# Patient Record
Sex: FEMALE | Race: OTHER | HISPANIC OR LATINO | Employment: FULL TIME | URBAN - METROPOLITAN AREA
[De-identification: names, ages, dates, MRNs, and addresses within clinical notes are randomized per-mention and may not be internally consistent; named-entity substitution may affect disease eponyms.]

---

## 2017-12-14 ENCOUNTER — ALLSCRIPTS OFFICE VISIT (OUTPATIENT)
Dept: OTHER | Facility: OTHER | Age: 37
End: 2017-12-14

## 2017-12-14 DIAGNOSIS — K91.2 POSTSURGICAL MALABSORPTION, NOT ELSEWHERE CLASSIFIED (CODE): ICD-10-CM

## 2017-12-14 DIAGNOSIS — E66.9 OBESITY: ICD-10-CM

## 2017-12-14 DIAGNOSIS — D50.9 IRON DEFICIENCY ANEMIA: ICD-10-CM

## 2017-12-14 DIAGNOSIS — Z98.890 OTHER SPECIFIED POSTPROCEDURAL STATES: ICD-10-CM

## 2017-12-15 ENCOUNTER — GENERIC CONVERSION - ENCOUNTER (OUTPATIENT)
Dept: OTHER | Facility: OTHER | Age: 37
End: 2017-12-15

## 2017-12-16 NOTE — PROGRESS NOTES
Assessment  1  Postoperative malabsorption (579 3) (K91 2)   2  Obesity (278 00) (E66 9)   3  Iron deficiency anemia (280 9) (D50 9)   4  Non-intractable vomiting without nausea, unspecified vomiting type (787 03) (R11 11)   5  Bariatric surgery status (V45 86) (Z98 84)   6  Weight gain (783 1) (R63 5)    Plan  Iron deficiency anemia, Obesity, Postoperative malabsorption,     · (1) COMPREHENSIVE METABOLIC PANEL; Status:Active; Requested for:86Zib1441;    Perform:[facility]; Due:95Enx6258; Ordered; For:Iron deficiency anemia, Obesity, Postoperative malabsorption, ; Ordered By:Nancy Lam;   · (1) FOLATE; Status:Active; Requested for:51Jcg1498;    Perform:[facility]; Due:66Fig1810; Ordered; For:Iron deficiency anemia, Obesity, Postoperative malabsorption, ; Ordered By:Nancy Lam;   · (1) PTH N-TERMINAL (INTACT); Status:Active; Requested for:82Duq1896;    Perform:[facility]; Due:06Xoh8181; Ordered; For:Iron deficiency anemia, Obesity, Postoperative malabsorption, ; Ordered By:Nancy Lam;   · (1) VITAMIN A; Status:Active; Requested for:47Wqn5415;    Perform:[facility]; Due:00Orp9439; Ordered; For:Iron deficiency anemia, Obesity, Postoperative malabsorption, ; Ordered By:Nancy Lam;   · (1) VITAMIN B1, WHOLE BLOOD; Status:Active; Requested for:28Zzd1496;    Perform:[facility]; Due:53Sbr0972; Ordered; For:Iron deficiency anemia, Obesity, Postoperative malabsorption, ; Ordered By:Nancy Lam;   · (1) VITAMIN B12; Status:Active; Requested for:42Fkp4228;    Perform:[facility]; Due:84Alf2987; Ordered; For:Iron deficiency anemia, Obesity, Postoperative malabsorption, ; Ordered By:Nancy Lam;   · (1) VITAMIN D 25-HYDROXY; Status:Active; Requested for:65Acs4692;    Perform:[facility]; Due:52Qov5753; Ordered; For:Iron deficiency anemia, Obesity, Postoperative malabsorption, ; Ordered By:Nancy Lam;  Non-intractable vomiting without nausea, unspecified vomiting type    · Famotidine 20 MG Oral Tablet; take 1 tablet by mouth twice daily   Rx By: Osmany Lynch; Dispense: 0 Days ; #:60 Tablet; Refill: 2;For: Non-intractable vomiting without nausea, unspecified vomiting type; JUDI = N; Verified Transmission to [facility] Allison Mirza; Last Updated By: System, [employer]; 12/14/2017 4:03:23 PM    Discussion/Summary    Follow up in 1 month to re check on diet tolerance  Since you are breast feeding- please stop omeprazole and take famotidine twice daily-focus on eating slowly/chewing thoroughly  Refer back to Chapter 2 of bariatric manual and lesson plans 1-6  You can make follow-up with RD and  first  After I see you back in a month will determine further follow-up with evaluation of stomach or medical weight management  Follow diet as discussed  Get lab work done prior to your next office visit  Follow vitamin/mineral recommendations as reviewed with you  Exercise as tolerated  Call our office if you have any problems with abdominal pain especially if associated with fever, chills, nausea, or worsening vomiting, or any other concerns  1 s/p laparoscopic sleeve gastrectomy 2  weight gain (post pregnancy)/currently breast feeding 3  vomiting/wowlrhonacge65tozq old female Status post laparoscopic sleeve gastrectomy by Dr Adrian Valerio 11/22/2011 has been lost to follow-up and was last seen in our office a little more than 3 years ago- reports she lost her insurance and was unable to return to us then  She is here in consultation to re-establish with our practice  She is complaining of some weight regain and feels I never lost the weight I should have from the surgery  She reports intermittent vomiting- once every 3-4 days usually at lunch notes she gets foamy prior to vomiting-sometimes some food  No associated abdominal pain, fever, chills or nausea  She reports to be moving her bowels regularly  In regards to her weight gain-INITIAL WEIGHT: 56 lbPost op LYNDSEY: 213 lb in our office/but reports a pre-pregnancy weight of 207 lb / bmi of 34/around 53% excess body weight loss-she notes she gained up to 250 lb post-pregnancy 5/17 butCurrent Weight: 239 lbshe has maintained a 40 lb weight loss now/31% excess body weight lossShpatrick initially wants to know if she is eligible for a revision to her surgery or if we can offer her help with her weight lossAdvised that based on her starting weight/bmi --her weight loss down to 207 lb was quite successful based on her surgery and starting weightAdvised that it is normal to see some weight gain after pregnancyShpatrick is currently breast feeding partially for her child-advised that this should help to some degree with weight loss as well  She has been taking omeprazole intermittently with her vomiting which she feels helps to some degreeAdvised that since she is still breast feeding-I would like her to hold omeprazole and will order pepcid/famotidine twice daily for now  -rx sent to her pharmacy for same  Advised on eating slowly, chewing thoroughly, having proteins moist and advised her on food preparation for this- I believe her vomiting is related to eating too quickly or not chewing well  A 24 hour diet recall was obtained from the patientIs eating a regular dietIs eating at least 60 grams of protein per dayfollowing 30/60 minute rule with liquidsdrinking at least 64 ounces of fluid per dayIs not exercising regularlyShe does drink socially a glass of wine a couple times per month- I encouraged her not to drink especially with breast feeding and advised on effect post-gastric surgery as well as contributing calories to weight gainShe appears appropriately restricted   She sometimes skips meals and advised her that this can slow weight lsosGreater than 50 percent of the 30 minutes visit was spent on diet education and healthy eating and importance of regular exercise to be successful with weight managementPLAN; Famotidine twice daily-she will focus on eating slowly, chewing thoroughly and eating moist proteinsI am referring her to RD and  first to help her with diet choices and her goal to lose weight  I recommended she review Chapter 2 of bariatric manual - the pre-op surgery goals and lesson plans 1-6 and also to start food log  Advised that in order to be eligible for a revision there has to be a problem related to the surgery- and this is a team decision when neededwill see her back in one month to reassess her oral intakes  If she is still having issues with regular vomiting after diet changes would consider further testing  Advised if she is doing better I would recommend referral to DR Darlene Esqueda for medical weight management  (She had asked about diet pills and advised that diet and exercise would be most important first and until she is done breast feeding diet pills would likely be contraindicated  This would be decided by DR Darlene Esqueda if she goes the medical weight management route  She appears to be in agreement with the plan4  Malabsorption- pateint is at risk for malabsorption of vitamins/minerals secondary to malabsorption from her procedure and restriction of intakesReviewed current supplements and advised on same-she is currently taking 2 prenatal vitamins daily-advised to take one daily-she notes she had at least iron studies done recently (outside our network) -I will order other routine vitamin levels for her now- advised to go to the same lab and only get the labs done that were not completed already- I asked her to have her recent labs faxed to us for her records  5  iron deficiency anemia-she has been following with hematology and reports she still is anemic by recent labs and follows with hematology at [address] [place] [address] for this  FAMILY HISTORY REVIEWEDMGM- VYUFQT-CD-khve at age 79Mother with CAD and had kidney removed-unclear etiologyFather- at age 59 from 'choking'PGF- from liver problem /? cirrhosis2 Brothers- ObesePast medical and past surgical history reviewed/updated  The patient has the current Goals: Continued weight loss with good nutrition intakesNormal vitamin/mineral levelsExercise as toleratedresolve vomiting  The patent has the current Barriers: Not regularly exercising  Patient is able to Self-Care  Educational resources provided: N/a  Possible side effects of new medications were reviewed with the patient/guardian today  The treatment plan was reviewed with the patient/guardian  The patient/guardian understands and agrees with the treatment plan   She was advised to follow up due to malabsorption risks  Chief Complaint  Patient in office today for overdue annual visit  She is here to re-establish with our office  She is taking vitamins  Wants help with weight loss and wondering about a revision to her surgery  She is complaining of intermittent vomiting- takes omeprazole at times but not regularly  She gave birth to her second child in May and is still doing some breast feeding      Post-Op  Post-Op Bariatric Surgery:  Brooklynn Sánchez is status post lap sleeve procedure,-- performed on 2011--   by Dr Sarah Roberts  HPI: today's weight is 239 lb pounds,-- today's BMI is 39 9-- and-- her total weight loss is 31% excess body weight loss pounds  The patient reports vomiting, but-- no nausea,-- no constipation,-- no diarrhea,-- no chest pain-- and-- no abdominal pain  Diet and Exercise: Diet history reviewed and discussed with the patient  Weight loss/gains to date discussed with the patient  Supplements: multivitamins-- and-- taking prenatal vitamins  PE:  Abdominal exam: soft,-- nontender,-- no rebound tenderness,-- no guarding,-- no incisional hernia-- and-- no palpable mass  Assessment:  Post-op, the patient is regressing-- and-- see discussion  Plan: Activity restrictions: None    Instructions / Recommendations: dietary counseling recommended,-- recommended a daily protein intake of  grams,-- vitamin supplement(s) recommended,-- mineral supplement(s) recommended,-- recommended exercising at least 150 minutes per week,-- and  follow-up-- and-- instructed to call the office for concerns, questions, or problems  The patient was instructed to follow up in 1 months  Review of Systems   Constitutional: pre-pregnancy weight of 207 lb/ post pregnancy 250 lb/ with recent decrease to 239 lb, but-- no fever,-- not feeling poorly-- and-- no chills  Eyes: no eye pain-- and-- no eyesight problems  ENT: no nosebleeds,-- no sore throat-- and-- no hoarseness  Cardiovascular: no chest pain-- and-- no palpitations  Respiratory: no shortness of breath-- and-- no wheezing  Gastrointestinal: vomiting-- and-- no black/tarry stools, but-- no abdominal pain,-- no nausea,-- no diarrhea-- and-- no blood in stools  Genitourinary: no dysuria  Musculoskeletal: c/o bilateral leg edema  Integumentary: no rashes-- and-- no skin lesions  Neurological: numbness,-- tingling-- and-- intermittent numbness to fingers when she wakes up/not during the day, but-- no headache  Psychiatric: no anxiety-- and-- no depression  Endocrine: no muscle weakness  no feelings of weakness  Hematologic/Lymphatic: no swollen glands  Active Problems  1  Bariatric surgery status (V45 86) (Z98 84)   2  Iron deficiency anemia (280 9) (D50 9)   3  Migraine headache (346 90) (G43 909)   4  Obesity (278 00) (E66 9)   5  Postoperative malabsorption (579 3) (K91 2)    Social History   · Denied: History of Drug use   · Never smoker   · Social alcohol use (Z78 9)  The social history was reviewed and updated today  Current Meds   1  Biotin 1000 MCG Oral Tablet Recorded   2  Multivitamins Oral Capsule Recorded    The medication list was reviewed and updated today  Allergies  1   No Known Allergies    Vitals   Recorded: 13OIX5369 01:37PM   Temperature 98 2 F   Heart Rate 78   Respiration 18   Systolic 505   Diastolic 60   Height 5 ft 5 5 in   Weight 239 lb    BMI Calculated 39 17   BSA Calculated 2 15     Physical Exam   Constitutional  General appearance: No acute distress, well appearing and well nourished  Eyes bilateral conjunctiva without pallor  Ears, Nose, Mouth, and Throat  External inspection of ears and nose: Normal  -- mucous membranes moist   Pulmonary  Respiratory effort: No increased work of breathing or signs of respiratory distress  Auscultation of lungs: Clear to auscultation  Cardiovascular  Auscultation of heart: Normal rate and rhythm, normal S1 and S2, without murmurs  Abdomen soft, +BS, Non-tender to palpation in all 4 quadrants, no rebound or guarding, no incisional hernias apprciated  Lymphatic  Palpation of lymph nodes in neck: No lymphadenopathy  Musculoskeletal  Gait and station: Normal    Skin  Skin and subcutaneous tissue: Normal without rashes or lesions  -- limited exam of abdomen/no rashes or lesions  Neurologic  Reflexes: 2+ and symmetric   -- bilateral lower extremities -grossly equal   Psychiatric  Orientation to person, place, and time: Normal    Mood and affect: Normal          Future Appointments    Date/Time Provider Specialty Site   01/23/2018 10:30 AM David Mo, [address] [place] [address] Surgery [facility]   12/19/2018 01:30 PM David Mo [facility] General Surgery [facility]     Signatures   Electronically signed by : Gloria Bullock [facility]; Dec 14 2017  5:15PM EST                       (Author)    Electronically signed by : Gloria Bullock [facility]; Dec 14 2017  5:20PM EST                       (Author)    Electronically signed by : ADDIE Titus ; Dec 15 2017 11:31AM EST                       (Validation)

## 2018-01-22 VITALS
HEART RATE: 78 BPM | TEMPERATURE: 98.2 F | BODY MASS INDEX: 38.41 KG/M2 | DIASTOLIC BLOOD PRESSURE: 60 MMHG | SYSTOLIC BLOOD PRESSURE: 114 MMHG | WEIGHT: 239 LBS | RESPIRATION RATE: 18 BRPM | HEIGHT: 66 IN

## 2018-01-23 ENCOUNTER — GENERIC CONVERSION - ENCOUNTER (OUTPATIENT)
Dept: OTHER | Facility: OTHER | Age: 38
End: 2018-01-23

## 2018-01-23 NOTE — PROGRESS NOTES
Message  Outreach phone call; no response but left message for a return phone call  Patient referred by KRISTIN WEAVER      Active Problems    1  Bariatric surgery status (V45 86) (Z98 84)   2  Iron deficiency anemia (280 9) (D50 9)   3  Migraine headache (346 90) (G43 909)   4  Non-intractable vomiting without nausea, unspecified vomiting type (787 03) (R11 11)   5  Obesity (278 00) (E66 9)   6  Postoperative malabsorption (579 3) (K91 2)   7  Weight gain (783 1) (R63 5)    Current Meds   1  Biotin 1000 MCG Oral Tablet Recorded   2  Famotidine 20 MG Oral Tablet; take 1 tablet by mouth twice daily; Therapy: 96MAK7602 to (Last Rx:53Cnz8496)  Requested for: 23DHK1503 Ordered   3  Multivitamins Oral Capsule Recorded    Allergies    1   No Known Allergies    Signatures   Electronically signed by : LORENE Encinas; Dec 15 2017 11:13AM EST                       (Author)

## 2018-01-24 NOTE — MISCELLANEOUS
Provider Comments  Provider Comments:   Dear Keysha Sher had a scheduled appointment at our office today but did not show  We attempted to call you back but were unable to reach you  It is very important that you follow up with us so that we can assess your physical and nutritional safety after your surgery  Please call our office at [phone] to reschedule your appointment       Sincerely,     [facility]          Signatures   Electronically signed by : Deanna Marquse, ; Jan 23 2018 10:31AM EST                       (Author)

## 2018-02-12 NOTE — MISCELLANEOUS
Provider Comments  Provider Comments:   Dear Lynda Wise had a scheduled appointment at our office for 01/23/2018 that you called to cancel but did not reschedule for another day  It is very important that you follow up with us so that we can assess your physical and nutritional safety after your surgery  Please call our office at [phone] to reschedule your appointment  Sincerely,   Kamryn Gautam [facility]        Signatures   Electronically signed by :  Ishmael Rowland, ; Feb 5 2018  9:46AM EST                       (Administrative)

## 2018-12-12 RX ORDER — FAMOTIDINE 20 MG/1
1 TABLET, FILM COATED ORAL 2 TIMES DAILY
COMMUNITY
Start: 2017-12-14 | End: 2019-03-21 | Stop reason: CLARIF

## 2018-12-12 RX ORDER — OMEPRAZOLE 20 MG/1
1 CAPSULE, DELAYED RELEASE ORAL DAILY
COMMUNITY
Start: 2014-10-28 | End: 2019-03-21 | Stop reason: CLARIF

## 2018-12-12 RX ORDER — BIOTIN 1 MG
TABLET ORAL
Status: ON HOLD | COMMUNITY
End: 2019-01-30 | Stop reason: ALTCHOICE

## 2018-12-19 ENCOUNTER — TELEPHONE (OUTPATIENT)
Dept: BARIATRICS | Facility: CLINIC | Age: 38
End: 2018-12-19

## 2018-12-19 PROBLEM — K91.2 POSTSURGICAL MALABSORPTION: Status: ACTIVE | Noted: 2018-12-19

## 2018-12-19 PROBLEM — Z98.84 BARIATRIC SURGERY STATUS: Status: ACTIVE | Noted: 2018-12-19

## 2019-01-24 ENCOUNTER — OFFICE VISIT (OUTPATIENT)
Dept: BARIATRICS | Facility: CLINIC | Age: 39
End: 2019-01-24
Payer: COMMERCIAL

## 2019-01-24 VITALS
BODY MASS INDEX: 38.99 KG/M2 | HEIGHT: 65 IN | TEMPERATURE: 97.2 F | HEART RATE: 78 BPM | DIASTOLIC BLOOD PRESSURE: 62 MMHG | SYSTOLIC BLOOD PRESSURE: 118 MMHG | WEIGHT: 234 LBS

## 2019-01-24 DIAGNOSIS — Z98.84 BARIATRIC SURGERY STATUS: Primary | ICD-10-CM

## 2019-01-24 DIAGNOSIS — R10.13 EPIGASTRIC PAIN: ICD-10-CM

## 2019-01-24 DIAGNOSIS — E66.9 OBESITY, CLASS II, BMI 35-39.9: ICD-10-CM

## 2019-01-24 DIAGNOSIS — K91.2 POSTSURGICAL MALABSORPTION: ICD-10-CM

## 2019-01-24 PROBLEM — E66.812 OBESITY, CLASS II, BMI 35-39.9: Status: ACTIVE | Noted: 2019-01-24

## 2019-01-24 PROCEDURE — 99214 OFFICE O/P EST MOD 30 MIN: CPT | Performed by: SURGERY

## 2019-01-24 NOTE — PROGRESS NOTES
FOLLOW UP VISIT - BARIATRIC SURGERY  Woody Aldrich 41 y o  female MRN: [id number]  Unit/Bed#:  Encounter: [id number]      HPI:  Marcia Sosa is a 41 y o  female who presents with a history of sleeve gastrectomy in 2011  Here for follow-up as she has developed abdominal pain  Review of Systems   Gastrointestinal: Positive for abdominal pain, nausea and vomiting  Intermittent   All other systems reviewed and are negative  Historical Information   Past Medical History:   Diagnosis Date    Anemia     Bariatric surgery status     Dizzy     Fatigue     Migraine     Morbid obesity (HCC)     Non-intractable vomiting     Postgastrectomy malabsorption     Weight gain      Past Surgical History:   Procedure Laterality Date    CHOLECYSTECTOMY      HERNIA REPAIR      SLEEVE GASTROPLASTY       Social History   History   Alcohol Use    Yes     Comment: social     History   Drug Use No     History   Smoking Status    Never Smoker   Smokeless Tobacco    Never Used     Family History: non-contributory    Meds/Allergies   all medications and allergies reviewed  Allergies   Allergen Reactions    Aspirin Anaphylaxis     Questionable anaphylaxis with aspirin- avoids to be safe     Ibuprofen      Other reaction(s): Other (See Comments)  Facial edema       Objective       Current Vitals:   Blood Pressure: 118/62 (01/24/19 1445)  Pulse: 78 (01/24/19 1445)  Temperature: (!) 97 2 °F (36 2 °C) (01/24/19 1445)  Height: 5' 4 5" (163 8 cm) (01/24/19 1445)  Weight - Scale: 106 kg (234 lb) (01/24/19 1445)        Invasive Devices          No matching active lines, drains, or airways          Physical Exam   Constitutional: She is oriented to person, place, and time  She appears well-developed  No distress  HENT:   Head: Normocephalic and atraumatic  Right Ear: External ear normal    Left Ear: External ear normal    Eyes: Conjunctivae are normal  Right eye exhibits no discharge   Left eye exhibits no discharge  No scleral icterus  Abdominal: Soft  Bowel sounds are normal  She exhibits no distension and no mass  There is no tenderness  There is no rebound and no guarding  Abdomen is obese  Benign  Mild tenderness to palpation on the epigastric region  No rebound tenderness or peritoneal signs  Neurological: She is alert and oriented to person, place, and time  Skin: She is not diaphoretic  Psychiatric: She has a normal mood and affect  Her behavior is normal  Judgment and thought content normal    Vitals reviewed  Lab Results:   She had some labs done and an outside hospital back in September that show she is mildly anemic  No nutritional labs were done in a while  Assessment/PLAN:    41 y o  female with a history of laparoscopic sleeve gastrectomy on November of 2011  She has lost 35% excess weight loss and 16% total body weight  The last time she was seen in the office was and October of 2014 and since then she has regained almost 20 lb  She has developed abdominal pain after meals that is sometimes associated with nausea vomiting  She has been taking omeprazole once a day without significant improvement  I am going to schedule her for an endoscopy to rule out a potential ulcer  In the meantime have told her to increase her PPI to twice a day  She has not had any nutritional labs done in a while and I am going to order them today as well        She will follow up with us as scheduled after the study is done    Elizabeth Escalera MD  1/24/2019  3:22 PM

## 2019-01-29 ENCOUNTER — ANESTHESIA EVENT (OUTPATIENT)
Dept: GASTROENTEROLOGY | Facility: HOSPITAL | Age: 39
End: 2019-01-29
Payer: COMMERCIAL

## 2019-01-30 ENCOUNTER — ANESTHESIA (OUTPATIENT)
Dept: GASTROENTEROLOGY | Facility: HOSPITAL | Age: 39
End: 2019-01-30
Payer: COMMERCIAL

## 2019-01-30 ENCOUNTER — APPOINTMENT (OUTPATIENT)
Dept: LAB | Facility: HOSPITAL | Age: 39
End: 2019-01-30
Attending: SURGERY
Payer: COMMERCIAL

## 2019-01-30 ENCOUNTER — HOSPITAL ENCOUNTER (OUTPATIENT)
Facility: HOSPITAL | Age: 39
Setting detail: OUTPATIENT SURGERY
Discharge: HOME | End: 2019-01-30
Attending: SURGERY | Admitting: SURGERY
Payer: COMMERCIAL

## 2019-01-30 VITALS
OXYGEN SATURATION: 100 % | BODY MASS INDEX: 39.27 KG/M2 | TEMPERATURE: 99.2 F | DIASTOLIC BLOOD PRESSURE: 68 MMHG | RESPIRATION RATE: 12 BRPM | HEIGHT: 64 IN | WEIGHT: 230 LBS | HEART RATE: 62 BPM | SYSTOLIC BLOOD PRESSURE: 117 MMHG

## 2019-01-30 DIAGNOSIS — E66.9 OBESITY, CLASS II, BMI 35-39.9: ICD-10-CM

## 2019-01-30 DIAGNOSIS — K91.2 POSTSURGICAL MALABSORPTION: ICD-10-CM

## 2019-01-30 DIAGNOSIS — Z98.84 BARIATRIC SURGERY STATUS: ICD-10-CM

## 2019-01-30 LAB
25(OH)D3 SERPL-MCNC: 32.2 NG/ML (ref 30–100)
ALBUMIN SERPL BCP-MCNC: 3.4 G/DL (ref 3.5–5)
ALP SERPL-CCNC: 78 U/L (ref 46–116)
ALT SERPL W P-5'-P-CCNC: 16 U/L (ref 12–78)
ANION GAP SERPL CALCULATED.3IONS-SCNC: 8 MMOL/L (ref 4–13)
AST SERPL W P-5'-P-CCNC: 28 U/L (ref 5–45)
BILIRUB SERPL-MCNC: 0.34 MG/DL (ref 0.2–1)
BUN SERPL-MCNC: 11 MG/DL (ref 5–25)
CALCIUM SERPL-MCNC: 8.8 MG/DL (ref 8.3–10.1)
CHLORIDE SERPL-SCNC: 102 MMOL/L (ref 100–108)
CO2 SERPL-SCNC: 29 MMOL/L (ref 21–32)
CREAT SERPL-MCNC: 0.71 MG/DL (ref 0.6–1.3)
ERYTHROCYTE [DISTWIDTH] IN BLOOD BY AUTOMATED COUNT: 16.4 % (ref 11.6–15.1)
EXT PREGNANCY TEST URINE: NEGATIVE
GFR SERPL CREATININE-BSD FRML MDRD: 109 ML/MIN/1.73SQ M
GLUCOSE P FAST SERPL-MCNC: 90 MG/DL (ref 65–99)
HCT VFR BLD AUTO: 38.6 % (ref 34.8–46.1)
HGB BLD-MCNC: 11.7 G/DL (ref 11.5–15.4)
MCH RBC QN AUTO: 26.4 PG (ref 26.8–34.3)
MCHC RBC AUTO-ENTMCNC: 30.3 G/DL (ref 31.4–37.4)
MCV RBC AUTO: 87 FL (ref 82–98)
PLATELET # BLD AUTO: 279 THOUSANDS/UL (ref 149–390)
PMV BLD AUTO: 10 FL (ref 8.9–12.7)
POTASSIUM SERPL-SCNC: 3.5 MMOL/L (ref 3.5–5.3)
PROT SERPL-MCNC: 7.8 G/DL (ref 6.4–8.2)
PTH-INTACT SERPL-MCNC: 74.7 PG/ML (ref 18.4–80.1)
RBC # BLD AUTO: 4.44 MILLION/UL (ref 3.81–5.12)
SODIUM SERPL-SCNC: 139 MMOL/L (ref 136–145)
VIT B12 SERPL-MCNC: 394 PG/ML (ref 100–900)
WBC # BLD AUTO: 6.32 THOUSAND/UL (ref 4.31–10.16)

## 2019-01-30 PROCEDURE — 81025 URINE PREGNANCY TEST: CPT | Performed by: ANESTHESIOLOGY

## 2019-01-30 PROCEDURE — 80053 COMPREHEN METABOLIC PANEL: CPT

## 2019-01-30 PROCEDURE — 88342 IMHCHEM/IMCYTCHM 1ST ANTB: CPT | Performed by: PATHOLOGY

## 2019-01-30 PROCEDURE — 84425 ASSAY OF VITAMIN B-1: CPT

## 2019-01-30 PROCEDURE — 88305 TISSUE EXAM BY PATHOLOGIST: CPT | Performed by: PATHOLOGY

## 2019-01-30 PROCEDURE — 84590 ASSAY OF VITAMIN A: CPT

## 2019-01-30 PROCEDURE — 36415 COLL VENOUS BLD VENIPUNCTURE: CPT

## 2019-01-30 PROCEDURE — 83970 ASSAY OF PARATHORMONE: CPT

## 2019-01-30 PROCEDURE — 43239 EGD BIOPSY SINGLE/MULTIPLE: CPT | Performed by: SURGERY

## 2019-01-30 PROCEDURE — 82306 VITAMIN D 25 HYDROXY: CPT

## 2019-01-30 PROCEDURE — 85027 COMPLETE CBC AUTOMATED: CPT

## 2019-01-30 PROCEDURE — 82607 VITAMIN B-12: CPT

## 2019-01-30 RX ORDER — PROPOFOL 10 MG/ML
INJECTION, EMULSION INTRAVENOUS AS NEEDED
Status: DISCONTINUED | OUTPATIENT
Start: 2019-01-30 | End: 2019-01-30 | Stop reason: SURG

## 2019-01-30 RX ORDER — SODIUM CHLORIDE 9 MG/ML
125 INJECTION, SOLUTION INTRAVENOUS CONTINUOUS
Status: DISCONTINUED | OUTPATIENT
Start: 2019-01-30 | End: 2019-01-30 | Stop reason: HOSPADM

## 2019-01-30 RX ADMIN — PROPOFOL 200 MG: 10 INJECTION, EMULSION INTRAVENOUS at 08:41

## 2019-01-30 RX ADMIN — LIDOCAINE HYDROCHLORIDE 100 MG: 20 INJECTION, SOLUTION INTRAVENOUS at 08:41

## 2019-01-30 RX ADMIN — SODIUM CHLORIDE 125 ML/HR: 0.9 INJECTION, SOLUTION INTRAVENOUS at 07:40

## 2019-01-30 NOTE — ANESTHESIA PREPROCEDURE EVALUATION
Review of Systems/Medical History  Patient summary reviewed        Cardiovascular  Negative cardio ROS    Pulmonary  Negative pulmonary ROS        GI/Hepatic  Negative GI/hepatic ROS          Negative  ROS        Endo/Other  Negative endo/other ROS   Obesity  morbid obesity   GYN  Negative gynecology ROS          Hematology  Negative hematology ROS Anemia ,     Musculoskeletal  Negative musculoskeletal ROS        Neurology  Negative neurology ROS   Headaches,    Psychology   Negative psychology ROS              Physical Exam    Airway    Mallampati score: II  TM Distance: >3 FB  Neck ROM: full     Dental   No notable dental hx     Cardiovascular  Comment: Negative ROS, Rhythm: regular, Rate: normal, Cardiovascular exam normal    Pulmonary  Pulmonary exam normal Breath sounds clear to auscultation,     Other Findings        Anesthesia Plan  ASA Score- 3     Anesthesia Type- general and IV sedation with anesthesia with ASA Monitors  Additional Monitors:   Airway Plan:         Plan Factors-    Induction- intravenous  Postoperative Plan-     Informed Consent- Anesthetic plan and risks discussed with patient

## 2019-01-30 NOTE — DISCHARGE INSTRUCTIONS
Gastritis   WHAT YOU NEED TO KNOW:   Gastritis is inflammation or irritation of the lining of your stomach  DISCHARGE INSTRUCTIONS:   Call 911 for any of the following:   · You develop chest pain or shortness of breath  Seek care immediately if:   · You vomit blood  · You have black or bloody bowel movements  · You have severe stomach or back pain  Contact your healthcare provider if:   · You have a fever  · You have new or worsening symptoms, even after treatment  · You have questions or concerns about your condition or care  Medicines:   · Medicines  may be given to help treat a bacterial infection or decrease stomach acid  · Take your medicine as directed  Contact your healthcare provider if you think your medicine is not helping or if you have side effects  Tell him or her if you are allergic to any medicine  Keep a list of the medicines, vitamins, and herbs you take  Include the amounts, and when and why you take them  Bring the list or the pill bottles to follow-up visits  Carry your medicine list with you in case of an emergency  Manage or prevent gastritis:   · Do not smoke  Nicotine and other chemicals in cigarettes and cigars can make your symptoms worse and cause lung damage  Ask your healthcare provider for information if you currently smoke and need help to quit  E-cigarettes or smokeless tobacco still contain nicotine  Talk to your healthcare provider before you use these products  · Do not drink alcohol  Alcohol can prevent healing and make your gastritis worse  Talk to your healthcare provider if you need help to stop drinking  · Do not take NSAIDs or aspirin unless directed  These and similar medicines can cause irritation  If your healthcare provider says it is okay to take NSAIDs, take them with food  · Do not eat foods that cause irritation  Foods such as oranges and salsa can cause burning or pain  Eat a variety of healthy foods   Examples include fruits (not citrus), vegetables, low-fat dairy products, beans, whole-grain breads, and lean meats and fish  Try to eat small meals, and drink water with your meals  Do not eat for at least 3 hours before you go to bed  · Find ways to relax and decrease stress  Stress can increase stomach acid and make gastritis worse  Activities such as yoga, meditation, or listening to music can help you relax  Spend time with friends, or do things you enjoy  Follow up with your healthcare provider as directed: You may need ongoing tests or treatment, or referral to a gastroenterologist  Write down your questions so you remember to ask them during your visits  © 2017 [employer] Information is for End User's use only and may not be sold, redistributed or otherwise used for commercial purposes  All illustrations and images included in CareNotes® are the copyrighted property of A D [employer] M , [employer]  or [place]  The above information is an  only  It is not intended as medical advice for individual conditions or treatments  Talk to your doctor, nurse or pharmacist before following any medical regimen to see if it is safe and effective for you  Upper Endoscopy   WHAT YOU NEED TO KNOW:   An upper endoscopy is also called an upper gastrointestinal (GI) endoscopy, or an esophagogastroduodenoscopy (EGD)  You may feel bloated, gassy, or have some abdominal discomfort after your procedure  Your throat may be sore for 24 to 36 hours  You may burp or pass gas from air that is still inside your body  DISCHARGE INSTRUCTIONS:   Call 911 for any of the following:   · You have sudden chest pain or trouble breathing  Seek care immediately if:   · You feel dizzy or faint  · You have trouble swallowing  · Your bowel movements are very dark or black  · Your abdomen is hard and firm and you have severe pain  · You vomit blood    Contact your healthcare provider if:   · You feel full or bloated and cannot burp or pass gas  · You have not had a bowel movement for 3 days after your procedure  · You have neck pain  · You have a fever or chills  · You have nausea or are vomiting  · You have a rash or hives  · You have questions or concerns about your endoscopy  Relieve a sore throat:  Suck on throat lozenges or crushed ice  Gargle with a small amount of warm salt water  Mix 1 teaspoon of salt and 1 cup of warm water to make salt water  Relieve gas and discomfort from bloating:  Lie on your right side with a heating pad on your abdomen  Take short walks to help pass gas  Eat small meals until bloating is relieved  Rest after your procedure: You have been given medicine to relax you  Do not  drive or make important decisions until the day after your procedure  Return to your normal activity as directed  You can usually return to work the day after your procedure  Follow up with your healthcare provider as directed:  Write down your questions so you remember to ask them during your visits  © 2017 [employer] [employer] is for End User's use only and may not be sold, redistributed or otherwise used for commercial purposes  All illustrations and images included in CareNotes® are the copyrighted property of [employer]  or [place]  The above information is an  only  It is not intended as medical advice for individual conditions or treatments  Talk to your doctor, nurse or pharmacist before following any medical regimen to see if it is safe and effective for you

## 2019-01-30 NOTE — H&P (VIEW-ONLY)
FOLLOW UP VISIT - BARIATRIC SURGERY  Carlotta Aldrich 41 y o  female MRN: [id number]  Unit/Bed#:  Encounter: [id number]      HPI:  Christy Ross is a 41 y o  female who presents with a history of sleeve gastrectomy in 2011  Here for follow-up as she has developed abdominal pain  Review of Systems   Gastrointestinal: Positive for abdominal pain, nausea and vomiting  Intermittent   All other systems reviewed and are negative  Historical Information   Past Medical History:   Diagnosis Date    Anemia     Bariatric surgery status     Dizzy     Fatigue     Migraine     Morbid obesity (HCC)     Non-intractable vomiting     Postgastrectomy malabsorption     Weight gain      Past Surgical History:   Procedure Laterality Date    CHOLECYSTECTOMY      HERNIA REPAIR      SLEEVE GASTROPLASTY       Social History   History   Alcohol Use    Yes     Comment: social     History   Drug Use No     History   Smoking Status    Never Smoker   Smokeless Tobacco    Never Used     Family History: non-contributory    Meds/Allergies   all medications and allergies reviewed  Allergies   Allergen Reactions    Aspirin Anaphylaxis     Questionable anaphylaxis with aspirin- avoids to be safe     Ibuprofen      Other reaction(s): Other (See Comments)  Facial edema       Objective       Current Vitals:   Blood Pressure: 118/62 (01/24/19 1445)  Pulse: 78 (01/24/19 1445)  Temperature: (!) 97 2 °F (36 2 °C) (01/24/19 1445)  Height: 5' 4 5" (163 8 cm) (01/24/19 1445)  Weight - Scale: 106 kg (234 lb) (01/24/19 1445)        Invasive Devices          No matching active lines, drains, or airways          Physical Exam   Constitutional: She is oriented to person, place, and time  She appears well-developed  No distress  HENT:   Head: Normocephalic and atraumatic  Right Ear: External ear normal    Left Ear: External ear normal    Eyes: Conjunctivae are normal  Right eye exhibits no discharge   Left eye exhibits no discharge  No scleral icterus  Abdominal: Soft  Bowel sounds are normal  She exhibits no distension and no mass  There is no tenderness  There is no rebound and no guarding  Abdomen is obese  Benign  Mild tenderness to palpation on the epigastric region  No rebound tenderness or peritoneal signs  Neurological: She is alert and oriented to person, place, and time  Skin: She is not diaphoretic  Psychiatric: She has a normal mood and affect  Her behavior is normal  Judgment and thought content normal    Vitals reviewed  Lab Results:   She had some labs done and an outside hospital back in September that show she is mildly anemic  No nutritional labs were done in a while  Assessment/PLAN:    41 y o  female with a history of laparoscopic sleeve gastrectomy on November of 2011  She has lost 35% excess weight loss and 16% total body weight  The last time she was seen in the office was and October of 2014 and since then she has regained almost 20 lb  She has developed abdominal pain after meals that is sometimes associated with nausea vomiting  She has been taking omeprazole once a day without significant improvement  I am going to schedule her for an endoscopy to rule out a potential ulcer  In the meantime have told her to increase her PPI to twice a day  She has not had any nutritional labs done in a while and I am going to order them today as well        She will follow up with us as scheduled after the study is done    Saud Marie MD  1/24/2019  3:22 PM

## 2019-01-30 NOTE — OP NOTE
[facility]   [address] [facility]  [place] [address], [address]  [phone] (Fax)      Operative Report  ESOPHAGOGASTRODUODENOSCOPY (EGD) with bx     Patient Name: Magali Mesa    :  1980  MRN: [id number]  Patient Location: [facility]  Surgery Date : 2019  Surgeons:  Surgeon(s) and Role:     * Jonna Yu MD - Primary    Diagnosis:    Pre-Op Diagnosis Codes:  Bariatric surgery status [Z98 84]  Abdominal pain  Nausea vomiting    Post-Op Diagnosis Codes: * Bariatric surgery status [Z98 84]     * Abdominal pain, nausea vomiting     * Pouchitis and bile reflux    Procedure  1  Endoscopy with Biopsy    Specimen(s):  ID Type Source Tests Collected by Time Destination   1 : gastric bx r/o h  pilori Tissue Stomach TISSUE EXAM Jonna Yu MD 2019 [id number]        Estimated Blood Loss:    Minimal      Anesthesia Type:     IV Sedation with Anesthesia    Operative Indications:    Bariatric surgery status [Z98 84]  Abdominal pain, nausea vomiting    Operative Findings:    Pouchitis  Bile reflux all the way up to the esophagus  Complications:     None    Procedure and Technique:       Indication for the procedure     41 y o  female with a history of laparoscopic sleeve gastrectomy on 2011  She has lost 35% excess weight loss and 16% total body weight  The last time she was seen in the office was and 2014 and since then she has regained almost 20 lb  She has developed abdominal pain after meals that is sometimes associated with nausea vomiting  She has been taking omeprazole once a day without significant improvement      I am going to schedule her for an endoscopy to rule out a potential ulcer      Operative Technique     The patient was brought to the GI suite and was placed in a supine position  EKG trace, pulse, blood pressure and oxygen saturation were monitored throughout the procedure    A time out was called and the patient was identified by name and arm band  The patient was placed in a left lateral decubitus position and received IV sedation with propofol by the anesthesia staff  The endoscope was introduced through the mouth and advanced under under direct visualization  The esophagus was normal in appearance  The gastric pouch showed evidence of  inflammation  There were no mucosal lesions, polyps nor ulcerations   There was a moderate amount of bile refluxing into the antrum of the stomach from the pylorus  The first and second portions of the duodenum were inspected and were normal in appearance  A biopsy was taken times two from the fundus and incisura with a forceps grasper to rule out the presence of H  Pylori  As I was removing the scope we noticed again bile refluxing all the way up to the LES     The GE junction was again inspected upon withdrawing the scope and was normal in appearance      Impression     Pouchitis  Bile reflux all the way up to the LES      Patient Disposition:    PACU     Signature: The JHONATAN Jones MD  Date: January 30, 2019  Time: 8:45 AM

## 2019-01-30 NOTE — PROGRESS NOTES
D/C instructions given and pt verbalizes understanding  Dr Isabel Kc was here to talk with pt and   OOB to ambulate gait steady denies dizziness

## 2019-02-02 LAB — VIT B1 BLD-SCNC: 113.8 NMOL/L (ref 66.5–200)

## 2019-02-05 DIAGNOSIS — Z98.84 BARIATRIC SURGERY STATUS: ICD-10-CM

## 2019-02-05 DIAGNOSIS — E53.8 LOW VITAMIN B12 LEVEL: ICD-10-CM

## 2019-02-05 DIAGNOSIS — E66.9 OBESITY, CLASS II, BMI 35-39.9: ICD-10-CM

## 2019-02-05 DIAGNOSIS — K91.2 POSTSURGICAL MALABSORPTION: Primary | ICD-10-CM

## 2019-02-05 LAB — VIT A SERPL-MCNC: 39 UG/DL (ref 18.9–57.3)

## 2019-02-07 ENCOUNTER — OFFICE VISIT (OUTPATIENT)
Dept: BARIATRICS | Facility: CLINIC | Age: 39
End: 2019-02-07
Payer: COMMERCIAL

## 2019-02-07 VITALS
HEART RATE: 72 BPM | DIASTOLIC BLOOD PRESSURE: 70 MMHG | WEIGHT: 234.5 LBS | BODY MASS INDEX: 37.69 KG/M2 | HEIGHT: 66 IN | TEMPERATURE: 97.7 F | SYSTOLIC BLOOD PRESSURE: 114 MMHG

## 2019-02-07 DIAGNOSIS — Z98.84 BARIATRIC SURGERY STATUS: ICD-10-CM

## 2019-02-07 DIAGNOSIS — K91.2 POSTSURGICAL MALABSORPTION: ICD-10-CM

## 2019-02-07 DIAGNOSIS — E66.9 OBESITY, CLASS II, BMI 35-39.9: Primary | ICD-10-CM

## 2019-02-07 DIAGNOSIS — R10.13 EPIGASTRIC PAIN: Primary | ICD-10-CM

## 2019-02-07 PROBLEM — R11.14 BILIOUS VOMITING WITH NAUSEA: Status: ACTIVE | Noted: 2019-02-07

## 2019-02-07 PROCEDURE — 99213 OFFICE O/P EST LOW 20 MIN: CPT | Performed by: SURGERY

## 2019-02-07 RX ORDER — SUCRALFATE 1 G/1
1 TABLET ORAL 4 TIMES DAILY
Qty: 120 TABLET | Refills: 1 | Status: SHIPPED | OUTPATIENT
Start: 2019-02-07 | End: 2019-02-28 | Stop reason: SDUPTHER

## 2019-02-07 NOTE — PROGRESS NOTES
FOLLOW UP VISIT - BARIATRIC SURGERY  Loreta Aldrich 41 y o  female MRN: [id number]  Unit/Bed#:  Encounter: [id number]      HPI:  Christelle Foster is a 41 y o  female who presents with a history of laparoscopic sleeve gastrectomy back in 2011  Here to review the results of her endoscopy  Review of Systems   Gastrointestinal: Positive for abdominal pain and nausea  Intermittent       Historical Information   Past Medical History:   Diagnosis Date    Anemia     Bariatric surgery status     Dizzy     Fatigue     Heart murmur     heart murmer, work up negative with holter monitor    Migraine     Morbid obesity (HCC)     Non-intractable vomiting     Postgastrectomy malabsorption     Weight gain      Past Surgical History:   Procedure Laterality Date    CHOLECYSTECTOMY      OVARIAN CYST REMOVAL Right 2018    [place] EGD TRANSORAL BIOPSY SINGLE/MULTIPLE N/A 1/30/2019    Procedure: ESOPHAGOGASTRODUODENOSCOPY (EGD) with bx;  Surgeon: Tita Burns MD;  Location: [facility]; Service: Bariatrics    SLEEVE GASTROPLASTY       Social History   History   Alcohol Use    Yes     Comment: social     History   Drug Use No     History   Smoking Status    Never Smoker   Smokeless Tobacco    Never Used     Family History: non-contributory    Meds/Allergies   all medications and allergies reviewed  Allergies   Allergen Reactions    Aspirin Anaphylaxis     Questionable anaphylaxis with aspirin- avoids to be safe     Shellfish-Derived Products Anaphylaxis    Ibuprofen      Other reaction(s):  Other (See Comments)  Facial edema       Objective       Current Vitals:   Blood Pressure: 114/70 (02/07/19 1548)  Pulse: 72 (02/07/19 1548)  Temperature: 97 7 °F (36 5 °C) (02/07/19 1548)  Temp Source: Tympanic (02/07/19 1548)  Height: 5' 5 5" (166 4 cm) (02/07/19 1548)  Weight - Scale: 106 kg (234 lb 8 oz) (02/07/19 1548)        Invasive Devices          No matching active lines, drains, or airways Physical Exam   Constitutional: She is oriented to person, place, and time  She appears well-developed and well-nourished  No distress  HENT:   Head: Normocephalic and atraumatic  Right Ear: External ear normal    Left Ear: External ear normal    Nose: Nose normal    Eyes: Conjunctivae are normal  Right eye exhibits no discharge  Left eye exhibits no discharge  No scleral icterus  Neurological: She is alert and oriented to person, place, and time  Skin: She is not diaphoretic  Psychiatric: She has a normal mood and affect  Her behavior is normal  Judgment and thought content normal    Vitals reviewed  Lab Results: I have personally reviewed pertinent lab results  Imaging: I have personally reviewed pertinent reports  EKG, Pathology, and Other Studies: I have personally reviewed pertinent reports  Her EGD revealed pouchitis with bile reflux all the way up to the esophagus  The biopsies revealed  A  Stomach, biopsy:  - Fragments of gastric antral and oxyntic mucosa with mild chronic inactive gastritis  - Stain for Helicobacter pylori was negative  Assessment/PLAN:    41 y  o  female with a history of laparoscopic sleeve gastrectomy on November of 2011  She has lost 35% excess weight loss and 16% total body weight  The last time she was seen in the office was and October of 2014 and since then she has regained almost 20 lb  She has developed abdominal pain after meals that is sometimes associated with nausea vomiting    She has been taking omeprazole once a day without significant improvement      Her endoscopy revealed pouchitis with bile refluxing all the way up to the esophagus  We will maximize her therapy and give her general recommendations against reflux  I will make an appointment for her to be seen by our dietitians and Dr Harry Blackman to help her lose weight as I think this will improve her symptoms as well      If her symptomatology does not improve we will have to consider a conversion from a sleeve to a gastric bypass  I had a detailed discussion with her stressing the fact that long-term success is largely dependent on compliance and abidance to the recommendations of the program as well as participation within the support groups  She is committed to continue to observe her diet and to increase her physical activity  She will follow up with us as scheduled      Harrison Nagy MD  2/7/2019  4:09 PM

## 2019-02-22 ENCOUNTER — OFFICE VISIT (OUTPATIENT)
Dept: BARIATRICS | Facility: CLINIC | Age: 39
End: 2019-02-22

## 2019-02-22 VITALS — BODY MASS INDEX: 38.14 KG/M2 | HEIGHT: 66 IN | WEIGHT: 237.3 LBS

## 2019-02-22 DIAGNOSIS — Z98.84 BARIATRIC SURGERY STATUS: Primary | ICD-10-CM

## 2019-02-22 DIAGNOSIS — K21.9 GASTROESOPHAGEAL REFLUX DISEASE WITHOUT ESOPHAGITIS: ICD-10-CM

## 2019-02-22 PROCEDURE — RECHECK

## 2019-02-22 NOTE — PROGRESS NOTES
Patient arrived late to appointment so session with SW was brief  Patient shared  everything she eats and drinks is painful  She also said medication prescribed by Dr Paul Sheldon is not covered by insurance  I called nurse Susan Woodward, who spoke with patient and will discuss with Dr Paul Sheldon  Patient is scheduled with MWADDIE for March appointment   [place]

## 2019-02-22 NOTE — PROGRESS NOTES
Bariatric Follow Up Nutrition Note    Type of surgery  Vertical sleeve gastrectomy  Surgery Date: 11/22/2011  7 years  post-op  Surgeon: Dr Arnav Burrows  41 y o   female  Ht 5' 5 5" (1 664 m)   Wt 108 kg (237 lb 4 8 oz)   BMI 38 89 kg/m²     No calculations performed for this visit    Weight on Day of Weight Loss Surgery: 266#  Weight in (lb) to have BMI = 25: 151 9#  Pre-Op Excess Wt: 114 1#  Post-Op Wt Loss: 41 7#/ 33% EBWL in 7 year(s)    Review of History and Medications   Past Medical History:   Diagnosis Date    Anemia     Bariatric surgery status     Dizzy     Fatigue     Heart murmur     heart murmer, work up negative with holter monitor    Migraine     Morbid obesity (HCC)     Non-intractable vomiting     Postgastrectomy malabsorption     Weight gain      Past Surgical History:   Procedure Laterality Date    CHOLECYSTECTOMY      OVARIAN CYST REMOVAL Right 2018    [place] EGD TRANSORAL BIOPSY SINGLE/MULTIPLE N/A 1/30/2019    Procedure: ESOPHAGOGASTRODUODENOSCOPY (EGD) with bx;  Surgeon: Jonna Yu MD;  Location: [facility];   Service: Bariatrics    SLEEVE GASTROPLASTY       Social History     Socioeconomic History    Marital status: /Civil Union     Spouse name: Not on file    Number of children: Not on file    Years of education: Not on file    Highest education level: Not on file   Occupational History    Not on file   Social Needs    Financial resource strain: Not on file    Food insecurity:     Worry: Not on file     Inability: Not on file    Transportation needs:     Medical: Not on file     Non-medical: Not on file   Tobacco Use    Smoking status: Never Smoker    Smokeless tobacco: Never Used   Substance and Sexual Activity    Alcohol use: Yes     Comment: social    Drug use: No    Sexual activity: Not on file   Lifestyle    Physical activity:     Days per week: Not on file     Minutes per session: Not on file  Stress: Not on file   Relationships    Social connections:     Talks on phone: Not on file     Gets together: Not on file     Attends Voodoo service: Not on file     Active member of club or organization: Not on file     Attends meetings of clubs or organizations: Not on file     Relationship status: Not on file    Intimate partner violence:     Fear of current or ex partner: Not on file     Emotionally abused: Not on file     Physically abused: Not on file     Forced sexual activity: Not on file   Other Topics Concern    Not on file   Social History Narrative    Not on file       Current Outpatient Medications:     Cholecalciferol (VITAMIN D PO), Take 1 tablet by mouth daily, Disp: , Rfl:     famotidine (PEPCID) 20 mg tablet, Take 1 tablet by mouth 2 (two) times a day Pt states she takes only as needed , Disp: , Rfl:     FOLIC ACID PO, Take 1 capsule by mouth daily  , Disp: , Rfl:     Multiple Vitamins-Minerals (MULTIVITAMIN ADULT PO), Take by mouth, Disp: , Rfl:     omeprazole (PriLOSEC) 20 mg delayed release capsule, Take 1 capsule by mouth daily, Disp: , Rfl:     sertraline (ZOLOFT) 50 mg tablet, Take 50 mg by mouth daily, Disp: , Rfl:     sucralfate (CARAFATE) 1 g tablet, Take 1 tablet (1 g total) by mouth 4 (four) times a day for 30 days, Disp: 120 tablet, Rfl: 1  MVI (regular) 1 daily, vitamin D (unsure dosage)-reviewed post-op guidelines and provided samples  Food Intake and Lifestyle Assessment   Food Intake Assessment completed via 24 hour recall  6:30-7am- does not eat in the morning because states pain is worse in the AM and she will vomitt  12:30-1pm Breakfast: "brunch" 2 scrambled eggs, coffee, turkey ham on eggs  Snack: yogurt  Lunch: -  Snack: belvita cookies  Dinner: 6pm tilapia, salad (spring mix)  Snack: 10pm pears  Beverage intake: water, juice and coffee/tea, crystal light  Diet texture/stage: regular  Protein supplement: none  Estimated protein intake per day: <60 grams  Estimated fluid intake per day: 48 oz  Meals eaten away from home: 0-1  Typical meal pattern: 2 meals per day and 1 snacks per day  Eating Behaviors: does not follow 30/60 rule, describes pain whenever she eats (pain is less with foods like yogurt)  Food allergies or intolerances: shellfish, "Everything I eat and drink hurts"  Cultural or Jewish considerations: n/a    Physical Assessment  Nutrition Related Findings  Nausea and Vomiting    Physical Activity  Types of exercise: Not assessed this visit  Current physical limitations: not assessed    Psychosocial Assessment   Support systems: family  Socioeconomic factors: None disclosed    Nutrition Diagnosis  Diagnosis: Disordered eating pattern (NB-1 5) and Altered GI function (NC-1 4)  Related to: Physical inactivity and Altered GI function  As Evidenced by: Expected anthropometric outcomes are not achieved, Unintentional weight gain and Reports of disorded eating patterns     Interventions and Teaching   Patient educated on post-op nutrition guidelines  Patient educated and handouts provided  Adequate hydration  Protein supplements  Meal planning and preparation  Dietary and lifestyle changes  Possible problems with poor eating habits  Intuitive eating  Techniques for self monitoring and keeping daily food journal  Vitamin / Mineral supplementation of Multivitamin with minerals, Calcium, Vitamin B12, Iron and Vitamin D    Education provided to: patient    Barriers to learning: Language    Readiness to change: action    Comprehension: verbalizes understanding     Expected Compliance: good    Evaluation/Monitoring   Eating pattern as discussed Tolerance of nutrition prescription Body weight Lab values Physical activity    Goals  1  Try protein supplement for breakfast in AM  2  Consume at least 3 meals daily, may benefit from smaller more frequent meals  3   Food journal  Has appointment with Dr Violeta Krueger on 3/8/2019  Time Spent:   27 Minutes

## 2019-02-24 RX ORDER — PANTOPRAZOLE SODIUM 40 MG/1
40 TABLET, DELAYED RELEASE ORAL DAILY
Qty: 30 TABLET | Refills: 1 | Status: SHIPPED | OUTPATIENT
Start: 2019-02-24 | End: 2019-05-22 | Stop reason: SDUPTHER

## 2019-02-26 ENCOUNTER — HOSPITAL ENCOUNTER (EMERGENCY)
Facility: HOSPITAL | Age: 39
Discharge: HOME | End: 2019-02-26
Attending: EMERGENCY MEDICINE | Admitting: EMERGENCY MEDICINE
Payer: COMMERCIAL

## 2019-02-26 VITALS
SYSTOLIC BLOOD PRESSURE: 117 MMHG | OXYGEN SATURATION: 100 % | RESPIRATION RATE: 18 BRPM | BODY MASS INDEX: 39.49 KG/M2 | TEMPERATURE: 98.2 F | WEIGHT: 241 LBS | HEART RATE: 86 BPM | DIASTOLIC BLOOD PRESSURE: 74 MMHG

## 2019-02-26 DIAGNOSIS — K29.70 GASTRITIS: Primary | ICD-10-CM

## 2019-02-26 DIAGNOSIS — R11.10 VOMITING: ICD-10-CM

## 2019-02-26 LAB
ALBUMIN SERPL BCP-MCNC: 3.9 G/DL (ref 3.5–5)
ALP SERPL-CCNC: 89 U/L (ref 46–116)
ALT SERPL W P-5'-P-CCNC: 20 U/L (ref 12–78)
ANION GAP SERPL CALCULATED.3IONS-SCNC: 10 MMOL/L (ref 4–13)
AST SERPL W P-5'-P-CCNC: 28 U/L (ref 5–45)
BASOPHILS # BLD AUTO: 0.01 THOUSANDS/ΜL (ref 0–0.1)
BASOPHILS NFR BLD AUTO: 0 % (ref 0–1)
BILIRUB SERPL-MCNC: 0.29 MG/DL (ref 0.2–1)
BILIRUB UR QL STRIP: NEGATIVE
BUN SERPL-MCNC: 18 MG/DL (ref 5–25)
CALCIUM SERPL-MCNC: 8.8 MG/DL (ref 8.3–10.1)
CHLORIDE SERPL-SCNC: 99 MMOL/L (ref 100–108)
CLARITY UR: CLEAR
CLARITY, POC: CLEAR
CO2 SERPL-SCNC: 28 MMOL/L (ref 21–32)
COLOR UR: YELLOW
COLOR, POC: YELLOW
CREAT SERPL-MCNC: 0.86 MG/DL (ref 0.6–1.3)
EOSINOPHIL # BLD AUTO: 0.07 THOUSAND/ΜL (ref 0–0.61)
EOSINOPHIL NFR BLD AUTO: 1 % (ref 0–6)
ERYTHROCYTE [DISTWIDTH] IN BLOOD BY AUTOMATED COUNT: 16.1 % (ref 11.6–15.1)
EXT PREG TEST URINE: NORMAL
GFR SERPL CREATININE-BSD FRML MDRD: 87 ML/MIN/1.73SQ M
GLUCOSE SERPL-MCNC: 86 MG/DL (ref 65–140)
GLUCOSE UR STRIP-MCNC: NEGATIVE MG/DL
HCT VFR BLD AUTO: 41.3 % (ref 34.8–46.1)
HGB BLD-MCNC: 12.9 G/DL (ref 11.5–15.4)
HGB UR QL STRIP.AUTO: NEGATIVE
IMM GRANULOCYTES # BLD AUTO: 0.02 THOUSAND/UL (ref 0–0.2)
IMM GRANULOCYTES NFR BLD AUTO: 0 % (ref 0–2)
KETONES UR STRIP-MCNC: NEGATIVE MG/DL
LEUKOCYTE ESTERASE UR QL STRIP: NEGATIVE
LIPASE SERPL-CCNC: 149 U/L (ref 73–393)
LYMPHOCYTES # BLD AUTO: 2.12 THOUSANDS/ΜL (ref 0.6–4.47)
LYMPHOCYTES NFR BLD AUTO: 20 % (ref 14–44)
MCH RBC QN AUTO: 26.8 PG (ref 26.8–34.3)
MCHC RBC AUTO-ENTMCNC: 31.2 G/DL (ref 31.4–37.4)
MCV RBC AUTO: 86 FL (ref 82–98)
MONOCYTES # BLD AUTO: 0.67 THOUSAND/ΜL (ref 0.17–1.22)
MONOCYTES NFR BLD AUTO: 6 % (ref 4–12)
NEUTROPHILS # BLD AUTO: 7.56 THOUSANDS/ΜL (ref 1.85–7.62)
NEUTS SEG NFR BLD AUTO: 73 % (ref 43–75)
NITRITE UR QL STRIP: NEGATIVE
NRBC BLD AUTO-RTO: 0 /100 WBCS
PH UR STRIP.AUTO: 5 [PH] (ref 4.5–8)
PLATELET # BLD AUTO: 302 THOUSANDS/UL (ref 149–390)
PMV BLD AUTO: 9.8 FL (ref 8.9–12.7)
POTASSIUM SERPL-SCNC: 3.3 MMOL/L (ref 3.5–5.3)
PROT SERPL-MCNC: 9.1 G/DL (ref 6.4–8.2)
PROT UR STRIP-MCNC: NEGATIVE MG/DL
RBC # BLD AUTO: 4.81 MILLION/UL (ref 3.81–5.12)
SODIUM SERPL-SCNC: 137 MMOL/L (ref 136–145)
SP GR UR STRIP.AUTO: >=1.03 (ref 1–1.03)
UROBILINOGEN UR QL STRIP.AUTO: 0.2 E.U./DL
WBC # BLD AUTO: 10.45 THOUSAND/UL (ref 4.31–10.16)

## 2019-02-26 PROCEDURE — 83690 ASSAY OF LIPASE: CPT | Performed by: EMERGENCY MEDICINE

## 2019-02-26 PROCEDURE — 81025 URINE PREGNANCY TEST: CPT | Performed by: EMERGENCY MEDICINE

## 2019-02-26 PROCEDURE — 96361 HYDRATE IV INFUSION ADD-ON: CPT

## 2019-02-26 PROCEDURE — C9113 INJ PANTOPRAZOLE SODIUM, VIA: HCPCS | Performed by: EMERGENCY MEDICINE

## 2019-02-26 PROCEDURE — 80053 COMPREHEN METABOLIC PANEL: CPT | Performed by: EMERGENCY MEDICINE

## 2019-02-26 PROCEDURE — 96374 THER/PROPH/DIAG INJ IV PUSH: CPT

## 2019-02-26 PROCEDURE — 81003 URINALYSIS AUTO W/O SCOPE: CPT

## 2019-02-26 PROCEDURE — 36415 COLL VENOUS BLD VENIPUNCTURE: CPT | Performed by: EMERGENCY MEDICINE

## 2019-02-26 PROCEDURE — 85025 COMPLETE CBC W/AUTO DIFF WBC: CPT | Performed by: EMERGENCY MEDICINE

## 2019-02-26 PROCEDURE — 96375 TX/PRO/DX INJ NEW DRUG ADDON: CPT

## 2019-02-26 PROCEDURE — 99284 EMERGENCY DEPT VISIT MOD MDM: CPT

## 2019-02-26 RX ORDER — PANTOPRAZOLE SODIUM 40 MG/1
40 INJECTION, POWDER, FOR SOLUTION INTRAVENOUS ONCE
Status: COMPLETED | OUTPATIENT
Start: 2019-02-26 | End: 2019-02-26

## 2019-02-26 RX ORDER — MAGNESIUM HYDROXIDE/ALUMINUM HYDROXICE/SIMETHICONE 120; 1200; 1200 MG/30ML; MG/30ML; MG/30ML
30 SUSPENSION ORAL ONCE
Status: COMPLETED | OUTPATIENT
Start: 2019-02-26 | End: 2019-02-26

## 2019-02-26 RX ORDER — METOCLOPRAMIDE HYDROCHLORIDE 5 MG/ML
10 INJECTION INTRAMUSCULAR; INTRAVENOUS ONCE
Status: COMPLETED | OUTPATIENT
Start: 2019-02-26 | End: 2019-02-26

## 2019-02-26 RX ORDER — TRAMADOL HYDROCHLORIDE 50 MG/1
50 TABLET ORAL EVERY 6 HOURS PRN
Qty: 15 TABLET | Refills: 0 | Status: SHIPPED | OUTPATIENT
Start: 2019-02-26 | End: 2019-03-08

## 2019-02-26 RX ORDER — METOCLOPRAMIDE 10 MG/1
10 TABLET ORAL EVERY 6 HOURS PRN
Qty: 15 TABLET | Refills: 0 | Status: SHIPPED | OUTPATIENT
Start: 2019-02-26 | End: 2019-05-23

## 2019-02-26 RX ORDER — MORPHINE SULFATE 4 MG/ML
4 INJECTION, SOLUTION INTRAMUSCULAR; INTRAVENOUS ONCE
Status: COMPLETED | OUTPATIENT
Start: 2019-02-26 | End: 2019-02-26

## 2019-02-26 RX ADMIN — SODIUM CHLORIDE 1000 ML: 0.9 INJECTION, SOLUTION INTRAVENOUS at 22:27

## 2019-02-26 RX ADMIN — ALUMINUM HYDROXIDE, MAGNESIUM HYDROXIDE, AND SIMETHICONE 30 ML: 200; 200; 20 SUSPENSION ORAL at 23:29

## 2019-02-26 RX ADMIN — METOCLOPRAMIDE 10 MG: 5 INJECTION, SOLUTION INTRAMUSCULAR; INTRAVENOUS at 22:40

## 2019-02-26 RX ADMIN — LIDOCAINE HYDROCHLORIDE 10 ML: 20 SOLUTION ORAL; TOPICAL at 23:29

## 2019-02-26 RX ADMIN — MORPHINE SULFATE 4 MG: 4 INJECTION INTRAVENOUS at 22:35

## 2019-02-26 RX ADMIN — PANTOPRAZOLE SODIUM 40 MG: 40 INJECTION, POWDER, FOR SOLUTION INTRAVENOUS at 22:38

## 2019-02-27 NOTE — ED PROVIDER NOTES
History  Chief Complaint   Patient presents with    Abdominal Pain     Pt w/ Hx of GERD c/o upper abdominal pain and vomiting for past month; Pt has been seen on [facility] ED for this complaints and Had EGD on 2/7; Pt reports pain became more severe a few hours PTA    Vomiting     60-year-old female with history of anemia, migraine headaches, pouchitis, sleeve gastrectomy presents to the emergency department with burning epigastric pain and vomiting starting earlier this evening  Patient has had this repeatedly in the past and had an EGD on January 30th revealing the pouchitis  She is on Prilosec and Carafate but feels the medications are not helping  She has had no fevers  She states she is unable to tolerate any liquids        History provided by:  Patient   used: No    Abdominal Pain   Pain location:  Epigastric  Pain quality: burning    Pain radiates to:  Does not radiate  Pain severity:  Unable to specify  Onset quality:  Gradual  Duration:  1 day  Timing:  Constant  Progression:  Unchanged  Chronicity:  Recurrent  Context: previous surgery    Context: not alcohol use, not diet changes, not eating, not recent illness, not recent travel, not sick contacts, not suspicious food intake and not trauma    Relieved by:  Nothing  Worsened by:  Nothing  Ineffective treatments:  Antacids  Associated symptoms: hematemesis (Small amount of blood streaked emesis), nausea and vomiting    Associated symptoms: no anorexia, no belching, no chest pain, no chills, no constipation, no cough, no diarrhea, no dysuria, no fatigue, no fever, no flatus, no hematochezia, no hematuria, no shortness of breath and no sore throat    Risk factors: multiple surgeries and obesity    Risk factors: no alcohol abuse, no NSAID use and not pregnant    Vomiting   Associated symptoms: abdominal pain    Associated symptoms: no chills, no cough, no diarrhea, no fever, no headaches and no sore throat        Prior to Admission Medications   Prescriptions Last Dose Informant Patient Reported? Taking? Cholecalciferol (VITAMIN D PO)  Self Yes Yes   Sig: Take 1 tablet by mouth daily   FOLIC ACID PO  Self Yes Yes   Sig: Take 1 capsule by mouth daily     Multiple Vitamins-Minerals (MULTIVITAMIN ADULT PO)  Self Yes Yes   Sig: Take by mouth   famotidine (PEPCID) 20 mg tablet  Self Yes Yes   Sig: Take 1 tablet by mouth 2 (two) times a day Pt states she takes only as needed    omeprazole (PriLOSEC) 20 mg delayed release capsule  Self Yes Yes   Sig: Take 1 capsule by mouth daily   pantoprazole (PROTONIX) 40 mg tablet   No Yes   Sig: Take 1 tablet (40 mg total) by mouth daily   sertraline (ZOLOFT) 50 mg tablet  Self Yes Yes   Sig: Take 50 mg by mouth daily   sucralfate (CARAFATE) 1 g tablet   No Yes   Sig: Take 1 tablet (1 g total) by mouth 4 (four) times a day for 30 days      Facility-Administered Medications: None       Past Medical History:   Diagnosis Date    Anemia     Bariatric surgery status     Dizzy     Fatigue     Heart murmur     heart murmer, work up negative with holter monitor    Migraine     Morbid obesity (HCC)     Non-intractable vomiting     Postgastrectomy malabsorption     Weight gain        Past Surgical History:   Procedure Laterality Date    CHOLECYSTECTOMY      OVARIAN CYST REMOVAL Right 2018    NM EGD TRANSORAL BIOPSY SINGLE/MULTIPLE N/A 1/30/2019    Procedure: ESOPHAGOGASTRODUODENOSCOPY (EGD) with bx;  Surgeon: Mahendra Johnson MD;  Location: [facility]; Service: Bariatrics    SLEEVE GASTROPLASTY         Family History   Problem Relation Age of Onset    Hypertension Mother     Heart disease Mother     No Known Problems Father      I have reviewed and agree with the history as documented      Social History     Tobacco Use    Smoking status: Never Smoker    Smokeless tobacco: Never Used   Substance Use Topics    Alcohol use: Yes     Comment: social    Drug use: No        Review of Systems Constitutional: Negative  Negative for chills, diaphoresis, fatigue and fever  HENT: Negative  Negative for congestion, rhinorrhea and sore throat  Eyes: Negative  Negative for discharge, redness and itching  Respiratory: Negative  Negative for apnea, cough, chest tightness, shortness of breath and wheezing  Cardiovascular: Negative for chest pain, palpitations and leg swelling  Gastrointestinal: Positive for abdominal pain, hematemesis (Small amount of blood streaked emesis), nausea and vomiting  Negative for abdominal distention, anorexia, blood in stool, constipation, diarrhea, flatus and hematochezia  Endocrine: Negative  Genitourinary: Negative  Negative for dysuria, flank pain, frequency, hematuria and urgency  Musculoskeletal: Negative  Negative for back pain  Skin: Negative  Allergic/Immunologic: Negative  Neurological: Negative  Negative for dizziness, syncope, weakness, light-headedness, numbness and headaches  Hematological: Negative  All other systems reviewed and are negative  Physical Exam  Physical Exam   Constitutional: She is oriented to person, place, and time  She appears well-developed and well-nourished  Non-toxic appearance  She does not have a sickly appearance  She appears ill (Actively vomiting)  No distress  HENT:   Head: Normocephalic and atraumatic  Right Ear: External ear normal    Left Ear: External ear normal    Mouth/Throat: Oropharynx is clear and moist    Eyes: Pupils are equal, round, and reactive to light  Conjunctivae are normal  Right eye exhibits no discharge  Left eye exhibits no discharge  No scleral icterus  Cardiovascular: Normal rate, regular rhythm and normal heart sounds  Exam reveals no gallop and no friction rub  No murmur heard  Pulmonary/Chest: Effort normal and breath sounds normal  No stridor  No respiratory distress  She has no wheezes  She has no rales  She exhibits no tenderness  Abdominal: Soft   Normal appearance and bowel sounds are normal  She exhibits no distension and no mass  There is tenderness in the epigastric area  There is no rebound and no guarding  No hernia  Neurological: She is alert and oriented to person, place, and time  She has normal reflexes  She exhibits normal muscle tone  Skin: Skin is warm and dry  No rash noted  She is not diaphoretic  No erythema  No pallor  Psychiatric: She has a normal mood and affect  Nursing note and vitals reviewed        Vital Signs  ED Triage Vitals   Temperature Pulse Respirations Blood Pressure SpO2   02/26/19 2123 02/26/19 2123 02/26/19 2123 02/26/19 2123 02/26/19 2123   98 2 °F (36 8 °C) 82 16 118/68 100 %      Temp Source Heart Rate Source Patient Position - Orthostatic VS BP Location FiO2 (%)   02/26/19 2123 02/26/19 2123 02/26/19 2328 02/26/19 2328 --   Oral Monitor Sitting Left arm       Pain Score       02/26/19 2123       8           Vitals:    02/26/19 2123 02/26/19 2328   BP: 118/68 117/74   Pulse: 82 86   Patient Position - Orthostatic VS:  Sitting       Visual Acuity      ED Medications  Medications   sodium chloride 0 9 % bolus 1,000 mL (1,000 mL Intravenous New Bag 2/26/19 2227)   metoclopramide (REGLAN) injection 10 mg (10 mg Intravenous Given 2/26/19 2240)   morphine (PF) 4 mg/mL injection 4 mg (4 mg Intravenous Given 2/26/19 2235)   pantoprazole (PROTONIX) injection 40 mg (40 mg Intravenous Given 2/26/19 2238)   aluminum-magnesium hydroxide-simethicone (MYLANTA) 200-200-20 mg/5 mL oral suspension 30 mL (30 mL Oral Given 2/26/19 2329)   lidocaine viscous (XYLOCAINE) 2 % mucosal solution 10 mL (10 mL Swish & Spit Given 2/26/19 2329)       Diagnostic Studies  Results Reviewed     Procedure Component Value Units Date/Time    Comprehensive metabolic panel [[id number]]  (Abnormal) Collected:  02/26/19 2225    Lab Status:  Final result Specimen:  Blood from Arm, Right Updated:  02/26/19 2246     Sodium 137 mmol/L      Potassium 3 3 mmol/L Chloride 99 mmol/L      CO2 28 mmol/L      ANION GAP 10 mmol/L      BUN 18 mg/dL      Creatinine 0 86 mg/dL      Glucose 86 mg/dL      Calcium 8 8 mg/dL      AST 28 U/L      ALT 20 U/L      Alkaline Phosphatase 89 U/L      Total Protein 9 1 g/dL      Albumin 3 9 g/dL      Total Bilirubin 0 29 mg/dL      eGFR 87 ml/min/1 73sq m     Narrative:       National Kidney Disease Education Program recommendations are as follows:  GFR calculation is accurate only with a steady state creatinine  Chronic Kidney disease less than 60 ml/min/1 73 sq  meters  Kidney failure less than 15 ml/min/1 73 sq  meters      Lipase [[id number]]  (Normal) Collected:  02/26/19 2225    Lab Status:  Final result Specimen:  Blood from Arm, Right Updated:  02/26/19 2246     Lipase 149 u/L     CBC and differential [[id number]]  (Abnormal) Collected:  02/26/19 2225    Lab Status:  Final result Specimen:  Blood from Arm, Right Updated:  02/26/19 2232     WBC 10 45 Thousand/uL      RBC 4 81 Million/uL      Hemoglobin 12 9 g/dL      Hematocrit 41 3 %      MCV 86 fL      MCH 26 8 pg      MCHC 31 2 g/dL      RDW 16 1 %      MPV 9 8 fL      Platelets 353 Thousands/uL      nRBC 0 /100 WBCs      Neutrophils Relative 73 %      Immat GRANS % 0 %      Lymphocytes Relative 20 %      Monocytes Relative 6 %      Eosinophils Relative 1 %      Basophils Relative 0 %      Neutrophils Absolute 7 56 Thousands/µL      Immature Grans Absolute 0 02 Thousand/uL      Lymphocytes Absolute 2 12 Thousands/µL      Monocytes Absolute 0 67 Thousand/µL      Eosinophils Absolute 0 07 Thousand/µL      Basophils Absolute 0 01 Thousands/µL     POCT urinalysis dipstick [[id number]]  (Normal) Resulted:  02/26/19 2230    Lab Status:  Final result Specimen:  Urine Updated:  02/26/19 2230     Color, UA Yellow     Clarity, UA Clear    POCT pregnancy, urine [[id number]]  (Normal) Resulted:  02/26/19 2229    Lab Status:  Final result Updated:  02/26/19 2230     EXT PREG TEST UR (Ref: Negative) Negative (-)    ED Urine Macroscopic [[id number]] Collected:  02/26/19 2229    Lab Status:  Final result Specimen:  Urine Updated:  02/26/19 2228     Color, UA Yellow     Clarity, UA Clear     pH, UA 5 0     Leukocytes, UA Negative     Nitrite, UA Negative     Protein, UA Negative mg/dl      Glucose, UA Negative mg/dl      Ketones, UA Negative mg/dl      Urobilinogen, UA 0 2 E U /dl      Bilirubin, UA Negative     Blood, UA Negative     Specific Gravity, UA >=1 030    Narrative:       CLINITEK RESULT                 No orders to display              Procedures  Procedures       Phone Contacts  ED Phone Contact    ED Course  ED Course as of Feb 26 2339   Tue Feb 26, 2019 2324 Spoke with Dr Mj Elena he would recommend GI cocktail and follow up in the office this week                                  MDM  Number of Diagnoses or Management Options  Diagnosis management comments: 42-year-old female presents with burning epigastric pain and vomiting that started this evening  Patient has had recurrence of the symptoms and had a recent EGD January 30th and found have pouchitis  She has been taking Prilosec and Carafate without relief  She has had no fevers  On exam she appears uncomfortable actively vomiting  She does have tenderness in the epigastric region without peritoneal signs  Check CBC, CMP, lipase  Will control pain and nausea  Will give IV fluids    Will contact her bariatric surgeon for further management once her labs are resulted       Amount and/or Complexity of Data Reviewed  Clinical lab tests: ordered and reviewed  Review and summarize past medical records: yes  Discuss the patient with other providers: yes        Disposition  Final diagnoses:   Gastritis   Vomiting     Time reflects when diagnosis was documented in both MDM as applicable and the Disposition within this note     Time User Action Codes Description Comment    2/26/2019 11:35 PM Ernesto Trujillo Add [K26 70] Gastritis 2/26/2019 11:35 PM Mary CHILEL Add [R11 10] Vomiting       ED Disposition     ED Disposition Condition Date/Time Comment    Discharge Stable Tue Feb 26, 2019 11:35 PM Cyndy Aldrich discharge to home/self care  Follow-up Information     Follow up With Specialties Details Why Ronit Rainey MD General Surgery, Bariatrics Schedule an appointment as soon as possible for a visit in 1 day  [address] [place] [address]  [phone]            Patient's Medications   Discharge Prescriptions    METOCLOPRAMIDE (REGLAN) 10 MG TABLET    Take 1 tablet (10 mg total) by mouth every 6 (six) hours as needed (nausea/vomiting)       Start Date: 2/26/2019 End Date: --       Order Dose: 10 mg       Quantity: 15 tablet    Refills: 0    TRAMADOL (ULTRAM) 50 MG TABLET    Take 1 tablet (50 mg total) by mouth every 6 (six) hours as needed for moderate pain for up to 10 days       Start Date: 2/26/2019 End Date: 3/8/2019       Order Dose: 50 mg       Quantity: 15 tablet    Refills: 0     No discharge procedures on file      ED Provider  Electronically Signed by           Rebecca Lentz DO  02/26/19 7569

## 2019-02-27 NOTE — DISCHARGE INSTRUCTIONS
Epigastric Pain   WHAT YOU NEED TO KNOW:   Epigastric pain is felt in the middle of the upper abdomen, between the ribs and the bellybutton  The pain may be mild or severe  Pain may spread from or to another part of your body  Epigastric pain may be a sign of a serious health problem that needs to be treated  DISCHARGE INSTRUCTIONS:   Call 911 for any of the following:   · You have any of the following signs of a heart attack:      ¨ Squeezing, pressure, or pain in your chest that lasts longer than 5 minutes or returns    ¨ Discomfort or pain in your back, neck, jaw, stomach, or arm     ¨ Trouble breathing    ¨ Nausea or vomiting    ¨ Lightheadedness or a sudden cold sweat, especially with chest pain or trouble breathing    · You have severe pain that radiates to your jaw or back  Return to the emergency department if:   · You have severe pain that starts suddenly and quickly gets worse  · You cannot have a bowel movement and are vomiting  · You vomit or cough up blood  · You see blood in your urine or bowel movement  · You feel drowsy and your breathing is slower than usual   Contact your healthcare provider if:   · You have a fever or chills  · You have yellowing of your skin or the whites of your eyes  · You vomit often or several times in a row  · You lose weight without trying  · You have symptoms for longer than 2 weeks  · You have questions or concerns about your condition or care  Medicines:   · Medicines  may be given to treat pain or stop vomiting  You may also need medicines to reduce or control stomach acid, or treat an infection  · Take your medicine as directed  Contact your healthcare provider if you think your medicine is not helping or if you have side effects  Tell him of her if you are allergic to any medicine  Keep a list of the medicines, vitamins, and herbs you take  Include the amounts, and when and why you take them   Bring the list or the pill bottles to follow-up visits  Carry your medicine list with you in case of an emergency  Follow up with your healthcare provider as directed:  Write down your questions so you remember to ask them during your visits  Manage your symptoms:   · Keep a record of your symptoms  Include when the pain starts, how long it lasts, and if it is sharp or dull  Also include any foods you ate or activities you did before the pain started  Keep track of anything that helped the pain  · Eat a variety of healthy foods  Healthy foods include fruits, vegetables, whole-grain breads, low-fat dairy products, beans, lean meats, and fish  Ask if you need to be on a special diet  Certain foods may cause your pain, such as alcohol or foods that are high in fat  You may need to eat smaller meals and to eat more often than usual     · Drink liquids as directed  Ask how much liquid to drink each day and which liquids are best for you  Do not have drinks that contain alcohol or caffeine  © 2017 [facility] Information is for End User's use only and may not be sold, redistributed or otherwise used for commercial purposes  All illustrations and images included in CareNotes® are the copyrighted property of [employer]  or [place]  The above information is an  only  It is not intended as medical advice for individual conditions or treatments  Talk to your doctor, nurse or pharmacist before following any medical regimen to see if it is safe and effective for you

## 2019-02-28 ENCOUNTER — OFFICE VISIT (OUTPATIENT)
Dept: BARIATRICS | Facility: CLINIC | Age: 39
End: 2019-02-28
Payer: COMMERCIAL

## 2019-02-28 VITALS
TEMPERATURE: 97.7 F | SYSTOLIC BLOOD PRESSURE: 116 MMHG | HEART RATE: 98 BPM | DIASTOLIC BLOOD PRESSURE: 70 MMHG | WEIGHT: 234 LBS | BODY MASS INDEX: 37.61 KG/M2 | HEIGHT: 66 IN

## 2019-02-28 DIAGNOSIS — R11.14 BILIOUS VOMITING WITH NAUSEA: Primary | ICD-10-CM

## 2019-02-28 DIAGNOSIS — R10.13 EPIGASTRIC PAIN: ICD-10-CM

## 2019-02-28 PROCEDURE — 99213 OFFICE O/P EST LOW 20 MIN: CPT | Performed by: SURGERY

## 2019-02-28 RX ORDER — SUCRALFATE 1 G/1
1 TABLET ORAL 4 TIMES DAILY
Qty: 120 TABLET | Refills: 1 | Status: SHIPPED | OUTPATIENT
Start: 2019-02-28 | End: 2019-05-23 | Stop reason: SDUPTHER

## 2019-02-28 NOTE — ASSESSMENT & PLAN NOTE
Patient is here for follow-up after her recent hospital visit for persistent nausea and bilious vomiting  Patient reports persistent reflux and regurgitation associated with abdominal pain upon eating  She is unable to tolerate some liquids but no solid food  She is currently on antiacid twice a day for symptoms  Denies diarrhea constipation  She had a recent upper endoscopy which showed bile in the stomach and gastritis but no ulcerations  Given patient's symptoms are not improving on medical therapy will start the revision process to convert her sleeve to a gastric bypass  I will also add sucralfate to her medications and advised the patient to take Tylenol as needed

## 2019-02-28 NOTE — PROGRESS NOTES
Assessment/Plan:    Bilious vomiting with nausea  Patient is here for follow-up after her recent hospital visit for persistent nausea and bilious vomiting  Patient reports persistent reflux and regurgitation associated with abdominal pain upon eating  She is unable to tolerate some liquids but no solid food  She is currently on antiacid twice a day for symptoms  Denies diarrhea constipation  She had a recent upper endoscopy which showed bile in the stomach and gastritis but no ulcerations  Given patient's symptoms are not improving on medical therapy will start the revision process to convert her sleeve to a gastric bypass  I will also add sucralfate to her medications and advised the patient to take Tylenol as needed  Diagnoses and all orders for this visit:    Bilious vomiting with nausea    Epigastric pain  -     sucralfate (CARAFATE) 1 g tablet; Take 1 tablet (1 g total) by mouth 4 (four) times a day for 30 days Crush pill and mix with water prior to taking          Subjective:      Patient ID: Jagdish Abdullahi is a 41 y o  female  50-year-old female with a history of sleeve gastrectomy at an outside hospital in 2011  Patient had inadequate weight loss and developed severe reflux and regurgitation of bile  She was recently evaluated with an upper endoscopy which showed bile reflux and gastritis but no ulcerations  Patient is symptomatic with epigastric abdominal pain nausea and bilious vomiting  She is currently on antiacid medication  Abdominal Pain   Pertinent negatives include no fever  The following portions of the patient's history were reviewed and updated as appropriate: allergies, current medications, past family history, past medical history, past social history, past surgical history and problem list     Review of Systems   Constitutional: Positive for appetite change  Negative for diaphoresis, fatigue and fever  Eyes: Negative  Respiratory: Negative  Gastrointestinal: Positive for abdominal pain  Epigastric abdominal pain, reflux and bile regurgitation  Endocrine: Negative  Genitourinary: Negative  Objective:      /70   Pulse 98   Temp 97 7 °F (36 5 °C) (Tympanic)   Ht 5' 5 5" (1 664 m)   Wt 106 kg (234 lb)   LMP 02/19/2019   BMI 38 35 kg/m²          Physical Exam   Constitutional: She is oriented to person, place, and time  She appears well-developed and well-nourished  Non-toxic appearance  She does not appear ill  Eyes: Pupils are equal, round, and reactive to light  EOM are normal    Pulmonary/Chest: Effort normal    Abdominal: Normal appearance  There is tenderness in the epigastric area  There is no rebound and no guarding  Abdomen obese, soft and nondistended  Patient has mild to moderate epigastric tenderness without peritoneal signs  Neurological: She is alert and oriented to person, place, and time  Skin: Skin is dry

## 2019-03-12 ENCOUNTER — CLINICAL SUPPORT (OUTPATIENT)
Dept: BARIATRICS | Facility: CLINIC | Age: 39
End: 2019-03-12

## 2019-03-12 VITALS — HEIGHT: 66 IN | WEIGHT: 237.2 LBS | BODY MASS INDEX: 38.12 KG/M2

## 2019-03-12 DIAGNOSIS — R11.14 BILIOUS VOMITING WITH NAUSEA: ICD-10-CM

## 2019-03-12 DIAGNOSIS — Z98.84 BARIATRIC SURGERY STATUS: Primary | ICD-10-CM

## 2019-03-12 DIAGNOSIS — K91.2 POSTSURGICAL MALABSORPTION: ICD-10-CM

## 2019-03-12 DIAGNOSIS — E66.9 CLASS 2 OBESITY WITH BODY MASS INDEX (BMI) OF 38.0 TO 38.9 IN ADULT, UNSPECIFIED OBESITY TYPE, UNSPECIFIED WHETHER SERIOUS COMORBIDITY PRESENT: Primary | ICD-10-CM

## 2019-03-12 PROCEDURE — RECHECK

## 2019-03-12 NOTE — PROGRESS NOTES
Bariatric Follow Up Nutrition Note    Type of surgery  Vertical sleeve gastrectomy  Surgery Date: 11/22/2011  7 years  post-op  Surgeon: Dr Tita Burns    Patient is being evaluated today for revisional surgery ( sleeve to bypass ) due to severe reflux and bilious vomiting  Nutrition Assessment   Christelle Foster  41 y o   female  Ht 5' 5 5" (1 664 m)   Wt 108 kg (237 lb 3 2 oz)   LMP 02/19/2019   BMI 38 87 kg/m²    Patient gained 3 pounds since her last appointment     [facility] Equation:    BMR 1442 kcal per day  Estimated calories for weight loss = 1231 kcal per day ( 1# per week wt loss - sedentary )   Estimated protein needs = 70-85 grams per day ( 1 0-1 2 grams/ kg IBW)    Weight on Day of Weight Loss Surgery: 266#  Weight in (lb) to have BMI = 25: 151 9#  Pre-Op Excess Wt: 114 1#  Post-Op Wt Loss: 41 7#/ 33% EBWL in 7 year(s)  Jhonny weight = 213#    Review of History and Medications   Past Medical History:   Diagnosis Date    Anemia     Bariatric surgery status     Dizzy     Fatigue     Heart murmur     heart murmer, work up negative with holter monitor    Loss of appetite     Migraine     Morbid obesity (HCC)     Non-intractable vomiting     Postgastrectomy malabsorption     Weight gain      Past Surgical History:   Procedure Laterality Date    CHOLECYSTECTOMY      OVARIAN CYST REMOVAL Right 2018    MT EGD TRANSORAL BIOPSY SINGLE/MULTIPLE N/A 1/30/2019    Procedure: ESOPHAGOGASTRODUODENOSCOPY (EGD) with bx;  Surgeon: Tita Burns MD;  [facility];   Service: Bariatrics    SLEEVE GASTROPLASTY       Social History     Socioeconomic History    Marital status: /Civil Union     Spouse name: Not on file    Number of children: Not on file    Years of education: Not on file    Highest education level: Not on file   Occupational History    Not on file   Social Needs    Financial resource strain: Not on file    Food insecurity:     Worry: Not on file Inability: Not on file    Transportation needs:     Medical: Not on file     Non-medical: Not on file   Tobacco Use    Smoking status: Never Smoker    Smokeless tobacco: Never Used   Substance and Sexual Activity    Alcohol use: Yes     Comment: social    Drug use: No    Sexual activity: Not on file   Lifestyle    Physical activity:     Days per week: Not on file     Minutes per session: Not on file    Stress: Not on file   Relationships    Social connections:     Talks on phone: Not on file     Gets together: Not on file     Attends Latter-day service: Not on file     Active member of club or organization: Not on file     Attends meetings of clubs or organizations: Not on file     Relationship status: Not on file    Intimate partner violence:     Fear of current or ex partner: Not on file     Emotionally abused: Not on file     Physically abused: Not on file     Forced sexual activity: Not on file   Other Topics Concern    Not on file   Social History Narrative    Not on file       Current Outpatient Medications:     Cholecalciferol (VITAMIN D PO), Take 1 tablet by mouth daily, Disp: , Rfl:     famotidine (PEPCID) 20 mg tablet, Take 1 tablet by mouth 2 (two) times a day Pt states she takes only as needed , Disp: , Rfl:     FOLIC ACID PO, Take 1 capsule by mouth daily  , Disp: , Rfl:     metoclopramide (REGLAN) 10 mg tablet, Take 1 tablet (10 mg total) by mouth every 6 (six) hours as needed (nausea/vomiting), Disp: 15 tablet, Rfl: 0    Multiple Vitamins-Minerals (MULTIVITAMIN ADULT PO), Take by mouth, Disp: , Rfl:     omeprazole (PriLOSEC) 20 mg delayed release capsule, Take 1 capsule by mouth daily, Disp: , Rfl:     pantoprazole (PROTONIX) 40 mg tablet, Take 1 tablet (40 mg total) by mouth daily, Disp: 30 tablet, Rfl: 1    sertraline (ZOLOFT) 50 mg tablet, Take 50 mg by mouth daily, Disp: , Rfl:     sucralfate (CARAFATE) 1 g tablet, Take 1 tablet (1 g total) by mouth 4 (four) times a day for 30 days Crush pill and mix with water prior to taking, Disp: 120 tablet, Rfl: 1    Patient is now taking a bariatric vitamin /minerals ( thinks its procare) vitamin B12 and one calcium per day but frequently vomits her vitamins/mienrals  Food Intake and Lifestyle Assessment   Food Intake Assessment completed via usual diet recall  Patient continues to have bilious vomiting and ongoing pain and reflux  Can only tolerate full liquids at this time  Soups, oatmeal ( with added liquid) tea, makes a smoothie with strawberry banana yogurt and milk  Toast/crackers to soothe reflux  Tried scrambled eggs but vomited   Beverage intake: water, decaf tea crystal light has stopped all coffee due to her severe reflux  Diet texture/stage: Liquids   Protein supplement: none  Estimated protein intake per day: <60 grams  Estimated fluid intake per day: 24-32 oz( with frequent vomiting )   Meals eaten away from home: 0-1  Typical meal pattern: drinks liquids through out the day   Eating Behaviors:  describes pain whenever she eats (pain is less with foods like yogurt)  Food allergies or intolerances: shellfish, "Everything I eat and drink hurts"  Cultural or Faith considerations:      Physical Assessment  Nutrition Related Findings  Nausea and Vomiting    Physical Activity  Types of exercise: none  Current physical limitations: movement makes reflux worse     Psychosocial Assessment   Support systems: family  Socioeconomic factors: None disclosed    Nutrition Diagnosis( continued )   Diagnosis: Disordered eating pattern (NB-1 5) and Altered GI function (NC-1 4)  Related to: Physical inactivity and Altered GI function  As Evidenced by: Expected anthropometric outcomes are not achieved, Unintentional weight gain and Reports of disorded eating patterns secondary to severe reflux and bilious vomiting  Interventions and Teaching   Patient educated on post-op nutrition guidelines         Patient educated and handouts provided  Adequate hydration- Encouraged patient to try sugar free popsicles and sugar free jello to increase fluid intake  Protein supplements-Provided with samples of Sofia Cristobal and provided with coupons   Meal planning and preparation  Dietary and lifestyle changes  Possible problems with poor eating habits  Intuitive eating  Techniques for self monitoring and keeping daily food journal  Vitamin / Mineral supplementation of Multivitamin with minerals, Calcium, Vitamin B12, Iron and Vitamin D    Provided with handout on GERD- instructed patient to sip her liquids take small amounts of fluid at at time, avoid all high fat soups, avoid all caffeine, avoid chocolate/peppermint, pepper, citrus and tomato products  Education provided to: patient    Barriers to learning: none     Readiness to change: action    Comprehension: verbalizes understanding     Expected Compliance: good    Recommendations  Pt is an appropriate candidate for surgery  Yes    Evaluation/Monitoring   Eating pattern as discussed Tolerance of nutrition prescription Body weight Lab values Physical activity  Patient continues to struggle with ongoing vomiting and severe reflux pain  Gained 3 pounds with disordered eating, such as including crackers/toast to help sooth her reflux     Goals  1  Drink 2 protein shakes per day   2  Increase hydration by eating sugar free popsicles, sugar free jello  3  Increase protein by drinking bone broth instead of plain broth   4  Avoid high fat soups, caffeine, chocolate, peppermint, pepper, citrus and tomato   5    Allow 3 hours between last food intake and laying down     Time Spent:   45  Minutes

## 2019-03-12 NOTE — PROGRESS NOTES
Bariatric Behavioral Health Evaluation    Presenting Problem: Patient had sleeve in   Patient is being considered for a revision  Is the patient seeking Bariatric Surgery Eval? Yes  If yes how long have you researched this surgery option  Patient is being considered for a revision  Realizes Post- Op Requirements? Yes     Psychiatric/Psychological Treatment Diagnosis: Patient attended out-patient counseling for several months after a series of miscarriages  Outpatient Counselor No, patient concluded counseling 3/9/19  Psychiatrist No     Have you had Inpatient Treatment? No    Family Constellation: None reported   Mother is 64years old, father  @ 59years old, 1 sister, 2 brothers   3 years; 2 children     Domestic Violence No    Abuse History:  None reported     Additional comments/stressors related to family/relationships/peer support: history of miscarriages and current health conditions  Physical/Psychological Assessment:     Appearance: appropriate  Sociability: average  Affect: appropriate  Mood: calm  Thought Process: coherent  Speech: normal  Content: no impairment  Orientation: person  Yes   Insight: emotional  good    Risk Assessment:     none    Recommendations: Recommended for surgery  yes    Risk of Harm to Self or Others: None reported     Access to weapons no     Based on the previous information, the client presents the following risk of harm to self or others: low     Note   Pj Murray Completed Behavioral Health Assessment  Provided patient education as needed  Patient admits  to  Axis 1 diagnosis  Patient  meets criteria for [facility] bariatric  surgery program and is therefore referred to surgeon     BARIATRIC SURGERY EDUCATION CHECKLIST    I have received education related to my bariatric surgery process and understand:    Patients may be required to complete a psychiatric evaluation and receive clearance for surgery from their psychiatrist     Patients who undergo weight loss surgery are at higher risk of increased mental health concerns and suicide attempts  Patients may be required to complete a full substance abuse evaluation and then complete all treatment recommendations prior to surgery  If diagnosis of abuse/dependence results, patient may be required to remain sober for one (1) year before having bariatric surgery  Patients on psychiatric medications should check with their provider to discuss psychiatric medications and the changes in absorption  Patient should discuss all time release medications with provider and take all medications as prescribed  The recommendation is that there is no use of  any tobacco products, Hookah or  vapes for the bariatric post-operation patient  Bariatric surgery patients should not consume alcohol as a post-operative patient as it may increase risk of numerous health conditions including but not limited to alcohol abuse and ulcers  There is a possibility of weight regain if patient does not follow all program guidelines and recommendations  Bariatric surgery patients should exercise thirty (30) to sixty (60) minutes per day to maintain post-surgical weight loss  Research indicates that bariatric patients are more successful when they see a therapist for up to two (2) years post-op  Patients will follow all medical and dietary recommendations provided  Patient will keep all scheduled appointments and follow up with their physician for a minimum of five (5) years  Patient will take all vitamins as recommended  Post-operative vitamins are life-long  Patient reviewed Bariatric Surgery Education Checklist and agrees they have received education on these issues

## 2019-03-12 NOTE — PROGRESS NOTES
Patient met biweekly with therapist Deepthi Mcknight( [phone])  Patient had a series of miscarriages  Patient said therapy was very helpful  NV  Fall of 2017 patient was pregnant and in February 2018 was told no heart beat and therefore had a DNC  In Fall 2018 another pregnancy and had a miscarriage in Dec 2018   NV

## 2019-03-21 ENCOUNTER — OFFICE VISIT (OUTPATIENT)
Dept: CARDIOLOGY CLINIC | Facility: CLINIC | Age: 39
End: 2019-03-21
Payer: COMMERCIAL

## 2019-03-21 VITALS
WEIGHT: 240.7 LBS | BODY MASS INDEX: 41.09 KG/M2 | HEART RATE: 71 BPM | SYSTOLIC BLOOD PRESSURE: 100 MMHG | OXYGEN SATURATION: 100 % | DIASTOLIC BLOOD PRESSURE: 68 MMHG | HEIGHT: 64 IN

## 2019-03-21 DIAGNOSIS — Z01.818 PRE-OP EXAMINATION: Primary | ICD-10-CM

## 2019-03-21 PROCEDURE — 99243 OFF/OP CNSLTJ NEW/EST LOW 30: CPT | Performed by: NURSE PRACTITIONER

## 2019-03-21 PROCEDURE — 93000 ELECTROCARDIOGRAM COMPLETE: CPT | Performed by: NURSE PRACTITIONER

## 2019-03-21 RX ORDER — CALCIUM CARBONATE 200(500)MG
1 TABLET,CHEWABLE ORAL AS NEEDED
COMMUNITY
End: 2019-08-07 | Stop reason: ALTCHOICE

## 2019-03-21 RX ORDER — PHENOL 1.4 %
600 AEROSOL, SPRAY (ML) MUCOUS MEMBRANE DAILY
COMMUNITY
End: 2019-10-29

## 2019-03-21 NOTE — PROGRESS NOTES
Cardiology Pre Operative Clearance     Linda Aldrich  [id number]  1980  HEART & VASCULAR [place] [facility]  [address] [place] [address]      Ms Adriana Salguero is a 41year old female with a known past medical history of   1   Pre-op examination  POCT ECG     Patient Active Problem List   Diagnosis    Bariatric surgery status    Postsurgical malabsorption    Obesity, Class II, BMI 35-39 9    Bilious vomiting with nausea     Past Medical History:   Diagnosis Date    Anemia     Bariatric surgery status     Dizzy     Fatigue     Heart murmur     heart murmer, work up negative with holter monitor    Loss of appetite     Migraine     Morbid obesity (HCC)     Non-intractable vomiting     Palpitations     Postgastrectomy malabsorption     Weight gain      Social History     Socioeconomic History    Marital status: /Civil Union     Spouse name: Not on file    Number of children: Not on file    Years of education: Not on file    Highest education level: Not on file   Occupational History    Not on file   Social Needs    Financial resource strain: Not on file    Food insecurity:     Worry: Not on file     Inability: Not on file    Transportation needs:     Medical: Not on file     Non-medical: Not on file   Tobacco Use    Smoking status: Never Smoker    Smokeless tobacco: Never Used   Substance and Sexual Activity    Alcohol use: Yes     Comment: social    Drug use: No    Sexual activity: Not on file   Lifestyle    Physical activity:     Days per week: Not on file     Minutes per session: Not on file    Stress: Not on file   Relationships    Social connections:     Talks on phone: Not on file     Gets together: Not on file     Attends Roman Catholic service: Not on file     Active member of club or organization: Not on file     Attends meetings of clubs or organizations: Not on file Relationship status: Not on file    Intimate partner violence:     Fear of current or ex partner: Not on file     Emotionally abused: Not on file     Physically abused: Not on file     Forced sexual activity: Not on file   Other Topics Concern    Not on file   Social History Narrative    Not on file      Family History   Problem Relation Age of Onset    Hypertension Mother     Heart disease Mother     No Known Problems Father      Past Surgical History:   Procedure Laterality Date    CHOLECYSTECTOMY      OVARIAN CYST REMOVAL Right 2018    DE EGD TRANSORAL BIOPSY SINGLE/MULTIPLE N/A 1/30/2019    Procedure: ESOPHAGOGASTRODUODENOSCOPY (EGD) with bx;  Surgeon: Mahendra Johnson MD;  Location: [facility];   Service: Bariatrics    SLEEVE GASTROPLASTY         Current Outpatient Medications:     calcium carbonate (OS-STEVEN) 600 MG tablet, Take 600 mg by mouth daily, Disp: , Rfl:     calcium carbonate (TUMS) 500 mg chewable tablet, Chew 1 tablet as needed for indigestion or heartburn, Disp: , Rfl:     Cholecalciferol (VITAMIN D PO), Take 1,000 tablets by mouth daily , Disp: , Rfl:     metoclopramide (REGLAN) 10 mg tablet, Take 1 tablet (10 mg total) by mouth every 6 (six) hours as needed (nausea/vomiting), Disp: 15 tablet, Rfl: 0    Multiple Vitamins-Minerals (MULTIVITAMIN ADULT PO), Take by mouth, Disp: , Rfl:     pantoprazole (PROTONIX) 40 mg tablet, Take 1 tablet (40 mg total) by mouth daily, Disp: 30 tablet, Rfl: 1    sucralfate (CARAFATE) 1 g tablet, Take 1 tablet (1 g total) by mouth 4 (four) times a day for 30 days Crush pill and mix with water prior to taking, Disp: 120 tablet, Rfl: 1  Allergies   Allergen Reactions    Aspirin Anaphylaxis     Questionable anaphylaxis with aspirin- avoids to be safe     Shellfish-Derived Products Anaphylaxis    Ibuprofen Edema     Vitals:    03/21/19 0901   BP: 100/68   BP Location: Right arm   Patient Position: Sitting   Cuff Size: Large   Pulse: 71   SpO2: 100% Weight: 109 kg (240 lb 11 2 oz)   Height: 5' 4" (1 626 m)   Denies ETOH or Tobacco use  Family History + Mother with CAD and CABG    10/17/18 48 Hour Holter monitor NSR rare PAC's  79/38/83 TTE LV systolic function normal, LVEF 65-70%, Normal RV size and systolic function normal, mildly dilated LA  Ms Jose Luis Murillo presents to our office today for pre operative clearance for planned Gastric Bypass Surgery to be done by Dr Ubaldo Davison, date to be determined  Tom Viveros admits to occasional palpitations  She is able to walk up and down stairs without symptoms of chest pain  Tom Viveros denies dyspnea with exertion, lightheadedness or dizziness              Labs:  Admission on 02/26/2019, Discharged on 02/26/2019   Component Date Value    WBC 02/26/2019 10 45*    RBC 02/26/2019 4 81     Hemoglobin 02/26/2019 12 9     Hematocrit 02/26/2019 41 3     MCV 02/26/2019 86     MCH 02/26/2019 26 8     MCHC 02/26/2019 31 2*    RDW 02/26/2019 16 1*    MPV 02/26/2019 9 8     Platelets 68/04/3390 302     nRBC 02/26/2019 0     Neutrophils Relative 02/26/2019 73     Immat GRANS % 02/26/2019 0     Lymphocytes Relative 02/26/2019 20     Monocytes Relative 02/26/2019 6     Eosinophils Relative 02/26/2019 1     Basophils Relative 02/26/2019 0     Neutrophils Absolute 02/26/2019 7 56     Immature Grans Absolute 02/26/2019 0 02     Lymphocytes Absolute 02/26/2019 2 12     Monocytes Absolute 02/26/2019 0 67     Eosinophils Absolute 02/26/2019 0 07     Basophils Absolute 02/26/2019 0 01     Sodium 02/26/2019 137     Potassium 02/26/2019 3 3*    Chloride 02/26/2019 99*    CO2 02/26/2019 28     ANION GAP 02/26/2019 10     BUN 02/26/2019 18     Creatinine 02/26/2019 0 86     Glucose 02/26/2019 86     Calcium 02/26/2019 8 8     AST 02/26/2019 28     ALT 02/26/2019 20     Alkaline Phosphatase 02/26/2019 89     Total Protein 02/26/2019 9 1*    Albumin 02/26/2019 3 9     Total Bilirubin 02/26/2019 0 29  eGFR 02/26/2019 87     Lipase 02/26/2019 149     EXT PREG TEST UR (Ref: N* 02/26/2019 Negative (-)     Color, UA 02/26/2019 Yellow     Clarity, UA 02/26/2019 Clear     Color, UA 02/26/2019 Yellow     Clarity, UA 02/26/2019 Clear     pH, UA 02/26/2019 5 0     Leukocytes, UA 02/26/2019 Negative     Nitrite, UA 02/26/2019 Negative     Protein, UA 02/26/2019 Negative     Glucose, UA 02/26/2019 Negative     Ketones, UA 02/26/2019 Negative     Urobilinogen, UA 02/26/2019 0 2     Bilirubin, UA 02/26/2019 Negative     Blood, UA 02/26/2019 Negative     Specific Gravity, UA 02/26/2019 >=1 030    Appointment on 01/30/2019   Component Date Value    WBC 01/30/2019 6 32     RBC 01/30/2019 4 44     Hemoglobin 01/30/2019 11 7     Hematocrit 01/30/2019 38 6     MCV 01/30/2019 87     MCH 01/30/2019 26 4*    MCHC 01/30/2019 30 3*    RDW 01/30/2019 16 4*    Platelets 58/56/4246 279     MPV 01/30/2019 10 0     Sodium 01/30/2019 139     Potassium 01/30/2019 3 5     Chloride 01/30/2019 102     CO2 01/30/2019 29     ANION GAP 01/30/2019 8     BUN 01/30/2019 11     Creatinine 01/30/2019 0 71     Glucose, Fasting 01/30/2019 90     Calcium 01/30/2019 8 8     AST 01/30/2019 28     ALT 01/30/2019 16     Alkaline Phosphatase 01/30/2019 78     Total Protein 01/30/2019 7 8     Albumin 01/30/2019 3 4*    Total Bilirubin 01/30/2019 0 34     eGFR 01/30/2019 109     PTH 01/30/2019 74 7     Vitamin A 01/30/2019 39 0     Vitamin B1, Whole Blood 01/30/2019 113 8     Vitamin B-12 01/30/2019 394     Vit D, 25-Hydroxy 01/30/2019 32 2    Admission on 01/30/2019, Discharged on 01/30/2019   Component Date Value    EXT Preg Test, Ur 01/30/2019 Negative     Case Report 01/30/2019                      Value:Surgical Pathology Report                         Case: [id number]                                   Authorizing Provider:  Melonie Johnson MD        Collected:           01/30/2019 8561 Ordering Location:     [facility]        Received:            01/30/2019 [facility]                                                          Pathologist:           Ronaldo Cochran MD                                                        Specimen:    Stomach, gastric bx r/o h  pilori                                                          Addendum 01/30/2019                      Value: This result contains rich text formatting which cannot be displayed here   Final Diagnosis 01/30/2019                      Value: This result contains rich text formatting which cannot be displayed here   Additional Information 01/30/2019                      Value: This result contains rich text formatting which cannot be displayed here  [employer] Description 01/30/2019                      Value: This result contains rich text formatting which cannot be displayed here  Imaging: No results found  Review of Systems:  Review of Systems   Cardiovascular: Positive for palpitations  All other systems reviewed and are negative  Physical Exam:  Physical Exam   Constitutional: She is oriented to person, place, and time  She appears well-developed  HENT:   Head: Normocephalic  Eyes: Pupils are equal, round, and reactive to light  Neck: Normal range of motion  Cardiovascular: Normal rate, regular rhythm and normal heart sounds  Pulmonary/Chest: Effort normal and breath sounds normal    Abdominal: Soft  Bowel sounds are normal    obese   Musculoskeletal: Normal range of motion  She exhibits no edema  Neurological: She is alert and oriented to person, place, and time  Skin: Skin is warm and dry  Psychiatric: She has a normal mood and affect  Vitals reviewed  Discussion/Summary:  1  Pre Operative Clearance- Planned procedure Gastric Bypass Surgery to be done by Dr Stephanie Nuno, date to be determined    Sarmiento Prey is able to perform > 3 mets without symptoms  EKG NSR  Destini Momin is at low risk from a cardiovascular standpoint  No further testing needed at this time

## 2019-03-26 PROBLEM — D64.9 ANEMIA: Status: ACTIVE | Noted: 2019-03-26

## 2019-03-26 PROBLEM — E66.812 CLASS 2 OBESITY WITH BODY MASS INDEX (BMI) OF 38.0 TO 38.9 IN ADULT: Status: ACTIVE | Noted: 2019-03-26

## 2019-03-26 PROBLEM — E66.9 CLASS 2 OBESITY WITH BODY MASS INDEX (BMI) OF 38.0 TO 38.9 IN ADULT: Status: ACTIVE | Noted: 2019-03-26

## 2019-03-26 PROBLEM — K21.9 GASTROESOPHAGEAL REFLUX DISEASE: Status: ACTIVE | Noted: 2019-03-26

## 2019-03-26 PROBLEM — R11.10 CHRONIC VOMITING: Status: ACTIVE | Noted: 2019-03-26

## 2019-03-26 PROBLEM — Z98.84 S/P LAPAROSCOPIC SLEEVE GASTRECTOMY: Status: ACTIVE | Noted: 2019-03-26

## 2019-03-26 PROBLEM — R10.9 ABDOMINAL PAIN: Status: ACTIVE | Noted: 2019-03-26

## 2019-03-27 ENCOUNTER — TELEPHONE (OUTPATIENT)
Dept: BARIATRICS | Facility: CLINIC | Age: 39
End: 2019-03-27

## 2019-03-27 NOTE — TELEPHONE ENCOUNTER
Called patient to review pre op liver shrinking diet  Patient had done the diet previously , prior to her original surgery and started the diet yesterday  Patient verbalized understanding  Also follow up with email and handouts  Patient will contact me with any further concerns    She requested samples and I left a bag at the  for her to

## 2019-04-03 ENCOUNTER — ANESTHESIA EVENT (OUTPATIENT)
Dept: PERIOP | Facility: HOSPITAL | Age: 39
DRG: 220 | End: 2019-04-03
Payer: COMMERCIAL

## 2019-04-03 DIAGNOSIS — E66.01 MORBID (SEVERE) OBESITY DUE TO EXCESS CALORIES (HCC): Primary | ICD-10-CM

## 2019-04-03 RX ORDER — SODIUM CHLORIDE 9 MG/ML
125 INJECTION, SOLUTION INTRAVENOUS CONTINUOUS
Status: CANCELLED | OUTPATIENT
Start: 2019-04-08

## 2019-04-03 RX ORDER — SCOLOPAMINE TRANSDERMAL SYSTEM 1 MG/1
1 PATCH, EXTENDED RELEASE TRANSDERMAL ONCE AS NEEDED
Status: CANCELLED | OUTPATIENT
Start: 2019-04-08

## 2019-04-04 ENCOUNTER — APPOINTMENT (OUTPATIENT)
Dept: PREADMISSION TESTING | Facility: HOSPITAL | Age: 39
DRG: 220 | End: 2019-04-04
Payer: COMMERCIAL

## 2019-04-04 ENCOUNTER — OFFICE VISIT (OUTPATIENT)
Dept: BARIATRICS | Facility: CLINIC | Age: 39
End: 2019-04-04
Payer: COMMERCIAL

## 2019-04-04 VITALS
DIASTOLIC BLOOD PRESSURE: 70 MMHG | HEART RATE: 72 BPM | TEMPERATURE: 97.7 F | BODY MASS INDEX: 38.09 KG/M2 | HEIGHT: 66 IN | WEIGHT: 237 LBS | SYSTOLIC BLOOD PRESSURE: 112 MMHG

## 2019-04-04 DIAGNOSIS — E66.9 OBESITY, CLASS II, BMI 35-39.9: Primary | ICD-10-CM

## 2019-04-04 DIAGNOSIS — Z98.84 BARIATRIC SURGERY STATUS: ICD-10-CM

## 2019-04-04 DIAGNOSIS — Z98.84 S/P LAPAROSCOPIC SLEEVE GASTRECTOMY: ICD-10-CM

## 2019-04-04 DIAGNOSIS — R11.14 BILIOUS VOMITING WITH NAUSEA: ICD-10-CM

## 2019-04-04 DIAGNOSIS — K21.9 GASTROESOPHAGEAL REFLUX DISEASE WITHOUT ESOPHAGITIS: ICD-10-CM

## 2019-04-04 PROCEDURE — 99213 OFFICE O/P EST LOW 20 MIN: CPT | Performed by: SURGERY

## 2019-04-04 RX ORDER — OMEPRAZOLE 20 MG/1
20 CAPSULE, DELAYED RELEASE ORAL DAILY
Qty: 90 CAPSULE | Refills: 1 | Status: SHIPPED | OUTPATIENT
Start: 2019-04-04 | End: 2019-04-04 | Stop reason: ALTCHOICE

## 2019-04-04 RX ORDER — CEFAZOLIN SODIUM 2 G/50ML
2000 SOLUTION INTRAVENOUS ONCE
Status: CANCELLED | OUTPATIENT
Start: 2019-04-08 | End: 2019-04-04

## 2019-04-04 RX ORDER — OXYCODONE HYDROCHLORIDE AND ACETAMINOPHEN 5; 325 MG/1; MG/1
1 TABLET ORAL EVERY 4 HOURS PRN
Qty: 15 TABLET | Refills: 0 | Status: SHIPPED | OUTPATIENT
Start: 2019-04-04 | End: 2019-05-23 | Stop reason: ALTCHOICE

## 2019-04-04 RX ORDER — HEPARIN SODIUM 5000 [USP'U]/ML
5000 INJECTION, SOLUTION INTRAVENOUS; SUBCUTANEOUS
Status: CANCELLED | OUTPATIENT
Start: 2019-04-09 | End: 2019-04-10

## 2019-04-08 ENCOUNTER — HOSPITAL ENCOUNTER (INPATIENT)
Facility: HOSPITAL | Age: 39
LOS: 3 days | Discharge: HOME | DRG: 220 | End: 2019-04-11
Attending: SURGERY | Admitting: SURGERY
Payer: COMMERCIAL

## 2019-04-08 ENCOUNTER — ANESTHESIA (OUTPATIENT)
Dept: PERIOP | Facility: HOSPITAL | Age: 39
DRG: 220 | End: 2019-04-08
Payer: COMMERCIAL

## 2019-04-08 LAB — EXT PREGNANCY TEST URINE: NEGATIVE

## 2019-04-08 PROCEDURE — 0D164ZA BYPASS STOMACH TO JEJUNUM, PERCUTANEOUS ENDOSCOPIC APPROACH: ICD-10-PCS | Performed by: SURGERY

## 2019-04-08 PROCEDURE — 81025 URINE PREGNANCY TEST: CPT | Performed by: ANESTHESIOLOGY

## 2019-04-08 PROCEDURE — 43848 REVJ OPEN GSTR RSTCV PX: CPT | Performed by: SURGERY

## 2019-04-08 DEVICE — SEAMGUARD STPL REINF ENDO GIA ULTRA UNIV 60 PURPLE: Type: IMPLANTABLE DEVICE | Site: ABDOMEN | Status: FUNCTIONAL

## 2019-04-08 RX ORDER — ONDANSETRON 2 MG/ML
INJECTION INTRAMUSCULAR; INTRAVENOUS AS NEEDED
Status: DISCONTINUED | OUTPATIENT
Start: 2019-04-08 | End: 2019-04-08 | Stop reason: SURG

## 2019-04-08 RX ORDER — ONDANSETRON 2 MG/ML
4 INJECTION INTRAMUSCULAR; INTRAVENOUS ONCE AS NEEDED
Status: DISCONTINUED | OUTPATIENT
Start: 2019-04-08 | End: 2019-04-08 | Stop reason: HOSPADM

## 2019-04-08 RX ORDER — FENTANYL CITRATE/PF 50 MCG/ML
50 SYRINGE (ML) INJECTION
Status: DISCONTINUED | OUTPATIENT
Start: 2019-04-08 | End: 2019-04-08 | Stop reason: HOSPADM

## 2019-04-08 RX ORDER — CEFAZOLIN SODIUM 2 G/50ML
2000 SOLUTION INTRAVENOUS EVERY 8 HOURS
Status: COMPLETED | OUTPATIENT
Start: 2019-04-09 | End: 2019-04-09

## 2019-04-08 RX ORDER — ACETAMINOPHEN 160 MG/5ML
325 SUSPENSION, ORAL (FINAL DOSE FORM) ORAL EVERY 4 HOURS PRN
Status: DISCONTINUED | OUTPATIENT
Start: 2019-04-08 | End: 2019-04-11 | Stop reason: HOSPADM

## 2019-04-08 RX ORDER — MAGNESIUM HYDROXIDE 1200 MG/15ML
LIQUID ORAL AS NEEDED
Status: DISCONTINUED | OUTPATIENT
Start: 2019-04-08 | End: 2019-04-08 | Stop reason: HOSPADM

## 2019-04-08 RX ORDER — HYDROMORPHONE HYDROCHLORIDE 2 MG/ML
INJECTION, SOLUTION INTRAMUSCULAR; INTRAVENOUS; SUBCUTANEOUS AS NEEDED
Status: DISCONTINUED | OUTPATIENT
Start: 2019-04-08 | End: 2019-04-08 | Stop reason: SURG

## 2019-04-08 RX ORDER — SODIUM CHLORIDE, SODIUM LACTATE, POTASSIUM CHLORIDE, CALCIUM CHLORIDE 600; 310; 30; 20 MG/100ML; MG/100ML; MG/100ML; MG/100ML
75 INJECTION, SOLUTION INTRAVENOUS CONTINUOUS
Status: DISCONTINUED | OUTPATIENT
Start: 2019-04-08 | End: 2019-04-11

## 2019-04-08 RX ORDER — DEXAMETHASONE SODIUM PHOSPHATE 10 MG/ML
INJECTION, SOLUTION INTRAMUSCULAR; INTRAVENOUS AS NEEDED
Status: DISCONTINUED | OUTPATIENT
Start: 2019-04-08 | End: 2019-04-08 | Stop reason: SURG

## 2019-04-08 RX ORDER — ONDANSETRON 2 MG/ML
4 INJECTION INTRAMUSCULAR; INTRAVENOUS EVERY 4 HOURS PRN
Status: DISCONTINUED | OUTPATIENT
Start: 2019-04-08 | End: 2019-04-11 | Stop reason: HOSPADM

## 2019-04-08 RX ORDER — ROCURONIUM BROMIDE 10 MG/ML
INJECTION, SOLUTION INTRAVENOUS AS NEEDED
Status: DISCONTINUED | OUTPATIENT
Start: 2019-04-08 | End: 2019-04-08 | Stop reason: SURG

## 2019-04-08 RX ORDER — OXYCODONE HCL 5 MG/5 ML
5 SOLUTION, ORAL ORAL EVERY 4 HOURS PRN
Status: DISCONTINUED | OUTPATIENT
Start: 2019-04-08 | End: 2019-04-11 | Stop reason: HOSPADM

## 2019-04-08 RX ORDER — MORPHINE SULFATE 4 MG/ML
4 INJECTION, SOLUTION INTRAMUSCULAR; INTRAVENOUS EVERY 2 HOUR PRN
Status: DISCONTINUED | OUTPATIENT
Start: 2019-04-08 | End: 2019-04-11 | Stop reason: HOSPADM

## 2019-04-08 RX ORDER — NEOSTIGMINE METHYLSULFATE 1 MG/ML
INJECTION INTRAVENOUS AS NEEDED
Status: DISCONTINUED | OUTPATIENT
Start: 2019-04-08 | End: 2019-04-08 | Stop reason: SURG

## 2019-04-08 RX ORDER — SODIUM CHLORIDE 9 MG/ML
125 INJECTION, SOLUTION INTRAVENOUS CONTINUOUS
Status: DISCONTINUED | OUTPATIENT
Start: 2019-04-08 | End: 2019-04-08

## 2019-04-08 RX ORDER — FENTANYL CITRATE 50 UG/ML
INJECTION, SOLUTION INTRAMUSCULAR; INTRAVENOUS AS NEEDED
Status: DISCONTINUED | OUTPATIENT
Start: 2019-04-08 | End: 2019-04-08 | Stop reason: SURG

## 2019-04-08 RX ORDER — GLYCOPYRROLATE 0.2 MG/ML
INJECTION INTRAMUSCULAR; INTRAVENOUS AS NEEDED
Status: DISCONTINUED | OUTPATIENT
Start: 2019-04-08 | End: 2019-04-08 | Stop reason: SURG

## 2019-04-08 RX ORDER — PROPOFOL 10 MG/ML
INJECTION, EMULSION INTRAVENOUS AS NEEDED
Status: DISCONTINUED | OUTPATIENT
Start: 2019-04-08 | End: 2019-04-08 | Stop reason: SURG

## 2019-04-08 RX ORDER — CEFAZOLIN SODIUM 2 G/50ML
2000 SOLUTION INTRAVENOUS ONCE
Status: COMPLETED | OUTPATIENT
Start: 2019-04-08 | End: 2019-04-08

## 2019-04-08 RX ORDER — HEPARIN SODIUM 5000 [USP'U]/ML
5000 INJECTION, SOLUTION INTRAVENOUS; SUBCUTANEOUS
Status: COMPLETED | OUTPATIENT
Start: 2019-04-08 | End: 2019-04-08

## 2019-04-08 RX ORDER — SCOLOPAMINE TRANSDERMAL SYSTEM 1 MG/1
1 PATCH, EXTENDED RELEASE TRANSDERMAL ONCE AS NEEDED
Status: DISCONTINUED | OUTPATIENT
Start: 2019-04-08 | End: 2019-04-08

## 2019-04-08 RX ORDER — ACETAMINOPHEN 160 MG/5ML
320 SUSPENSION, ORAL (FINAL DOSE FORM) ORAL EVERY 4 HOURS PRN
Status: DISCONTINUED | OUTPATIENT
Start: 2019-04-08 | End: 2019-04-11 | Stop reason: HOSPADM

## 2019-04-08 RX ORDER — PANTOPRAZOLE SODIUM 40 MG/1
40 INJECTION, POWDER, FOR SOLUTION INTRAVENOUS
Status: DISCONTINUED | OUTPATIENT
Start: 2019-04-09 | End: 2019-04-11 | Stop reason: HOSPADM

## 2019-04-08 RX ORDER — OXYCODONE HCL 5 MG/5 ML
10 SOLUTION, ORAL ORAL EVERY 4 HOURS PRN
Status: DISCONTINUED | OUTPATIENT
Start: 2019-04-08 | End: 2019-04-11 | Stop reason: HOSPADM

## 2019-04-08 RX ORDER — HEPARIN SODIUM 5000 [USP'U]/ML
5000 INJECTION, SOLUTION INTRAVENOUS; SUBCUTANEOUS
Status: DISCONTINUED | OUTPATIENT
Start: 2019-04-09 | End: 2019-04-08

## 2019-04-08 RX ORDER — MIDAZOLAM HYDROCHLORIDE 1 MG/ML
INJECTION INTRAMUSCULAR; INTRAVENOUS AS NEEDED
Status: DISCONTINUED | OUTPATIENT
Start: 2019-04-08 | End: 2019-04-08 | Stop reason: SURG

## 2019-04-08 RX ADMIN — FENTANYL CITRATE 50 MCG: 50 INJECTION, SOLUTION INTRAMUSCULAR; INTRAVENOUS at 18:26

## 2019-04-08 RX ADMIN — DEXAMETHASONE SODIUM PHOSPHATE 4 MG: 10 INJECTION, SOLUTION INTRAMUSCULAR; INTRAVENOUS at 16:16

## 2019-04-08 RX ADMIN — GLYCOPYRROLATE 0.6 MG: 0.2 INJECTION INTRAMUSCULAR; INTRAVENOUS at 17:56

## 2019-04-08 RX ADMIN — NEOSTIGMINE METHYLSULFATE 3 MG: 1 INJECTION, SOLUTION INTRAVENOUS at 17:56

## 2019-04-08 RX ADMIN — HYDROMORPHONE HYDROCHLORIDE 0.5 MG: 2 INJECTION, SOLUTION INTRAMUSCULAR; INTRAVENOUS; SUBCUTANEOUS at 17:56

## 2019-04-08 RX ADMIN — ROCURONIUM BROMIDE 10 MG: 10 INJECTION, SOLUTION INTRAVENOUS at 16:49

## 2019-04-08 RX ADMIN — CEFAZOLIN SODIUM 2000 MG: 2 SOLUTION INTRAVENOUS at 23:37

## 2019-04-08 RX ADMIN — ROCURONIUM BROMIDE 20 MG: 10 INJECTION, SOLUTION INTRAVENOUS at 16:11

## 2019-04-08 RX ADMIN — ONDANSETRON 4 MG: 2 INJECTION INTRAMUSCULAR; INTRAVENOUS at 16:14

## 2019-04-08 RX ADMIN — MORPHINE SULFATE 2 MG: 2 INJECTION, SOLUTION INTRAMUSCULAR; INTRAVENOUS at 23:37

## 2019-04-08 RX ADMIN — PHENYLEPHRINE HYDROCHLORIDE 100 MCG: 10 INJECTION INTRAVENOUS at 17:01

## 2019-04-08 RX ADMIN — FENTANYL CITRATE 100 MCG: 50 INJECTION, SOLUTION INTRAMUSCULAR; INTRAVENOUS at 15:50

## 2019-04-08 RX ADMIN — SCOPALAMINE 1 PATCH: 1 PATCH, EXTENDED RELEASE TRANSDERMAL at 12:41

## 2019-04-08 RX ADMIN — MORPHINE SULFATE 2 MG: 2 INJECTION, SOLUTION INTRAMUSCULAR; INTRAVENOUS at 20:45

## 2019-04-08 RX ADMIN — CEFAZOLIN SODIUM 2000 MG: 2 SOLUTION INTRAVENOUS at 15:49

## 2019-04-08 RX ADMIN — PHENYLEPHRINE HYDROCHLORIDE 100 MCG: 10 INJECTION INTRAVENOUS at 17:05

## 2019-04-08 RX ADMIN — METRONIDAZOLE 500 MG: 500 INJECTION, SOLUTION INTRAVENOUS at 15:54

## 2019-04-08 RX ADMIN — SODIUM CHLORIDE 125 ML/HR: 0.9 INJECTION, SOLUTION INTRAVENOUS at 12:41

## 2019-04-08 RX ADMIN — HEPARIN SODIUM 5000 UNITS: 5000 INJECTION INTRAVENOUS; SUBCUTANEOUS at 14:54

## 2019-04-08 RX ADMIN — FENTANYL CITRATE 50 MCG: 50 INJECTION, SOLUTION INTRAMUSCULAR; INTRAVENOUS at 15:55

## 2019-04-08 RX ADMIN — LIDOCAINE HYDROCHLORIDE 100 MG: 20 INJECTION, SOLUTION INTRAVENOUS at 17:59

## 2019-04-08 RX ADMIN — SODIUM CHLORIDE, SODIUM LACTATE, POTASSIUM CHLORIDE, AND CALCIUM CHLORIDE 125 ML/HR: .6; .31; .03; .02 INJECTION, SOLUTION INTRAVENOUS at 18:43

## 2019-04-08 RX ADMIN — HYDROMORPHONE HYDROCHLORIDE 1 MG: 2 INJECTION, SOLUTION INTRAMUSCULAR; INTRAVENOUS; SUBCUTANEOUS at 17:43

## 2019-04-08 RX ADMIN — ONDANSETRON 4 MG: 2 INJECTION INTRAMUSCULAR; INTRAVENOUS at 17:24

## 2019-04-08 RX ADMIN — MIDAZOLAM 2 MG: 1 INJECTION INTRAMUSCULAR; INTRAVENOUS at 15:50

## 2019-04-08 RX ADMIN — PROPOFOL 200 MG: 10 INJECTION, EMULSION INTRAVENOUS at 15:50

## 2019-04-08 RX ADMIN — ONDANSETRON 4 MG: 2 INJECTION INTRAMUSCULAR; INTRAVENOUS at 23:37

## 2019-04-08 RX ADMIN — ROCURONIUM BROMIDE 50 MG: 10 INJECTION, SOLUTION INTRAVENOUS at 15:50

## 2019-04-08 RX ADMIN — HYDROMORPHONE HYDROCHLORIDE 0.5 MG: 2 INJECTION, SOLUTION INTRAMUSCULAR; INTRAVENOUS; SUBCUTANEOUS at 17:57

## 2019-04-08 RX ADMIN — FENTANYL CITRATE 50 MCG: 50 INJECTION, SOLUTION INTRAMUSCULAR; INTRAVENOUS at 17:22

## 2019-04-08 RX ADMIN — PHENYLEPHRINE HYDROCHLORIDE 50 MCG: 10 INJECTION INTRAVENOUS at 16:27

## 2019-04-09 ENCOUNTER — APPOINTMENT (INPATIENT)
Dept: RADIOLOGY | Facility: HOSPITAL | Age: 39
DRG: 220 | End: 2019-04-09
Payer: COMMERCIAL

## 2019-04-09 LAB
ANION GAP SERPL CALCULATED.3IONS-SCNC: 12 MMOL/L (ref 4–13)
BUN SERPL-MCNC: 11 MG/DL (ref 5–25)
CALCIUM SERPL-MCNC: 8.5 MG/DL (ref 8.3–10.1)
CHLORIDE SERPL-SCNC: 104 MMOL/L (ref 100–108)
CO2 SERPL-SCNC: 22 MMOL/L (ref 21–32)
CREAT SERPL-MCNC: 0.88 MG/DL (ref 0.6–1.3)
ERYTHROCYTE [DISTWIDTH] IN BLOOD BY AUTOMATED COUNT: 15.8 % (ref 11.6–15.1)
GFR SERPL CREATININE-BSD FRML MDRD: 84 ML/MIN/1.73SQ M
GLUCOSE SERPL-MCNC: 166 MG/DL (ref 65–140)
HCT VFR BLD AUTO: 35.2 % (ref 34.8–46.1)
HGB BLD-MCNC: 11.1 G/DL (ref 11.5–15.4)
MCH RBC QN AUTO: 27.6 PG (ref 26.8–34.3)
MCHC RBC AUTO-ENTMCNC: 31.5 G/DL (ref 31.4–37.4)
MCV RBC AUTO: 88 FL (ref 82–98)
PLATELET # BLD AUTO: 248 THOUSANDS/UL (ref 149–390)
PMV BLD AUTO: 9.7 FL (ref 8.9–12.7)
POTASSIUM SERPL-SCNC: 4.1 MMOL/L (ref 3.5–5.3)
RBC # BLD AUTO: 4.02 MILLION/UL (ref 3.81–5.12)
SODIUM SERPL-SCNC: 138 MMOL/L (ref 136–145)
WBC # BLD AUTO: 17.6 THOUSAND/UL (ref 4.31–10.16)

## 2019-04-09 PROCEDURE — 85027 COMPLETE CBC AUTOMATED: CPT | Performed by: SURGERY

## 2019-04-09 PROCEDURE — 80048 BASIC METABOLIC PNL TOTAL CA: CPT | Performed by: SURGERY

## 2019-04-09 PROCEDURE — 99024 POSTOP FOLLOW-UP VISIT: CPT | Performed by: SURGERY

## 2019-04-09 PROCEDURE — 74240 X-RAY XM UPR GI TRC 1CNTRST: CPT

## 2019-04-09 PROCEDURE — C9113 INJ PANTOPRAZOLE SODIUM, VIA: HCPCS | Performed by: SURGERY

## 2019-04-09 RX ORDER — METOCLOPRAMIDE HYDROCHLORIDE 5 MG/ML
10 INJECTION INTRAMUSCULAR; INTRAVENOUS EVERY 6 HOURS PRN
Status: DISCONTINUED | OUTPATIENT
Start: 2019-04-09 | End: 2019-04-09

## 2019-04-09 RX ORDER — PROMETHAZINE HYDROCHLORIDE 25 MG/ML
12.5 INJECTION, SOLUTION INTRAMUSCULAR; INTRAVENOUS EVERY 6 HOURS PRN
Status: DISCONTINUED | OUTPATIENT
Start: 2019-04-09 | End: 2019-04-11 | Stop reason: HOSPADM

## 2019-04-09 RX ADMIN — METOCLOPRAMIDE 10 MG: 5 INJECTION, SOLUTION INTRAMUSCULAR; INTRAVENOUS at 02:10

## 2019-04-09 RX ADMIN — MORPHINE SULFATE 2 MG: 2 INJECTION, SOLUTION INTRAMUSCULAR; INTRAVENOUS at 02:54

## 2019-04-09 RX ADMIN — OXYCODONE HYDROCHLORIDE 10 MG: 5 SOLUTION ORAL at 21:23

## 2019-04-09 RX ADMIN — OXYCODONE HYDROCHLORIDE 10 MG: 5 SOLUTION ORAL at 12:05

## 2019-04-09 RX ADMIN — CEFAZOLIN SODIUM 2000 MG: 2 SOLUTION INTRAVENOUS at 08:24

## 2019-04-09 RX ADMIN — ONDANSETRON 4 MG: 2 INJECTION INTRAMUSCULAR; INTRAVENOUS at 06:48

## 2019-04-09 RX ADMIN — PANTOPRAZOLE SODIUM 40 MG: 40 INJECTION, POWDER, FOR SOLUTION INTRAVENOUS at 08:24

## 2019-04-09 RX ADMIN — ONDANSETRON 4 MG: 2 INJECTION INTRAMUSCULAR; INTRAVENOUS at 17:56

## 2019-04-09 RX ADMIN — MORPHINE SULFATE: 2 INJECTION, SOLUTION INTRAMUSCULAR; INTRAVENOUS at 06:36

## 2019-04-09 RX ADMIN — ACETAMINOPHEN 325 MG: 160 SUSPENSION ORAL at 21:24

## 2019-04-09 RX ADMIN — ACETAMINOPHEN 325 MG: 160 SUSPENSION ORAL at 12:05

## 2019-04-09 RX ADMIN — MORPHINE SULFATE 2 MG: 2 INJECTION, SOLUTION INTRAMUSCULAR; INTRAVENOUS at 08:38

## 2019-04-09 RX ADMIN — ONDANSETRON 4 MG: 2 INJECTION INTRAMUSCULAR; INTRAVENOUS at 21:28

## 2019-04-09 RX ADMIN — SODIUM CHLORIDE, SODIUM LACTATE, POTASSIUM CHLORIDE, AND CALCIUM CHLORIDE 125 ML/HR: .6; .31; .03; .02 INJECTION, SOLUTION INTRAVENOUS at 22:31

## 2019-04-09 RX ADMIN — METRONIDAZOLE 500 MG: 500 INJECTION, SOLUTION INTRAVENOUS at 01:11

## 2019-04-09 RX ADMIN — ONDANSETRON 4 MG: 2 INJECTION INTRAMUSCULAR; INTRAVENOUS at 12:11

## 2019-04-09 RX ADMIN — IOHEXOL 30 ML: 350 INJECTION, SOLUTION INTRAVENOUS at 09:50

## 2019-04-09 RX ADMIN — METRONIDAZOLE 500 MG: 500 INJECTION, SOLUTION INTRAVENOUS at 10:06

## 2019-04-09 RX ADMIN — OXYCODONE HYDROCHLORIDE 10 MG: 5 SOLUTION ORAL at 17:53

## 2019-04-09 RX ADMIN — ACETAMINOPHEN 325 MG: 160 SUSPENSION ORAL at 17:53

## 2019-04-10 PROCEDURE — C9113 INJ PANTOPRAZOLE SODIUM, VIA: HCPCS | Performed by: SURGERY

## 2019-04-10 PROCEDURE — 99024 POSTOP FOLLOW-UP VISIT: CPT | Performed by: SURGERY

## 2019-04-10 RX ORDER — DEXAMETHASONE SODIUM PHOSPHATE 4 MG/ML
8 INJECTION, SOLUTION INTRA-ARTICULAR; INTRALESIONAL; INTRAMUSCULAR; INTRAVENOUS; SOFT TISSUE ONCE
Status: COMPLETED | OUTPATIENT
Start: 2019-04-10 | End: 2019-04-10

## 2019-04-10 RX ADMIN — ONDANSETRON 4 MG: 2 INJECTION INTRAMUSCULAR; INTRAVENOUS at 05:40

## 2019-04-10 RX ADMIN — ACETAMINOPHEN 325 MG: 160 SUSPENSION ORAL at 01:47

## 2019-04-10 RX ADMIN — ONDANSETRON 4 MG: 2 INJECTION INTRAMUSCULAR; INTRAVENOUS at 11:28

## 2019-04-10 RX ADMIN — OXYCODONE HYDROCHLORIDE 10 MG: 5 SOLUTION ORAL at 05:41

## 2019-04-10 RX ADMIN — MORPHINE SULFATE 2 MG: 2 INJECTION, SOLUTION INTRAMUSCULAR; INTRAVENOUS at 19:58

## 2019-04-10 RX ADMIN — ACETAMINOPHEN 325 MG: 160 SUSPENSION ORAL at 05:41

## 2019-04-10 RX ADMIN — OXYCODONE HYDROCHLORIDE 10 MG: 5 SOLUTION ORAL at 01:47

## 2019-04-10 RX ADMIN — ONDANSETRON 4 MG: 2 INJECTION INTRAMUSCULAR; INTRAVENOUS at 01:48

## 2019-04-10 RX ADMIN — PANTOPRAZOLE SODIUM 40 MG: 40 INJECTION, POWDER, FOR SOLUTION INTRAVENOUS at 08:56

## 2019-04-10 RX ADMIN — DEXAMETHASONE SODIUM PHOSPHATE 8 MG: 4 INJECTION, SOLUTION INTRAMUSCULAR; INTRAVENOUS at 07:50

## 2019-04-10 RX ADMIN — MORPHINE SULFATE 2 MG: 2 INJECTION, SOLUTION INTRAMUSCULAR; INTRAVENOUS at 11:28

## 2019-04-10 RX ADMIN — SODIUM CHLORIDE, SODIUM LACTATE, POTASSIUM CHLORIDE, AND CALCIUM CHLORIDE 75 ML/HR: .6; .31; .03; .02 INJECTION, SOLUTION INTRAVENOUS at 11:27

## 2019-04-11 VITALS
DIASTOLIC BLOOD PRESSURE: 48 MMHG | WEIGHT: 231.1 LBS | TEMPERATURE: 98.5 F | RESPIRATION RATE: 16 BRPM | OXYGEN SATURATION: 96 % | BODY MASS INDEX: 39.46 KG/M2 | HEART RATE: 62 BPM | SYSTOLIC BLOOD PRESSURE: 90 MMHG | HEIGHT: 64 IN

## 2019-04-11 PROBLEM — K21.9 GASTROESOPHAGEAL REFLUX DISEASE: Status: RESOLVED | Noted: 2019-03-26 | Resolved: 2019-04-11

## 2019-04-11 PROCEDURE — 99024 POSTOP FOLLOW-UP VISIT: CPT | Performed by: SURGERY

## 2019-04-11 PROCEDURE — NC001 PR NO CHARGE: Performed by: SURGERY

## 2019-04-11 RX ORDER — PANTOPRAZOLE SODIUM 40 MG/1
40 TABLET, DELAYED RELEASE ORAL
Status: DISCONTINUED | OUTPATIENT
Start: 2019-04-11 | End: 2019-04-11 | Stop reason: HOSPADM

## 2019-04-11 RX ADMIN — ONDANSETRON 4 MG: 2 INJECTION INTRAMUSCULAR; INTRAVENOUS at 06:29

## 2019-04-11 RX ADMIN — ACETAMINOPHEN 325 MG: 160 SUSPENSION ORAL at 06:29

## 2019-04-11 RX ADMIN — OXYCODONE HYDROCHLORIDE 10 MG: 5 SOLUTION ORAL at 06:29

## 2019-04-11 RX ADMIN — PANTOPRAZOLE SODIUM 40 MG: 40 TABLET, DELAYED RELEASE ORAL at 09:11

## 2019-04-15 ENCOUNTER — TELEPHONE (OUTPATIENT)
Dept: MEDSURG UNIT | Facility: HOSPITAL | Age: 39
End: 2019-04-15

## 2019-04-15 ENCOUNTER — TELEPHONE (OUTPATIENT)
Dept: BARIATRICS | Facility: CLINIC | Age: 39
End: 2019-04-15

## 2019-04-18 ENCOUNTER — OFFICE VISIT (OUTPATIENT)
Dept: BARIATRICS | Facility: CLINIC | Age: 39
End: 2019-04-18

## 2019-04-18 VITALS
TEMPERATURE: 97.5 F | BODY MASS INDEX: 37.28 KG/M2 | HEIGHT: 66 IN | SYSTOLIC BLOOD PRESSURE: 104 MMHG | WEIGHT: 232 LBS | DIASTOLIC BLOOD PRESSURE: 70 MMHG | HEART RATE: 84 BPM

## 2019-04-18 DIAGNOSIS — Z98.84 BARIATRIC SURGERY STATUS: Primary | ICD-10-CM

## 2019-04-18 DIAGNOSIS — Z98.84 S/P GASTRIC BYPASS: Primary | ICD-10-CM

## 2019-04-18 DIAGNOSIS — R11.14 BILIOUS VOMITING WITH NAUSEA: ICD-10-CM

## 2019-04-18 PROCEDURE — 99024 POSTOP FOLLOW-UP VISIT: CPT | Performed by: SURGERY

## 2019-04-18 PROCEDURE — RECHECK: Performed by: DIETITIAN, REGISTERED

## 2019-05-22 ENCOUNTER — CLINICAL SUPPORT (OUTPATIENT)
Dept: BARIATRICS | Facility: CLINIC | Age: 39
End: 2019-05-22

## 2019-05-22 VITALS — WEIGHT: 224.7 LBS | BODY MASS INDEX: 36.82 KG/M2

## 2019-05-22 DIAGNOSIS — K91.2 POSTSURGICAL MALABSORPTION: Primary | ICD-10-CM

## 2019-05-22 DIAGNOSIS — Z98.84 BARIATRIC SURGERY STATUS: ICD-10-CM

## 2019-05-22 DIAGNOSIS — K21.9 GASTROESOPHAGEAL REFLUX DISEASE WITHOUT ESOPHAGITIS: ICD-10-CM

## 2019-05-22 DIAGNOSIS — E66.9 OBESITY, CLASS II, BMI 35-39.9: ICD-10-CM

## 2019-05-22 PROCEDURE — RECHECK: Performed by: DIETITIAN, REGISTERED

## 2019-05-22 RX ORDER — PANTOPRAZOLE SODIUM 40 MG/1
40 TABLET, DELAYED RELEASE ORAL DAILY
Qty: 90 TABLET | Refills: 0 | Status: SHIPPED | OUTPATIENT
Start: 2019-05-22 | End: 2019-05-23

## 2019-05-23 ENCOUNTER — OFFICE VISIT (OUTPATIENT)
Dept: BARIATRICS | Facility: CLINIC | Age: 39
End: 2019-05-23

## 2019-05-23 VITALS
BODY MASS INDEX: 36.32 KG/M2 | HEART RATE: 68 BPM | DIASTOLIC BLOOD PRESSURE: 66 MMHG | RESPIRATION RATE: 16 BRPM | TEMPERATURE: 98 F | SYSTOLIC BLOOD PRESSURE: 112 MMHG | WEIGHT: 226 LBS | HEIGHT: 66 IN

## 2019-05-23 DIAGNOSIS — R12 HEART BURN: ICD-10-CM

## 2019-05-23 DIAGNOSIS — Z98.84 BARIATRIC SURGERY STATUS: Primary | ICD-10-CM

## 2019-05-23 DIAGNOSIS — R10.13 EPIGASTRIC PAIN: ICD-10-CM

## 2019-05-23 PROBLEM — K59.09 OTHER CONSTIPATION: Status: ACTIVE | Noted: 2019-05-23

## 2019-05-23 PROCEDURE — 99024 POSTOP FOLLOW-UP VISIT: CPT | Performed by: PHYSICIAN ASSISTANT

## 2019-05-23 RX ORDER — SUCRALFATE 1 G/1
1 TABLET ORAL 4 TIMES DAILY
Qty: 120 TABLET | Refills: 1 | Status: SHIPPED | OUTPATIENT
Start: 2019-05-23 | End: 2019-05-23

## 2019-05-23 RX ORDER — PANTOPRAZOLE SODIUM 40 MG/1
40 TABLET, DELAYED RELEASE ORAL 2 TIMES DAILY
Qty: 180 TABLET | Refills: 0 | Status: SHIPPED | OUTPATIENT
Start: 2019-05-23 | End: 2019-07-26 | Stop reason: SDUPTHER

## 2019-05-23 RX ORDER — SUCRALFATE 1 G/1
1 TABLET ORAL 4 TIMES DAILY
Qty: 120 TABLET | Refills: 0 | Status: SHIPPED | OUTPATIENT
Start: 2019-05-23 | End: 2019-09-09 | Stop reason: ALTCHOICE

## 2019-05-31 ENCOUNTER — OFFICE VISIT (OUTPATIENT)
Dept: BARIATRICS | Facility: CLINIC | Age: 39
End: 2019-05-31

## 2019-05-31 VITALS
HEIGHT: 66 IN | DIASTOLIC BLOOD PRESSURE: 68 MMHG | SYSTOLIC BLOOD PRESSURE: 110 MMHG | HEART RATE: 88 BPM | WEIGHT: 221 LBS | BODY MASS INDEX: 35.52 KG/M2 | TEMPERATURE: 97.1 F

## 2019-05-31 DIAGNOSIS — R13.10 DYSPHAGIA: ICD-10-CM

## 2019-05-31 DIAGNOSIS — R10.13 EPIGASTRIC ABDOMINAL PAIN: ICD-10-CM

## 2019-05-31 DIAGNOSIS — Z98.84 BARIATRIC SURGERY STATUS: Primary | ICD-10-CM

## 2019-05-31 PROBLEM — R63.8 DECREASED ORAL INTAKE: Status: ACTIVE | Noted: 2019-05-31

## 2019-05-31 PROCEDURE — 99024 POSTOP FOLLOW-UP VISIT: CPT | Performed by: PHYSICIAN ASSISTANT

## 2019-06-06 ENCOUNTER — HOSPITAL ENCOUNTER (OUTPATIENT)
Dept: INFUSION CENTER | Facility: HOSPITAL | Age: 39
Discharge: HOME | End: 2019-06-06
Payer: COMMERCIAL

## 2019-06-06 VITALS
DIASTOLIC BLOOD PRESSURE: 58 MMHG | HEART RATE: 81 BPM | TEMPERATURE: 99.1 F | RESPIRATION RATE: 16 BRPM | SYSTOLIC BLOOD PRESSURE: 109 MMHG

## 2019-06-06 DIAGNOSIS — R63.8 DECREASED ORAL INTAKE: ICD-10-CM

## 2019-06-06 DIAGNOSIS — Z98.84 BARIATRIC SURGERY STATUS: Primary | ICD-10-CM

## 2019-06-06 PROCEDURE — 96365 THER/PROPH/DIAG IV INF INIT: CPT

## 2019-06-06 PROCEDURE — 96366 THER/PROPH/DIAG IV INF ADDON: CPT

## 2019-06-06 RX ADMIN — THIAMINE HYDROCHLORIDE: 100 INJECTION, SOLUTION INTRAMUSCULAR; INTRAVENOUS at 10:55

## 2019-06-06 NOTE — PROGRESS NOTES
Patient tolerated hydration well without complications    This was one time dose, pt will follow up with James blackmon tomorrow

## 2019-06-06 NOTE — PLAN OF CARE
Problem: Potential for Falls  Goal: Patient will remain free of falls  Description  INTERVENTIONS:  - Assess patient frequently for physical needs  -  Identify cognitive and physical deficits and behaviors that affect risk of falls    -  [place] fall precautions as indicated by assessment   - Educate patient/family on patient safety including physical limitations  - Instruct patient to call for assistance with activity based on assessment  - Modify environment to reduce risk of injury  - Consider OT/PT consult to assist with strengthening/mobility  Outcome: Progressing

## 2019-06-07 ENCOUNTER — OFFICE VISIT (OUTPATIENT)
Dept: BARIATRICS | Facility: CLINIC | Age: 39
End: 2019-06-07

## 2019-06-07 ENCOUNTER — HOSPITAL ENCOUNTER (OUTPATIENT)
Dept: INFUSION CENTER | Facility: HOSPITAL | Age: 39
Discharge: HOME | End: 2019-06-07
Payer: COMMERCIAL

## 2019-06-07 VITALS
HEART RATE: 64 BPM | DIASTOLIC BLOOD PRESSURE: 56 MMHG | RESPIRATION RATE: 16 BRPM | TEMPERATURE: 97.9 F | SYSTOLIC BLOOD PRESSURE: 102 MMHG

## 2019-06-07 VITALS
BODY MASS INDEX: 36 KG/M2 | TEMPERATURE: 97.1 F | DIASTOLIC BLOOD PRESSURE: 64 MMHG | HEIGHT: 66 IN | SYSTOLIC BLOOD PRESSURE: 112 MMHG | WEIGHT: 224 LBS | HEART RATE: 66 BPM

## 2019-06-07 DIAGNOSIS — K59.09 OTHER CONSTIPATION: ICD-10-CM

## 2019-06-07 DIAGNOSIS — R63.8 DECREASED ORAL INTAKE: ICD-10-CM

## 2019-06-07 DIAGNOSIS — Z98.84 BARIATRIC SURGERY STATUS: Primary | ICD-10-CM

## 2019-06-07 DIAGNOSIS — R13.10 DYSPHAGIA, UNSPECIFIED TYPE: ICD-10-CM

## 2019-06-07 DIAGNOSIS — R10.13 EPIGASTRIC ABDOMINAL PAIN: ICD-10-CM

## 2019-06-07 PROCEDURE — 99024 POSTOP FOLLOW-UP VISIT: CPT | Performed by: PHYSICIAN ASSISTANT

## 2019-06-07 PROCEDURE — 96366 THER/PROPH/DIAG IV INF ADDON: CPT

## 2019-06-07 PROCEDURE — 96365 THER/PROPH/DIAG IV INF INIT: CPT

## 2019-06-07 RX ADMIN — THIAMINE HYDROCHLORIDE: 100 INJECTION, SOLUTION INTRAMUSCULAR; INTRAVENOUS at 13:15

## 2019-06-07 NOTE — PLAN OF CARE
Problem: Potential for Falls  Goal: Patient will remain free of falls  Description  INTERVENTIONS:  - Assess patient frequently for physical needs  -  Identify cognitive and physical deficits and behaviors that affect risk of falls    -  [place] fall precautions as indicated by assessment   - Educate patient/family on patient safety including physical limitations  - Instruct patient to call for assistance with activity based on assessment  - Modify environment to reduce risk of injury  - Consider OT/PT consult to assist with strengthening/mobility  Outcome: Progressing

## 2019-06-17 ENCOUNTER — ANESTHESIA EVENT (OUTPATIENT)
Dept: GASTROENTEROLOGY | Facility: HOSPITAL | Age: 39
End: 2019-06-17

## 2019-06-19 ENCOUNTER — ANESTHESIA (OUTPATIENT)
Dept: GASTROENTEROLOGY | Facility: HOSPITAL | Age: 39
End: 2019-06-19

## 2019-06-19 ENCOUNTER — HOSPITAL ENCOUNTER (OUTPATIENT)
Dept: GASTROENTEROLOGY | Facility: HOSPITAL | Age: 39
Setting detail: OUTPATIENT SURGERY
Discharge: HOME | End: 2019-06-19
Attending: SURGERY | Admitting: SURGERY
Payer: COMMERCIAL

## 2019-06-19 VITALS
TEMPERATURE: 97.9 F | HEART RATE: 62 BPM | DIASTOLIC BLOOD PRESSURE: 59 MMHG | HEIGHT: 65 IN | SYSTOLIC BLOOD PRESSURE: 113 MMHG | WEIGHT: 223 LBS | OXYGEN SATURATION: 100 % | RESPIRATION RATE: 16 BRPM | BODY MASS INDEX: 37.15 KG/M2

## 2019-06-19 DIAGNOSIS — Z98.84 BARIATRIC SURGERY STATUS: ICD-10-CM

## 2019-06-19 LAB
EXT PREGNANCY TEST URINE: NEGATIVE
EXT. CONTROL: NORMAL

## 2019-06-19 PROCEDURE — 43239 EGD BIOPSY SINGLE/MULTIPLE: CPT | Performed by: SURGERY

## 2019-06-19 PROCEDURE — 88305 TISSUE EXAM BY PATHOLOGIST: CPT | Performed by: PATHOLOGY

## 2019-06-19 PROCEDURE — 81025 URINE PREGNANCY TEST: CPT | Performed by: ANESTHESIOLOGY

## 2019-06-19 RX ORDER — SODIUM CHLORIDE 9 MG/ML
125 INJECTION, SOLUTION INTRAVENOUS CONTINUOUS
Status: DISCONTINUED | OUTPATIENT
Start: 2019-06-19 | End: 2019-06-23 | Stop reason: HOSPADM

## 2019-06-19 RX ORDER — PROPOFOL 10 MG/ML
INJECTION, EMULSION INTRAVENOUS AS NEEDED
Status: DISCONTINUED | OUTPATIENT
Start: 2019-06-19 | End: 2019-06-19 | Stop reason: SURG

## 2019-06-19 RX ADMIN — SODIUM CHLORIDE 125 ML/HR: 0.9 INJECTION, SOLUTION INTRAVENOUS at 11:57

## 2019-06-19 RX ADMIN — PROPOFOL 200 MG: 10 INJECTION, EMULSION INTRAVENOUS at 12:36

## 2019-06-19 RX ADMIN — LIDOCAINE HYDROCHLORIDE 100 MG: 20 INJECTION, SOLUTION INTRAVENOUS at 12:35

## 2019-06-25 ENCOUNTER — OFFICE VISIT (OUTPATIENT)
Dept: BARIATRICS | Facility: CLINIC | Age: 39
End: 2019-06-25
Payer: COMMERCIAL

## 2019-06-25 VITALS
BODY MASS INDEX: 35.68 KG/M2 | DIASTOLIC BLOOD PRESSURE: 56 MMHG | WEIGHT: 222 LBS | TEMPERATURE: 97 F | SYSTOLIC BLOOD PRESSURE: 112 MMHG | HEART RATE: 90 BPM | HEIGHT: 66 IN

## 2019-06-25 DIAGNOSIS — K28.9 MARGINAL ULCER: Primary | ICD-10-CM

## 2019-06-25 DIAGNOSIS — M79.89 LEG SWELLING: ICD-10-CM

## 2019-06-25 DIAGNOSIS — R10.13 EPIGASTRIC ABDOMINAL PAIN: ICD-10-CM

## 2019-06-25 DIAGNOSIS — Z98.84 BARIATRIC SURGERY STATUS: ICD-10-CM

## 2019-06-25 DIAGNOSIS — K91.850 POUCHITIS (HCC): ICD-10-CM

## 2019-06-25 DIAGNOSIS — K59.09 OTHER CONSTIPATION: ICD-10-CM

## 2019-06-25 PROBLEM — N96 HISTORY OF MULTIPLE MISCARRIAGES: Status: ACTIVE | Noted: 2018-10-10

## 2019-06-25 PROCEDURE — 99214 OFFICE O/P EST MOD 30 MIN: CPT | Performed by: PHYSICIAN ASSISTANT

## 2019-06-26 ENCOUNTER — HOSPITAL ENCOUNTER (OUTPATIENT)
Dept: NON INVASIVE DIAGNOSTICS | Facility: HOSPITAL | Age: 39
Discharge: HOME | End: 2019-06-26
Payer: COMMERCIAL

## 2019-06-26 DIAGNOSIS — K28.9 MARGINAL ULCER: ICD-10-CM

## 2019-06-26 DIAGNOSIS — K91.850 POUCHITIS (HCC): ICD-10-CM

## 2019-06-26 DIAGNOSIS — M79.89 LEG SWELLING: ICD-10-CM

## 2019-06-26 PROCEDURE — 93970 EXTREMITY STUDY: CPT | Performed by: SURGERY

## 2019-06-26 PROCEDURE — 93970 EXTREMITY STUDY: CPT

## 2019-06-27 ENCOUNTER — TELEPHONE (OUTPATIENT)
Dept: BARIATRICS | Facility: CLINIC | Age: 39
End: 2019-06-27

## 2019-07-26 ENCOUNTER — OFFICE VISIT (OUTPATIENT)
Dept: BARIATRICS | Facility: CLINIC | Age: 39
End: 2019-07-26
Payer: COMMERCIAL

## 2019-07-26 VITALS
WEIGHT: 220 LBS | HEIGHT: 66 IN | DIASTOLIC BLOOD PRESSURE: 62 MMHG | TEMPERATURE: 96.8 F | SYSTOLIC BLOOD PRESSURE: 114 MMHG | BODY MASS INDEX: 35.36 KG/M2 | HEART RATE: 78 BPM

## 2019-07-26 DIAGNOSIS — R12 HEART BURN: ICD-10-CM

## 2019-07-26 DIAGNOSIS — E66.9 OBESITY, CLASS II, BMI 35-39.9: ICD-10-CM

## 2019-07-26 DIAGNOSIS — R10.13 EPIGASTRIC PAIN: ICD-10-CM

## 2019-07-26 DIAGNOSIS — Z98.84 BARIATRIC SURGERY STATUS: Primary | ICD-10-CM

## 2019-07-26 DIAGNOSIS — K28.9 MARGINAL ULCER: ICD-10-CM

## 2019-07-26 DIAGNOSIS — K91.2 POSTSURGICAL MALABSORPTION: ICD-10-CM

## 2019-07-26 DIAGNOSIS — K91.850 POUCHITIS (HCC): ICD-10-CM

## 2019-07-26 PROBLEM — E66.812 CLASS 2 OBESITY WITH BODY MASS INDEX (BMI) OF 38.0 TO 38.9 IN ADULT: Status: RESOLVED | Noted: 2019-03-26 | Resolved: 2019-07-26

## 2019-07-26 PROCEDURE — 99214 OFFICE O/P EST MOD 30 MIN: CPT | Performed by: PHYSICIAN ASSISTANT

## 2019-07-26 RX ORDER — PANTOPRAZOLE SODIUM 40 MG/1
40 TABLET, DELAYED RELEASE ORAL 2 TIMES DAILY
Qty: 180 TABLET | Refills: 0 | Status: SHIPPED | OUTPATIENT
Start: 2019-07-26 | End: 2019-09-09

## 2019-07-26 NOTE — PROGRESS NOTES
Assessment/Plan:    Bariatric surgery status  11/22/2011 Status post laparoscopic sleeve gastrectomy by Dr Linda Elizalde with revision to status post laparoscopy candace-en-y gastric bypass surgery by Dr Linda Elizalde 4/8/2019    -Overall doing [address] with weight loss  She is here in routine follow-up and to re-assess her abdominal pain with known ulcer    She complained of a couple episodes of self-limited  BRBPR last week-noted on toilet tissue and in the bowl with ? Streak on a bowel movement but no tania bloody stools/no black/tarry stools-she has been prone to constipation  No further episodes-advised patient this is likely related to a hemorrhoidal bleed and not consistent with a bleeding ulcer  If it continues she was advised to also review with her PCP    Initial:279 lb  Current:220 lb  EWL: 46%  Jhonny:  Current BMI is Body mass index is 36 05 kg/m²      Tolerating a regular diet-She is eating a puree to soft diet currently-still is experiencing intermittent mid-epigastric pain with meals but no nausea/vomiting    Eating at least 60 grams of protein per day-yes  Following 30/60 minute rule with liquids-yes  Drinking at least 64 ounces of fluid per day-yes  Drinking carbonated beverages-no  Sufficient exercise-yes  Using NSAIDs regularly-no  Using nicotine-no  Using alcohol-no      Postsurgical malabsorption  Malabsorption- patient is at risk for malabsorption of vitamins/minerals secondary to malabsorption from her procedure and restriction of intakes  Reviewed current supplements and advised on same    She is taking one procare mvi and 2 calcium citrate with D-will order her routine post-op labs now    Marginal ulcer  11/22/2011 Status post laparoscopic sleeve gastrectomy by Dr Linda Elizalde with revision to status post laparoscopy candace-en-y gastric bypass surgery by Dr Linda Elizalde 4/8/2019      Since late May 2019 she was having intermittent epiggastric pain worse with meals and had developed dysphagia with nausea and vomiting-found to have marginal ulcer without stricture by June upper endoscopy-she is on maximal therapy of ppi and carafate  Unfortunately she has been chewing the carafate and not taking it as a liquid-advised on same  She notes she was taking extra pantoprazole and advised to take rx as ordered  She continues to have intermittent epigastric pain worse with meals-but no nausea/vomiting  She notes she is tolerating puree to soft diet consistency        She had upper endoscopy 6/19 by Dr Gabriela Dugan with the following findings-copied from report:    FINDINGS:  · Mild, generalized erythematous mucosa in the anastomosis of the stomach; performed 2 cold biopsies  · Single small, superficial, benign-appearing ulcer in the anastomosis of the stomach with no hemorrhage    IMPRESSION:  Pouchitis with small marginal ulceration  No evidence of anastomotic stricture       Biopsy results-copied from chart:    Final Diagnosis   A  Gastric pouch biopsy:  - Minimal chronic inactive oxyntic gastritis, negative for curvilinear Helicobacter pylori organisms by routine H&E stains   - No atrophy or intestinal metaplasia identified   - No epithelial dysplasia and no evidence of malignancy  Electronically signed by Nimo Thakur MD on 6/21/2019 at 11:17 AM     She is not using NSAIDs  No tobacco   No alcohol intakes    Plan: Continue maximal ppi and carafate rx  Repeat upper endoscopy to check anatomy and status of ulcer  Will see her back in the office a couple weeks after the EGD      Obesity, Class II, BMI 35-39 9  Improved from bmi of 38 at her first post-op visit with her surgeon to current bmi of 36-she does appear to be eating better/tolerating puree to soft diet fairly well  Diagnoses and all orders for this visit:    Bariatric surgery status  -     CBC and Platelet; Future  -     Comprehensive metabolic panel; Future  -     Copper Level; Future  -     Ferritin; Future  -     Folate; Future  -     Iron Saturation %;  Future  -     Lipid panel; Future  -     PTH, intact; Future  -     Vitamin A; Future  -     Vitamin B1, whole blood; Future  -     Vitamin B12; Future  -     Vitamin D 25 hydroxy; Future  -     Zinc; Future  -     pantoprazole (PROTONIX) 40 mg tablet; Take 1 tablet (40 mg total) by mouth 2 (two) times a day    Postsurgical malabsorption  -     CBC and Platelet; Future  -     Comprehensive metabolic panel; Future  -     Copper Level; Future  -     Ferritin; Future  -     Folate; Future  -     Iron Saturation %; Future  -     Lipid panel; Future  -     PTH, intact; Future  -     Vitamin A; Future  -     Vitamin B1, whole blood; Future  -     Vitamin B12; Future  -     Vitamin D 25 hydroxy; Future  -     Zinc; Future    Marginal ulcer    Pouchitis (HCC)    Obesity, Class II, BMI 35-39 9  -     CBC and Platelet; Future  -     Comprehensive metabolic panel; Future  -     Copper Level; Future  -     Ferritin; Future  -     Folate; Future  -     Iron Saturation %; Future  -     Lipid panel; Future  -     PTH, intact; Future  -     Vitamin A; Future  -     Vitamin B1, whole blood; Future  -     Vitamin B12; Future  -     Vitamin D 25 hydroxy; Future  -     Zinc; Future    Epigastric pain  -     pantoprazole (PROTONIX) 40 mg tablet; Take 1 tablet (40 mg total) by mouth 2 (two) times a day    Heart burn  -     pantoprazole (PROTONIX) 40 mg tablet; Take 1 tablet (40 mg total) by mouth 2 (two) times a day          Subjective:      Patient ID: Randal Zaidi is a 46 y o  female  She is here in routine post-op visit along with re-evaluation of epigastric pain and oral diet tolerance as she was diagnosed with a marginal ulcer in mid-June  She reports she is taking carafate and her pantoprazole-but chewing the carafate  She notes she is eating a puree to soft diet but still with some intermittent epigastric pain-worse with meals-sometimes taking extra pantoprazole  She denies nausea/vomiting   She is prone to constipation-taking miralax daily and notes sometimes with loose liquid stools and backed off the miralax  She notes she had a couple episodes of BRBPR last week-in bowl and on toilet tissue and noted a streak on one bowel movement but none since  Stools are brown-no black/tarry or overtly bloody bowel movements  The following portions of the patient's history were reviewed and updated as appropriate: allergies, current medications, past family history, past medical history, past social history, past surgical history and problem list     Review of Systems   Constitutional: Negative for chills and fever  Unexpected weight change: planned weight loss  Respiratory: Negative for shortness of breath and wheezing  Cardiovascular: Negative for chest pain and palpitations  Gastrointestinal: Positive for constipation  Negative for abdominal pain, diarrhea, nausea and vomiting  See hpi/discussion-had some looser stools on the miralax   Psychiatric/Behavioral: Suicidal ideas: no complait of anxiety or depression  Objective:      /62   Pulse 78   Temp (!) 96 8 °F (36 °C) (Tympanic)   Ht 5' 5 5" (1 664 m)   Wt 99 8 kg (220 lb)   BMI 36 05 kg/m²          Physical Exam   Constitutional: She is oriented to person, place, and time  She appears well-developed and well-nourished  HENT:   Mouth/Throat: Oropharynx is clear and moist    Eyes: Conjunctivae are normal  No scleral icterus  Cardiovascular: Normal rate, regular rhythm and normal heart sounds  Pulmonary/Chest: Effort normal and breath sounds normal    Abdominal: Soft  There is no tenderness  No incisional hernias appreciated   Musculoskeletal:   Normal gait   Neurological: She is alert and oriented to person, place, and time  Psychiatric: She has a normal mood and affect  Her behavior is normal  Judgment and thought content normal    Nursing note and vitals reviewed  GOALS: Continued weight loss with good nutrition intakes    Normal vitamin and mineral levels  Exercise as tolerated  Resolve ulcer/epigastric pain    BARRIERS: none identified

## 2019-07-26 NOTE — PATIENT INSTRUCTIONS
Get repeat upper endoscopy done  Take carafate before breakfast, lunch and dinner and bedtime  Crush the tablet and add water to it to make it liquid before you swallow it down  Take pantoprazole before breakfast and dinner  Follow-up in the office a couple weeks after the upper endoscopy is done  Also schedule: Follow-up in 3 months  We kindly ask that you arrive 15 minutes before your scheduled appointment time with your provider to allow for our staff to room you and check your vital signs and update your chart  We thank you for your patience at your visit  Follow diet as discussed  Get lab work done prior to next office visit  It is recommended to check with your insurance BEFORE getting labs done to make sure they are covered by your policy  Make sure to HOLD any multivitamins that may contain biotin and any biotin supplements FOR 5 DAYS before any labs since it can affect the results  Follow vitamin  and mineral recommendations as reviewed with you  Bariatric vitamins are highly recommended  Vitamins are important for a life-time to avoid low levels which can lead to other medical problems  Exercise as tolerated  Call our office if you have any problems with abdominal pain especially associated with fever, chills, nausea, vomiting or any other concerns  All  Post-bariatric surgery patients should be aware that very small quantities of any alcohol  can cause impairment and it is very possible not to feel the effect  The effect can be in the system for several hours  It is also a stomach irritant  It is advised to AVOID alcohol, Nonsteroidal antiinflammatory drugs (NSAIDS) and nicotine of all forms   Any of these can cause stomach irritation/pain  Recommendation : Most, if not all post-gastric surgery patients would benefit from having a regular counselor for at least 2 years post-op to help with need for multiple life-style changes   If you want to do this and need help finding a regular counselor you can also make a follow-up with our  to help you find one

## 2019-07-26 NOTE — ASSESSMENT & PLAN NOTE
Improved from bmi of 38 at her first post-op visit with her surgeon to current bmi of 36-she does appear to be eating better/tolerating puree to soft diet fairly well

## 2019-07-26 NOTE — ASSESSMENT & PLAN NOTE
Malabsorption- patient is at risk for malabsorption of vitamins/minerals secondary to malabsorption from her procedure and restriction of intakes  Reviewed current supplements and advised on same    She is taking one procare mvi and 2 calcium citrate with D-will order her routine post-op labs now

## 2019-07-26 NOTE — ASSESSMENT & PLAN NOTE
11/22/2011 Status post laparoscopic sleeve gastrectomy by Dr Andrés Farley with revision to status post laparoscopy candace-en-y gastric bypass surgery by Dr Andrés Farley 4/8/2019    -Overall doing [address] with weight loss  She is here in routine follow-up and to re-assess her abdominal pain with known ulcer    She complained of a couple episodes of self-limited  BRBPR last week-noted on toilet tissue and in the bowl with ? Streak on a bowel movement but no tania bloody stools/no black/tarry stools-she has been prone to constipation  No further episodes-advised patient this is likely related to a hemorrhoidal bleed and not consistent with a bleeding ulcer  If it continues she was advised to also review with her PCP    Initial:279 lb  Current:220 lb  EWL: 46%  Jhonny:  Current BMI is Body mass index is 36 05 kg/m²      Tolerating a regular diet-She is eating a puree to soft diet currently-still is experiencing intermittent mid-epigastric pain with meals but no nausea/vomiting    Eating at least 60 grams of protein per day-yes  Following 30/60 minute rule with liquids-yes  Drinking at least 64 ounces of fluid per day-yes  Drinking carbonated beverages-no  Sufficient exercise-yes  Using NSAIDs regularly-no  Using nicotine-no  Using alcohol-no

## 2019-07-26 NOTE — ASSESSMENT & PLAN NOTE
11/22/2011 Status post laparoscopic sleeve gastrectomy by Dr Mikael Sauceda with revision to status post laparoscopy candace-en-y gastric bypass surgery by Dr Mikael Sauceda 4/8/2019      Since late May 2019 she was having intermittent epiggastric pain worse with meals and had developed dysphagia with nausea and vomiting-found to have marginal ulcer without stricture by June upper endoscopy-she is on maximal therapy of ppi and carafate  Unfortunately she has been chewing the carafate and not taking it as a liquid-advised on same  She notes she was taking extra pantoprazole and advised to take rx as ordered  She continues to have intermittent epigastric pain worse with meals-but no nausea/vomiting  She notes she is tolerating puree to soft diet consistency        She had upper endoscopy 6/19 by Dr Mikael Sauceda with the following findings-copied from report:    FINDINGS:  · Mild, generalized erythematous mucosa in the anastomosis of the stomach; performed 2 cold biopsies  · Single small, superficial, benign-appearing ulcer in the anastomosis of the stomach with no hemorrhage    IMPRESSION:  Pouchitis with small marginal ulceration  No evidence of anastomotic stricture       Biopsy results-copied from chart:    Final Diagnosis   A  Gastric pouch biopsy:  - Minimal chronic inactive oxyntic gastritis, negative for curvilinear Helicobacter pylori organisms by routine H&E stains   - No atrophy or intestinal metaplasia identified   - No epithelial dysplasia and no evidence of malignancy  Electronically signed by Carloz Tran MD on 6/21/2019 at 11:17 AM     She is not using NSAIDs  No tobacco   No alcohol intakes    Plan: Continue maximal ppi and carafate rx  Repeat upper endoscopy to check anatomy and status of ulcer    Will see her back in the office a couple weeks after the EGD

## 2019-07-26 NOTE — H&P (VIEW-ONLY)
Assessment/Plan:    Bariatric surgery status  11/22/2011 Status post laparoscopic sleeve gastrectomy by Dr Andrés Farley with revision to status post laparoscopy candace-en-y gastric bypass surgery by Dr Andrés Farley 4/8/2019    -Overall doing [address] with weight loss  She is here in routine follow-up and to re-assess her abdominal pain with known ulcer    She complained of a couple episodes of self-limited  BRBPR last week-noted on toilet tissue and in the bowl with ? Streak on a bowel movement but no tania bloody stools/no black/tarry stools-she has been prone to constipation  No further episodes-advised patient this is likely related to a hemorrhoidal bleed and not consistent with a bleeding ulcer  If it continues she was advised to also review with her PCP    Initial:279 lb  Current:220 lb  EWL: 46%  Jhonny:  Current BMI is Body mass index is 36 05 kg/m²      Tolerating a regular diet-She is eating a puree to soft diet currently-still is experiencing intermittent mid-epigastric pain with meals but no nausea/vomiting    Eating at least 60 grams of protein per day-yes  Following 30/60 minute rule with liquids-yes  Drinking at least 64 ounces of fluid per day-yes  Drinking carbonated beverages-no  Sufficient exercise-yes  Using NSAIDs regularly-no  Using nicotine-no  Using alcohol-no      Postsurgical malabsorption  Malabsorption- patient is at risk for malabsorption of vitamins/minerals secondary to malabsorption from her procedure and restriction of intakes  Reviewed current supplements and advised on same    She is taking one procare mvi and 2 calcium citrate with D-will order her routine post-op labs now    Marginal ulcer  11/22/2011 Status post laparoscopic sleeve gastrectomy by Dr Andrés Farley with revision to status post laparoscopy candace-en-y gastric bypass surgery by Dr Andrés Farley 4/8/2019      Since late May 2019 she was having intermittent epiggastric pain worse with meals and had developed dysphagia with nausea and vomiting-found to have marginal ulcer without stricture by June upper endoscopy-she is on maximal therapy of ppi and carafate  Unfortunately she has been chewing the carafate and not taking it as a liquid-advised on same  She notes she was taking extra pantoprazole and advised to take rx as ordered  She continues to have intermittent epigastric pain worse with meals-but no nausea/vomiting  She notes she is tolerating puree to soft diet consistency        She had upper endoscopy 6/19 by Dr Silva Briones with the following findings-copied from report:    FINDINGS:  · Mild, generalized erythematous mucosa in the anastomosis of the stomach; performed 2 cold biopsies  · Single small, superficial, benign-appearing ulcer in the anastomosis of the stomach with no hemorrhage    IMPRESSION:  Pouchitis with small marginal ulceration  No evidence of anastomotic stricture       Biopsy results-copied from chart:    Final Diagnosis   A  Gastric pouch biopsy:  - Minimal chronic inactive oxyntic gastritis, negative for curvilinear Helicobacter pylori organisms by routine H&E stains   - No atrophy or intestinal metaplasia identified   - No epithelial dysplasia and no evidence of malignancy  Electronically signed by Jimy Rodriguez MD on 6/21/2019 at 11:17 AM     She is not using NSAIDs  No tobacco   No alcohol intakes    Plan: Continue maximal ppi and carafate rx  Repeat upper endoscopy to check anatomy and status of ulcer  Will see her back in the office a couple weeks after the EGD      Obesity, Class II, BMI 35-39 9  Improved from bmi of 38 at her first post-op visit with her surgeon to current bmi of 36-she does appear to be eating better/tolerating puree to soft diet fairly well  Diagnoses and all orders for this visit:    Bariatric surgery status  -     CBC and Platelet; Future  -     Comprehensive metabolic panel; Future  -     Copper Level; Future  -     Ferritin; Future  -     Folate; Future  -     Iron Saturation %;  Future  -     Lipid panel; Future  -     PTH, intact; Future  -     Vitamin A; Future  -     Vitamin B1, whole blood; Future  -     Vitamin B12; Future  -     Vitamin D 25 hydroxy; Future  -     Zinc; Future  -     pantoprazole (PROTONIX) 40 mg tablet; Take 1 tablet (40 mg total) by mouth 2 (two) times a day    Postsurgical malabsorption  -     CBC and Platelet; Future  -     Comprehensive metabolic panel; Future  -     Copper Level; Future  -     Ferritin; Future  -     Folate; Future  -     Iron Saturation %; Future  -     Lipid panel; Future  -     PTH, intact; Future  -     Vitamin A; Future  -     Vitamin B1, whole blood; Future  -     Vitamin B12; Future  -     Vitamin D 25 hydroxy; Future  -     Zinc; Future    Marginal ulcer    Pouchitis (HCC)    Obesity, Class II, BMI 35-39 9  -     CBC and Platelet; Future  -     Comprehensive metabolic panel; Future  -     Copper Level; Future  -     Ferritin; Future  -     Folate; Future  -     Iron Saturation %; Future  -     Lipid panel; Future  -     PTH, intact; Future  -     Vitamin A; Future  -     Vitamin B1, whole blood; Future  -     Vitamin B12; Future  -     Vitamin D 25 hydroxy; Future  -     Zinc; Future    Epigastric pain  -     pantoprazole (PROTONIX) 40 mg tablet; Take 1 tablet (40 mg total) by mouth 2 (two) times a day    Heart burn  -     pantoprazole (PROTONIX) 40 mg tablet; Take 1 tablet (40 mg total) by mouth 2 (two) times a day          Subjective:      Patient ID: Glenn Archibald is a 46 y o  female  She is here in routine post-op visit along with re-evaluation of epigastric pain and oral diet tolerance as she was diagnosed with a marginal ulcer in mid-June  She reports she is taking carafate and her pantoprazole-but chewing the carafate  She notes she is eating a puree to soft diet but still with some intermittent epigastric pain-worse with meals-sometimes taking extra pantoprazole  She denies nausea/vomiting   She is prone to constipation-taking miralax daily and notes sometimes with loose liquid stools and backed off the miralax  She notes she had a couple episodes of BRBPR last week-in bowl and on toilet tissue and noted a streak on one bowel movement but none since  Stools are brown-no black/tarry or overtly bloody bowel movements  The following portions of the patient's history were reviewed and updated as appropriate: allergies, current medications, past family history, past medical history, past social history, past surgical history and problem list     Review of Systems   Constitutional: Negative for chills and fever  Unexpected weight change: planned weight loss  Respiratory: Negative for shortness of breath and wheezing  Cardiovascular: Negative for chest pain and palpitations  Gastrointestinal: Positive for constipation  Negative for abdominal pain, diarrhea, nausea and vomiting  See hpi/discussion-had some looser stools on the miralax   Psychiatric/Behavioral: Suicidal ideas: no complait of anxiety or depression  Objective:      /62   Pulse 78   Temp (!) 96 8 °F (36 °C) (Tympanic)   Ht 5' 5 5" (1 664 m)   Wt 99 8 kg (220 lb)   BMI 36 05 kg/m²          Physical Exam   Constitutional: She is oriented to person, place, and time  She appears well-developed and well-nourished  HENT:   Mouth/Throat: Oropharynx is clear and moist    Eyes: Conjunctivae are normal  No scleral icterus  Cardiovascular: Normal rate, regular rhythm and normal heart sounds  Pulmonary/Chest: Effort normal and breath sounds normal    Abdominal: Soft  There is no tenderness  No incisional hernias appreciated   Musculoskeletal:   Normal gait   Neurological: She is alert and oriented to person, place, and time  Psychiatric: She has a normal mood and affect  Her behavior is normal  Judgment and thought content normal    Nursing note and vitals reviewed  GOALS: Continued weight loss with good nutrition intakes    Normal vitamin and mineral levels  Exercise as tolerated  Resolve ulcer/epigastric pain    BARRIERS: none identified

## 2019-08-06 ENCOUNTER — ANESTHESIA EVENT (OUTPATIENT)
Dept: GASTROENTEROLOGY | Facility: HOSPITAL | Age: 39
End: 2019-08-06

## 2019-08-07 ENCOUNTER — ANESTHESIA (OUTPATIENT)
Dept: GASTROENTEROLOGY | Facility: HOSPITAL | Age: 39
End: 2019-08-07

## 2019-08-07 ENCOUNTER — HOSPITAL ENCOUNTER (OUTPATIENT)
Dept: GASTROENTEROLOGY | Facility: HOSPITAL | Age: 39
Setting detail: OUTPATIENT SURGERY
Discharge: HOME | End: 2019-08-07
Attending: SURGERY | Admitting: SURGERY
Payer: COMMERCIAL

## 2019-08-07 VITALS
RESPIRATION RATE: 16 BRPM | OXYGEN SATURATION: 100 % | DIASTOLIC BLOOD PRESSURE: 57 MMHG | HEIGHT: 65 IN | WEIGHT: 215 LBS | SYSTOLIC BLOOD PRESSURE: 93 MMHG | TEMPERATURE: 98.6 F | BODY MASS INDEX: 35.82 KG/M2 | HEART RATE: 66 BPM

## 2019-08-07 DIAGNOSIS — Z98.84 BARIATRIC SURGERY STATUS: ICD-10-CM

## 2019-08-07 LAB
EXT PREGNANCY TEST URINE: NEGATIVE
EXT. CONTROL: NORMAL

## 2019-08-07 PROCEDURE — 43235 EGD DIAGNOSTIC BRUSH WASH: CPT | Performed by: SURGERY

## 2019-08-07 PROCEDURE — 81025 URINE PREGNANCY TEST: CPT | Performed by: ANESTHESIOLOGY

## 2019-08-07 RX ORDER — SODIUM CHLORIDE 9 MG/ML
125 INJECTION, SOLUTION INTRAVENOUS CONTINUOUS
Status: DISCONTINUED | OUTPATIENT
Start: 2019-08-07 | End: 2019-08-11 | Stop reason: HOSPADM

## 2019-08-07 RX ORDER — PROPOFOL 10 MG/ML
INJECTION, EMULSION INTRAVENOUS AS NEEDED
Status: DISCONTINUED | OUTPATIENT
Start: 2019-08-07 | End: 2019-08-07 | Stop reason: SURG

## 2019-08-07 RX ORDER — LIDOCAINE HYDROCHLORIDE 20 MG/ML
INJECTION, SOLUTION EPIDURAL; INFILTRATION; INTRACAUDAL; PERINEURAL AS NEEDED
Status: DISCONTINUED | OUTPATIENT
Start: 2019-08-07 | End: 2019-08-07 | Stop reason: SURG

## 2019-08-07 RX ADMIN — PROPOFOL 150 MG: 10 INJECTION, EMULSION INTRAVENOUS at 12:41

## 2019-08-07 RX ADMIN — LIDOCAINE HYDROCHLORIDE 3 ML: 20 INJECTION, SOLUTION EPIDURAL; INFILTRATION; INTRACAUDAL; PERINEURAL at 12:41

## 2019-08-07 RX ADMIN — SODIUM CHLORIDE 125 ML/HR: 0.9 INJECTION, SOLUTION INTRAVENOUS at 11:38

## 2019-08-07 RX ADMIN — PROPOFOL 20 MG: 10 INJECTION, EMULSION INTRAVENOUS at 12:45

## 2019-08-07 RX ADMIN — PROPOFOL 20 MG: 10 INJECTION, EMULSION INTRAVENOUS at 12:43

## 2019-08-07 NOTE — DISCHARGE INSTRUCTIONS
Upper Endoscopy   WHAT YOU NEED TO KNOW:   An upper endoscopy is also called an upper gastrointestinal (GI) endoscopy, or an esophagogastroduodenoscopy (EGD)  You may feel bloated, gassy, or have some abdominal discomfort after your procedure  Your throat may be sore for 24 to 36 hours  You may burp or pass gas from air that is still inside your body  DISCHARGE INSTRUCTIONS:   Call 911 if:   · You have sudden chest pain or trouble breathing  Seek care immediately if:   · You feel dizzy or faint  · You have trouble swallowing  · You have severe throat pain  · Your bowel movements are very dark or black  · Your abdomen is hard and firm and you have severe pain  · You vomit blood  Contact your healthcare provider if:   · You feel full or bloated and cannot burp or pass gas  · You have not had a bowel movement for 3 days after your procedure  · You have neck pain  · You have a fever or chills  · You have nausea or are vomiting  · You have a rash or hives  · You have questions or concerns about your endoscopy  Relieve a sore throat:  Suck on throat lozenges or crushed ice  Gargle with a small amount of warm salt water  Mix 1 teaspoon of salt and 1 cup of warm water to make salt water  Relieve gas and discomfort from bloating:  Lie on your right side with a heating pad on your abdomen  Take short walks to help pass gas  Eat small meals until bloating is relieved  Rest after your procedure:  Do not drive or make important decisions until the day after your procedure  Return to your normal activity as directed  You can usually return to work the day after your procedure  Follow up with your healthcare provider as directed:  Write down your questions so you remember to ask them during your visits  © 2017 [employer] Information is for End User's use only and may not be sold, redistributed or otherwise used for commercial purposes   All illustrations and images included in CareNotes® are the copyrighted property of [employer]  or [place]  The above information is an  only  It is not intended as medical advice for individual conditions or treatments  Talk to your doctor, nurse or pharmacist before following any medical regimen to see if it is safe and effective for you  Gastritis   WHAT YOU NEED TO KNOW:   Gastritis is inflammation or irritation of the lining of your stomach  DISCHARGE INSTRUCTIONS:   Call 911 for any of the following:   · You develop chest pain or shortness of breath  Seek care immediately if:   · You vomit blood  · You have black or bloody bowel movements  · You have severe stomach or back pain  Contact your healthcare provider if:   · You have a fever  · You have new or worsening symptoms, even after treatment  · You have questions or concerns about your condition or care  Medicines:   · Medicines  may be given to help treat a bacterial infection or decrease stomach acid  · Take your medicine as directed  Contact your healthcare provider if you think your medicine is not helping or if you have side effects  Tell him or her if you are allergic to any medicine  Keep a list of the medicines, vitamins, and herbs you take  Include the amounts, and when and why you take them  Bring the list or the pill bottles to follow-up visits  Carry your medicine list with you in case of an emergency  Manage or prevent gastritis:   · Do not smoke  Nicotine and other chemicals in cigarettes and cigars can make your symptoms worse and cause lung damage  Ask your healthcare provider for information if you currently smoke and need help to quit  E-cigarettes or smokeless tobacco still contain nicotine  Talk to your healthcare provider before you use these products  · Do not drink alcohol  Alcohol can prevent healing and make your gastritis worse   Talk to your healthcare provider if you need help to stop drinking  · Do not take NSAIDs or aspirin unless directed  These and similar medicines can cause irritation  If your healthcare provider says it is okay to take NSAIDs, take them with food  · Do not eat foods that cause irritation  Foods such as oranges and salsa can cause burning or pain  Eat a variety of healthy foods  Examples include fruits (not citrus), vegetables, low-fat dairy products, beans, whole-grain breads, and lean meats and fish  Try to eat small meals, and drink water with your meals  Do not eat for at least 3 hours before you go to bed  · Find ways to relax and decrease stress  Stress can increase stomach acid and make gastritis worse  Activities such as yoga, meditation, or listening to music can help you relax  Spend time with friends, or do things you enjoy  Follow up with your healthcare provider as directed: You may need ongoing tests or treatment, or referral to a gastroenterologist  Write down your questions so you remember to ask them during your visits  © 2017 [employer]  Information is for End User's use only and may not be sold, redistributed or otherwise used for commercial purposes  All illustrations and images included in CareNotes® are the copyrighted property of [employer]  or [place]  The above information is an  only  It is not intended as medical advice for individual conditions or treatments  Talk to your doctor, nurse or pharmacist before following any medical regimen to see if it is safe and effective for you

## 2019-08-07 NOTE — ANESTHESIA POSTPROCEDURE EVALUATION
Post-Op Assessment Note    CV Status:  Stable  Pain Score: 0    Pain management: adequate     Mental Status:  Alert and awake   Hydration Status:  Euvolemic   PONV Controlled:  Controlled   Airway Patency:  Patent   Post Op Vitals Reviewed: Yes      Staff: Anesthesiologist           BP 95/50 (08/07/19 1251)    Temp      Pulse 77 (08/07/19 1251)   Resp 16 (08/07/19 1251)    SpO2 94 % (08/07/19 1251)

## 2019-08-07 NOTE — ANESTHESIA PREPROCEDURE EVALUATION
Review of Systems/Medical History  Patient summary reviewed  Chart reviewed  No history of anesthetic complications     Cardiovascular    Comment: Prone to low BP's,  Pulmonary  Negative pulmonary ROS        GI/Hepatic    GERD well controlled, Bariatric surgery,        Negative  ROS        Endo/Other  History of thyroid disease , hypothyroidism,   Obesity  morbid obesity   GYN  Negative gynecology ROS          Hematology  Anemia ,     Musculoskeletal  Negative musculoskeletal ROS        Neurology    Headaches,    Psychology   Negative psychology ROS Depression ,              Physical Exam    Airway    Mallampati score: II  TM Distance: >3 FB  Neck ROM: full     Dental   No notable dental hx     Cardiovascular  Rhythm: regular, Rate: normal, Cardiovascular exam normal    Pulmonary  Pulmonary exam normal Breath sounds clear to auscultation,     Other Findings        Anesthesia Plan  ASA Score- 2     Anesthesia Type- IV sedation with anesthesia with ASA Monitors  Additional Monitors:   Airway Plan:         Plan Factors-Patient not instructed to abstain from smoking on day of procedure  Patient did not smoke on day of surgery  Induction- intravenous  Postoperative Plan-     Informed Consent- Anesthetic plan and risks discussed with patient

## 2019-09-05 ENCOUNTER — TELEPHONE (OUTPATIENT)
Dept: BARIATRICS | Facility: CLINIC | Age: 39
End: 2019-09-05

## 2019-09-05 NOTE — TELEPHONE ENCOUNTER
----- Message from Ray Porras PA-C sent at 9/5/2019  2:33 PM EDT -----    Please advise patient since she is pregnant to stop her pantoprazole  I have sent rx for famotidine 20 mg twice daily-before breakfast and dinner daily-continue carafate    She should hold regular or bariatric mutlivitamins now and switch to a prenatal multivitamin with DHA in it--continue her calcium supplements and add 2000 IU vitamin D3 daily-ask her to bring bottles of her supplements in to visit -she should also review all meds and supplements with OB/thanks

## 2019-09-09 ENCOUNTER — OFFICE VISIT (OUTPATIENT)
Dept: BARIATRICS | Facility: CLINIC | Age: 39
End: 2019-09-09
Payer: COMMERCIAL

## 2019-09-09 VITALS
WEIGHT: 223.5 LBS | TEMPERATURE: 98.6 F | HEART RATE: 81 BPM | HEIGHT: 66 IN | BODY MASS INDEX: 35.92 KG/M2 | SYSTOLIC BLOOD PRESSURE: 118 MMHG | DIASTOLIC BLOOD PRESSURE: 70 MMHG

## 2019-09-09 DIAGNOSIS — K91.2 POSTSURGICAL MALABSORPTION: Chronic | ICD-10-CM

## 2019-09-09 DIAGNOSIS — O99.841 PREGNANCY COMPLICATED BY PREVIOUS BARIATRIC SURGERY, FIRST TRIMESTER: Primary | ICD-10-CM

## 2019-09-09 DIAGNOSIS — K91.850 POUCHITIS (HCC): ICD-10-CM

## 2019-09-09 PROBLEM — K28.9 MARGINAL ULCER: Status: RESOLVED | Noted: 2019-06-25 | Resolved: 2019-09-09

## 2019-09-09 PROCEDURE — 99213 OFFICE O/P EST LOW 20 MIN: CPT | Performed by: PHYSICIAN ASSISTANT

## 2019-09-09 RX ORDER — FAMOTIDINE 20 MG/1
20 TABLET, FILM COATED ORAL 2 TIMES DAILY
Qty: 180 TABLET | Refills: 2 | Status: SHIPPED | OUTPATIENT
Start: 2019-09-09 | End: 2019-11-28 | Stop reason: HOSPADM

## 2019-09-09 NOTE — ASSESSMENT & PLAN NOTE
Malabsorption- patient is at risk for malabsorption of vitamins/minerals secondary to malabsorption from her procedure and restriction of intakes  Reviewed current supplements and advised on same    She is currently pregnant and did bring in her supplements-advised her to hold most of her supplements and advised to take the following:    One prenatal regular mvi with DHA-not a gummie and to take one flinestones complete mvi (no gummie) daily  Advised to take 500 mg calcium citrate with D-three per day  And take an EXTRA 2000 IU vitamin D3 daily  And add 20 grams of fiber daily-may be a gummie  She is prone to constipation so advised if low in iron I would recommend she see a hematologist as she may benefit from IV iron    She can get my routine labs done but advised not to get the vitamin A level or lipids done-but advised her OB will primarily manage her levels during pregnancy-full written instructions provided

## 2019-09-09 NOTE — PROGRESS NOTES
Assessment/Plan:    Pregnancy complicated by previous bariatric surgery, first trimester  11[facility]22/2011 [facility] with revision to status post laparoscopy candace-en-y gastric bypass surgery by Dr SEPULVEDA [facility] 4/8/2019  She is here to review most recent upper endoscopy findings  Also had contacted our office to let me know she just became pregnant and wanted help with supplementation for this and wanted to discuss diet  She has upcoming follow-up with her OB    First day of LMP as per patient of 7/26/19 and EDC of 5/1/2020 but she had negative urine pregnancy test 8/8/2019 so likely [facility] is later in May  This is her 5th pregnancy with 2 live births and 2 miscarriages      In June 2019 she had upper endoscopy with the following findings (coped from report)-EGD performaed by Dr SEPULVEDA [facility] -    · Mild, generalized erythematous mucosa in the anastomosis of the stomach; performed 2 cold biopsies  · Single small, superficial, benign-appearing ulcer in the anastomosis of the stomach with no hemorrhage    With pathology (copied from chart:)  Final Diagnosis   A  Gastric pouch biopsy:  - Minimal chronic inactive oxyntic gastritis, negative for curvilinear Helicobacter pylori organisms by routine H&E stains   - No atrophy or intestinal metaplasia identified   - No epithelial dysplasia and no evidence of malignancy  Electronically signed by Trisha Soares MD on 6/21/2019 at 11:17 AM         She was having ongoing symptoms so repeat upper endoscopy was done by Dr Jonathan Rodriguez 8/8/2019    Last EGD was August 8, 2019 and urine pregnancy was negative at the time  Results from upper endoscopy-copied from report:  FINDINGS:  · Mild atrophic mucosa in the stomach   Mild inflammation at the gastrojejunostomy anastomosis no evidence of ulceration  · Signs of previous gastric bypass surgery    And    IMPRESSION:  Mild inflammation at the gastrojejunostomy anastomosis  No evidence of ulceration or stricture  No evidence of hernia     When she contacted our office to let us know she was pregnant-advised to hold her ppi and I  her to famotidine 20 mg twice daily and had advised her to continue carafate 4 times per day   Also advised to stop regular/bariatric mvi and switch to prenatal mvi with DHA and continue calcium until I could see her to review all appropriate supplements    PLAN-based on results of recent EGD-advised to stop the carafate and remain off the ppi-take famotidine twice daily before breakfast and dinner  Take vitamins as advised until she sees OB and has her labs checked then levels can be adjusted accordingly     Marginal ulcer  No ulcer on most recent 8/8/2019 upper endoscopy but still + erythema -see note/EGD report    Postsurgical malabsorption  Malabsorption- patient is at risk for malabsorption of vitamins/minerals secondary to malabsorption from her procedure and restriction of intakes  Reviewed current supplements and advised on same    She is currently pregnant and did bring in her supplements-advised her to hold most of her supplements and advised to take the following:    One prenatal regular mvi with DHA-not a gummie and to take one flinestones complete mvi (no gummie) daily  Advised to take 500 mg calcium citrate with D-three per day  And take an EXTRA 2000 IU vitamin D3 daily  And add 20 grams of fiber daily-may be a gummie  She is prone to constipation so advised if low in iron I would recommend she see a hematologist as she may benefit from IV iron    She can get my routine labs done but advised not to get the vitamin A level or lipids done-but advised her OB will primarily manage her levels during pregnancy-full written instructions provided       Diagnoses and all orders for this visit:    Pregnancy complicated by previous bariatric surgery, first trimester  -     famotidine (PEPCID) 20 mg tablet;  Take 1 tablet (20 mg total) by mouth 2 (two) times a day    Pouchitis ([facility] )  -     famotidine (PEPCID) 20 mg tablet; Take 1 tablet (20 mg total) by mouth 2 (two) times a day    Postsurgical malabsorption          Subjective:      Patient ID: Shella Phalen is a 46 y o  female  She is here in follow-up to review repeat upper endoscopy  She stopped her ppi and notes some increase in heart burn over the past couple of days (rx for famotidine) today sent to pharmacy  She is currently pregnant with LMP of 7/26/2019  EDC estimated at May 1, 2019 but she had a negative urine pregnancy 8/8/2019 at the time of her endoscopy so EDC is likely later in May 2020  She stopped her regular mvi and switched to a gummie prenatal  Here to also review her vitamin/mineral intakes  She has not gotten labs done yet  The following portions of the patient's history were reviewed and updated as appropriate: allergies, current medications, past family history, past medical history, past social history, past surgical history and problem list     Review of Systems   Constitutional: Positive for fatigue  Negative for chills, fever and unexpected weight change (up 3 1/2 lb from her July appointment)  Respiratory: Negative for shortness of breath and wheezing  Cardiovascular: Negative for chest pain and palpitations  Gastrointestinal: Negative for abdominal pain, constipation, diarrhea, nausea and vomiting         + intermittent heart burn   Psychiatric/Behavioral: Suicidal ideas: no complait of anxiety or depression  Objective:      /70 (BP Location: Right arm, Patient Position: Sitting)   Pulse 81   Temp 98 6 °F (37 °C) (Tympanic)   Ht 5' 5 5" (1 664 m)   Wt 101 kg (223 lb 8 oz)   LMP 07/26/2019   BMI 36 63 kg/m²          Physical Exam   Constitutional: She is oriented to person, place, and time  She appears well-developed and well-nourished  HENT:   Mouth/Throat: Oropharynx is clear and moist    Eyes: Conjunctivae are normal  No scleral icterus     Cardiovascular: Normal rate and regular rhythm  Pulmonary/Chest: Effort normal and breath sounds normal    Abdominal: Soft  There is no tenderness  No incisional hernias appreciated   Musculoskeletal:   Normal gait   Neurological: She is alert and oriented to person, place, and time  Skin: Skin is warm and dry  Psychiatric: She has a normal mood and affect  Her behavior is normal  Judgment and thought content normal    Nursing note and vitals reviewed          Goals:    Appropriate weight gain during pregnancy  with good nutrition intakes  Normal vitamin and mineral levels  Exercise as tolerated  Help resolve heart burn

## 2019-09-09 NOTE — PATIENT INSTRUCTIONS
Just take the pepcid 20 mg twice daily-before breakfast and dinner-stop the pantoprazole and stop the carafate since your ulcer is healed  -review with OB and follow their advise  If you get my labs done do NOT get the vitamin A level or the lipids done-unless your OB advises you to get these done  Follow vitamin recommendations on paper provided-until you see your OB-and they will follow your levels during pregnancy  If you want to get my labs done when you get the OB's labs-just do NOT get the vitamin A level or your lipids during pregnancy    I will plan to see you after you have your baby  Plan on follow-up with me in July 2020 and we will recheck your routine labs after that visit  Recommend that you follow 30/30 minute rule with liquids and eat/drink up to 6 small meals/healthy snacks per day -especially during your second trimester  I will ask your dieititian to touch base with you but the OB office likely will have you see a dieititan as well  Call me if you have any concerns regarding your gastric surgery

## 2019-09-18 ENCOUNTER — APPOINTMENT (OUTPATIENT)
Dept: LAB | Facility: HOSPITAL | Age: 39
End: 2019-09-18
Payer: COMMERCIAL

## 2019-09-18 ENCOUNTER — OFFICE VISIT (OUTPATIENT)
Dept: OBGYN CLINIC | Facility: CLINIC | Age: 39
End: 2019-09-18
Payer: COMMERCIAL

## 2019-09-18 VITALS — BODY MASS INDEX: 36.41 KG/M2 | DIASTOLIC BLOOD PRESSURE: 78 MMHG | WEIGHT: 222.2 LBS | SYSTOLIC BLOOD PRESSURE: 120 MMHG

## 2019-09-18 DIAGNOSIS — N91.2 AMENORRHEA: Primary | ICD-10-CM

## 2019-09-18 DIAGNOSIS — Z34.90 EARLY STAGE OF PREGNANCY: ICD-10-CM

## 2019-09-18 DIAGNOSIS — Z34.90 EARLY STAGE OF PREGNANCY: Primary | ICD-10-CM

## 2019-09-18 LAB
ABO GROUP BLD: NORMAL
B-HCG SERPL-ACNC: ABNORMAL MIU/ML
BLD GP AB SCN SERPL QL: NEGATIVE
RH BLD: POSITIVE
SPECIMEN EXPIRATION DATE: NORMAL

## 2019-09-18 PROCEDURE — 86900 BLOOD TYPING SEROLOGIC ABO: CPT

## 2019-09-18 PROCEDURE — 86901 BLOOD TYPING SEROLOGIC RH(D): CPT

## 2019-09-18 PROCEDURE — 76817 TRANSVAGINAL US OBSTETRIC: CPT | Performed by: PHYSICIAN ASSISTANT

## 2019-09-18 PROCEDURE — 99202 OFFICE O/P NEW SF 15 MIN: CPT | Performed by: PHYSICIAN ASSISTANT

## 2019-09-18 PROCEDURE — 86850 RBC ANTIBODY SCREEN: CPT

## 2019-09-18 PROCEDURE — 84702 CHORIONIC GONADOTROPIN TEST: CPT

## 2019-09-18 PROCEDURE — 36415 COLL VENOUS BLD VENIPUNCTURE: CPT

## 2019-09-18 NOTE — ASSESSMENT & PLAN NOTE
Small gestational sac noted on US today  No visible fetal pole, yolk sac  Denies any bleeding, pelvic pain  Admits to mild cramping, not localized to one side    Will check beta HCG, Type and Screen  H/o 2 prior miscarriages, rev'd miscarriage and ectopic precautions/reasons to call

## 2019-09-18 NOTE — PROGRESS NOTES
Assessment/Plan:    Amenorrhea  Small gestational sac noted on US today  No visible fetal pole, yolk sac  Denies any bleeding, pelvic pain  Admits to mild cramping, not localized to one side    Will check beta HCG, Type and Screen  H/o 2 prior miscarriages, rev'd miscarriage and ectopic precautions/reasons to call         Diagnoses and all orders for this visit:    Amenorrhea        Subjective:      Patient ID: Dana Gates is a 46 y o  female  Technician: Study performed by the interpreting physician assistant     Indications:  amenorrhea         LMP 19       H/o 2 previous miscarriages     Procedure Details  A gestational sac is seen, no yolk sac, no fetal pole  Embryonic cardiac activity is not present    Findings:  No IUP visualized           The following portions of the patient's history were reviewed and updated as appropriate: allergies, current medications, past family history, past medical history, past social history, past surgical history and problem list     Review of Systems   Constitutional: Negative for appetite change, fatigue and unexpected weight change  Respiratory: Negative for chest tightness and shortness of breath  Cardiovascular: Negative for chest pain, palpitations and leg swelling  Gastrointestinal: Negative for abdominal pain, constipation, diarrhea, nausea and vomiting  Genitourinary: Negative for difficulty urinating, dyspareunia, menstrual problem, pelvic pain and vaginal discharge  Musculoskeletal: Negative for arthralgias and myalgias  Neurological: Negative for dizziness, light-headedness and headaches  Psychiatric/Behavioral: Negative for dysphoric mood  The patient is not nervous/anxious  All other systems reviewed and are negative          Objective:      /78 (BP Location: Right arm, Patient Position: Sitting, Cuff Size: Standard)   Wt 101 kg (222 lb 3 2 oz)   LMP 2019   BMI 36 41 kg/m²          Physical Exam Constitutional: She is oriented to person, place, and time  She appears well-developed and well-nourished  HENT:   Head: Normocephalic and atraumatic  Neurological: She is alert and oriented to person, place, and time  Skin: Skin is warm and dry  Psychiatric: She has a normal mood and affect  Nursing note and vitals reviewed

## 2019-09-19 ENCOUNTER — TELEPHONE (OUTPATIENT)
Dept: OBGYN CLINIC | Facility: CLINIC | Age: 39
End: 2019-09-19

## 2019-09-19 DIAGNOSIS — Z32.01 POSITIVE PREGNANCY TEST: Primary | ICD-10-CM

## 2019-09-19 DIAGNOSIS — N91.2 AMENORRHEA: ICD-10-CM

## 2019-09-19 NOTE — TELEPHONE ENCOUNTER
----- Message from Asa Ventura PA-C sent at 9/19/2019  7:18 AM EDT -----  Beta 86,000 - puts her in 6-8 week range  Please have her repeat beta tomorrow to follow trend

## 2019-09-19 NOTE — TELEPHONE ENCOUNTER
Left pt a message with her B-HCG result and we request her having repeated tomorrow looking for upward trend  Advised to go to any [facility] outpt lab as order is active in [facility] questions please call us back

## 2019-09-19 NOTE — TELEPHONE ENCOUNTER
Patient is following up regarding the message she received from [facility]  Would like to speak to someone about her HCG results  Please advise

## 2019-09-20 ENCOUNTER — APPOINTMENT (OUTPATIENT)
Dept: LAB | Facility: HOSPITAL | Age: 39
End: 2019-09-20
Payer: COMMERCIAL

## 2019-09-20 DIAGNOSIS — Z32.01 POSITIVE PREGNANCY TEST: ICD-10-CM

## 2019-09-20 LAB — B-HCG SERPL-ACNC: ABNORMAL MIU/ML

## 2019-09-20 PROCEDURE — 84702 CHORIONIC GONADOTROPIN TEST: CPT

## 2019-09-20 PROCEDURE — 36415 COLL VENOUS BLD VENIPUNCTURE: CPT

## 2019-09-23 ENCOUNTER — HOSPITAL ENCOUNTER (OUTPATIENT)
Dept: ULTRASOUND IMAGING | Facility: HOSPITAL | Age: 39
Discharge: HOME | End: 2019-09-23
Payer: COMMERCIAL

## 2019-09-23 ENCOUNTER — TELEPHONE (OUTPATIENT)
Dept: OBGYN CLINIC | Facility: MEDICAL CENTER | Age: 39
End: 2019-09-23

## 2019-09-23 DIAGNOSIS — Z32.01 POSITIVE PREGNANCY TEST: ICD-10-CM

## 2019-09-23 PROCEDURE — 76801 OB US < 14 WKS SINGLE FETUS: CPT

## 2019-09-23 NOTE — TELEPHONE ENCOUNTER
Pt calling for repeat blood work she had to do again and said she did it on Friday  She is also pregnant and she thinks she has an infection, burning and itching not sure what it is or she can take to help it    Please call

## 2019-09-23 NOTE — TELEPHONE ENCOUNTER
[facility]  Pt also given appt for vag issues at 7 tomorrow    Will await u/s appt results

## 2019-09-23 NOTE — TELEPHONE ENCOUNTER
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

## 2019-09-23 NOTE — TELEPHONE ENCOUNTER
Spoke with Kleber Hinojosa regarding patient's case  Patient has newly diagnosed missed  on ultrasound today with CRL >8 wks without fetal cardiac activity following abnormal quants  Called to discuss these findings with the patient so she would not have to wait for results of STAT exam overnight  Informed patient of miscarriage, she was appropriately upset and emotional     No bleeding or cramping  We did briefly discuss the options for management of missed ab including expectant, medical or surgical  She did feel up to discuss further or have any questions on those on the phone  She did inquire on the phone as to why she is having recurrent miscarriages  now  Her three miscarriages followed two normal uncomplicated deliveries  She will likely request further work up for this at her appt  Reviewed return precautions  Appt made with Dr Lyla Car for tomorrow morning at [address] [facility] and communicated to the patient (changed from 0700 with Maria Teresa Kramer) for counseling       Sita Frost MD

## 2019-09-23 NOTE — TELEPHONE ENCOUNTER
She will need an appointment for her vaginal symptoms, but I am more concerned with her pregnancy status right now  Her quant HCG has risen (it won't double at this point because it is high)  She needs a pelvic ultrasound TODAY to determine the location of her pregnancy  We should be able to see something in her uterus with this quant HCG value - and if we can't, we need to get a better look at her pelvis to see if there is a pregnancy outside of her uterus   Please order this ultrasound as a STAT

## 2019-09-24 ENCOUNTER — OFFICE VISIT (OUTPATIENT)
Dept: OBGYN CLINIC | Facility: CLINIC | Age: 39
End: 2019-09-24

## 2019-09-24 ENCOUNTER — ANESTHESIA EVENT (OUTPATIENT)
Dept: PERIOP | Facility: HOSPITAL | Age: 39
End: 2019-09-24
Payer: COMMERCIAL

## 2019-09-24 VITALS — DIASTOLIC BLOOD PRESSURE: 64 MMHG | WEIGHT: 220.4 LBS | SYSTOLIC BLOOD PRESSURE: 108 MMHG | BODY MASS INDEX: 36.12 KG/M2

## 2019-09-24 DIAGNOSIS — N89.8 VAGINAL ITCHING: ICD-10-CM

## 2019-09-24 DIAGNOSIS — Z11.3 SCREEN FOR STD (SEXUALLY TRANSMITTED DISEASE): ICD-10-CM

## 2019-09-24 DIAGNOSIS — O02.1 MISSED ABORTION: Primary | ICD-10-CM

## 2019-09-24 PROCEDURE — 99215 OFFICE O/P EST HI 40 MIN: CPT | Performed by: STUDENT IN AN ORGANIZED HEALTH CARE EDUCATION/TRAINING PROGRAM

## 2019-09-24 PROCEDURE — 87491 CHLMYD TRACH DNA AMP PROBE: CPT | Performed by: STUDENT IN AN ORGANIZED HEALTH CARE EDUCATION/TRAINING PROGRAM

## 2019-09-24 PROCEDURE — 87591 N.GONORRHOEAE DNA AMP PROB: CPT | Performed by: STUDENT IN AN ORGANIZED HEALTH CARE EDUCATION/TRAINING PROGRAM

## 2019-09-24 RX ORDER — FLUCONAZOLE 150 MG/1
150 TABLET ORAL ONCE
Qty: 1 TABLET | Refills: 1 | Status: SHIPPED | OUTPATIENT
Start: 2019-09-24 | End: 2019-09-25 | Stop reason: HOSPADM

## 2019-09-24 NOTE — H&P (VIEW-ONLY)
History and Physical - Gynecology   Feliz Aldrich 46 y o  female MRN: [id number]  89 M  [facility]  Encounter: [id number]    Chief complaint: vaginal itching, newly diagnosed missed     History of Present Illness     El Jansen is a 46 y o  female [id number] at 8 0/7 weeks by LMP of 2019 presents for follow up from new diagnosis of missed   She had originally been seen as a new appointment for confirmation of pregnancy on 19 by our office   At that time a gestational sac was appreciated but no fetal heart beat  A bHCG was drawn and resulted at 86,000 and repeat was ordered for her  The patient then called in with vaginal itching for which she requested an appointment  At that time, it was recommended that she undergo a STAT ultrasound with radiology for concern for ectopic pregnancy  Ultrasound demonstrated a fetus measuring 8 weeks without heartbeat  This result was relayed to Ese Lewis over the phone at time of the result, and appointment was made for her to visit with a provider to discuss options moving forward  In speaking with Ese Lewis and her  Carlos Polanco today, they are grieving appropriately  Ese Lewis is expressing great frustration as this is her third miscarriage in a row and she wants to know if there is a reason for these recurrent losses  Regarding her vaginal itching, she reports that for 3-4 days she has had intense itching and cramping  Slight odor, however not fishy-smelling  No vaginal bleeding  She would like to undergo surgical intervention for her missed   REVIEW OF SYSTEMS  CONSTITUTIONAL:  No weight loss, fever, chills, weakness  HEENT: No visual loss, blurred vision  SKIN: No rash or itching  CARDIOVASCULAR: No chest pain, chest pressure, or chest discomfort  RESPIRATORY: No shortness of breath, cough or sputum    GASTROINTESTINAL: No nausea, emesis, or diarrhea  NEUROLOGICAL: No headache, dizziness, syncope, paralysis  MUSCUOSKELETAL: No muscle, back pain, joint stiffness or bruising  INFECTIOUS: No fever, chills  PSYCHIATRIC: No disorder of thought or mood  HEMATOLOGIC: No easy bruising or bleeding  GYN: Vaginal itching and cramping   No vaginal bleeding    Past Medical History:   Diagnosis Date    Abdominal pain     "almost constant"    Anemia     Anesthesia     "woke up swinging with last  2018  "was fine with EGD"    Bariatric surgery status     Dental bridge present     right lower permanent    Depression     "not currently taking any meds"    Difficulty swallowing     Disease of thyroid gland     not on meds now    Dizzy     Fatigue     "when blood pressure low" and weakness too"    GERD (gastroesophageal reflux disease)     "increase after surgery"    Heart murmur     heart murmer, work up negative with holter monitor    History of 2  sections     most recent 17    History of iron deficiency     anemia/ had IV infusions through pregnancy in 2830-5970    Inguinal hernia     right    Loss of appetite     Low BP     "off and on"    Migraine     N&V (nausea and vomiting)     " a little better since on meds"    Non-intractable vomiting     "not since been on medicine"    Palpitations     "not too much, occas"    Postgastrectomy malabsorption     Stomach pain      Patient Active Problem List   Diagnosis    Bariatric surgery status    Postsurgical malabsorption    Obesity, Class II, BMI 35-39 9    Bilious vomiting with nausea    Chronic vomiting    Abdominal pain    Anemia    S/P laparoscopic sleeve gastrectomy    Heart burn    Epigastric abdominal pain    Other constipation    Decreased oral intake    Dysphagia    History of multiple miscarriages    Pouchitis (HCC)    Leg swelling    Pregnancy complicated by previous bariatric surgery, first trimester    Amenorrhea    Missed        Past Surgical History:   Procedure Laterality Date     SECTION      x2    CHOLECYSTECTOMY      CHROMOSOME ANALYSIS, PRODUCTS OF CONCEPTION (HISTORICAL)  2018    2 miscarriages in  and Nov    OVARIAN CYST REMOVAL Right 2018    PA EGD TRANSORAL BIOPSY SINGLE/MULTIPLE N/A 2019    Procedure: ESOPHAGOGASTRODUODENOSCOPY (EGD) with bx;  Surgeon: Riccardo Brock MD;  [facility]; Service: Bariatrics    PA LAP GASTRIC BYPASS/SREE-EN-Y N/A 2019    Procedure: LAP SREE-EN-Y GASTRIC BYPASS, INTRAOP EGD;  Surgeon: Riccardo Brock MD;  [facility];  Service: [facility] N/A 2019    Procedure: LAP REVISION/ CONVERSION;  Surgeon: Riccardo Brock MD;  [facility];  Service: Bariatrics    SALPINGOOPHORECTOMY Right 2018    SLEEVE GASTROPLASTY         OB history:    G1  Primary  section for NRFHT, term, no complications  G2 6577 failed TOLAC, repeat  section complicated by uterine rupture  Per patient, rupture was noted to be in a T shape  G3  Missed AB, D+C, no complications  G4 7651 SAB  Underwent right salpingo-oophorectomy for cyst on ovary    Experience spontaneous loss afterwards    Family History   Problem Relation Age of Onset    Hypertension Mother     Heart disease Mother     No Known Problems Father        Social History   Social History     Substance and Sexual Activity   Alcohol Use Not Currently    Frequency: 2-4 times a month    Drinks per session: 1 or 2    Binge frequency: Never    Comment: social     Social History     Substance and Sexual Activity   Drug Use No     Social History     Tobacco Use   Smoking Status Never Smoker   Smokeless Tobacco Never Used         Current Outpatient Medications:     famotidine (PEPCID) 20 mg tablet, Take 1 tablet (20 mg total) by mouth 2 (two) times a day, Disp: 180 tablet, Rfl: 2    Multiple Vitamins-Minerals (MULTIVITAMIN ADULT PO), Take by mouth, Disp: , Rfl:    calcium carbonate (OS-STEVEN) 600 MG tablet, Take 600 mg by mouth daily, Disp: , Rfl:     Cholecalciferol (VITAMIN D PO), Take 1,000 tablets by mouth daily , Disp: , Rfl:     fluconazole (DIFLUCAN) 150 mg tablet, Take 1 tablet (150 mg total) by mouth once for 1 dose, Disp: 1 tablet, Rfl: 1    Allergies   Allergen Reactions    Aspirin Anaphylaxis    Shellfish-Derived Products Anaphylaxis    Ibuprofen Edema    Reglan [Metoclopramide]        Objective   Vitals: Blood pressure 108/64, weight 100 kg (220 lb 6 4 oz), last menstrual period 2019, not currently breastfeeding  Body mass index is 36 12 kg/m²  General: NAD, AAOx3  Heart: RRR  Lungs: CTAB  Abdomen: soft, non-distended, non tender to palpation  Extremities: non-tender to palpation  Speculum exam: Normal appearing external genitalia  Redness of labia consistent with excoriation  Normal hair distribution  Urethra well-suspended, no clitoromegaly noted  Vagina pink, moist, well-rugated  Thick white discharge noted  Cervix without lesion, Nulliparous appearing, closed  No blood in vaginal vault    Pelvic exam: No cervical motion tenderness  Tender to cotton swab touch on labia majora, minora, and vaginally  Wet Prep / KOH prep: pH 5 5  No clue cells, Whiff test negative  Multiple yeast buds on KOH prep    Lab Results:   No visits with results within 1 Day(s) from this visit  Latest known visit with results is:   Appointment on 2019   Component Date Value    HCG, Quant 2019 23,354 0*        Assessment/Plan     Assessment:  46 y o  [id number] with missed , yeast infection  Plan:  Diagnoses and all orders for this visit:    Missed   -     Ambulatory referral to Infertility; Future    Screen for STD (sexually transmitted disease)  -     Chlamydia/GC amplified DNA by PCR    Vaginal itching  -     fluconazole (DIFLUCAN) 150 mg tablet;  Take 1 tablet (150 mg total) by mouth once for 1 dose       Referral made to [facility] to discuss recurrent pregnancy loss  GCCT collected today  Diflucan ordered for yeast infection  Discussed options moving forward to manage missed  - reviewed expectant management, medical management, and surgical management  Patient wants definitive surgical management, does not want to bleed at home  Consents and disposition reviewed and signed  Would accept blood transfusion if needed  Blood type O+  Reviewed risk of bleeding, infection, uterine perforation, scarring, risk to future pregnancy  She expressed understanding, desires to move forward with dilation and suction curettage

## 2019-09-24 NOTE — PROGRESS NOTES
History and Physical - Gynecology   Marylee Foreman AlmesticaRomero 46 y o  female MRN: [id number]  89 M  [facility]  Encounter: [id number]    Chief complaint: vaginal itching, newly diagnosed missed     History of Present Illness     Neysa Fothergill is a 46 y o  female [id number] at 8 0/7 weeks by LMP of 2019 presents for follow up from new diagnosis of missed   She had originally been seen as a new appointment for confirmation of pregnancy on 19 by our office   At that time a gestational sac was appreciated but no fetal heart beat  A bHCG was drawn and resulted at 86,000 and repeat was ordered for her  The patient then called in with vaginal itching for which she requested an appointment  At that time, it was recommended that she undergo a STAT ultrasound with radiology for concern for ectopic pregnancy  Ultrasound demonstrated a fetus measuring 8 weeks without heartbeat  This result was relayed to Carlos Alberto Santoyo over the phone at time of the result, and appointment was made for her to visit with a provider to discuss options moving forward  In speaking with Carlos Alberto Santoyo and her  Marc Reno today, they are grieving appropriately  Lashaepatrick Natanael is expressing great frustration as this is her third miscarriage in a row and she wants to know if there is a reason for these recurrent losses  Regarding her vaginal itching, she reports that for 3-4 days she has had intense itching and cramping  Slight odor, however not fishy-smelling  No vaginal bleeding  She would like to undergo surgical intervention for her missed   REVIEW OF SYSTEMS  CONSTITUTIONAL:  No weight loss, fever, chills, weakness  HEENT: No visual loss, blurred vision  SKIN: No rash or itching  CARDIOVASCULAR: No chest pain, chest pressure, or chest discomfort  RESPIRATORY: No shortness of breath, cough or sputum    GASTROINTESTINAL: No nausea, emesis, or diarrhea  NEUROLOGICAL: No headache, dizziness, syncope, paralysis  MUSCUOSKELETAL: No muscle, back pain, joint stiffness or bruising  INFECTIOUS: No fever, chills  PSYCHIATRIC: No disorder of thought or mood  HEMATOLOGIC: No easy bruising or bleeding  GYN: Vaginal itching and cramping   No vaginal bleeding    Past Medical History:   Diagnosis Date    Abdominal pain     "almost constant"    Anemia     Anesthesia     "woke up swinging with last  2018  "was fine with EGD"    Bariatric surgery status     Dental bridge present     right lower permanent    Depression     "not currently taking any meds"    Difficulty swallowing     Disease of thyroid gland     not on meds now    Dizzy     Fatigue     "when blood pressure low" and weakness too"    GERD (gastroesophageal reflux disease)     "increase after surgery"    Heart murmur     heart murmer, work up negative with holter monitor    History of 2  sections     most recent 17    History of iron deficiency     anemia/ had IV infusions through pregnancy in 7515-4312    Inguinal hernia     right    Loss of appetite     Low BP     "off and on"    Migraine     N&V (nausea and vomiting)     " a little better since on meds"    Non-intractable vomiting     "not since been on medicine"    Palpitations     "not too much, occas"    Postgastrectomy malabsorption     Stomach pain      Patient Active Problem List   Diagnosis    Bariatric surgery status    Postsurgical malabsorption    Obesity, Class II, BMI 35-39 9    Bilious vomiting with nausea    Chronic vomiting    Abdominal pain    Anemia    S/P laparoscopic sleeve gastrectomy    Heart burn    Epigastric abdominal pain    Other constipation    Decreased oral intake    Dysphagia    History of multiple miscarriages    Pouchitis (HCC)    Leg swelling    Pregnancy complicated by previous bariatric surgery, first trimester    Amenorrhea    Missed        Past Surgical History:   Procedure Laterality Date     SECTION      x2    CHOLECYSTECTOMY      CHROMOSOME ANALYSIS, PRODUCTS OF CONCEPTION (HISTORICAL)  2018    2 miscarriages in  and Nov    OVARIAN CYST REMOVAL Right 2018    MT EGD TRANSORAL BIOPSY SINGLE/MULTIPLE N/A 2019    Procedure: ESOPHAGOGASTRODUODENOSCOPY (EGD) with bx;  Surgeon: Ailyn Lal MD;  [facility]; Service: Bariatrics    MT LAP GASTRIC BYPASS/SREE-EN-Y N/A 2019    Procedure: LAP SREE-EN-Y GASTRIC BYPASS, INTRAOP EGD;  Surgeon: Ailyn Lal MD;  [facility];  Service: [address] [place] [address] N/A 2019    Procedure: LAP REVISION/ CONVERSION;  Surgeon: Ailyn Lal MD;  [facility];  Service: Bariatrics    SALPINGOOPHORECTOMY Right 2018    SLEEVE GASTROPLASTY         OB history:    G1  Primary  section for NRFHT, term, no complications  G2 3641 failed TOLAC, repeat  section complicated by uterine rupture  Per patient, rupture was noted to be in a T shape  G3 2017 Missed AB, D+C, no complications  G4 5697 SAB  Underwent right salpingo-oophorectomy for cyst on ovary    Experience spontaneous loss afterwards    Family History   Problem Relation Age of Onset    Hypertension Mother     Heart disease Mother     No Known Problems Father        Social History   Social History     Substance and Sexual Activity   Alcohol Use Not Currently    Frequency: 2-4 times a month    Drinks per session: 1 or 2    Binge frequency: Never    Comment: social     Social History     Substance and Sexual Activity   Drug Use No     Social History     Tobacco Use   Smoking Status Never Smoker   Smokeless Tobacco Never Used         Current Outpatient Medications:     famotidine (PEPCID) 20 mg tablet, Take 1 tablet (20 mg total) by mouth 2 (two) times a day, Disp: 180 tablet, Rfl: 2    Multiple Vitamins-Minerals (MULTIVITAMIN ADULT PO), Take by mouth, Disp: , Rfl:    calcium carbonate (OS-STEVEN) 600 MG tablet, Take 600 mg by mouth daily, Disp: , Rfl:     Cholecalciferol (VITAMIN D PO), Take 1,000 tablets by mouth daily , Disp: , Rfl:     fluconazole (DIFLUCAN) 150 mg tablet, Take 1 tablet (150 mg total) by mouth once for 1 dose, Disp: 1 tablet, Rfl: 1    Allergies   Allergen Reactions    Aspirin Anaphylaxis    Shellfish-Derived Products Anaphylaxis    Ibuprofen Edema    Reglan [Metoclopramide]        Objective   Vitals: Blood pressure 108/64, weight 100 kg (220 lb 6 4 oz), last menstrual period 2019, not currently breastfeeding  Body mass index is 36 12 kg/m²  General: NAD, AAOx3  Heart: RRR  Lungs: CTAB  Abdomen: soft, non-distended, non tender to palpation  Extremities: non-tender to palpation  Speculum exam: Normal appearing external genitalia  Redness of labia consistent with excoriation  Normal hair distribution  Urethra well-suspended, no clitoromegaly noted  Vagina pink, moist, well-rugated  Thick white discharge noted  Cervix without lesion, Nulliparous appearing, closed  No blood in vaginal vault    Pelvic exam: No cervical motion tenderness  Tender to cotton swab touch on labia majora, minora, and vaginally  Wet Prep / KOH prep: pH 5 5  No clue cells, Whiff test negative  Multiple yeast buds on KOH prep    Lab Results:   No visits with results within 1 Day(s) from this visit  Latest known visit with results is:   Appointment on 2019   Component Date Value    HCG, Quant 2019 22,356 0*        Assessment/Plan     Assessment:  46 y o  [id number] with missed , yeast infection  Plan:  Diagnoses and all orders for this visit:    Missed   -     Ambulatory referral to Infertility; Future    Screen for STD (sexually transmitted disease)  -     Chlamydia/GC amplified DNA by PCR    Vaginal itching  -     fluconazole (DIFLUCAN) 150 mg tablet;  Take 1 tablet (150 mg total) by mouth once for 1 dose       Referral made to [facility] to discuss recurrent pregnancy loss  GCCT collected today  Diflucan ordered for yeast infection  Discussed options moving forward to manage missed  - reviewed expectant management, medical management, and surgical management  Patient wants definitive surgical management, does not want to bleed at home  Consents and disposition reviewed and signed  Would accept blood transfusion if needed  Blood type O+  Reviewed risk of bleeding, infection, uterine perforation, scarring, risk to future pregnancy  She expressed understanding, desires to move forward with dilation and suction curettage

## 2019-09-25 ENCOUNTER — HOSPITAL ENCOUNTER (OUTPATIENT)
Facility: HOSPITAL | Age: 39
Setting detail: OUTPATIENT SURGERY
Discharge: HOME | End: 2019-09-25
Attending: OBSTETRICS & GYNECOLOGY | Admitting: OBSTETRICS & GYNECOLOGY
Payer: COMMERCIAL

## 2019-09-25 ENCOUNTER — ANESTHESIA (OUTPATIENT)
Dept: PERIOP | Facility: HOSPITAL | Age: 39
End: 2019-09-25
Payer: COMMERCIAL

## 2019-09-25 VITALS
TEMPERATURE: 98.6 F | BODY MASS INDEX: 36.65 KG/M2 | DIASTOLIC BLOOD PRESSURE: 56 MMHG | HEIGHT: 65 IN | HEART RATE: 67 BPM | WEIGHT: 220 LBS | OXYGEN SATURATION: 98 % | SYSTOLIC BLOOD PRESSURE: 111 MMHG | RESPIRATION RATE: 16 BRPM

## 2019-09-25 DIAGNOSIS — O02.1 MISSED ABORTION: ICD-10-CM

## 2019-09-25 PROBLEM — Z98.890 STATUS POST D&C: Status: ACTIVE | Noted: 2019-09-25

## 2019-09-25 LAB
ERYTHROCYTE [DISTWIDTH] IN BLOOD BY AUTOMATED COUNT: 17.5 % (ref 11.6–15.1)
HCT VFR BLD AUTO: 31.8 % (ref 34.8–46.1)
HGB BLD-MCNC: 9.8 G/DL (ref 11.5–15.4)
MCH RBC QN AUTO: 23.3 PG (ref 26.8–34.3)
MCHC RBC AUTO-ENTMCNC: 30.8 G/DL (ref 31.4–37.4)
MCV RBC AUTO: 76 FL (ref 82–98)
PLATELET # BLD AUTO: 331 THOUSANDS/UL (ref 149–390)
PMV BLD AUTO: 10.2 FL (ref 8.9–12.7)
RBC # BLD AUTO: 4.21 MILLION/UL (ref 3.81–5.12)
WBC # BLD AUTO: 7.25 THOUSAND/UL (ref 4.31–10.16)

## 2019-09-25 PROCEDURE — 59820 CARE OF MISCARRIAGE: CPT | Performed by: STUDENT IN AN ORGANIZED HEALTH CARE EDUCATION/TRAINING PROGRAM

## 2019-09-25 PROCEDURE — 85027 COMPLETE CBC AUTOMATED: CPT | Performed by: OBSTETRICS & GYNECOLOGY

## 2019-09-25 PROCEDURE — 88305 TISSUE EXAM BY PATHOLOGIST: CPT | Performed by: PATHOLOGY

## 2019-09-25 RX ORDER — LIDOCAINE HYDROCHLORIDE 10 MG/ML
0.5 INJECTION, SOLUTION EPIDURAL; INFILTRATION; INTRACAUDAL; PERINEURAL ONCE AS NEEDED
Status: COMPLETED | OUTPATIENT
Start: 2019-09-25 | End: 2019-09-25

## 2019-09-25 RX ORDER — FENTANYL CITRATE 50 UG/ML
INJECTION, SOLUTION INTRAMUSCULAR; INTRAVENOUS AS NEEDED
Status: DISCONTINUED | OUTPATIENT
Start: 2019-09-25 | End: 2019-09-25 | Stop reason: SURG

## 2019-09-25 RX ORDER — EPHEDRINE SULFATE 50 MG/ML
INJECTION INTRAVENOUS AS NEEDED
Status: DISCONTINUED | OUTPATIENT
Start: 2019-09-25 | End: 2019-09-25 | Stop reason: SURG

## 2019-09-25 RX ORDER — DOXYCYCLINE HYCLATE 100 MG/1
200 CAPSULE ORAL ONCE
Status: COMPLETED | OUTPATIENT
Start: 2019-09-25 | End: 2019-09-25

## 2019-09-25 RX ORDER — FENTANYL CITRATE/PF 50 MCG/ML
50 SYRINGE (ML) INJECTION
Status: COMPLETED | OUTPATIENT
Start: 2019-09-25 | End: 2019-09-25

## 2019-09-25 RX ORDER — ONDANSETRON 2 MG/ML
4 INJECTION INTRAMUSCULAR; INTRAVENOUS ONCE
Status: COMPLETED | OUTPATIENT
Start: 2019-09-25 | End: 2019-09-25

## 2019-09-25 RX ORDER — MIDAZOLAM HYDROCHLORIDE 1 MG/ML
INJECTION INTRAMUSCULAR; INTRAVENOUS AS NEEDED
Status: DISCONTINUED | OUTPATIENT
Start: 2019-09-25 | End: 2019-09-25 | Stop reason: SURG

## 2019-09-25 RX ORDER — OXYCODONE HYDROCHLORIDE 5 MG/1
10 TABLET ORAL EVERY 4 HOURS PRN
Status: DISCONTINUED | OUTPATIENT
Start: 2019-09-25 | End: 2019-09-25 | Stop reason: HOSPADM

## 2019-09-25 RX ORDER — MISOPROSTOL 100 UG/1
TABLET ORAL AS NEEDED
Status: DISCONTINUED | OUTPATIENT
Start: 2019-09-25 | End: 2019-09-25 | Stop reason: HOSPADM

## 2019-09-25 RX ORDER — HYDROMORPHONE HCL/PF 1 MG/ML
0.5 SYRINGE (ML) INJECTION
Status: DISCONTINUED | OUTPATIENT
Start: 2019-09-25 | End: 2019-09-25 | Stop reason: HOSPADM

## 2019-09-25 RX ORDER — ACETAMINOPHEN 325 MG/1
650 TABLET ORAL EVERY 4 HOURS PRN
Status: DISCONTINUED | OUTPATIENT
Start: 2019-09-25 | End: 2019-09-25 | Stop reason: HOSPADM

## 2019-09-25 RX ORDER — ONDANSETRON 2 MG/ML
4 INJECTION INTRAMUSCULAR; INTRAVENOUS EVERY 6 HOURS PRN
Status: DISCONTINUED | OUTPATIENT
Start: 2019-09-25 | End: 2019-09-25 | Stop reason: HOSPADM

## 2019-09-25 RX ORDER — ONDANSETRON 2 MG/ML
4 INJECTION INTRAMUSCULAR; INTRAVENOUS ONCE AS NEEDED
Status: DISCONTINUED | OUTPATIENT
Start: 2019-09-25 | End: 2019-09-25 | Stop reason: HOSPADM

## 2019-09-25 RX ORDER — DEXAMETHASONE SODIUM PHOSPHATE 10 MG/ML
INJECTION, SOLUTION INTRAMUSCULAR; INTRAVENOUS AS NEEDED
Status: DISCONTINUED | OUTPATIENT
Start: 2019-09-25 | End: 2019-09-25 | Stop reason: SURG

## 2019-09-25 RX ORDER — PROPOFOL 10 MG/ML
INJECTION, EMULSION INTRAVENOUS AS NEEDED
Status: DISCONTINUED | OUTPATIENT
Start: 2019-09-25 | End: 2019-09-25 | Stop reason: SURG

## 2019-09-25 RX ORDER — GLYCOPYRROLATE 0.2 MG/ML
INJECTION INTRAMUSCULAR; INTRAVENOUS AS NEEDED
Status: DISCONTINUED | OUTPATIENT
Start: 2019-09-25 | End: 2019-09-25 | Stop reason: SURG

## 2019-09-25 RX ORDER — ONDANSETRON 2 MG/ML
INJECTION INTRAMUSCULAR; INTRAVENOUS AS NEEDED
Status: DISCONTINUED | OUTPATIENT
Start: 2019-09-25 | End: 2019-09-25 | Stop reason: SURG

## 2019-09-25 RX ORDER — OXYCODONE HYDROCHLORIDE 5 MG/1
5 TABLET ORAL EVERY 4 HOURS PRN
Status: DISCONTINUED | OUTPATIENT
Start: 2019-09-25 | End: 2019-09-25 | Stop reason: HOSPADM

## 2019-09-25 RX ORDER — MISOPROSTOL 200 UG/1
1000 TABLET ORAL ONCE
Status: DISCONTINUED | OUTPATIENT
Start: 2019-09-25 | End: 2019-09-25 | Stop reason: HOSPADM

## 2019-09-25 RX ORDER — ACETAMINOPHEN 325 MG/1
975 TABLET ORAL ONCE
Status: COMPLETED | OUTPATIENT
Start: 2019-09-25 | End: 2019-09-25

## 2019-09-25 RX ORDER — LIDOCAINE HYDROCHLORIDE 10 MG/ML
INJECTION, SOLUTION INFILTRATION; PERINEURAL AS NEEDED
Status: DISCONTINUED | OUTPATIENT
Start: 2019-09-25 | End: 2019-09-25 | Stop reason: SURG

## 2019-09-25 RX ORDER — SODIUM CHLORIDE, SODIUM LACTATE, POTASSIUM CHLORIDE, CALCIUM CHLORIDE 600; 310; 30; 20 MG/100ML; MG/100ML; MG/100ML; MG/100ML
125 INJECTION, SOLUTION INTRAVENOUS CONTINUOUS
Status: DISCONTINUED | OUTPATIENT
Start: 2019-09-25 | End: 2019-09-25 | Stop reason: HOSPADM

## 2019-09-25 RX ADMIN — OXYCODONE HYDROCHLORIDE 10 MG: 5 TABLET ORAL at 17:58

## 2019-09-25 RX ADMIN — ONDANSETRON 4 MG: 2 INJECTION INTRAMUSCULAR; INTRAVENOUS at 15:33

## 2019-09-25 RX ADMIN — FENTANYL CITRATE 50 MCG: 50 INJECTION INTRAMUSCULAR; INTRAVENOUS at 17:04

## 2019-09-25 RX ADMIN — GLYCOPYRROLATE 0.1 MG: 0.2 INJECTION, SOLUTION INTRAMUSCULAR; INTRAVENOUS at 15:24

## 2019-09-25 RX ADMIN — FENTANYL CITRATE 25 MCG: 50 INJECTION, SOLUTION INTRAMUSCULAR; INTRAVENOUS at 15:37

## 2019-09-25 RX ADMIN — FENTANYL CITRATE 25 MCG: 50 INJECTION, SOLUTION INTRAMUSCULAR; INTRAVENOUS at 16:17

## 2019-09-25 RX ADMIN — PROPOFOL 200 MG: 10 INJECTION, EMULSION INTRAVENOUS at 15:31

## 2019-09-25 RX ADMIN — PHENYLEPHRINE HYDROCHLORIDE 150 MCG: 10 INJECTION INTRAVENOUS at 15:45

## 2019-09-25 RX ADMIN — SODIUM CHLORIDE, SODIUM LACTATE, POTASSIUM CHLORIDE, AND CALCIUM CHLORIDE 125 ML/HR: .6; .31; .03; .02 INJECTION, SOLUTION INTRAVENOUS at 13:30

## 2019-09-25 RX ADMIN — MIDAZOLAM 2 MG: 1 INJECTION INTRAMUSCULAR; INTRAVENOUS at 15:24

## 2019-09-25 RX ADMIN — PHENYLEPHRINE HYDROCHLORIDE 150 MCG: 10 INJECTION INTRAVENOUS at 15:40

## 2019-09-25 RX ADMIN — DOXYCYCLINE 200 MG: 100 CAPSULE ORAL at 14:15

## 2019-09-25 RX ADMIN — EPHEDRINE SULFATE 10 MG: 50 INJECTION, SOLUTION INTRAVENOUS at 15:47

## 2019-09-25 RX ADMIN — ONDANSETRON 4 MG: 2 INJECTION INTRAMUSCULAR; INTRAVENOUS at 14:15

## 2019-09-25 RX ADMIN — PHENYLEPHRINE HYDROCHLORIDE 100 MCG: 10 INJECTION INTRAVENOUS at 15:36

## 2019-09-25 RX ADMIN — EPHEDRINE SULFATE 10 MG: 50 INJECTION, SOLUTION INTRAVENOUS at 15:51

## 2019-09-25 RX ADMIN — DEXAMETHASONE SODIUM PHOSPHATE 10 MG: 10 INJECTION, SOLUTION INTRAMUSCULAR; INTRAVENOUS at 15:31

## 2019-09-25 RX ADMIN — FENTANYL CITRATE 50 MCG: 50 INJECTION, SOLUTION INTRAMUSCULAR; INTRAVENOUS at 16:20

## 2019-09-25 RX ADMIN — ACETAMINOPHEN 975 MG: 325 TABLET ORAL at 14:10

## 2019-09-25 RX ADMIN — LIDOCAINE HYDROCHLORIDE 0.5 ML: 10 INJECTION, SOLUTION EPIDURAL; INFILTRATION; INTRACAUDAL; PERINEURAL at 13:52

## 2019-09-25 RX ADMIN — EPHEDRINE SULFATE 15 MG: 50 INJECTION, SOLUTION INTRAVENOUS at 15:57

## 2019-09-25 RX ADMIN — SODIUM CHLORIDE, SODIUM LACTATE, POTASSIUM CHLORIDE, AND CALCIUM CHLORIDE: .6; .31; .03; .02 INJECTION, SOLUTION INTRAVENOUS at 16:09

## 2019-09-25 RX ADMIN — LIDOCAINE HYDROCHLORIDE 50 MG: 10 INJECTION, SOLUTION INFILTRATION; PERINEURAL at 15:31

## 2019-09-25 RX ADMIN — FENTANYL CITRATE 50 MCG: 50 INJECTION INTRAMUSCULAR; INTRAVENOUS at 16:54

## 2019-09-25 NOTE — ANESTHESIA POSTPROCEDURE EVALUATION
Post-Op Assessment Note    CV Status:  Stable    Pain management: adequate     Mental Status:  Alert and awake   Hydration Status:  Euvolemic   PONV Controlled:  Controlled   Airway Patency:  Patent   Post Op Vitals Reviewed: Yes      Staff: Anesthesiologist, CRNA   Comments: VSS, reflexes intact, maintains own airway          BP 94/55 (09/25/19 1626)    Temp 98 7 °F (37 1 °C) (09/25/19 1626)    Pulse 79 (09/25/19 1626)   Resp 16 (09/25/19 1626)    SpO2 100 % (09/25/19 1626)

## 2019-09-25 NOTE — INTERVAL H&P NOTE
H&P reviewed  After examining the patient I find no changes in the patients condition since the H&P had been written  Proceed with procedure as planned      Vitals:    09/25/19 1306   BP: 97/51   Pulse: 68   Resp: 18   Temp: 99 3 °F (37 4 °C)   SpO2: 100%

## 2019-09-25 NOTE — DISCHARGE INSTRUCTIONS
Miscarriage   WHAT YOU NEED TO KNOW:   A miscarriage is the loss of a fetus within the first 20 weeks of pregnancy  A miscarriage may also be called a spontaneous  or an early pregnancy loss  DISCHARGE INSTRUCTIONS:   Seek care immediately if:   · You have foul-smelling drainage or pus coming from your vagina  · You have heavy vaginal bleeding and soak 1 pad or more in an hour  · You have severe abdominal pain  · You feel like your heart is beating faster than normal      · You feel extremely weak or dizzy  Contact your healthcare provider if:   · You have a fever greater than 100 4°F or chills  · You have extreme sadness, grief, or feel unable to cope with what has happened  · You have questions or concerns about your condition or care  Self-care:   · Do not put anything in your vagina for 2 weeks or as directed  Do not use tampons, douche, or have sex  These actions can cause infection and pain  · Use sanitary pads as needed  You may have light bleeding or spotting for 2 weeks  · Do not take a bath or go swimming for 2 weeks or as directed  These actions may increase your risk for an infection  Take showers only  · Rest as needed  Slowly start to do more each day  Return to your daily activities as directed  · Talk to your healthcare provider about birth control  If you would like to prevent another pregnancy, ask your healthcare provider which type of birth control is best for you  · Join a support group or therapy to help you cope  A miscarriage may be very difficult for you, your partner, and other members of your family  There is no right way to feel after a miscarriage  You may feel overwhelming grief or other emotions  It may be helpful to talk to a friend, family member, or counselor about your feelings  You may worry that you could have another miscarriage  Talk to your healthcare provider about your concerns   He may be able to help you reduce the risk for another miscarriage  He may also help you find ways to cope with grief  For more information:   · [employer]  [address] [place] [address] , [address] [place] [address]  Phone: [phone]  Phone: [phone]  Web Address: [email or web address]  [employer]  · [facility]  [facility] , [address] [place] [address]  Web Address: [email or web address]  Follow up with your healthcare provider as directed: You may need to see your healthcare provider for blood tests or an ultrasound  Write down your questions so you remember to ask them during your visits  © 2017 [employer] Information is for End User's use only and may not be sold, redistributed or otherwise used for commercial purposes  All illustrations and images included in CareNotes® are the copyrighted property of [employer] , [employer]  or [employer]  The above information is an  only  It is not intended as medical advice for individual conditions or treatments  Talk to your doctor, nurse or pharmacist before following any medical regimen to see if it is safe and effective for you

## 2019-09-25 NOTE — OP NOTE
OPERATIVE REPORT  PATIENT NAME: Willa Aldrich    :  1980  MRN: [id number]  Pt Location: BE OR ROOM 07    SURGERY DATE: 2019    Surgeon(s) and Role:     * El Dunlap MD - Primary     * Rashi Ibarra MD - Philly Dsouza MD - Observing    Preop Diagnosis:  Missed  [O02 1]    Post-Op Diagnosis Codes:     * Missed  [O02 1]    Procedure(s) (LRB):  DILATATION AND EVACUATION (D&E) (8 weeks) missed ab (N/A)    Specimen(s):  ID Type Source Tests Collected by Time Destination   1 :  Tissue Products of Rosy Simpson MD 2019 1550        Estimated Blood Loss:   600 mL    Drains: None    Anesthesia Type: General LMA    Operative Indications:  Missed  [O02 1]    Operative Findings:   Anteverted uterus consistent with 8week gestation  Products of conception, removed from uterus      Complications:   None    Procedure and Technique:  The patient was taken to the operating room where she was properly identified  General anesthesia was obtained without difficulty  She was prepped and draped in the normal sterile fashion in the dorsal lithotomy position using the stirrups  Care was taken to avoid excessive flexion or extension of the patients hips and knees or pressure on her extremities  A time out was performed and everyone was in agreement  The patient received PO doxycyline in holding  The bladder was drained with a straight cath for 300cc of clear urine  A Mireles retractor and Mayran retractor were placed in the patient's vagina and the anterior lip of the cervix was identified and grasped with a single-toothed tenaculum  The cervix was serially dilated to accommodate the suction device  A 8 cm curved tip was selected  This was advanced to the fundus and suction was activated  The products of conception were collected in the suction trap  The uterus was felt to clamp down   The suction tip was removed and gentle curettage was performed with a good cry noted throughout the cavity and no further products of conception obtained  The uterus was massaged and firm  All instruments were removed from the patient's vagina  There was no bleeding noted from tenaculum site  1000mcg of misoprostol was placed in the rectum  The patient tolerated the procedure well  Sponge, instrument and needle counts were correct times 2  The patient was cleansed, awakened from anesthesia and taken to the recovery room in stable condition  Dr Oni Acosta was present for the entire procedure      Grant Escobar MD, PGY-2  9/25/2019  4:28 PM

## 2019-09-25 NOTE — ANESTHESIA PREPROCEDURE EVALUATION
Review of Systems/Medical History  Patient summary reviewed  Chart reviewed  No history of anesthetic complications     Cardiovascular  Negative cardio ROS    Pulmonary  Negative pulmonary ROS        GI/Hepatic  Negative GI/hepatic ROS Dysphagia,   GERD , Pancreatic problem,   Comment: Chronic vomiting controlled on current medications; h/o bariatric surgery     Negative  ROS        Endo/Other  Negative endo/other ROS History of thyroid disease ,   Obesity    GYN  Negative gynecology ROS          Hematology  Negative hematology ROS Anemia iron deficiency anemia,     Musculoskeletal  Negative musculoskeletal ROS        Neurology  Negative neurology ROS   Headaches,    Psychology   Negative psychology ROS Depression ,          Prev Anes - gastric sleeve (4/8/19): GETA, easy MV, grade 1 view with Levy 2 blade    EKG 3/21/19: NSR, non-specific st changes in inferolateral leads    Recent Results (from the past 1008 hour(s))   hCG, quantitative    Collection Time: 09/18/19  5:31 PM   Result Value Ref Range    HCG, Quant 71,436 0 (H) <=6 mIU/mL   Type and screen    Collection Time: 09/18/19  5:31 PM   Result Value Ref Range    ABO Grouping O     Rh Factor Positive     Antibody Screen Negative     Specimen Expiration Date [id number]    hCG, quantitative    Collection Time: 09/20/19  3:53 PM   Result Value Ref Range    HCG, Quant 97,327 2 (H) <=6 mIU/mL         Physical Exam    Airway    Mallampati score: II  TM Distance: >3 FB  Neck ROM: full     Dental   No notable dental hx     Cardiovascular  Comment: Negative ROS, Rhythm: regular, Rate: normal, No murmur,     Pulmonary  Breath sounds clear to auscultation,     Other Findings        Anesthesia Plan  ASA Score- 2     Anesthesia Type- general with ASA Monitors  Additional Monitors:   Airway Plan: LMA  Comment: Pt ate a single cracker and had some juice around 7:30am      Plan Factors-    Induction- intravenous      Postoperative Plan- Plan for postoperative opioid use  Informed Consent- Anesthetic plan and risks discussed with patient  I personally reviewed this patient with the CRNA  Discussed and agreed on the Anesthesia Plan with the CRNA  Shazia Li

## 2019-09-26 ENCOUNTER — TELEPHONE (OUTPATIENT)
Dept: OBGYN CLINIC | Facility: CLINIC | Age: 39
End: 2019-09-26

## 2019-09-26 NOTE — TELEPHONE ENCOUNTER
Pt had D&C 9/25 with Dr Donta Denise,  Pt needs a note for work that she was under Drs care, also a release form for work, pls call pt to advise, thanks

## 2019-09-27 LAB
C TRACH DNA SPEC QL NAA+PROBE: NEGATIVE
N GONORRHOEA DNA SPEC QL NAA+PROBE: NEGATIVE

## 2019-09-27 NOTE — TELEPHONE ENCOUNTER
Yes, okay to write letter, thank you! Apologies for the late reply  Okay to return to work Monday   -AMM

## 2019-09-27 NOTE — TELEPHONE ENCOUNTER
Spoke with Pt today via phone call  Pt informed that "return to work" letter was approved by Dr Andrey Milligan  Pt states she will  letter at [facility]  Letter left for Pt at  today

## 2019-10-29 ENCOUNTER — HOSPITAL ENCOUNTER (EMERGENCY)
Facility: HOSPITAL | Age: 39
Discharge: HOME | End: 2019-10-29
Attending: EMERGENCY MEDICINE | Admitting: EMERGENCY MEDICINE
Payer: COMMERCIAL

## 2019-10-29 ENCOUNTER — APPOINTMENT (EMERGENCY)
Dept: CT IMAGING | Facility: HOSPITAL | Age: 39
End: 2019-10-29
Payer: COMMERCIAL

## 2019-10-29 VITALS
HEART RATE: 63 BPM | OXYGEN SATURATION: 100 % | BODY MASS INDEX: 36.76 KG/M2 | TEMPERATURE: 98.3 F | RESPIRATION RATE: 20 BRPM | SYSTOLIC BLOOD PRESSURE: 121 MMHG | WEIGHT: 220.9 LBS | DIASTOLIC BLOOD PRESSURE: 73 MMHG

## 2019-10-29 DIAGNOSIS — D64.9 ANEMIA: ICD-10-CM

## 2019-10-29 DIAGNOSIS — R10.9 ABDOMINAL PAIN: Primary | ICD-10-CM

## 2019-10-29 LAB
ALBUMIN SERPL BCP-MCNC: 3.3 G/DL (ref 3.5–5)
ALP SERPL-CCNC: 82 U/L (ref 46–116)
ALT SERPL W P-5'-P-CCNC: 20 U/L (ref 12–78)
ANION GAP SERPL CALCULATED.3IONS-SCNC: 7 MMOL/L (ref 4–13)
AST SERPL W P-5'-P-CCNC: 23 U/L (ref 5–45)
BASOPHILS # BLD AUTO: 0.02 THOUSANDS/ΜL (ref 0–0.1)
BASOPHILS NFR BLD AUTO: 0 % (ref 0–1)
BILIRUB SERPL-MCNC: 0.24 MG/DL (ref 0.2–1)
BILIRUB UR QL STRIP: NEGATIVE
BUN SERPL-MCNC: 14 MG/DL (ref 5–25)
CALCIUM SERPL-MCNC: 8.5 MG/DL (ref 8.3–10.1)
CHLORIDE SERPL-SCNC: 105 MMOL/L (ref 100–108)
CLARITY UR: CLEAR
CLARITY, POC: CLEAR
CO2 SERPL-SCNC: 26 MMOL/L (ref 21–32)
COLOR UR: YELLOW
COLOR, POC: YELLOW
CREAT SERPL-MCNC: 0.78 MG/DL (ref 0.6–1.3)
EOSINOPHIL # BLD AUTO: 0.09 THOUSAND/ΜL (ref 0–0.61)
EOSINOPHIL NFR BLD AUTO: 2 % (ref 0–6)
ERYTHROCYTE [DISTWIDTH] IN BLOOD BY AUTOMATED COUNT: 16.7 % (ref 11.6–15.1)
EXT PREG TEST URINE: NEGATIVE
EXT. CONTROL ED NAV: NORMAL
GFR SERPL CREATININE-BSD FRML MDRD: 97 ML/MIN/1.73SQ M
GLUCOSE SERPL-MCNC: 87 MG/DL (ref 65–140)
GLUCOSE UR STRIP-MCNC: NEGATIVE MG/DL
HCT VFR BLD AUTO: 27.3 % (ref 34.8–46.1)
HGB BLD-MCNC: 8 G/DL (ref 11.5–15.4)
HGB UR QL STRIP.AUTO: NEGATIVE
IMM GRANULOCYTES # BLD AUTO: 0.01 THOUSAND/UL (ref 0–0.2)
IMM GRANULOCYTES NFR BLD AUTO: 0 % (ref 0–2)
KETONES UR STRIP-MCNC: NEGATIVE MG/DL
LEUKOCYTE ESTERASE UR QL STRIP: NEGATIVE
LIPASE SERPL-CCNC: 99 U/L (ref 73–393)
LYMPHOCYTES # BLD AUTO: 2.64 THOUSANDS/ΜL (ref 0.6–4.47)
LYMPHOCYTES NFR BLD AUTO: 43 % (ref 14–44)
MCH RBC QN AUTO: 21.7 PG (ref 26.8–34.3)
MCHC RBC AUTO-ENTMCNC: 29.3 G/DL (ref 31.4–37.4)
MCV RBC AUTO: 74 FL (ref 82–98)
MONOCYTES # BLD AUTO: 0.58 THOUSAND/ΜL (ref 0.17–1.22)
MONOCYTES NFR BLD AUTO: 9 % (ref 4–12)
NEUTROPHILS # BLD AUTO: 2.82 THOUSANDS/ΜL (ref 1.85–7.62)
NEUTS SEG NFR BLD AUTO: 46 % (ref 43–75)
NITRITE UR QL STRIP: NEGATIVE
NRBC BLD AUTO-RTO: 0 /100 WBCS
PH UR STRIP.AUTO: 7 [PH] (ref 4.5–8)
PLATELET # BLD AUTO: 382 THOUSANDS/UL (ref 149–390)
PMV BLD AUTO: 9.4 FL (ref 8.9–12.7)
POTASSIUM SERPL-SCNC: 3.9 MMOL/L (ref 3.5–5.3)
PROT SERPL-MCNC: 7.4 G/DL (ref 6.4–8.2)
PROT UR STRIP-MCNC: NEGATIVE MG/DL
RBC # BLD AUTO: 3.68 MILLION/UL (ref 3.81–5.12)
SODIUM SERPL-SCNC: 138 MMOL/L (ref 136–145)
SP GR UR STRIP.AUTO: 1.01 (ref 1–1.03)
UROBILINOGEN UR QL STRIP.AUTO: 0.2 E.U./DL
WBC # BLD AUTO: 6.16 THOUSAND/UL (ref 4.31–10.16)

## 2019-10-29 PROCEDURE — 83690 ASSAY OF LIPASE: CPT | Performed by: EMERGENCY MEDICINE

## 2019-10-29 PROCEDURE — 80053 COMPREHEN METABOLIC PANEL: CPT | Performed by: EMERGENCY MEDICINE

## 2019-10-29 PROCEDURE — 96361 HYDRATE IV INFUSION ADD-ON: CPT

## 2019-10-29 PROCEDURE — 99285 EMERGENCY DEPT VISIT HI MDM: CPT | Performed by: EMERGENCY MEDICINE

## 2019-10-29 PROCEDURE — 74177 CT ABD & PELVIS W/CONTRAST: CPT

## 2019-10-29 PROCEDURE — 96374 THER/PROPH/DIAG INJ IV PUSH: CPT

## 2019-10-29 PROCEDURE — 36415 COLL VENOUS BLD VENIPUNCTURE: CPT

## 2019-10-29 PROCEDURE — 85025 COMPLETE CBC W/AUTO DIFF WBC: CPT | Performed by: EMERGENCY MEDICINE

## 2019-10-29 PROCEDURE — 81025 URINE PREGNANCY TEST: CPT | Performed by: EMERGENCY MEDICINE

## 2019-10-29 PROCEDURE — 99284 EMERGENCY DEPT VISIT MOD MDM: CPT

## 2019-10-29 PROCEDURE — 96375 TX/PRO/DX INJ NEW DRUG ADDON: CPT

## 2019-10-29 PROCEDURE — 81003 URINALYSIS AUTO W/O SCOPE: CPT

## 2019-10-29 RX ORDER — DIPHENHYDRAMINE HYDROCHLORIDE 50 MG/ML
25 INJECTION INTRAMUSCULAR; INTRAVENOUS ONCE
Status: COMPLETED | OUTPATIENT
Start: 2019-10-29 | End: 2019-10-29

## 2019-10-29 RX ORDER — MORPHINE SULFATE 4 MG/ML
4 INJECTION, SOLUTION INTRAMUSCULAR; INTRAVENOUS ONCE
Status: COMPLETED | OUTPATIENT
Start: 2019-10-29 | End: 2019-10-29

## 2019-10-29 RX ORDER — PANTOPRAZOLE SODIUM 20 MG/1
20 TABLET, DELAYED RELEASE ORAL DAILY
COMMUNITY
End: 2020-01-17 | Stop reason: SDUPTHER

## 2019-10-29 RX ADMIN — SODIUM CHLORIDE 1000 ML: 0.9 INJECTION, SOLUTION INTRAVENOUS at 17:34

## 2019-10-29 RX ADMIN — DIPHENHYDRAMINE HYDROCHLORIDE 25 MG: 50 INJECTION, SOLUTION INTRAMUSCULAR; INTRAVENOUS at 19:32

## 2019-10-29 RX ADMIN — MORPHINE SULFATE 4 MG: 4 INJECTION INTRAVENOUS at 17:42

## 2019-10-29 RX ADMIN — IOHEXOL 100 ML: 350 INJECTION, SOLUTION INTRAVENOUS at 19:07

## 2019-10-29 NOTE — ED NOTES
Patient reports feeling generalized itching, throat itching and pain with swallowing  Reports sensation of hives to roof of mouth  No oral swelling or hives noted  ED resident and Dr Chele Storey aware        Ingrid Orta RN  10/29/19 1934

## 2019-10-29 NOTE — ED NOTES
Patient transported to [address] [place] [address] [place], [facility]  10/29/19 4453

## 2019-10-29 NOTE — ED PROVIDER NOTES
History  Chief Complaint   Patient presents with    Abdominal Pain     patient reports LLQ abdominal pain; patient reports pain on and off for 2 weeks; patient reports Nausea      Patient presents for evaluation of abdominal pain  For the past 2 weeks she has been experiencing left upper quadrant abdominal pain  Denies any relieving or exacerbating factors, radiation, fever/chills, sick contacts, new food exposures  Endorses nausea without vomiting  Patient does have a history of bariatric surgery consisting of gastric sleeve which was then changed to a Karmen-en-Y secondary to increased reflux  She has also had surgery done for a right-sided ovarian cyst   She feels like the pain that she is currently having was similar to her cyst pain only located further up on her abdomen  Denies any hematuria, dysuria, constipation, diarrhea, vaginal discharge  Prior to Admission Medications   Prescriptions Last Dose Informant Patient Reported? Taking?    Multiple Vitamins-Minerals (MULTIVITAMIN ADULT PO)  Self Yes Yes   Sig: Take by mouth   famotidine (PEPCID) 20 mg tablet  Self No Yes   Sig: Take 1 tablet (20 mg total) by mouth 2 (two) times a day   pantoprazole (PROTONIX) 20 mg tablet   Yes Yes   Sig: Take 20 mg by mouth daily      Facility-Administered Medications: None       Past Medical History:   Diagnosis Date    Abdominal pain     "almost constant"    Anemia     Anesthesia     "woke up swinging with last  2018  "was fine with EGD"    Bariatric surgery status     Dental bridge present     right lower permanent    Depression     "not currently taking any meds"    Difficulty swallowing     Disease of thyroid gland     not on meds now    Dizzy     Fatigue     "when blood pressure low" and weakness too"    GERD (gastroesophageal reflux disease)     "increase after surgery"    Heart murmur     heart murmer, work up negative with holter monitor    History of 2  sections     most recent 17    History of iron deficiency     anemia/ had IV infusions through pregnancy in [id number]    Inguinal hernia     right    Loss of appetite     Low BP     "off and on"    Migraine     N&V (nausea and vomiting)     " a little better since on meds"    Non-intractable vomiting     "not since been on medicine"    Palpitations     "not too much, occas"    Postgastrectomy malabsorption     Stomach pain        Past Surgical History:   Procedure Laterality Date     SECTION      x2    CHOLECYSTECTOMY      CHROMOSOME ANALYSIS, PRODUCTS OF CONCEPTION (HISTORICAL)  2018    2 miscarriages in  and Nov    OVARIAN CYST REMOVAL Right     MI EGD TRANSORAL BIOPSY SINGLE/MULTIPLE N/A 2019    Procedure: ESOPHAGOGASTRODUODENOSCOPY (EGD) with bx;  Surgeon: Griffin Claude, MD;  Location: [facility]; Service: Bariatrics    MI LAP GASTRIC BYPASS/SREE-EN-Y N/A 2019    Procedure: LAP SREE-EN-Y GASTRIC BYPASS, INTRAOP EGD;  Surgeon: Griffin Claude, MD;  [facility];  Service: Stella ISAAC,1ST TRI N/A 2019    Procedure: DILATATION AND EVACUATION (D&E) (8 weeks) missed ab;  Surgeon: Stephy Keenan MD;  [facility]: [facility];  Service: Gynecology    REVISION BYPASS LAPAROSCOPIC N/A 2019    Procedure: LAP REVISION/ CONVERSION;  Surgeon: Griffin Claude, MD;  [facility];  Service: Bariatrics    SALPINGOOPHORECTOMY Right 2018    SLEEVE GASTROPLASTY         Family History   Problem Relation Age of Onset    Hypertension Mother     Heart disease Mother     No Known Problems Father      I have reviewed and agree with the history as documented      Social History     Tobacco Use    Smoking status: Never Smoker    Smokeless tobacco: Never Used   Substance Use Topics    Alcohol use: Not Currently     Frequency: 2-4 times a month     Drinks per session: 1 or 2     Binge frequency: Never     Comment: social    Drug use: No        Review of Systems   Constitutional: Negative for chills, diaphoresis, fatigue and fever  Respiratory: Negative for cough and shortness of breath  Cardiovascular: Negative for chest pain and palpitations  Gastrointestinal: Positive for abdominal pain  Negative for abdominal distention, blood in stool, constipation, diarrhea, nausea and vomiting  Genitourinary: Negative for dysuria, frequency and hematuria  Musculoskeletal: Negative for arthralgias, myalgias and neck pain  Neurological: Negative for dizziness, syncope, light-headedness and headaches  All other systems reviewed and are negative  Physical Exam  ED Triage Vitals   Temperature Pulse Respirations Blood Pressure SpO2   10/29/19 1432 10/29/19 1432 10/29/19 1432 10/29/19 1433 10/29/19 1432   98 3 °F (36 8 °C) 91 18 122/69 100 %      Temp Source Heart Rate Source Patient Position - Orthostatic VS BP Location FiO2 (%)   10/29/19 1432 10/29/19 1432 10/29/19 1432 10/29/19 1432 --   Temporal Monitor Sitting Right arm       Pain Score       10/29/19 1432       Worst Possible Pain             Orthostatic Vital Signs  Vitals:    10/29/19 1831 10/29/19 1941 10/29/19 1946 10/29/19 2101   BP: 114/69 104/60 114/63 121/73   Pulse: 62 63 63 63   Patient Position - Orthostatic VS: Lying Lying Lying Lying       Physical Exam   Constitutional: She is oriented to person, place, and time  She appears well-nourished  No distress  HENT:   Head: Normocephalic and atraumatic  Right Ear: External ear normal    Left Ear: External ear normal    Mouth/Throat: Oropharynx is clear and moist    Eyes: Pupils are equal, round, and reactive to light  Conjunctivae and EOM are normal  Right eye exhibits no discharge  Left eye exhibits no discharge  No scleral icterus  Neck: Normal range of motion  Neck supple  No JVD present  Cardiovascular: Normal rate, regular rhythm, normal heart sounds and intact distal pulses  Exam reveals no gallop and no friction rub     No murmur heard   Pulmonary/Chest: Effort normal and breath sounds normal  No respiratory distress  She has no wheezes  She has no rales  Abdominal: Soft  Normal appearance and bowel sounds are normal  She exhibits no distension and no mass  There is tenderness in the left upper quadrant  There is no rigidity, no rebound, no guarding, no CVA tenderness, no tenderness at McBurney's point and negative Larry's sign  Musculoskeletal: Normal range of motion  She exhibits no tenderness or deformity  Neurological: She is alert and oriented to person, place, and time  No cranial nerve deficit or sensory deficit  She exhibits normal muscle tone  Coordination normal    Skin: Skin is warm  She is not diaphoretic  Psychiatric: She has a normal mood and affect  Her behavior is normal  Judgment and thought content normal    Vitals reviewed        ED Medications  Medications   sodium chloride 0 9 % bolus 1,000 mL (0 mL Intravenous Stopped 10/29/19 1909)   morphine (PF) 4 mg/mL injection 4 mg (4 mg Intravenous Given 10/29/19 1742)   iohexol (OMNIPAQUE) 350 MG/ML injection (SINGLE-DOSE) 100 mL (100 mL Intravenous Given 10/29/19 1907)   diphenhydrAMINE (BENADRYL) injection 25 mg (25 mg Intravenous Given 10/29/19 1932)       Diagnostic Studies  Results Reviewed     Procedure Component Value Units Date/Time    POCT pregnancy, urine [[id number]]  (Normal) Resulted:  10/29/19 1640    Lab Status:  Final result Updated:  10/29/19 1640     EXT PREG TEST UR (Ref: Negative) NEGATIVE     Control VALID    POCT urinalysis dipstick [[id number]]  (Normal) Resulted:  10/29/19 1640    Lab Status:  Final result Updated:  10/29/19 1640     Color, UA YELLOW     Clarity, UA CLEAR    ED Urine Macroscopic [[id number]] Collected:  10/29/19 1639    Lab Status:  Final result Specimen:  Urine Updated:  10/29/19 1640     Color, UA Yellow     Clarity, UA Clear     pH, UA 7 0     Leukocytes, UA Negative     Nitrite, UA Negative     Protein, UA Negative mg/dl Glucose, UA Negative mg/dl      Ketones, UA Negative mg/dl      Urobilinogen, UA 0 2 E U /dl      Bilirubin, UA Negative     Blood, UA Negative     Specific Gravity, UA 1 015    Narrative:       CLINITEK RESULT    Comprehensive metabolic panel [[id number]]  (Abnormal) Collected:  10/29/19 1606    Lab Status:  Final result Specimen:  Blood from Arm, Right Updated:  10/29/19 1634     Sodium 138 mmol/L      Potassium 3 9 mmol/L      Chloride 105 mmol/L      CO2 26 mmol/L      ANION GAP 7 mmol/L      BUN 14 mg/dL      Creatinine 0 78 mg/dL      Glucose 87 mg/dL      Calcium 8 5 mg/dL      AST 23 U/L      ALT 20 U/L      Alkaline Phosphatase 82 U/L      Total Protein 7 4 g/dL      Albumin 3 3 g/dL      Total Bilirubin 0 24 mg/dL      eGFR 97 ml/min/1 73sq m     Narrative:       National Kidney Disease Foundation guidelines for Chronic Kidney Disease (CKD):     Stage 1 with normal or high GFR (GFR > 90 mL/min/1 73 square meters)    Stage 2 Mild CKD (GFR = 60-89 mL/min/1 73 square meters)    Stage 3A Moderate CKD (GFR = 45-59 mL/min/1 73 square meters)    Stage 3B Moderate CKD (GFR = 30-44 mL/min/1 73 square meters)    Stage 4 Severe CKD (GFR = 15-29 mL/min/1 73 square meters)    Stage 5 End Stage CKD (GFR <15 mL/min/1 73 square meters)  Note: GFR calculation is accurate only with a steady state creatinine    Lipase [[id number]]  (Normal) Collected:  10/29/19 1606    Lab Status:  Final result Specimen:  Blood from Arm, Right Updated:  10/29/19 1634     Lipase 99 u/L     CBC and differential [[id number]]  (Abnormal) Collected:  10/29/19 1606    Lab Status:  Final result Specimen:  Blood from Arm, Right Updated:  10/29/19 1615     WBC 6 16 Thousand/uL      RBC 3 68 Million/uL      Hemoglobin 8 0 g/dL      Hematocrit 27 3 %      MCV 74 fL      MCH 21 7 pg      MCHC 29 3 g/dL      RDW 16 7 %      MPV 9 4 fL      Platelets 632 Thousands/uL      nRBC 0 /100 WBCs      Neutrophils Relative 46 %      Immat GRANS % 0 % Lymphocytes Relative 43 %      Monocytes Relative 9 %      Eosinophils Relative 2 %      Basophils Relative 0 %      Neutrophils Absolute 2 82 Thousands/µL      Immature Grans Absolute 0 01 Thousand/uL      Lymphocytes Absolute 2 64 Thousands/µL      Monocytes Absolute 0 58 Thousand/µL      Eosinophils Absolute 0 09 Thousand/µL      Basophils Absolute 0 02 Thousands/µL                  CT abdomen pelvis with contrast   Final Result by Franck Avina MD (10/29 1934)      1  No acute inflammatory process detected in the abdomen and pelvis  Workstation performed: [id number]               Procedures  Procedures        ED Course                               MDM  Number of Diagnoses or Management Options  Abdominal pain:   Anemia:   Diagnosis management comments: CT scan with oral and IV contrast failed to show any acute underlying pathology  Patient was found to be anemic with a drop of hemoglobin of almost 5 over the past 3 months  A occult blood test was performed which was negative  I discussed the case with bariatric surgery who suggested close follow-up for possible scope and investigation for ulcers  I discussed the need to do this with the patient as well as continue to take her Pepcid and Protonix  Disposition  Final diagnoses:   Abdominal pain   Anemia     Time reflects when diagnosis was documented in both MDM as applicable and the Disposition within this note     Time User Action Codes Description Comment    10/29/2019  8:51 PM Wendy Barrera Add [R10 9] Abdominal pain     10/29/2019  8:51 PM Mayda Evans Add [D64 9] Anemia       ED Disposition     ED Disposition Condition Date/Time Comment    Discharge Stable Tue Oct 29, 2019  8:51 PM Stanley Aldrich discharge to home/self care              Follow-up Information     Follow up With Specialties Details Why Contact Info Additional Information    Shaq Stevens PA-C Physician Assistant   [address] [place] [address] [place] [address]  [address] Emergency Department Emergency Medicine   [facility] [id number] [phone] [place] [facility], [address] [facility] , [place], [place], [address] [facility] Bariatrics Schedule an appointment as soon as possible for a visit   [address]  [phone]  [address] [facility] [place], [address] [place] [address], [place], [place], [address]          Discharge Medication List as of 10/29/2019  8:52 PM      CONTINUE these medications which have NOT CHANGED    Details   famotidine (PEPCID) 20 mg tablet Take 1 tablet (20 mg total) by mouth 2 (two) times a day, Starting Mon 9/9/2019, Normal      Multiple Vitamins-Minerals (MULTIVITAMIN ADULT PO) Take by mouth, Historical Med      pantoprazole (PROTONIX) 20 mg tablet Take 20 mg by mouth daily, Historical Med           No discharge procedures on file  ED Provider  Attending physically available and evaluated Karlos Aldrich I managed the patient along with the ED Attending      Electronically Signed by         Karla Romero MD  10/31/19 2067

## 2019-10-29 NOTE — ED NOTES
Pt brought from [address] [place] [address] to ED 17 treatment room via ambulation  Patient instructed to change into gown and provide urine specimen if possible  Call bell placed within reach  Primary RN Logan Hernandez notified of patient       Flavia Grain  33/95/27 1543

## 2019-10-29 NOTE — ED ATTENDING ATTESTATION
10/29/2019  IKobe DO, saw and evaluated the patient  I have discussed the patient with the resident/non-physician practitioner and agree with the resident's/non-physician practitioner's findings, Plan of Care, and MDM as documented in the resident's/non-physician practitioner's note, except where noted  All available labs and Radiology studies were reviewed  I was present for key portions of any procedure(s) performed by the resident/non-physician practitioner and I was immediately available to provide assistance  At this point I agree with the current assessment done in the Emergency Department  I have conducted an independent evaluation of this patient a history and physical is as follows:    ED Course         Critical Care Time  Procedures  67-year-old female with a history of Karmen-en-Y surgery and April converted from gastric sleeve presents to the emergency department with a one-week history of left upper quadrant pain suddenly getting worse  She describes it as sharp  She has had nausea without vomiting  No diarrhea or constipation  No fevers  On exam she is alert in no acute distress  She does have significant left upper quadrant and epigastric tenderness on exam without peritoneal signs  Abdominal pain workup already in progress  Will CT abdomen pelvis to rule out any complication from the surgery such as hernia, bowel obstruction  Will rule out pancreatitis  Will contact her bariatric surgeon once all results are back

## 2019-10-31 ENCOUNTER — OFFICE VISIT (OUTPATIENT)
Dept: BARIATRICS | Facility: CLINIC | Age: 39
End: 2019-10-31
Payer: COMMERCIAL

## 2019-10-31 VITALS
TEMPERATURE: 98.5 F | HEIGHT: 66 IN | WEIGHT: 219 LBS | HEART RATE: 75 BPM | DIASTOLIC BLOOD PRESSURE: 60 MMHG | SYSTOLIC BLOOD PRESSURE: 118 MMHG | BODY MASS INDEX: 35.2 KG/M2

## 2019-10-31 DIAGNOSIS — Z98.84 BARIATRIC SURGERY STATUS: Primary | ICD-10-CM

## 2019-10-31 DIAGNOSIS — D64.9 ANEMIA: ICD-10-CM

## 2019-10-31 DIAGNOSIS — K59.09 OTHER CONSTIPATION: ICD-10-CM

## 2019-10-31 DIAGNOSIS — K91.2 POSTSURGICAL MALABSORPTION: Chronic | ICD-10-CM

## 2019-10-31 DIAGNOSIS — R10.9 ABDOMINAL PAIN: ICD-10-CM

## 2019-10-31 PROCEDURE — 99213 OFFICE O/P EST LOW 20 MIN: CPT | Performed by: SURGERY

## 2019-10-31 NOTE — H&P (VIEW-ONLY)
OFFICE VISIT - BARIATRIC SURGERY  Magda Aldrich 46 y o  female MRN: [id number]  Unit/Bed#:  Encounter: [id number]      HPI:  Nicky Stevens is a 46 y o  female status post laparoscopic conversion of sleeve gastrectomy to Karmen-en-Y gastric bypass for intractable heartburn, performed on 04/08/2019  She is here after being referred to our office after a visit to the ED left upper quadrant pain  Subjective     The patient was recently in the emergency department due to approximately 2 weeks of intermittent left upper quadrant pain  The day she went to the ED she experienced an acute exacerbation of excruciating stabbing pain associated with diarrhea  ED workup revealed a hemoglobin of 8 (from 11 preop), with an unremarkable CT scan of the abdomen pelvis  The patient has undergone an upper endoscopy in June which revealed a superficial and small marginal ulcer  On her repeat endoscopy in August this ulcer was found to be resolved  Of note, the patient had a high risk pregnancy shortly after her conversion surgery, which would eventually ended in miscarriage  It was due to this that the patient did not perform her routine postoperative nutritional labs  She has also not been taking her iron supplements but mentions she is taking her multivitamins  She denies smoking or NSAID use  She she mentions extreme fatigue lately, along with the chronic abdominal pain  She mentions she has noticed hematochezia along with blood when wiping  She tends to be constipated for recently she has had daily loose bowel movements with the use of MiraLax  She is currently taking omeprazole 20 mg daily, with intermittent b i d  Dosing p r n  Alfreda Ada She mentions the PPIs improve her symptoms slightly  Weight loss was reviewed with the patient and she was made aware she has only lost 14 lb since her conversion surgery    She reports she has not been exercising due to lack of time, and she has not been compliant with healthy eating habits  Review of Systems   Constitutional: Negative  HENT: Negative  Eyes: Negative  Respiratory: Negative  Cardiovascular: Negative  Gastrointestinal: Positive for abdominal pain, anal bleeding, blood in stool, constipation and diarrhea  Endocrine: Negative  Genitourinary: Negative  Musculoskeletal: Negative  Skin: Negative  Allergic/Immunologic: Negative  Neurological: Negative  Hematological: Negative  Psychiatric/Behavioral: Negative          Historical Information   Past Medical History:   Diagnosis Date    Abdominal pain     "almost constant"    Anemia     Anesthesia     "woke up swinging with last  2018  "was fine with EGD"    Bariatric surgery status     Dental bridge present     right lower permanent    Depression     "not currently taking any meds"    Difficulty swallowing     Disease of thyroid gland     not on meds now    Dizzy     Fatigue     "when blood pressure low" and weakness too"    GERD (gastroesophageal reflux disease)     "increase after surgery"    Heart murmur     heart murmer, work up negative with holter monitor    History of 2  sections     most recent 17    History of D&C 2019    History of iron deficiency     anemia/ had IV infusions through pregnancy in [id number]    Inguinal hernia     right    Loss of appetite     Low BP     "off and on"    Migraine     N&V (nausea and vomiting)     " a little better since on meds"    Non-intractable vomiting     "not since been on medicine"    Palpitations     "not too much, occas"    Postgastrectomy malabsorption     Stomach pain      Past Surgical History:   Procedure Laterality Date     SECTION      x2    CHOLECYSTECTOMY      CHROMOSOME ANALYSIS, PRODUCTS OF CONCEPTION (HISTORICAL)  2018    2 miscarriages in / and Nov    OVARIAN CYST REMOVAL Right     IA EGD TRANSORAL BIOPSY SINGLE/MULTIPLE N/A 2019 Procedure: ESOPHAGOGASTRODUODENOSCOPY (EGD) with bx;  Surgeon: Alessandro Paz MD;  [facility]; Service: Bariatrics    [place] LAP GASTRIC BYPASS/SREE-EN-Y N/A 4/8/2019    Procedure: LAP SREE-EN-Y GASTRIC BYPASS, INTRAOP EGD;  Surgeon: Alessandro Paz MD;  [facility];  Service: Stella ISAAC,1ST TRI N/A 9/25/2019    Procedure: DILATATION AND EVACUATION (D&E) (8 weeks) missed ab;  Surgeon: Meseret Goodrich MD;  [facility];  Service: Gynecology    REVISION BYPASS LAPAROSCOPIC N/A 4/8/2019    Procedure: LAP REVISION/ CONVERSION;  Surgeon: Alessandro Paz MD;  [facility];  Service: Bariatrics    SALPINGOOPHORECTOMY Right 09/2018    SLEEVE GASTROPLASTY       Social History   Social History     Substance and Sexual Activity   Alcohol Use Not Currently    Frequency: 2-4 times a month    Drinks per session: 1 or 2    Binge frequency: Never    Comment: social     Social History     Substance and Sexual Activity   Drug Use No     Social History     Tobacco Use   Smoking Status Never Smoker   Smokeless Tobacco Never Used       Objective       Current Vitals:   Blood Pressure: 118/60 (10/31/19 1526)  Pulse: 75 (10/31/19 1526)  Temperature: 98 5 °F (36 9 °C) (10/31/19 1526)  Temp Source: Tympanic (10/31/19 1526)  Height: 5' 5 5" (166 4 cm) (10/31/19 1526)  Weight - Scale: 99 3 kg (219 lb) (10/31/19 1526)    Invasive Devices     None                 Physical Exam   Constitutional: She is oriented to person, place, and time  She appears well-developed and well-nourished  Mucosa is are moist and pale  HENT:   Head: Normocephalic and atraumatic  Nose: Nose normal    Eyes: Conjunctivae and EOM are normal    Neck: Normal range of motion  Cardiovascular: Normal rate  Pulmonary/Chest: Effort normal    Abdominal: Soft  Bowel sounds are normal  She exhibits no distension and no mass  There is no tenderness  There is no rebound and no guarding  No hernia     The abdomen is soft and benign  There is tenderness to deep palpation of the left upper quadrant and epigastrium  All laparoscopic incisions have completely healed  There are no underlying palpable incisional hernias  Musculoskeletal: Normal range of motion  Neurological: She is alert and oriented to person, place, and time  Skin: Skin is warm  Psychiatric: She has a normal mood and affect  Her behavior is normal  Judgment and thought content normal      Assessment/PLAN:    Savage Gil is a 46 y o  female status post laparoscopic conversion of sleeve gastrectomy to Karmen-en-Y gastric bypass for intractable heartburn, performed on 04/08/2019  She comes to our office with symptoms of left upper quadrant pain and anemia  This could likely be related to recurrent marginal ulcer  We will order nutrition labs for the patient and schedule her for an upper endoscopy  She was instructed to start taking her PPIs b i d  and carafate  The patient will also be scheduled with our nutritionist     Follow up with us in 1 month with the results of EGD                Gleen Canavan MD  Bariatric Surgery Fellow  10/31/2019  3:58 PM

## 2019-10-31 NOTE — PROGRESS NOTES
OFFICE VISIT - BARIATRIC SURGERY  Radha Aldrich 46 y o  female MRN: [id number]  Unit/Bed#:  Encounter: [id number]      HPI:  Shanelle Can is a 46 y o  female status post laparoscopic conversion of sleeve gastrectomy to Karmen-en-Y gastric bypass for intractable heartburn, performed on 04/08/2019  She is here after being referred to our office after a visit to the ED left upper quadrant pain  Subjective     The patient was recently in the emergency department due to approximately 2 weeks of intermittent left upper quadrant pain  The day she went to the ED she experienced an acute exacerbation of excruciating stabbing pain associated with diarrhea  ED workup revealed a hemoglobin of 8 (from 11 preop), with an unremarkable CT scan of the abdomen pelvis  The patient has undergone an upper endoscopy in June which revealed a superficial and small marginal ulcer  On her repeat endoscopy in August this ulcer was found to be resolved  Of note, the patient had a high risk pregnancy shortly after her conversion surgery, which would eventually ended in miscarriage  It was due to this that the patient did not perform her routine postoperative nutritional labs  She has also not been taking her iron supplements but mentions she is taking her multivitamins  She denies smoking or NSAID use  She she mentions extreme fatigue lately, along with the chronic abdominal pain  She mentions she has noticed hematochezia along with blood when wiping  She tends to be constipated for recently she has had daily loose bowel movements with the use of MiraLax  She is currently taking omeprazole 20 mg daily, with intermittent b i d  Dosing p r n  Lida Peoples She mentions the PPIs improve her symptoms slightly  Weight loss was reviewed with the patient and she was made aware she has only lost 14 lb since her conversion surgery    She reports she has not been exercising due to lack of time, and she has not been compliant with healthy eating habits  Review of Systems   Constitutional: Negative  HENT: Negative  Eyes: Negative  Respiratory: Negative  Cardiovascular: Negative  Gastrointestinal: Positive for abdominal pain, anal bleeding, blood in stool, constipation and diarrhea  Endocrine: Negative  Genitourinary: Negative  Musculoskeletal: Negative  Skin: Negative  Allergic/Immunologic: Negative  Neurological: Negative  Hematological: Negative  Psychiatric/Behavioral: Negative          Historical Information   Past Medical History:   Diagnosis Date    Abdominal pain     "almost constant"    Anemia     Anesthesia     "woke up swinging with last  2018  "was fine with EGD"    Bariatric surgery status     Dental bridge present     right lower permanent    Depression     "not currently taking any meds"    Difficulty swallowing     Disease of thyroid gland     not on meds now    Dizzy     Fatigue     "when blood pressure low" and weakness too"    GERD (gastroesophageal reflux disease)     "increase after surgery"    Heart murmur     heart murmer, work up negative with holter monitor    History of 2  sections     most recent 17    History of D&C 2019    History of iron deficiency     anemia/ had IV infusions through pregnancy in [id number]    Inguinal hernia     right    Loss of appetite     Low BP     "off and on"    Migraine     N&V (nausea and vomiting)     " a little better since on meds"    Non-intractable vomiting     "not since been on medicine"    Palpitations     "not too much, occas"    Postgastrectomy malabsorption     Stomach pain      Past Surgical History:   Procedure Laterality Date     SECTION      x2    CHOLECYSTECTOMY      CHROMOSOME ANALYSIS, PRODUCTS OF CONCEPTION (HISTORICAL)  2018    2 miscarriages in / and Nov    OVARIAN CYST REMOVAL Right     AR EGD TRANSORAL BIOPSY SINGLE/MULTIPLE N/A 2019 Procedure: ESOPHAGOGASTRODUODENOSCOPY (EGD) with bx;  Surgeon: Rosita Price MD;  [facility]; Service: Bariatrics    [place] LAP GASTRIC BYPASS/SREE-EN-Y N/A 4/8/2019    Procedure: LAP SREE-EN-Y GASTRIC BYPASS, INTRAOP EGD;  Surgeon: Rosita Price MD;  [facility];  Service: Stella ISAAC,1ST TRI N/A 9/25/2019    Procedure: DILATATION AND EVACUATION (D&E) (8 weeks) missed ab;  Surgeon: Peg Clarke MD;  [facility];  Service: Gynecology    REVISION BYPASS LAPAROSCOPIC N/A 4/8/2019    Procedure: LAP REVISION/ CONVERSION;  Surgeon: Rosita Price MD;  [facility];  Service: Bariatrics    SALPINGOOPHORECTOMY Right 09/2018    SLEEVE GASTROPLASTY       Social History   Social History     Substance and Sexual Activity   Alcohol Use Not Currently    Frequency: 2-4 times a month    Drinks per session: 1 or 2    Binge frequency: Never    Comment: social     Social History     Substance and Sexual Activity   Drug Use No     Social History     Tobacco Use   Smoking Status Never Smoker   Smokeless Tobacco Never Used       Objective       Current Vitals:   Blood Pressure: 118/60 (10/31/19 1526)  Pulse: 75 (10/31/19 1526)  Temperature: 98 5 °F (36 9 °C) (10/31/19 1526)  Temp Source: Tympanic (10/31/19 1526)  Height: 5' 5 5" (166 4 cm) (10/31/19 1526)  Weight - Scale: 99 3 kg (219 lb) (10/31/19 1526)    Invasive Devices     None                 Physical Exam   Constitutional: She is oriented to person, place, and time  She appears well-developed and well-nourished  Mucosa is are moist and pale  HENT:   Head: Normocephalic and atraumatic  Nose: Nose normal    Eyes: Conjunctivae and EOM are normal    Neck: Normal range of motion  Cardiovascular: Normal rate  Pulmonary/Chest: Effort normal    Abdominal: Soft  Bowel sounds are normal  She exhibits no distension and no mass  There is no tenderness  There is no rebound and no guarding  No hernia     The abdomen is soft and benign  There is tenderness to deep palpation of the left upper quadrant and epigastrium  All laparoscopic incisions have completely healed  There are no underlying palpable incisional hernias  Musculoskeletal: Normal range of motion  Neurological: She is alert and oriented to person, place, and time  Skin: Skin is warm  Psychiatric: She has a normal mood and affect  Her behavior is normal  Judgment and thought content normal      Assessment/PLAN:    Ho Oliveira is a 46 y o  female status post laparoscopic conversion of sleeve gastrectomy to Karmen-en-Y gastric bypass for intractable heartburn, performed on 04/08/2019  She comes to our office with symptoms of left upper quadrant pain and anemia  This could likely be related to recurrent marginal ulcer  We will order nutrition labs for the patient and schedule her for an upper endoscopy  She was instructed to start taking her PPIs b i d  and carafate  The patient will also be scheduled with our nutritionist     Follow up with us in 1 month with the results of EGD                Nuno Seaman MD  Bariatric Surgery Fellow  10/31/2019  3:58 PM

## 2019-11-11 ENCOUNTER — OFFICE VISIT (OUTPATIENT)
Dept: OBGYN CLINIC | Facility: CLINIC | Age: 39
End: 2019-11-11
Payer: COMMERCIAL

## 2019-11-11 ENCOUNTER — APPOINTMENT (OUTPATIENT)
Dept: LAB | Facility: HOSPITAL | Age: 39
End: 2019-11-11
Payer: COMMERCIAL

## 2019-11-11 VITALS — WEIGHT: 218 LBS | SYSTOLIC BLOOD PRESSURE: 120 MMHG | BODY MASS INDEX: 35.73 KG/M2 | DIASTOLIC BLOOD PRESSURE: 62 MMHG

## 2019-11-11 DIAGNOSIS — N76.0 BV (BACTERIAL VAGINOSIS): Primary | ICD-10-CM

## 2019-11-11 DIAGNOSIS — N96 HISTORY OF MULTIPLE MISCARRIAGES: ICD-10-CM

## 2019-11-11 DIAGNOSIS — Z98.84 BARIATRIC SURGERY STATUS: ICD-10-CM

## 2019-11-11 DIAGNOSIS — B96.89 BV (BACTERIAL VAGINOSIS): Primary | ICD-10-CM

## 2019-11-11 DIAGNOSIS — R10.2 PELVIC PAIN: ICD-10-CM

## 2019-11-11 PROCEDURE — 99214 OFFICE O/P EST MOD 30 MIN: CPT | Performed by: NURSE PRACTITIONER

## 2019-11-11 RX ORDER — METRONIDAZOLE 500 MG/1
500 TABLET ORAL EVERY 12 HOURS SCHEDULED
Qty: 14 TABLET | Refills: 0 | Status: SHIPPED | OUTPATIENT
Start: 2019-11-11 | End: 2019-11-18

## 2019-11-12 ENCOUNTER — HOSPITAL ENCOUNTER (OUTPATIENT)
Dept: ULTRASOUND IMAGING | Facility: HOSPITAL | Age: 39
Discharge: HOME | End: 2019-11-12
Payer: COMMERCIAL

## 2019-11-12 ENCOUNTER — TRANSCRIBE ORDERS (OUTPATIENT)
Dept: ADMINISTRATIVE | Facility: HOSPITAL | Age: 39
End: 2019-11-12

## 2019-11-12 DIAGNOSIS — R10.2 PELVIC PAIN: ICD-10-CM

## 2019-11-12 PROCEDURE — 76856 US EXAM PELVIC COMPLETE: CPT

## 2019-11-12 PROCEDURE — 76830 TRANSVAGINAL US NON-OB: CPT

## 2019-11-12 NOTE — PROGRESS NOTES
Assessment/Plan:    Pelvic pain  C/o chronic intermittent nonfocal pelvic pain  Not reproducible on exam  Unrelated to current complaints of vaginitis  Recommended pelvic US  Declines gc/chl, as she reports low risk  We will f/u as indicated with results  History of multiple miscarriages  The patient requests orders for lab studies, as previously discussed with Dr Ravi Barfield following recent  MAB  She reports lab work up was initiated at an [facility] practice last year  Will notify Dr Ravi Barfield and review prior studies then forward orders for outstanding tests as indicated  Emotional support provided, as the patient is understandably upset with this discussion  BV (bacterial vaginosis)  Exam c/w Bv  Recommended Flagyl, conservative vulvar hygiene, pelvic rest  Reviewed directions for use, risks/benefits, antabuse effects  F/u PRN if sx not improved  25 mins of time was devoted to this visit, of which >50% was spent counseling face to face regarding the above concerns  Diagnoses and all orders for this visit:    BV (bacterial vaginosis)  -     metroNIDAZOLE (FLAGYL) 500 mg tablet; Take 1 tablet (500 mg total) by mouth every 12 (twelve) hours for 7 days    Pelvic pain  -     US pelvis complete w transvaginal; Future    History of multiple miscarriages          Subjective:      Patient ID: Dania De León is a 46 y o  female      This patient presents with c/o vulvovag irritation, intermittent pelvic pain and questions about fertility work up  Yash Tyler reports intermittent vulvovag itching   Discharge is malodorous at times  Denies STI concerns, urinary complaints  C/o intermittent nonfocal low pelvic pain for months  Exacerbated by intercourse at times  Not tracking for cyclic component   Recent MAB was 3rd loss - fertility work up was started at an [facility] practice and she desires to complete this now     H/o 4/2019 conversion from sleeve to Karmen En Y  10/31/19 GI note reviewed - c/o RUQ abd pain, constipation   Abd CT was unremarkable  EGD was ordered and f/u scheduled with GI     OB hx, as converted from Dr Ian Love previous note:  #1: () Primary  section for NRFHT, term, no complications  #2: (5986) failed TOLAC, repeat  section complicated by uterine rupture  Per patient, rupture was noted to be in a T shape  #3: () Missed AB, D+C, no complications  #4: (1598) SAB  Underwent right salpingo-oophorectomy for cyst on ovary  Experience spontaneous loss afterwards  #5: (2019) MAB  Uncomplicated D&C  The following portions of the patient's history were reviewed and updated as appropriate: allergies, current medications, past family history, past medical history, past social history, past surgical history and problem list     Review of Systems   Constitutional: Negative  Respiratory: Negative  Cardiovascular: Negative  Gastrointestinal: Positive for abdominal pain and constipation  Genitourinary: Positive for dyspareunia, pelvic pain and vaginal discharge  Negative for dysuria, frequency, menstrual problem and vaginal bleeding  Musculoskeletal: Negative  Skin: Negative  Neurological: Negative  Psychiatric/Behavioral: Negative  Objective:      /62   Wt 98 9 kg (218 lb)   LMP 10/24/2019   BMI 35 73 kg/m²          Physical Exam   Constitutional: She is oriented to person, place, and time  She appears well-developed and well-nourished  HENT:   Head: Normocephalic and atraumatic  Eyes: Pupils are equal, round, and reactive to light  Neck: Normal range of motion  Pulmonary/Chest: Effort normal    Abdominal: Hernia confirmed negative in the right inguinal area and confirmed negative in the left inguinal area  Genitourinary: Rectum normal and uterus normal  There is no rash, tenderness, lesion or injury on the right labia  There is no rash, tenderness, lesion or injury on the left labia   Cervix exhibits no motion tenderness, no discharge and no friability  Right adnexum displays no mass, no tenderness and no fullness  Left adnexum displays no mass, no tenderness and no fullness  No erythema, tenderness or bleeding in the vagina  Vaginal discharge found  Musculoskeletal: Normal range of motion  Lymphadenopathy:        Right: No inguinal adenopathy present  Left: No inguinal adenopathy present  Neurological: She is alert and oriented to person, place, and time  Skin: Skin is warm and dry  Psychiatric: She has a normal mood and affect   Her behavior is normal  Judgment and thought content normal

## 2019-11-12 NOTE — ASSESSMENT & PLAN NOTE
The patient requests orders for lab studies, as previously discussed with Dr Caroline Bill following recent  MAB  She reports lab work up was initiated at an [facility] practice last year  Will notify Dr Caroline Bill and review prior studies then forward orders for outstanding tests as indicated  Emotional support provided, as the patient is understandably upset with this discussion

## 2019-11-12 NOTE — ASSESSMENT & PLAN NOTE
C/o chronic intermittent nonfocal low pelvic pain  Not reproducible on exam  Unrelated to current complaints of vaginitis  Discussed possible gyn and nongyn etiologies  Also consider adhesive disease given h/o multiple abd and pelvic surgeries  Recommended pelvic US  The patient declines gc/chl, as she reports low risk  We will f/u as indicated with results

## 2019-11-12 NOTE — ASSESSMENT & PLAN NOTE
Exam c/w Bv  Recommended Flagyl, conservative vulvar hygiene, pelvic rest  Reviewed directions for use, risks/benefits, antabuse effects  F/u PRN if sx not improved

## 2019-11-14 ENCOUNTER — TELEPHONE (OUTPATIENT)
Dept: OBGYN CLINIC | Facility: CLINIC | Age: 39
End: 2019-11-14

## 2019-11-18 ENCOUNTER — APPOINTMENT (OUTPATIENT)
Dept: LAB | Age: 39
End: 2019-11-18
Payer: COMMERCIAL

## 2019-11-18 ENCOUNTER — TELEPHONE (OUTPATIENT)
Dept: OBGYN CLINIC | Facility: CLINIC | Age: 39
End: 2019-11-18

## 2019-11-18 DIAGNOSIS — K91.2 POSTSURGICAL MALABSORPTION: ICD-10-CM

## 2019-11-18 DIAGNOSIS — Z98.84 BARIATRIC SURGERY STATUS: ICD-10-CM

## 2019-11-18 DIAGNOSIS — E53.8 LOW VITAMIN B12 LEVEL: ICD-10-CM

## 2019-11-18 DIAGNOSIS — E66.9 OBESITY, CLASS II, BMI 35-39.9: ICD-10-CM

## 2019-11-18 LAB
25(OH)D3 SERPL-MCNC: 32.3 NG/ML (ref 30–100)
ALBUMIN SERPL BCP-MCNC: 3.7 G/DL (ref 3.5–5)
ALP SERPL-CCNC: 85 U/L (ref 46–116)
ALT SERPL W P-5'-P-CCNC: 18 U/L (ref 12–78)
ANION GAP SERPL CALCULATED.3IONS-SCNC: 6 MMOL/L (ref 4–13)
AST SERPL W P-5'-P-CCNC: 21 U/L (ref 5–45)
BASOPHILS # BLD AUTO: 0.02 THOUSANDS/ΜL (ref 0–0.1)
BASOPHILS NFR BLD AUTO: 0 % (ref 0–1)
BILIRUB SERPL-MCNC: 0.33 MG/DL (ref 0.2–1)
BUN SERPL-MCNC: 17 MG/DL (ref 5–25)
CALCIUM SERPL-MCNC: 8.9 MG/DL (ref 8.3–10.1)
CHLORIDE SERPL-SCNC: 106 MMOL/L (ref 100–108)
CHOLEST SERPL-MCNC: 144 MG/DL (ref 50–200)
CO2 SERPL-SCNC: 26 MMOL/L (ref 21–32)
CREAT SERPL-MCNC: 0.86 MG/DL (ref 0.6–1.3)
EOSINOPHIL # BLD AUTO: 0.12 THOUSAND/ΜL (ref 0–0.61)
EOSINOPHIL NFR BLD AUTO: 2 % (ref 0–6)
ERYTHROCYTE [DISTWIDTH] IN BLOOD BY AUTOMATED COUNT: 17.1 % (ref 11.6–15.1)
FERRITIN SERPL-MCNC: 3 NG/ML (ref 8–388)
FOLATE SERPL-MCNC: >20 NG/ML (ref 3.1–17.5)
GFR SERPL CREATININE-BSD FRML MDRD: 86 ML/MIN/1.73SQ M
GLUCOSE P FAST SERPL-MCNC: 94 MG/DL (ref 65–99)
HCT VFR BLD AUTO: 29.5 % (ref 34.8–46.1)
HDLC SERPL-MCNC: 76 MG/DL
HGB BLD-MCNC: 8.6 G/DL (ref 11.5–15.4)
IMM GRANULOCYTES # BLD AUTO: 0.01 THOUSAND/UL (ref 0–0.2)
IMM GRANULOCYTES NFR BLD AUTO: 0 % (ref 0–2)
IRON SATN MFR SERPL: 3 %
IRON SERPL-MCNC: 16 UG/DL (ref 50–170)
LDLC SERPL CALC-MCNC: 57 MG/DL (ref 0–100)
LYMPHOCYTES # BLD AUTO: 2.64 THOUSANDS/ΜL (ref 0.6–4.47)
LYMPHOCYTES NFR BLD AUTO: 45 % (ref 14–44)
MCH RBC QN AUTO: 20.9 PG (ref 26.8–34.3)
MCHC RBC AUTO-ENTMCNC: 29.2 G/DL (ref 31.4–37.4)
MCV RBC AUTO: 72 FL (ref 82–98)
MONOCYTES # BLD AUTO: 0.57 THOUSAND/ΜL (ref 0.17–1.22)
MONOCYTES NFR BLD AUTO: 10 % (ref 4–12)
NEUTROPHILS # BLD AUTO: 2.53 THOUSANDS/ΜL (ref 1.85–7.62)
NEUTS SEG NFR BLD AUTO: 43 % (ref 43–75)
NRBC BLD AUTO-RTO: 0 /100 WBCS
PLATELET # BLD AUTO: 321 THOUSANDS/UL (ref 149–390)
PMV BLD AUTO: 10.5 FL (ref 8.9–12.7)
POTASSIUM SERPL-SCNC: 3.5 MMOL/L (ref 3.5–5.3)
PROT SERPL-MCNC: 7.9 G/DL (ref 6.4–8.2)
PTH-INTACT SERPL-MCNC: 62.3 PG/ML (ref 18.4–80.1)
RBC # BLD AUTO: 4.12 MILLION/UL (ref 3.81–5.12)
SODIUM SERPL-SCNC: 138 MMOL/L (ref 136–145)
TIBC SERPL-MCNC: 576 UG/DL (ref 250–450)
TRIGL SERPL-MCNC: 53 MG/DL
TSH SERPL DL<=0.05 MIU/L-ACNC: 2.78 UIU/ML (ref 0.36–3.74)
VIT B12 SERPL-MCNC: 483 PG/ML (ref 100–900)
WBC # BLD AUTO: 5.89 THOUSAND/UL (ref 4.31–10.16)

## 2019-11-18 PROCEDURE — 84590 ASSAY OF VITAMIN A: CPT

## 2019-11-18 PROCEDURE — 82728 ASSAY OF FERRITIN: CPT

## 2019-11-18 PROCEDURE — 83918 ORGANIC ACIDS TOTAL QUANT: CPT

## 2019-11-18 PROCEDURE — 84630 ASSAY OF ZINC: CPT

## 2019-11-18 PROCEDURE — 82306 VITAMIN D 25 HYDROXY: CPT

## 2019-11-18 PROCEDURE — 83540 ASSAY OF IRON: CPT

## 2019-11-18 PROCEDURE — 83970 ASSAY OF PARATHORMONE: CPT

## 2019-11-18 PROCEDURE — 80061 LIPID PANEL: CPT

## 2019-11-18 PROCEDURE — 82746 ASSAY OF FOLIC ACID SERUM: CPT

## 2019-11-18 PROCEDURE — 36415 COLL VENOUS BLD VENIPUNCTURE: CPT

## 2019-11-18 PROCEDURE — 85025 COMPLETE CBC W/AUTO DIFF WBC: CPT

## 2019-11-18 PROCEDURE — 80053 COMPREHEN METABOLIC PANEL: CPT

## 2019-11-18 PROCEDURE — 82525 ASSAY OF COPPER: CPT

## 2019-11-18 PROCEDURE — 84425 ASSAY OF VITAMIN B-1: CPT

## 2019-11-18 PROCEDURE — 82607 VITAMIN B-12: CPT

## 2019-11-18 PROCEDURE — 83550 IRON BINDING TEST: CPT

## 2019-11-18 PROCEDURE — 84443 ASSAY THYROID STIM HORMONE: CPT

## 2019-11-18 NOTE — TELEPHONE ENCOUNTER
----- Message from Mitchell Goss Casia  sent at 11/15/2019  1:17 PM EST -----  Pelvic US was performed for c/o pelvic pain  Uterus is normal  Right ovary is surgically absent   Left ovary with involuting corpus luteum, consistent with recent ovulation  Please advise results are benign   If pain persists after she has completed meds for BV then she should schedule an office visit for additional testing and exam

## 2019-11-19 ENCOUNTER — ANESTHESIA EVENT (OUTPATIENT)
Dept: GASTROENTEROLOGY | Facility: HOSPITAL | Age: 39
End: 2019-11-19

## 2019-11-19 DIAGNOSIS — D53.8 OTHER SPECIFIED NUTRITIONAL ANEMIAS: ICD-10-CM

## 2019-11-19 DIAGNOSIS — K91.2 POSTSURGICAL MALABSORPTION: Primary | ICD-10-CM

## 2019-11-20 ENCOUNTER — HOSPITAL ENCOUNTER (OUTPATIENT)
Dept: GASTROENTEROLOGY | Facility: HOSPITAL | Age: 39
Setting detail: OUTPATIENT SURGERY
Discharge: HOME | End: 2019-11-20
Attending: SURGERY
Payer: COMMERCIAL

## 2019-11-20 ENCOUNTER — ANESTHESIA (OUTPATIENT)
Dept: GASTROENTEROLOGY | Facility: HOSPITAL | Age: 39
End: 2019-11-20

## 2019-11-20 VITALS
TEMPERATURE: 97.8 F | HEART RATE: 61 BPM | DIASTOLIC BLOOD PRESSURE: 55 MMHG | OXYGEN SATURATION: 100 % | SYSTOLIC BLOOD PRESSURE: 100 MMHG | RESPIRATION RATE: 18 BRPM

## 2019-11-20 DIAGNOSIS — Z98.84 STATUS POST BARIATRIC SURGERY: ICD-10-CM

## 2019-11-20 LAB
COPPER SERPL-MCNC: 137 UG/DL (ref 72–166)
EXT PREGNANCY TEST URINE: NEGATIVE
EXT. CONTROL: NORMAL
METHYLMALONATE SERPL-SCNC: 129 NMOL/L (ref 0–378)
SL AMB DISCLAIMER: NORMAL
ZINC SERPL-MCNC: 72 UG/DL (ref 56–134)

## 2019-11-20 PROCEDURE — 88342 IMHCHEM/IMCYTCHM 1ST ANTB: CPT | Performed by: PATHOLOGY

## 2019-11-20 PROCEDURE — 43239 EGD BIOPSY SINGLE/MULTIPLE: CPT | Performed by: SURGERY

## 2019-11-20 PROCEDURE — 88305 TISSUE EXAM BY PATHOLOGIST: CPT | Performed by: PATHOLOGY

## 2019-11-20 PROCEDURE — 81025 URINE PREGNANCY TEST: CPT | Performed by: ANESTHESIOLOGY

## 2019-11-20 RX ORDER — PROPOFOL 10 MG/ML
INJECTION, EMULSION INTRAVENOUS AS NEEDED
Status: DISCONTINUED | OUTPATIENT
Start: 2019-11-20 | End: 2019-11-20 | Stop reason: SURG

## 2019-11-20 RX ORDER — SODIUM CHLORIDE 9 MG/ML
125 INJECTION, SOLUTION INTRAVENOUS CONTINUOUS
Status: DISCONTINUED | OUTPATIENT
Start: 2019-11-20 | End: 2019-11-24 | Stop reason: HOSPADM

## 2019-11-20 RX ORDER — LIDOCAINE HYDROCHLORIDE 20 MG/ML
INJECTION, SOLUTION EPIDURAL; INFILTRATION; INTRACAUDAL; PERINEURAL AS NEEDED
Status: DISCONTINUED | OUTPATIENT
Start: 2019-11-20 | End: 2019-11-20 | Stop reason: SURG

## 2019-11-20 RX ADMIN — SODIUM CHLORIDE 125 ML/HR: 0.9 INJECTION, SOLUTION INTRAVENOUS at 08:08

## 2019-11-20 RX ADMIN — PROPOFOL 50 MG: 10 INJECTION, EMULSION INTRAVENOUS at 09:14

## 2019-11-20 RX ADMIN — PROPOFOL 100 MG: 10 INJECTION, EMULSION INTRAVENOUS at 09:13

## 2019-11-20 RX ADMIN — LIDOCAINE HYDROCHLORIDE 5 ML: 20 INJECTION, SOLUTION EPIDURAL; INFILTRATION; INTRACAUDAL; PERINEURAL at 09:13

## 2019-11-20 RX ADMIN — PROPOFOL 50 MG: 10 INJECTION, EMULSION INTRAVENOUS at 09:15

## 2019-11-20 NOTE — DISCHARGE INSTRUCTIONS
Peptic Ulcer   WHAT YOU NEED TO KNOW:   A peptic ulcer is an open sore in the lining of your stomach, intestine, or esophagus  Peptic ulcers have different names, depending on their location  Gastric ulcers are peptic ulcers in the stomach  Duodenal ulcers are peptic ulcers in the intestine  A peptic ulcer in the esophagus is called an esophageal ulcer  Peptic ulcers may be a short-term or long-term problem  DISCHARGE INSTRUCTIONS:   Medicines:   · Medicines  that decrease the amount of acid made by your stomach may be given  You may also need medicines that protect your stomach lining from acid and antibiotics to treat H  pylori infection  · Take your medicine as directed  Contact your healthcare provider if you think your medicine is not helping or if you have side effects  Tell him or her if you are allergic to any medicine  Keep a list of the medicines, vitamins, and herbs you take  Include the amounts, and when and why you take them  Bring the list or the pill bottles to follow-up visits  Carry your medicine list with you in case of an emergency  Follow up with your healthcare provider as directed:  Write down your questions so you remember to ask them during your visits  Nutrition:   · Avoid carbonated drinks, alcohol, and caffeine  Caffeine is found in some coffees, teas, and sodas  It is also found in chocolate  · Do not eat foods that upset your stomach  These include spicy or acidic foods, such as oranges  · Eat small meals more often rather than big meals less often  An empty stomach may make your symptoms worse  Quit smoking:  If you smoke, it is never too late to quit  Smoking increases your risk of developing ulcers  Ask your healthcare provider for information if you need help quitting  Contact your healthcare provider if:   · You have a fever  · You have diarrhea or constipation  · Your stomach pain does not go away or gets worse after you take medicine      · You have questions or concerns about your condition or care  Seek care immediately or call 911 if:   · You have a fast heartbeat, fast breathing, or are too dizzy or weak to stand up  · You have severe pain in your stomach  · Your vomit looks like coffee grounds or has blood in it  · Your bowel movements are bloody or black  · You have sudden shortness of breath  © 2017 [facility] Information is for End User's use only and may not be sold, redistributed or otherwise used for commercial purposes  All illustrations and images included in CareNotes® are the copyrighted property of A [employer] M [employer]  or [employer] [place]  The above information is an  only  It is not intended as medical advice for individual conditions or treatments  Talk to your doctor, nurse or pharmacist before following any medical regimen to see if it is safe and effective for you  Gastritis   WHAT YOU NEED TO KNOW:   Gastritis is inflammation or irritation of the lining of your stomach  DISCHARGE INSTRUCTIONS:   Call 911 for any of the following:   · You develop chest pain or shortness of breath  Seek care immediately if:   · You vomit blood  · You have black or bloody bowel movements  · You have severe stomach or back pain  Contact your healthcare provider if:   · You have a fever  · You have new or worsening symptoms, even after treatment  · You have questions or concerns about your condition or care  Medicines:   · Medicines  may be given to help treat a bacterial infection or decrease stomach acid  · Take your medicine as directed  Contact your healthcare provider if you think your medicine is not helping or if you have side effects  Tell him or her if you are allergic to any medicine  Keep a list of the medicines, vitamins, and herbs you take  Include the amounts, and when and why you take them  Bring the list or the pill bottles to follow-up visits   Carry your medicine list with you in case of an emergency  Manage or prevent gastritis:   · Do not smoke  Nicotine and other chemicals in cigarettes and cigars can make your symptoms worse and cause lung damage  Ask your healthcare provider for information if you currently smoke and need help to quit  E-cigarettes or smokeless tobacco still contain nicotine  Talk to your healthcare provider before you use these products  · Do not drink alcohol  Alcohol can prevent healing and make your gastritis worse  Talk to your healthcare provider if you need help to stop drinking  · Do not take NSAIDs or aspirin unless directed  These and similar medicines can cause irritation  If your healthcare provider says it is okay to take NSAIDs, take them with food  · Do not eat foods that cause irritation  Foods such as oranges and salsa can cause burning or pain  Eat a variety of healthy foods  Examples include fruits (not citrus), vegetables, low-fat dairy products, beans, whole-grain breads, and lean meats and fish  Try to eat small meals, and drink water with your meals  Do not eat for at least 3 hours before you go to bed  · Find ways to relax and decrease stress  Stress can increase stomach acid and make gastritis worse  Activities such as yoga, meditation, or listening to music can help you relax  Spend time with friends, or do things you enjoy  Follow up with your healthcare provider as directed: You may need ongoing tests or treatment, or referral to a gastroenterologist  Write down your questions so you remember to ask them during your visits  © 2017 [address] Information is for End User's use only and may not be sold, redistributed or otherwise used for commercial purposes  All illustrations and images included in CareNotes® are the copyrighted property of [employer]  or [employer] [place]  The above information is an  only   It is not intended as medical advice for individual conditions or treatments  Talk to your doctor, nurse or pharmacist before following any medical regimen to see if it is safe and effective for you  Upper Endoscopy   WHAT YOU NEED TO KNOW:   An upper endoscopy is also called an upper gastrointestinal (GI) endoscopy, or an esophagogastroduodenoscopy (EGD)  You may feel bloated, gassy, or have some abdominal discomfort after your procedure  Your throat may be sore for 24 to 36 hours  You may burp or pass gas from air that is still inside your body  DISCHARGE INSTRUCTIONS:   Call 911 if:   · You have sudden chest pain or trouble breathing  Seek care immediately if:   · You feel dizzy or faint  · You have trouble swallowing  · You have severe throat pain  · Your bowel movements are very dark or black  · Your abdomen is hard and firm and you have severe pain  · You vomit blood  Contact your healthcare provider if:   · You feel full or bloated and cannot burp or pass gas  · You have not had a bowel movement for 3 days after your procedure  · You have neck pain  · You have a fever or chills  · You have nausea or are vomiting  · You have a rash or hives  · You have questions or concerns about your endoscopy  Relieve a sore throat:  Suck on throat lozenges or crushed ice  Gargle with a small amount of warm salt water  Mix 1 teaspoon of salt and 1 cup of warm water to make salt water  Relieve gas and discomfort from bloating:  Lie on your right side with a heating pad on your abdomen  Take short walks to help pass gas  Eat small meals until bloating is relieved  Rest after your procedure:  Do not drive or make important decisions until the day after your procedure  Return to your normal activity as directed  You can usually return to work the day after your procedure  Follow up with your healthcare provider as directed:  Write down your questions so you remember to ask them during your visits     © 2017 [facility] [address] [facility] is for End User's use only and may not be sold, redistributed or otherwise used for commercial purposes  All illustrations and images included in CareNotes® are the copyrighted property of A [employer] A M , [employer]  or [place]  The above information is an  only  It is not intended as medical advice for individual conditions or treatments  Talk to your doctor, nurse or pharmacist before following any medical regimen to see if it is safe and effective for you

## 2019-11-20 NOTE — ANESTHESIA PREPROCEDURE EVALUATION
Review of Systems/Medical History  Patient summary reviewed  Chart reviewed  No history of anesthetic complications     Cardiovascular    Comment: Prone to low BP's,  Pulmonary  Negative pulmonary ROS        GI/Hepatic    GERD well controlled, Bariatric surgery,        Negative  ROS        Endo/Other  History of thyroid disease , hypothyroidism,   Obesity (36=BMI)    GYN  Negative gynecology ROS          Hematology  Anemia ,     Musculoskeletal  Negative musculoskeletal ROS        Neurology    Headaches,    Psychology   Negative psychology ROS Depression ,              Physical Exam    Airway    Mallampati score: II  TM Distance: >3 FB  Neck ROM: full     Dental   No notable dental hx     Cardiovascular  Rhythm: regular, Rate: normal, Cardiovascular exam normal    Pulmonary  Pulmonary exam normal Breath sounds clear to auscultation,     Other Findings        Anesthesia Plan  ASA Score- 2     Anesthesia Type- IV sedation with anesthesia with ASA Monitors  Additional Monitors:   Airway Plan:         Plan Factors-Patient not instructed to abstain from smoking on day of procedure  Patient did not smoke on day of surgery  Induction- intravenous  Postoperative Plan-     Informed Consent- Anesthetic plan and risks discussed with patient

## 2019-11-20 NOTE — INTERVAL H&P NOTE
H&P reviewed  After examining the patient I find no changes in the patients condition since the H&P had been written      Vitals:    11/20/19 0755   BP: 111/59   Pulse: 75   Resp: 16   Temp: 97 8 °F (36 6 °C)   SpO2: 98%

## 2019-11-21 LAB
VIT A SERPL-MCNC: 28.4 UG/DL (ref 18.9–57.3)
VIT B1 BLD-SCNC: 77.4 NMOL/L (ref 66.5–200)

## 2019-11-25 ENCOUNTER — TELEPHONE (OUTPATIENT)
Dept: SURGICAL ONCOLOGY | Facility: CLINIC | Age: 39
End: 2019-11-25

## 2019-11-25 ENCOUNTER — HOSPITAL ENCOUNTER (OUTPATIENT)
Facility: HOSPITAL | Age: 39
Setting detail: OBSERVATION
Discharge: HOME | End: 2019-11-28
Attending: EMERGENCY MEDICINE | Admitting: INTERNAL MEDICINE
Payer: COMMERCIAL

## 2019-11-25 ENCOUNTER — TELEPHONE (OUTPATIENT)
Dept: OBGYN CLINIC | Facility: CLINIC | Age: 39
End: 2019-11-25

## 2019-11-25 DIAGNOSIS — D64.9 SYMPTOMATIC ANEMIA: Primary | ICD-10-CM

## 2019-11-25 LAB
ABO GROUP BLD: NORMAL
ANION GAP SERPL CALCULATED.3IONS-SCNC: 6 MMOL/L (ref 4–13)
ATRIAL RATE: 65 BPM
ATRIAL RATE: 74 BPM
BASOPHILS # BLD AUTO: 0.02 THOUSANDS/ΜL (ref 0–0.1)
BASOPHILS NFR BLD AUTO: 0 % (ref 0–1)
BILIRUB UR QL STRIP: NEGATIVE
BLD GP AB SCN SERPL QL: NEGATIVE
BUN SERPL-MCNC: 13 MG/DL (ref 5–25)
CALCIUM SERPL-MCNC: 8.6 MG/DL (ref 8.3–10.1)
CHLORIDE SERPL-SCNC: 104 MMOL/L (ref 100–108)
CLARITY UR: CLEAR
CLARITY, POC: CLEAR
CO2 SERPL-SCNC: 29 MMOL/L (ref 21–32)
COLOR UR: YELLOW
COLOR, POC: YELLOW
CREAT SERPL-MCNC: 0.87 MG/DL (ref 0.6–1.3)
EOSINOPHIL # BLD AUTO: 0.2 THOUSAND/ΜL (ref 0–0.61)
EOSINOPHIL NFR BLD AUTO: 3 % (ref 0–6)
ERYTHROCYTE [DISTWIDTH] IN BLOOD BY AUTOMATED COUNT: 16.7 % (ref 11.6–15.1)
EXT PREG TEST URINE: NEGATIVE
EXT. CONTROL ED NAV: NORMAL
GFR SERPL CREATININE-BSD FRML MDRD: 85 ML/MIN/1.73SQ M
GLUCOSE SERPL-MCNC: 90 MG/DL (ref 65–140)
GLUCOSE UR STRIP-MCNC: NEGATIVE MG/DL
HCT VFR BLD AUTO: 25.9 % (ref 34.8–46.1)
HGB BLD-MCNC: 7.6 G/DL (ref 11.5–15.4)
HGB UR QL STRIP.AUTO: NEGATIVE
IMM GRANULOCYTES # BLD AUTO: 0.01 THOUSAND/UL (ref 0–0.2)
IMM GRANULOCYTES NFR BLD AUTO: 0 % (ref 0–2)
KETONES UR STRIP-MCNC: NEGATIVE MG/DL
LEUKOCYTE ESTERASE UR QL STRIP: NEGATIVE
LYMPHOCYTES # BLD AUTO: 3.29 THOUSANDS/ΜL (ref 0.6–4.47)
LYMPHOCYTES NFR BLD AUTO: 52 % (ref 14–44)
MCH RBC QN AUTO: 21 PG (ref 26.8–34.3)
MCHC RBC AUTO-ENTMCNC: 29.3 G/DL (ref 31.4–37.4)
MCV RBC AUTO: 72 FL (ref 82–98)
MONOCYTES # BLD AUTO: 0.61 THOUSAND/ΜL (ref 0.17–1.22)
MONOCYTES NFR BLD AUTO: 10 % (ref 4–12)
NEUTROPHILS # BLD AUTO: 2.24 THOUSANDS/ΜL (ref 1.85–7.62)
NEUTS SEG NFR BLD AUTO: 35 % (ref 43–75)
NITRITE UR QL STRIP: NEGATIVE
NRBC BLD AUTO-RTO: 0 /100 WBCS
P AXIS: 59 DEGREES
P AXIS: 63 DEGREES
PH UR STRIP.AUTO: 7 [PH] (ref 4.5–8)
PLATELET # BLD AUTO: 394 THOUSANDS/UL (ref 149–390)
PMV BLD AUTO: 9.1 FL (ref 8.9–12.7)
POTASSIUM SERPL-SCNC: 3.7 MMOL/L (ref 3.5–5.3)
PR INTERVAL: 148 MS
PR INTERVAL: 158 MS
PROT UR STRIP-MCNC: NEGATIVE MG/DL
QRS AXIS: 67 DEGREES
QRS AXIS: 71 DEGREES
QRSD INTERVAL: 86 MS
QRSD INTERVAL: 92 MS
QT INTERVAL: 398 MS
QT INTERVAL: 406 MS
QTC INTERVAL: 422 MS
QTC INTERVAL: 441 MS
RBC # BLD AUTO: 3.62 MILLION/UL (ref 3.81–5.12)
RH BLD: POSITIVE
SODIUM SERPL-SCNC: 139 MMOL/L (ref 136–145)
SP GR UR STRIP.AUTO: 1.02 (ref 1–1.03)
SPECIMEN EXPIRATION DATE: NORMAL
T WAVE AXIS: 52 DEGREES
T WAVE AXIS: 59 DEGREES
UROBILINOGEN UR QL STRIP.AUTO: 1 E.U./DL
VENTRICULAR RATE: 65 BPM
VENTRICULAR RATE: 74 BPM
WBC # BLD AUTO: 6.37 THOUSAND/UL (ref 4.31–10.16)

## 2019-11-25 PROCEDURE — 96360 HYDRATION IV INFUSION INIT: CPT

## 2019-11-25 PROCEDURE — 99285 EMERGENCY DEPT VISIT HI MDM: CPT | Performed by: EMERGENCY MEDICINE

## 2019-11-25 PROCEDURE — 86900 BLOOD TYPING SEROLOGIC ABO: CPT | Performed by: PHYSICIAN ASSISTANT

## 2019-11-25 PROCEDURE — P9016 RBC LEUKOCYTES REDUCED: HCPCS

## 2019-11-25 PROCEDURE — 83550 IRON BINDING TEST: CPT | Performed by: EMERGENCY MEDICINE

## 2019-11-25 PROCEDURE — 99220 PR INITIAL OBSERVATION CARE/DAY 70 MINUTES: CPT | Performed by: PHYSICIAN ASSISTANT

## 2019-11-25 PROCEDURE — 86900 BLOOD TYPING SEROLOGIC ABO: CPT | Performed by: EMERGENCY MEDICINE

## 2019-11-25 PROCEDURE — 83540 ASSAY OF IRON: CPT | Performed by: EMERGENCY MEDICINE

## 2019-11-25 PROCEDURE — 81025 URINE PREGNANCY TEST: CPT | Performed by: EMERGENCY MEDICINE

## 2019-11-25 PROCEDURE — 36415 COLL VENOUS BLD VENIPUNCTURE: CPT | Performed by: EMERGENCY MEDICINE

## 2019-11-25 PROCEDURE — 86880 COOMBS TEST DIRECT: CPT | Performed by: PHYSICIAN ASSISTANT

## 2019-11-25 PROCEDURE — 86923 COMPATIBILITY TEST ELECTRIC: CPT

## 2019-11-25 PROCEDURE — 81003 URINALYSIS AUTO W/O SCOPE: CPT

## 2019-11-25 PROCEDURE — 36430 TRANSFUSION BLD/BLD COMPNT: CPT

## 2019-11-25 PROCEDURE — 86850 RBC ANTIBODY SCREEN: CPT | Performed by: EMERGENCY MEDICINE

## 2019-11-25 PROCEDURE — 99284 EMERGENCY DEPT VISIT MOD MDM: CPT

## 2019-11-25 PROCEDURE — 93010 ELECTROCARDIOGRAM REPORT: CPT | Performed by: INTERNAL MEDICINE

## 2019-11-25 PROCEDURE — 82728 ASSAY OF FERRITIN: CPT | Performed by: EMERGENCY MEDICINE

## 2019-11-25 PROCEDURE — 85025 COMPLETE CBC W/AUTO DIFF WBC: CPT | Performed by: EMERGENCY MEDICINE

## 2019-11-25 PROCEDURE — 86901 BLOOD TYPING SEROLOGIC RH(D): CPT | Performed by: EMERGENCY MEDICINE

## 2019-11-25 PROCEDURE — 86901 BLOOD TYPING SEROLOGIC RH(D): CPT | Performed by: PHYSICIAN ASSISTANT

## 2019-11-25 PROCEDURE — 80048 BASIC METABOLIC PNL TOTAL CA: CPT | Performed by: EMERGENCY MEDICINE

## 2019-11-25 PROCEDURE — 93005 ELECTROCARDIOGRAM TRACING: CPT

## 2019-11-25 RX ORDER — SUCRALFATE 1 G/1
TABLET ORAL 4 TIMES DAILY
COMMUNITY
End: 2020-01-17 | Stop reason: SDUPTHER

## 2019-11-25 RX ORDER — ONDANSETRON 2 MG/ML
4 INJECTION INTRAMUSCULAR; INTRAVENOUS EVERY 6 HOURS PRN
Status: DISCONTINUED | OUTPATIENT
Start: 2019-11-25 | End: 2019-11-28 | Stop reason: HOSPADM

## 2019-11-25 RX ORDER — SUCRALFATE 1 G/1
1 TABLET ORAL 4 TIMES DAILY
Status: DISCONTINUED | OUTPATIENT
Start: 2019-11-25 | End: 2019-11-28 | Stop reason: HOSPADM

## 2019-11-25 RX ORDER — DIPHENHYDRAMINE HYDROCHLORIDE 50 MG/ML
25 INJECTION INTRAMUSCULAR; INTRAVENOUS EVERY 6 HOURS PRN
Status: DISCONTINUED | OUTPATIENT
Start: 2019-11-25 | End: 2019-11-28 | Stop reason: HOSPADM

## 2019-11-25 RX ORDER — FAMOTIDINE 20 MG/1
20 TABLET, FILM COATED ORAL 2 TIMES DAILY
Status: DISCONTINUED | OUTPATIENT
Start: 2019-11-25 | End: 2019-11-25

## 2019-11-25 RX ORDER — FERROUS SULFATE 325(65) MG
325 TABLET ORAL
COMMUNITY

## 2019-11-25 RX ORDER — PANTOPRAZOLE SODIUM 20 MG/1
20 TABLET, DELAYED RELEASE ORAL
Status: DISCONTINUED | OUTPATIENT
Start: 2019-11-25 | End: 2019-11-28 | Stop reason: HOSPADM

## 2019-11-25 RX ORDER — PANTOPRAZOLE SODIUM 20 MG/1
20 TABLET, DELAYED RELEASE ORAL
Status: DISCONTINUED | OUTPATIENT
Start: 2019-11-26 | End: 2019-11-25

## 2019-11-25 RX ORDER — DIPHENHYDRAMINE HYDROCHLORIDE 50 MG/ML
25 INJECTION INTRAMUSCULAR; INTRAVENOUS ONCE
Status: COMPLETED | OUTPATIENT
Start: 2019-11-25 | End: 2019-11-25

## 2019-11-25 RX ORDER — ACETAMINOPHEN 325 MG/1
650 TABLET ORAL EVERY 6 HOURS PRN
Status: DISCONTINUED | OUTPATIENT
Start: 2019-11-25 | End: 2019-11-28 | Stop reason: HOSPADM

## 2019-11-25 RX ORDER — FERROUS SULFATE 325(65) MG
325 TABLET ORAL
Status: DISCONTINUED | OUTPATIENT
Start: 2019-11-26 | End: 2019-11-26

## 2019-11-25 RX ADMIN — ACETAMINOPHEN 650 MG: 325 TABLET ORAL at 23:01

## 2019-11-25 RX ADMIN — SUCRALFATE 1 G: 1 TABLET ORAL at 22:20

## 2019-11-25 RX ADMIN — DIPHENHYDRAMINE HYDROCHLORIDE 25 MG: 50 INJECTION, SOLUTION INTRAMUSCULAR; INTRAVENOUS at 23:05

## 2019-11-25 RX ADMIN — SODIUM CHLORIDE 1000 ML: 0.9 INJECTION, SOLUTION INTRAVENOUS at 20:09

## 2019-11-25 RX ADMIN — PANTOPRAZOLE SODIUM 20 MG: 20 TABLET, DELAYED RELEASE ORAL at 23:01

## 2019-11-25 NOTE — TELEPHONE ENCOUNTER
New Patient Encounter    New Patient Intake Form   Patient Details:  Pieter Cadet  1980  [id number]    Background Information:  [address] starts by opening a telephone encounter and gathering the following information   Who is calling to schedule? If not self, relationship to patient? self   Referring Provider Dr Lavinia Hart   What is the diagnosis? anemia   When was the diagnosis? 11/2019   Is patient aware of diagnosis? Yes   Reason for visit? NP DX   Have you had any testing done? If so: when, where? Yes   Are records in [place]? yes   Was the patient told to bring a disk? no   Scheduling Information:   Preferred [place]:  Any     Requesting Specific Provider? no   Are there any dates/time the patient cannot be seen? no      Miscellaneous: n/a   After completing the above information, please route to Financial Counselor and the appropriate Nurse Navigator for review

## 2019-11-25 NOTE — TELEPHONE ENCOUNTER
----- Message from Manual Charlie Aldrich sent at 11/24/2019  9:14 PM EST -----  Regarding: Visit Follow-Up Question  Contact: [phone]  Barry Song    Last time I was in the office the Nurse Practioner that assisted me (I don't remember her name) ordered a pelvic ultrasound, where everything appeared to be normal, and they found I have a cyst  The nurse that called me with the results said to let you guys know if the pain persist, and it does  I am having really heavy, painful and very long mentrual periods, plus I am having a lot of pain after my period ends, as well as painfully intercourse  I started my last mentrual period on 11/12/19 and it finished on 11/21/2019 and until today's date 11/24/19 I still have strong cramps and lower abdomen pain, to the point I have thought about to going to the ER  I feel really  tired most of the time, dizzie, I know my hemoglobin levels had dicrease abruptly in the last couple months but the pain persist  I have been taking Tylenol for the it, but is not enough to control it, its just really uncomfortable       Thank you,   Lesa Kaiser

## 2019-11-25 NOTE — LETTER
[address]  Dept: [phone]    November 28, 2019     Patient: Johnathan Portillo   YOB: 1980   Date of Visit: 11/25/2019       To Whom it May Concern:    Johnathan Portillo is under my professional care  She was seen in the hospital from 11/25/2019   to 11/28/19  She may return to work on 12/2/19 without limitations  If you have any questions or concerns, please don't hesitate to call           Sincerely,          Long Johnson MD

## 2019-11-25 NOTE — TELEPHONE ENCOUNTER
Pt Has had d and e, 9/25, gastric sleeve, and was just rxed for BV  Pt has a hgb 0f 8 6  She will be calling hematology now - to f/u her lo hgb  I gave her an appt with you 12/12  Since her d and e on 9/25 she has had heavy and long menses  She has postsurgical malabsorption   She is complaining of dizziness and I recommended the ER - pt probably will not go  Do you want 40 min with her ?? I gave you 20 min - debated what to do?  Please review

## 2019-11-26 PROBLEM — K29.41: Status: ACTIVE | Noted: 2019-03-26

## 2019-11-26 PROBLEM — N92.0 MENORRHAGIA WITH REGULAR CYCLE: Status: ACTIVE | Noted: 2019-11-26

## 2019-11-26 LAB
ABO GROUP BLD BPU: NORMAL
ANION GAP SERPL CALCULATED.3IONS-SCNC: 8 MMOL/L (ref 4–13)
BPU ID: NORMAL
BUN SERPL-MCNC: 13 MG/DL (ref 5–25)
CALCIUM SERPL-MCNC: 8.4 MG/DL (ref 8.3–10.1)
CHLORIDE SERPL-SCNC: 107 MMOL/L (ref 100–108)
CO2 SERPL-SCNC: 26 MMOL/L (ref 21–32)
CREAT SERPL-MCNC: 0.76 MG/DL (ref 0.6–1.3)
CROSSMATCH: NORMAL
ERYTHROCYTE [DISTWIDTH] IN BLOOD BY AUTOMATED COUNT: 17 % (ref 11.6–15.1)
FERRITIN SERPL-MCNC: 2 NG/ML (ref 8–388)
GFR SERPL CREATININE-BSD FRML MDRD: 100 ML/MIN/1.73SQ M
GLUCOSE SERPL-MCNC: 99 MG/DL (ref 65–140)
HCT VFR BLD AUTO: 26.5 % (ref 34.8–46.1)
HGB BLD-MCNC: 7.5 G/DL (ref 11.5–15.4)
HGB UR QL STRIP.AUTO: NEGATIVE
IRON SATN MFR SERPL: 2 %
IRON SERPL-MCNC: 11 UG/DL (ref 50–170)
MCH RBC QN AUTO: 20.7 PG (ref 26.8–34.3)
MCHC RBC AUTO-ENTMCNC: 28.3 G/DL (ref 31.4–37.4)
MCV RBC AUTO: 73 FL (ref 82–98)
PLATELET # BLD AUTO: 361 THOUSANDS/UL (ref 149–390)
PMV BLD AUTO: 9.8 FL (ref 8.9–12.7)
POTASSIUM SERPL-SCNC: 3.6 MMOL/L (ref 3.5–5.3)
RBC # BLD AUTO: 3.62 MILLION/UL (ref 3.81–5.12)
RBC, URINE: NORMAL
SODIUM SERPL-SCNC: 141 MMOL/L (ref 136–145)
TIBC SERPL-MCNC: 518 UG/DL (ref 250–450)
TRANSFUSION STATUS PATIENT QL: NORMAL
UNIT DISPENSE STATUS: NORMAL
UNIT PRODUCT CODE: NORMAL
UNIT RH: NORMAL
WBC # BLD AUTO: 5.85 THOUSAND/UL (ref 4.31–10.16)

## 2019-11-26 PROCEDURE — 80048 BASIC METABOLIC PNL TOTAL CA: CPT | Performed by: PHYSICIAN ASSISTANT

## 2019-11-26 PROCEDURE — 85027 COMPLETE CBC AUTOMATED: CPT | Performed by: PHYSICIAN ASSISTANT

## 2019-11-26 PROCEDURE — 99225 PR SBSQ OBSERVATION CARE/DAY 25 MINUTES: CPT | Performed by: INTERNAL MEDICINE

## 2019-11-26 PROCEDURE — 81003 URINALYSIS AUTO W/O SCOPE: CPT | Performed by: PHYSICIAN ASSISTANT

## 2019-11-26 RX ORDER — TRAMADOL HYDROCHLORIDE 50 MG/1
50 TABLET ORAL EVERY 6 HOURS PRN
Status: DISCONTINUED | OUTPATIENT
Start: 2019-11-26 | End: 2019-11-28 | Stop reason: HOSPADM

## 2019-11-26 RX ADMIN — SUCRALFATE 1 G: 1 TABLET ORAL at 18:00

## 2019-11-26 RX ADMIN — PANTOPRAZOLE SODIUM 20 MG: 20 TABLET, DELAYED RELEASE ORAL at 16:27

## 2019-11-26 RX ADMIN — SUCRALFATE 1 G: 1 TABLET ORAL at 21:26

## 2019-11-26 RX ADMIN — SUCRALFATE 1 G: 1 TABLET ORAL at 08:42

## 2019-11-26 RX ADMIN — SODIUM CHLORIDE, SODIUM LACTATE, POTASSIUM CHLORIDE, AND CALCIUM CHLORIDE 500 ML: .6; .31; .03; .02 INJECTION, SOLUTION INTRAVENOUS at 21:41

## 2019-11-26 RX ADMIN — TRAMADOL HYDROCHLORIDE 50 MG: 50 TABLET, FILM COATED ORAL at 21:41

## 2019-11-26 RX ADMIN — PANTOPRAZOLE SODIUM 20 MG: 20 TABLET, DELAYED RELEASE ORAL at 06:00

## 2019-11-26 RX ADMIN — SUCRALFATE 1 G: 1 TABLET ORAL at 12:37

## 2019-11-26 RX ADMIN — ACETAMINOPHEN 650 MG: 325 TABLET ORAL at 16:29

## 2019-11-26 RX ADMIN — ACETAMINOPHEN 650 MG: 325 TABLET ORAL at 08:42

## 2019-11-26 RX ADMIN — FERROUS SULFATE TAB 325 MG (65 MG ELEMENTAL FE) 325 MG: 325 (65 FE) TAB at 08:38

## 2019-11-26 RX ADMIN — Medication 1 TABLET: at 08:38

## 2019-11-26 RX ADMIN — IRON SUCROSE 200 MG: 20 INJECTION, SOLUTION INTRAVENOUS at 12:37

## 2019-11-26 NOTE — ASSESSMENT & PLAN NOTE
Admit to med/surg  Blood transfusion ordered by ED, consent obtained  Iron studies ordered prior to transfusion  Monitor H&H   Has appt with hematology 11/29

## 2019-11-26 NOTE — UTILIZATION REVIEW
Initial Clinical Review    Admission: Date/Time/Statement:   Observation   11/25 @    2112  Orders Placed This Encounter   Procedures    Place in Observation     Standing Status:   Standing     Number of Occurrences:   1     Order Specific Question:   Admitting Physician     Answer:   Mary Anderson [[id number]]     Order Specific Question:   Level of Care     Answer:   Med Surg [[id number]]     ED Arrival Information     Expected Arrival Acuity Means of Arrival Escorted By Service Admission Type    - 11/25/2019 19:09 Urgent Wheelchair Family Member General Medicine Urgent    Arrival Complaint    Palpitations,Weakness        Chief Complaint   Patient presents with    Medical Problem     Pt reports palpitations that started today, dizziness, generalized weakness, headache, and pelvic pain for two weeks  Denies vaginal bleeding or discharge  Denies fever  Assessment/Plan: Symptomatic anemia  Assessment & Plan  Admit to med/surg  Blood transfusion ordered by ED, consent obtained  Iron studies ordered prior to transfusion  Monitor H&H  Has appt with hematology 11/29     Pelvic pain  Assessment & Plan  Recent miscarriage with D&E 9/25  Had heavy bleeding for 2 weeks after procedure but now menses normal flow but longer in duration than normal (greater than 7 days)  Recent pelvic ultrasound 11/12 reports hx right oophorectomy and no adnexal mass  Follow up with GYN as outpatient     Gastroesophageal reflux disease  Assessment & Plan  Continue home meds     Postsurgical malabsorption  Assessment & Plan  S/p gastric sleeve 11 years ago with revision 7 months ago  Follows with bariatrics    Lee Santana is a 46 y o  female who presents with dizziness for 3 weeks, worse today  Forced her to pull over while driving today  She has known anemia  She has postsurgical malabsorption syndrome  She also recently had a miscarriage and had bleeding after D&E  She has never required blood transfusion before   She also reports palpitations  She does follow with GYN and bariatrics   She is scheduled to see hematology on friday     ED Triage Vitals   Temperature Pulse Respirations Blood Pressure SpO2   11/25/19 1920 11/25/19 1920 11/25/19 1920 11/25/19 1920 11/25/19 1920   97 7 °F (36 5 °C) 66 16 111/67 100 %      Temp Source Heart Rate Source Patient Position - Orthostatic VS BP Location FiO2 (%)   11/25/19 1920 11/25/19 1920 11/25/19 2057 11/25/19 1920 --   Oral Monitor Lying Right arm       Pain Score       11/25/19 1920       9        Wt Readings from Last 1 Encounters:   11/25/19 97 1 kg (214 lb 1 1 oz)     Additional Vital Signs:   11/26/19 0713  98 9 °F (37 2 °C)  72  18  98/59  99 %  None (Room air)  Lying   11/26/19 0555        100/64      Sitting   11/26/19 0235        100/66      Lying   11/26/19 0133        104/68  98 %    Lying   11/26/19 0126  98 7 °F (37 1 °C)  73             11/26/19 0100    79    86/49Abnormal       Lying   11/26/19 0045    75    91/53  99 %    Lying   11/26/19 0030    69    95/50      Lying   11/26/19 0015    69    102/51      Lying   11/26/19 0000  98 °F (36 7 °C)  71    115/52  98 %    Lying   11/25/19 2325  98 °F (36 7 °C)  67  18  113/55  98 %       11/25/19 2258      22           11/25/19 2253  98 5 °F (36 9 °C)  69    98/55  98 %       11/25/19 2232  98 4 °F (36 9 °C)               11/25/19 2215    66  18  112/58  99 %       11/25/19 2150  98 °F (36 7 °C)  63  18  103/55  99 %       11/25/19 2057    70  16  112/60  100 %  None (Room air)  Lying   11/25/19 1920  97 7 °F (36 5 °C)  66  16  111/67  100 %  None (Room air)           Pertinent Labs/Diagnostic Test Results:   Results from last 7 days   Lab Units 11/26/19  0432 11/25/19 2009   WBC Thousand/uL 5 85 6 37   HEMOGLOBIN g/dL 7 5* 7 6*   HEMATOCRIT % 26 5* 25 9*   PLATELETS Thousands/uL 361 394*   NEUTROS ABS Thousands/µL  --  2 24         Results from last 7 days   Lab Units 19   SODIUM mmol/L 141 139   POTASSIUM mmol/L 3 6 3 7   CHLORIDE mmol/L 107 104   CO2 mmol/L 26 29   ANION GAP mmol/L 8 6   BUN mg/dL 13 13   CREATININE mg/dL 0 76 0 87   EGFR ml/min/1 73sq m 100 85   CALCIUM mg/dL 8 4 8 6             Results from last 7 days   Lab Units 19   GLUCOSE RANDOM mg/dL 99 90       Results from last 7 days   Lab Units 19   FERRITIN ng/mL 2*           Results from last 7 days   Lab Units 19   CLARITY UA   --  clear Clear   COLOR UA   --  yellow Yellow   SPEC GRAV UA   --   --  1 020   PH UA   --   --  7 0   GLUCOSE UA mg/dl  --   --  Negative   KETONES UA mg/dl  --   --  Negative   BLOOD UA  Negative  --  Negative   PROTEIN UA mg/dl  --   --  Negative   NITRITE UA   --   --  Negative   BILIRUBIN UA   --   --  Negative   UROBILINOGEN UA E U /dl  --   --  1 0   LEUKOCYTES UA   --   --  Negative         ED Treatment:   Medication Administration from 2019 1909 to 2019 2140       Date/Time Order Dose Route Action Action by Comments     2019 sodium chloride 0 9 % bolus 1,000 mL 1,000 mL Intravenous New Bag Alejandro Lo RN         Past Medical History:   Diagnosis Date    Abdominal pain     "almost constant"    Anemia     Anesthesia     "woke up swinging with last  2018  "was fine with EGD"    Bariatric surgery status     Dental bridge present     right lower permanent    Depression     "not currently taking any meds"    Difficulty swallowing     "in the past"    Disease of thyroid gland     not on meds now    Dizzy     Fatigue     "when blood pressure low" and weakness too"    GERD (gastroesophageal reflux disease)     "increase after surgery"    Heart murmur     heart murmer, work up negative with holter monitor    History of 2  sections     most recent 17    History of D&C 2019    History of iron deficiency     anemia/ had IV infusions through pregnancy in 2644-3716    Inguinal hernia     right    Loss of appetite     Low BP     "off and on"    Migraine     N&V (nausea and vomiting)     " a little better since on meds"    Non-intractable vomiting     "not since been on medicine"    Palpitations     "having again"    Postgastrectomy malabsorption     Stomach pain      Present on Admission:   Postsurgical malabsorption   Pelvic pain   Gastroesophageal reflux disease      Admitting Diagnosis: Palpitations [R00 2]  Symptomatic anemia [D64 9]  Age/Sex: 46 y o  female  Admission Orders:  Scheduled Medications:    Medications:  ferrous sulfate 325 mg Oral Daily With Breakfast   influenza vaccine 0 5 mL Intramuscular Once   multivitamin-minerals 1 tablet Oral Daily   pantoprazole 20 mg Oral BID AC   sucralfate 1 g Oral 4x Daily     Continuous IV Infusions:     PRN Meds:    acetaminophen 650 mg Oral Q6H PRN   diphenhydrAMINE 25 mg Intravenous Q6H PRN   ondansetron 4 mg Intravenous Q6H PRN       None    Network Utilization Review Department  [email or web address]  ATTENTION: Please call with any questions or concerns to [phone] and carefully listen to the prompts so that you are directed to the right person  All voicemails are confidential   Laura Farrar all requests for admission clinical reviews, approved or denied determinations and any other requests to dedicated fax number below belonging to the campus where the patient is receiving treatment    FACILITY NAME UR FAX NUMBER   ADMISSION DENIALS (Administrative/Medical Necessity) [phone]   PARENT CHILD HEALTH (Maternity/NICU/Pediatrics) [phone]   [facility] [phone]   Michael Chopra [phone]   [facility] [address] [facility] [phone]   [address] [phone]

## 2019-11-26 NOTE — ASSESSMENT & PLAN NOTE
Recent miscarriage with D&E 9/25  Had heavy bleeding for 2 weeks after procedure but now menses normal flow but longer in duration than normal (greater than 7 days)  Recent pelvic ultrasound 11/12 reports hx right oophorectomy and no adnexal mass   Follow up with GYN as outpatient

## 2019-11-26 NOTE — ASSESSMENT & PLAN NOTE
· Outpatient follow-up with [facility]  · Menorrhagia and decreased absorption of iron from gastric bypass status is the cause of her iron deficiency anemia

## 2019-11-26 NOTE — ASSESSMENT & PLAN NOTE
· Blood transfusion was ordered on admission however she developed a sudden transfusion reaction which resolved after cessation of the transfusion and Benadryl  · Currently without any fever or jaundice  · She continues to complain of fatigue and decreased exercise tolerance  · Due to her recent transfusion reaction, will administer IV Venofer 200 mg daily  · Outpatient follow-up with Hematology as scheduled on 11/29   · Due to her transfusion reaction, I would keep her another 24 hours to watch out for delayed reaction  · Recheck CBC and CMP in a m

## 2019-11-26 NOTE — H&P
H&P- Edgardo Fonseca Elinor 1980, 46 y o  female MRN: [id number]    Unit/Bed#: E5 -01 Encounter: [id number]    Primary Care Provider: Luis A Calderon PA-C   Date and time admitted to hospital: 11/25/2019  7:24 PM        * Symptomatic anemia  Assessment & Plan  Admit to med/surg  Blood transfusion ordered by ED, consent obtained  Iron studies ordered prior to transfusion  Monitor H&H  Has appt with hematology 11/29    Pelvic pain  Assessment & Plan  Recent miscarriage with D&E 9/25  Had heavy bleeding for 2 weeks after procedure but now menses normal flow but longer in duration than normal (greater than 7 days)  Recent pelvic ultrasound 11/12 reports hx right oophorectomy and no adnexal mass  Follow up with GYN as outpatient    Gastroesophageal reflux disease  Assessment & Plan  Continue home meds    Postsurgical malabsorption  Assessment & Plan  S/p gastric sleeve 11 years ago with revision 7 months ago  Follows with bariatrics          VTE Prophylaxis: Pharmacologic VTE Prophylaxis contraindicated due to low risk  / reason for no mechanical VTE prophylaxis low risk   Code Status: full code  POLST: There is no POLST form on file for this patient (pre-hospital)  Discussion with family: significant other at bedside    Anticipated Length of Stay:  Patient will be admitted on an Observation basis with an anticipated length of stay of  Less than 2 midnights  Justification for Hospital Stay: patient requires blood transfusion    Total Time for Visit, including Counseling / Coordination of Care: 45 minutes  Greater than 50% of this total time spent on direct patient counseling and coordination of care  Chief Complaint:   dizziness    History of Present Illness:    Meenakshi Flannery is a 46 y o  female who presents with dizziness for 3 weeks, worse today  Forced her to pull over while driving today  She has known anemia  She has postsurgical malabsorption syndrome   She also recently had a miscarriage and had bleeding after D&E  She has never required blood transfusion before  She also reports palpitations  She does follow with GYN and bariatrics  She is scheduled to see hematology on friday    Review of Systems:    Review of Systems   Constitutional: Positive for fatigue  HENT: Negative  Eyes: Negative  Respiratory: Negative  Cardiovascular: Positive for palpitations  Gastrointestinal: Positive for abdominal pain  Endocrine: Negative  Genitourinary: Negative  Musculoskeletal: Negative  Skin: Negative  Allergic/Immunologic: Negative  Neurological: Positive for dizziness  Hematological: Negative  Psychiatric/Behavioral: Negative          Past Medical and Surgical History:     Past Medical History:   Diagnosis Date    Abdominal pain     "almost constant"    Anemia     Anesthesia     "woke up swinging with last  2018  "was fine with EGD"    Bariatric surgery status     Dental bridge present     right lower permanent    Depression     "not currently taking any meds"    Difficulty swallowing     "in the past"    Disease of thyroid gland     not on meds now    Dizzy     Fatigue     "when blood pressure low" and weakness too"    GERD (gastroesophageal reflux disease)     "increase after surgery"    Heart murmur     heart murmer, work up negative with holter monitor    History of 2  sections     most recent 17    History of D&C 2019    History of iron deficiency     anemia/ had IV infusions through pregnancy in [id number]    Inguinal hernia     right    Loss of appetite     Low BP     "off and on"    Migraine     N&V (nausea and vomiting)     " a little better since on meds"    Non-intractable vomiting     "not since been on medicine"    Palpitations     "having again"    Postgastrectomy malabsorption     Stomach pain        Past Surgical History:   Procedure Laterality Date     SECTION      x2    CHOLECYSTECTOMY      CHROMOSOME ANALYSIS, PRODUCTS OF CONCEPTION (HISTORICAL)  2018    2 miscarriages in 2018/Jan and Nov    OVARIAN CYST REMOVAL Right 2018    VT EGD TRANSORAL BIOPSY SINGLE/MULTIPLE N/A 1/30/2019    Procedure: ESOPHAGOGASTRODUODENOSCOPY (EGD) with bx;  Surgeon: Jes Collier MD;  [facility]; Service: Bariatrics    VT LAP GASTRIC BYPASS/SREE-EN-Y N/A 4/8/2019    Procedure: LAP SREE-EN-Y GASTRIC BYPASS, INTRAOP EGD;  Surgeon: Jes Collier MD;  [facility];  Service: Stella ABORTN,1ST TRI N/A 9/25/2019    Procedure: DILATATION AND EVACUATION (D&E) (8 weeks) missed ab;  Surgeon: Emelia Yee MD;  [facility];  Service: Gynecology    REVISION BYPASS LAPAROSCOPIC N/A 4/8/2019    Procedure: LAP REVISION/ CONVERSION;  Surgeon: Jes Collier MD;  [facility];  Service: Arzella Abide SALPINGOOPHORECTOMY Right 09/2018    SLEEVE GASTROPLASTY         Meds/Allergies:    Prior to Admission medications    Medication Sig Start Date End Date Taking? Authorizing Provider   ferrous sulfate 325 (65 Fe) mg tablet Take 325 mg by mouth daily with breakfast   Yes Historical Provider, MD   Multiple Vitamins-Minerals (MULTIVITAMIN ADULT PO) Take by mouth   Yes Historical Provider, MD   pantoprazole (PROTONIX) 20 mg tablet Take 20 mg by mouth daily   Yes Historical Provider, MD   sucralfate (CARAFATE) 1 g tablet Take by mouth 4 (four) times a day   Yes Historical Provider, MD   famotidine (PEPCID) 20 mg tablet Take 1 tablet (20 mg total) by mouth 2 (two) times a day  Patient not taking: Reported on 11/25/2019 9/9/19   Michelle Castillo PA-C     I have reviewed home medications with patient personally  Allergies:    Allergies   Allergen Reactions    Aspirin Anaphylaxis    Shellfish-Derived Products Anaphylaxis    Contrast [Iodinated Diagnostic Agents] Itching    Ibuprofen Edema    Reglan [Metoclopramide]        Social History:     Marital Status: /Civil Union     Patient Pre-hospital Living Situation: lives with family  Patient Pre-hospital Level of Mobility: ambulatory  Patient Pre-hospital Diet Restrictions: none  Substance Use History:   Social History     Substance and Sexual Activity   Alcohol Use Not Currently    Frequency: 2-4 times a month    Drinks per session: 1 or 2    Binge frequency: Never    Comment: social     Social History     Tobacco Use   Smoking Status Never Smoker   Smokeless Tobacco Never Used     Social History     Substance and Sexual Activity   Drug Use No       Family History:    non-contributory    Physical Exam:     Vitals:   Blood Pressure: 103/55 (11/25/19 2150)  Pulse: 63 (11/25/19 2150)  Temperature: 98 °F (36 7 °C) (11/25/19 2150)  Temp Source: Oral (11/25/19 1920)  Respirations: 18 (11/25/19 2150)  Weight - Scale: 97 1 kg (214 lb 1 1 oz) (11/25/19 2102)  SpO2: 99 % (11/25/19 2150)    Physical Exam   Constitutional: She is oriented to person, place, and time  She appears well-developed and well-nourished  No distress  HENT:   Head: Normocephalic and atraumatic  Eyes: Pupils are equal, round, and reactive to light  EOM are normal    Neck: Normal range of motion  Neck supple  Cardiovascular: Normal rate and regular rhythm  Pulmonary/Chest: Effort normal and breath sounds normal    Abdominal: Soft  Bowel sounds are normal    Lower pelvic pain   Musculoskeletal: Normal range of motion  She exhibits no edema or deformity  Neurological: She is alert and oriented to person, place, and time  Skin: Skin is warm and dry  She is not diaphoretic  Psychiatric: She has a normal mood and affect  Her behavior is normal    Vitals reviewed  Additional Data:     Lab Results: I have personally reviewed pertinent reports        Results from last 7 days   Lab Units 11/25/19 2009   WBC Thousand/uL 6 37   HEMOGLOBIN g/dL 7 6*   HEMATOCRIT % 25 9*   PLATELETS Thousands/uL 394*   NEUTROS PCT % 35*   LYMPHS PCT % 52*   MONOS PCT % 10 EOS PCT % 3     Results from last 7 days   Lab Units 11/25/19 2009   SODIUM mmol/L 139   POTASSIUM mmol/L 3 7   CHLORIDE mmol/L 104   CO2 mmol/L 29   BUN mg/dL 13   CREATININE mg/dL 0 87   ANION GAP mmol/L 6   CALCIUM mg/dL 8 6   GLUCOSE RANDOM mg/dL 90                       Imaging: I have personally reviewed pertinent reports  No orders to display       EKG, Pathology, and Other Studies Reviewed on Admission:   · EKG: NSR    Allscripts / Epic Records Reviewed: Yes     ** Please Note: This note has been constructed using a voice recognition system   **

## 2019-11-26 NOTE — QUICK NOTE
Call from nurse  Within 15 minutes of blood starting, patient developed "scratchy" feeling in throat and trouble swallowing  She then developed itching over her chest  Blood was stopped  Patient given IV benadryl  Symptoms resolved  Patient resting comfortably  On evaluation there is no swelling or rash  Able to swallow  Will continue to monitor closely

## 2019-11-26 NOTE — PROGRESS NOTES
Sitting in with pt for first 15mins of blood transfusion  Pt began to develop a scratchy throat and difficulty swallowing  Pt also reported itchiness of face, chest, and back  Blood was stopped  On call admitting PA, Kitty Goodpasture, called and orders for IV benadryl 25mg given  PA came to floor to assess pt  Decision was made to stop blood for the night  Blood bank notified  Pt resting comfortably in bed  Symptoms have subsided  Will continue to monitor

## 2019-11-26 NOTE — ED PROVIDER NOTES
History  Chief Complaint   Patient presents with    Medical Problem     Pt reports palpitations that started today, dizziness, generalized weakness, headache, and pelvic pain for two weeks  Denies vaginal bleeding or discharge  Denies fever  60-year-old female presents for the evaluation of intermittent worsening lightheadedness and dizziness over the past couple of weeks  She states that she has been following with her primary care doctor her OBGYN for the lightheadedness and dizziness  She has a history of iron deficiency anemia and heavy menstrual flow  She also has a hematologist that she follows with  Today she had worsening symptoms especially with exertion she called her gyn in her Heme-Onc doctor who advised her to come to the emergency department for evaluation of possible worsening symptomatic anemia  Patient denies any associated fevers or chills  She denies any chest pain  She denies any nausea, vomiting, diarrhea  Prior to Admission Medications   Prescriptions Last Dose Informant Patient Reported? Taking?    Multiple Vitamins-Minerals (MULTIVITAMIN ADULT PO)  Self Yes Yes   Sig: Take by mouth   famotidine (PEPCID) 20 mg tablet Not Taking at Unknown time Self No No   Sig: Take 1 tablet (20 mg total) by mouth 2 (two) times a day   Patient not taking: Reported on 11/25/2019   ferrous sulfate 325 (65 Fe) mg tablet   Yes Yes   Sig: Take 325 mg by mouth daily with breakfast   pantoprazole (PROTONIX) 20 mg tablet   Yes Yes   Sig: Take 20 mg by mouth daily   sucralfate (CARAFATE) 1 g tablet   Yes Yes   Sig: Take by mouth 4 (four) times a day      Facility-Administered Medications: None       Past Medical History:   Diagnosis Date    Abdominal pain     "almost constant"    Anemia     Anesthesia     "woke up swinging with last  9/2018  "was fine with EGD"    Bariatric surgery status     Dental bridge present     right lower permanent    Depression     "not currently taking any meds"    Difficulty swallowing     "in the past"    Disease of thyroid gland     not on meds now    Dizzy     Fatigue     "when blood pressure low" and weakness too"    GERD (gastroesophageal reflux disease)     "increase after surgery"    Heart murmur     heart murmer, work up negative with holter monitor    History of 2  sections     most recent 17    History of D&C 2019    History of iron deficiency     anemia/ had IV infusions through pregnancy in [id number]    Inguinal hernia     right    Loss of appetite     Low BP     "off and on"    Migraine     N&V (nausea and vomiting)     " a little better since on meds"    Non-intractable vomiting     "not since been on medicine"    Palpitations     "having again"    Postgastrectomy malabsorption     Stomach pain        Past Surgical History:   Procedure Laterality Date     SECTION      x2    CHOLECYSTECTOMY      CHROMOSOME ANALYSIS, PRODUCTS OF CONCEPTION (HISTORICAL)      2 miscarriages in  and Nov    OVARIAN CYST REMOVAL Right     RI EGD TRANSORAL BIOPSY SINGLE/MULTIPLE N/A 2019    Procedure: ESOPHAGOGASTRODUODENOSCOPY (EGD) with bx;  Surgeon: Raji Sorensen MD;  [facility];   Service: Bariatrics    RI LAP GASTRIC BYPASS/SREE-EN-Y N/A 2019    Procedure: LAP SREE-EN-Y GASTRIC BYPASS, INTRAOP EGD;  Surgeon: Raji Sorensen MD;  [facility];  Service: Stella ISAAC,1ST TRI N/A 2019    Procedure: DILATATION AND EVACUATION (D&E) (8 weeks) missed ab;  Surgeon: Lakshmi Prakash MD;  [facility];  Service: Gynecology    REVISION BYPASS LAPAROSCOPIC N/A 2019    Procedure: LAP REVISION/ CONVERSION;  Surgeon: Raji Sorensen MD;  [facility];  Service: Bariatrics    SALPINGOOPHORECTOMY Right 2018    SLEEVE GASTROPLASTY         Family History   Problem Relation Age of Onset    Hypertension Mother     Heart disease Mother     No Known Problems Father      I have reviewed and agree with the history as documented  Social History     Tobacco Use    Smoking status: Never Smoker    Smokeless tobacco: Never Used   Substance Use Topics    Alcohol use: Not Currently     Frequency: 2-4 times a month     Drinks per session: 1 or 2     Binge frequency: Never     Comment: social    Drug use: No        Review of Systems   Constitutional: Positive for fatigue  Respiratory: Shortness of breath:  on exertion  Cardiovascular: Positive for palpitations  Neurological: Positive for dizziness and light-headedness  All other systems reviewed and are negative  Physical Exam  Physical Exam   Constitutional: She is oriented to person, place, and time  She appears well-developed and well-nourished  No distress  HENT:   Head: Normocephalic and atraumatic  Right Ear: External ear normal    Left Ear: External ear normal    Eyes: Pupils are equal, round, and reactive to light  Conjunctivae and EOM are normal  No scleral icterus  Neck: Normal range of motion  Cardiovascular: Normal rate, regular rhythm and normal heart sounds  Pulmonary/Chest: Effort normal and breath sounds normal  No respiratory distress  Abdominal: Soft  Bowel sounds are normal  There is no tenderness  There is no rebound and no guarding  Musculoskeletal: Normal range of motion  She exhibits no edema  Neurological: She is alert and oriented to person, place, and time  Skin: Skin is warm and dry  No rash noted  Psychiatric: She has a normal mood and affect  Nursing note and vitals reviewed        Vital Signs  ED Triage Vitals   Temperature Pulse Respirations Blood Pressure SpO2   11/25/19 1920 11/25/19 1920 11/25/19 1920 11/25/19 1920 11/25/19 1920   97 7 °F (36 5 °C) 66 16 111/67 100 %      Temp Source Heart Rate Source Patient Position - Orthostatic VS BP Location FiO2 (%)   11/25/19 1920 11/25/19 1920 11/25/19 2057 11/25/19 1920 --   Oral Monitor Lying Right arm Pain Score       11/25/19 1920       9           Vitals:    11/25/19 2150 11/25/19 2215 11/25/19 2253 11/25/19 2325   BP: 103/55 112/58 98/55 (P) 113/55   Pulse: 63 66 69 (P) 67   Patient Position - Orthostatic VS:             Visual Acuity      ED Medications  Medications   ferrous sulfate tablet 325 mg (has no administration in time range)   multivitamin-minerals (CENTRUM) tablet 1 tablet (has no administration in time range)   sucralfate (CARAFATE) tablet 1 g (1 g Oral Given 11/25/19 2220)   ondansetron (ZOFRAN) injection 4 mg (has no administration in time range)   acetaminophen (TYLENOL) tablet 650 mg (650 mg Oral Given 11/25/19 2301)   influenza vaccine, quadrivalent (FLUARIX) IM injection 0 5 mL (has no administration in time range)   pantoprazole (PROTONIX) EC tablet 20 mg (20 mg Oral Given 11/25/19 2301)   sodium chloride 0 9 % bolus 1,000 mL (1,000 mL Intravenous New Bag 11/25/19 2009)   diphenhydrAMINE (BENADRYL) injection 25 mg (25 mg Intravenous Given 11/25/19 2305)       Diagnostic Studies  Results Reviewed     Procedure Component Value Units Date/Time    Iron Saturation % [[id number]] Collected:  11/25/19 2009    Lab Status: In process Specimen:  Blood from Arm, Right Updated:  11/25/19 2145    Ferritin [[id number]] Collected:  11/25/19 2009    Lab Status:   In process Specimen:  Blood from Arm, Right Updated:  11/25/19 3548    Basic metabolic panel [[id number]] Collected:  11/25/19 2009    Lab Status:  Final result Specimen:  Blood from Arm, Right Updated:  11/25/19 2037     Sodium 139 mmol/L      Potassium 3 7 mmol/L      Chloride 104 mmol/L      CO2 29 mmol/L      ANION GAP 6 mmol/L      BUN 13 mg/dL      Creatinine 0 87 mg/dL      Glucose 90 mg/dL      Calcium 8 6 mg/dL      eGFR 85 ml/min/1 73sq m     Narrative:       [place] guidelines for Chronic Kidney Disease (CKD):     Stage 1 with normal or high GFR (GFR > 90 mL/min/1 73 square meters)    Stage 2 Mild CKD (GFR = 60-89 mL/min/1 73 square meters)    Stage 3A Moderate CKD (GFR = 45-59 mL/min/1 73 square meters)    Stage 3B Moderate CKD (GFR = 30-44 mL/min/1 73 square meters)    Stage 4 Severe CKD (GFR = 15-29 mL/min/1 73 square meters)    Stage 5 End Stage CKD (GFR <15 mL/min/1 73 square meters)  Note: GFR calculation is accurate only with a steady state creatinine    CBC and differential [[id number]]  (Abnormal) Collected:  11/25/19 2009    Lab Status:  Final result Specimen:  Blood from Arm, Right Updated:  11/25/19 2025     WBC 6 37 Thousand/uL      RBC 3 62 Million/uL      Hemoglobin 7 6 g/dL      Hematocrit 25 9 %      MCV 72 fL      MCH 21 0 pg      MCHC 29 3 g/dL      RDW 16 7 %      MPV 9 1 fL      Platelets 972 Thousands/uL      nRBC 0 /100 WBCs      Neutrophils Relative 35 %      Immat GRANS % 0 %      Lymphocytes Relative 52 %      Monocytes Relative 10 %      Eosinophils Relative 3 %      Basophils Relative 0 %      Neutrophils Absolute 2 24 Thousands/µL      Immature Grans Absolute 0 01 Thousand/uL      Lymphocytes Absolute 3 29 Thousands/µL      Monocytes Absolute 0 61 Thousand/µL      Eosinophils Absolute 0 20 Thousand/µL      Basophils Absolute 0 02 Thousands/µL     POCT pregnancy, urine [[id number]]  (Normal) Resulted:  11/25/19 2022    Lab Status:  Final result Updated:  11/25/19 2022     EXT PREG TEST UR (Ref: Negative) negative     Control valid    POCT urinalysis dipstick [[id number]]  (Normal) Resulted:  11/25/19 2022    Lab Status:  Final result Specimen:  Urine Updated:  11/25/19 2022     Color, UA yellow     Clarity, UA clear    Urine Macroscopic, POC [[id number]] Collected:  11/25/19 2020    Lab Status:  Final result Specimen:  Urine Updated:  11/25/19 2021     Color, UA Yellow     Clarity, UA Clear     pH, UA 7 0     Leukocytes, UA Negative     Nitrite, UA Negative     Protein, UA Negative mg/dl      Glucose, UA Negative mg/dl      Ketones, UA Negative mg/dl      Urobilinogen, UA 1 0 E U /dl Bilirubin, UA Negative     Blood, UA Negative     Specific Gravity, UA 1 020    Narrative:       CLINITEK RESULT                 No orders to display              Procedures  ECG 12 Lead Documentation Only  Date/Time: 11/25/2019 7:50 PM  Performed by: Maty Martínez DO  Authorized by: Maty Martínez DO     Indications / Diagnosis:  Palpitations  ECG reviewed by me, the ED Provider: yes    Patient location:  ED  Previous ECG:     Previous ECG:  Unavailable  Interpretation:     Interpretation: normal    Rate:     ECG rate:  63    ECG rate assessment: normal    Rhythm:     Rhythm: sinus rhythm    Ectopy:     Ectopy: none    QRS:     QRS axis:  Normal    QRS intervals:  Normal  Conduction:     Conduction: normal    ST segments:     ST segments:  Normal  T waves:     T waves: normal             ED Course  ED Course as of Nov 25 2328 Mon Nov 25, 2019 2108 I discussed the case with the hospitalist  We reviewed the HPI, pertinent PMH, ED course and workup  Hospitalist agreed with plan and will admit the patient to the hospital   I discussed the case with the hospitalist  We reviewed the HPI, pertinent PMH, ED course and workup  Hospitalist agreed with plan and will admit the patient to the hospital                                   MDM  Number of Diagnoses or Management Options  Symptomatic anemia:   Diagnosis management comments: Plan is for transfusion of 1 unit packed red cells for the patient's worsening symptomatic anemia  Iron studies will be sent    Plan is for admission       Amount and/or Complexity of Data Reviewed  Clinical lab tests: ordered and reviewed  Review and summarize past medical records: yes (Hemoglobin decreased from previous when compared to labs)  Discuss the patient with other providers: yes        Disposition  Final diagnoses:   Symptomatic anemia     Time reflects when diagnosis was documented in both MDM as applicable and the Disposition within this note     Time User Action Codes Description Comment    11/25/2019  9:11 PM Santiago Branham Add [D64 9] Symptomatic anemia     11/25/2019 10:25 PM Glenys Moreno Modify [D64 9] Symptomatic anemia       ED Disposition     ED Disposition Condition Date/Time Comment    Admit Stable Mon Nov 25, 2019  9:11 PM Case was discussed with Jessica Uriarte and the patient's admission status was agreed to be Admission Status: observation status to the service of Dr Susan Jackson   Follow-up Information    None         Current Discharge Medication List      CONTINUE these medications which have NOT CHANGED    Details   ferrous sulfate 325 (65 Fe) mg tablet Take 325 mg by mouth daily with breakfast      Multiple Vitamins-Minerals (MULTIVITAMIN ADULT PO) Take by mouth      pantoprazole (PROTONIX) 20 mg tablet Take 20 mg by mouth daily      sucralfate (CARAFATE) 1 g tablet Take by mouth 4 (four) times a day      famotidine (PEPCID) 20 mg tablet Take 1 tablet (20 mg total) by mouth 2 (two) times a day  Qty: 180 tablet, Refills: 2    Associated Diagnoses: Pregnancy complicated by previous bariatric surgery, first trimester; Pouchitis ([facility] [address] )           No discharge procedures on file      ED Provider  Electronically Signed by           Andrew Sol DO  11/25/19 6067

## 2019-11-26 NOTE — PROGRESS NOTES
Progress Note - Lucio Aldrich 1980, 46 y o  female MRN: [id number]    Unit/Bed#: E5 -01 Encounter: [id number]    Primary Care Provider: Paige Duarte PA-C   Date and time admitted to hospital: 11/25/2019  7:24 PM        * Symptomatic anemia  Assessment & Plan  · Blood transfusion was ordered on admission however she developed a sudden transfusion reaction which resolved after cessation of the transfusion and Benadryl  · Currently without any fever or jaundice  · She continues to complain of fatigue and decreased exercise tolerance  · Due to her recent transfusion reaction, will administer IV Venofer 200 mg daily  · Outpatient follow-up with Hematology as scheduled on 11/29   · Due to her transfusion reaction, I would keep her another 24 hours to watch out for delayed reaction  · Recheck CBC and CMP in a m  Gastric hemorrhage due to atrophic gastritis  Assessment & Plan  Continue Protonix and Carafate    Menorrhagia with regular cycle  Assessment & Plan  · Outpatient follow-up with OBGYN  · Menorrhagia and decreased absorption of iron from gastric bypass status is the cause of her iron deficiency anemia  Obesity, Class II, BMI 35-39 9  Assessment & Plan  · Status post gastric bypass 11 years ago  · Continue regular follow-up with weight management      VTE Pharmacologic Prophylaxis:   Pharmacologic: None due to anemia and menorrhagia  Mechanical VTE Prophylaxis in Place: Yes    Patient Centered Rounds: Discussed with her nurse    Discussions with Specialists or Other Care Team Provider:  H&P and old records reviewed    Education and Discussions with Family / Patient:  Spoke with     Time Spent for Care: 25 minutes  More than 50% of total time spent on counseling and coordination of care as described above      Current Length of Stay: 0 day(s)    Current Patient Status: Observation   Certification Statement: The patient, admitted on an observation basis, will now require > 2 midnight hospital stay due to Transfusion reaction and symptomatic anemia    Discharge Plan:  Not ready for DC today    Code Status: Level 1 - Full Code      Subjective:   Continues with fatigue and shortness of breath on minimal exertion  Reports heavy menstrual bleeding which last for a week  Bleeding has not normalized since her D and C  No current bleeding    Objective:     Vitals:   Temp (24hrs), Av 3 °F (36 8 °C), Min:97 7 °F (36 5 °C), Max:98 9 °F (37 2 °C)    Temp:  [97 7 °F (36 5 °C)-98 9 °F (37 2 °C)] 98 9 °F (37 2 °C)  HR:  [63-79] 72  Resp:  [16-22] 18  BP: ()/(49-68) 98/59  SpO2:  [98 %-100 %] 99 %  Body mass index is 36 74 kg/m²  Input and Output Summary (last 24 hours): Intake/Output Summary (Last 24 hours) at 2019 1151  Last data filed at 2019 1101  Gross per 24 hour   Intake 1271 7 ml   Output 500 ml   Net 771 7 ml       Physical Exam:     Physical Exam   Constitutional: She is oriented to person, place, and time  She appears well-developed  No distress  HENT:   Head: Normocephalic and atraumatic  Eyes: No scleral icterus  Pale palpebral conjunctiva   Neck: No JVD present  Cardiovascular: Regular rhythm  Exam reveals no gallop and no friction rub  Murmur heard  Pulmonary/Chest: Effort normal  No stridor  No respiratory distress  She has no wheezes  Abdominal: Soft  She exhibits no distension  There is no tenderness  There is no guarding  Musculoskeletal: She exhibits no edema or deformity  Neurological: She is alert and oriented to person, place, and time  Skin: Skin is warm and dry  She is not diaphoretic     Psychiatric:   Mood slightly depressed     Additional Data:     Labs:    Results from last 7 days   Lab Units 19  0432 19   WBC Thousand/uL 5 85 6 37   HEMOGLOBIN g/dL 7 5* 7 6*   HEMATOCRIT % 26 5* 25 9*   PLATELETS Thousands/uL 361 394*   NEUTROS PCT %  --  35*   LYMPHS PCT %  --  52*   MONOS PCT %  --  10   EOS PCT %  -- 3     Results from last 7 days   Lab Units 11/26/19  0432   SODIUM mmol/L 141   POTASSIUM mmol/L 3 6   CHLORIDE mmol/L 107   CO2 mmol/L 26   BUN mg/dL 13   CREATININE mg/dL 0 76   ANION GAP mmol/L 8   CALCIUM mg/dL 8 4   GLUCOSE RANDOM mg/dL 99                           * I Have Reviewed All Lab Data Listed Above  * Additional Pertinent Lab Tests Reviewed: All Labs Within Last 24 Hours Reviewed    Imaging:    Imaging Reports Reviewed Today Include:  None  Recent Cultures (last 7 days):           Last 24 Hours Medication List:     Current Facility-Administered Medications:  acetaminophen 650 mg Oral Q6H PRN Fernando Cordero PA-C   diphenhydrAMINE 25 mg Intravenous Q6H PRN Fernando Cordero PA-C   influenza vaccine 0 5 mL Intramuscular Once Vinnie Grant MD   iron sucrose 200 mg Intravenous Daily Vinnie Grant MD   multivitamin-minerals 1 tablet Oral Daily Fernando Cordero PA-C   ondansetron 4 mg Intravenous Q6H PRN Fernando Cordero PA-C   pantoprazole 20 mg Oral BID AC Fernando Cordero PA-C   sucralfate 1 g Oral 4x Daily Fernando Cordero PA-C        Today, Patient Was Seen By: Vinnie Grant MD    ** Please Note: Dictation voice to text software may have been used in the creation of this document   **

## 2019-11-26 NOTE — PLAN OF CARE
Problem: PAIN - ADULT  Goal: Verbalizes/displays adequate comfort level or baseline comfort level  Description  Interventions:  - Encourage patient to monitor pain and request assistance  - Assess pain using appropriate pain scale  - Administer analgesics based on type and severity of pain and evaluate response  - Implement non-pharmacological measures as appropriate and evaluate response  - Consider cultural and social influences on pain and pain management  - Notify physician/advanced practitioner if interventions unsuccessful or patient reports new pain  Outcome: Progressing     Problem: SAFETY ADULT  Goal: Patient will remain free of falls  Description  INTERVENTIONS:  - Assess patient frequently for physical needs  -  Identify cognitive and physical deficits and behaviors that affect risk of falls    -  [place] fall precautions as indicated by assessment   - Educate patient/family on patient safety including physical limitations  - Instruct patient to call for assistance with activity based on assessment  - Modify environment to reduce risk of injury  - Consider OT/PT consult to assist with strengthening/mobility  Outcome: Progressing     Problem: HEMATOLOGIC - ADULT  Goal: Maintains hematologic stability  Description  INTERVENTIONS  - Assess for signs and symptoms of bleeding or hemorrhage  - Monitor labs  - Administer supportive blood products/factors as ordered and appropriate  Outcome: Progressing

## 2019-11-27 LAB
ALBUMIN SERPL BCP-MCNC: 2.8 G/DL (ref 3.5–5)
ALP SERPL-CCNC: 67 U/L (ref 46–116)
ALT SERPL W P-5'-P-CCNC: 12 U/L (ref 12–78)
ANION GAP SERPL CALCULATED.3IONS-SCNC: 5 MMOL/L (ref 4–13)
AST SERPL W P-5'-P-CCNC: 19 U/L (ref 5–45)
BASOPHILS # BLD AUTO: 0.02 THOUSANDS/ΜL (ref 0–0.1)
BASOPHILS NFR BLD AUTO: 0 % (ref 0–1)
BILIRUB SERPL-MCNC: 0.18 MG/DL (ref 0.2–1)
BUN SERPL-MCNC: 8 MG/DL (ref 5–25)
CALCIUM SERPL-MCNC: 8.5 MG/DL (ref 8.3–10.1)
CHLORIDE SERPL-SCNC: 106 MMOL/L (ref 100–108)
CO2 SERPL-SCNC: 28 MMOL/L (ref 21–32)
CREAT SERPL-MCNC: 0.74 MG/DL (ref 0.6–1.3)
EOSINOPHIL # BLD AUTO: 0.22 THOUSAND/ΜL (ref 0–0.61)
EOSINOPHIL NFR BLD AUTO: 4 % (ref 0–6)
ERYTHROCYTE [DISTWIDTH] IN BLOOD BY AUTOMATED COUNT: 17 % (ref 11.6–15.1)
GFR SERPL CREATININE-BSD FRML MDRD: 103 ML/MIN/1.73SQ M
GLUCOSE SERPL-MCNC: 92 MG/DL (ref 65–140)
HCT VFR BLD AUTO: 26 % (ref 34.8–46.1)
HGB BLD-MCNC: 7.7 G/DL (ref 11.5–15.4)
IMM GRANULOCYTES # BLD AUTO: 0 THOUSAND/UL (ref 0–0.2)
IMM GRANULOCYTES NFR BLD AUTO: 0 % (ref 0–2)
LYMPHOCYTES # BLD AUTO: 2.67 THOUSANDS/ΜL (ref 0.6–4.47)
LYMPHOCYTES NFR BLD AUTO: 52 % (ref 14–44)
MCH RBC QN AUTO: 21.4 PG (ref 26.8–34.3)
MCHC RBC AUTO-ENTMCNC: 29.6 G/DL (ref 31.4–37.4)
MCV RBC AUTO: 72 FL (ref 82–98)
MONOCYTES # BLD AUTO: 0.5 THOUSAND/ΜL (ref 0.17–1.22)
MONOCYTES NFR BLD AUTO: 10 % (ref 4–12)
NEUTROPHILS # BLD AUTO: 1.78 THOUSANDS/ΜL (ref 1.85–7.62)
NEUTS SEG NFR BLD AUTO: 34 % (ref 43–75)
NRBC BLD AUTO-RTO: 0 /100 WBCS
PLATELET # BLD AUTO: 346 THOUSANDS/UL (ref 149–390)
PMV BLD AUTO: 9.2 FL (ref 8.9–12.7)
POTASSIUM SERPL-SCNC: 3.7 MMOL/L (ref 3.5–5.3)
PROT SERPL-MCNC: 6.4 G/DL (ref 6.4–8.2)
RBC # BLD AUTO: 3.6 MILLION/UL (ref 3.81–5.12)
SODIUM SERPL-SCNC: 139 MMOL/L (ref 136–145)
WBC # BLD AUTO: 5.19 THOUSAND/UL (ref 4.31–10.16)

## 2019-11-27 PROCEDURE — 85025 COMPLETE CBC W/AUTO DIFF WBC: CPT | Performed by: INTERNAL MEDICINE

## 2019-11-27 PROCEDURE — 99225 PR SBSQ OBSERVATION CARE/DAY 25 MINUTES: CPT | Performed by: INTERNAL MEDICINE

## 2019-11-27 PROCEDURE — 80053 COMPREHEN METABOLIC PANEL: CPT | Performed by: INTERNAL MEDICINE

## 2019-11-27 RX ADMIN — ACETAMINOPHEN 650 MG: 325 TABLET ORAL at 08:33

## 2019-11-27 RX ADMIN — FOLIC ACID: 5 INJECTION, SOLUTION INTRAMUSCULAR; INTRAVENOUS; SUBCUTANEOUS at 08:27

## 2019-11-27 RX ADMIN — ACETAMINOPHEN 650 MG: 325 TABLET ORAL at 22:13

## 2019-11-27 RX ADMIN — SUCRALFATE 1 G: 1 TABLET ORAL at 17:47

## 2019-11-27 RX ADMIN — SUCRALFATE 1 G: 1 TABLET ORAL at 08:27

## 2019-11-27 RX ADMIN — Medication 1 TABLET: at 08:27

## 2019-11-27 RX ADMIN — PANTOPRAZOLE SODIUM 20 MG: 20 TABLET, DELAYED RELEASE ORAL at 16:03

## 2019-11-27 RX ADMIN — SUCRALFATE 1 G: 1 TABLET ORAL at 12:19

## 2019-11-27 RX ADMIN — SUCRALFATE 1 G: 1 TABLET ORAL at 22:13

## 2019-11-27 RX ADMIN — PANTOPRAZOLE SODIUM 20 MG: 20 TABLET, DELAYED RELEASE ORAL at 08:27

## 2019-11-27 RX ADMIN — ACETAMINOPHEN 650 MG: 325 TABLET ORAL at 16:06

## 2019-11-27 RX ADMIN — IRON SUCROSE 200 MG: 20 INJECTION, SOLUTION INTRAVENOUS at 08:27

## 2019-11-27 NOTE — ASSESSMENT & PLAN NOTE
· Blood transfusion was ordered on admission however she developed a sudden transfusion reaction which resolved after cessation of the transfusion and Benadryl  · Currently without any fever or jaundice, or recurrent throat itching or hives  · Continue venofer 200 mg today  · She still feels fatigued but hemodynamically stable with no active menstrual bleeding  · Hgb slightly up at 7 7  · Will not administer blood for now and risk another reaction since her counts are > 7 and rising     · She may not feel better until after a few days or weeks   · I encouraged her to get out of bed and see if she feels well enough to go home  · Plan to dc today or tomorrow and follow up with hematology on Friday as scheduled

## 2019-11-27 NOTE — PROGRESS NOTES
Progress Note - Feliz Vergara Elinor 1980, 46 y o  female MRN: [id number]    Unit/Bed#: E5 -01 Encounter: [id number]    Primary Care Provider: Sharifa Cohen PA-C   Date and time admitted to hospital: 11/25/2019  7:24 PM        * Symptomatic anemia  Assessment & Plan  · Blood transfusion was ordered on admission however she developed a sudden transfusion reaction which resolved after cessation of the transfusion and Benadryl  · Currently without any fever or jaundice, or recurrent throat itching or hives  · Continue venofer 200 mg today  · She still feels fatigued but hemodynamically stable with no active menstrual bleeding  · Hgb slightly up at 7 7  · Will not administer blood for now and risk another reaction since her counts are > 7 and rising  · She may not feel better until after a few days or weeks   · I encouraged her to get out of bed and see if she feels well enough to go home  · Plan to dc today or tomorrow and follow up with hematology on Friday as scheduled    Gastric hemorrhage due to atrophic gastritis  Assessment & Plan  Continue Protonix and Carafate    Menorrhagia with regular cycle  Assessment & Plan  · Outpatient follow-up with OBGYN  · Menorrhagia and decreased absorption of iron from gastric bypass status is the cause of her iron deficiency anemia  Obesity, Class II, BMI 35-39 9  Assessment & Plan  · Status post gastric bypass 11 years ago  · Continue regular follow-up with weight management    VTE Pharmacologic Prophylaxis:   Pharmacologic: none due to menorrhagia  Mechanical VTE Prophylaxis in Place: Yes    Patient Centered Rounds: Discussed with her nurse    Discussions with Specialists or Other Care Team Provider: None    Time Spent for Care: 20 minutes  More than 50% of total time spent on counseling and coordination of care as described above      Current Length of Stay: 0 day(s)    Current Patient Status: Observation   Certification Statement: The patient will continue to require additional inpatient hospital stay due to symptomatic anemia    Discharge Plan: possible dc later today or tomorrow    Code Status: Level 1 - Full Code      Subjective:   Feels tired and easily fatigued with exertion  Menstrual cycle bleeding resolved  Mild headache this am relieved with tylenol    Objective:     Vitals:   Temp (24hrs), Av 8 °F (36 6 °C), Min:97 4 °F (36 3 °C), Max:98 4 °F (36 9 °C)    Temp:  [97 4 °F (36 3 °C)-98 4 °F (36 9 °C)] 97 4 °F (36 3 °C)  HR:  [63-86] 63  Resp:  [16-18] 16  BP: (101-113)/(52-73) 113/58  SpO2:  [96 %-100 %] 100 %  Body mass index is 36 74 kg/m²  Input and Output Summary (last 24 hours): Intake/Output Summary (Last 24 hours) at 2019 1030  Last data filed at 2019 1202  Gross per 24 hour   Intake 358 ml   Output    Net 358 ml       Physical Exam:     Physical Exam   Constitutional: She is oriented to person, place, and time  She appears well-developed  No distress  HENT:   Head: Normocephalic and atraumatic  Eyes: No scleral icterus  Neck: No JVD present  Cardiovascular: Regular rhythm  Murmur heard  Pulmonary/Chest: No stridor  No respiratory distress  She has no wheezes  Abdominal: Soft  She exhibits no distension  There is no tenderness  There is no guarding  Musculoskeletal: She exhibits no edema or deformity  Neurological: She is alert and oriented to person, place, and time  Skin: Skin is warm and dry  She is not diaphoretic     Psychiatric:   Mood slightly depressed       Additional Data:     Labs:    Results from last 7 days   Lab Units 19  0452   WBC Thousand/uL 5 19   HEMOGLOBIN g/dL 7 7*   HEMATOCRIT % 26 0*   PLATELETS Thousands/uL 346   NEUTROS PCT % 34*   LYMPHS PCT % 52*   MONOS PCT % 10   EOS PCT % 4     Results from last 7 days   Lab Units 19  0452   SODIUM mmol/L 139   POTASSIUM mmol/L 3 7   CHLORIDE mmol/L 106   CO2 mmol/L 28   BUN mg/dL 8   CREATININE mg/dL 0 74   ANION GAP mmol/L 5   CALCIUM mg/dL 8 5   ALBUMIN g/dL 2 8*   TOTAL BILIRUBIN mg/dL 0 18*   ALK PHOS U/L 67   ALT U/L 12   AST U/L 19   GLUCOSE RANDOM mg/dL 92                           * I Have Reviewed All Lab Data Listed Above  * Additional Pertinent Lab Tests Reviewed: All Labs Within Last 24 Hours Reviewed    Imaging:    Imaging Reports Reviewed Today Include: none      Recent Cultures (last 7 days):           Last 24 Hours Medication List:     Current Facility-Administered Medications:  acetaminophen 650 mg Oral Q6H PRN Roselie Joe, PA-C    diphenhydrAMINE 25 mg Intravenous Q6H PRN Roselie Joe, PA-C    folic acid 1 mg, thiamine 100 mg in 0 9% sodium chloride 100 mL IVPB  Intravenous Daily DIGNA Banuelos    influenza vaccine 0 5 mL Intramuscular Once Ena Correa MD    iron sucrose 200 mg Intravenous Daily Ena Correa MD Last Rate: 200 mg (11/27/19 0827)   multivitamin-minerals 1 tablet Oral Daily Tialie Joe, PA-C    ondansetron 4 mg Intravenous Q6H PRN Kaylae Joe, PA-KATHRYN    pantoprazole 20 mg Oral BID AC Roselie Joe, PA-C    sucralfate 1 g Oral 4x Daily Kaylae Joe, PA-KATHRYN    traMADol 50 mg Oral Q6H PRN DIGNA Banuelos         Today, Patient Was Seen By: Ena Correa MD    ** Please Note: Dictation voice to text software may have been used in the creation of this document   **

## 2019-11-27 NOTE — UTILIZATION REVIEW
Continued Stay Review    Date:   11/27/2019                          Current Patient Class:   Observation  Current Level of Care:   Med surg    HPI:39 y o  female initially admitted on    Observation     11/25  @    2112    Assessment/Plan: Symptomatic anemia  Assessment & Plan  · Blood transfusion was ordered on admission however she developed a sudden transfusion reaction which resolved after cessation of the transfusion and Benadryl  · Currently without any fever or jaundice, or recurrent throat itching or hives  · Continue venofer 200 mg today  · She still feels fatigued but hemodynamically stable with no active menstrual bleeding  · Hgb slightly up at 7 7  · Will not administer blood for now and risk another reaction since her counts are > 7 and rising     · She may not feel better until after a few days or weeks   · I encouraged her to get out of bed and see if she feels well enough to go home  · Plan to dc today or tomorrow and follow up with hematology on Friday as scheduled     Gastric hemorrhage due to atrophic gastritis  Assessment & Plan  Continue Protonix and Carafate   Menorrhagia with regular cycle  Assessment & Plan  · Outpatient follow-up with OBGYN  · Menorrhagia and decreased absorption of iron from gastric bypass status is the cause of her iron deficiency anemia      Obesity, Class II, BMI 35-39 9  Assessment & Plan  · Status post gastric bypass 11 years ago  · Continue regular follow-up with weight management   The patient will continue to require additional inpatient hospital stay due to symptomatic anemia     Discharge Plan: possible dc later today or tomorrow       Pertinent Labs/Diagnostic Results:   Results from last 7 days   Lab Units 11/27/19  0452 11/26/19  0432 11/25/19 2009   WBC Thousand/uL 5 19 5 85 6 37   HEMOGLOBIN g/dL 7 7* 7 5* 7 6*   HEMATOCRIT % 26 0* 26 5* 25 9*   PLATELETS Thousands/uL 346 361 394*   NEUTROS ABS Thousands/µL 1 78*  --  2 24         Results from last 7 days Lab Units 11/27/19 0452 11/26/19 0432 11/25/19 2009   SODIUM mmol/L 139 141 139   POTASSIUM mmol/L 3 7 3 6 3 7   CHLORIDE mmol/L 106 107 104   CO2 mmol/L 28 26 29   ANION GAP mmol/L 5 8 6   BUN mg/dL 8 13 13   CREATININE mg/dL 0 74 0 76 0 87   EGFR ml/min/1 73sq m 103 100 85   CALCIUM mg/dL 8 5 8 4 8 6     Results from last 7 days   Lab Units 11/27/19 0452   AST U/L 19   ALT U/L 12   ALK PHOS U/L 67   TOTAL PROTEIN g/dL 6 4   ALBUMIN g/dL 2 8*   TOTAL BILIRUBIN mg/dL 0 18*         Results from last 7 days   Lab Units 11/27/19 0452 11/26/19 0432 11/25/19 2009   GLUCOSE RANDOM mg/dL 92 99 90           Results from last 7 days   Lab Units 11/25/19 2009   FERRITIN ng/mL 2*                     Results from last 7 days   Lab Units 11/26/19 0236 11/25/19 2022 11/25/19 2020   CLARITY UA   --  clear Clear   COLOR UA   --  yellow Yellow   SPEC GRAV UA   --   --  1 020   PH UA   --   --  7 0   GLUCOSE UA mg/dl  --   --  Negative   KETONES UA mg/dl  --   --  Negative   BLOOD UA  Negative  --  Negative   PROTEIN UA mg/dl  --   --  Negative   NITRITE UA   --   --  Negative   BILIRUBIN UA   --   --  Negative   UROBILINOGEN UA E U /dl  --   --  1 0   LEUKOCYTES UA   --   --  Negative         Vital Signs:   97 4 °F (36 3 °C)Abnormal   63  16  113/58  100 %  None (Room air)          Medications:   Scheduled Medications:    Medications:  folic acid 1 mg, thiamine 100 mg in 0 9% sodium chloride 100 mL IVPB  Intravenous Daily   influenza vaccine 0 5 mL Intramuscular Once   iron sucrose 200 mg Intravenous Daily   multivitamin-minerals 1 tablet Oral Daily   pantoprazole 20 mg Oral BID AC   sucralfate 1 g Oral 4x Daily     Continuous IV Infusions:     PRN Meds:    acetaminophen 650 mg Oral Q6H PRN   diphenhydrAMINE 25 mg Intravenous Q6H PRN   ondansetron 4 mg Intravenous Q6H PRN   traMADol 50 mg Oral Q6H PRN       Discharge Plan:   home    Network Utilization Review Department  [email or web address]  ATTENTION: Please call with any questions or concerns to [phone] and carefully listen to the prompts so that you are directed to the right person  All voicemails are confidential   Steffanie Sheets all requests for admission clinical reviews, approved or denied determinations and any other requests to dedicated fax number below belonging to the campus where the patient is receiving treatment    FACILITY NAME UR FAX NUMBER   ADMISSION DENIALS (Administrative/Medical Necessity) [address] [facility] (Maternity/NICU/Pediatrics) [phone]   [facility] [address] [facility] [address] [facility] [phone]   [facility] [address] [facility] [phone]   [facility] [address] [facility] [address] [place] [address] [phone]

## 2019-11-27 NOTE — SOCIAL WORK
Contacted patient to set up an office visit with Dr. Thorpe on 10/26/17.  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

## 2019-11-28 VITALS
OXYGEN SATURATION: 99 % | TEMPERATURE: 97.7 F | RESPIRATION RATE: 16 BRPM | HEART RATE: 76 BPM | WEIGHT: 214.07 LBS | DIASTOLIC BLOOD PRESSURE: 68 MMHG | HEIGHT: 64 IN | BODY MASS INDEX: 36.55 KG/M2 | SYSTOLIC BLOOD PRESSURE: 110 MMHG

## 2019-11-28 PROBLEM — K29.70 GASTRITIS: Status: ACTIVE | Noted: 2019-03-26

## 2019-11-28 LAB
BASOPHILS # BLD AUTO: 0.03 THOUSANDS/ΜL (ref 0–0.1)
BASOPHILS NFR BLD AUTO: 1 % (ref 0–1)
EOSINOPHIL # BLD AUTO: 0.17 THOUSAND/ΜL (ref 0–0.61)
EOSINOPHIL NFR BLD AUTO: 3 % (ref 0–6)
ERYTHROCYTE [DISTWIDTH] IN BLOOD BY AUTOMATED COUNT: 17.1 % (ref 11.6–15.1)
HCT VFR BLD AUTO: 27.4 % (ref 34.8–46.1)
HGB BLD-MCNC: 7.9 G/DL (ref 11.5–15.4)
IMM GRANULOCYTES # BLD AUTO: 0.01 THOUSAND/UL (ref 0–0.2)
IMM GRANULOCYTES NFR BLD AUTO: 0 % (ref 0–2)
LYMPHOCYTES # BLD AUTO: 2.97 THOUSANDS/ΜL (ref 0.6–4.47)
LYMPHOCYTES NFR BLD AUTO: 49 % (ref 14–44)
MCH RBC QN AUTO: 21 PG (ref 26.8–34.3)
MCHC RBC AUTO-ENTMCNC: 28.8 G/DL (ref 31.4–37.4)
MCV RBC AUTO: 73 FL (ref 82–98)
MONOCYTES # BLD AUTO: 0.56 THOUSAND/ΜL (ref 0.17–1.22)
MONOCYTES NFR BLD AUTO: 9 % (ref 4–12)
NEUTROPHILS # BLD AUTO: 2.28 THOUSANDS/ΜL (ref 1.85–7.62)
NEUTS SEG NFR BLD AUTO: 38 % (ref 43–75)
NRBC BLD AUTO-RTO: 0 /100 WBCS
PLATELET # BLD AUTO: 393 THOUSANDS/UL (ref 149–390)
PMV BLD AUTO: 10 FL (ref 8.9–12.7)
RBC # BLD AUTO: 3.77 MILLION/UL (ref 3.81–5.12)
WBC # BLD AUTO: 6.02 THOUSAND/UL (ref 4.31–10.16)

## 2019-11-28 PROCEDURE — 99217 PR OBSERVATION CARE DISCHARGE MANAGEMENT: CPT | Performed by: INTERNAL MEDICINE

## 2019-11-28 PROCEDURE — 85025 COMPLETE CBC W/AUTO DIFF WBC: CPT | Performed by: INTERNAL MEDICINE

## 2019-11-28 RX ADMIN — PANTOPRAZOLE SODIUM 20 MG: 20 TABLET, DELAYED RELEASE ORAL at 05:00

## 2019-11-28 RX ADMIN — Medication 1 TABLET: at 08:19

## 2019-11-28 RX ADMIN — SUCRALFATE 1 G: 1 TABLET ORAL at 12:21

## 2019-11-28 RX ADMIN — SUCRALFATE 1 G: 1 TABLET ORAL at 08:19

## 2019-11-28 RX ADMIN — ACETAMINOPHEN 650 MG: 325 TABLET ORAL at 06:27

## 2019-11-28 RX ADMIN — IRON SUCROSE 200 MG: 20 INJECTION, SOLUTION INTRAVENOUS at 08:19

## 2019-11-28 RX ADMIN — FOLIC ACID: 5 INJECTION, SOLUTION INTRAMUSCULAR; INTRAVENOUS; SUBCUTANEOUS at 08:19

## 2019-11-28 NOTE — ASSESSMENT & PLAN NOTE
Status post gastric sleeve  Previous EGD on 11/04/2019 with: Mild, generalized erythematous mucosa in the anastomosis of the stomach; performed 2 cold biopsies   Marginal ulceration  Continue Protonix and Carafate

## 2019-11-28 NOTE — UTILIZATION REVIEW
Continued Stay Review    Date: 11/28/2019                        Current Patient Class: OBS  Current Level of Care: [facility]    HPI:39 y o  female initially admitted on 11/25 @    2112  To OBSERVATION    Assessment/Plan:  Will Send additional info later today if pt not discharged    Pertinent Labs/Diagnostic Results:   Results from last 7 days   Lab Units 11/28/19 0455 11/27/19 0452 11/26/19 0432 11/25/19 2009   WBC Thousand/uL 6 02 5 19 5 85 6 37   HEMOGLOBIN g/dL 7 9* 7 7* 7 5* 7 6*   HEMATOCRIT % 27 4* 26 0* 26 5* 25 9*   PLATELETS Thousands/uL 393* 346 361 394*   NEUTROS ABS Thousands/µL 2 28 1 78*  --  2 24     Results from last 7 days   Lab Units 11/27/19 0452 11/26/19 0432 11/25/19 2009   SODIUM mmol/L 139 141 139   POTASSIUM mmol/L 3 7 3 6 3 7   CHLORIDE mmol/L 106 107 104   CO2 mmol/L 28 26 29   ANION GAP mmol/L 5 8 6   BUN mg/dL 8 13 13   CREATININE mg/dL 0 74 0 76 0 87   EGFR ml/min/1 73sq m 103 100 85   CALCIUM mg/dL 8 5 8 4 8 6     Results from last 7 days   Lab Units 11/27/19 0452   AST U/L 19   ALT U/L 12   ALK PHOS U/L 67   TOTAL PROTEIN g/dL 6 4   ALBUMIN g/dL 2 8*   TOTAL BILIRUBIN mg/dL 0 18*     Results from last 7 days   Lab Units 11/27/19 0452 11/26/19 0432 11/25/19 2009   GLUCOSE RANDOM mg/dL 92 99 90     Results from last 7 days   Lab Units 11/25/19 2009   FERRITIN ng/mL 2*     Results from last 7 days   Lab Units 11/26/19 0236 11/25/19 2022 11/25/19 2020   CLARITY UA   --  clear Clear   COLOR UA   --  yellow Yellow   SPEC GRAV UA   --   --  1 020   PH UA   --   --  7 0   GLUCOSE UA mg/dl  --   --  Negative   KETONES UA mg/dl  --   --  Negative   BLOOD UA  Negative  --  Negative   PROTEIN UA mg/dl  --   --  Negative   NITRITE UA   --   --  Negative   BILIRUBIN UA   --   --  Negative   UROBILINOGEN UA E U /dl  --   --  1 0   LEUKOCYTES UA   --   --  Negative     Vital Signs:   11/27/19 2328  98 1 °F (36 7 °C)  85  18  99/63  97 %  None (Room air)  Lying         Medications: Scheduled Medications:  Medications:  folic acid 1 mg, thiamine 100 mg in 0 9% sodium chloride 100 mL IVPB  Intravenous Daily   influenza vaccine 0 5 mL Intramuscular Once   iron sucrose 200 mg Intravenous Daily   multivitamin-minerals 1 tablet Oral Daily   pantoprazole 20 mg Oral BID AC   sucralfate 1 g Oral 4x Daily     Continuous IV Infusions:     PRN Meds:  acetaminophen 650 mg Oral Q6H PRN x 1 11/28 ( x 3 11/27)    diphenhydrAMINE 25 mg Intravenous Q6H PRN   ondansetron 4 mg Intravenous Q6H PRN   traMADol 50 mg Oral Q6H PRN       Discharge Plan: tbd         Martín Valles RN   Registered Nurse   Utilization Review   Utilization Review   Signed   Date of Service:  11/26/2019  9:28 AM               Signed        Expand All Collapse All      Show:Clear all  [x]Manual[x]Template[x]Copied    Added by:  [x]Angela Connor RN    []Vern for details  Initial Clinical Review     Admission: Date/Time/Statement:   Observation   11/25 @    2112        Orders Placed This Encounter   Procedures    Place in Observation       Standing Status:   Standing       Number of Occurrences:   1       Order Specific Question:   Admitting Physician       Answer:   Mahendra Vazquez [[id number]]       Order Specific Question:   Level of Care       Answer:   Med Surg [16]                ED Arrival Information      Expected Arrival Acuity Means of Arrival Escorted By Service Admission Type     - 11/25/2019 19:09 Urgent Wheelchair Family Member General Medicine Urgent     Arrival Complaint     Palpitations,Weakness               Chief Complaint   Patient presents with    Medical Problem       Pt reports palpitations that started today, dizziness, generalized weakness, headache, and pelvic pain for two weeks  Denies vaginal bleeding or discharge  Denies fever        Assessment/Plan: Symptomatic anemia  Assessment & Plan  Admit to med/surg  Blood transfusion ordered by ED, consent obtained  Iron studies ordered prior to transfusion  Monitor H&H  Has appt with hematology 11/29     Pelvic pain  Assessment & Plan  Recent miscarriage with D&E 9/25  Had heavy bleeding for 2 weeks after procedure but now menses normal flow but longer in duration than normal (greater than 7 days)  Recent pelvic ultrasound 11/12 reports hx right oophorectomy and no adnexal mass  Follow up with GYN as outpatient     Gastroesophageal reflux disease  Assessment & Plan  Continue home meds     Postsurgical malabsorption  Assessment & Plan  S/p gastric sleeve 11 years ago with revision 7 months ago  Follows with bariatrics     Stanley Melyssa Aldrich is a 46 y  o  female who presents with dizziness for 3 weeks, worse today  Forced her to pull over while driving today  She has known anemia  She has postsurgical malabsorption syndrome  She also recently had a miscarriage and had bleeding after D&E  She has never required blood transfusion before  She also reports palpitations  She does follow with GYN and bariatrics   She is scheduled to see hematology on friday              ED Triage Vitals   Temperature Pulse Respirations Blood Pressure SpO2   11/25/19 1920 11/25/19 1920 11/25/19 1920 11/25/19 1920 11/25/19 1920   97 7 °F (36 5 °C) 66 16 111/67 100 %       Temp Source Heart Rate Source Patient Position - Orthostatic VS BP Location FiO2 (%)   11/25/19 1920 11/25/19 1920 11/25/19 2057 11/25/19 1920 --   Oral Monitor Lying Right arm         Pain Score           11/25/19 1920           9                Wt Readings from Last 1 Encounters:   11/25/19 97 1 kg (214 lb 1 1 oz)      Additional Vital Signs:   11/26/19 0713   98 9 °F (37 2 °C)   72   18   98/59   99 %   None (Room air)   Lying   11/26/19 0555            100/64         Sitting   11/26/19 0235            100/66         Lying   11/26/19 0133            104/68   98 %      Lying   11/26/19 0126   98 7 °F (37 1 °C)   73                  11/26/19 0100      79      86/49Abnormal          Lying 11/26/19 0045      75      91/53   99 %      Lying   11/26/19 0030      69      95/50         Lying   11/26/19 0015      69      102/51         Lying   11/26/19 0000   98 °F (36 7 °C)   71      115/52   98 %      Lying   11/25/19 2325   98 °F (36 7 °C)   67   18   113/55   98 %         11/25/19 2258         22               11/25/19 2253   98 5 °F (36 9 °C)   69      98/55   98 %         11/25/19 2232   98 4 °F (36 9 °C)                     11/25/19 2215      66   18   112/58   99 %         11/25/19 2150   98 °F (36 7 °C)   63   18   103/55   99 %         11/25/19 2057      70   16   112/60   100 %   None (Room air)   Lying   11/25/19 1920   97 7 °F (36 5 °C)   66   16   111/67   100 %   None (Room air)        Pertinent Labs/Diagnostic Test Results:         Results from last 7 days   Lab Units 11/26/19 0432 11/25/19 2009   WBC Thousand/uL 5 85 6 37   HEMOGLOBIN g/dL 7 5* 7 6*   HEMATOCRIT % 26 5* 25 9*   PLATELETS Thousands/uL 361 394*   NEUTROS ABS Thousands/µL  --  2 24                Results from last 7 days   Lab Units 11/26/19  0432 11/25/19 2009   SODIUM mmol/L 141 139   POTASSIUM mmol/L 3 6 3 7   CHLORIDE mmol/L 107 104   CO2 mmol/L 26 29   ANION GAP mmol/L 8 6   BUN mg/dL 13 13   CREATININE mg/dL 0 76 0 87   EGFR ml/min/1 73sq m 100 85   CALCIUM mg/dL 8 4 8 6                    Results from last 7 days   Lab Units 11/26/19  0432 11/25/19 2009   GLUCOSE RANDOM mg/dL 99 90            Results from last 7 days   Lab Units 11/25/19 2009   FERRITIN ng/mL 2*                   Results from last 7 days   Lab Units 11/26/19 0236 11/25/19 2022 11/25/19 2020   CLARITY UA    --  clear Clear   COLOR UA    --  yellow Yellow   SPEC GRAV UA    --   --  1 020   PH UA    --   --  7 0   GLUCOSE UA mg/dl  --   --  Negative   KETONES UA mg/dl  --   --  Negative   BLOOD UA   Negative  --  Negative   PROTEIN UA mg/dl  --   --  Negative   NITRITE UA    --   --  Negative   BILIRUBIN UA   --   --  Negative   UROBILINOGEN UA E U /dl  --   --  1 0   LEUKOCYTES UA    --   --  Negative          ED Treatment:               Medication Administration from 2019 1909 to 2019 2140        Date/Time Order Dose Route Action Action by Comments       2019 sodium chloride 0 9 % bolus 1,000 mL 1,000 mL Intravenous New Bag Ezio Celis RN            Medical History        Past Medical History:   Diagnosis Date    Abdominal pain       "almost constant"    Anemia      Anesthesia       "woke up swinging with last  2018  "was fine with EGD"    Bariatric surgery status      Dental bridge present       right lower permanent    Depression       "not currently taking any meds"    Difficulty swallowing       "in the past"    Disease of thyroid gland       not on meds now    Dizzy      Fatigue       "when blood pressure low" and weakness too"    GERD (gastroesophageal reflux disease)       "increase after surgery"    Heart murmur       heart murmer, work up negative with holter monitor    History of 2  sections       most recent 17    History of D&C 2019    History of iron deficiency       anemia/ had IV infusions through pregnancy in [id number]    Inguinal hernia       right    Loss of appetite      Low BP       "off and on"    Migraine      N&V (nausea and vomiting)       " a little better since on meds"    Non-intractable vomiting       "not since been on medicine"    Palpitations       "having again"    Postgastrectomy malabsorption      Stomach pain           Present on Admission:   Postsurgical malabsorption   Pelvic pain   Gastroesophageal reflux disease        Admitting Diagnosis: Palpitations [R00 2]  Symptomatic anemia [D64 9]  Age/Sex: 46 y o  female  Admission Orders:  Scheduled Medications:     Medications:  ferrous sulfate 325 mg Oral Daily With Breakfast   influenza vaccine 0 5 mL Intramuscular Once   multivitamin-minerals 1 tablet Oral Daily pantoprazole 20 mg Oral BID AC   sucralfate 1 g Oral 4x Daily      Continuous IV Infusions:  PRN Meds:     acetaminophen 650 mg Oral Q6H PRN   diphenhydrAMINE 25 mg Intravenous Q6H PRN   ondansetron 4 mg Intravenous Q6H PRN    Network Utilization Review Department  [email or web address]  ATTENTION: Please call with any questions or concerns to [phone] and carefully listen to the prompts so that you are directed to the right person  All voicemails are confidential   River Curtis all requests for admission clinical reviews, approved or denied determinations and any other requests to dedicated fax number below belonging to the [facility] where the patient is receiving treatment  FACILITY NAME UR FAX NUMBER   ADMISSION DENIALS (Administrative/Medical Necessity) [phone]   PARENT [facility] (Maternity/NICU/Pediatrics) [phone]   [facility] [phone]   [facility] [address] [facility] [phone]   [address] [phone]   [facility] [phone]   [address] [place] [address] [phone]                           Network Utilization Review Department  [email or web address]  ATTENTION: Please call with any questions or concerns to [phone] and carefully listen to the prompts so that you are directed to the right person  All voicemails are confidential   River Curtis all requests for admission clinical reviews, approved or denied determinations and any other requests to dedicated fax number below belonging to the campus where the patient is receiving treatment    FACILITY NAME UR FAX NUMBER   ADMISSION DENIALS (Administrative/Medical Necessity) [phone]   [facility] (Maternity/NICU/Pediatrics) [place] [address] [facility] [phone]   Noel Evans [phone]   [facility] [address]amin [address] [facility] [phone]   [place] [phone]

## 2019-11-28 NOTE — DISCHARGE SUMMARY
Discharge- Lucio Aldrich 1980, 46 y o  female MRN: [id number]    Unit/Bed#: E5 -01 Encounter: [id number]    Primary Care Provider: Paige Duarte PA-C   Date and time admitted to hospital: 11/25/2019  7:24 PM        * Symptomatic anemia  Assessment & Plan  · Secondary to menorrhagia and poor iron absorption due to gastric sleeve status  · Blood transfusion was ordered on admission however she developed a sudden transfusion reaction which resolved after cessation of the transfusion and Benadryl  · No further reactions since  · Received a total of 600 mg of IV Venofer  · Hemoglobin slowly improving  This is up to 7 9  · She has a scheduled follow-up with Hematology tomorrow  · She is stable for discharge home  She may resume regular activities but advised to avoid strenuous exercise for now given she has not fully improved yet  · She knows to follow up with her cardiologist regarding her known heart murmur  Her symptoms of fatigue and decreased exercise tolerance are likely due to her anemia  Gastritis  Assessment & Plan  Status post gastric sleeve  Previous EGD on 11/04/2019 with: Mild, generalized erythematous mucosa in the anastomosis of the stomach; performed 2 cold biopsies  Marginal ulceration  Continue Protonix and Carafate    Menorrhagia with regular cycle  Assessment & Plan  · Outpatient follow-up with OBGYN  · Menorrhagia and decreased absorption of iron from gastric bypass status is the cause of her iron deficiency anemia      Obesity, Class II, BMI 35-39 9  Assessment & Plan  · Status post gastric bypass 11 years ago  · Continue regular follow-up with weight management      Discharging Physician / Practitioner: Aditi Bah MD  PCP: Paige Duarte PA-C  Admission Date:   Admission Orders (From admission, onward)     Ordered        11/25/19 2112  Place in Observation  Once                   Discharge Date: 11/28/19    Resolved Problems  Date Reviewed: 11/28/2019 None          Consultations During Hospital Stay:  · none    Procedures Performed:   · none    Significant Findings / Test Results:   · hgb 7 5    Incidental Findings:   · none     Test Results Pending at Discharge (will require follow up):   · none     Outpatient Tests Requested:  · none    Complications:      Reason for Admission:  Dizziness    Hospital Course:     Ho Oliveira is a 46 y o  female patient who originally presented to the hospital on 11/25/2019 due to dizziness, fatigue, dyspnea on exertion  She has known history of obesity status post remote gastric sleeve 11 years ago  She also has had menorrhagia since her recent D and C in September 2019  She had an EGD in November 4 2019 which showed gastritis and marginal ulceration  She was found to have a hemoglobin of 7 5  A blood transfusion was ordered on admission however 15 minutes into her transfusion she developed chest itching, throat itching and difficulty swallowing  Due to this her transfusion was stopped and she was given IV Benadryl  She had no further reactions since  She was treated with intravenous Venofer  She received a total of 600 mg  Her dizziness and fatigue are slowly improving  She had finished her menstrual period this month and had no further bleeding  Since starting the Venofer her counts are starting to improve slowly  Her hemoglobin on discharge is now 7 9  She has an appointment with Hematology on 11/29/2019 for her iron deficiency anemia which is due to blood loss from menorrhagia and malabsorption from her gastric sleeve status  Plan of care was discussed with the patient and her   Please see above list of diagnoses and related plan for additional information  Condition at Discharge: good     Discharge Day Visit / Exam:     Subjective:  Improving dizziness and fatigue  No further bleeding  Ena Sykes to go home    Vitals: Blood Pressure: 110/68 (11/28/19 0839)  Pulse: 76 (11/28/19 2734)  Temperature: 97 7 °F (36 5 °C) (11/28/19 0839)  Temp Source: Temporal (11/28/19 0839)  Respirations: 16 (11/28/19 0839)  Height: 5' 4" (162 6 cm) (11/25/19 2150)  Weight - Scale: 97 1 kg (214 lb 1 1 oz) (11/25/19 2150)  SpO2: 99 % (11/28/19 0839)  Exam:   Physical Exam   Constitutional: She is oriented to person, place, and time  She appears well-developed  No distress  HENT:   Head: Normocephalic and atraumatic  Eyes: No scleral icterus  Neck: No JVD present  Cardiovascular: Regular rhythm  Exam reveals no gallop and no friction rub  Murmur heard  Pulmonary/Chest: Effort normal  No stridor  No respiratory distress  She has no wheezes  Abdominal: Soft  She exhibits no distension  There is no tenderness  There is no guarding  Musculoskeletal: She exhibits no edema  Neurological: She is alert and oriented to person, place, and time  Skin: Skin is warm and dry  She is not diaphoretic  Psychiatric: She has a normal mood and affect  Her behavior is normal        Discussion with Family:      Discharge instructions/Information to patient and family:   See after visit summary for information provided to patient and family  Provisions for Follow-Up Care:  See after visit summary for information related to follow-up care and any pertinent home health orders  Disposition:     Home    Planned Readmission: no     Discharge Statement:  I spent <30 minutes discharging the patient  This time was spent on the day of discharge  I had direct contact with the patient on the day of discharge  Greater than 50% of the total time was spent examining patient, answering all patient questions, arranging and discussing plan of care with patient as well as directly providing post-discharge instructions  Additional time then spent on discharge activities  Discharge Medications:  See after visit summary for reconciled discharge medications provided to patient and family        ** Please Note: This note has been constructed using a voice recognition system **

## 2019-11-28 NOTE — DISCHARGE INSTR - AVS FIRST PAGE
· Avoid strenuous exercise  · Resume regular activities as tolerated  · Follow up with Hematology as scheduled tomorrow 11/29/19  · Follow up with your cardiologist regarding your heart murmur

## 2019-11-28 NOTE — ASSESSMENT & PLAN NOTE
· Secondary to menorrhagia and poor iron absorption due to gastric sleeve status  · Blood transfusion was ordered on admission however she developed a sudden transfusion reaction which resolved after cessation of the transfusion and Benadryl  · No further reactions since  · Received a total of 600 mg of IV Venofer  · Hemoglobin slowly improving  This is up to 7 9  · She has a scheduled follow-up with Hematology tomorrow  · She is stable for discharge home  She may resume regular activities but advised to avoid strenuous exercise for now given she has not fully improved yet  · She knows to follow up with her cardiologist regarding her known heart murmur  Her symptoms of fatigue and decreased exercise tolerance are likely due to her anemia

## 2019-11-29 ENCOUNTER — CONSULT (OUTPATIENT)
Dept: HEMATOLOGY ONCOLOGY | Facility: CLINIC | Age: 39
End: 2019-11-29
Payer: COMMERCIAL

## 2019-11-29 VITALS
HEIGHT: 64 IN | SYSTOLIC BLOOD PRESSURE: 110 MMHG | DIASTOLIC BLOOD PRESSURE: 70 MMHG | RESPIRATION RATE: 14 BRPM | BODY MASS INDEX: 36.7 KG/M2 | TEMPERATURE: 98.7 F | WEIGHT: 215 LBS | HEART RATE: 72 BPM

## 2019-11-29 DIAGNOSIS — Z98.84 BARIATRIC SURGERY STATUS: Chronic | ICD-10-CM

## 2019-11-29 DIAGNOSIS — D50.8 IRON DEFICIENCY ANEMIA SECONDARY TO INADEQUATE DIETARY IRON INTAKE: Primary | ICD-10-CM

## 2019-11-29 PROCEDURE — 99243 OFF/OP CNSLTJ NEW/EST LOW 30: CPT | Performed by: PHYSICIAN ASSISTANT

## 2019-11-29 RX ORDER — SODIUM CHLORIDE 9 MG/ML
20 INJECTION, SOLUTION INTRAVENOUS ONCE
Status: CANCELLED | OUTPATIENT
Start: 2019-12-02

## 2019-11-29 NOTE — PROGRESS NOTES
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  She follows with Dr Amara Kemp  HPI:  Carlos Melena is a 46 y o  seen for initial consultation 11/29/2019 at the referral of Samantha Gutierrez MD regarding iron deficiency anemia  She was admitted November 25th through November 28, 2019 due to symptomatic anemia  11/25/2019 hemoglobin 7 6, MCV of 72, white blood cell count 6 37 with 35% neutrophils, 52% lymphocytes, platelet count 620,965  Iron saturation 2%, ferritin 2  November 28th, hemoglobin improved to 7 9  She is status post laparoscopic conversion of sleeve gastrectomy to Karmen-en-Y gastric bypass for intractable heartburn, performed on 04/08/2019  [id number]  Her three miscarriages followed two normal uncomplicated deliveries  Most recent miscarriage 9/2019  Normal cardiolipin and beta 2 glycoprotein antibodies, no lupus anticoagulant identified on labs 9/2018 at [facility]  Menses are heavy  Most recent lasted 10 days    EGD 11/20/2019:  Mild, generalized erythematous mucosa in the anastomosis of the stomach    11/4/2019: , normal copper  9/2019:  Hemoglobin 9 8, MCV 76  4/9/2019 hemoglobin 11 1, MCV 88  February 2019 hemoglobin 12 9, MCV of 86    10/2014:  Iron saturation 3%, ferritin 2 7          Blood transfusion was ordered on admission however she had a  transfusion reaction which resolved after cessation of the transfusion and Benadryl  She received a total of Venofer 600 milligrams inpatient  She feels better now since she received IV iron but still feels tired and lightheaded if she stands too quickly  Palpitations have improved  SOB improved  Test Results:      Labs:   Lab Results   Component Value Date    HGB 7 9 (L) 11/28/2019    HCT 27 4 (L) 11/28/2019    MCV 73 (L) 11/28/2019     (H) 11/28/2019    WBC 6 02 11/28/2019    NRBC 0 11/28/2019     Lab Results   Component Value Date     10/15/2014    K 3 7 11/27/2019     11/27/2019    CO2 28 11/27/2019    ANIONGAP 10 10/15/2014    BUN 8 11/27/2019    CREATININE 0 74 11/27/2019    GLUCOSE 91 10/15/2014    GLUF 94 11/18/2019    CALCIUM 8 5 11/27/2019    AST 19 11/27/2019    ALT 12 11/27/2019    ALKPHOS 67 11/27/2019    PROT 8 1 10/15/2014    BILITOT 0 2 10/15/2014    EGFR 103 11/27/2019           Imaging:   Us Pelvis Complete W Transvaginal    Result Date: 11/13/2019  Narrative: PELVIC ULTRASOUND, COMPLETE INDICATION:  46years old  R10 2: Pelvic and perineal pain  COMPARISON: CT pelvis 10/29/2019 and OB sonogram 9/23/2019 TECHNIQUE:   Transabdominal pelvic ultrasound was performed in sagittal and transverse planes with a curvilinear transducer  Additional transvaginal imaging was performed to better evaluate the endometrium and ovaries  Imaging included volumetric sweeps as well as traditional still imaging technique  FINDINGS: UTERUS: The uterus is anteverted in position, measuring 12 7 x 3 2 x 6 2 cm  Contour and echotexture appear normal  The cervix shows no suspicious abnormality  Small simple nabothian cyst  ENDOMETRIUM:  Normal caliber of 10 mm  Homogeneous and normal in appearance  OVARIES/ADNEXA: Right ovary:  Post oophorectomy  Left ovary:  3 9 x 2 1 x 1 8 cm  No suspicious left ovarian abnormality   There is a thick-walled nodule, central low level internal echoes with a crenulated margin, and minimal peripheral hypervascularity compatible with a corpus luteum measuring 2 x 1 5 x 1 6 cm  Doppler flow within normal limits  No suspicious adnexal mass or loculated collections  Trace free fluid in the left adnexa  Impression:  Normal premenopausal sonographic appearance of the uterus and left ovary with involuting 2 cm corpus luteum  Post right oophorectomy  No abnormal adnexal mass  Workstation performed: [id number]           ROS: As mentioned in HPI & Interval History otherwise 14 point ROS negative        Active Problems:   Patient Active Problem List   Diagnosis    Bariatric surgery status    Postsurgical malabsorption    Obesity, Class II, BMI 35-39 9    Bilious vomiting with nausea    Chronic vomiting    Gastritis    Abdominal pain    Symptomatic anemia    S/P laparoscopic sleeve gastrectomy    Heart burn    Epigastric abdominal pain    Other constipation    Decreased oral intake    Dysphagia    History of multiple miscarriages    Pouchitis (HCC)    Leg swelling    Pregnancy complicated by previous bariatric surgery, first trimester    Amenorrhea    Missed     Status post suction D&C    BV (bacterial vaginosis)    Pelvic pain    Menorrhagia with regular cycle       Past Medical History:   Past Medical History:   Diagnosis Date    Abdominal pain     "almost constant"    Anemia     Anesthesia     "woke up swinging with last  2018  "was fine with EGD"    Bariatric surgery status     Dental bridge present     right lower permanent    Depression     "not currently taking any meds"    Difficulty swallowing     "in the past"    Disease of thyroid gland     not on meds now    Dizzy     Fatigue     "when blood pressure low" and weakness too"    GERD (gastroesophageal reflux disease)     "increase after surgery"    Heart murmur     heart murmer, work up negative with holter monitor    History of 2  sections     most recent 17    History of D&C 2019    History of iron deficiency     anemia/ had IV infusions through pregnancy in [id number]    Inguinal hernia     right    Loss of appetite     Low BP     "off and on"    Migraine     N&V (nausea and vomiting)     " a little better since on meds"    Non-intractable vomiting     "not since been on medicine"    Palpitations     "having again"    Postgastrectomy malabsorption     Stomach pain        Surgical History:   Past Surgical History:   Procedure Laterality Date     SECTION      x2    CHOLECYSTECTOMY      CHROMOSOME ANALYSIS, PRODUCTS OF CONCEPTION (HISTORICAL)      2 miscarriages in  and Nov    OVARIAN CYST REMOVAL Right     ND EGD TRANSORAL BIOPSY SINGLE/MULTIPLE N/A 2019    Procedure: ESOPHAGOGASTRODUODENOSCOPY (EGD) with bx;  Surgeon: yKm Loredo MD;  Location: [facility]; Service: Bariatrics    ND LAP GASTRIC BYPASS/SREE-EN-Y N/A 2019    Procedure: LAP SREE-EN-Y GASTRIC BYPASS, INTRAOP EGD;  Surgeon: Kym Loredo MD;  Location: [facility];  Service: Stella ISAAC,1ST TRI N/A 2019    Procedure: DILATATION AND EVACUATION (D&E) (8 weeks) missed ab;  Surgeon: Devin Hawk MD;  Location: [facility];  Service: Gynecology    REVISION BYPASS LAPAROSCOPIC N/A 2019    Procedure: LAP REVISION/ CONVERSION;  Surgeon: Kym Loredo MD;  Location: [facility];  Service: Bariatrics    SALPINGOOPHORECTOMY Right 2018    SLEEVE GASTROPLASTY         Family History:    Family History   Problem Relation Age of Onset    Hypertension Mother     Heart disease Mother     No Known Problems Father        Cancer-related family history is not on file      Social History:   Social History     Socioeconomic History    Marital status: /Civil Union     Spouse name: Not on file    Number of children: Not on file    Years of education: Not on file    Highest education level: Not on file   Occupational History    Not on file   Social Needs    Financial resource strain: Not on file    Food insecurity:     Worry: Not on file     Inability: Not on file    Transportation needs:     Medical: Not on file     Non-medical: Not on file   Tobacco Use    Smoking status: Never Smoker    Smokeless tobacco: Never Used   Substance and Sexual Activity    Alcohol use: Not Currently     Frequency: 2-4 times a month     Drinks per session: 1 or 2     Binge frequency: Never     Comment: social    Drug use: No    Sexual activity: Yes     Partners: Male     Birth control/protection: Condom Male   Lifestyle    Physical activity:     Days per week: Not on file     Minutes per session: Not on file    Stress: Not on file   Relationships    Social connections:     Talks on phone: Not on file     Gets together: Not on file     Attends [employer] service: Not on file     Active member of club or organization: Not on file     Attends meetings of clubs or organizations: Not on file     Relationship status: Not on file    Intimate partner violence:     Fear of current or ex partner: Not on file     Emotionally abused: Not on file     Physically abused: Not on file     Forced sexual activity: Not on file   Other Topics Concern    Not on file   Social History Narrative    Not on file       Current Medications:   Current Outpatient Medications   Medication Sig Dispense Refill    ferrous sulfate 325 (65 Fe) mg tablet Take 325 mg by mouth daily with breakfast      Multiple Vitamins-Minerals (MULTIVITAMIN ADULT PO) Take by mouth      pantoprazole (PROTONIX) 20 mg tablet Take 20 mg by mouth daily      sucralfate (CARAFATE) 1 g tablet Take by mouth 4 (four) times a day       No current facility-administered medications for this visit  Allergies:    Allergies   Allergen Reactions    Aspirin Anaphylaxis    Shellfish-Derived Products Anaphylaxis    Contrast [Iodinated Diagnostic Agents] Itching    Ibuprofen Edema    Reglan [Metoclopramide] Physical Exam:    There is no height or weight on file to calculate BSA  Ht Readings from Last 3 Encounters:   11/25/19 5' 4" (1 626 m)   10/31/19 5' 5 5" (1 664 m)   09/25/19 5' 5" (1 651 m)       Wt Readings from Last 3 Encounters:   11/25/19 97 1 kg (214 lb 1 1 oz)   11/11/19 98 9 kg (218 lb)   10/31/19 99 3 kg (219 lb)        Temp Readings from Last 3 Encounters:   11/28/19 97 7 °F (36 5 °C) (Temporal)   11/20/19 97 8 °F (36 6 °C) (Temporal)   10/31/19 98 5 °F (36 9 °C) (Tympanic)        BP Readings from Last 3 Encounters:   11/28/19 110/68   11/20/19 100/55   11/11/19 120/62         Pulse Readings from Last 3 Encounters:   11/28/19 76   11/20/19 61   10/31/19 75         Physical Exam    Physical Exam   Constitutional: She is oriented to person, place, and time  She appears well-developed and well-nourished  No distress  HENT:   Head: Normocephalic and atraumatic  Eyes: Conjunctivae are normal    Neck: Normal range of motion  Neck supple  No tracheal deviation present  Cardiovascular: Normal rate and regular rhythm  Exam reveals no gallop and no friction rub  No murmur heard  Pulmonary/Chest: Effort normal and breath sounds normal  No respiratory distress  She has no wheezes  She has no rales  She exhibits no tenderness  Abdominal: Soft  She exhibits no distension  There is no tenderness  Lymphadenopathy:     She has no cervical adenopathy  Neurological: She is alert and oriented to person, place, and time  Skin: Skin is warm and dry  She is not diaphoretic  No erythema  No pallor  Psychiatric: She has a normal mood and affect  Her behavior is normal  Judgment and thought content normal    Vitals reviewed            Emergency Contacts:    Mickey Crum, [phone],

## 2019-12-05 ENCOUNTER — HOSPITAL ENCOUNTER (OUTPATIENT)
Dept: INFUSION CENTER | Facility: HOSPITAL | Age: 39
End: 2019-12-05
Attending: INTERNAL MEDICINE

## 2019-12-07 ENCOUNTER — HOSPITAL ENCOUNTER (OUTPATIENT)
Dept: INFUSION CENTER | Facility: HOSPITAL | Age: 39
Discharge: HOME | End: 2019-12-07
Attending: INTERNAL MEDICINE
Payer: COMMERCIAL

## 2019-12-07 VITALS
TEMPERATURE: 98.3 F | HEART RATE: 61 BPM | DIASTOLIC BLOOD PRESSURE: 60 MMHG | RESPIRATION RATE: 16 BRPM | SYSTOLIC BLOOD PRESSURE: 116 MMHG

## 2019-12-07 DIAGNOSIS — K91.2 POSTSURGICAL MALABSORPTION: ICD-10-CM

## 2019-12-07 DIAGNOSIS — D50.8 IRON DEFICIENCY ANEMIA SECONDARY TO INADEQUATE DIETARY IRON INTAKE: Primary | ICD-10-CM

## 2019-12-07 DIAGNOSIS — R63.8 DECREASED ORAL INTAKE: ICD-10-CM

## 2019-12-07 DIAGNOSIS — Z98.84 BARIATRIC SURGERY STATUS: ICD-10-CM

## 2019-12-07 PROCEDURE — 96366 THER/PROPH/DIAG IV INF ADDON: CPT

## 2019-12-07 PROCEDURE — 96365 THER/PROPH/DIAG IV INF INIT: CPT

## 2019-12-07 RX ORDER — SODIUM CHLORIDE 9 MG/ML
20 INJECTION, SOLUTION INTRAVENOUS ONCE
Status: CANCELLED | OUTPATIENT
Start: 2019-12-14

## 2019-12-07 RX ORDER — SODIUM CHLORIDE 9 MG/ML
20 INJECTION, SOLUTION INTRAVENOUS ONCE
Status: COMPLETED | OUTPATIENT
Start: 2019-12-07 | End: 2019-12-07

## 2019-12-07 RX ADMIN — IRON SUCROSE 300 MG: 20 INJECTION, SOLUTION INTRAVENOUS at 10:58

## 2019-12-07 RX ADMIN — SODIUM CHLORIDE 20 ML/HR: 0.9 INJECTION, SOLUTION INTRAVENOUS at 10:30

## 2019-12-07 NOTE — PROGRESS NOTES
Pt here for #1/6 venofer today  Patient tolerated well without complications  Confirmed appt back next week, pt can only do Saturdays, AVS provided

## 2019-12-07 NOTE — PLAN OF CARE
Problem: Potential for Falls  Goal: Patient will remain free of falls  Description  INTERVENTIONS:  - Assess patient frequently for physical needs  -  Identify cognitive and physical deficits and behaviors that affect risk of falls    -  [place] fall precautions as indicated by assessment   - Educate patient/family on patient safety including physical limitations  - Instruct patient to call for assistance with activity based on assessment  - Modify environment to reduce risk of injury  - Consider OT/PT consult to assist with strengthening/mobility  Outcome: Progressing

## 2019-12-09 ENCOUNTER — TELEPHONE (OUTPATIENT)
Dept: BARIATRICS | Facility: CLINIC | Age: 39
End: 2019-12-09

## 2019-12-09 NOTE — TELEPHONE ENCOUNTER
patient called concerned about EGD results please read note    Patient recently had EGD on 11/20/19 with Dr Opal Parks and would like to come in to review the results however cannot attend the appointment times that we have available  She is concerned that she has 'bacteria' , as she received a message from Dr Opal Parks saying so  Also, she is in a lot of pain around her  abdominal area  She is not eating a lot of food because it's painful, only shakes and soft food  She states that she has lost a lot of weight over the past few days

## 2019-12-10 DIAGNOSIS — E66.01 MORBID (SEVERE) OBESITY DUE TO EXCESS CALORIES (HCC): Primary | ICD-10-CM

## 2019-12-10 DIAGNOSIS — A04.8 HELICOBACTER PYLORI INFECTION: ICD-10-CM

## 2019-12-10 RX ORDER — OMEPRAZOLE 20 MG/1
20 CAPSULE, DELAYED RELEASE ORAL 2 TIMES DAILY
Qty: 28 CAPSULE | Refills: 0 | Status: SHIPPED | OUTPATIENT
Start: 2019-12-10 | End: 2020-01-30

## 2019-12-10 RX ORDER — CLARITHROMYCIN 500 MG/1
500 TABLET, COATED ORAL 2 TIMES DAILY
Qty: 28 TABLET | Refills: 0 | Status: SHIPPED | OUTPATIENT
Start: 2019-12-10 | End: 2019-12-24

## 2019-12-10 RX ORDER — AMOXICILLIN 500 MG/1
1000 CAPSULE ORAL 2 TIMES DAILY
Qty: 56 CAPSULE | Refills: 0 | Status: SHIPPED | OUTPATIENT
Start: 2019-12-10 | End: 2019-12-24

## 2019-12-12 ENCOUNTER — OFFICE VISIT (OUTPATIENT)
Dept: OBGYN CLINIC | Facility: CLINIC | Age: 39
End: 2019-12-12
Payer: COMMERCIAL

## 2019-12-12 VITALS — SYSTOLIC BLOOD PRESSURE: 112 MMHG | WEIGHT: 216 LBS | BODY MASS INDEX: 37.08 KG/M2 | DIASTOLIC BLOOD PRESSURE: 62 MMHG

## 2019-12-12 DIAGNOSIS — N93.9 ABNORMAL UTERINE BLEEDING (AUB): Primary | ICD-10-CM

## 2019-12-12 DIAGNOSIS — R10.2 PELVIC PAIN: ICD-10-CM

## 2019-12-12 DIAGNOSIS — N96 HISTORY OF MULTIPLE MISCARRIAGES: ICD-10-CM

## 2019-12-12 DIAGNOSIS — Z98.84 S/P LAPAROSCOPIC SLEEVE GASTRECTOMY: ICD-10-CM

## 2019-12-12 LAB — SL AMB POCT URINE HCG: NEGATIVE

## 2019-12-12 PROCEDURE — 81025 URINE PREGNANCY TEST: CPT | Performed by: STUDENT IN AN ORGANIZED HEALTH CARE EDUCATION/TRAINING PROGRAM

## 2019-12-12 PROCEDURE — 99214 OFFICE O/P EST MOD 30 MIN: CPT | Performed by: STUDENT IN AN ORGANIZED HEALTH CARE EDUCATION/TRAINING PROGRAM

## 2019-12-12 RX ORDER — MEDROXYPROGESTERONE ACETATE 150 MG/ML
150 INJECTION, SUSPENSION INTRAMUSCULAR
Qty: 1 ML | Refills: 5 | Status: SHIPPED | OUTPATIENT
Start: 2019-12-12 | End: 2020-01-17 | Stop reason: ALTCHOICE

## 2019-12-12 NOTE — PROGRESS NOTES
Problem visit established - Gynecology   Stanley Aldrich 46 y o  female MRN: [id number]  227 M  [facility]  Encounter: [id number]    Chief Complaint   Patient presents with   [facility] Gynecology Problem     Heavy periods that last 7-9 days with clotting  She says she saturates a pad an hour with clots the entire time  She also has pain during intercourse and after  History of Present Illness     Gabby Jenkins is a 46 y o  female who presents to discuss heavy menses  Concerns today: Has been ahving heavy menses lasting 7-9 days over the past several months, filling a pad per hour  Had previously expressed desire for pregnancy  Currently being treated with iron infusions for iron deficiency anemia with heme onc  Interested in treatment for heavy menses  Also reports pain with intercourse, deep thrust, cramping pain  REVIEW OF SYSTEMS  12 point review of systems negative aside from HPI      Past Medical History:   Diagnosis Date    Abdominal pain     "almost constant"    Anemia     Anesthesia     "woke up swinging with last  2018  "was fine with EGD"    Bariatric surgery status     Dental bridge present     right lower permanent    Depression     "not currently taking any meds"    Difficulty swallowing     "in the past"    Disease of thyroid gland     not on meds now    Dizzy     Fatigue     "when blood pressure low" and weakness too"    GERD (gastroesophageal reflux disease)     "increase after surgery"    Heart murmur     heart murmer, work up negative with holter monitor    History of 2  sections     most recent 17    History of D&C 2019    History of iron deficiency     anemia/ had IV infusions through pregnancy in 0560-0162    Inguinal hernia     right    Loss of appetite     Low BP     "off and on"    Migraine     N&V (nausea and vomiting)     " a little better since on meds"    Non-intractable vomiting "not since been on medicine"    Palpitations     "having again"    Postgastrectomy malabsorption     Stomach pain      Patient Active Problem List   Diagnosis    Bariatric surgery status    Postsurgical malabsorption    Obesity, Class II, BMI 35-39 9    Bilious vomiting with nausea    Chronic vomiting    Gastritis    Abdominal pain    Symptomatic anemia    S/P laparoscopic sleeve gastrectomy    Heart burn    Epigastric abdominal pain    Other constipation    Decreased oral intake    Dysphagia    History of multiple miscarriages    Pouchitis (HCC)    Leg swelling    Pregnancy complicated by previous bariatric surgery, first trimester    Amenorrhea    Missed     Status post suction D&C    BV (bacterial vaginosis)    Pelvic pain    Menorrhagia with regular cycle    Iron deficiency anemia secondary to inadequate dietary iron intake       Past Surgical History:   Procedure Laterality Date     SECTION      x2    CHOLECYSTECTOMY      CHROMOSOME ANALYSIS, PRODUCTS OF CONCEPTION (HISTORICAL)  2018    2 miscarriages in / and Nov    OVARIAN CYST REMOVAL Right     [place] EGD TRANSORAL BIOPSY SINGLE/MULTIPLE N/A 2019    Procedure: ESOPHAGOGASTRODUODENOSCOPY (EGD) with bx;  Surgeon: Jose Manuel Murillo MD;  [facility];   Service: Bariatrics    TN LAP GASTRIC BYPASS/SREE-EN-Y N/A 2019    Procedure: LAP SREE-EN-Y GASTRIC BYPASS, INTRAOP EGD;  Surgeon: Jose Manuel Murillo MD;  [facility];  Service: Stella ABORTN,1ST TRI N/A 2019    Procedure: DILATATION AND EVACUATION (D&E) (8 weeks) missed ab;  Surgeon: Devin Lau MD;  [facility];  Service: Gynecology    REVISION BYPASS LAPAROSCOPIC N/A 2019    Procedure: LAP REVISION/ CONVERSION;  Surgeon: Jose Manuel Murillo MD;  [facility];  Service: Bariatrics    SALPINGOOPHORECTOMY Right 2018    SLEEVE GASTROPLASTY         G1 2006 Primary  section for NRFHT, term, no complications  G2 270 failed TOLAC, repeat  section complicated by uterine rupture  Per patient, rupture was noted to be in a T shape  G3 2017 Missed AB, D+C, no complications  G4  SAB  Underwent right salpingo-oophorectomy for cyst on ovary    Experience spontaneous loss afterwards      Social History   Social History     Substance and Sexual Activity   Alcohol Use Not Currently    Frequency: 2-4 times a month    Drinks per session: 1 or 2    Binge frequency: Never    Comment: social     Social History     Substance and Sexual Activity   Drug Use No     Social History     Tobacco Use   Smoking Status Never Smoker   Smokeless Tobacco Never Used         Current Outpatient Medications:     amoxicillin (AMOXIL) 500 mg capsule, Take 2 capsules (1,000 mg total) by mouth 2 (two) times a day for 14 days, Disp: 56 capsule, Rfl: 0    clarithromycin (BIAXIN) 500 mg tablet, Take 1 tablet (500 mg total) by mouth 2 (two) times a day for 14 days, Disp: 28 tablet, Rfl: 0    ferrous sulfate 325 (65 Fe) mg tablet, Take 325 mg by mouth daily with breakfast, Disp: , Rfl:     omeprazole (PriLOSEC) 20 mg delayed release capsule, Take 1 capsule (20 mg total) by mouth 2 (two) times a day for 14 days, Disp: 28 capsule, Rfl: 0    pantoprazole (PROTONIX) 20 mg tablet, Take 20 mg by mouth daily, Disp: , Rfl:     sucralfate (CARAFATE) 1 g tablet, Take by mouth 4 (four) times a day, Disp: , Rfl:     medroxyPROGESTERone (DEPO-PROVERA) 150 mg/mL injection, Inject 1 mL (150 mg total) into a muscle every 3 (three) months, Disp: 1 mL, Rfl: 5    Multiple Vitamins-Minerals (MULTIVITAMIN ADULT PO), Take by mouth, Disp: , Rfl:     Allergies   Allergen Reactions    Aspirin Anaphylaxis    Shellfish-Derived Products Anaphylaxis    Contrast [Iodinated Diagnostic Agents] Itching    Ibuprofen Edema    Reglan [Metoclopramide]        Objective   Vitals: Blood pressure 112/62, weight 98 kg (216 lb), last menstrual period 11/12/2019, not currently breastfeeding  Body mass index is 37 08 kg/m²  General: NAD, AAOx3  Heart: non-tachycardic  Lungs: non-labored breathing, symmetric chest rise    Pelvic deferred    Lab Results:   No visits with results within 1 Day(s) from this visit     Latest known visit with results is:   Admission on 11/25/2019, Discharged on 11/28/2019   Component Date Value    WBC 11/25/2019 6 37     RBC 11/25/2019 3 62*    Hemoglobin 11/25/2019 7 6*    Hematocrit 11/25/2019 25 9*    MCV 11/25/2019 72*    MCH 11/25/2019 21 0*    MCHC 11/25/2019 29 3*    RDW 11/25/2019 16 7*    MPV 11/25/2019 9 1     Platelets 92/91/5575 394*    nRBC 11/25/2019 0     Neutrophils Relative 11/25/2019 35*    Immat GRANS % 11/25/2019 0     Lymphocytes Relative 11/25/2019 52*    Monocytes Relative 11/25/2019 10     Eosinophils Relative 11/25/2019 3     Basophils Relative 11/25/2019 0     Neutrophils Absolute 11/25/2019 2 24     Immature Grans Absolute 11/25/2019 0 01     Lymphocytes Absolute 11/25/2019 3 29     Monocytes Absolute 11/25/2019 0 61     Eosinophils Absolute 11/25/2019 0 20     Basophils Absolute 11/25/2019 0 02     ABO Grouping 11/25/2019 O     Rh Factor 11/25/2019 Positive     Antibody Screen 11/25/2019 Negative     Specimen Expiration Date 11/25/2019 [id number]     Color, UA 11/25/2019 yellow     Clarity, UA 11/25/2019 clear     EXT PREG TEST UR (Ref: N* 11/25/2019 negative     Control 11/25/2019 valid     Sodium 11/25/2019 139     Potassium 11/25/2019 3 7     Chloride 11/25/2019 104     CO2 11/25/2019 29     ANION GAP 11/25/2019 6     BUN 11/25/2019 13     Creatinine 11/25/2019 0 87     Glucose 11/25/2019 90     Calcium 11/25/2019 8 6     eGFR 11/25/2019 85     Color, UA 11/25/2019 Yellow     Clarity, UA 11/25/2019 Clear     pH, UA 11/25/2019 7 0     Leukocytes, UA 11/25/2019 Negative     Nitrite, UA 11/25/2019 Negative     Protein, UA 11/25/2019 Negative     Glucose, UA 11/25/2019 Negative     Ketones, UA 11/25/2019 Negative     Urobilinogen, UA 11/25/2019 1 0     Bilirubin, UA 11/25/2019 Negative     Blood, UA 11/25/2019 Negative     Specific Gravity, UA 11/25/2019 1 020     Ventricular Rate 11/25/2019 65     Atrial Rate 11/25/2019 65     MO Interval 11/25/2019 158     QRSD Interval 11/25/2019 86     QT Interval 11/25/2019 406     QTC Interval 11/25/2019 422     P Axis 11/25/2019 59     QRS Axis 11/25/2019 67     T Wave Axis 11/25/2019 52     Ventricular Rate 11/25/2019 74     Atrial Rate 11/25/2019 74     MO Interval 11/25/2019 148     QRSD Interval 11/25/2019 92     QT Interval 11/25/2019 398     QTC Interval 11/25/2019 441     P Axis 11/25/2019 63     QRS Axis 11/25/2019 71     T Wave Axis 11/25/2019 59     Unit Product Code 11/26/2019 [id number]     Unit Number 11/26/2019 [id number]     Unit ABO 11/26/2019 O     Unit RH 11/26/2019 POS     Crossmatch 11/26/2019 Compatible     Unit Dispense Status 11/26/2019 Presumed Trans     Iron Saturation 11/25/2019 2     TIBC 11/25/2019 518*    Iron 11/25/2019 11*    Ferritin 11/25/2019 2*    TRANSFUSION REACTION 11/25/2019 SEE COMMENT     Blood, UA 11/26/2019 Negative     RBC, URINE 11/26/2019 None Seen     Sodium 11/26/2019 141     Potassium 11/26/2019 3 6     Chloride 11/26/2019 107     CO2 11/26/2019 26     ANION GAP 11/26/2019 8     BUN 11/26/2019 13     Creatinine 11/26/2019 0 76     Glucose 11/26/2019 99     Calcium 11/26/2019 8 4     eGFR 11/26/2019 100     WBC 11/26/2019 5 85     RBC 11/26/2019 3 62*    Hemoglobin 11/26/2019 7 5*    Hematocrit 11/26/2019 26 5*    MCV 11/26/2019 73*    MCH 11/26/2019 20 7*    MCHC 11/26/2019 28 3*    RDW 11/26/2019 17 0*    Platelets 22/66/7965 361     MPV 11/26/2019 9 8     WBC 11/27/2019 5 19     RBC 11/27/2019 3 60*    Hemoglobin 11/27/2019 7 7*    Hematocrit 11/27/2019 26 0*    MCV 11/27/2019 72*    MCH 11/27/2019 21 4*  MCHC 11/27/2019 29 6*    RDW 11/27/2019 17 0*    MPV 11/27/2019 9 2     Platelets 94/24/9388 346     nRBC 11/27/2019 0     Neutrophils Relative 11/27/2019 34*    Immat GRANS % 11/27/2019 0     Lymphocytes Relative 11/27/2019 52*    Monocytes Relative 11/27/2019 10     Eosinophils Relative 11/27/2019 4     Basophils Relative 11/27/2019 0     Neutrophils Absolute 11/27/2019 1 78*    Immature Grans Absolute 11/27/2019 0 00     Lymphocytes Absolute 11/27/2019 2 67     Monocytes Absolute 11/27/2019 0 50     Eosinophils Absolute 11/27/2019 0 22     Basophils Absolute 11/27/2019 0 02     Sodium 11/27/2019 139     Potassium 11/27/2019 3 7     Chloride 11/27/2019 106     CO2 11/27/2019 28     ANION GAP 11/27/2019 5     BUN 11/27/2019 8     Creatinine 11/27/2019 0 74     Glucose 11/27/2019 92     Calcium 11/27/2019 8 5     AST 11/27/2019 19     ALT 11/27/2019 12     Alkaline Phosphatase 11/27/2019 67     Total Protein 11/27/2019 6 4     Albumin 11/27/2019 2 8*    Total Bilirubin 11/27/2019 0 18*    eGFR 11/27/2019 103     WBC 11/28/2019 6 02     RBC 11/28/2019 3 77*    Hemoglobin 11/28/2019 7 9*    Hematocrit 11/28/2019 27 4*    MCV 11/28/2019 73*    MCH 11/28/2019 21 0*    MCHC 11/28/2019 28 8*    RDW 11/28/2019 17 1*    MPV 11/28/2019 10 0     Platelets 71/61/1241 393*    nRBC 11/28/2019 0     Neutrophils Relative 11/28/2019 38*    Immat GRANS % 11/28/2019 0     Lymphocytes Relative 11/28/2019 49*    Monocytes Relative 11/28/2019 9     Eosinophils Relative 11/28/2019 3     Basophils Relative 11/28/2019 1     Neutrophils Absolute 11/28/2019 2 28     Immature Grans Absolute 11/28/2019 0 01     Lymphocytes Absolute 11/28/2019 2 97     Monocytes Absolute 11/28/2019 0 56     Eosinophils Absolute 11/28/2019 0 17     Basophils Absolute 11/28/2019 0 03         Assessment/Plan     Assessment:  46 y o  F with heavy menses, AUB    Diagnoses and all orders for this visit:    Abnormal uterine bleeding (AUB)  -     POCT urine HCG  -     medroxyPROGESTERone (DEPO-PROVERA) 150 mg/mL injection; Inject 1 mL (150 mg total) into a muscle every 3 (three) months   - Reviewed options - not a good candidate for OCP given gastric bypass, ? absorption, current possible infection  Not interested in IUD at this time  Would like to try Depo  Reviewed timing every three months, side effects of spotting, bone mass, mood, appetite changes  Expressed understanding       History of multiple miscarriages   - Pending workup with [facility] - on reviewing outside records, appears negative APLS workup   - urine bhcg negative today  S/P laparoscopic sleeve gastrectomy   - Unable to take Lysteda    - Continue care with primary provider  Pelvic pain   - Will continue to monitor on Depo, will reassess at follow up    Follow up in 2-3 months to reassess pain, bleeding

## 2019-12-14 ENCOUNTER — HOSPITAL ENCOUNTER (OUTPATIENT)
Dept: INFUSION CENTER | Facility: HOSPITAL | Age: 39
Discharge: HOME | End: 2019-12-14
Attending: INTERNAL MEDICINE
Payer: COMMERCIAL

## 2019-12-14 VITALS
TEMPERATURE: 98.8 F | DIASTOLIC BLOOD PRESSURE: 65 MMHG | RESPIRATION RATE: 18 BRPM | SYSTOLIC BLOOD PRESSURE: 108 MMHG | HEART RATE: 73 BPM

## 2019-12-14 DIAGNOSIS — R63.8 DECREASED ORAL INTAKE: ICD-10-CM

## 2019-12-14 DIAGNOSIS — Z98.84 BARIATRIC SURGERY STATUS: ICD-10-CM

## 2019-12-14 DIAGNOSIS — K91.2 POSTSURGICAL MALABSORPTION: ICD-10-CM

## 2019-12-14 DIAGNOSIS — D50.8 IRON DEFICIENCY ANEMIA SECONDARY TO INADEQUATE DIETARY IRON INTAKE: Primary | ICD-10-CM

## 2019-12-14 PROCEDURE — 96366 THER/PROPH/DIAG IV INF ADDON: CPT

## 2019-12-14 PROCEDURE — 96365 THER/PROPH/DIAG IV INF INIT: CPT

## 2019-12-14 RX ORDER — SODIUM CHLORIDE 9 MG/ML
20 INJECTION, SOLUTION INTRAVENOUS ONCE
Status: CANCELLED | OUTPATIENT
Start: 2019-12-21

## 2019-12-14 RX ORDER — SODIUM CHLORIDE 9 MG/ML
20 INJECTION, SOLUTION INTRAVENOUS ONCE
Status: COMPLETED | OUTPATIENT
Start: 2019-12-14 | End: 2019-12-14

## 2019-12-14 RX ADMIN — IRON SUCROSE 300 MG: 20 INJECTION, SOLUTION INTRAVENOUS at 10:28

## 2019-12-14 RX ADMIN — SODIUM CHLORIDE 20 ML/HR: 0.9 INJECTION, SOLUTION INTRAVENOUS at 10:28

## 2019-12-14 NOTE — PLAN OF CARE
Problem: Potential for Falls  Goal: Patient will remain free of falls  Description  INTERVENTIONS:  - Assess patient frequently for physical needs  -  Identify cognitive and physical deficits and behaviors that affect risk of falls    -  [place] fall precautions as indicated by assessment   - Educate patient/family on patient safety including physical limitations  - Instruct patient to call for assistance with activity based on assessment  - Modify environment to reduce risk of injury  - Consider OT/PT consult to assist with strengthening/mobility  Outcome: Progressing

## 2019-12-21 ENCOUNTER — HOSPITAL ENCOUNTER (OUTPATIENT)
Dept: INFUSION CENTER | Facility: HOSPITAL | Age: 39
Discharge: HOME | End: 2019-12-21
Attending: INTERNAL MEDICINE
Payer: COMMERCIAL

## 2019-12-21 VITALS
SYSTOLIC BLOOD PRESSURE: 116 MMHG | RESPIRATION RATE: 18 BRPM | TEMPERATURE: 97 F | HEART RATE: 68 BPM | DIASTOLIC BLOOD PRESSURE: 62 MMHG

## 2019-12-21 DIAGNOSIS — Z98.84 BARIATRIC SURGERY STATUS: ICD-10-CM

## 2019-12-21 DIAGNOSIS — D50.8 IRON DEFICIENCY ANEMIA SECONDARY TO INADEQUATE DIETARY IRON INTAKE: Primary | ICD-10-CM

## 2019-12-21 DIAGNOSIS — R63.8 DECREASED ORAL INTAKE: ICD-10-CM

## 2019-12-21 DIAGNOSIS — K91.2 POSTSURGICAL MALABSORPTION: ICD-10-CM

## 2019-12-21 PROCEDURE — 96365 THER/PROPH/DIAG IV INF INIT: CPT

## 2019-12-21 RX ORDER — SODIUM CHLORIDE 9 MG/ML
20 INJECTION, SOLUTION INTRAVENOUS ONCE
Status: COMPLETED | OUTPATIENT
Start: 2019-12-21 | End: 2019-12-21

## 2019-12-21 RX ORDER — SODIUM CHLORIDE 9 MG/ML
20 INJECTION, SOLUTION INTRAVENOUS ONCE
Status: CANCELLED | OUTPATIENT
Start: 2019-12-22

## 2019-12-21 RX ADMIN — SODIUM CHLORIDE 20 ML/HR: 0.9 INJECTION, SOLUTION INTRAVENOUS at 10:20

## 2019-12-21 RX ADMIN — IRON SUCROSE 300 MG: 20 INJECTION, SOLUTION INTRAVENOUS at 10:20

## 2019-12-21 NOTE — PLAN OF CARE
Problem: Potential for Falls  Goal: Patient will remain free of falls  Description  INTERVENTIONS:  - Assess patient frequently for physical needs  -  Identify cognitive and physical deficits and behaviors that affect risk of falls    -  [place] fall precautions as indicated by assessment   - Educate patient/family on patient safety including physical limitations  - Instruct patient to call for assistance with activity based on assessment  - Modify environment to reduce risk of injury  - Consider OT/PT consult to assist with strengthening/mobility  Outcome: Progressing

## 2019-12-28 ENCOUNTER — HOSPITAL ENCOUNTER (OUTPATIENT)
Dept: INFUSION CENTER | Facility: HOSPITAL | Age: 39
Discharge: HOME | End: 2019-12-28
Attending: INTERNAL MEDICINE
Payer: COMMERCIAL

## 2019-12-28 VITALS
DIASTOLIC BLOOD PRESSURE: 67 MMHG | RESPIRATION RATE: 18 BRPM | SYSTOLIC BLOOD PRESSURE: 107 MMHG | HEART RATE: 67 BPM | TEMPERATURE: 98.2 F

## 2019-12-28 DIAGNOSIS — K91.2 POSTSURGICAL MALABSORPTION: ICD-10-CM

## 2019-12-28 DIAGNOSIS — Z98.84 BARIATRIC SURGERY STATUS: ICD-10-CM

## 2019-12-28 DIAGNOSIS — R63.8 DECREASED ORAL INTAKE: ICD-10-CM

## 2019-12-28 DIAGNOSIS — D50.8 IRON DEFICIENCY ANEMIA SECONDARY TO INADEQUATE DIETARY IRON INTAKE: Primary | ICD-10-CM

## 2019-12-28 PROCEDURE — 96365 THER/PROPH/DIAG IV INF INIT: CPT

## 2019-12-28 PROCEDURE — 96366 THER/PROPH/DIAG IV INF ADDON: CPT

## 2019-12-28 RX ORDER — SODIUM CHLORIDE 9 MG/ML
20 INJECTION, SOLUTION INTRAVENOUS ONCE
Status: COMPLETED | OUTPATIENT
Start: 2019-12-28 | End: 2019-12-28

## 2019-12-28 RX ORDER — SODIUM CHLORIDE 9 MG/ML
20 INJECTION, SOLUTION INTRAVENOUS ONCE
Status: CANCELLED | OUTPATIENT
Start: 2020-01-04

## 2019-12-28 RX ADMIN — IRON SUCROSE 300 MG: 20 INJECTION, SOLUTION INTRAVENOUS at 10:37

## 2019-12-28 RX ADMIN — SODIUM CHLORIDE 20 ML/HR: 0.9 INJECTION, SOLUTION INTRAVENOUS at 10:37

## 2019-12-28 NOTE — PLAN OF CARE
Problem: Potential for Falls  Goal: Patient will remain free of falls  Description  INTERVENTIONS:  - Assess patient frequently for physical needs  -  Identify cognitive and physical deficits and behaviors that affect risk of falls    -  [place] fall precautions as indicated by assessment   - Educate patient/family on patient safety including physical limitations  - Instruct patient to call for assistance with activity based on assessment  - Modify environment to reduce risk of injury  - Consider OT/PT consult to assist with strengthening/mobility  Outcome: Progressing

## 2020-01-04 ENCOUNTER — HOSPITAL ENCOUNTER (OUTPATIENT)
Dept: INFUSION CENTER | Facility: HOSPITAL | Age: 40
Discharge: HOME | End: 2020-01-04
Attending: INTERNAL MEDICINE
Payer: COMMERCIAL

## 2020-01-04 VITALS
RESPIRATION RATE: 18 BRPM | TEMPERATURE: 98.8 F | SYSTOLIC BLOOD PRESSURE: 110 MMHG | DIASTOLIC BLOOD PRESSURE: 56 MMHG | HEART RATE: 75 BPM

## 2020-01-04 DIAGNOSIS — R63.8 DECREASED ORAL INTAKE: ICD-10-CM

## 2020-01-04 DIAGNOSIS — Z98.84 BARIATRIC SURGERY STATUS: ICD-10-CM

## 2020-01-04 DIAGNOSIS — K91.2 POSTSURGICAL MALABSORPTION: ICD-10-CM

## 2020-01-04 DIAGNOSIS — D50.8 IRON DEFICIENCY ANEMIA SECONDARY TO INADEQUATE DIETARY IRON INTAKE: Primary | ICD-10-CM

## 2020-01-04 PROCEDURE — 96365 THER/PROPH/DIAG IV INF INIT: CPT

## 2020-01-04 PROCEDURE — 96366 THER/PROPH/DIAG IV INF ADDON: CPT

## 2020-01-04 RX ORDER — SODIUM CHLORIDE 9 MG/ML
20 INJECTION, SOLUTION INTRAVENOUS ONCE
Status: COMPLETED | OUTPATIENT
Start: 2020-01-04 | End: 2020-01-04

## 2020-01-04 RX ORDER — SODIUM CHLORIDE 9 MG/ML
20 INJECTION, SOLUTION INTRAVENOUS ONCE
Status: CANCELLED | OUTPATIENT
Start: 2020-01-11

## 2020-01-04 RX ADMIN — SODIUM CHLORIDE 20 ML/HR: 0.9 INJECTION, SOLUTION INTRAVENOUS at 10:22

## 2020-01-04 RX ADMIN — IRON SUCROSE 300 MG: 20 INJECTION, SOLUTION INTRAVENOUS at 10:23

## 2020-01-04 NOTE — PLAN OF CARE
Problem: Potential for Falls  Goal: Patient will remain free of falls  Description  INTERVENTIONS:  - Assess patient frequently for physical needs  -  Identify cognitive and physical deficits and behaviors that affect risk of falls    -  [place] fall precautions as indicated by assessment   - Educate patient/family on patient safety including physical limitations  - Instruct patient to call for assistance with activity based on assessment  - Modify environment to reduce risk of injury  - Consider OT/PT consult to assist with strengthening/mobility  Outcome: Progressing

## 2020-01-11 ENCOUNTER — HOSPITAL ENCOUNTER (OUTPATIENT)
Dept: INFUSION CENTER | Facility: HOSPITAL | Age: 40
Discharge: HOME | End: 2020-01-11
Attending: INTERNAL MEDICINE
Payer: COMMERCIAL

## 2020-01-11 VITALS
SYSTOLIC BLOOD PRESSURE: 110 MMHG | TEMPERATURE: 98.9 F | HEART RATE: 72 BPM | DIASTOLIC BLOOD PRESSURE: 78 MMHG | RESPIRATION RATE: 18 BRPM

## 2020-01-11 DIAGNOSIS — R63.8 DECREASED ORAL INTAKE: ICD-10-CM

## 2020-01-11 DIAGNOSIS — K91.2 POSTSURGICAL MALABSORPTION: ICD-10-CM

## 2020-01-11 DIAGNOSIS — Z98.84 BARIATRIC SURGERY STATUS: ICD-10-CM

## 2020-01-11 DIAGNOSIS — D50.8 IRON DEFICIENCY ANEMIA SECONDARY TO INADEQUATE DIETARY IRON INTAKE: Primary | ICD-10-CM

## 2020-01-11 PROCEDURE — 96365 THER/PROPH/DIAG IV INF INIT: CPT

## 2020-01-11 RX ORDER — SODIUM CHLORIDE 9 MG/ML
20 INJECTION, SOLUTION INTRAVENOUS ONCE
Status: COMPLETED | OUTPATIENT
Start: 2020-01-11 | End: 2020-01-11

## 2020-01-11 RX ORDER — SODIUM CHLORIDE 9 MG/ML
20 INJECTION, SOLUTION INTRAVENOUS ONCE
Status: CANCELLED | OUTPATIENT
Start: 2020-01-12

## 2020-01-11 RX ADMIN — IRON SUCROSE 300 MG: 20 INJECTION, SOLUTION INTRAVENOUS at 10:25

## 2020-01-11 RX ADMIN — SODIUM CHLORIDE 20 ML/HR: 0.9 INJECTION, SOLUTION INTRAVENOUS at 10:25

## 2020-01-11 NOTE — PROGRESS NOTES
Pt  Offers no complaints  Discussed bloodwork ordered and Pt  Stated she is suppose to have that done just prior to her Dr King Ron  Scheduled on 1/29/20

## 2020-01-11 NOTE — PROGRESS NOTES
Pt  Received venofer today without incident  Pt  Refused AVS  Reminded pt  Of 's appt  On 1/29/20 and to get her bloodwork done a day or 2 before she goes  Verbalized understanding

## 2020-01-16 ENCOUNTER — TRANSCRIBE ORDERS (OUTPATIENT)
Dept: LAB | Facility: IMAGING CENTER | Age: 40
End: 2020-01-16

## 2020-01-17 ENCOUNTER — OFFICE VISIT (OUTPATIENT)
Dept: BARIATRICS | Facility: CLINIC | Age: 40
End: 2020-01-17
Payer: COMMERCIAL

## 2020-01-17 VITALS
DIASTOLIC BLOOD PRESSURE: 80 MMHG | HEART RATE: 67 BPM | HEIGHT: 66 IN | BODY MASS INDEX: 34.47 KG/M2 | WEIGHT: 214.5 LBS | TEMPERATURE: 96.4 F | SYSTOLIC BLOOD PRESSURE: 120 MMHG

## 2020-01-17 DIAGNOSIS — Z98.84 S/P GASTRIC BYPASS: ICD-10-CM

## 2020-01-17 DIAGNOSIS — K28.9 MARGINAL ULCER: Primary | ICD-10-CM

## 2020-01-17 DIAGNOSIS — K91.2 POSTSURGICAL MALABSORPTION: Chronic | ICD-10-CM

## 2020-01-17 DIAGNOSIS — E66.9 OBESITY, CLASS II, BMI 35-39.9: ICD-10-CM

## 2020-01-17 DIAGNOSIS — R12 HEART BURN: ICD-10-CM

## 2020-01-17 PROCEDURE — 99213 OFFICE O/P EST LOW 20 MIN: CPT | Performed by: SURGERY

## 2020-01-17 RX ORDER — PANTOPRAZOLE SODIUM 20 MG/1
20 TABLET, DELAYED RELEASE ORAL DAILY
Qty: 90 TABLET | Refills: 3 | Status: SHIPPED | OUTPATIENT
Start: 2020-01-17 | End: 2020-03-06

## 2020-01-17 RX ORDER — MISOPROSTOL 100 UG/1
100 TABLET ORAL 4 TIMES DAILY
Qty: 112 TABLET | Refills: 3 | Status: SHIPPED | OUTPATIENT
Start: 2020-01-17 | End: 2020-01-30

## 2020-01-17 RX ORDER — SUCRALFATE 1 G/1
1 TABLET ORAL 4 TIMES DAILY
Qty: 363 TABLET | Refills: 3 | Status: SHIPPED | OUTPATIENT
Start: 2020-01-17 | End: 2020-03-06

## 2020-01-17 NOTE — H&P (VIEW-ONLY)
FOLLOW UP VISIT - BARIATRIC SURGERY  Krishan Aldrich 46 y o  female MRN: [id number]  Unit/Bed#:  Encounter: [id number]      HPI:  Lee Santana is a 46 y o  female who presents with a history of sleeve gastrectomy converted to a gastric bypass  Review of Systems   Gastrointestinal: Positive for abdominal pain  Historical Information   Past Medical History:   Diagnosis Date    Abdominal pain     "almost constant"    Anemia     Anesthesia     "woke up swinging with last  2018  "was fine with EGD"    Bariatric surgery status     Dental bridge present     right lower permanent    Depression     "not currently taking any meds"    Difficulty swallowing     "in the past"    Disease of thyroid gland     not on meds now    Dizzy     Fatigue     "when blood pressure low" and weakness too"    GERD (gastroesophageal reflux disease)     "increase after surgery"    Heart murmur     heart murmer, work up negative with holter monitor    History of 2  sections     most recent 17    History of D&C 2019    History of iron deficiency     anemia/ had IV infusions through pregnancy in 3312-4388    Inguinal hernia     right    Loss of appetite     Low BP     "off and on"    Migraine     N&V (nausea and vomiting)     " a little better since on meds"    Non-intractable vomiting     "not since been on medicine"    Palpitations     "having again"    Postgastrectomy malabsorption     Stomach pain      Past Surgical History:   Procedure Laterality Date     SECTION      x2    CHOLECYSTECTOMY      CHROMOSOME ANALYSIS, PRODUCTS OF CONCEPTION (HISTORICAL)      2 miscarriages in  and Nov    OVARIAN CYST REMOVAL Right     [place] EGD TRANSORAL BIOPSY SINGLE/MULTIPLE N/A 2019    Procedure: ESOPHAGOGASTRODUODENOSCOPY (EGD) with bx;  Surgeon: Riccardo Brock MD;  [facility];   Service: Bariatrics    MN LAP GASTRIC BYPASS/SREE-EN-Y N/A 4/8/2019    Procedure: LAP SREE-EN-Y GASTRIC BYPASS, INTRAOP EGD;  Surgeon: Rommie Schlatter, MD;  [facility];  Service: Stella ISAAC,1ST TRI N/A 9/25/2019    Procedure: DILATATION AND EVACUATION (D&E) (8 weeks) missed ab;  Surgeon: Pawan Licona MD;  [facility];  Service: Gynecology    REVISION BYPASS LAPAROSCOPIC N/A 4/8/2019    Procedure: LAP REVISION/ CONVERSION;  Surgeon: Rommie Schlatter, MD;  [facility];  Service: Bariatrics    SALPINGOOPHORECTOMY Right 09/2018    SLEEVE GASTROPLASTY       Social History   Social History     Substance and Sexual Activity   Alcohol Use Not Currently    Frequency: 2-4 times a month    Drinks per session: 1 or 2    Binge frequency: Never    Comment: social     Social History     Substance and Sexual Activity   Drug Use No     Social History     Tobacco Use   Smoking Status Never Smoker   Smokeless Tobacco Never Used     Family History: non-contributory    Meds/Allergies   all medications and allergies reviewed  Allergies   Allergen Reactions    Aspirin Anaphylaxis    Shellfish-Derived Products Anaphylaxis    Contrast [Iodinated Diagnostic Agents] Itching    Ibuprofen Edema    Reglan [Metoclopramide]        Objective       Current Vitals:   Blood Pressure: 120/80 (01/17/20 0903)  Pulse: 67 (01/17/20 0903)  Temperature: (!) 96 4 °F (35 8 °C) (01/17/20 0903)  Temp Source: Tympanic (01/17/20 0903)  Height: 5' 5 5" (166 4 cm) (01/17/20 0903)  Weight - Scale: 97 3 kg (214 lb 8 oz) (01/17/20 0903)        Invasive Devices     None                 Physical Exam   Constitutional: She is oriented to person, place, and time  She appears well-developed and well-nourished  No distress  HENT:   Head: Normocephalic and atraumatic  Right Ear: External ear normal    Left Ear: External ear normal    Nose: Nose normal    Eyes: Conjunctivae are normal  Right eye exhibits no discharge  Left eye exhibits no discharge   No scleral icterus  Neurological: She is alert and oriented to person, place, and time  Skin: She is not diaphoretic  Psychiatric: She has a normal mood and affect  Her behavior is normal  Judgment and thought content normal    Vitals reviewed  Lab Results: I have personally reviewed pertinent lab results  Imaging: I have personally reviewed pertinent reports  EKG, Pathology, and Other Studies: I have personally reviewed pertinent reports  Assessment/PLAN:    46 y o  female with a history of laparoscopic sleeve gastrectomy converted to a gastric bypass in 4/8/2019  Recently she presented to the office complaining of abdominal pain and she was found to be anemic  Hematology is following her up and have scheduled to have iron infusions  She underwent an endoscopy that revealed a marginal ulceration  A repeat her endoscopy about a month and half ago any revealed pouchitis with some improvement of her marginal ulceration  The biopsies were negative for H pylori    She continues to have pain and discomfort despite the treatment  I am telling her to take her PPI twice a day and we will schedule her for a repeat endoscopy to further assess her pouch  She will follow up with us as scheduled      Yumiko Weir MD  1/17/2020  9:22 AM

## 2020-01-17 NOTE — PATIENT INSTRUCTIONS
Take two omeprazole 20mg once in the morning and two omeprazole 20mg in the evening  Take carafate 4 times daily on an empty stomach  Take cytotec 4 times daily; can be taken at the same time as carafate

## 2020-01-27 ENCOUNTER — TELEPHONE (OUTPATIENT)
Dept: HEMATOLOGY ONCOLOGY | Facility: CLINIC | Age: 40
End: 2020-01-27

## 2020-01-28 ENCOUNTER — ANESTHESIA EVENT (OUTPATIENT)
Dept: GASTROENTEROLOGY | Facility: HOSPITAL | Age: 40
End: 2020-01-28

## 2020-01-29 ENCOUNTER — HOSPITAL ENCOUNTER (OUTPATIENT)
Dept: GASTROENTEROLOGY | Facility: HOSPITAL | Age: 40
Setting detail: OUTPATIENT SURGERY
Discharge: HOME | End: 2020-01-29
Attending: SURGERY | Admitting: SURGERY
Payer: COMMERCIAL

## 2020-01-29 ENCOUNTER — ANESTHESIA (OUTPATIENT)
Dept: GASTROENTEROLOGY | Facility: HOSPITAL | Age: 40
End: 2020-01-29

## 2020-01-29 VITALS
DIASTOLIC BLOOD PRESSURE: 58 MMHG | BODY MASS INDEX: 34.99 KG/M2 | OXYGEN SATURATION: 100 % | SYSTOLIC BLOOD PRESSURE: 100 MMHG | HEART RATE: 56 BPM | HEIGHT: 65 IN | WEIGHT: 210 LBS | RESPIRATION RATE: 20 BRPM

## 2020-01-29 DIAGNOSIS — Z98.84 BARIATRIC SURGERY STATUS: ICD-10-CM

## 2020-01-29 LAB
EXT PREGNANCY TEST URINE: NEGATIVE
EXT. CONTROL: NORMAL

## 2020-01-29 PROCEDURE — 81025 URINE PREGNANCY TEST: CPT | Performed by: ANESTHESIOLOGY

## 2020-01-29 PROCEDURE — 43235 EGD DIAGNOSTIC BRUSH WASH: CPT | Performed by: SURGERY

## 2020-01-29 RX ORDER — SODIUM CHLORIDE 9 MG/ML
125 INJECTION, SOLUTION INTRAVENOUS CONTINUOUS
Status: DISCONTINUED | OUTPATIENT
Start: 2020-01-29 | End: 2020-02-02 | Stop reason: HOSPADM

## 2020-01-29 RX ORDER — PROPOFOL 10 MG/ML
INJECTION, EMULSION INTRAVENOUS AS NEEDED
Status: DISCONTINUED | OUTPATIENT
Start: 2020-01-29 | End: 2020-01-29 | Stop reason: SURG

## 2020-01-29 RX ORDER — LIDOCAINE HYDROCHLORIDE 20 MG/ML
INJECTION, SOLUTION EPIDURAL; INFILTRATION; INTRACAUDAL; PERINEURAL AS NEEDED
Status: DISCONTINUED | OUTPATIENT
Start: 2020-01-29 | End: 2020-01-29 | Stop reason: SURG

## 2020-01-29 RX ADMIN — SODIUM CHLORIDE 125 ML/HR: 0.9 INJECTION, SOLUTION INTRAVENOUS at 10:36

## 2020-01-29 RX ADMIN — LIDOCAINE HYDROCHLORIDE 60 MG: 20 INJECTION, SOLUTION EPIDURAL; INFILTRATION; INTRACAUDAL; PERINEURAL at 11:01

## 2020-01-29 RX ADMIN — PROPOFOL 50 MG: 10 INJECTION, EMULSION INTRAVENOUS at 11:04

## 2020-01-29 RX ADMIN — PROPOFOL 150 MG: 10 INJECTION, EMULSION INTRAVENOUS at 11:01

## 2020-01-29 NOTE — INTERVAL H&P NOTE
H&P reviewed  After examining the patient I find no changes in the patients condition since the H&P had been written      Vitals:    01/29/20 1022   BP: 104/64   Pulse: 65   Resp: 18   SpO2: 100%

## 2020-01-29 NOTE — PROGRESS NOTES
D/C instructions given and pt verbalizes understanding  Dr Darlene Varela was here to talk with Pt and is aware Pt c/o abd pain 7/10  No further orders received

## 2020-01-29 NOTE — ANESTHESIA POSTPROCEDURE EVALUATION
Post-Op Assessment Note    CV Status:  Stable    Pain management: adequate     Mental Status:  Alert and awake   Hydration Status:  Euvolemic   PONV Controlled:  Controlled   Airway Patency:  Patent   Post Op Vitals Reviewed: Yes      Staff: Anesthesiologist, CRNA           BP 98/53 (01/29/20 1108)    Temp      Pulse 62 (01/29/20 1108)   Resp 20 (01/29/20 1108)    SpO2 100 % (01/29/20 1108)

## 2020-01-29 NOTE — DISCHARGE INSTRUCTIONS
Gastritis   WHAT YOU NEED TO KNOW:   Gastritis is inflammation or irritation of the lining of your stomach  DISCHARGE INSTRUCTIONS:   Call 911 for any of the following:   · You develop chest pain or shortness of breath  Seek care immediately if:   · You vomit blood  · You have black or bloody bowel movements  · You have severe stomach or back pain  Contact your healthcare provider if:   · You have a fever  · You have new or worsening symptoms, even after treatment  · You have questions or concerns about your condition or care  Medicines:   · Medicines  may be given to help treat a bacterial infection or decrease stomach acid  · Take your medicine as directed  Contact your healthcare provider if you think your medicine is not helping or if you have side effects  Tell him or her if you are allergic to any medicine  Keep a list of the medicines, vitamins, and herbs you take  Include the amounts, and when and why you take them  Bring the list or the pill bottles to follow-up visits  Carry your medicine list with you in case of an emergency  Manage or prevent gastritis:   · Do not smoke  Nicotine and other chemicals in cigarettes and cigars can make your symptoms worse and cause lung damage  Ask your healthcare provider for information if you currently smoke and need help to quit  E-cigarettes or smokeless tobacco still contain nicotine  Talk to your healthcare provider before you use these products  · Do not drink alcohol  Alcohol can prevent healing and make your gastritis worse  Talk to your healthcare provider if you need help to stop drinking  · Do not take NSAIDs or aspirin unless directed  These and similar medicines can cause irritation  If your healthcare provider says it is okay to take NSAIDs, take them with food  · Do not eat foods that cause irritation  Foods such as oranges and salsa can cause burning or pain  Eat a variety of healthy foods   Examples include fruits (not citrus), vegetables, low-fat dairy products, beans, whole-grain breads, and lean meats and fish  Try to eat small meals, and drink water with your meals  Do not eat for at least 3 hours before you go to bed  · Find ways to relax and decrease stress  Stress can increase stomach acid and make gastritis worse  Activities such as yoga, meditation, or listening to music can help you relax  Spend time with friends, or do things you enjoy  Follow up with your healthcare provider as directed: You may need ongoing tests or treatment, or referral to a gastroenterologist  Write down your questions so you remember to ask them during your visits  © 2017 [address] [employer] Information is for End User's use only and may not be sold, redistributed or otherwise used for commercial purposes  All illustrations and images included in CareNotes® are the copyrighted property of A [employer] M , [employer]  or [place]  The above information is an  only  It is not intended as medical advice for individual conditions or treatments  Talk to your doctor, nurse or pharmacist before following any medical regimen to see if it is safe and effective for you  Upper Endoscopy   WHAT YOU NEED TO KNOW:   An upper endoscopy is also called an upper gastrointestinal (GI) endoscopy, or an esophagogastroduodenoscopy (EGD)  You may feel bloated, gassy, or have some abdominal discomfort after your procedure  Your throat may be sore for 24 to 36 hours  You may burp or pass gas from air that is still inside your body  DISCHARGE INSTRUCTIONS:   Call 911 for any of the following:   · You have sudden chest pain or trouble breathing  Seek care immediately if:   · You feel dizzy or faint  · You have trouble swallowing  · Your bowel movements are very dark or black  · Your abdomen is hard and firm and you have severe pain  · You vomit blood    Contact your healthcare provider if:   · You feel full or bloated and cannot burp or pass gas  · You have not had a bowel movement for 3 days after your procedure  · You have neck pain  · You have a fever or chills  · You have nausea or are vomiting  · You have a rash or hives  · You have questions or concerns about your endoscopy  Relieve a sore throat:  Suck on throat lozenges or crushed ice  Gargle with a small amount of warm salt water  Mix 1 teaspoon of salt and 1 cup of warm water to make salt water  Relieve gas and discomfort from bloating:  Lie on your right side with a heating pad on your abdomen  Take short walks to help pass gas  Eat small meals until bloating is relieved  Rest after your procedure: You have been given medicine to relax you  Do not  drive or make important decisions until the day after your procedure  Return to your normal activity as directed  You can usually return to work the day after your procedure  Follow up with your healthcare provider as directed:  Write down your questions so you remember to ask them during your visits  © 2017 [address] is for End User's use only and may not be sold, redistributed or otherwise used for commercial purposes  All illustrations and images included in CareNotes® are the copyrighted property of [employer]  or [place]  The above information is an  only  It is not intended as medical advice for individual conditions or treatments  Talk to your doctor, nurse or pharmacist before following any medical regimen to see if it is safe and effective for you

## 2020-01-29 NOTE — ANESTHESIA PREPROCEDURE EVALUATION
Review of Systems/Medical History  Patient summary reviewed  Chart reviewed  No history of anesthetic complications     Cardiovascular    Comment: Prone to low BP's,  Pulmonary  Negative pulmonary ROS        GI/Hepatic    GERD well controlled, Bariatric surgery,        Negative  ROS        Endo/Other  History of thyroid disease , hypothyroidism,   Obesity (36=BMI)    GYN  Negative gynecology ROS          Hematology  Anemia ,     Musculoskeletal  Negative musculoskeletal ROS        Neurology    Headaches,    Psychology   Negative psychology ROS Depression ,              Physical Exam    Airway    Mallampati score: II  TM Distance: >3 FB  Neck ROM: full     Dental   No notable dental hx     Cardiovascular  Rhythm: regular, Rate: normal, Cardiovascular exam normal    Pulmonary  Pulmonary exam normal Breath sounds clear to auscultation,     Other Findings        Anesthesia Plan  ASA Score- 2     Anesthesia Type- IV sedation with anesthesia with ASA Monitors  Additional Monitors:   Airway Plan:         Plan Factors-Patient not instructed to abstain from smoking on day of procedure  Patient did not smoke on day of surgery  Induction- intravenous  Postoperative Plan-     Informed Consent- Anesthetic plan and risks discussed with patient  I personally reviewed this patient with the CRNA  Discussed and agreed on the Anesthesia Plan with the CRNA  Kemal Mckee

## 2020-01-30 ENCOUNTER — APPOINTMENT (EMERGENCY)
Dept: CT IMAGING | Facility: HOSPITAL | Age: 40
End: 2020-01-30
Payer: COMMERCIAL

## 2020-01-30 ENCOUNTER — HOSPITAL ENCOUNTER (EMERGENCY)
Facility: HOSPITAL | Age: 40
Discharge: HOME | End: 2020-01-30
Attending: EMERGENCY MEDICINE
Payer: COMMERCIAL

## 2020-01-30 VITALS
RESPIRATION RATE: 16 BRPM | BODY MASS INDEX: 35.81 KG/M2 | SYSTOLIC BLOOD PRESSURE: 108 MMHG | TEMPERATURE: 98.3 F | DIASTOLIC BLOOD PRESSURE: 62 MMHG | WEIGHT: 215.17 LBS | HEART RATE: 66 BPM | OXYGEN SATURATION: 100 %

## 2020-01-30 DIAGNOSIS — R11.10 CHRONIC VOMITING: ICD-10-CM

## 2020-01-30 DIAGNOSIS — R10.9 ABDOMINAL PAIN: ICD-10-CM

## 2020-01-30 DIAGNOSIS — K91.850 POUCHITIS (HCC): Primary | ICD-10-CM

## 2020-01-30 LAB
ALBUMIN SERPL BCP-MCNC: 3.2 G/DL (ref 3.5–5)
ALP SERPL-CCNC: 86 U/L (ref 46–116)
ALT SERPL W P-5'-P-CCNC: 26 U/L (ref 12–78)
ANION GAP SERPL CALCULATED.3IONS-SCNC: 5 MMOL/L (ref 4–13)
AST SERPL W P-5'-P-CCNC: 51 U/L (ref 5–45)
BACTERIA UR QL AUTO: ABNORMAL /HPF
BASOPHILS # BLD AUTO: 0.01 THOUSANDS/ΜL (ref 0–0.1)
BASOPHILS NFR BLD AUTO: 0 % (ref 0–1)
BILIRUB SERPL-MCNC: 0.2 MG/DL (ref 0.2–1)
BILIRUB UR QL STRIP: NEGATIVE
BUN SERPL-MCNC: 10 MG/DL (ref 5–25)
CALCIUM SERPL-MCNC: 8.4 MG/DL (ref 8.3–10.1)
CHLORIDE SERPL-SCNC: 107 MMOL/L (ref 100–108)
CLARITY UR: CLEAR
CO2 SERPL-SCNC: 30 MMOL/L (ref 21–32)
COLOR UR: YELLOW
CREAT SERPL-MCNC: 0.75 MG/DL (ref 0.6–1.3)
EOSINOPHIL # BLD AUTO: 0.11 THOUSAND/ΜL (ref 0–0.61)
EOSINOPHIL NFR BLD AUTO: 2 % (ref 0–6)
EXT PREG TEST URINE: NEGATIVE
EXT. CONTROL ED NAV: NORMAL
GFR SERPL CREATININE-BSD FRML MDRD: 101 ML/MIN/1.73SQ M
GLUCOSE SERPL-MCNC: 81 MG/DL (ref 65–140)
GLUCOSE UR STRIP-MCNC: NEGATIVE MG/DL
HCT VFR BLD AUTO: 38.1 % (ref 34.8–46.1)
HGB BLD-MCNC: 12.1 G/DL (ref 11.5–15.4)
HGB UR QL STRIP.AUTO: NEGATIVE
IMM GRANULOCYTES # BLD AUTO: 0.01 THOUSAND/UL (ref 0–0.2)
IMM GRANULOCYTES NFR BLD AUTO: 0 % (ref 0–2)
KETONES UR STRIP-MCNC: NEGATIVE MG/DL
LEUKOCYTE ESTERASE UR QL STRIP: ABNORMAL
LIPASE SERPL-CCNC: 93 U/L (ref 73–393)
LYMPHOCYTES # BLD AUTO: 2.18 THOUSANDS/ΜL (ref 0.6–4.47)
LYMPHOCYTES NFR BLD AUTO: 46 % (ref 14–44)
MCH RBC QN AUTO: 28.4 PG (ref 26.8–34.3)
MCHC RBC AUTO-ENTMCNC: 31.8 G/DL (ref 31.4–37.4)
MCV RBC AUTO: 89 FL (ref 82–98)
MONOCYTES # BLD AUTO: 0.55 THOUSAND/ΜL (ref 0.17–1.22)
MONOCYTES NFR BLD AUTO: 12 % (ref 4–12)
NEUTROPHILS # BLD AUTO: 1.91 THOUSANDS/ΜL (ref 1.85–7.62)
NEUTS SEG NFR BLD AUTO: 40 % (ref 43–75)
NITRITE UR QL STRIP: NEGATIVE
NON-SQ EPI CELLS URNS QL MICRO: ABNORMAL /HPF
NRBC BLD AUTO-RTO: 0 /100 WBCS
PH UR STRIP.AUTO: 5.5 [PH] (ref 4.5–8)
PLATELET # BLD AUTO: 207 THOUSANDS/UL (ref 149–390)
PMV BLD AUTO: 9.5 FL (ref 8.9–12.7)
POTASSIUM SERPL-SCNC: 4.1 MMOL/L (ref 3.5–5.3)
PROT SERPL-MCNC: 7.3 G/DL (ref 6.4–8.2)
PROT UR STRIP-MCNC: NEGATIVE MG/DL
RBC # BLD AUTO: 4.26 MILLION/UL (ref 3.81–5.12)
RBC #/AREA URNS AUTO: ABNORMAL /HPF
SODIUM SERPL-SCNC: 142 MMOL/L (ref 136–145)
SP GR UR STRIP.AUTO: 1.01 (ref 1–1.03)
UROBILINOGEN UR QL STRIP.AUTO: 0.2 E.U./DL
WBC # BLD AUTO: 4.77 THOUSAND/UL (ref 4.31–10.16)
WBC #/AREA URNS AUTO: ABNORMAL /HPF

## 2020-01-30 PROCEDURE — 80053 COMPREHEN METABOLIC PANEL: CPT | Performed by: EMERGENCY MEDICINE

## 2020-01-30 PROCEDURE — 99285 EMERGENCY DEPT VISIT HI MDM: CPT | Performed by: EMERGENCY MEDICINE

## 2020-01-30 PROCEDURE — 36415 COLL VENOUS BLD VENIPUNCTURE: CPT | Performed by: EMERGENCY MEDICINE

## 2020-01-30 PROCEDURE — 96374 THER/PROPH/DIAG INJ IV PUSH: CPT

## 2020-01-30 PROCEDURE — 96361 HYDRATE IV INFUSION ADD-ON: CPT

## 2020-01-30 PROCEDURE — 96375 TX/PRO/DX INJ NEW DRUG ADDON: CPT

## 2020-01-30 PROCEDURE — 74177 CT ABD & PELVIS W/CONTRAST: CPT

## 2020-01-30 PROCEDURE — 87493 C DIFF AMPLIFIED PROBE: CPT | Performed by: EMERGENCY MEDICINE

## 2020-01-30 PROCEDURE — 99284 EMERGENCY DEPT VISIT MOD MDM: CPT

## 2020-01-30 PROCEDURE — 83690 ASSAY OF LIPASE: CPT | Performed by: EMERGENCY MEDICINE

## 2020-01-30 PROCEDURE — 81025 URINE PREGNANCY TEST: CPT | Performed by: EMERGENCY MEDICINE

## 2020-01-30 PROCEDURE — 85025 COMPLETE CBC W/AUTO DIFF WBC: CPT | Performed by: EMERGENCY MEDICINE

## 2020-01-30 PROCEDURE — 81001 URINALYSIS AUTO W/SCOPE: CPT

## 2020-01-30 RX ORDER — ONDANSETRON 4 MG/1
4 TABLET, ORALLY DISINTEGRATING ORAL EVERY 8 HOURS PRN
Qty: 12 TABLET | Refills: 0 | Status: SHIPPED | OUTPATIENT
Start: 2020-01-30 | End: 2020-07-08

## 2020-01-30 RX ORDER — MORPHINE SULFATE 4 MG/ML
4 INJECTION, SOLUTION INTRAMUSCULAR; INTRAVENOUS ONCE
Status: COMPLETED | OUTPATIENT
Start: 2020-01-30 | End: 2020-01-30

## 2020-01-30 RX ORDER — DIPHENHYDRAMINE HYDROCHLORIDE 50 MG/ML
25 INJECTION INTRAMUSCULAR; INTRAVENOUS ONCE
Status: COMPLETED | OUTPATIENT
Start: 2020-01-30 | End: 2020-01-30

## 2020-01-30 RX ORDER — ONDANSETRON 2 MG/ML
4 INJECTION INTRAMUSCULAR; INTRAVENOUS ONCE
Status: COMPLETED | OUTPATIENT
Start: 2020-01-30 | End: 2020-01-30

## 2020-01-30 RX ORDER — FAMOTIDINE 20 MG/1
20 TABLET, FILM COATED ORAL 2 TIMES DAILY
Qty: 60 TABLET | Refills: 0 | Status: SHIPPED | OUTPATIENT
Start: 2020-01-30 | End: 2020-03-11

## 2020-01-30 RX ADMIN — MORPHINE SULFATE 4 MG: 4 INJECTION INTRAVENOUS at 13:53

## 2020-01-30 RX ADMIN — SODIUM CHLORIDE 1000 ML: 0.9 INJECTION, SOLUTION INTRAVENOUS at 10:38

## 2020-01-30 RX ADMIN — IOHEXOL 50 ML: 240 INJECTION, SOLUTION INTRATHECAL; INTRAVASCULAR; INTRAVENOUS; ORAL at 12:55

## 2020-01-30 RX ADMIN — DIPHENHYDRAMINE HYDROCHLORIDE 25 MG: 50 INJECTION INTRAMUSCULAR; INTRAVENOUS at 12:45

## 2020-01-30 RX ADMIN — FAMOTIDINE 40 MG: 10 INJECTION, SOLUTION INTRAVENOUS at 10:45

## 2020-01-30 RX ADMIN — ONDANSETRON 4 MG: 2 INJECTION INTRAMUSCULAR; INTRAVENOUS at 10:39

## 2020-01-30 RX ADMIN — IOHEXOL 100 ML: 350 INJECTION, SOLUTION INTRAVENOUS at 12:56

## 2020-01-30 NOTE — ED PROVIDER NOTES
History  Chief Complaint   Patient presents with    Abdominal Pain     pt c/o LUQ ang generalized abd  pain for the past x2 weeks; pt states the pain has been increasing; pt states she has n/d but denies vomitting; pt states she also has some sob but denies cp     Pt here w/ brad/luq pain that she states has been for the last two weeks, but upon record review, noted to have similar visits for the last 6 months  Follows regularly w/ bariatrics (hx of gastric sleeve w/ conversion to bypass)  About 2wks ago, increased protonix to BID  Continues to take carafate  No longer takes pepcid  Seen yesterday and told if pain increased to come to the ED  C/o constant pain that increases before bms and is slightly improved after bm  Has known marginal ulcer and pouchitis and recently had EGD      History provided by:  Patient and medical records   used: No    Abdominal Pain   Pain location:  Epigastric  Pain quality: aching, bloating, pressure and sharp    Pain radiates to:  Does not radiate  Pain severity:  Severe  Onset quality:  Gradual  Timing:  Constant  Progression:  Waxing and waning  Chronicity:  Chronic  Context: previous surgery    Context: not sick contacts and not suspicious food intake    Relieved by:  None tried  Worsened by:  Palpation  Ineffective treatments: Bowel activity  Associated symptoms: anorexia, chills, diarrhea, fatigue and nausea    Associated symptoms: no constipation, no fever and no vomiting    Risk factors: multiple surgeries        Prior to Admission Medications   Prescriptions Last Dose Informant Patient Reported? Taking?    Multiple Vitamins-Minerals (MULTIVITAMIN ADULT PO)  Self Yes Yes   Sig: Take by mouth   ferrous sulfate 325 (65 Fe) mg tablet   Yes Yes   Sig: Take 325 mg by mouth daily with breakfast   pantoprazole (PROTONIX) 20 mg tablet   No Yes   Sig: Take 1 tablet (20 mg total) by mouth daily   sucralfate (CARAFATE) 1 g tablet   No Yes   Sig: Take 1 tablet (1 g total) by mouth 4 (four) times a day      Facility-Administered Medications: None       Past Medical History:   Diagnosis Date    Abdominal pain     "almost constant"    Anemia     Anesthesia     "woke up swinging with last  2018  "was fine with EGD"    Bariatric surgery status     Dental bridge present     right lower permanent    Depression     "not currently taking any meds"    Difficulty swallowing     "in the past"    Disease of thyroid gland     not on meds now    Dizzy     Fatigue     "when blood pressure low" and weakness too"    GERD (gastroesophageal reflux disease)     "increase after surgery"    Heart murmur     heart murmer, work up negative with holter monitor    History of 2  sections     most recent 17    History of D&C 2019    History of iron deficiency     anemia/ had IV infusions through pregnancy in [id number]    Inguinal hernia     right    Loss of appetite     Low BP     "off and on"    Migraine     N&V (nausea and vomiting)     " a little better since on meds"    Non-intractable vomiting     "not since been on medicine"    Palpitations     "having again"    Postgastrectomy malabsorption     Stomach pain        Past Surgical History:   Procedure Laterality Date     SECTION      x2    CHOLECYSTECTOMY      CHROMOSOME ANALYSIS, PRODUCTS OF CONCEPTION (HISTORICAL)      2 miscarriages in  and Nov    OVARIAN CYST REMOVAL Right     [place] EGD TRANSORAL BIOPSY SINGLE/MULTIPLE N/A 2019    Procedure: ESOPHAGOGASTRODUODENOSCOPY (EGD) with bx;  Surgeon: Riccardo Brock MD;  Location: [facility];   Service: Bariatrics    [place] LAP GASTRIC BYPASS/SREE-EN-Y N/A 2019    Procedure: LAP SREE-EN-Y GASTRIC BYPASS, INTRAOP EGD;  Surgeon: Riccardo Brock MD;  [facility];  Service: Stella ISAAC,1ST TRI N/A 2019    Procedure: DILATATION AND EVACUATION (D&E) (8 weeks) missed ab;  Surgeon: Carlos Enrique Wei Gracia Buckley MD;  [facility];  Service: Gynecology    REVISION BYPASS LAPAROSCOPIC N/A 4/8/2019    Procedure: LAP REVISION/ CONVERSION;  Surgeon: Myah Greenberg MD;  Location: [facility];  Service: Bariatrics    SALPINGOOPHORECTOMY Right 09/2018    SLEEVE GASTROPLASTY         Family History   Problem Relation Age of Onset    Hypertension Mother     Heart disease Mother     No Known Problems Father      I have reviewed and agree with the history as documented  Social History     Tobacco Use    Smoking status: Never Smoker    Smokeless tobacco: Never Used   Substance Use Topics    Alcohol use: Not Currently     Frequency: 2-4 times a month     Drinks per session: 1 or 2     Binge frequency: Never     Comment: social    Drug use: No        Review of Systems   Constitutional: Positive for chills and fatigue  Negative for fever  Gastrointestinal: Positive for abdominal pain, anorexia, diarrhea and nausea  Negative for constipation and vomiting  All other systems reviewed and are negative  Physical Exam  Physical Exam   Constitutional: She appears well-developed and well-nourished  HENT:   Nose: Nose normal    Mouth/Throat: Oropharynx is clear and moist    Eyes: Conjunctivae are normal    Neck: Neck supple  Cardiovascular: Normal rate and regular rhythm  Pulmonary/Chest: Effort normal and breath sounds normal    Abdominal: Soft  Bowel sounds are decreased  There is tenderness in the epigastric area and left upper quadrant  Musculoskeletal: She exhibits no deformity  Neurological: She is alert  Skin: Skin is warm  Capillary refill takes less than 2 seconds  Psychiatric: She has a normal mood and affect  Nursing note and vitals reviewed        Vital Signs  ED Triage Vitals [01/30/20 0935]   Temperature Pulse Respirations Blood Pressure SpO2   98 3 °F (36 8 °C) 74 18 119/66 100 %      Temp Source Heart Rate Source Patient Position - Orthostatic VS BP Location FiO2 (%)   Temporal Monitor Sitting Right arm --      Pain Score       9           Vitals:    01/30/20 0935 01/30/20 1135 01/30/20 1348 01/30/20 1549   BP: 119/66 111/68 106/59 108/62   Pulse: 74 68 61 66   Patient Position - Orthostatic VS: Sitting Lying Lying Lying         Visual Acuity      ED Medications  Medications   sodium chloride 0 9 % bolus 1,000 mL (0 mL Intravenous Stopped 1/30/20 1546)   ondansetron (ZOFRAN) injection 4 mg (4 mg Intravenous Given 1/30/20 1039)   famotidine (PEPCID) injection 40 mg (40 mg Intravenous Given 1/30/20 1045)   diphenhydrAMINE (BENADRYL) injection 25 mg (25 mg Intravenous Given 1/30/20 1245)   iohexol (OMNIPAQUE) 350 MG/ML injection (MULTI-DOSE) 100 mL (100 mL Intravenous Given 1/30/20 1256)   iohexol (OMNIPAQUE) 240 MG/ML solution 50 mL (50 mL Oral Given 1/30/20 1255)   morphine (PF) 4 mg/mL injection 4 mg (4 mg Intravenous Given 1/30/20 1353)       Diagnostic Studies  Results Reviewed     Procedure Component Value Units Date/Time    Stool Enteric Bacterial Panel by PCR [[id number]] Updated:  01/30/20 1538    Lab Status:  No result Specimen:  Stool from Rectum     Clostridium difficile toxin by PCR with EIA [[id number]] Collected:  01/30/20 1534    Lab Status:   In process Specimen:  Stool from Per Rectum Updated:  01/30/20 1536    Urine Microscopic [[id number]]  (Abnormal) Collected:  01/30/20 1002    Lab Status:  Final result Specimen:  Urine, Clean Catch Updated:  01/30/20 1108     RBC, UA 0-1 /hpf      WBC, UA 2-4 /hpf      Epithelial Cells Moderate /hpf      Bacteria, UA Occasional /hpf     Lipase [[id number]]  (Normal) Collected:  01/30/20 1035    Lab Status:  Final result Specimen:  Blood from Arm, Left Updated:  01/30/20 1100     Lipase 93 u/L     Comprehensive metabolic panel [[id number]]  (Abnormal) Collected:  01/30/20 1035    Lab Status:  Final result Specimen:  Blood from Arm, Left Updated:  01/30/20 1100     Sodium 142 mmol/L      Potassium 4 1 mmol/L      Chloride 107 mmol/L      CO2 30 mmol/L      ANION GAP 5 mmol/L      BUN 10 mg/dL      Creatinine 0 75 mg/dL      Glucose 81 mg/dL      Calcium 8 4 mg/dL      AST 51 U/L      ALT 26 U/L      Alkaline Phosphatase 86 U/L      Total Protein 7 3 g/dL      Albumin 3 2 g/dL      Total Bilirubin 0 20 mg/dL      eGFR 101 ml/min/1 73sq m     Narrative:       National Kidney Disease Foundation guidelines for Chronic Kidney Disease (CKD):     Stage 1 with normal or high GFR (GFR > 90 mL/min/1 73 square meters)    Stage 2 Mild CKD (GFR = 60-89 mL/min/1 73 square meters)    Stage 3A Moderate CKD (GFR = 45-59 mL/min/1 73 square meters)    Stage 3B Moderate CKD (GFR = 30-44 mL/min/1 73 square meters)    Stage 4 Severe CKD (GFR = 15-29 mL/min/1 73 square meters)    Stage 5 End Stage CKD (GFR <15 mL/min/1 73 square meters)  Note: GFR calculation is accurate only with a steady state creatinine    CBC and differential [[id number]]  (Abnormal) Collected:  01/30/20 1035    Lab Status:  Final result Specimen:  Blood from Arm, Left Updated:  01/30/20 1040     WBC 4 77 Thousand/uL      RBC 4 26 Million/uL      Hemoglobin 12 1 g/dL      Hematocrit 38 1 %      MCV 89 fL      MCH 28 4 pg      MCHC 31 8 g/dL      MPV 9 5 fL      Platelets 220 Thousands/uL      nRBC 0 /100 WBCs      Neutrophils Relative 40 %      Immat GRANS % 0 %      Lymphocytes Relative 46 %      Monocytes Relative 12 %      Eosinophils Relative 2 %      Basophils Relative 0 %      Neutrophils Absolute 1 91 Thousands/µL      Immature Grans Absolute 0 01 Thousand/uL      Lymphocytes Absolute 2 18 Thousands/µL      Monocytes Absolute 0 55 Thousand/µL      Eosinophils Absolute 0 11 Thousand/µL      Basophils Absolute 0 01 Thousands/µL     POCT pregnancy, urine [[id number]]  (Normal) Resulted:  01/30/20 1038    Lab Status:  Final result Updated:  01/30/20 1038     EXT PREG TEST UR (Ref: Negative) Negative     Control Valid    Urine Macroscopic, POC [[id number]]  (Abnormal) Collected:  01/30/20 1002    Lab Status:  Final result Specimen:  Urine Updated:  01/30/20 1003     Color, UA Yellow     Clarity, UA Clear     pH, UA 5 5     Leukocytes, UA Trace     Nitrite, UA Negative     Protein, UA Negative mg/dl      Glucose, UA Negative mg/dl      Ketones, UA Negative mg/dl      Urobilinogen, UA 0 2 E U /dl      Bilirubin, UA Negative     Blood, UA Negative     Specific Gravity, UA 1 010    Narrative:       CL[facility] RESULT                 CT abdomen pelvis with contrast   ED Interpretation by Dejuan Cdeeno DO (01/30 1511)   See below      Final Result by Sammy Ferreira MD (01/30 2574)      1  Redemonstration of postsurgical changes status post gastric bypass with mild mural thickening of the gastric pouch suggestive of pouchitis as seen in recent EGD 1/29/2020       2   There is fluid and layering oral contrast within the excluded stomach  Since the layering contrast is less dense compared to the bowel contrast and no gastrogastric fistula was seen on most recent EGD, this is likely secondary to reflux from    bypassed duodenum  May consider fluoroscopy evaluation if there is continued concern  3   Left ovary 2 6 cm prominent follicle/cyst            Workstation performed: [id number]                    Procedures  Procedures         ED Course  ED Course as of Jan 30 1831   Thu Jan 30, 2020   1324 Pt complaining of continued abd pain    Will give pain med                                  MDM  Number of Diagnoses or Management Options  Abdominal pain: new and requires workup  Chronic vomiting: new and requires workup  Pouchitis [facility]): new and requires workup     Amount and/or Complexity of Data Reviewed  Clinical lab tests: reviewed and ordered  Tests in the radiology section of CPT®: ordered and reviewed  Review and summarize past medical records: yes  Independent visualization of images, tracings, or specimens: yes          Disposition  Final diagnoses:   Pouchitis ([address] )   Abdominal pain   Chronic vomiting     Time reflects when diagnosis was documented in both MDM as applicable and the Disposition within this note     Time User Action Codes Description Comment    1/30/2020  3:32 PM Zahida Varela Add [K91 850] Pouchitis ([address] )     1/30/2020  3:32 PM Carol Mendoza Add [R10 9] Abdominal pain     1/30/2020  4:14 PM Carol Mendoza Add [R11 10] Chronic vomiting       ED Disposition     ED Disposition Condition Date/Time Comment    Discharge Stable u Jan 30, 2020  3:32 PM Dedrick Aldrich discharge to home/self care  Follow-up Information     Follow up With Specialties Details [address] ,  [facility], General Surgery Call  for further evaluation and treatment [address] [facility] [address] [facility]  [phone]            Discharge Medication List as of 1/30/2020  3:40 PM      START taking these medications    Details   famotidine (PEPCID) 20 mg tablet Take 1 tablet (20 mg total) by mouth 2 (two) times a day, Starting Thu 1/30/2020, Until Sat 2/29/2020, Normal         CONTINUE these medications which have NOT CHANGED    Details   ferrous sulfate 325 (65 Fe) mg tablet Take 325 mg by mouth daily with breakfast, Historical Med      Multiple Vitamins-Minerals (MULTIVITAMIN ADULT PO) Take by mouth, Historical Med      pantoprazole (PROTONIX) 20 mg tablet Take 1 tablet (20 mg total) by mouth daily, Starting Fri 1/17/2020, Normal      sucralfate (CARAFATE) 1 g tablet Take 1 tablet (1 g total) by mouth 4 (four) times a day, Starting Fri 1/17/2020, Normal           No discharge procedures on file      ED Provider  Electronically Signed by           Krishan Allen DO  01/30/20 5416

## 2020-01-31 LAB — C DIFF TOX GENS STL QL NAA+PROBE: NEGATIVE

## 2020-02-28 ENCOUNTER — TELEPHONE (OUTPATIENT)
Dept: BARIATRICS | Facility: CLINIC | Age: 40
End: 2020-02-28

## 2020-03-02 ENCOUNTER — APPOINTMENT (EMERGENCY)
Dept: CT IMAGING | Facility: HOSPITAL | Age: 40
End: 2020-03-02
Payer: COMMERCIAL

## 2020-03-02 ENCOUNTER — HOSPITAL ENCOUNTER (EMERGENCY)
Facility: HOSPITAL | Age: 40
Discharge: HOME | End: 2020-03-02
Attending: EMERGENCY MEDICINE | Admitting: EMERGENCY MEDICINE
Payer: COMMERCIAL

## 2020-03-02 VITALS
DIASTOLIC BLOOD PRESSURE: 62 MMHG | WEIGHT: 210.98 LBS | OXYGEN SATURATION: 100 % | BODY MASS INDEX: 35.11 KG/M2 | SYSTOLIC BLOOD PRESSURE: 116 MMHG | RESPIRATION RATE: 18 BRPM | TEMPERATURE: 98.4 F | HEART RATE: 76 BPM

## 2020-03-02 DIAGNOSIS — K59.00 CONSTIPATION: ICD-10-CM

## 2020-03-02 DIAGNOSIS — R10.9 ABDOMINAL PAIN: Primary | ICD-10-CM

## 2020-03-02 LAB
ALBUMIN SERPL BCP-MCNC: 3.5 G/DL (ref 3.5–5)
ALP SERPL-CCNC: 78 U/L (ref 46–116)
ALT SERPL W P-5'-P-CCNC: 22 U/L (ref 12–78)
ANION GAP SERPL CALCULATED.3IONS-SCNC: 10 MMOL/L (ref 4–13)
APAP SERPL-MCNC: <2 UG/ML (ref 10–20)
AST SERPL W P-5'-P-CCNC: 24 U/L (ref 5–45)
BACTERIA UR QL AUTO: ABNORMAL /HPF
BASOPHILS # BLD AUTO: 0.01 THOUSANDS/ΜL (ref 0–0.1)
BASOPHILS NFR BLD AUTO: 0 % (ref 0–1)
BILIRUB SERPL-MCNC: 0.36 MG/DL (ref 0.2–1)
BILIRUB UR QL STRIP: NEGATIVE
BUN SERPL-MCNC: 10 MG/DL (ref 5–25)
CALCIUM SERPL-MCNC: 8.6 MG/DL (ref 8.3–10.1)
CHLORIDE SERPL-SCNC: 104 MMOL/L (ref 100–108)
CLARITY UR: ABNORMAL
CO2 SERPL-SCNC: 25 MMOL/L (ref 21–32)
COLOR UR: YELLOW
CREAT SERPL-MCNC: 0.78 MG/DL (ref 0.6–1.3)
EOSINOPHIL # BLD AUTO: 0.07 THOUSAND/ΜL (ref 0–0.61)
EOSINOPHIL NFR BLD AUTO: 1 % (ref 0–6)
ERYTHROCYTE [DISTWIDTH] IN BLOOD BY AUTOMATED COUNT: 17.7 % (ref 11.6–15.1)
ETHANOL SERPL-MCNC: <3 MG/DL (ref 0–3)
EXT PREG TEST URINE: NEGATIVE
EXT. CONTROL ED NAV: NORMAL
GFR SERPL CREATININE-BSD FRML MDRD: 97 ML/MIN/1.73SQ M
GLUCOSE SERPL-MCNC: 84 MG/DL (ref 65–140)
GLUCOSE UR STRIP-MCNC: NEGATIVE MG/DL
HCT VFR BLD AUTO: 37.7 % (ref 34.8–46.1)
HGB BLD-MCNC: 12.4 G/DL (ref 11.5–15.4)
HGB UR QL STRIP.AUTO: NEGATIVE
IMM GRANULOCYTES # BLD AUTO: 0.01 THOUSAND/UL (ref 0–0.2)
IMM GRANULOCYTES NFR BLD AUTO: 0 % (ref 0–2)
KETONES UR STRIP-MCNC: ABNORMAL MG/DL
LEUKOCYTE ESTERASE UR QL STRIP: ABNORMAL
LIPASE SERPL-CCNC: 73 U/L (ref 73–393)
LYMPHOCYTES # BLD AUTO: 3.13 THOUSANDS/ΜL (ref 0.6–4.47)
LYMPHOCYTES NFR BLD AUTO: 49 % (ref 14–44)
MCH RBC QN AUTO: 30.3 PG (ref 26.8–34.3)
MCHC RBC AUTO-ENTMCNC: 32.9 G/DL (ref 31.4–37.4)
MCV RBC AUTO: 92 FL (ref 82–98)
MONOCYTES # BLD AUTO: 0.51 THOUSAND/ΜL (ref 0.17–1.22)
MONOCYTES NFR BLD AUTO: 8 % (ref 4–12)
MUCOUS THREADS UR QL AUTO: ABNORMAL
NEUTROPHILS # BLD AUTO: 2.74 THOUSANDS/ΜL (ref 1.85–7.62)
NEUTS SEG NFR BLD AUTO: 42 % (ref 43–75)
NITRITE UR QL STRIP: NEGATIVE
NON-SQ EPI CELLS URNS QL MICRO: ABNORMAL /HPF
NRBC BLD AUTO-RTO: 0 /100 WBCS
PH UR STRIP.AUTO: 5.5 [PH] (ref 4.5–8)
PLATELET # BLD AUTO: 198 THOUSANDS/UL (ref 149–390)
PMV BLD AUTO: 10.2 FL (ref 8.9–12.7)
POTASSIUM SERPL-SCNC: 4 MMOL/L (ref 3.5–5.3)
PROT SERPL-MCNC: 6.9 G/DL (ref 6.4–8.2)
PROT UR STRIP-MCNC: NEGATIVE MG/DL
RBC # BLD AUTO: 4.09 MILLION/UL (ref 3.81–5.12)
RBC #/AREA URNS AUTO: ABNORMAL /HPF
SALICYLATES SERPL-MCNC: <3 MG/DL (ref 3–20)
SODIUM SERPL-SCNC: 139 MMOL/L (ref 136–145)
SP GR UR STRIP.AUTO: 1.02 (ref 1–1.03)
UROBILINOGEN UR QL STRIP.AUTO: 0.2 E.U./DL
WBC # BLD AUTO: 6.47 THOUSAND/UL (ref 4.31–10.16)
WBC #/AREA URNS AUTO: ABNORMAL /HPF

## 2020-03-02 PROCEDURE — 74177 CT ABD & PELVIS W/CONTRAST: CPT

## 2020-03-02 PROCEDURE — 96361 HYDRATE IV INFUSION ADD-ON: CPT

## 2020-03-02 PROCEDURE — 80053 COMPREHEN METABOLIC PANEL: CPT | Performed by: EMERGENCY MEDICINE

## 2020-03-02 PROCEDURE — 99284 EMERGENCY DEPT VISIT MOD MDM: CPT

## 2020-03-02 PROCEDURE — 80320 DRUG SCREEN QUANTALCOHOLS: CPT | Performed by: EMERGENCY MEDICINE

## 2020-03-02 PROCEDURE — 96376 TX/PRO/DX INJ SAME DRUG ADON: CPT

## 2020-03-02 PROCEDURE — 85025 COMPLETE CBC W/AUTO DIFF WBC: CPT | Performed by: EMERGENCY MEDICINE

## 2020-03-02 PROCEDURE — 99285 EMERGENCY DEPT VISIT HI MDM: CPT | Performed by: EMERGENCY MEDICINE

## 2020-03-02 PROCEDURE — 83690 ASSAY OF LIPASE: CPT | Performed by: EMERGENCY MEDICINE

## 2020-03-02 PROCEDURE — 36415 COLL VENOUS BLD VENIPUNCTURE: CPT | Performed by: EMERGENCY MEDICINE

## 2020-03-02 PROCEDURE — 81001 URINALYSIS AUTO W/SCOPE: CPT

## 2020-03-02 PROCEDURE — 96374 THER/PROPH/DIAG INJ IV PUSH: CPT

## 2020-03-02 PROCEDURE — 80329 ANALGESICS NON-OPIOID 1 OR 2: CPT | Performed by: EMERGENCY MEDICINE

## 2020-03-02 PROCEDURE — 81025 URINE PREGNANCY TEST: CPT | Performed by: EMERGENCY MEDICINE

## 2020-03-02 PROCEDURE — 96375 TX/PRO/DX INJ NEW DRUG ADDON: CPT

## 2020-03-02 RX ORDER — TRAMADOL HYDROCHLORIDE 50 MG/1
50 TABLET ORAL EVERY 6 HOURS PRN
Qty: 10 TABLET | Refills: 0 | Status: SHIPPED | OUTPATIENT
Start: 2020-03-02 | End: 2020-03-06

## 2020-03-02 RX ORDER — HYDROMORPHONE HCL/PF 1 MG/ML
0.5 SYRINGE (ML) INJECTION ONCE
Status: COMPLETED | OUTPATIENT
Start: 2020-03-02 | End: 2020-03-02

## 2020-03-02 RX ORDER — DIPHENHYDRAMINE HYDROCHLORIDE 50 MG/ML
12.5 INJECTION INTRAMUSCULAR; INTRAVENOUS ONCE
Status: COMPLETED | OUTPATIENT
Start: 2020-03-02 | End: 2020-03-02

## 2020-03-02 RX ORDER — DIPHENHYDRAMINE HYDROCHLORIDE 50 MG/ML
25 INJECTION INTRAMUSCULAR; INTRAVENOUS ONCE
Status: COMPLETED | OUTPATIENT
Start: 2020-03-02 | End: 2020-03-02

## 2020-03-02 RX ORDER — TRAMADOL HYDROCHLORIDE 50 MG/1
50 TABLET ORAL EVERY 6 HOURS PRN
Qty: 30 TABLET | Refills: 0 | Status: SHIPPED | OUTPATIENT
Start: 2020-03-02 | End: 2020-03-02 | Stop reason: CLARIF

## 2020-03-02 RX ORDER — POLYETHYLENE GLYCOL 3350 17 G/17G
17 POWDER, FOR SOLUTION ORAL DAILY
Qty: 289 G | Refills: 0 | Status: SHIPPED | OUTPATIENT
Start: 2020-03-02 | End: 2020-03-06

## 2020-03-02 RX ADMIN — DIPHENHYDRAMINE HYDROCHLORIDE 12.5 MG: 50 INJECTION, SOLUTION INTRAMUSCULAR; INTRAVENOUS at 21:02

## 2020-03-02 RX ADMIN — HYDROMORPHONE HYDROCHLORIDE 0.5 MG: 1 INJECTION, SOLUTION INTRAMUSCULAR; INTRAVENOUS; SUBCUTANEOUS at 19:23

## 2020-03-02 RX ADMIN — IOHEXOL 100 ML: 350 INJECTION, SOLUTION INTRAVENOUS at 20:38

## 2020-03-02 RX ADMIN — FAMOTIDINE 20 MG: 10 INJECTION, SOLUTION INTRAVENOUS at 21:03

## 2020-03-02 RX ADMIN — SODIUM CHLORIDE 1000 ML: 0.9 INJECTION, SOLUTION INTRAVENOUS at 18:59

## 2020-03-02 RX ADMIN — DIPHENHYDRAMINE HYDROCHLORIDE 25 MG: 50 INJECTION, SOLUTION INTRAMUSCULAR; INTRAVENOUS at 20:26

## 2020-03-03 ENCOUNTER — TELEPHONE (OUTPATIENT)
Dept: BARIATRICS | Facility: CLINIC | Age: 40
End: 2020-03-03

## 2020-03-03 NOTE — TELEPHONE ENCOUNTER
Left msg for patient  She no showed last weeks appt and was seen in the ER per Dr Sharlotte Goodell patient needs an appt with Dr Opal Parks this week

## 2020-03-03 NOTE — ED PROVIDER NOTES
History  Chief Complaint   Patient presents with    Abdominal Pain     pt report SCK adominal pain; pt reports nausea;      50-year-old woman with a past medical history obesity s/p gastric sleeve with Karmen-en-Y revision in 04/2019 presents for evaluation of right lower quadrant abdominal pain  The symptoms started roughly 1 week ago  The pain is located in her right lower quadrant  She was seen in outside hospital 225 and had a non con abdominal CT and pelvic ultrasound the remarkable for a left-sided ovarian cyst   The symptoms resolved briefly but have returned and are more severe  She states that the pain is now a burning sensation radiates from right lower quadrant to right upper quadrant and right flank  She has had a few episodes of burning with urination  Denies hematuria  Denies vaginal bleeding, discharge  LMP was 3 weeks ago  She complains subjective fevers  She has been taking Tylenol every 2-3 hours with no relief  She has taken roughly 4 g over last 24 hours  She also complains of nausea  She denies chills, diarrhea  She is passing flatus  Prior to Admission Medications   Prescriptions Last Dose Informant Patient Reported? Taking?    Multiple Vitamins-Minerals (MULTIVITAMIN ADULT PO) 3/2/2020 at Unknown time Self Yes Yes   Sig: Take by mouth   famotidine (PEPCID) 20 mg tablet Past Month at Unknown time  No Yes   Sig: Take 1 tablet (20 mg total) by mouth 2 (two) times a day   ferrous sulfate 325 (65 Fe) mg tablet 3/1/2020 at Unknown time  Yes Yes   Sig: Take 325 mg by mouth daily with breakfast   ondansetron (ZOFRAN-ODT) 4 mg disintegrating tablet 3/1/2020 at Unknown time  No Yes   Sig: Take 1 tablet (4 mg total) by mouth every 8 (eight) hours as needed for nausea or vomiting   pantoprazole (PROTONIX) 20 mg tablet 3/1/2020 at Unknown time  No Yes   Sig: Take 1 tablet (20 mg total) by mouth daily   sucralfate (CARAFATE) 1 g tablet 3/1/2020 at Unknown time  No Yes   Sig: Take 1 tablet (1 g total) by mouth 4 (four) times a day      Facility-Administered Medications: None       Past Medical History:   Diagnosis Date    Abdominal pain     "almost constant"    Anemia     Anesthesia     "woke up swinging with last  2018  "was fine with EGD"    Bariatric surgery status     Dental bridge present     right lower permanent    Depression     "not currently taking any meds"    Difficulty swallowing     "in the past"    Disease of thyroid gland     not on meds now    Dizzy     Fatigue     "when blood pressure low" and weakness too"    GERD (gastroesophageal reflux disease)     "increase after surgery"    Heart murmur     heart murmer, work up negative with holter monitor    History of 2  sections     most recent 17    History of D&C 2019    History of iron deficiency     anemia/ had IV infusions through pregnancy in [id number]    Inguinal hernia     right    Loss of appetite     Low BP     "off and on"    Migraine     N&V (nausea and vomiting)     " a little better since on meds"    Non-intractable vomiting     "not since been on medicine"    Palpitations     "having again"    Postgastrectomy malabsorption     Stomach pain        Past Surgical History:   Procedure Laterality Date     SECTION      x2    CHOLECYSTECTOMY      CHROMOSOME ANALYSIS, PRODUCTS OF CONCEPTION (HISTORICAL)      2 miscarriages in / and Nov    OVARIAN CYST REMOVAL Right     WA EGD TRANSORAL BIOPSY SINGLE/MULTIPLE N/A 2019    Procedure: ESOPHAGOGASTRODUODENOSCOPY (EGD) with bx;  Surgeon: Gordon Mims MD;  Location: [facility];   Service: Bariatrics    WA LAP GASTRIC BYPASS/SREE-EN-Y N/A 2019    Procedure: LAP SREE-EN-Y GASTRIC BYPASS, INTRAOP EGD;  Surgeon: Gordon Mims MD;  Location: [facility];  Service: Stella DURANTNORMA,1ST TRI N/A 2019    Procedure: DILATATION AND EVACUATION (D&E) (8 weeks) missed ab;  Surgeon: Haley Quezada Rosalio Romero MD;  Location: [facility];  Service: Gynecology    REVISION BYPASS LAPAROSCOPIC N/A 4/8/2019    Procedure: LAP REVISION/ CONVERSION;  Surgeon: Rommie Schlatter, MD;  Location: [facility];  Service: Bariatrics    SALPINGOOPHORECTOMY Right 09/2018    SLEEVE GASTROPLASTY         Family History   Problem Relation Age of Onset    Hypertension Mother     Heart disease Mother     No Known Problems Father      I have reviewed and agree with the history as documented  E-Cigarette/Vaping     E-Cigarette/Vaping Substances     Social History     Tobacco Use    Smoking status: Never Smoker    Smokeless tobacco: Never Used   Substance Use Topics    Alcohol use: Not Currently     Frequency: 2-4 times a month     Drinks per session: 1 or 2     Binge frequency: Never     Comment: social    Drug use: No        Review of Systems   Constitutional: Positive for fever  Negative for appetite change, chills, diaphoresis and fatigue  HENT: Negative for congestion, rhinorrhea and sore throat  Respiratory: Negative for cough, shortness of breath, wheezing and stridor  Cardiovascular: Negative for chest pain, palpitations and leg swelling  Gastrointestinal: Positive for abdominal pain and nausea  Negative for abdominal distention, constipation, diarrhea and vomiting  Endocrine: Negative for polydipsia and polyuria  Genitourinary: Negative for dysuria and hematuria  Musculoskeletal: Negative for back pain, neck pain and neck stiffness  Skin: Negative for pallor, rash and wound  Neurological: Negative for dizziness, light-headedness and headaches  Psychiatric/Behavioral: Negative for behavioral problems and confusion         Physical Exam  ED Triage Vitals   Temperature Pulse Respirations Blood Pressure SpO2   03/02/20 1709 03/02/20 1709 03/02/20 1708 03/02/20 1709 03/02/20 1709   98 4 °F (36 9 °C) 80 20 147/78 99 %      Temp Source Heart Rate Source Patient Position - Orthostatic VS BP Location FiO2 (%) mL (100 mL Intravenous Given 3/2/20 2038)   famotidine (PEPCID) injection 20 mg (20 mg Intravenous Given 3/2/20 2103)   diphenhydrAMINE (BENADRYL) injection 12 5 mg (12 5 mg Intravenous Given 3/2/20 2102)       Diagnostic Studies  Results Reviewed     Procedure Component Value Units Date/Time    Ethanol [[id number]]  (Normal) Collected:  03/02/20 1906    Lab Status:  Final result Specimen:  Blood from Arm, Right Updated:  03/02/20 1931     Ethanol Lvl <3 mg/dL     Acetaminophen level-If concentration is detectable, please discuss with medical  on call   [[id number]]  (Abnormal) Collected:  03/02/20 1906    Lab Status:  Final result Specimen:  Blood from Arm, Right Updated:  03/02/20 1930     Acetaminophen Level <2 ug/mL     Comprehensive metabolic panel [[id number]] Collected:  03/02/20 1906    Lab Status:  Final result Specimen:  Blood from Arm, Right Updated:  03/02/20 1927     Sodium 139 mmol/L      Potassium 4 0 mmol/L      Chloride 104 mmol/L      CO2 25 mmol/L      ANION GAP 10 mmol/L      BUN 10 mg/dL      Creatinine 0 78 mg/dL      Glucose 84 mg/dL      Calcium 8 6 mg/dL      AST 24 U/L      ALT 22 U/L      Alkaline Phosphatase 78 U/L      Total Protein 6 9 g/dL      Albumin 3 5 g/dL      Total Bilirubin 0 36 mg/dL      eGFR 97 ml/min/1 73sq m     Narrative:       [place] guidelines for Chronic Kidney Disease (CKD):     Stage 1 with normal or high GFR (GFR > 90 mL/min/1 73 square meters)    Stage 2 Mild CKD (GFR = 60-89 mL/min/1 73 square meters)    Stage 3A Moderate CKD (GFR = 45-59 mL/min/1 73 square meters)    Stage 3B Moderate CKD (GFR = 30-44 mL/min/1 73 square meters)    Stage 4 Severe CKD (GFR = 15-29 mL/min/1 73 square meters)    Stage 5 End Stage CKD (GFR <15 mL/min/1 73 square meters)  Note: GFR calculation is accurate only with a steady state creatinine    Lipase [[id number]]  (Normal) Collected:  03/02/20 1906    Lab Status:  Final result Specimen:  Blood from Arm, Right Updated:  03/02/20 1927     Lipase 73 u/L     Salicylate level [[id number]]  (Abnormal) Collected:  03/02/20 1906    Lab Status:  Final result Specimen:  Blood from Arm, Right Updated:  77/50/50 7628     Salicylate Lvl <3 mg/dL     CBC and differential [[id number]]  (Abnormal) Collected:  03/02/20 1906    Lab Status:  Final result Specimen:  Blood from Arm, Right Updated:  03/02/20 1913     WBC 6 47 Thousand/uL      RBC 4 09 Million/uL      Hemoglobin 12 4 g/dL      Hematocrit 37 7 %      MCV 92 fL      MCH 30 3 pg      MCHC 32 9 g/dL      RDW 17 7 %      MPV 10 2 fL      Platelets 587 Thousands/uL      nRBC 0 /100 WBCs      Neutrophils Relative 42 %      Immat GRANS % 0 %      Lymphocytes Relative 49 %      Monocytes Relative 8 %      Eosinophils Relative 1 %      Basophils Relative 0 %      Neutrophils Absolute 2 74 Thousands/µL      Immature Grans Absolute 0 01 Thousand/uL      Lymphocytes Absolute 3 13 Thousands/µL      Monocytes Absolute 0 51 Thousand/µL      Eosinophils Absolute 0 07 Thousand/µL      Basophils Absolute 0 01 Thousands/µL     Urine Microscopic [[id number]]  (Abnormal) Collected:  03/02/20 1753    Lab Status:  Final result Specimen:  Urine, Clean Catch Updated:  03/02/20 1821     RBC, UA None Seen /hpf      WBC, UA 4-10 /hpf      Epithelial Cells Occasional /hpf      Bacteria, UA Occasional /hpf      MUCUS THREADS Occasional    POCT pregnancy, urine [[id number]]  (Normal) Resulted:  03/02/20 1800    Lab Status:  Final result Updated:  03/02/20 1801     EXT PREG TEST UR (Ref: Negative) Negative     Control Valid    POCT urinalysis dipstick [[id number]]  (Abnormal) Resulted:  03/02/20 1758    Lab Status:  Final result Updated:  03/02/20 1758    Urine Macroscopic, POC [[id number]]  (Abnormal) Collected:  03/02/20 1753    Lab Status:  Final result Specimen:  Urine Updated:  03/02/20 1755     Color, UA Yellow     Clarity, UA Cloudy     pH, UA 5 5     Leukocytes, UA Trace     Nitrite, UA Negative     Protein, UA Negative mg/dl      Glucose, UA Negative mg/dl      Ketones, UA 15 (1+) mg/dl      Urobilinogen, UA 0 2 E U /dl      Bilirubin, UA Negative     Blood, UA Negative     Specific Gravity, UA 1 025    Narrative:       CLINITEK RESULT                 CT abdomen pelvis with contrast   Final Result by Radha Florian MD (03/02 2058)      No acute inflammatory changes in the abdomen or pelvis  No bowel obstruction or anastomotic leak in this patient status post gastric bypass  The oral contrast column has transited into the mid small bowel  No prominent small bowel distention  Prominent colonic stool  No colonic obstruction  Small hiatal hernia  Workstation performed: [id number]               Procedures  Procedures      ED Course  ED Course as of Mar 03 0003   Mon Mar 02, 2020   1809 PREGNANCY TEST URINE: Negative   0145 SALICYLATE LEVEL(!): <3   1928 WBC: 6 47   1928 Creatinine: 0 78   1928 WBC, UA(!): 4-10                               MDM  Number of Diagnoses or Management Options  Abdominal pain: new and requires workup  Constipation: new and requires workup  Diagnosis management comments: 50-year-old woman with a past medical history of Karmen-en-Y presents with right sided abdominal pain she has right lower quadrant tenderness on a patient  Vital signs are normal and exam is otherwise unremarkable  Will check labs, urine, CT abdomen pelvis with p o  And IV contrast per bariatric protocol  Dilaudid for pain  Reassess  Imaging shows colonic stool burden  Discussed this with patient and she is concerned there is another cause of her pain  Will discuss with bariatric surgery fellow on-call  Discussed with bariatric surgery  They recommend prompt re-evaluation in the clinic tomorrow  They will contact the patient  Patient given script for 10 tabs of tramadol 50mg    The script was printed as patient is unable to fill have the prescription filled electronically tonight  Patient advised to take less than 4 g of Tylenol total daily  Amount and/or Complexity of Data Reviewed  Clinical lab tests: ordered and reviewed  Tests in the radiology section of CPT®: ordered and reviewed  Decide to obtain previous medical records or to obtain history from someone other than the patient: yes  Obtain history from someone other than the patient: yes  Review and summarize past medical records: yes  Discuss the patient with other providers: yes  Independent visualization of images, tracings, or specimens: yes    Risk of Complications, Morbidity, and/or Mortality  Presenting problems: low  Diagnostic procedures: low  Management options: low    Patient Progress  Patient progress: stable        Disposition  Final diagnoses:   Abdominal pain   Constipation     Time reflects when diagnosis was documented in both MDM as applicable and the Disposition within this note     Time User Action Codes Description Comment    3/2/2020 10:31 PM Chel Andrea [R10 9] Abdominal pain     3/2/2020 10:31 PM Chel Andrea [K59 00] Constipation       ED Disposition     ED Disposition Condition Date/Time Comment    Discharge Stable Mon Mar 2, 2020 10:31 PM Edgardo Aldrich discharge to home/self care              Follow-up Information     Follow up With Specialties Details Why Contact Info    Luis A Calderon PA-C Physician Assistant   [address] [facility] [address] [phone]            Discharge Medication List as of 3/2/2020 10:33 PM      START taking these medications    Details   polyethylene glycol (GLYCOLAX) powder Take 17 g by mouth daily, Starting Mon 3/2/2020, Print         CONTINUE these medications which have NOT CHANGED    Details   famotidine (PEPCID) 20 mg tablet Take 1 tablet (20 mg total) by mouth 2 (two) times a day, Starting u 1/30/2020, Until Mon 3/2/2020, Normal      ferrous sulfate 325 (65 Fe) mg tablet Take 325 mg by mouth daily with breakfast, Historical Med      Multiple Vitamins-Minerals (MULTIVITAMIN ADULT PO) Take by mouth, Historical Med      ondansetron (ZOFRAN-ODT) 4 mg disintegrating tablet Take 1 tablet (4 mg total) by mouth every 8 (eight) hours as needed for nausea or vomiting, Starting Thu 1/30/2020, Normal      pantoprazole (PROTONIX) 20 mg tablet Take 1 tablet (20 mg total) by mouth daily, Starting Fri 1/17/2020, Normal      sucralfate (CARAFATE) 1 g tablet Take 1 tablet (1 g total) by mouth 4 (four) times a day, Starting Fri 1/17/2020, Normal           No discharge procedures on file  PDMP Review     None           ED Provider  Attending physically available and evaluated Cesar Tyra Aldrich I managed the patient along with the ED Attending      Electronically Signed by         Marshall Walsh MD  03/03/20 7790

## 2020-03-03 NOTE — ED ATTENDING ATTESTATION
3/2/2020  Rustam MULLEN DO, saw and evaluated the patient  I have discussed the patient with the resident/non-physician practitioner and agree with the resident's/non-physician practitioner's findings, Plan of Care, and MDM as documented in the resident's/non-physician practitioner's note, except where noted  All available labs and Radiology studies were reviewed  I was present for key portions of any procedure(s) performed by the resident/non-physician practitioner and I was immediately available to provide assistance  At this point I agree with the current assessment done in the Emergency Department  I have conducted an independent evaluation of this patient a history and physical is as follows:    ED Course         Critical Care Time  Procedures    59-year-old female with a history of Karmen-en-Y bariatric surgery about a year ago presents with a one-week history of worsening right lower quadrant pain now radiating around to the right flank  She has had nausea without vomiting  No fevers  No urinary complaints  She was evaluated for this about a week ago at another hospital and had a noncontrast CT scan of her abdomen and pelvis and an ultrasound which did not show any reason for the right lower quadrant pain  On exam she is alert seems to be in moderate distress secondary to the pain  She is tender with voluntary guarding in the right lower quadrant  Will check basic labs, urinalysis, rule out pregnancy  Will repeat CT abdomen pelvis with both p o   And IV contrast to rule out the possibility of appendicitis, kidney stone, internal hernia, pyelonephritis, UTI

## 2020-03-03 NOTE — QUICK NOTE
Bariatrics On-Call    Received call by ED for well-known bariatric patient of Dr Edi Garrison, s/p sleeve -> bypass in 4/2019  Has a h/o marginal ulcers, with last EGD on 1/2020 showing resolution of previously seen ulcer  Comes to the ED c/o RLQ pain radiating to groin, back and down her leg; along w/dysuria  Patient is s/p R oophorectomy but has a h/o symptomatic L ovarian cysts  Had pelvic US which was normal on 11/2019  The patient denies any obstructive symptoms  Vital signs WNL  Labwork unremarkable  Contrasted CT c/w constipation  The patient has now visted multiple EDs with non-specific abdominal complaints and pain  She missed the last outpatient appointment with Dr Edi Garrison last week  OK to d/c from bariatric standpoint  We will reach out to the patient ASAP to bring her to the office and potentially consider a diagnostic laparoscopy to find a cause to her chronic abdominal pain       Discussed w/Dr David Batista MD  3/3/2020

## 2020-03-03 NOTE — ED NOTES
Pt requested to speak with Dr Alexandra Saini at bedside     [facility], [facility]  03/02/20 3624

## 2020-03-04 ENCOUNTER — OFFICE VISIT (OUTPATIENT)
Dept: BARIATRICS | Facility: CLINIC | Age: 40
End: 2020-03-04
Payer: COMMERCIAL

## 2020-03-04 VITALS
HEART RATE: 75 BPM | SYSTOLIC BLOOD PRESSURE: 110 MMHG | BODY MASS INDEX: 33.75 KG/M2 | TEMPERATURE: 97.4 F | HEIGHT: 66 IN | WEIGHT: 210 LBS | DIASTOLIC BLOOD PRESSURE: 58 MMHG

## 2020-03-04 DIAGNOSIS — K29.70 GASTRITIS WITHOUT BLEEDING, UNSPECIFIED CHRONICITY, UNSPECIFIED GASTRITIS TYPE: ICD-10-CM

## 2020-03-04 DIAGNOSIS — Z98.84 BARIATRIC SURGERY STATUS: ICD-10-CM

## 2020-03-04 DIAGNOSIS — R11.10 CHRONIC VOMITING: ICD-10-CM

## 2020-03-04 DIAGNOSIS — K91.2 POSTSURGICAL MALABSORPTION: Primary | Chronic | ICD-10-CM

## 2020-03-04 DIAGNOSIS — E66.9 OBESITY, CLASS I, BMI 30-34.9: ICD-10-CM

## 2020-03-04 PROCEDURE — 99213 OFFICE O/P EST LOW 20 MIN: CPT | Performed by: SURGERY

## 2020-03-04 RX ORDER — ACETAMINOPHEN 325 MG/1
975 TABLET ORAL ONCE
Status: CANCELLED | OUTPATIENT
Start: 2020-03-09 | End: 2020-03-04

## 2020-03-04 RX ORDER — ASCORBIC ACID 500 MG
500 TABLET ORAL DAILY
COMMUNITY

## 2020-03-04 RX ORDER — SCOLOPAMINE TRANSDERMAL SYSTEM 1 MG/1
1 PATCH, EXTENDED RELEASE TRANSDERMAL ONCE
Status: CANCELLED | OUTPATIENT
Start: 2020-03-09 | End: 2020-03-04

## 2020-03-04 RX ORDER — HEPARIN SODIUM 5000 [USP'U]/ML
5000 INJECTION, SOLUTION INTRAVENOUS; SUBCUTANEOUS
Status: CANCELLED | OUTPATIENT
Start: 2020-03-09 | End: 2020-03-10

## 2020-03-04 NOTE — H&P (VIEW-ONLY)
Zahraalupis GantaRomero 46 y o  female MRN: [id number]  Unit/Bed#:  Encounter: [id number]      HPI:  Javy Barr is a 46 y o  female with a history significant for laparoscopic conversion of sleeve gastrectomy to Karmen-en-Y gastric bypass for intractable heartburn on 04/08/2019 with Dr Gilford Rota  Postoperatively, she developed marginal ulcer which has since resolved  Her last endoscopy on 01/29/2020 demonstrated mild pouchitis with no further evidence of marginal ulceration  She was recently seen in the emergency department for suprapubic abdominal discomfort  Workup including a CT scan demonstrated prominent colonic stool and a small hiatal hernia but no other significant findings     She was discharged and instructed to follow up in office  She states that she is experiencing some suprapubic abdominal discomfort which she describes as burning in nature and radiates to her right thigh  Additionally, it she endorses dysuria  She reports that she has had a history of constipation  However recently she has been having normal soft bowel movements twice daily  Review of Systems   Constitutional: Negative  HENT: Negative  Eyes: Negative  Respiratory: Negative  Cardiovascular: Negative  Gastrointestinal: Positive for abdominal distention, abdominal pain, constipation, diarrhea, nausea and vomiting  Negative for anal bleeding, blood in stool and rectal pain  Endocrine: Negative  Genitourinary: Positive for difficulty urinating and dysuria  Negative for hematuria, vaginal bleeding and vaginal discharge  Musculoskeletal: Positive for arthralgias and back pain  Negative for gait problem, joint swelling, myalgias and neck pain  Skin: Negative  Allergic/Immunologic: Negative  Neurological: Negative  Hematological: Negative  Psychiatric/Behavioral: Negative  All other systems reviewed and are negative        Historical Information   Past Medical History:   Diagnosis Date    Abdominal pain     "almost constant"    Anemia     Anesthesia     "woke up swinging with last  2018  "was fine with EGD"    Bariatric surgery status     Dental bridge present     right lower permanent    Depression     "not currently taking any meds"    Difficulty swallowing     "in the past"    Disease of thyroid gland     not on meds now    Dizzy     Fatigue     "when blood pressure low" and weakness too"    GERD (gastroesophageal reflux disease)     "increase after surgery"    Heart murmur     heart murmer, work up negative with holter monitor    History of 2  sections     most recent 17    History of D&C 2019    History of iron deficiency     anemia/ had IV infusions through pregnancy in [id number]    Inguinal hernia     right    Loss of appetite     Low BP     "off and on"    Migraine     N&V (nausea and vomiting)     " a little better since on meds"    Non-intractable vomiting     "not since been on medicine"    Palpitations     "having again"    Postgastrectomy malabsorption     Stomach pain      Past Surgical History:   Procedure Laterality Date     SECTION      x2    CHOLECYSTECTOMY      CHROMOSOME ANALYSIS, PRODUCTS OF CONCEPTION (HISTORICAL)      2 miscarriages in  and Nov    OVARIAN CYST REMOVAL Right     OR EGD TRANSORAL BIOPSY SINGLE/MULTIPLE N/A 2019    Procedure: ESOPHAGOGASTRODUODENOSCOPY (EGD) with bx;  Surgeon: James Benoit MD;  Location: [facility];   Service: Bariatrics    OR LAP GASTRIC BYPASS/SREE-EN-Y N/A 2019    Procedure: LAP SREE-EN-Y GASTRIC BYPASS, INTRAOP EGD;  Surgeon: James Benoit MD;  [facility];  Service: Stella ABORTN,1ST TRI N/A 2019    Procedure: DILATATION AND EVACUATION (D&E) (8 weeks) missed ab;  Surgeon: Misti Rowley MD;  [facility];  Service: Gynecology    REVISION BYPASS LAPAROSCOPIC N/A 2019    Procedure: LAP REVISION/ CONVERSION;  Surgeon: Myah Greenberg MD;  Location: [facility];  Service: Bariatrics    SALPINGOOPHORECTOMY Right 09/2018    SLEEVE GASTROPLASTY       Social History   Social History     Substance and Sexual Activity   Alcohol Use Not Currently    Frequency: 2-4 times a month    Drinks per session: 1 or 2    Binge frequency: Never    Comment: social     Social History     Substance and Sexual Activity   Drug Use No     Social History     Tobacco Use   Smoking Status Never Smoker   Smokeless Tobacco Never Used     Family History: non-contributory    Meds/Allergies   all medications and allergies reviewed  Allergies   Allergen Reactions    Aspirin Anaphylaxis    Shellfish-Derived Products Anaphylaxis    Contrast [Iodinated Diagnostic Agents] Itching    Ibuprofen Edema    Reglan [Metoclopramide]        Objective     Current Vitals:   Blood Pressure: 110/58 (03/04/20 1445)  Pulse: 75 (03/04/20 1445)  Temperature: (!) 97 4 °F (36 3 °C) (03/04/20 1445)  Temp Source: Tympanic (03/04/20 1445)  Height: 5' 5 5" (166 4 cm) (03/04/20 1445)  Weight - Scale: 95 3 kg (210 lb) (03/04/20 1445)    [unfilled]    Invasive Devices     None                 Physical Exam   Constitutional: She is oriented to person, place, and time  She appears well-developed and well-nourished  Eyes: Pupils are equal, round, and reactive to light  Conjunctivae and EOM are normal    Neck: Normal range of motion  Neck supple  Cardiovascular: Normal rate, regular rhythm and normal heart sounds  Pulmonary/Chest: Effort normal and breath sounds normal    Abdominal:   Abdomen soft, nondistended, tender in suprapubic region and right lower quadrant without rebound or guarding  Well-healed laparoscopic surgical scars  Musculoskeletal: Normal range of motion  Neurological: She is alert and oriented to person, place, and time  Skin: Skin is warm and dry  Psychiatric: She has a normal mood and affect   Her behavior is normal  Judgment and thought content normal        Code Status: Full Code      Assessment/Plan:  El Jansen is a 46 y o  female with a history significant for laparoscopic conversion of sleeve gastrectomy to Karmen-en-Y gastric bypass for intractable heartburn on 04/08/2019 with Dr Marlen Rojas  Postoperatively, she developed marginal ulcer which has since resolved  She has experienced persistent abdominal pain associated with other nonspecific symptoms which have resulted in multiple visits to the emergency department and multiple workups including multiple CT scans  The most recent CT scan demonstrated prominent stool in her colon and a small hiatal hernia  Additionally, previous imaging has demonstrated ovarian cyst   Patient states that she has discussed this with her OB GYN physician who does not think this is contributing to her persistent symptoms  None of these workups have revealed a specific cause for her persistent symptoms  - Will scheduled for diagnostic laparoscopy to further assess the cause of her abdominal pain    - She will meet with the  today to schedule his procedure       -Risks and benefits of diagnostic laparoscopy were discussed with the patient agrees to proceed with surgery

## 2020-03-04 NOTE — PROGRESS NOTES
Willa GantaRomfiorella 46 y o  female MRN: [id number]  Unit/Bed#:  Encounter: [id number]      HPI:  Donald Romero is a 46 y o  female with a history significant for laparoscopic conversion of sleeve gastrectomy to Karmen-en-Y gastric bypass for intractable heartburn on 04/08/2019 with Dr Vince Evans  Postoperatively, she developed marginal ulcer which has since resolved  Her last endoscopy on 01/29/2020 demonstrated mild pouchitis with no further evidence of marginal ulceration  She was recently seen in the emergency department for suprapubic abdominal discomfort  Workup including a CT scan demonstrated prominent colonic stool and a small hiatal hernia but no other significant findings     She was discharged and instructed to follow up in office  She states that she is experiencing some suprapubic abdominal discomfort which she describes as burning in nature and radiates to her right thigh  Additionally, it she endorses dysuria  She reports that she has had a history of constipation  However recently she has been having normal soft bowel movements twice daily  Review of Systems   Constitutional: Negative  HENT: Negative  Eyes: Negative  Respiratory: Negative  Cardiovascular: Negative  Gastrointestinal: Positive for abdominal distention, abdominal pain, constipation, diarrhea, nausea and vomiting  Negative for anal bleeding, blood in stool and rectal pain  Endocrine: Negative  Genitourinary: Positive for difficulty urinating and dysuria  Negative for hematuria, vaginal bleeding and vaginal discharge  Musculoskeletal: Positive for arthralgias and back pain  Negative for gait problem, joint swelling, myalgias and neck pain  Skin: Negative  Allergic/Immunologic: Negative  Neurological: Negative  Hematological: Negative  Psychiatric/Behavioral: Negative  All other systems reviewed and are negative        Historical Information   Past Medical History:   Diagnosis Date    Abdominal pain     "almost constant"    Anemia     Anesthesia     "woke up swinging with last  2018  "was fine with EGD"    Bariatric surgery status     Dental bridge present     right lower permanent    Depression     "not currently taking any meds"    Difficulty swallowing     "in the past"    Disease of thyroid gland     not on meds now    Dizzy     Fatigue     "when blood pressure low" and weakness too"    GERD (gastroesophageal reflux disease)     "increase after surgery"    Heart murmur     heart murmer, work up negative with holter monitor    History of 2  sections     most recent 17    History of D&C 2019    History of iron deficiency     anemia/ had IV infusions through pregnancy in [id number]    Inguinal hernia     right    Loss of appetite     Low BP     "off and on"    Migraine     N&V (nausea and vomiting)     " a little better since on meds"    Non-intractable vomiting     "not since been on medicine"    Palpitations     "having again"    Postgastrectomy malabsorption     Stomach pain      Past Surgical History:   Procedure Laterality Date     SECTION      x2    CHOLECYSTECTOMY      CHROMOSOME ANALYSIS, PRODUCTS OF CONCEPTION (HISTORICAL)      2 miscarriages in  and Nov    OVARIAN CYST REMOVAL Right     AR EGD TRANSORAL BIOPSY SINGLE/MULTIPLE N/A 2019    Procedure: ESOPHAGOGASTRODUODENOSCOPY (EGD) with bx;  Surgeon: Raji Sorensen MD;  [facility];   Service: Bariatrics    AR LAP GASTRIC BYPASS/SREE-EN-Y N/A 2019    Procedure: LAP SREE-EN-Y GASTRIC BYPASS, INTRAOP EGD;  Surgeon: Raji Sorensen MD;  [facility];  Service: Stella ABORTN,1ST TRI N/A 2019    Procedure: DILATATION AND EVACUATION (D&E) (8 weeks) missed ab;  Surgeon: Lakshmi Prakash MD;  [facility];  Service: Gynecology    REVISION BYPASS LAPAROSCOPIC N/A 2019    Procedure: LAP REVISION/ CONVERSION;  Surgeon: Rupal Higginbotham MD;  Location: [facility];  Service: Bariatrics    SALPINGOOPHORECTOMY Right 09/2018    SLEEVE GASTROPLASTY       Social History   Social History     Substance and Sexual Activity   Alcohol Use Not Currently    Frequency: 2-4 times a month    Drinks per session: 1 or 2    Binge frequency: Never    Comment: social     Social History     Substance and Sexual Activity   Drug Use No     Social History     Tobacco Use   Smoking Status Never Smoker   Smokeless Tobacco Never Used     Family History: non-contributory    Meds/Allergies   all medications and allergies reviewed  Allergies   Allergen Reactions    Aspirin Anaphylaxis    Shellfish-Derived Products Anaphylaxis    Contrast [Iodinated Diagnostic Agents] Itching    Ibuprofen Edema    Reglan [Metoclopramide]        Objective     Current Vitals:   Blood Pressure: 110/58 (03/04/20 1445)  Pulse: 75 (03/04/20 1445)  Temperature: (!) 97 4 °F (36 3 °C) (03/04/20 1445)  Temp Source: Tympanic (03/04/20 1445)  Height: 5' 5 5" (166 4 cm) (03/04/20 1445)  Weight - Scale: 95 3 kg (210 lb) (03/04/20 1445)    [unfilled]    Invasive Devices     None                 Physical Exam   Constitutional: She is oriented to person, place, and time  She appears well-developed and well-nourished  Eyes: Pupils are equal, round, and reactive to light  Conjunctivae and EOM are normal    Neck: Normal range of motion  Neck supple  Cardiovascular: Normal rate, regular rhythm and normal heart sounds  Pulmonary/Chest: Effort normal and breath sounds normal    Abdominal:   Abdomen soft, nondistended, tender in suprapubic region and right lower quadrant without rebound or guarding  Well-healed laparoscopic surgical scars  Musculoskeletal: Normal range of motion  Neurological: She is alert and oriented to person, place, and time  Skin: Skin is warm and dry  Psychiatric: She has a normal mood and affect   Her behavior is normal  Judgment and thought content normal        Code Status: Full Code      Assessment/Plan:  Gabriel Costa is a 46 y o  female with a history significant for laparoscopic conversion of sleeve gastrectomy to Karmen-en-Y gastric bypass for intractable heartburn on 04/08/2019 with Dr Edi Garrison  Postoperatively, she developed marginal ulcer which has since resolved  She has experienced persistent abdominal pain associated with other nonspecific symptoms which have resulted in multiple visits to the emergency department and multiple workups including multiple CT scans  The most recent CT scan demonstrated prominent stool in her colon and a small hiatal hernia  Additionally, previous imaging has demonstrated ovarian cyst   Patient states that she has discussed this with her OB GYN physician who does not think this is contributing to her persistent symptoms  None of these workups have revealed a specific cause for her persistent symptoms  - Will scheduled for diagnostic laparoscopy to further assess the cause of her abdominal pain    - She will meet with the  today to schedule his procedure       -Risks and benefits of diagnostic laparoscopy were discussed with the patient agrees to proceed with surgery

## 2020-03-05 PROBLEM — Z98.84 STATUS POST BARIATRIC SURGERY: Status: ACTIVE | Noted: 2020-03-05

## 2020-03-06 ENCOUNTER — ANESTHESIA EVENT (OUTPATIENT)
Dept: PERIOP | Facility: HOSPITAL | Age: 40
End: 2020-03-06
Payer: COMMERCIAL

## 2020-03-06 DIAGNOSIS — R10.9 ABDOMINAL PAIN: Primary | ICD-10-CM

## 2020-03-06 RX ORDER — ACETAMINOPHEN 325 MG/1
650 TABLET ORAL EVERY 6 HOURS PRN
COMMUNITY

## 2020-03-06 NOTE — PRE-PROCEDURE INSTRUCTIONS
Pre-Surgery Instructions:   Medication Instructions    acetaminophen (TYLENOL) 325 mg tablet Patient was instructed by Physician and understands   ascorbic acid (VITAMIN C) 500 mg tablet Patient was instructed by Physician and understands   famotidine (PEPCID) 20 mg tablet Patient was instructed by Physician and understands   Multiple Vitamins-Minerals (MULTIVITAMIN ADULT PO) Patient was instructed by Physician and understands   ondansetron (ZOFRAN-ODT) 4 mg disintegrating tablet Patient was instructed by Physician and understands  Pt instructed to take pepcid and zofran if she needs them with a small sip of water the morning of surgery  [facility] preop instructions reviewed with pt  Pt has surgical soap

## 2020-03-09 ENCOUNTER — ANESTHESIA (OUTPATIENT)
Dept: PERIOP | Facility: HOSPITAL | Age: 40
End: 2020-03-09
Payer: COMMERCIAL

## 2020-03-09 ENCOUNTER — HOSPITAL ENCOUNTER (OUTPATIENT)
Facility: HOSPITAL | Age: 40
Setting detail: OUTPATIENT SURGERY
Discharge: HOME | End: 2020-03-09
Attending: SURGERY | Admitting: SURGERY
Payer: COMMERCIAL

## 2020-03-09 VITALS
OXYGEN SATURATION: 98 % | RESPIRATION RATE: 18 BRPM | SYSTOLIC BLOOD PRESSURE: 101 MMHG | WEIGHT: 208.34 LBS | HEIGHT: 65 IN | DIASTOLIC BLOOD PRESSURE: 57 MMHG | TEMPERATURE: 97.6 F | HEART RATE: 62 BPM | BODY MASS INDEX: 34.71 KG/M2

## 2020-03-09 DIAGNOSIS — R10.84 GENERALIZED ABDOMINAL PAIN: Primary | ICD-10-CM

## 2020-03-09 DIAGNOSIS — Z98.84 STATUS POST BARIATRIC SURGERY: ICD-10-CM

## 2020-03-09 LAB
EXT PREGNANCY TEST URINE: NEGATIVE
EXT. CONTROL: NORMAL

## 2020-03-09 PROCEDURE — C9290 INJ, BUPIVACAINE LIPOSOME: HCPCS | Performed by: SURGERY

## 2020-03-09 PROCEDURE — 49659 UNLSTD LAPS PX HRNAP HRNRPHY: CPT | Performed by: SURGERY

## 2020-03-09 PROCEDURE — 81025 URINE PREGNANCY TEST: CPT | Performed by: ANESTHESIOLOGY

## 2020-03-09 RX ORDER — DIPHENHYDRAMINE HYDROCHLORIDE 50 MG/ML
12.5 INJECTION INTRAMUSCULAR; INTRAVENOUS ONCE
Status: COMPLETED | OUTPATIENT
Start: 2020-03-09 | End: 2020-03-09

## 2020-03-09 RX ORDER — MEPERIDINE HYDROCHLORIDE 50 MG/ML
12.5 INJECTION INTRAMUSCULAR; INTRAVENOUS; SUBCUTANEOUS AS NEEDED
Status: DISCONTINUED | OUTPATIENT
Start: 2020-03-09 | End: 2020-03-09 | Stop reason: HOSPADM

## 2020-03-09 RX ORDER — ACETAMINOPHEN 325 MG/1
975 TABLET ORAL ONCE
Status: COMPLETED | OUTPATIENT
Start: 2020-03-09 | End: 2020-03-09

## 2020-03-09 RX ORDER — OXYCODONE HYDROCHLORIDE 5 MG/1
5 TABLET ORAL EVERY 4 HOURS PRN
Status: DISCONTINUED | OUTPATIENT
Start: 2020-03-09 | End: 2020-03-09 | Stop reason: HOSPADM

## 2020-03-09 RX ORDER — ROCURONIUM BROMIDE 10 MG/ML
INJECTION, SOLUTION INTRAVENOUS AS NEEDED
Status: DISCONTINUED | OUTPATIENT
Start: 2020-03-09 | End: 2020-03-09 | Stop reason: SURG

## 2020-03-09 RX ORDER — NEOSTIGMINE METHYLSULFATE 1 MG/ML
INJECTION INTRAVENOUS AS NEEDED
Status: DISCONTINUED | OUTPATIENT
Start: 2020-03-09 | End: 2020-03-09 | Stop reason: SURG

## 2020-03-09 RX ORDER — HYDROMORPHONE HCL/PF 1 MG/ML
SYRINGE (ML) INJECTION AS NEEDED
Status: DISCONTINUED | OUTPATIENT
Start: 2020-03-09 | End: 2020-03-09 | Stop reason: SURG

## 2020-03-09 RX ORDER — MAGNESIUM HYDROXIDE 1200 MG/15ML
LIQUID ORAL AS NEEDED
Status: DISCONTINUED | OUTPATIENT
Start: 2020-03-09 | End: 2020-03-09 | Stop reason: HOSPADM

## 2020-03-09 RX ORDER — LIDOCAINE HYDROCHLORIDE 20 MG/ML
INJECTION, SOLUTION INFILTRATION; PERINEURAL AS NEEDED
Status: DISCONTINUED | OUTPATIENT
Start: 2020-03-09 | End: 2020-03-09 | Stop reason: SURG

## 2020-03-09 RX ORDER — FENTANYL CITRATE 50 UG/ML
INJECTION, SOLUTION INTRAMUSCULAR; INTRAVENOUS AS NEEDED
Status: DISCONTINUED | OUTPATIENT
Start: 2020-03-09 | End: 2020-03-09 | Stop reason: SURG

## 2020-03-09 RX ORDER — PROPOFOL 10 MG/ML
INJECTION, EMULSION INTRAVENOUS AS NEEDED
Status: DISCONTINUED | OUTPATIENT
Start: 2020-03-09 | End: 2020-03-09 | Stop reason: SURG

## 2020-03-09 RX ORDER — HYDROMORPHONE HCL/PF 1 MG/ML
0.5 SYRINGE (ML) INJECTION
Status: DISCONTINUED | OUTPATIENT
Start: 2020-03-09 | End: 2020-03-09 | Stop reason: HOSPADM

## 2020-03-09 RX ORDER — FENTANYL CITRATE/PF 50 MCG/ML
50 SYRINGE (ML) INJECTION
Status: DISCONTINUED | OUTPATIENT
Start: 2020-03-09 | End: 2020-03-09 | Stop reason: HOSPADM

## 2020-03-09 RX ORDER — ONDANSETRON 2 MG/ML
INJECTION INTRAMUSCULAR; INTRAVENOUS AS NEEDED
Status: DISCONTINUED | OUTPATIENT
Start: 2020-03-09 | End: 2020-03-09 | Stop reason: SURG

## 2020-03-09 RX ORDER — HEPARIN SODIUM 5000 [USP'U]/ML
5000 INJECTION, SOLUTION INTRAVENOUS; SUBCUTANEOUS
Status: COMPLETED | OUTPATIENT
Start: 2020-03-09 | End: 2020-03-09

## 2020-03-09 RX ORDER — BUPIVACAINE HYDROCHLORIDE AND EPINEPHRINE 5; 5 MG/ML; UG/ML
INJECTION, SOLUTION PERINEURAL AS NEEDED
Status: DISCONTINUED | OUTPATIENT
Start: 2020-03-09 | End: 2020-03-09 | Stop reason: HOSPADM

## 2020-03-09 RX ORDER — ONDANSETRON 2 MG/ML
4 INJECTION INTRAMUSCULAR; INTRAVENOUS ONCE
Status: COMPLETED | OUTPATIENT
Start: 2020-03-09 | End: 2020-03-09

## 2020-03-09 RX ORDER — MIDAZOLAM HYDROCHLORIDE 2 MG/2ML
INJECTION, SOLUTION INTRAMUSCULAR; INTRAVENOUS AS NEEDED
Status: DISCONTINUED | OUTPATIENT
Start: 2020-03-09 | End: 2020-03-09 | Stop reason: SURG

## 2020-03-09 RX ORDER — GLYCOPYRROLATE 0.2 MG/ML
INJECTION INTRAMUSCULAR; INTRAVENOUS AS NEEDED
Status: DISCONTINUED | OUTPATIENT
Start: 2020-03-09 | End: 2020-03-09 | Stop reason: SURG

## 2020-03-09 RX ORDER — SODIUM CHLORIDE 9 MG/ML
125 INJECTION, SOLUTION INTRAVENOUS CONTINUOUS
Status: DISCONTINUED | OUTPATIENT
Start: 2020-03-09 | End: 2020-03-09 | Stop reason: HOSPADM

## 2020-03-09 RX ORDER — OXYCODONE HYDROCHLORIDE 5 MG/1
5 TABLET ORAL EVERY 4 HOURS PRN
Qty: 10 TABLET | Refills: 0 | Status: SHIPPED | OUTPATIENT
Start: 2020-03-09 | End: 2020-03-16

## 2020-03-09 RX ORDER — DEXAMETHASONE SODIUM PHOSPHATE 4 MG/ML
INJECTION, SOLUTION INTRA-ARTICULAR; INTRALESIONAL; INTRAMUSCULAR; INTRAVENOUS; SOFT TISSUE AS NEEDED
Status: DISCONTINUED | OUTPATIENT
Start: 2020-03-09 | End: 2020-03-09 | Stop reason: SURG

## 2020-03-09 RX ORDER — SCOLOPAMINE TRANSDERMAL SYSTEM 1 MG/1
1 PATCH, EXTENDED RELEASE TRANSDERMAL ONCE AS NEEDED
Status: DISCONTINUED | OUTPATIENT
Start: 2020-03-09 | End: 2020-03-09 | Stop reason: HOSPADM

## 2020-03-09 RX ORDER — ALBUTEROL SULFATE 2.5 MG/3ML
2.5 SOLUTION RESPIRATORY (INHALATION) ONCE AS NEEDED
Status: DISCONTINUED | OUTPATIENT
Start: 2020-03-09 | End: 2020-03-09 | Stop reason: HOSPADM

## 2020-03-09 RX ORDER — EPHEDRINE SULFATE 50 MG/ML
INJECTION INTRAVENOUS AS NEEDED
Status: DISCONTINUED | OUTPATIENT
Start: 2020-03-09 | End: 2020-03-09 | Stop reason: SURG

## 2020-03-09 RX ORDER — SCOLOPAMINE TRANSDERMAL SYSTEM 1 MG/1
1 PATCH, EXTENDED RELEASE TRANSDERMAL ONCE
Status: DISCONTINUED | OUTPATIENT
Start: 2020-03-09 | End: 2020-03-09 | Stop reason: HOSPADM

## 2020-03-09 RX ADMIN — ROCURONIUM BROMIDE 50 MG: 10 INJECTION, SOLUTION INTRAVENOUS at 07:41

## 2020-03-09 RX ADMIN — OXYCODONE HYDROCHLORIDE 5 MG: 5 TABLET ORAL at 13:57

## 2020-03-09 RX ADMIN — DIPHENHYDRAMINE HYDROCHLORIDE 12.5 MG: 50 INJECTION INTRAMUSCULAR; INTRAVENOUS at 10:08

## 2020-03-09 RX ADMIN — ROCURONIUM BROMIDE 30 MG: 10 INJECTION, SOLUTION INTRAVENOUS at 08:14

## 2020-03-09 RX ADMIN — EPHEDRINE SULFATE 10 MG: 50 INJECTION, SOLUTION INTRAVENOUS at 07:45

## 2020-03-09 RX ADMIN — ONDANSETRON 4 MG: 2 INJECTION INTRAMUSCULAR; INTRAVENOUS at 09:48

## 2020-03-09 RX ADMIN — MIDAZOLAM 2 MG: 1 INJECTION INTRAMUSCULAR; INTRAVENOUS at 07:41

## 2020-03-09 RX ADMIN — SODIUM CHLORIDE 125 ML/HR: 0.9 INJECTION, SOLUTION INTRAVENOUS at 07:09

## 2020-03-09 RX ADMIN — SCOPALAMINE 1 PATCH: 1 PATCH, EXTENDED RELEASE TRANSDERMAL at 06:41

## 2020-03-09 RX ADMIN — CEFAZOLIN SODIUM 2000 MG: 10 INJECTION, POWDER, FOR SOLUTION INTRAVENOUS at 07:33

## 2020-03-09 RX ADMIN — HYDROMORPHONE HYDROCHLORIDE 0.5 MG: 1 INJECTION, SOLUTION INTRAMUSCULAR; INTRAVENOUS; SUBCUTANEOUS at 07:59

## 2020-03-09 RX ADMIN — ACETAMINOPHEN 975 MG: 325 TABLET ORAL at 06:39

## 2020-03-09 RX ADMIN — FENTANYL CITRATE 100 MCG: 50 INJECTION, SOLUTION INTRAMUSCULAR; INTRAVENOUS at 07:41

## 2020-03-09 RX ADMIN — LIDOCAINE HYDROCHLORIDE 5 ML: 20 INJECTION, SOLUTION EPIDURAL; INFILTRATION; INTRACAUDAL; PERINEURAL at 07:41

## 2020-03-09 RX ADMIN — PROPOFOL 200 MG: 10 INJECTION, EMULSION INTRAVENOUS at 07:41

## 2020-03-09 RX ADMIN — FENTANYL CITRATE 50 MCG: 50 INJECTION, SOLUTION INTRAMUSCULAR; INTRAVENOUS at 08:15

## 2020-03-09 RX ADMIN — DEXAMETHASONE SODIUM PHOSPHATE 4 MG: 4 INJECTION, SOLUTION INTRAMUSCULAR; INTRAVENOUS at 07:55

## 2020-03-09 RX ADMIN — ONDANSETRON 4 MG: 2 INJECTION INTRAMUSCULAR; INTRAVENOUS at 07:55

## 2020-03-09 RX ADMIN — GLYCOPYRROLATE 0.6 MG: 0.2 INJECTION, SOLUTION INTRAMUSCULAR; INTRAVENOUS at 09:03

## 2020-03-09 RX ADMIN — TRIMETHOBENZAMIDE HYDROCHLORIDE 200 MG: 100 INJECTION INTRAMUSCULAR at 12:44

## 2020-03-09 RX ADMIN — SODIUM CHLORIDE: 0.9 INJECTION, SOLUTION INTRAVENOUS at 08:11

## 2020-03-09 RX ADMIN — NEOSTIGMINE METHYLSULFATE 4 MG: 1 INJECTION, SOLUTION INTRAVENOUS at 09:03

## 2020-03-09 RX ADMIN — FENTANYL CITRATE 50 MCG: 50 INJECTION INTRAMUSCULAR; INTRAVENOUS at 09:55

## 2020-03-09 RX ADMIN — HEPARIN SODIUM 5000 UNITS: 5000 INJECTION INTRAVENOUS; SUBCUTANEOUS at 06:54

## 2020-03-09 RX ADMIN — FENTANYL CITRATE 50 MCG: 50 INJECTION, SOLUTION INTRAMUSCULAR; INTRAVENOUS at 09:05

## 2020-03-09 NOTE — ANESTHESIA POSTPROCEDURE EVALUATION
Post-Op Assessment Note    CV Status:  Stable  Pain Score: 2    Pain management: adequate     Mental Status:  Alert and awake   Hydration Status:  Euvolemic   PONV Controlled:  Controlled   Airway Patency:  Patent   Post Op Vitals Reviewed: Yes      Staff: Anesthesiologist           /63 (03/09/20 1107)    Temp 98 °F (36 7 °C) (03/09/20 1107)    Pulse 68 (03/09/20 1107)   Resp 12 (03/09/20 1107)    SpO2 99 % (03/09/20 1107)

## 2020-03-09 NOTE — OP NOTE
[facility]   [address] [facility], [address]  [phone] (Fax)      Operative Report  DIAGNOSTIC LAPAROSCOPY     Patient Name: Javy Barr    :  1980  MRN: [id number]  [facility] Location: [facility] 08  Surgery Date : 3/9/2020  Surgeons:  Surgeon(s) and Role:     * Myah Greenberg MD - Primary     * Jasvir Murillo MD - Assisting    Diagnosis:    Pre-Op Diagnosis Codes:  Abdominal pain [R10 9]  Status post bariatric surgery [Z98 84]    Post-Op Diagnosis Codes:     * Abdominal pain [R10 9]     * Status post bariatric surgery [Z98 84]       Procedure    1  Diagnostic Laparoscopy  2  Intraoperative Endoscopy  3  Laparoscopic closure of Singh's defect    Specimen(s):  * No specimens in log *    Estimated Blood Loss:    10 cc    Anesthesia Type:     General    Operative Indications:    Abdominal pain [R10 9]  Status post bariatric surgery [Z98 84]      Operative Findings:    Open Singh's defect  Small amount of free serosanguineous fluid in the pelvic area and cul de sac  Ovarian cysts  Complications:     None    Procedure and Technique:    The patient was taken to the operating room and placed in a supine position  A dose of IV antibiotic prophylaxis that consisted of Ancef 2g and Metronidazole 500mg was given  Sequential compression devices were placed on both lower extremities  After satisfactory general anesthesia induction and endotracheal intubation was achieved, the extremities were secured to prevent neurovascular and musculoskeletal injuries as best as possible  Subsequently, the abdominal wall was prepped and draped in a surgical standard sterile fashion  After a timeout was done and the patient was properly identified and the type of procedure was confirmed a supra-umbilical transverse skin incision was made, and the subcutaneous tissues dissected     Access to the peritoneal cavity was gained with a Veress needle technique  Upon obtaining pneumoperitoneum we used a 5 mm Optiview trocar on the right flank  With this device, we were able to visualize the layers of the abdominal wall, and enter the peritoneal cavity under direct visualization  Upon confirming there was no injury to the underlying structures either with the Veress needle or the trocar, under direct laparoscopic visualization, three additional trocars were placed: a 5 mm in the right upper quadrant subcostal position in the anterior axillary line, a 12-mm port was placed in the ryan umbilical region and another 5-mm port was placed in the left flank at the level the mid axillary line  With the trocars in place, the diagnostic laparoscopy was begun  We visualized the gastric pouch and remnant not finding any abnormalities  We followed the Karmen limb all the way from the pouch to the JJ anastomosis and no abnormalities were found either  The bilio pancreatic limb was identified and followed all the way to the ligament of Treitz and back  We then followed the common channel down to the distal ileum and no abnormalities were found  The appendix was visualized and looked normal without any evidence of inflammation or scarring  There was no evidence of an internal hernia through the Singh's or intermesenteric defects  The inter-mesenteric defect was found to be closed from the original surgery and Singh's defect was open, we closed it approximating the base of the mesentery to the tenia of the colon with a figure-of-eight nonabsorbable suture  I decided to put only one suture as the space was a relatively small and I did not want to compromise colonic emptying  We then repositioned the patient on a Trendelenburg position and with to close look at the pelvic area and we found a small amount of serosanguineous fluid in the cul-de-sac  The left ovary had several cysts on it  The uterus was normal in appearance    We carefully inspected the abdominal wall anteriorly and laterally as well as the pelvic floor not finding any defects or obvious hernias  We turned our attention to the appendix once again and as it looked completely normal I decided not to remove it so we would not expose the patient to the unnecessary risk of a normal appendectomy  I ran the bowel from the terminal ileum all the way back to the [address] anastomosis and to the pouch making sure there was no injury to the bowel caused by us while performing the diagnostic laparoscopy  No injuries were found  The sponge, needle and instrument count was reported complete  The 10-MC ryan umbilical port site that was closed with the use of a suture closure device and a 0 absorbable suture  The remaining ports were then also removed under laparoscopic visualization  The skin incisions were all closed with 4-0 absorbable subcuticular suture  The patient tolerated the procedure well, was extubated uneventfully and was transferred to the recovery room in stable condition  I was present for the entire length of the procedure as the attending of record  No qualified resident was available to assist   The presence of an assistant was necessary for camera holding, traction and counter traction and for help with suturing in addition to performing the intraop-EGD        Patient Disposition:    PACU     Signature: Jennifer Ross MD  Date: March 9, 2020  Time: 9:07 AM

## 2020-03-09 NOTE — INTERVAL H&P NOTE
H&P reviewed  After examining the patient I find no changes in the patients condition since the H&P had been written  I have discussed with the patient also that we will look at her appendix and remove it  to make sure that this is not what might be causing her pain and to avoid any potential issues with appendicitis in the future    Vitals:    03/09/20 0701   BP: 105/69   Pulse: 80   Resp: 16   Temp: (!) 97 3 °F (36 3 °C)   SpO2: 98%

## 2020-03-09 NOTE — ANESTHESIA PREPROCEDURE EVALUATION
Review of Systems/Medical History  Patient summary reviewed  Chart reviewed  No history of anesthetic complications     Cardiovascular    Comment: Prone to low BP's,  Pulmonary  Negative pulmonary ROS        GI/Hepatic    GERD well controlled, Bariatric surgery,        Negative  ROS        Endo/Other  History of thyroid disease , hypothyroidism,   Obesity (36=BMI)    GYN  Negative gynecology ROS          Hematology  Anemia ,     Musculoskeletal  Negative musculoskeletal ROS        Neurology    Headaches,    Psychology   Depression ,              Physical Exam    Airway    Mallampati score: II  TM Distance: >3 FB  Neck ROM: full     Dental   No notable dental hx     Cardiovascular  Rhythm: regular, Rate: normal, Cardiovascular exam normal    Pulmonary  Pulmonary exam normal Breath sounds clear to auscultation,     Other Findings        Anesthesia Plan  ASA Score- 2     Anesthesia Type- general with ASA Monitors  Additional Monitors:   Airway Plan: ETT  Comment: SCOP patch ordered  Plan Factors-Patient not instructed to abstain from smoking on day of procedure  Patient did not smoke on day of surgery  Induction- intravenous  Postoperative Plan- Plan for postoperative opioid use  Planned trial extubation    Informed Consent- Anesthetic plan and risks discussed with patient

## 2020-03-11 ENCOUNTER — OFFICE VISIT (OUTPATIENT)
Dept: OBGYN CLINIC | Facility: CLINIC | Age: 40
End: 2020-03-11
Payer: COMMERCIAL

## 2020-03-11 VITALS
HEIGHT: 65 IN | WEIGHT: 214.8 LBS | BODY MASS INDEX: 35.79 KG/M2 | DIASTOLIC BLOOD PRESSURE: 66 MMHG | SYSTOLIC BLOOD PRESSURE: 110 MMHG

## 2020-03-11 DIAGNOSIS — N93.9 ABNORMAL UTERINE BLEEDING (AUB): Primary | ICD-10-CM

## 2020-03-11 PROCEDURE — 99214 OFFICE O/P EST MOD 30 MIN: CPT | Performed by: NURSE PRACTITIONER

## 2020-03-12 ENCOUNTER — TELEPHONE (OUTPATIENT)
Dept: OBGYN CLINIC | Facility: CLINIC | Age: 40
End: 2020-03-12

## 2020-03-12 ENCOUNTER — TRANSCRIBE ORDERS (OUTPATIENT)
Dept: LAB | Facility: OTHER | Age: 40
End: 2020-03-12

## 2020-03-12 ENCOUNTER — APPOINTMENT (OUTPATIENT)
Dept: LAB | Facility: OTHER | Age: 40
End: 2020-03-12
Payer: COMMERCIAL

## 2020-03-12 DIAGNOSIS — N93.9 ABNORMAL UTERINE BLEEDING (AUB): ICD-10-CM

## 2020-03-12 LAB
ANION GAP SERPL CALCULATED.3IONS-SCNC: 5 MMOL/L (ref 4–13)
BASOPHILS # BLD AUTO: 0.01 THOUSANDS/ΜL (ref 0–0.1)
BASOPHILS NFR BLD AUTO: 0 % (ref 0–1)
BUN SERPL-MCNC: 11 MG/DL (ref 5–25)
CALCIUM SERPL-MCNC: 8.9 MG/DL (ref 8.3–10.1)
CHLORIDE SERPL-SCNC: 106 MMOL/L (ref 100–108)
CO2 SERPL-SCNC: 30 MMOL/L (ref 21–32)
CREAT SERPL-MCNC: 0.73 MG/DL (ref 0.6–1.3)
EOSINOPHIL # BLD AUTO: 0.1 THOUSAND/ΜL (ref 0–0.61)
EOSINOPHIL NFR BLD AUTO: 2 % (ref 0–6)
ERYTHROCYTE [DISTWIDTH] IN BLOOD BY AUTOMATED COUNT: 16.3 % (ref 11.6–15.1)
GFR SERPL CREATININE-BSD FRML MDRD: 105 ML/MIN/1.73SQ M
GLUCOSE P FAST SERPL-MCNC: 96 MG/DL (ref 65–99)
HCT VFR BLD AUTO: 38.8 % (ref 34.8–46.1)
HGB BLD-MCNC: 12.5 G/DL (ref 11.5–15.4)
IMM GRANULOCYTES # BLD AUTO: 0 THOUSAND/UL (ref 0–0.2)
IMM GRANULOCYTES NFR BLD AUTO: 0 % (ref 0–2)
LYMPHOCYTES # BLD AUTO: 2.42 THOUSANDS/ΜL (ref 0.6–4.47)
LYMPHOCYTES NFR BLD AUTO: 49 % (ref 14–44)
MCH RBC QN AUTO: 30.6 PG (ref 26.8–34.3)
MCHC RBC AUTO-ENTMCNC: 32.2 G/DL (ref 31.4–37.4)
MCV RBC AUTO: 95 FL (ref 82–98)
MONOCYTES # BLD AUTO: 0.43 THOUSAND/ΜL (ref 0.17–1.22)
MONOCYTES NFR BLD AUTO: 9 % (ref 4–12)
NEUTROPHILS # BLD AUTO: 1.94 THOUSANDS/ΜL (ref 1.85–7.62)
NEUTS SEG NFR BLD AUTO: 40 % (ref 43–75)
NRBC BLD AUTO-RTO: 0 /100 WBCS
PLATELET # BLD AUTO: 252 THOUSANDS/UL (ref 149–390)
PMV BLD AUTO: 10.9 FL (ref 8.9–12.7)
POTASSIUM SERPL-SCNC: 3.8 MMOL/L (ref 3.5–5.3)
RBC # BLD AUTO: 4.09 MILLION/UL (ref 3.81–5.12)
SODIUM SERPL-SCNC: 141 MMOL/L (ref 136–145)
WBC # BLD AUTO: 4.9 THOUSAND/UL (ref 4.31–10.16)

## 2020-03-12 PROCEDURE — 80048 BASIC METABOLIC PNL TOTAL CA: CPT

## 2020-03-12 PROCEDURE — 85025 COMPLETE CBC W/AUTO DIFF WBC: CPT

## 2020-03-12 PROCEDURE — 36415 COLL VENOUS BLD VENIPUNCTURE: CPT

## 2020-03-12 NOTE — TELEPHONE ENCOUNTER
Please notify her that her CBC and Chem are totally normal   She is not anemic  Keep appointment with Dr Dai Roe next week

## 2020-03-12 NOTE — ASSESSMENT & PLAN NOTE
Exam c/w AUB  Don't feel her lightheadedness related to acute blood loss  To contact surgeon (she is 2 days postop laparoscopy)  Will obtain CBC and chem profile and pelvic ultrasound  Discussed causes of AUB  Declines IUD or depoprovera as discussed when she saw Dr Quan Camara in December  States is allergic to NSAIDs  Keep F/U with Dr Quan Camara for Monday to discuss next steps  Advised to call if bleeding increases

## 2020-03-12 NOTE — PROGRESS NOTES
Assessment/Plan:    Abnormal uterine bleeding (AUB)  Exam c/w AUB  Don't feel her lightheadedness related to acute blood loss  To contact surgeon (she is 2 days postop laparoscopy)  Will obtain CBC and chem profile and pelvic ultrasound  Discussed causes of AUB  Declines IUD or depoprovera as discussed when she saw Dr Cedrick Dunne in December  States is allergic to NSAIDs  Keep F/U with Dr Cedrick Dunne for Monday to discuss next steps  Advised to call if bleeding increases  Diagnoses and all orders for this visit:    Abnormal uterine bleeding (AUB)  -     CBC and differential; Future  -     Basic metabolic panel; Future  -     US pelvis complete w transvaginal; Future        Subjective:      Patient ID: Mary Jordan is a 46 y o  female  Milly Castro is a 46year old who presents with complaints of heavy vaginal bleeding with clots, lightheadedness and fatigue  She started her period yesterday and passed several clots  She is 2 days post op laparoscopy for abdominal pain  She is concerned because the surgeon told her that he observed fluid in her pelvis and cysts on her ovary at time of surgery and she should F/U with her MD   Note states there was small amount of serosanguineous fluid in the cul-de-sac and the left ovary had several cysts on it  She is eating, drinking, voiding and passing gas  She has not had a BM yet  She saw Dr Cedrick Dunne in December for heavy periods  She declined treatment options discussed at that time  She states she was getting iron infusions for anemia-last in January  Her January menses was normal and the one in February and current were heavy again  She is concerned she may be anemic again    Her H/H on 2/25 was normal         The following portions of the patient's history were reviewed and updated as appropriate: allergies, current medications, past family history, past medical history, past social history, past surgical history and problem list     Review of Systems   Constitutional: Positive for fatigue  Negative for chills  Respiratory: Negative  Cardiovascular: Negative  Gastrointestinal: Negative for abdominal distention, abdominal pain, diarrhea and nausea  Endocrine: Negative  Genitourinary: Positive for menstrual problem and vaginal bleeding  Negative for difficulty urinating, dysuria, flank pain, frequency and pelvic pain  Neurological: Positive for light-headedness  Negative for syncope  Hematological: Negative  Psychiatric/Behavioral: Negative  Objective:      /66 (BP Location: Right arm, Patient Position: Sitting, Cuff Size: Large)   Ht 5' 5" (1 651 m)   Wt 97 4 kg (214 lb 12 8 oz)   LMP 03/08/2020   BMI 35 74 kg/m²          Physical Exam   Constitutional: She is oriented to person, place, and time  She appears well-developed and well-nourished  HENT:   Head: Normocephalic  Eyes: Pupils are equal, round, and reactive to light  Neck: Normal range of motion  Cardiovascular: Normal rate and regular rhythm  Pulmonary/Chest: Effort normal and breath sounds normal    Abdominal: Soft  Bowel sounds are normal  She exhibits no distension and no mass  There is no guarding  Incision sites are intact with steri strips  Genitourinary: Uterus normal  There is no rash, tenderness or lesion on the right labia  There is no rash, tenderness or lesion on the left labia  Cervix exhibits no motion tenderness  Right adnexum displays no mass and no tenderness  Left adnexum displays no mass and no tenderness  There is bleeding in the vagina  No tenderness in the vagina  No signs of injury around the vagina  Musculoskeletal: Normal range of motion  Neurological: She is alert and oriented to person, place, and time  Skin: Capillary refill takes less than 2 seconds  Psychiatric: She has a normal mood and affect

## 2020-03-16 ENCOUNTER — OFFICE VISIT (OUTPATIENT)
Dept: OBGYN CLINIC | Facility: CLINIC | Age: 40
End: 2020-03-16
Payer: COMMERCIAL

## 2020-03-16 VITALS — SYSTOLIC BLOOD PRESSURE: 118 MMHG | DIASTOLIC BLOOD PRESSURE: 76 MMHG | BODY MASS INDEX: 35.21 KG/M2 | WEIGHT: 211.6 LBS

## 2020-03-16 DIAGNOSIS — N93.9 ABNORMAL UTERINE BLEEDING (AUB): Primary | ICD-10-CM

## 2020-03-16 PROCEDURE — 99214 OFFICE O/P EST MOD 30 MIN: CPT | Performed by: STUDENT IN AN ORGANIZED HEALTH CARE EDUCATION/TRAINING PROGRAM

## 2020-03-16 NOTE — PROGRESS NOTES
Problem visit established - Gynecology   Stanley Tavaresero 46 y o  female MRN: [id number]  227 M  [facility]  Encounter: [id number]    Chief Complaint   Patient presents with    Follow-up     F/U AUB, wants to discuss next steps  History of Present Illness     Gabby Jenkins is a 46 y o  female [id number] who presents to discuss abnormal uterine bleeding  Had previously been seen by me 2019 for suction D+E for missed AB  Subsequently was seen at our office for abnormal uterine bleeding, including myself on 2019  Had discussed starting Depo at that time which she accepted and then refused  Recently seen by Lynnwood Kussmaul, CRNP on 3/11/2020  Normal blood work  TVUS ordered, not yet performed  REVIEW OF SYSTEMS  12 point review of systems negative aside from HPI      Past Medical History:   Diagnosis Date    Abdominal pain     "almost constant"    Anemia     Anesthesia     "woke up swinging with last  2018  "was fine with EGD"    Back pain     Bariatric surgery status     -gastric sleeve revison RUEN-Y 2019-abd painnow-dx lap today 3/9/2020    Constipation     Dental bridge present     right lower permanent    Diarrhea     Difficulty swallowing     "in the past"    Disease of thyroid gland     not on meds now    Dizzy     Fatigue     "when blood pressure low" and weakness too"    GERD (gastroesophageal reflux disease)     "increase after surgery"    Heart murmur     heart murmer, work up negative with holter monitor    History of 2  sections     most recent 17    History of D&C 2019    History of iron deficiency     anemia/ had IV infusions through pregnancy in 2108-8117    History of transfusion     2019 - pt had allergic reaction - had to stop the blood    Inguinal hernia     right    Loss of appetite     Low BP     "off and on"    Migraine     N&V (nausea and vomiting)     " a little better since on meds"    Non-intractable vomiting     "not since been on medicine"    Palpitations     "having again"    Postgastrectomy malabsorption     Stomach pain      Patient Active Problem List   Diagnosis    Bariatric surgery status    Postsurgical malabsorption    Obesity, Class II, BMI 35-39 9    Bilious vomiting with nausea    Chronic vomiting    Gastritis    Abdominal pain    Symptomatic anemia    S/P laparoscopic sleeve gastrectomy    Heart burn    Epigastric abdominal pain    Other constipation    Decreased oral intake    Dysphagia    History of multiple miscarriages    Pouchitis (HCC)    Leg swelling    Pregnancy complicated by previous bariatric surgery, first trimester    Amenorrhea    Missed     Status post suction D&C    BV (bacterial vaginosis)    Pelvic pain    Menorrhagia with regular cycle    Iron deficiency anemia secondary to inadequate dietary iron intake    Status post bariatric surgery    Abnormal uterine bleeding (AUB)       Past Surgical History:   Procedure Laterality Date     SECTION      x2    CHOLECYSTECTOMY      CHROMOSOME ANALYSIS, PRODUCTS OF CONCEPTION (HISTORICAL)      2 miscarriages in  and Nov    LAPAROSCOPY N/A 3/9/2020    Procedure: DIAGNOSTIC LAPAROSCOPY,CLOSURE OF COATES DEFECT, INTRAOP EGD;  Surgeon: Dirk Beckwith MD;  Location: [facility];  Service: Bariatrics    OVARIAN CYST REMOVAL Right     PA EGD TRANSORAL BIOPSY SINGLE/MULTIPLE N/A 2019    Procedure: ESOPHAGOGASTRODUODENOSCOPY (EGD) with bx;  Surgeon: Dirk Beckwith MD;  Location: [facility];   Service: Bariatrics    PA LAP GASTRIC BYPASS/SREE-EN-Y N/A 2019    Procedure: LAP SREE-EN-Y GASTRIC BYPASS, INTRAOP EGD;  Surgeon: Dirk Beckwith MD;  [facility];  Service: Stella ABORTN,1ST TRI N/A 2019    Procedure: DILATATION AND EVACUATION (D&E) (8 weeks) missed ab;  Surgeon: Daniel Fleming Lupe Noland MD;  [facility]: [facility];  Service: Gynecology    REVISION BYPASS LAPAROSCOPIC N/A 2019    Procedure: LAP REVISION/ CONVERSION;  Surgeon: Rupal Higginbotham MD;  [facility];  Service: Estefanía Pro SALPINGOOPHORECTOMY Right 2018    SLEEVE GASTROPLASTY         OB History        1    Para        Term                AB        Living           SAB        TAB        Ectopic        Multiple        Live Births                         Social History   Social History     Substance and Sexual Activity   Alcohol Use Not Currently     Social History     Substance and Sexual Activity   Drug Use No     Social History     Tobacco Use   Smoking Status Never Smoker   Smokeless Tobacco Never Used         Current Outpatient Medications:     acetaminophen (TYLENOL) 325 mg tablet, Take 650 mg by mouth every 6 (six) hours as needed for mild pain, Disp: , Rfl:     ascorbic acid (VITAMIN C) 500 mg tablet, Take 500 mg by mouth daily, Disp: , Rfl:     ferrous sulfate 325 (65 Fe) mg tablet, Take 325 mg by mouth daily with breakfast, Disp: , Rfl:     Multiple Vitamins-Minerals (MULTIVITAMIN ADULT PO), Take by mouth, Disp: , Rfl:     ondansetron (ZOFRAN-ODT) 4 mg disintegrating tablet, Take 1 tablet (4 mg total) by mouth every 8 (eight) hours as needed for nausea or vomiting, Disp: 12 tablet, Rfl: 0    famotidine (PEPCID) 20 mg tablet, Take 1 tablet (20 mg total) by mouth 2 (two) times a day (Patient taking differently: Take 20 mg by mouth as needed ), Disp: 60 tablet, Rfl: 0    Allergies   Allergen Reactions    Aspirin Anaphylaxis    Shellfish-Derived Products Anaphylaxis    Contrast [Iodinated Diagnostic Agents] Itching and Swelling    Ibuprofen Edema    Reglan [Metoclopramide] Itching and Confusion       Objective   Vitals: Blood pressure 118/76, weight 96 kg (211 lb 9 6 oz), last menstrual period 2020, not currently breastfeeding  Body mass index is 35 21 kg/m²      General: NAD, AAOx3  Heart: non-tachycardic  Lungs: non-labored breathing, symmetric chest rise    Pelvic deferred    Lab Results:   No visits with results within 1 Day(s) from this visit  Latest known visit with results is:   Appointment on 03/12/2020   Component Date Value    WBC 03/12/2020 4 90     RBC 03/12/2020 4 09     Hemoglobin 03/12/2020 12 5     Hematocrit 03/12/2020 38 8     MCV 03/12/2020 95     MCH 03/12/2020 30 6     MCHC 03/12/2020 32 2     RDW 03/12/2020 16 3*    MPV 03/12/2020 10 9     Platelets 68/40/8207 252     nRBC 03/12/2020 0     Neutrophils Relative 03/12/2020 40*    Immat GRANS % 03/12/2020 0     Lymphocytes Relative 03/12/2020 49*    Monocytes Relative 03/12/2020 9     Eosinophils Relative 03/12/2020 2     Basophils Relative 03/12/2020 0     Neutrophils Absolute 03/12/2020 1 94     Immature Grans Absolute 03/12/2020 0 00     Lymphocytes Absolute 03/12/2020 2 42     Monocytes Absolute 03/12/2020 0 43     Eosinophils Absolute 03/12/2020 0 10     Basophils Absolute 03/12/2020 0 01     Sodium 03/12/2020 141     Potassium 03/12/2020 3 8     Chloride 03/12/2020 106     CO2 03/12/2020 30     ANION GAP 03/12/2020 5     BUN 03/12/2020 11     Creatinine 03/12/2020 0 73     Glucose, Fasting 03/12/2020 96     Calcium 03/12/2020 8 9     eGFR 03/12/2020 105         Assessment/Plan     Assessment:  46 y o  [id number] with AUB    Discussed plan to obtain transvaginal ultrasound to better assess pelvic organs, findings of cysts and free fluid at time of laparoscopic surgery with Bariatrics  Also reviewed possibility of dilation and curettage to rule out abnormalities, Mirena IUD to help with bleeding  Had previously declined secondary to desire for pregnancy but now feels so fatigued and has heavy bleeding would like to resolve this  Has pain with intercourse on deep thrust, reviewed possibility of endometriosis, role of Mirena and local hormones to help improve symptoms      Plan to obtain TVUS to assess  Will follow up in 3 weeks to discuss findings and plan moving forward, possibility of surgery

## 2020-03-19 ENCOUNTER — OFFICE VISIT (OUTPATIENT)
Dept: BARIATRICS | Facility: CLINIC | Age: 40
End: 2020-03-19

## 2020-03-19 VITALS
TEMPERATURE: 96.8 F | HEART RATE: 68 BPM | DIASTOLIC BLOOD PRESSURE: 68 MMHG | WEIGHT: 211.5 LBS | HEIGHT: 66 IN | SYSTOLIC BLOOD PRESSURE: 122 MMHG | BODY MASS INDEX: 33.99 KG/M2

## 2020-03-19 DIAGNOSIS — R10.84 GENERALIZED ABDOMINAL PAIN: Primary | ICD-10-CM

## 2020-03-19 DIAGNOSIS — Z98.84 BARIATRIC SURGERY STATUS: ICD-10-CM

## 2020-03-19 PROCEDURE — 99024 POSTOP FOLLOW-UP VISIT: CPT | Performed by: SURGERY

## 2020-03-19 NOTE — PROGRESS NOTES
Assessment/Plan:     Patient ID: Lee Santana is a 46 y o  female  Diagnoses and all orders for this visit:    Generalized abdominal pain    Bariatric surgery status          - Status post diagnostic laparoscopy with Dr Rizwan Joya on 03/09/2020  Patient is presenting for the first postoperative visit  Patient states that her pain has overall improved  However, she reports that the pain is still present  Intraoperative findings as well as images from her diagnostic laparoscopy reviewed with the patient  Although Euell Jose space was closed, there was no evidence of internal hernia or any other cause of her abdominal pain other than ovarian cysts and a small amount of serous fluid in the pelvis  -The patient has seen her OB GYN physician to discuss her symptoms as well as increased menstrual bleeding  She has had a CBC checked which showed a normal hemoglobin unchanged from prior  She is pending a transvaginal ultrasound  Will defer further workup to OBGYN     - Recommend continued routine follow-up  We kindly ask that your arrive 15 minutes before your scheduled appointment time with your provider to allow our staff to room you, get your vital signs and update your chart  - Get lab work done prior to next visit  Please call the office if you need a script  - Call our office if you have any problems with abdominal pain especially associated with fever, chills, nausea, vomiting or any other concerns  - All  Post-bariatric surgery patients should be aware that very small quantities of any alcohol can cause impairment and it is very possible not to feel the effect  The effect can be in the system for several hours  It is also a stomach irritant  - It is advised to AVOID alcohol, Nonsteroidal antiinflammatory drugs (NSAIDS) and nicotine of all forms   Any of these can cause stomach irritation/pain  Keep up the good work!      Subjective:      Patient ID: Lee Santana is a 46 y o  female  -s/p diagnostic laparoscopy with Dr Michelle Argueta on 03/09/2020  Presents to the office today for first post operative visit  Tolerating diet without issues; denies N/V, dysphagia, reflux  Overall doing Fair  Current BMI is Body mass index is 34 66 kg/m²  The following portions of the patient's history were reviewed and updated as appropriate: allergies, current medications, past family history, past medical history, past social history, past surgical history and problem list     Review of Systems   All other systems reviewed and are negative  Objective:    /68 (BP Location: Right arm, Patient Position: Sitting)   Pulse 68   Temp (!) 96 8 °F (36 °C) (Tympanic)   Ht 5' 5 5" (1 664 m)   Wt 95 9 kg (211 lb 8 oz)   LMP 03/08/2020 (Exact Date)   BMI 34 66 kg/m²      Physical Exam   Constitutional: She is oriented to person, place, and time  She appears well-developed and well-nourished  Eyes: Pupils are equal, round, and reactive to light  Conjunctivae and EOM are normal    Neck: Normal range of motion  Neck supple  Cardiovascular: Normal rate, regular rhythm and normal heart sounds  Pulmonary/Chest: Effort normal and breath sounds normal    Abdominal:   Abdomen soft, nondistended, mild tenderness in right lower quadrant and around incisions  Incisions healing well without evidence of infection  Musculoskeletal: Normal range of motion  Neurological: She is alert and oriented to person, place, and time  Skin: Skin is warm and dry  Psychiatric: She has a normal mood and affect   Her behavior is normal  Judgment and thought content normal

## 2020-06-10 ENCOUNTER — TELEPHONE (OUTPATIENT)
Dept: OBGYN CLINIC | Facility: CLINIC | Age: 40
End: 2020-06-10

## 2020-06-10 DIAGNOSIS — N91.2 AMENORRHEA: Primary | ICD-10-CM

## 2020-06-12 ENCOUNTER — APPOINTMENT (OUTPATIENT)
Dept: LAB | Age: 40
End: 2020-06-12
Payer: COMMERCIAL

## 2020-06-12 DIAGNOSIS — N91.2 AMENORRHEA: ICD-10-CM

## 2020-06-12 LAB — B-HCG SERPL-ACNC: ABNORMAL MIU/ML

## 2020-06-12 PROCEDURE — 36415 COLL VENOUS BLD VENIPUNCTURE: CPT

## 2020-06-12 PROCEDURE — 84702 CHORIONIC GONADOTROPIN TEST: CPT

## 2020-06-13 ENCOUNTER — TELEPHONE (OUTPATIENT)
Dept: LABOR AND DELIVERY | Facility: HOSPITAL | Age: 40
End: 2020-06-13

## 2020-06-13 DIAGNOSIS — N91.2 AMENORRHEA: Primary | ICD-10-CM

## 2020-06-15 ENCOUNTER — TELEPHONE (OUTPATIENT)
Dept: OBGYN CLINIC | Facility: CLINIC | Age: 40
End: 2020-06-15

## 2020-06-16 ENCOUNTER — APPOINTMENT (OUTPATIENT)
Dept: LAB | Facility: HOSPITAL | Age: 40
End: 2020-06-16
Attending: STUDENT IN AN ORGANIZED HEALTH CARE EDUCATION/TRAINING PROGRAM
Payer: COMMERCIAL

## 2020-06-16 DIAGNOSIS — N91.2 AMENORRHEA: ICD-10-CM

## 2020-06-16 LAB — B-HCG SERPL-ACNC: ABNORMAL MIU/ML

## 2020-06-16 PROCEDURE — 84702 CHORIONIC GONADOTROPIN TEST: CPT

## 2020-06-16 PROCEDURE — 36415 COLL VENOUS BLD VENIPUNCTURE: CPT

## 2020-06-17 ENCOUNTER — TELEPHONE (OUTPATIENT)
Dept: OBGYN CLINIC | Facility: CLINIC | Age: 40
End: 2020-06-17

## 2020-06-17 DIAGNOSIS — Z34.90 PREGNANCY AT EARLY STAGE: Primary | ICD-10-CM

## 2020-06-18 ENCOUNTER — APPOINTMENT (OUTPATIENT)
Dept: LAB | Facility: HOSPITAL | Age: 40
End: 2020-06-18
Attending: STUDENT IN AN ORGANIZED HEALTH CARE EDUCATION/TRAINING PROGRAM
Payer: COMMERCIAL

## 2020-06-18 DIAGNOSIS — Z34.90 PREGNANCY AT EARLY STAGE: ICD-10-CM

## 2020-06-18 LAB — B-HCG SERPL-ACNC: ABNORMAL MIU/ML

## 2020-06-18 PROCEDURE — 84702 CHORIONIC GONADOTROPIN TEST: CPT

## 2020-06-18 PROCEDURE — 36415 COLL VENOUS BLD VENIPUNCTURE: CPT

## 2020-06-20 ENCOUNTER — APPOINTMENT (OUTPATIENT)
Dept: LAB | Facility: HOSPITAL | Age: 40
End: 2020-06-20
Attending: STUDENT IN AN ORGANIZED HEALTH CARE EDUCATION/TRAINING PROGRAM
Payer: COMMERCIAL

## 2020-06-20 DIAGNOSIS — Z34.90 PREGNANCY AT EARLY STAGE: ICD-10-CM

## 2020-06-20 LAB — B-HCG SERPL-ACNC: ABNORMAL MIU/ML

## 2020-06-20 PROCEDURE — 84702 CHORIONIC GONADOTROPIN TEST: CPT

## 2020-06-20 PROCEDURE — 36415 COLL VENOUS BLD VENIPUNCTURE: CPT

## 2020-06-23 ENCOUNTER — ULTRASOUND (OUTPATIENT)
Dept: OBGYN CLINIC | Facility: CLINIC | Age: 40
End: 2020-06-23
Payer: COMMERCIAL

## 2020-06-23 VITALS
DIASTOLIC BLOOD PRESSURE: 74 MMHG | TEMPERATURE: 97.5 F | WEIGHT: 229.4 LBS | BODY MASS INDEX: 37.59 KG/M2 | SYSTOLIC BLOOD PRESSURE: 116 MMHG

## 2020-06-23 DIAGNOSIS — N91.1 SECONDARY AMENORRHEA: Primary | ICD-10-CM

## 2020-06-23 PROCEDURE — 99215 OFFICE O/P EST HI 40 MIN: CPT | Performed by: STUDENT IN AN ORGANIZED HEALTH CARE EDUCATION/TRAINING PROGRAM

## 2020-06-23 PROCEDURE — 76817 TRANSVAGINAL US OBSTETRIC: CPT | Performed by: STUDENT IN AN ORGANIZED HEALTH CARE EDUCATION/TRAINING PROGRAM

## 2020-06-23 RX ORDER — FLUTICASONE PROPIONATE 50 MCG
2 SPRAY, SUSPENSION (ML) NASAL DAILY
COMMUNITY
Start: 2020-04-30 | End: 2020-06-23

## 2020-06-29 ENCOUNTER — TELEPHONE (OUTPATIENT)
Dept: OBGYN CLINIC | Facility: CLINIC | Age: 40
End: 2020-06-29

## 2020-06-29 NOTE — TELEPHONE ENCOUNTER
This is the first that I am seeing this message - please check in and see if improved  Check for bleeding and pain - otherwise likely benign and pressure from pregnancy  May have virtual or in office visit if desires   Thanks! -AMM

## 2020-06-29 NOTE — TELEPHONE ENCOUNTER
----- Message from Suzanne Aldrich sent at 6/26/2020  7:52 PM EDT -----  Regarding: Visit Follow-Up Question  Contact: [phone]  [facility]    I have a question, it may not be anything, but I just wanted to let you know that I have been feeling a pressure down there, like if something is coming out (I don't know how else to describe it) is not like the feeling to urinate is a different type of pressure  It may not be anything I am just a little concern  I don't have pain or bleeding, is just that  Is that normal? Thank you

## 2020-07-01 NOTE — TELEPHONE ENCOUNTER
I received a voicemail from pt while I was out pt stated she is still having pain no bleeding and has a sensation as if something is coming out with pressure down there  Pt stats she does not see anything but its an uncomfortable feeling   Please advise

## 2020-07-01 NOTE — TELEPHONE ENCOUNTER
Called pt -L/M on voicemail informing of DR Bekah Cedeno recommendation for office visit  Advised to call prior to 5pm today , or tomorrow [phone]    Also, if symptomatic to call & ask to speak to on-call

## 2020-07-02 ENCOUNTER — ROUTINE PRENATAL (OUTPATIENT)
Dept: OBGYN CLINIC | Facility: CLINIC | Age: 40
End: 2020-07-02
Payer: COMMERCIAL

## 2020-07-02 ENCOUNTER — TELEPHONE (OUTPATIENT)
Dept: OBGYN CLINIC | Facility: CLINIC | Age: 40
End: 2020-07-02

## 2020-07-02 ENCOUNTER — APPOINTMENT (OUTPATIENT)
Dept: LAB | Facility: HOSPITAL | Age: 40
End: 2020-07-02
Attending: STUDENT IN AN ORGANIZED HEALTH CARE EDUCATION/TRAINING PROGRAM
Payer: COMMERCIAL

## 2020-07-02 ENCOUNTER — TELEPHONE (OUTPATIENT)
Dept: HEMATOLOGY ONCOLOGY | Facility: CLINIC | Age: 40
End: 2020-07-02

## 2020-07-02 VITALS — DIASTOLIC BLOOD PRESSURE: 56 MMHG | SYSTOLIC BLOOD PRESSURE: 92 MMHG | WEIGHT: 230.6 LBS | BODY MASS INDEX: 37.79 KG/M2

## 2020-07-02 DIAGNOSIS — O99.841 PREGNANCY COMPLICATED BY PREVIOUS BARIATRIC SURGERY, FIRST TRIMESTER: ICD-10-CM

## 2020-07-02 DIAGNOSIS — O99.841 PREGNANCY COMPLICATED BY PREVIOUS BARIATRIC SURGERY, FIRST TRIMESTER: Primary | ICD-10-CM

## 2020-07-02 LAB
25(OH)D3 SERPL-MCNC: 16.4 NG/ML (ref 30–100)
ABO GROUP BLD: NORMAL
ALBUMIN SERPL BCP-MCNC: 3.2 G/DL (ref 3.5–5)
ALP SERPL-CCNC: 71 U/L (ref 46–116)
ALT SERPL W P-5'-P-CCNC: 19 U/L (ref 12–78)
ANION GAP SERPL CALCULATED.3IONS-SCNC: 9 MMOL/L (ref 4–13)
AST SERPL W P-5'-P-CCNC: 16 U/L (ref 5–45)
BACTERIA UR QL AUTO: ABNORMAL /HPF
BASOPHILS # BLD AUTO: 0.01 THOUSANDS/ΜL (ref 0–0.1)
BASOPHILS NFR BLD AUTO: 0 % (ref 0–1)
BILIRUB SERPL-MCNC: 0.32 MG/DL (ref 0.2–1)
BILIRUB UR QL STRIP: NEGATIVE
BLD GP AB SCN SERPL QL: NEGATIVE
BUN SERPL-MCNC: 8 MG/DL (ref 5–25)
CALCIUM SERPL-MCNC: 8.4 MG/DL (ref 8.3–10.1)
CHLORIDE SERPL-SCNC: 103 MMOL/L (ref 100–108)
CLARITY UR: ABNORMAL
CO2 SERPL-SCNC: 24 MMOL/L (ref 21–32)
COLOR UR: YELLOW
CREAT SERPL-MCNC: 0.62 MG/DL (ref 0.6–1.3)
EOSINOPHIL # BLD AUTO: 0.09 THOUSAND/ΜL (ref 0–0.61)
EOSINOPHIL NFR BLD AUTO: 2 % (ref 0–6)
ERYTHROCYTE [DISTWIDTH] IN BLOOD BY AUTOMATED COUNT: 13 % (ref 11.6–15.1)
FERRITIN SERPL-MCNC: 78 NG/ML (ref 8–388)
FOLATE SERPL-MCNC: >20 NG/ML (ref 3.1–17.5)
GFR SERPL CREATININE-BSD FRML MDRD: 115 ML/MIN/1.73SQ M
GLUCOSE P FAST SERPL-MCNC: 81 MG/DL (ref 65–99)
GLUCOSE UR STRIP-MCNC: NEGATIVE MG/DL
HCT VFR BLD AUTO: 37.5 % (ref 34.8–46.1)
HGB BLD-MCNC: 12.3 G/DL (ref 11.5–15.4)
HGB UR QL STRIP.AUTO: NEGATIVE
IMM GRANULOCYTES # BLD AUTO: 0.02 THOUSAND/UL (ref 0–0.2)
IMM GRANULOCYTES NFR BLD AUTO: 0 % (ref 0–2)
IRON SATN MFR SERPL: 34 %
IRON SERPL-MCNC: 132 UG/DL (ref 50–170)
KETONES UR STRIP-MCNC: NEGATIVE MG/DL
LEUKOCYTE ESTERASE UR QL STRIP: NEGATIVE
LYMPHOCYTES # BLD AUTO: 2.15 THOUSANDS/ΜL (ref 0.6–4.47)
LYMPHOCYTES NFR BLD AUTO: 37 % (ref 14–44)
MAGNESIUM SERPL-MCNC: 1.9 MG/DL (ref 1.6–2.6)
MCH RBC QN AUTO: 31.5 PG (ref 26.8–34.3)
MCHC RBC AUTO-ENTMCNC: 32.8 G/DL (ref 31.4–37.4)
MCV RBC AUTO: 96 FL (ref 82–98)
MONOCYTES # BLD AUTO: 0.51 THOUSAND/ΜL (ref 0.17–1.22)
MONOCYTES NFR BLD AUTO: 9 % (ref 4–12)
NEUTROPHILS # BLD AUTO: 3.05 THOUSANDS/ΜL (ref 1.85–7.62)
NEUTS SEG NFR BLD AUTO: 52 % (ref 43–75)
NITRITE UR QL STRIP: NEGATIVE
NON-SQ EPI CELLS URNS QL MICRO: ABNORMAL /HPF
NRBC BLD AUTO-RTO: 0 /100 WBCS
PH UR STRIP.AUTO: 7.5 [PH]
PLATELET # BLD AUTO: 238 THOUSANDS/UL (ref 149–390)
PMV BLD AUTO: 9.9 FL (ref 8.9–12.7)
POTASSIUM SERPL-SCNC: 3.5 MMOL/L (ref 3.5–5.3)
PROT SERPL-MCNC: 6.9 G/DL (ref 6.4–8.2)
PROT UR STRIP-MCNC: NEGATIVE MG/DL
RBC # BLD AUTO: 3.9 MILLION/UL (ref 3.81–5.12)
RBC #/AREA URNS AUTO: ABNORMAL /HPF
RH BLD: POSITIVE
SODIUM SERPL-SCNC: 136 MMOL/L (ref 136–145)
SP GR UR STRIP.AUTO: 1.01 (ref 1–1.03)
SPECIMEN EXPIRATION DATE: NORMAL
TIBC SERPL-MCNC: 385 UG/DL (ref 250–450)
UROBILINOGEN UR QL STRIP.AUTO: 0.2 E.U./DL
VIT B12 SERPL-MCNC: 189 PG/ML (ref 100–900)
WBC # BLD AUTO: 5.83 THOUSAND/UL (ref 4.31–10.16)
WBC #/AREA URNS AUTO: ABNORMAL /HPF

## 2020-07-02 PROCEDURE — 80053 COMPREHEN METABOLIC PANEL: CPT

## 2020-07-02 PROCEDURE — 83735 ASSAY OF MAGNESIUM: CPT

## 2020-07-02 PROCEDURE — 82728 ASSAY OF FERRITIN: CPT

## 2020-07-02 PROCEDURE — 82607 VITAMIN B-12: CPT

## 2020-07-02 PROCEDURE — 99213 OFFICE O/P EST LOW 20 MIN: CPT | Performed by: STUDENT IN AN ORGANIZED HEALTH CARE EDUCATION/TRAINING PROGRAM

## 2020-07-02 PROCEDURE — 82306 VITAMIN D 25 HYDROXY: CPT

## 2020-07-02 PROCEDURE — 87147 CULTURE TYPE IMMUNOLOGIC: CPT

## 2020-07-02 PROCEDURE — 83540 ASSAY OF IRON: CPT

## 2020-07-02 PROCEDURE — 83550 IRON BINDING TEST: CPT

## 2020-07-02 PROCEDURE — 80081 OBSTETRIC PANEL INC HIV TSTG: CPT

## 2020-07-02 PROCEDURE — 81001 URINALYSIS AUTO W/SCOPE: CPT

## 2020-07-02 PROCEDURE — 82746 ASSAY OF FOLIC ACID SERUM: CPT

## 2020-07-02 PROCEDURE — 87086 URINE CULTURE/COLONY COUNT: CPT

## 2020-07-02 PROCEDURE — 36415 COLL VENOUS BLD VENIPUNCTURE: CPT

## 2020-07-02 NOTE — PROGRESS NOTES
Assessment/Plan:   Pregnancy complicated by previous bariatric surgery, first trimester  47 yo  at 8+3 here for problem visit  Exam unremarkable    2 cm simple left ovarian cyst  Will obtain anemia and nutrition labs as well as PNB and urine  Provided reassurance  Advised call to heme or PCP if symptoms worsen or feeling unsafe due to fatigue, may need ER evaluation or seen in their office  Diagnoses and all orders for this visit:    Pregnancy complicated by previous bariatric surgery, first trimester  -     CBC and differential; Future  -     Comprehensive metabolic panel; Future  -     Magnesium; Future  -     Rubella antibody, IgG; Future  -     Hepatitis B surface antigen; Future  -     HIV 1/2 ANTIGEN/ANTIBODY (4TH GENERATION) W REFLEX [facility]; Future  -     RPR; Future  -     Vitamin D 25 hydroxy; Future  -     Vitamin B12; Future  -     Iron Panel (Includes Ferritin, Iron Sat%, Iron, and TIBC); Future  -     Urine culture; Future  -     Urinalysis with microscopic; Future  -     Type and screen; Future  -     Folate; Future          Subjective:     Patient ID: Kary Wilder is a 46 y o  female  Patient is a 31 yo [id number] at 8+3 here for problem visit  She reports since Thursday having sensation of pelvic/vaginal pressure  She had one episode of abdominal pain on Saturday that self resolved and has not returned  A little discharge  No bleeding  Has the feeling like something is going to come out of her vagina, worse following void  Having BM every other day that are loose  She also reports dizziness, nausea with one episode of emesis and fatigue  States this feels very much like when she had anemia at the end of last year requiring IV iron  She has had one heavy cycle since her last CBC in March with stable Hgb of 12  Review of Systems   Constitutional: Positive for fatigue  Negative for chills and fever  Respiratory: Positive for shortness of breath   Negative for cough and chest tightness  Cardiovascular: Negative for chest pain and palpitations  Gastrointestinal: Positive for diarrhea and nausea  Negative for abdominal pain, constipation and vomiting  Genitourinary: Positive for vaginal pain (pressure)  Negative for difficulty urinating, dysuria, frequency, pelvic pain, vaginal bleeding and vaginal discharge  Objective:     Physical Exam   Constitutional: She is oriented to person, place, and time  She appears well-developed and well-nourished  HENT:   Head: Normocephalic and atraumatic  Eyes: EOM are normal    Neck: Normal range of motion  Pulmonary/Chest: Effort normal  No respiratory distress  Abdominal: Soft  She exhibits no distension and no mass  There is no tenderness  There is no rebound and no guarding  Genitourinary: Vagina normal  There is no rash, tenderness, lesion or injury on the right labia  There is no rash, tenderness, lesion or injury on the left labia  Uterus is enlarged (gravid approx 9 wk)  Cervix exhibits no motion tenderness, no discharge and no friability  Right adnexum displays no mass, no tenderness and no fullness  Left adnexum displays no mass, no tenderness and no fullness  No erythema, tenderness or bleeding in the vagina  No foreign body in the vagina  No signs of injury around the vagina  No vaginal discharge found  Genitourinary Comments: Closed cervix without prolapse of cervix or vaginal walls   Neurological: She is alert and oriented to person, place, and time  Skin: Skin is warm and dry  Psychiatric: She has a normal mood and affect   Her behavior is normal  Judgment and thought content normal

## 2020-07-02 NOTE — TELEPHONE ENCOUNTER
Called patient to review blood count and electrolytes are normal, UA overall not suggestive of UTI  If her symptoms persist she should call PCP  No answer, left VM and released with message in [place]

## 2020-07-02 NOTE — TELEPHONE ENCOUNTER
Patient called to report she is experiencing severe fatigue that gradually is worsening   + intermittent moderate to severe SOB at rest and worse with movement   + intermittent chest pain with heart palpitations  Denies at present moment  Patient is 8 weeks pregnant  Patient advised to go to ER for evaluation  Patient will go [facility]  Patient declined calling 911 since she was not actively having chest pain or SOB at present time  Please advise KRISTIN Hernandez of above

## 2020-07-02 NOTE — ASSESSMENT & PLAN NOTE
45 yo [id number] at 8+3 here for problem visit  Exam unremarkable    2 cm simple left ovarian cyst  Will obtain anemia and nutrition labs as well as PNB and urine  Provided reassurance  Advised call to heme or PCP if symptoms worsen or feeling unsafe due to fatigue, may need ER evaluation or seen in their office

## 2020-07-03 LAB
BACTERIA UR CULT: ABNORMAL
HBV SURFACE AG SER QL: NORMAL
HIV 1+2 AB+HIV1 P24 AG SERPL QL IA: NORMAL
RUBV IGG SERPL IA-ACNC: 28.3 IU/ML

## 2020-07-06 ENCOUNTER — TELEPHONE (OUTPATIENT)
Dept: OBGYN CLINIC | Facility: MEDICAL CENTER | Age: 40
End: 2020-07-06

## 2020-07-06 DIAGNOSIS — R82.71 GBS BACTERIURIA: Primary | ICD-10-CM

## 2020-07-06 LAB — RPR SER QL: NORMAL

## 2020-07-06 RX ORDER — AMOXICILLIN 875 MG/1
875 TABLET, COATED ORAL 2 TIMES DAILY
Qty: 10 TABLET | Refills: 0 | Status: SHIPPED | OUTPATIENT
Start: 2020-07-06 | End: 2020-07-11

## 2020-07-06 NOTE — TELEPHONE ENCOUNTER
Please let patient know Iron levels are normal  Vitamin D is low  Recommend 600-800IU daily supplement of vitamin D if not already on one  Low level bacteria in the urine that we will treat with antibiotics

## 2020-07-08 ENCOUNTER — HOSPITAL ENCOUNTER (EMERGENCY)
Facility: HOSPITAL | Age: 40
Discharge: HOME | End: 2020-07-08
Attending: EMERGENCY MEDICINE
Payer: COMMERCIAL

## 2020-07-08 VITALS
BODY MASS INDEX: 37.5 KG/M2 | WEIGHT: 228.84 LBS | TEMPERATURE: 98 F | OXYGEN SATURATION: 100 % | RESPIRATION RATE: 18 BRPM | HEART RATE: 78 BPM | SYSTOLIC BLOOD PRESSURE: 100 MMHG | DIASTOLIC BLOOD PRESSURE: 57 MMHG

## 2020-07-08 DIAGNOSIS — R11.0 NAUSEA: ICD-10-CM

## 2020-07-08 DIAGNOSIS — O26.899 ABDOMINAL PAIN IN PREGNANCY: Primary | ICD-10-CM

## 2020-07-08 DIAGNOSIS — R42 DIZZINESS: ICD-10-CM

## 2020-07-08 DIAGNOSIS — R10.9 ABDOMINAL PAIN IN PREGNANCY: Primary | ICD-10-CM

## 2020-07-08 DIAGNOSIS — N89.8 VAGINAL DISCHARGE: ICD-10-CM

## 2020-07-08 LAB
ALBUMIN SERPL BCP-MCNC: 3.1 G/DL (ref 3.5–5)
ALP SERPL-CCNC: 67 U/L (ref 46–116)
ALT SERPL W P-5'-P-CCNC: 15 U/L (ref 12–78)
ANION GAP SERPL CALCULATED.3IONS-SCNC: 6 MMOL/L (ref 4–13)
AST SERPL W P-5'-P-CCNC: 19 U/L (ref 5–45)
BASOPHILS # BLD AUTO: 0.02 THOUSANDS/ΜL (ref 0–0.1)
BASOPHILS NFR BLD AUTO: 0 % (ref 0–1)
BILIRUB SERPL-MCNC: 0.31 MG/DL (ref 0.2–1)
BILIRUB UR QL STRIP: NEGATIVE
BUN SERPL-MCNC: 9 MG/DL (ref 5–25)
CALCIUM SERPL-MCNC: 8.3 MG/DL (ref 8.3–10.1)
CHLORIDE SERPL-SCNC: 104 MMOL/L (ref 100–108)
CLARITY UR: CLEAR
CO2 SERPL-SCNC: 26 MMOL/L (ref 21–32)
COLOR UR: YELLOW
CREAT SERPL-MCNC: 0.64 MG/DL (ref 0.6–1.3)
EOSINOPHIL # BLD AUTO: 0.15 THOUSAND/ΜL (ref 0–0.61)
EOSINOPHIL NFR BLD AUTO: 2 % (ref 0–6)
ERYTHROCYTE [DISTWIDTH] IN BLOOD BY AUTOMATED COUNT: 12.8 % (ref 11.6–15.1)
GFR SERPL CREATININE-BSD FRML MDRD: 114 ML/MIN/1.73SQ M
GLUCOSE SERPL-MCNC: 83 MG/DL (ref 65–140)
GLUCOSE UR STRIP-MCNC: NEGATIVE MG/DL
HCT VFR BLD AUTO: 37.8 % (ref 34.8–46.1)
HGB BLD-MCNC: 12.4 G/DL (ref 11.5–15.4)
HGB UR QL STRIP.AUTO: NEGATIVE
IMM GRANULOCYTES # BLD AUTO: 0.01 THOUSAND/UL (ref 0–0.2)
IMM GRANULOCYTES NFR BLD AUTO: 0 % (ref 0–2)
KETONES UR STRIP-MCNC: NEGATIVE MG/DL
LEUKOCYTE ESTERASE UR QL STRIP: NEGATIVE
LYMPHOCYTES # BLD AUTO: 2.75 THOUSANDS/ΜL (ref 0.6–4.47)
LYMPHOCYTES NFR BLD AUTO: 37 % (ref 14–44)
MAGNESIUM SERPL-MCNC: 2 MG/DL (ref 1.6–2.6)
MCH RBC QN AUTO: 31.6 PG (ref 26.8–34.3)
MCHC RBC AUTO-ENTMCNC: 32.8 G/DL (ref 31.4–37.4)
MCV RBC AUTO: 96 FL (ref 82–98)
MONOCYTES # BLD AUTO: 0.64 THOUSAND/ΜL (ref 0.17–1.22)
MONOCYTES NFR BLD AUTO: 9 % (ref 4–12)
NEUTROPHILS # BLD AUTO: 3.92 THOUSANDS/ΜL (ref 1.85–7.62)
NEUTS SEG NFR BLD AUTO: 52 % (ref 43–75)
NITRITE UR QL STRIP: NEGATIVE
NRBC BLD AUTO-RTO: 0 /100 WBCS
PH UR STRIP.AUTO: 5.5 [PH] (ref 4.5–8)
PLATELET # BLD AUTO: 208 THOUSANDS/UL (ref 149–390)
PMV BLD AUTO: 10.1 FL (ref 8.9–12.7)
POTASSIUM SERPL-SCNC: 3.7 MMOL/L (ref 3.5–5.3)
PROT SERPL-MCNC: 7.1 G/DL (ref 6.4–8.2)
PROT UR STRIP-MCNC: NEGATIVE MG/DL
RBC # BLD AUTO: 3.92 MILLION/UL (ref 3.81–5.12)
SODIUM SERPL-SCNC: 136 MMOL/L (ref 136–145)
SP GR UR STRIP.AUTO: 1.02 (ref 1–1.03)
TSH SERPL DL<=0.05 MIU/L-ACNC: 1.2 UIU/ML (ref 0.36–3.74)
UROBILINOGEN UR QL STRIP.AUTO: 0.2 E.U./DL
WBC # BLD AUTO: 7.49 THOUSAND/UL (ref 4.31–10.16)

## 2020-07-08 PROCEDURE — 96361 HYDRATE IV INFUSION ADD-ON: CPT

## 2020-07-08 PROCEDURE — 81003 URINALYSIS AUTO W/O SCOPE: CPT

## 2020-07-08 PROCEDURE — 87086 URINE CULTURE/COLONY COUNT: CPT

## 2020-07-08 PROCEDURE — 99284 EMERGENCY DEPT VISIT MOD MDM: CPT | Performed by: PHYSICIAN ASSISTANT

## 2020-07-08 PROCEDURE — 84443 ASSAY THYROID STIM HORMONE: CPT | Performed by: PHYSICIAN ASSISTANT

## 2020-07-08 PROCEDURE — 96374 THER/PROPH/DIAG INJ IV PUSH: CPT

## 2020-07-08 PROCEDURE — 87491 CHLMYD TRACH DNA AMP PROBE: CPT | Performed by: PHYSICIAN ASSISTANT

## 2020-07-08 PROCEDURE — 80053 COMPREHEN METABOLIC PANEL: CPT | Performed by: PHYSICIAN ASSISTANT

## 2020-07-08 PROCEDURE — 83735 ASSAY OF MAGNESIUM: CPT | Performed by: PHYSICIAN ASSISTANT

## 2020-07-08 PROCEDURE — 85025 COMPLETE CBC W/AUTO DIFF WBC: CPT | Performed by: PHYSICIAN ASSISTANT

## 2020-07-08 PROCEDURE — 99284 EMERGENCY DEPT VISIT MOD MDM: CPT

## 2020-07-08 PROCEDURE — 36415 COLL VENOUS BLD VENIPUNCTURE: CPT | Performed by: PHYSICIAN ASSISTANT

## 2020-07-08 PROCEDURE — 87591 N.GONORRHOEAE DNA AMP PROB: CPT | Performed by: PHYSICIAN ASSISTANT

## 2020-07-08 RX ORDER — MECLIZINE HCL 12.5 MG/1
25 TABLET ORAL ONCE
Status: COMPLETED | OUTPATIENT
Start: 2020-07-08 | End: 2020-07-08

## 2020-07-08 RX ORDER — ONDANSETRON 2 MG/ML
4 INJECTION INTRAMUSCULAR; INTRAVENOUS ONCE
Status: COMPLETED | OUTPATIENT
Start: 2020-07-08 | End: 2020-07-08

## 2020-07-08 RX ORDER — ACETAMINOPHEN 325 MG/1
650 TABLET ORAL ONCE
Status: COMPLETED | OUTPATIENT
Start: 2020-07-08 | End: 2020-07-08

## 2020-07-08 RX ORDER — ONDANSETRON 4 MG/1
4 TABLET, ORALLY DISINTEGRATING ORAL EVERY 6 HOURS PRN
Qty: 20 TABLET | Refills: 0 | Status: SHIPPED | OUTPATIENT
Start: 2020-07-08 | End: 2022-04-15 | Stop reason: SDUPTHER

## 2020-07-08 RX ORDER — MECLIZINE HYDROCHLORIDE 25 MG/1
25 TABLET ORAL 3 TIMES DAILY PRN
Qty: 30 TABLET | Refills: 0 | Status: SHIPPED | OUTPATIENT
Start: 2020-07-08

## 2020-07-08 RX ADMIN — ACETAMINOPHEN 650 MG: 325 TABLET ORAL at 21:26

## 2020-07-08 RX ADMIN — MECLIZINE 25 MG: 12.5 TABLET ORAL at 23:15

## 2020-07-08 RX ADMIN — ONDANSETRON 4 MG: 2 INJECTION INTRAMUSCULAR; INTRAVENOUS at 21:27

## 2020-07-08 RX ADMIN — SODIUM CHLORIDE 1000 ML: 0.9 INJECTION, SOLUTION INTRAVENOUS at 21:20

## 2020-07-09 LAB — BACTERIA UR CULT: NORMAL

## 2020-07-09 NOTE — DISCHARGE INSTRUCTIONS
Please refer to the attached information for strict return instructions  If symptoms worsen or new symptoms develop please return to the ER  Please follow up with your ob/gyn as well as your primary care physician as soon as possible  Drink plenty of fluids at home to stay hydrated  Return immediately to the emergency department if you develop worsening/new abdominal pain, vomiting, vaginal bleeding, or any new/worsening symptoms of concern

## 2020-07-09 NOTE — ED NOTES
Patient is refusing an ECG 12 lead at this time  Provider is aware       Yong Cast RN  07/08/20 [id number]

## 2020-07-09 NOTE — ED PROVIDER NOTES
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  Does have prior transvaginal ultrasound confirming IUP  States pain is crampy, located over bilateral lower abdomen, and seems to last for several minutes at a time  Admits to nausea, but reports she has had this during each pregnancy  Denies vomiting, diarrhea, or constipation  Denies associated vaginal bleeding  Denies dysuria, urinary urgency, or urinary frequency  Does report small amount of "greyish" vaginal discharge, denies abnormal vaginal odor  Denies new unprotected sexual contacts  No fevers or chills  Pt also reports four days of lightheadedness and headache since yesterday as well as generalized fatigue  States headache is 6/10 and located over forehead  Reports prior history of similar headaches  Denies shortness of breath or chest pain  Denies palpitations/perceived ectopy  Denies vertigo/spinning sensation  Denies recent head injury/trauma  Reports nothing seems to make headache or lightheadedness symptoms better/worse  No recent trauma or surgery  No prolonged immobilization or recent travel  No history of DVT or PE  No family history of blood clots or coagulation disorder          History provided by:  Patient   used: No    Dizziness   Quality:  Lightheadedness  Duration:  4 days  Timing:  Intermittent  Progression:  Waxing and waning  Chronicity:  New  Relieved by:  None tried  Worsened by:  Nothing  Ineffective treatments:  None tried  Associated symptoms: headaches, nausea and weakness Associated symptoms: no blood in stool, no chest pain, no diarrhea, no palpitations, no shortness of breath, no syncope, no vision changes and no vomiting    Pelvic Pain   Associated symptoms: abdominal pain, headaches and nausea    Associated symptoms: no chest pain, no congestion, no cough, no diarrhea, no fever, no rash, no rhinorrhea, no shortness of breath, no sore throat, no vomiting and no wheezing        Prior to Admission Medications   Prescriptions Last Dose Informant Patient Reported? Taking?    Prenatal Vit-Fe Fumarate-FA (PRENATAL VITAMIN PO)   Yes Yes   Sig: Take by mouth   acetaminophen (TYLENOL) 325 mg tablet   Yes Yes   Sig: Take 650 mg by mouth every 6 (six) hours as needed for mild pain   amoxicillin (AMOXIL) 875 mg tablet   No Yes   Sig: Take 1 tablet (875 mg total) by mouth 2 (two) times a day for 5 days   ascorbic acid (VITAMIN C) 500 mg tablet  Self Yes Yes   Sig: Take 500 mg by mouth daily   famotidine (PEPCID) 20 mg tablet  Self No No   Sig: Take 1 tablet (20 mg total) by mouth 2 (two) times a day   Patient taking differently: Take 20 mg by mouth as needed    ferrous sulfate 325 (65 Fe) mg tablet   Yes Yes   Sig: Take 325 mg by mouth daily with breakfast      Facility-Administered Medications: None       Past Medical History:   Diagnosis Date    Abdominal pain     "almost constant"    Anemia     Anesthesia     "woke up swinging with last  9/2018  "was fine with EGD"    Back pain     Bariatric surgery status     2008-gastric sleeve revison RUEN-Y 4/2019-abd painnow-dx lap today 3/9/2020    Constipation     Dental bridge present     right lower permanent    Diarrhea     Difficulty swallowing     "in the past"    Disease of thyroid gland     not on meds now    Dizzy     Fatigue     "when blood pressure low" and weakness too"    GERD (gastroesophageal reflux disease)     "increase after surgery"    Heart murmur     heart murmer, work up negative with holter monitor    History of 2  sections     most recent 17    History of D&C 2019    History of iron deficiency     anemia/ had IV infusions through pregnancy in 6037-7302    History of transfusion     2019 - pt had allergic reaction - had to stop the blood    Inguinal hernia     right    Loss of appetite     Low BP     "off and on"    Migraine     N&V (nausea and vomiting)     " a little better since on meds"    Non-intractable vomiting     "not since been on medicine"    Palpitations     "having again"    Postgastrectomy malabsorption     Stomach pain        Past Surgical History:   Procedure Laterality Date     SECTION      x2    CHOLECYSTECTOMY      CHROMOSOME ANALYSIS, PRODUCTS OF CONCEPTION (HISTORICAL)      2 miscarriages in  and Nov    LAPAROSCOPY N/A 3/9/2020    Procedure: DIAGNOSTIC LAPAROSCOPY,CLOSURE OF COATES DEFECT, INTRAOP EGD;  Surgeon: Donny Duran MD;  Location: [facility];  Service: Bariatrics    OVARIAN CYST REMOVAL Right     WY EGD TRANSORAL BIOPSY SINGLE/MULTIPLE N/A 2019    Procedure: ESOPHAGOGASTRODUODENOSCOPY (EGD) with bx;  Surgeon: Donny Duran MD;  Location: [facility];   Service: Bariatrics    [place] LAP GASTRIC BYPASS/SREE-EN-Y N/A 2019    Procedure: LAP SREE-EN-Y GASTRIC BYPASS, INTRAOP EGD;  Surgeon: Donny Duran MD;  Location[facility];  Service: Stella ABORTN,1ST TRI N/A 2019    Procedure: DILATATION AND EVACUATION (D&E) (8 weeks) missed ab;  Surgeon: Sara Spatz, MD;  [facility]: [facility];  Service: Gynecology    REVISION BYPASS LAPAROSCOPIC N/A 2019    Procedure: LAP REVISION/ CONVERSION;  Surgeon: Donny Duran MD;  [facility];  Service: Bariatrics    SALPINGOOPHORECTOMY Right 2018    SLEEVE GASTROPLASTY         Family History   Problem Relation Age of Onset    Hypertension Mother     Heart disease Mother     No Known Problems Father      I have reviewed and agree with the history as documented  E-Cigarette/Vaping    E-Cigarette Use Never User      E-Cigarette/Vaping Substances    Nicotine No     THC No     CBD No     Flavoring No     Other No     Unknown No      Social History     Tobacco Use    Smoking status: Never Smoker    Smokeless tobacco: Never Used   Substance Use Topics    Alcohol use: Not Currently    Drug use: No       Review of Systems   Constitutional: Negative for chills and fever  HENT: Negative for congestion, rhinorrhea and sore throat  Eyes: Negative for pain and visual disturbance  Respiratory: Negative for cough, chest tightness, shortness of breath and wheezing  Cardiovascular: Negative for chest pain, palpitations, leg swelling and syncope  Gastrointestinal: Positive for abdominal pain and nausea  Negative for anal bleeding, blood in stool, diarrhea and vomiting  Genitourinary: Positive for pelvic pain and vaginal discharge  Negative for dysuria, flank pain, frequency, genital sores, urgency, vaginal bleeding and vaginal pain  Musculoskeletal: Negative for back pain, neck pain and neck stiffness  Skin: Negative for rash and wound  Neurological: Positive for weakness, light-headedness and headaches  Negative for dizziness, syncope, speech difficulty and numbness  Physical Exam  Physical Exam   Constitutional: She is oriented to person, place, and time  She appears well-developed and well-nourished  No distress  HENT:   Head: Normocephalic and atraumatic  Right Ear: External ear normal    Left Ear: External ear normal    Mouth/Throat: Oropharynx is clear and moist    Eyes: Pupils are equal, round, and reactive to light  Conjunctivae and EOM are normal    Neck: Normal range of motion  Neck supple  Cardiovascular: Normal rate, regular rhythm, normal heart sounds and intact distal pulses  Exam reveals no gallop and no friction rub  No murmur heard    Pulmonary/Chest: Effort normal and breath sounds normal  No respiratory distress  She has no wheezes  Abdominal: Soft  She exhibits no distension  There is tenderness  There is no guarding  Gravid abdomen  Mild suprapubic TTP  No rigidity, rebound, or guarding  No McBurney point TTP  No CVAT  Negative Larry's  Lymphadenopathy:     She has no cervical adenopathy  Neurological: She is alert and oriented to person, place, and time  She exhibits normal muscle tone  Coordination normal    CN II-XII grossly intact  EOM's, visual fields preserved  No nystagmus/strabismus  PERRL  5/5 strength, intact sensation to b/l UE and LE  Normal gait  Intact coordination in finger-nose and heel-shin testing  Skin: Skin is warm and dry  Capillary refill takes less than 2 seconds  No rash noted  She is not diaphoretic  No erythema  Psychiatric: She has a normal mood and affect   Her behavior is normal  Judgment and thought content normal        Vital Signs  ED Triage Vitals   Temperature Pulse Respirations Blood Pressure SpO2   07/08/20 1953 07/08/20 1953 07/08/20 1953 07/08/20 1953 07/08/20 1953   98 °F (36 7 °C) 74 17 140/60 100 %      Temp Source Heart Rate Source Patient Position - Orthostatic VS BP Location FiO2 (%)   07/08/20 1953 07/08/20 1953 07/08/20 1953 07/08/20 1953 --   Temporal Monitor Lying Right arm       Pain Score       07/08/20 2126       Worst Possible Pain           Vitals:    07/08/20 1953 07/08/20 2245   BP: 140/60 100/57   Pulse: 74 78   Patient Position - Orthostatic VS: Lying Lying         Visual Acuity      ED Medications  Medications   sodium chloride 0 9 % bolus 1,000 mL (0 mL Intravenous Stopped 7/8/20 2327)   ondansetron (ZOFRAN) injection 4 mg (4 mg Intravenous Given 7/8/20 2127)   acetaminophen (TYLENOL) tablet 650 mg (650 mg Oral Given 7/8/20 2126)   meclizine (ANTIVERT) tablet 25 mg (25 mg Oral Given 7/8/20 2315)       Diagnostic Studies  Results Reviewed     Procedure Component Value Units Date/Time    Chlamydia/GC amplified DNA by PCR [[id number]] (Normal) Collected:  07/08/20 2013    Lab Status:  Final result Specimen:  Urine, Other Updated:  07/10/20 0555     N gonorrhoeae, DNA Probe Negative     Chlamydia trachomatis, DNA Probe Negative    Narrative:       Test performed using PCR amplification of target DNA  This test is intended as an aid in the diagnosis of Chlamydial and gonococcal disease  This test has not been evaluated in patients younger than 15years of age and is not recommended for evaluation of suspected sexual abuse  Additional testing is recommended when the results do not correlate with clinical signs and symptoms  Urine culture [[id number]] Collected:  07/08/20 2013    Lab Status:  Final result Specimen:  Urine, Clean Catch Updated:  07/09/20 1559     Urine Culture <10,000 cfu/ml     TSH, 3rd generation with Free T4 reflex [[id number]]  (Normal) Collected:  07/08/20 2120    Lab Status:  Final result Specimen:  Blood from Arm, Right Updated:  07/08/20 2150     TSH 3RD GENERATON 1 195 uIU/mL     Narrative:       Patients undergoing fluorescein dye angiography may retain small amounts of fluorescein in the body for 48-72 hours post procedure  Samples containing fluorescein can produce falsely depressed TSH values  If the patient had this procedure,a specimen should be resubmitted post fluorescein clearance        Magnesium [[id number]]  (Normal) Collected:  07/08/20 2120    Lab Status:  Final result Specimen:  Blood from Arm, Right Updated:  07/08/20 2150     Magnesium 2 0 mg/dL     Comprehensive metabolic panel [[id number]]  (Abnormal) Collected:  07/08/20 2120    Lab Status:  Final result Specimen:  Blood from Arm, Right Updated:  07/08/20 2141     Sodium 136 mmol/L      Potassium 3 7 mmol/L      Chloride 104 mmol/L      CO2 26 mmol/L      ANION GAP 6 mmol/L      BUN 9 mg/dL      Creatinine 0 64 mg/dL      Glucose 83 mg/dL      Calcium 8 3 mg/dL      AST 19 U/L      ALT 15 U/L      Alkaline Phosphatase 67 U/L      Total Protein 7 1 g/dL Albumin 3 1 g/dL      Total Bilirubin 0 31 mg/dL      eGFR 114 ml/min/1 73sq m     Narrative:       [place] guidelines for Chronic Kidney Disease (CKD):     Stage 1 with normal or high GFR (GFR > 90 mL/min/1 73 square meters)    Stage 2 Mild CKD (GFR = 60-89 mL/min/1 73 square meters)    Stage 3A Moderate CKD (GFR = 45-59 mL/min/1 73 square meters)    Stage 3B Moderate CKD (GFR = 30-44 mL/min/1 73 square meters)    Stage 4 Severe CKD (GFR = 15-29 mL/min/1 73 square meters)    Stage 5 End Stage CKD (GFR <15 mL/min/1 73 square meters)  Note: GFR calculation is accurate only with a steady state creatinine    CBC and differential [[id number]] Collected:  07/08/20 2120    Lab Status:  Final result Specimen:  Blood from Arm, Right Updated:  07/08/20 2125     WBC 7 49 Thousand/uL      RBC 3 92 Million/uL      Hemoglobin 12 4 g/dL      Hematocrit 37 8 %      MCV 96 fL      MCH 31 6 pg      MCHC 32 8 g/dL      RDW 12 8 %      MPV 10 1 fL      Platelets 109 Thousands/uL      nRBC 0 /100 WBCs      Neutrophils Relative 52 %      Immat GRANS % 0 %      Lymphocytes Relative 37 %      Monocytes Relative 9 %      Eosinophils Relative 2 %      Basophils Relative 0 %      Neutrophils Absolute 3 92 Thousands/µL      Immature Grans Absolute 0 01 Thousand/uL      Lymphocytes Absolute 2 75 Thousands/µL      Monocytes Absolute 0 64 Thousand/µL      Eosinophils Absolute 0 15 Thousand/µL      Basophils Absolute 0 02 Thousands/µL     POCT urinalysis dipstick [[id number]]  (Abnormal) Resulted:  07/08/20 2016    Lab Status:  Final result Updated:  07/08/20 2016    Urine Macroscopic, POC [[id number]] Collected:  07/08/20 2013    Lab Status:  Final result Specimen:  Urine Updated:  07/08/20 2015     Color, UA Yellow     Clarity, UA Clear     pH, UA 5 5     Leukocytes, UA Negative     Nitrite, UA Negative     Protein, UA Negative mg/dl      Glucose, UA Negative mg/dl      Ketones, UA Negative mg/dl Urobilinogen, UA 0 2 E U /dl      Bilirubin, UA Negative     Blood, UA Negative     Specific Gravity, UA 1 020    Narrative:       CLINITEK RESULT                 No orders to display              Procedures  Procedures         ED Course  ED Course as of Jul 11 1235 Wed Jul 08, 2020   2100 FHT's 160        2251 Case discussed with Ian Jackson, on call for ob/gyn [facility]  Agreeable to plan of discharge with Zofran prn, follow up with ob outpatient  18 Pt reports she had improvement in headache, but now voices concern over worsening lightheadedness/vertigo since arrival  Neuro exam remains nonfocal  EKG ordered as well as meclizine, but pt refuses EKG and reports she just wants to leave  Agreeable to meclizine prior to discharge  MDM  Number of Diagnoses or Management Options  Abdominal pain in pregnancy:   Dizziness:   Nausea:   Vaginal discharge:   Diagnosis management comments: Bilateral lower abdominal pain, intermittent over the past week  Minimal suprapubic abdominal TTP without peritoneal signs or McBurney/RLQ TTP  No associated urinary symptoms, is undergoing treatment for GBS urine colonization  Four days of fatigue and nonspecific lightheadedness, as well as frontal headache for one day in context of prior similar headaches  Denies vertigo/spinning sensation at time of initial arrival  No associated chest pain/palpitations  No neurologic deficits by history or on exam  Fetal heart tones in 160's by doppler on my exam  Will check urine dip, basic labs, mag, TSH, provide fluids, Zofran for nausea, discuss with ob/gyn  Basic labs, mag, TSH WNL  Urine dip unremarkable  Given Zofran with relief of nausea  Following discussion with on call ob/gyn pt stable for discharge with outpatient ob/gyn follow up from obstetrics perspective, pt to return if worsening pain, vaginal bleeding, or new/worsening symptoms   States headache slightly improved, but pt reports since presentation her lightheadedness has turned into sensation of the room "spinning around" her  Neuro exam remains nonfocal  EKG ordered, pt offered meclizine  Pt agreeable to meclizine, refuses EKG or further testing/interventions  Reports she would like to "just go home"  Pt instructed to promptly follow up with ob/gyn, as well as with PCP for re-evaluation  Strict return indications discussed, which pt verbalizes understanding of  Amount and/or Complexity of Data Reviewed  Clinical lab tests: ordered    Patient Progress  Patient progress: stable        Disposition  Final diagnoses:   Abdominal pain in pregnancy   Dizziness   Vaginal discharge   Nausea     Time reflects when diagnosis was documented in both MDM as applicable and the Disposition within this note     Time User Action Codes Description Comment    7/8/2020 11:16 PM Katfifi Gloria Add [O26 [id number],  R10 9] Abdominal pain in pregnancy     7/8/2020 11:16 PM Katerikay Gloria Add [R42] Dizziness     7/8/2020 11:16 PM Othella Blakes [N89 8] Vaginal discharge     7/8/2020 11:17 PM Kathey Gloria Add [R11 0] Nausea       ED Disposition     ED Disposition Condition Date/Time Comment    Discharge Stable Wed Jul 8, 2020 11:16 PM Chasidy Aldrich discharge to home/self care              Follow-up Information     Follow up With Specialties Details Why Contact Info Additional Information    OB/GYN  Schedule an appointment as soon as possible for a visit        [address] [facility] Emergency Medicine  [facility] [address] [phone] [facility], [address], [place], [address], PA-C Physician Assistant Call   [address] [facility] [address] [phone]             Discharge Medication List as of 7/8/2020 11:19 PM      START taking these medications    Details   meclizine (ANTIVERT) 25 mg tablet Take 1 tablet (25 mg total) by mouth 3 (three) times a day as needed for dizziness, Starting Wed 7/8/2020, Normal      ondansetron (ZOFRAN-ODT) 4 mg disintegrating tablet Take 1 tablet (4 mg total) by mouth every 6 (six) hours as needed for nausea or vomiting, Starting Wed 7/8/2020, Normal         CONTINUE these medications which have NOT CHANGED    Details   acetaminophen (TYLENOL) 325 mg tablet Take 650 mg by mouth every 6 (six) hours as needed for mild pain, Historical Med      amoxicillin (AMOXIL) 875 mg tablet Take 1 tablet (875 mg total) by mouth 2 (two) times a day for 5 days, Starting Mon 7/6/2020, Until Sat 7/11/2020, Normal      ascorbic acid (VITAMIN C) 500 mg tablet Take 500 mg by mouth daily, Historical Med      famotidine (PEPCID) 20 mg tablet Take 1 tablet (20 mg total) by mouth 2 (two) times a day, Starting Thu 1/30/2020, Until Wed 3/11/2020, Normal      ferrous sulfate 325 (65 Fe) mg tablet Take 325 mg by mouth daily with breakfast, Historical Med      Prenatal Vit-Fe Fumarate-FA (PRENATAL VITAMIN PO) Take by mouth, Historical Med           No discharge procedures on file      PDMP Review     None          ED Provider  Electronically Signed by           Serjio Menchaca PA-C  07/11/20 3792

## 2020-07-10 LAB
C TRACH DNA SPEC QL NAA+PROBE: NEGATIVE
N GONORRHOEA DNA SPEC QL NAA+PROBE: NEGATIVE

## 2020-07-28 ENCOUNTER — TELEPHONE (OUTPATIENT)
Dept: OBGYN CLINIC | Facility: CLINIC | Age: 40
End: 2020-07-28

## 2020-07-28 NOTE — TELEPHONE ENCOUNTER
----- Message from Jenny Vela sent at 7/28/2020 10:19 AM EDT -----  Regarding: missed OB appt  Pt was to be seen for her Initail OB today but she did not show,  A message was left of her missed appt

## 2020-07-30 NOTE — TELEPHONE ENCOUNTER
Please send a certified letter asking pt to contact us if she wishes to continue her care with our office  Thank you!

## 2021-09-17 ENCOUNTER — TELEPHONE (OUTPATIENT)
Dept: BARIATRICS | Facility: CLINIC | Age: 41
End: 2021-09-17

## 2021-09-17 NOTE — TELEPHONE ENCOUNTER
----- Message from Alna Stratton RN sent at 2021  7:25 AM EDT -----  Regardin year f/u appointment  Please contact patient to schedule a 2 year follow up appointment    Thank You

## 2022-04-06 ENCOUNTER — DOCUMENTATION (OUTPATIENT)
Dept: GASTROENTEROLOGY | Facility: CLINIC | Age: 42
End: 2022-04-06

## 2022-04-06 ENCOUNTER — OFFICE VISIT (OUTPATIENT)
Dept: GASTROENTEROLOGY | Facility: CLINIC | Age: 42
End: 2022-04-06
Payer: MEDICARE

## 2022-04-06 ENCOUNTER — TELEPHONE (OUTPATIENT)
Dept: GASTROENTEROLOGY | Facility: CLINIC | Age: 42
End: 2022-04-06

## 2022-04-06 VITALS
WEIGHT: 249 LBS | HEIGHT: 66 IN | OXYGEN SATURATION: 99 % | DIASTOLIC BLOOD PRESSURE: 72 MMHG | TEMPERATURE: 98.4 F | HEART RATE: 80 BPM | SYSTOLIC BLOOD PRESSURE: 128 MMHG | BODY MASS INDEX: 40.02 KG/M2

## 2022-04-06 DIAGNOSIS — A04.8 H. PYLORI INFECTION: ICD-10-CM

## 2022-04-06 DIAGNOSIS — R10.13 EPIGASTRIC PAIN: ICD-10-CM

## 2022-04-06 DIAGNOSIS — K59.09 OTHER CONSTIPATION: ICD-10-CM

## 2022-04-06 DIAGNOSIS — R11.2 NAUSEA AND VOMITING IN ADULT: ICD-10-CM

## 2022-04-06 DIAGNOSIS — K59.09 OTHER CONSTIPATION: Primary | ICD-10-CM

## 2022-04-06 PROCEDURE — 99245 OFF/OP CONSLTJ NEW/EST HI 55: CPT | Performed by: PHYSICIAN ASSISTANT

## 2022-04-06 RX ORDER — LINACLOTIDE 145 UG/1
145 CAPSULE, GELATIN COATED ORAL DAILY
Qty: 30 CAPSULE | Refills: 3 | Status: SHIPPED | OUTPATIENT
Start: 2022-04-06 | End: 2022-04-06

## 2022-04-06 RX ORDER — OMEPRAZOLE 40 MG/1
40 CAPSULE, DELAYED RELEASE ORAL DAILY
Qty: 30 CAPSULE | Refills: 3 | Status: SHIPPED | OUTPATIENT
Start: 2022-04-06 | End: 2022-04-15 | Stop reason: SDUPTHER

## 2022-04-06 RX ORDER — LINACLOTIDE 145 UG/1
145 CAPSULE, GELATIN COATED ORAL DAILY
Qty: 30 CAPSULE | Refills: 3 | Status: SHIPPED | OUTPATIENT
Start: 2022-04-06 | End: 2022-06-17

## 2022-04-06 NOTE — PROGRESS NOTES
[facility] - Outpatient Consultation  Jose Raul Aldrich 37 y o  female MRN: [id number]  Encounter: [id number]        Assessment and Plan    1  Upper abdominal pain  2  Nausea and vomiting  3  History of Karmen-en-Y gastric bypass  4  Anastomotic ulcer    5  History of H pylori infection s/p treatment   The patient has a history of sleeve gastrectomy in 2009 which was converted to a Karmen-en-Y gastric bypass in 2018 due to worsening GERD, pouchitis, and anastomotic ulcer  The patient did have an EGD 8/18/21 revealed another anastomotic ulcer and most recent EGD 3/23/22 was normal  She is currently on pantoprazole 40mg once daily and as needed carafate  Despite this, she has nausea, vomiting, and upper abdominal pain in a bandlike fashion  She denies any dysphagia, odynophagia, severe epigastric pain, or melena  - stop pantoprazole, start omeprazole  - continue Carafate as needed  - constipation control as below   - will perform EGD with below mentioned colonoscopy     6  Constipation   The patient is currently having 1 bowel movement a week  This is associated with a lot of lower abdominal pain  She has tried and failed all over-the-counter options include MiraLax, stool softeners, fiber, magnesium citrate, and was, and suppositories  The patient did have an elevated TTG IgA with subsequent EGD that obtained small bowel biopsies however I am unable to review the results of this  CT scans have revealed large stool burden and CT a/p 1/26/22 without contrast also revealed multiple conspicuous pericecal lymph nodes, without tania lymph node enlargement  She had an attempted colonoscopy 3/23/22 however this was aborted due to stool burden    - obtain pathology report from her small bowel biopsy from EGD 3/23/22  - start Linzess 145 mcg once daily  - colonoscopy once constipation controlled, may require 2 day prep     Follow up in 2-3 months ______________________________________________________________________    History of Present Illness  Pieter Cadet is a 37 y o  female here for consultation of abdominal pain  She has a history of sleeve gastrectomy in 2009 converted to a Karmen-en-Y gastric bypass in 2018 due to worsening GERD with inflammation of the pouch and anastomotic ulcer  The patient states that she mainly has lower abdominal pain  This is associated with constipation  The patient states that she typically has 1 bowel movement per week  She has tried and failed all over-the-counter options include MiraLax, stool softeners, fiber, magnesium citrate, and was, and suppositories  She denies any blood or black in her stool  She does also have some upper GI symptoms such as nausea, vomiting, and upper abdominal pain in a bandlike fashion  The patient was previously established with [facility] [place] and presents today for a 2nd opinion  Her workup thus far is as below:    Upper GI 5/2021: Expected changes following gastric bypass    CT 5/21/21 with oral contrast: Postoperative changes status post gastric bypass procedure  No bowel  obstruction, bowel thickening, free fluid, fluid collection, inflammatory change or obstructive uropathy is demonstrated    EGD 6/3/21 normal     CT a/p 8/17/21 without contrast: No significant abnormality identified    EGD 8/18/21 two small superficial ulcer at distal anastomosis edge, stomach biopsy negative for h pylori     CT a/p 9/7/21 without contrast: Moderate to large stool in the colon  No bowel obstruction  Evidence of gastric bypass surgery  CT a/p 11/22/21 without contrast: No evidence of hydronephrosis, hydroureter, or of renal or ureteral   calculi  No bowel obstruction or free fluid  Postoperative changes       CT a/p 1/26/22 without contrast: Mild small bowel ileus, incidental note is made of multiple conspicuous pericecal lymph nodes, without tania lymph node enlargement     CRP 22 10 8 (high)    IgA 22 401 (normal)  TTG IgA 22 >20 (high)    EGD 3/23/22 normal, no residual ulceration with areas of healing tissue identified  Duodenal biopsy obtained but not available for my review  Colonoscopy 3/23/22 unable to complete due to solid stool in the rectum/distal colon    I am unable to view any more records however the patient states that her upper endoscopy in August of last year was not her 1st upper endoscopy with ulcers  She has had previous ulcer disease from h pylori as well as after her first bypass (sleeve) prior to revision  Review of Systems   Constitutional: Negative for activity change, appetite change, chills, fatigue, fever and unexpected weight change  HENT: Negative for sore throat and trouble swallowing  Respiratory: Negative for cough and choking  Gastrointestinal: Positive for abdominal pain, constipation, nausea and vomiting  Negative for diarrhea  Musculoskeletal: Negative for back pain and gait problem  Psychiatric/Behavioral: Negative for confusion         Past Medical History  Past Medical History:   Diagnosis Date    Abdominal pain     "almost constant"    Anemia     Anesthesia     "woke up swinging with last  2018  "was fine with EGD"    Back pain     Bariatric surgery status     2008-gastric sleeve revison RUEN-Y 2019-abd painnow-dx lap today 3/9/2020    Constipation     Dental bridge present     right lower permanent    Diarrhea     Difficulty swallowing     "in the past"    Disease of thyroid gland     not on meds now    Dizzy     Fatigue     "when blood pressure low" and weakness too"    GERD (gastroesophageal reflux disease)     "increase after surgery"    Heart murmur     heart murmer, work up negative with holter monitor    History of 2  sections     most recent 17    History of D&C 2019    History of iron deficiency     anemia/ had IV infusions through pregnancy in [id number]  History of transfusion     2019 - pt had allergic reaction - had to stop the blood    Inguinal hernia     right    Loss of appetite     Low BP     "off and on"    Migraine     N&V (nausea and vomiting)     " a little better since on meds"    Non-intractable vomiting     "not since been on medicine"    Palpitations     "having again"    Postgastrectomy malabsorption     Stomach pain        Past Social history  Past Surgical History:   Procedure Laterality Date     SECTION      x2    CHOLECYSTECTOMY      CHROMOSOME ANALYSIS, PRODUCTS OF CONCEPTION (HISTORICAL)      2 miscarriages in  and Nov    LAPAROSCOPY N/A 3/9/2020    Procedure: DIAGNOSTIC LAPAROSCOPY,CLOSURE OF COATES DEFECT, INTRAOP EGD;  Surgeon: Phil Pepper MD;  Location: [facility];  Service: Bariatrics    OVARIAN CYST REMOVAL Right     VT EGD TRANSORAL BIOPSY SINGLE/MULTIPLE N/A 2019    Procedure: ESOPHAGOGASTRODUODENOSCOPY (EGD) with bx;  Surgeon: Phil Pepper MD;  Location: [facility];   Service: Bariatrics    VT LAP GASTRIC BYPASS/SREE-EN-Y N/A 2019    Procedure: LAP SREE-EN-Y GASTRIC BYPASS, INTRAOP EGD;  Surgeon: Phil Pepper MD;  [facility];  Service: Stella ABORTN,1ST TRI N/A 2019    Procedure: DILATATION AND EVACUATION (D&E) (8 weeks) missed ab;  Surgeon: Isidro De La Rosa MD;  [facility]: [facility];  Service: Gynecology    REVISION BYPASS LAPAROSCOPIC N/A 2019    Procedure: LAP REVISION/ CONVERSION;  Surgeon: Phil Pepper MD;  [facility];  Service: Tyson Peel SALPINGOOPHORECTOMY Right 2018    SLEEVE GASTROPLASTY       Social History     Socioeconomic History    Marital status: /Civil Union     Spouse name: Not on file    Number of children: Not on file    Years of education: Not on file    Highest education level: Not on file   Occupational History    Not on file   Tobacco Use    Smoking status: Never Smoker    Smokeless tobacco: Never Used   Vaping Use    Vaping Use: Never used   Substance and Sexual Activity    Alcohol use: Not Currently    Drug use: No    Sexual activity: Yes     Partners: Male   Other Topics Concern    Not on file   Social History Narrative    Not on file     Social Determinants of Health     Financial Resource Strain: Not on file   Food Insecurity: Not on file   Transportation Needs: Not on file   Physical Activity: Not on file   Stress: Not on file   Social Connections: Not on file   Intimate Partner Violence: Not on file   Housing Stability: Not on file     Social History     Substance and Sexual Activity   Alcohol Use Not Currently     Social History     Substance and Sexual Activity   Drug Use No     Social History     Tobacco Use   Smoking Status Never Smoker   Smokeless Tobacco Never Used       Past Family History  Family History   Problem Relation Age of Onset    Hypertension Mother     Heart disease Mother     No Known Problems Father        Current Medications  Current Outpatient Medications   Medication Sig Dispense Refill    acetaminophen (TYLENOL) 325 mg tablet Take 650 mg by mouth every 6 (six) hours as needed for mild pain      ascorbic acid (VITAMIN C) 500 mg tablet Take 500 mg by mouth daily      famotidine (PEPCID) 20 mg tablet Take 1 tablet (20 mg total) by mouth 2 (two) times a day (Patient taking differently: Take 20 mg by mouth as needed ) 60 tablet 0    ferrous sulfate 325 (65 Fe) mg tablet Take 325 mg by mouth daily with breakfast      meclizine (ANTIVERT) 25 mg tablet Take 1 tablet (25 mg total) by mouth 3 (three) times a day as needed for dizziness 30 tablet 0    ondansetron (ZOFRAN-ODT) 4 mg disintegrating tablet Take 1 tablet (4 mg total) by mouth every 6 (six) hours as needed for nausea or vomiting 20 tablet 0    Prenatal Vit-Fe Fumarate-FA (PRENATAL VITAMIN PO) Take by mouth       No current facility-administered medications for this visit  Allergies  Allergies   Allergen Reactions    Aspirin Anaphylaxis    Shellfish-Derived Products - Food Allergy Anaphylaxis    Contrast [Iodinated Diagnostic Agents] Itching and Swelling    Ibuprofen Edema    Reglan [Metoclopramide] Itching and Confusion         The following portions of the patient's history were reviewed and updated as appropriate: allergies, current medications, past medical history, past social history, past surgical history and problem list       Vitals  There were no vitals filed for this visit  Physical Exam  Constitutional   General appearance: Patient is seated and in no acute distress, well appearing and well nourished  Head and Face   Head and face: Normal     Eyes   Conjunctiva and lids: No erythema, swelling or discharge  Anicteric  Ears, Nose, Mouth, and Throat   Hearing: Normal     Neck: Supple, trachea midline  Pulmonary   Respiratory effort: No increased work of breathing or signs of respiratory distress  Lungs: Clear to ascultation, no wheezes, rhonchi, or rales  Cardiovascular   Heart: Regular rate and rhythm, no murmurs gallops or rubs   Examination of extremities for edema and/or varicosities: Normal     Abdomen   Abdomen: Soft, non-tender, no masses, no organomegaly  Normal bowel sounds  Musculoskeletal   Gait and station: Normal     Skin   Skin and subcutaneous tissue: Warm, dry, and intact  No visible jaundice, lesions or rashes  Psychiatric   Judgment and insight: Normal  Recent and remote memory:  Normal  Mood and affect: Normal      Results  No visits with results within 1 Day(s) from this visit     Latest known visit with results is:   Admission on 07/08/2020, Discharged on 07/08/2020   Component Date Value    N gonorrhoeae, DNA Probe 07/08/2020 Negative     Chlamydia trachomatis, D* 07/08/2020 Negative     Color, UA 07/08/2020 Yellow     Clarity, UA 07/08/2020 Clear     pH, UA 07/08/2020 5 5     Leukocytes, UA 07/08/2020 Negative     Nitrite, UA 07/08/2020 Negative     Protein, UA 07/08/2020 Negative     Glucose, UA 07/08/2020 Negative     Ketones, UA 07/08/2020 Negative     Urobilinogen, UA 07/08/2020 0 2     Bilirubin, UA 07/08/2020 Negative     Blood, UA 07/08/2020 Negative     Specific Gravity, UA 07/08/2020 1 020     Urine Culture 07/08/2020 <10,000 cfu/ml      WBC 07/08/2020 7 49     RBC 07/08/2020 3 92     Hemoglobin 07/08/2020 12 4     Hematocrit 07/08/2020 37 8     MCV 07/08/2020 96     MCH 07/08/2020 31 6     MCHC 07/08/2020 32 8     RDW 07/08/2020 12 8     MPV 07/08/2020 10 1     Platelets 99/13/6217 208     nRBC 07/08/2020 0     Neutrophils Relative 07/08/2020 52     Immat GRANS % 07/08/2020 0     Lymphocytes Relative 07/08/2020 37     Monocytes Relative 07/08/2020 9     Eosinophils Relative 07/08/2020 2     Basophils Relative 07/08/2020 0     Neutrophils Absolute 07/08/2020 3 92     Immature Grans Absolute 07/08/2020 0 01     Lymphocytes Absolute 07/08/2020 2 75     Monocytes Absolute 07/08/2020 0 64     Eosinophils Absolute 07/08/2020 0 15     Basophils Absolute 07/08/2020 0 02     Sodium 07/08/2020 136     Potassium 07/08/2020 3 7     Chloride 07/08/2020 104     CO2 07/08/2020 26     ANION GAP 07/08/2020 6     BUN 07/08/2020 9     Creatinine 07/08/2020 0 64     Glucose 07/08/2020 83     Calcium 07/08/2020 8 3     AST 07/08/2020 19     ALT 07/08/2020 15     Alkaline Phosphatase 07/08/2020 67     Total Protein 07/08/2020 7 1     Albumin 07/08/2020 3 1*    Total Bilirubin 07/08/2020 0 31     eGFR 07/08/2020 114     TSH 3RD GENERATON 07/08/2020 1  195     Magnesium 07/08/2020 2 0        Radiology Results  No results found  Orders  No orders of the defined types were placed in this encounter

## 2022-04-11 NOTE — TELEPHONE ENCOUNTER
Patients GI provider:  Dr Roann Phoenix to return call: [phone]     Reason for call: Pt calling requesting to get the status of prior auth for linzess       Scheduled procedure/appointment date if applicable: Apt/procedure 6/17/22

## 2022-04-15 ENCOUNTER — TELEPHONE (OUTPATIENT)
Dept: GASTROENTEROLOGY | Facility: CLINIC | Age: 42
End: 2022-04-15

## 2022-04-15 ENCOUNTER — PREP FOR PROCEDURE (OUTPATIENT)
Dept: GASTROENTEROLOGY | Facility: MEDICAL CENTER | Age: 42
End: 2022-04-15

## 2022-04-15 DIAGNOSIS — R76.8 ELEVATED ANTI-TISSUE TRANSGLUTAMINASE (TTG) IGA LEVEL: Primary | ICD-10-CM

## 2022-04-15 NOTE — TELEPHONE ENCOUNTER
OV 4/6/22 [facility]   Hx: upper abdominal pain, N/V, Gastric bypass, H  Pylori, gallbladder removal  EGD: 3/23/22 (see media tab for results)    Please Advise  On Sunday started having intermittent RUQ to right side abdominal pain followed by emesis throughout the day/night  Pain went away and returned Wednesday, pain now constant, constant nausea, decreased appetite, and heart burn  Pt having diarrhea 2x daily however started linzess yesterday and has hx of diarrhea  Denies fevers, BRBPR, hematemesis, melena, dizziness/lightheaded, or any other appreciating symptoms  Started omeprazole on Monday, but due to symptoms was taking 2 pills in the AM      Recs  1  Please review EGD/biopsy results and adjust medications  Pt has trialed carafate and zofran in the past with relief  2  Consider RUQ US  3  Pt questioning if biopsies for celiac were taken  however I do not see mention of this in report   Per pt her celiac labs were abnormal  Informed a repeat scope may be indicated and if so could biopsy for celiac at that time

## 2022-04-15 NOTE — TELEPHONE ENCOUNTER
Patients GI provider: Dr Christianson Purcell    Number to return call: [phone]    Reason for call: Pt calling stating she has been experiencing sharp upper right abd pain and nausea since Sunday  Scheduled procedure/appointment date if applicable: Appt   6/17/22

## 2022-04-15 NOTE — TELEPHONE ENCOUNTER
Called pt and informed of new scripts sent to pharmacy as well as US ordered  Provided CS#  Reviewed ER precautions  Pt verbalized understanding, agreeable to recs and had no further questions

## 2022-04-15 NOTE — TELEPHONE ENCOUNTER
Called [facility] and informed small bowel biopsies were not taken during EGD as there are no further tissue results than what was provided to office and none are pending at this time

## 2022-04-19 ENCOUNTER — HOSPITAL ENCOUNTER (OUTPATIENT)
Dept: RADIOLOGY | Facility: IMAGING CENTER | Age: 42
Discharge: HOME | End: 2022-04-19
Payer: MEDICARE

## 2022-04-19 DIAGNOSIS — R10.11 RUQ PAIN: ICD-10-CM

## 2022-04-19 PROCEDURE — 76705 ECHO EXAM OF ABDOMEN: CPT

## 2022-04-26 NOTE — TELEPHONE ENCOUNTER
Scheduled date of EGD(as of today):  6/2/22  Physician performing EGD: Dr Lopez Giles  Location of EGD: [facility]  Instructions reviewed with patient by: prep instructions mailed to the patient  Clearances:  n/a

## 2022-06-02 ENCOUNTER — ANESTHESIA EVENT (OUTPATIENT)
Dept: GASTROENTEROLOGY | Facility: HOSPITAL | Age: 42
End: 2022-06-02

## 2022-06-02 ENCOUNTER — ANESTHESIA (OUTPATIENT)
Dept: GASTROENTEROLOGY | Facility: HOSPITAL | Age: 42
End: 2022-06-02

## 2022-06-02 ENCOUNTER — HOSPITAL ENCOUNTER (OUTPATIENT)
Dept: GASTROENTEROLOGY | Facility: HOSPITAL | Age: 42
Setting detail: OUTPATIENT SURGERY
Discharge: HOME | End: 2022-06-02
Attending: PHYSICIAN ASSISTANT | Admitting: STUDENT IN AN ORGANIZED HEALTH CARE EDUCATION/TRAINING PROGRAM
Payer: MEDICARE

## 2022-06-02 VITALS
OXYGEN SATURATION: 96 % | TEMPERATURE: 98.2 F | WEIGHT: 220 LBS | HEIGHT: 65 IN | HEART RATE: 70 BPM | BODY MASS INDEX: 36.65 KG/M2 | RESPIRATION RATE: 20 BRPM | DIASTOLIC BLOOD PRESSURE: 66 MMHG | SYSTOLIC BLOOD PRESSURE: 142 MMHG

## 2022-06-02 DIAGNOSIS — R76.8 ELEVATED ANTI-TISSUE TRANSGLUTAMINASE (TTG) IGA LEVEL: ICD-10-CM

## 2022-06-02 LAB
EXT PREGNANCY TEST URINE: NEGATIVE
EXT. CONTROL: NORMAL

## 2022-06-02 PROCEDURE — 81025 URINE PREGNANCY TEST: CPT | Performed by: STUDENT IN AN ORGANIZED HEALTH CARE EDUCATION/TRAINING PROGRAM

## 2022-06-02 PROCEDURE — 44361 SMALL BOWEL ENDOSCOPY/BIOPSY: CPT | Performed by: STUDENT IN AN ORGANIZED HEALTH CARE EDUCATION/TRAINING PROGRAM

## 2022-06-02 PROCEDURE — 88305 TISSUE EXAM BY PATHOLOGIST: CPT | Performed by: PATHOLOGY

## 2022-06-02 RX ORDER — LIDOCAINE HYDROCHLORIDE 10 MG/ML
INJECTION, SOLUTION EPIDURAL; INFILTRATION; INTRACAUDAL; PERINEURAL AS NEEDED
Status: DISCONTINUED | OUTPATIENT
Start: 2022-06-02 | End: 2022-06-02

## 2022-06-02 RX ORDER — SODIUM CHLORIDE 9 MG/ML
125 INJECTION, SOLUTION INTRAVENOUS CONTINUOUS
Status: DISCONTINUED | OUTPATIENT
Start: 2022-06-02 | End: 2022-06-06 | Stop reason: HOSPADM

## 2022-06-02 RX ORDER — PROPOFOL 10 MG/ML
INJECTION, EMULSION INTRAVENOUS AS NEEDED
Status: DISCONTINUED | OUTPATIENT
Start: 2022-06-02 | End: 2022-06-02

## 2022-06-02 RX ADMIN — PROPOFOL 50 MG: 10 INJECTION, EMULSION INTRAVENOUS at 11:44

## 2022-06-02 RX ADMIN — PROPOFOL 50 MG: 10 INJECTION, EMULSION INTRAVENOUS at 11:42

## 2022-06-02 RX ADMIN — PROPOFOL 60 MG: 10 INJECTION, EMULSION INTRAVENOUS at 11:40

## 2022-06-02 RX ADMIN — SODIUM CHLORIDE 125 ML/HR: 0.9 INJECTION, SOLUTION INTRAVENOUS at 11:37

## 2022-06-02 RX ADMIN — LIDOCAINE HYDROCHLORIDE 100 MG: 10 INJECTION, SOLUTION EPIDURAL; INFILTRATION; INTRACAUDAL; PERINEURAL at 11:39

## 2022-06-02 RX ADMIN — PROPOFOL 140 MG: 10 INJECTION, EMULSION INTRAVENOUS at 11:39

## 2022-06-02 NOTE — ANESTHESIA POSTPROCEDURE EVALUATION
Post-Op Assessment Note    CV Status:  Stable    Pain management: satisfactory to patient     Mental Status:  Alert and awake   Hydration Status:  Euvolemic   PONV Controlled:  Controlled   Airway Patency:  Patent      Post Op Vitals Reviewed: Yes      Staff: CRNA         No complications documented.     /66 (06/02/22 1200)    Temp 98.2 °F (36.8 °C) (06/02/22 1200)    Pulse 63 (06/02/22 1200)   Resp 18 (06/02/22 1200)    SpO2 96 % (06/02/22 1200)

## 2022-06-17 ENCOUNTER — OFFICE VISIT (OUTPATIENT)
Dept: GASTROENTEROLOGY | Facility: CLINIC | Age: 42
End: 2022-06-17
Payer: MEDICARE

## 2022-06-17 VITALS
HEIGHT: 65 IN | TEMPERATURE: 98 F | BODY MASS INDEX: 40.65 KG/M2 | OXYGEN SATURATION: 97 % | SYSTOLIC BLOOD PRESSURE: 122 MMHG | RESPIRATION RATE: 18 BRPM | WEIGHT: 244 LBS | HEART RATE: 72 BPM | DIASTOLIC BLOOD PRESSURE: 72 MMHG

## 2022-06-17 DIAGNOSIS — Z98.84 S/P LAPAROSCOPIC SLEEVE GASTRECTOMY: ICD-10-CM

## 2022-06-17 DIAGNOSIS — R10.9 ABDOMINAL PAIN, UNSPECIFIED ABDOMINAL LOCATION: ICD-10-CM

## 2022-06-17 DIAGNOSIS — Z98.84 BARIATRIC SURGERY STATUS: Chronic | ICD-10-CM

## 2022-06-17 DIAGNOSIS — K58.1 IRRITABLE BOWEL SYNDROME WITH CONSTIPATION: Primary | ICD-10-CM

## 2022-06-17 PROCEDURE — 99214 OFFICE O/P EST MOD 30 MIN: CPT | Performed by: INTERNAL MEDICINE

## 2022-06-17 RX ORDER — OMEPRAZOLE 40 MG/1
40 CAPSULE, DELAYED RELEASE ORAL 2 TIMES DAILY
Qty: 60 CAPSULE | Refills: 2 | Status: SHIPPED | OUTPATIENT
Start: 2022-06-17 | End: 2022-07-17

## 2022-06-17 RX ORDER — LINACLOTIDE 290 UG/1
290 CAPSULE, GELATIN COATED ORAL DAILY
Qty: 30 CAPSULE | Refills: 3 | Status: SHIPPED | OUTPATIENT
Start: 2022-06-17 | End: 2022-07-17

## 2022-06-17 NOTE — PROGRESS NOTES
[facility] - Outpatient Consultation  Kenan Grade 37 y o  female MRN: [id number]  Encounter: [id number]          ASSESSMENT AND PLAN:      1  Irritable bowel syndrome with constipation  - linaCLOtide (Linzess) 290 MCG CAPS; Take 1 capsule by mouth in the morning  Dispense: 30 capsule; Refill: 3  2  Abdominal pain, unspecified abdominal location  - omeprazole (PriLOSEC) 40 MG capsule; Take 1 capsule (40 mg total) by mouth 2 (two) times a day  Dispense: 60 capsule; Refill: 2  3  Bariatric surgery status  4  S/P laparoscopic sleeve gastrectomy    50-year-old female status post sleeve gastrectomy converted to Karmen-en-Y gastric bypass, presenting for follow-up regarding persistent abdominal pain and severe constipation  I do think her abdominal pain is multifactorial and may be secondary to functional dyspepsia, food sensitivities, possible adhesive disease given prior gastric surgeries, as well as discomfort from stool burden  At this time we will trial increasing omeprazole to twice daily and consider adding additional HS Pepcid if she continues to have upper abdominal burning  If there is no relief despite maximal PPI therapy, could consider pH impedance testing  Unfortunately she would not be a good candidate for Elavil as she has such significant constipation  Will also try increasing Linzess to 290 mcg given that she continues to have significant constipation at the dose of 145  She previously had some improvement when she had tried to eliminate gluten from her diet  I have encouraged her to resume this elimination as there may be a component of non celiac gluten sensitivity to her symptoms  Follow-up in 2-3 months   ______________________________________________________________________    HPI:  Gabriel Costa is a 37 y o  female here for follow up regarding abdominal pain and constipation       Summary of HPI: She was initially seen by our group in 4/2022  She has a history of sleeve gastrectomy in 2009 converted to a Karmen-en-Y gastric bypass in 2018 due to worsening GERD with inflammation of the pouch and anastomotic ulcer  The patient states that she mainly has lower abdominal pain  This is associated with constipation  The patient states that she typically has 1 bowel movement per week  She has tried and failed all over-the-counter options include MiraLax, stool softeners, fiber, magnesium citrate, and was, and suppositories  She denies any blood or black in her stool  She does also have some upper GI symptoms such as nausea, vomiting, and upper abdominal pain in a bandlike fashion  The patient was previously established with [facility] and presents today for a 2nd opinion  She has had an extensive evaluation thus far with multiple imaging studies and multiple upper endoscopies     Most recently:   EGD 3/23/22 normal, no residual ulceration with areas of healing tissue identified  Duodenal biopsy obtained but not available for my review  Colonoscopy 3/23/22 unable to complete due to solid stool in the rectum/distal colon    She had a mild elevation in TTG IgA at 20, thus a repeat EGD was completed    6/2/22  This was an unremarkable study and biopsies were negative for celiac disease  Since that visit she continues to have significant generalized abdominal pain worse in the upper abdomen  She has been compliant with omeprazole daily and despite this continues to have symptoms  She is taking the Linzess as prescribed at 145 mcg and continues to have significant constipation requiring her to take over-the-counter cleanse supplements to help her move her bowels  REVIEW OF SYSTEMS:    CONSTITUTIONAL: Denies any fever, chills, rigors, and weight loss  HEENT: Denies odynophagia, tinnitus  CARDIOVASCULAR: No chest pain or palpitations     RESPIRATORY: Denies any cough, hemoptysis, shortness of breath or dyspnea on exertion  GASTROINTESTINAL: As noted in the History of Present Illness  GENITOURINARY: No problems with urination  Denies any hematuria or dysuria  NEUROLOGIC: No dizziness or vertigo, denies headaches  MUSCULOSKELETAL: Denies any muscle or joint pain  SKIN: Denies skin rashes or itching  ENDOCRINE:  Denies intolerance to heat or cold  PSYCHOSOCIAL: Denies depression or anxiety  Denies any recent memory loss         Historical Information   Past Medical History:   Diagnosis Date    Abdominal pain     "almost constant"    Anemia     Anesthesia     "woke up swinging with last  2018  "was fine with EGD"    Back pain     Bariatric surgery status     2008-gastric sleeve revison RUEN-Y 2019-abd painnow-dx lap today 3/9/2020    Constipation     Dental bridge present     right lower permanent    Diarrhea     Difficulty swallowing     "in the past"    Disease of thyroid gland     not on meds now    Dizzy     Fatigue     "when blood pressure low" and weakness too"    GERD (gastroesophageal reflux disease)     "increase after surgery"    Heart murmur     heart murmer, work up negative with holter monitor    History of 2  sections     most recent 17    History of D&C 2019    History of iron deficiency     anemia/ had IV infusions through pregnancy in [id number]    History of transfusion     2019 - pt had allergic reaction - had to stop the blood    Inguinal hernia     right    Loss of appetite     Low BP     "off and on"    Migraine     N&V (nausea and vomiting)     " a little better since on meds"    Non-intractable vomiting     "not since been on medicine"    Palpitations     "having again"    Postgastrectomy malabsorption     Stomach pain      Past Surgical History:   Procedure Laterality Date     SECTION      x2    CHOLECYSTECTOMY      CHROMOSOME ANALYSIS, PRODUCTS OF CONCEPTION (HISTORICAL)      2 miscarriages in  and Nov    LAPAROSCOPY N/A 3/9/2020    Procedure: DIAGNOSTIC LAPAROSCOPY,CLOSURE OF COATES DEFECT, INTRAOP EGD;  Surgeon: Yumiko Weir MD;  Location: [facility];  Service: Bariatrics    OVARIAN CYST REMOVAL Right 2018    NH EGD TRANSORAL BIOPSY SINGLE/MULTIPLE N/A 1/30/2019    Procedure: ESOPHAGOGASTRODUODENOSCOPY (EGD) with bx;  Surgeon: Yumiko Weir MD;  Location: [facility];   Service: Bariatrics    NH LAP GASTRIC BYPASS/SREE-EN-Y N/A 4/8/2019    Procedure: LAP SREE-EN-Y GASTRIC BYPASS, INTRAOP EGD;  Surgeon: Yumiko Weir MD;  [facility];  Service: Stella ISAAC,1ST TRI N/A 9/25/2019    Procedure: DILATATION AND EVACUATION (D&E) (8 weeks) missed ab;  Surgeon: Violeta Petit MD;  [facility]: [facility];  Service: Gynecology    REVISION BYPASS LAPAROSCOPIC N/A 4/8/2019    Procedure: LAP REVISION/ CONVERSION;  Surgeon: Yumiko Weir MD;  [facility];  Service: Bariatrics    SALPINGOOPHORECTOMY Right 09/2018    SLEEVE GASTROPLASTY       Social History   Social History     Substance and Sexual Activity   Alcohol Use Not Currently     Social History     Substance and Sexual Activity   Drug Use No     Social History     Tobacco Use   Smoking Status Never Smoker   Smokeless Tobacco Never Used     Family History   Problem Relation Age of Onset    Hypertension Mother     Heart disease Mother     No Known Problems Father        Meds/Allergies       Current Outpatient Medications:     acetaminophen (TYLENOL) 325 mg tablet    ascorbic acid (VITAMIN C) 500 mg tablet    ferrous sulfate 325 (65 Fe) mg tablet    linaCLOtide (Linzess) 290 MCG CAPS    meclizine (ANTIVERT) 25 mg tablet    omeprazole (PriLOSEC) 40 MG capsule    ondansetron (ZOFRAN-ODT) 4 mg disintegrating tablet    Prenatal Vit-Fe Fumarate-FA (PRENATAL VITAMIN PO)    sucralfate (CARAFATE) 1 g/10 mL suspension    famotidine (PEPCID) 20 mg tablet    Allergies   Allergen Reactions    Aspirin Anaphylaxis    Shellfish-Derived Products - Food Allergy Anaphylaxis    Contrast [Iodinated Diagnostic Agents] Itching and Swelling    Ibuprofen Edema    Reglan [Metoclopramide] Itching and Confusion           Objective     Blood pressure 122/72, pulse 72, temperature 98 °F (36 7 °C), temperature source Tympanic, resp  rate 18, height 5' 5" (1 651 m), weight 111 kg (244 lb), SpO2 97 %, unknown if currently breastfeeding  Body mass index is 40 6 kg/m²  PHYSICAL EXAM:      General Appearance:   Alert, cooperative, no distress   HEENT:   Normocephalic, atraumatic, anicteric  Neck:  Supple, symmetrical, trachea midline   Lungs:   Clear to auscultation bilaterally; no rales, rhonchi or wheezing; respirations unlabored    Heart[de-identified]   Regular rate and rhythm; no murmur, rub, or gallop  Abdomen:   Soft, non-tender, non-distended; normal bowel sounds; no masses, no organomegaly    Genitalia:   Deferred    Rectal:   Deferred    Extremities:  No cyanosis, clubbing or edema    Pulses:  2+ and symmetric    Skin:  No jaundice, rashes, or lesions          Lab Results:   No visits with results within 1 Day(s) from this visit     Latest known visit with results is:   Hospital Outpatient Visit on 06/02/2022   Component Date Value    Case Report 06/02/2022                      Value:Surgical Pathology Report                         Case: [id number]                                   Authorizing Provider:  Minal Hong MD          Collected:           06/02/2022 1141              Ordering Location:     [facility] Received:            06/02/2022 1250                                     Heart Endoscopy                                                              Pathologist:           Boone George MD                                                       Specimens:   A) - Jejunum, jejunal bx, r/o celiac                                                                B) - Stomach, gastric pouch bx, r/o h pylori  Final Diagnosis 06/02/2022                      Value: This result contains rich text formatting which cannot be displayed here   Note 06/02/2022                      Value: This result contains rich text formatting which cannot be displayed here   Additional Information 06/02/2022                      Value: This result contains rich text formatting which cannot be displayed here  Massimo Mansfield Gross Description 06/02/2022                      Value: This result contains rich text formatting which cannot be displayed here   EXT Preg Test, Ur 06/02/2022 Negative     Control 06/02/2022 Valid          Radiology Results:   EGD    Result Date: 6/2/2022  Narrative: [facility] [address] [place] [facility]\"" [id number] [phone] DATE OF SERVICE: 6/02/22 PHYSICIAN(S): Attending: Edis Ramsay MD Fellow: No Staff Documented INDICATION: Elevated anti-tissue transglutaminase (tTG) IgA level POST-OP DIAGNOSIS: See the impression below  PREPROCEDURE: Informed consent was obtained for the procedure, including sedation  Risks of perforation, hemorrhage, adverse drug reaction and aspiration were discussed  The patient was placed in the left lateral decubitus position  Patient was explained about the risks and benefits of the procedure  Risks including but not limited to bleeding, infection, and perforation were explained in detail  Also explained about less than 100% sensitivity with the exam and other alternatives  DETAILS OF PROCEDURE: Patient was taken to the procedure room where a time out was performed to confirm correct patient and correct procedure  The patient underwent monitored anesthesia care, which was administered by an anesthesia professional  The patient's blood pressure, heart rate, level of consciousness, respirations, oxygen and ETCO2 were monitored throughout the procedure  The scope was advanced to the jejunum   The patient experienced no blood loss  The procedure was not difficult  The patient tolerated the procedure well  There were no apparent complications  ANESTHESIA INFORMATION: ASA: III Anesthesia Type: IV Sedation with Anesthesia MEDICATIONS: sodium chloride 0 9 % infusion Not documented*  *Total volume has not been documented  View each administration to see the amount administered  (Totals for administrations occurring from 1135 to 1154 on 06/02/22) FINDINGS: The gastrojejunal anastomosis and jejunum appeared normal  Performed random biopsy using biopsy forceps to rule out celiac disease  The gastric pouch appeared normal  Performed random biopsy using biopsy forceps to rule out H  pylori  The esophagus appeared normal  Z-line is 39 cm from the incisors  SPECIMENS: ID Type Source Tests Collected by Time Destination 1 : jejunal bx, r/o celiac  Tissue Jejunum TISSUE EXAM Lisa Arango MD 6/2/2022 11:41 AM  2 : gastric pouch bx, r/o h pylori  Tissue Stomach TISSUE EXAM Lisa Arango MD 6/2/2022 11:44 AM      Impression: The gastrojejunal anastomosis and jejunum appeared normal  Performed random biopsy using biopsy forceps to rule out celiac disease  The gastric pouch appeared normal  Performed random biopsy using biopsy forceps to rule out H  pylori  The esophagus appeared normal  Z-line is 39 cm from the incisors   RECOMMENDATION: Await pathology results   Lisa Arango MD

## 2022-07-17 ENCOUNTER — HOSPITAL ENCOUNTER (EMERGENCY)
Facility: HOSPITAL | Age: 42
Discharge: HOME | End: 2022-07-17
Attending: EMERGENCY MEDICINE | Admitting: EMERGENCY MEDICINE
Payer: MEDICARE

## 2022-07-17 VITALS
OXYGEN SATURATION: 97 % | BODY MASS INDEX: 40.65 KG/M2 | HEART RATE: 72 BPM | TEMPERATURE: 98.6 F | WEIGHT: 244.27 LBS | RESPIRATION RATE: 16 BRPM | SYSTOLIC BLOOD PRESSURE: 112 MMHG | DIASTOLIC BLOOD PRESSURE: 61 MMHG

## 2022-07-17 DIAGNOSIS — R53.83 FATIGUE: ICD-10-CM

## 2022-07-17 DIAGNOSIS — D64.9 ANEMIA: Primary | ICD-10-CM

## 2022-07-17 DIAGNOSIS — R11.0 NAUSEA: ICD-10-CM

## 2022-07-17 DIAGNOSIS — R51.9 HEADACHE: ICD-10-CM

## 2022-07-17 DIAGNOSIS — R00.2 PALPITATIONS: ICD-10-CM

## 2022-07-17 LAB
ABO GROUP BLD: NORMAL
ANION GAP SERPL CALCULATED.3IONS-SCNC: 8 MMOL/L (ref 4–13)
ATRIAL RATE: 82 BPM
BASOPHILS # BLD AUTO: 0.02 THOUSANDS/ΜL (ref 0–0.1)
BASOPHILS NFR BLD AUTO: 0 % (ref 0–1)
BLD GP AB SCN SERPL QL: NEGATIVE
BUN SERPL-MCNC: 10 MG/DL (ref 5–25)
CALCIUM SERPL-MCNC: 8.1 MG/DL (ref 8.3–10.1)
CHLORIDE SERPL-SCNC: 105 MMOL/L (ref 100–108)
CO2 SERPL-SCNC: 29 MMOL/L (ref 21–32)
CREAT SERPL-MCNC: 0.73 MG/DL (ref 0.6–1.3)
EOSINOPHIL # BLD AUTO: 0.11 THOUSAND/ΜL (ref 0–0.61)
EOSINOPHIL NFR BLD AUTO: 1 % (ref 0–6)
ERYTHROCYTE [DISTWIDTH] IN BLOOD BY AUTOMATED COUNT: 18.3 % (ref 11.6–15.1)
GFR SERPL CREATININE-BSD FRML MDRD: 103 ML/MIN/1.73SQ M
GLUCOSE SERPL-MCNC: 87 MG/DL (ref 65–140)
HCT VFR BLD AUTO: 26.4 % (ref 34.8–46.1)
HGB BLD-MCNC: 7.6 G/DL (ref 11.5–15.4)
IMM GRANULOCYTES # BLD AUTO: 0.01 THOUSAND/UL (ref 0–0.2)
IMM GRANULOCYTES NFR BLD AUTO: 0 % (ref 0–2)
LYMPHOCYTES # BLD AUTO: 2.67 THOUSANDS/ΜL (ref 0.6–4.47)
LYMPHOCYTES NFR BLD AUTO: 35 % (ref 14–44)
MAGNESIUM SERPL-MCNC: 1.9 MG/DL (ref 1.6–2.6)
MCH RBC QN AUTO: 19.7 PG (ref 26.8–34.3)
MCHC RBC AUTO-ENTMCNC: 28.8 G/DL (ref 31.4–37.4)
MCV RBC AUTO: 68 FL (ref 82–98)
MONOCYTES # BLD AUTO: 0.75 THOUSAND/ΜL (ref 0.17–1.22)
MONOCYTES NFR BLD AUTO: 10 % (ref 4–12)
NEUTROPHILS # BLD AUTO: 4.09 THOUSANDS/ΜL (ref 1.85–7.62)
NEUTS SEG NFR BLD AUTO: 54 % (ref 43–75)
NRBC BLD AUTO-RTO: 0 /100 WBCS
P AXIS: 76 DEGREES
PLATELET # BLD AUTO: 491 THOUSANDS/UL (ref 149–390)
PMV BLD AUTO: 9.7 FL (ref 8.9–12.7)
POTASSIUM SERPL-SCNC: 3.7 MMOL/L (ref 3.5–5.3)
PR INTERVAL: 144 MS
QRS AXIS: 64 DEGREES
QRSD INTERVAL: 88 MS
QT INTERVAL: 382 MS
QTC INTERVAL: 446 MS
RBC # BLD AUTO: 3.86 MILLION/UL (ref 3.81–5.12)
RH BLD: POSITIVE
SARS-COV-2 RNA RESP QL NAA+PROBE: NEGATIVE
SODIUM SERPL-SCNC: 142 MMOL/L (ref 136–145)
SPECIMEN EXPIRATION DATE: NORMAL
T WAVE AXIS: 45 DEGREES
TSH SERPL DL<=0.05 MIU/L-ACNC: 1.7 UIU/ML (ref 0.45–4.5)
VENTRICULAR RATE: 82 BPM
WBC # BLD AUTO: 7.65 THOUSAND/UL (ref 4.31–10.16)

## 2022-07-17 PROCEDURE — 99291 CRITICAL CARE FIRST HOUR: CPT | Performed by: EMERGENCY MEDICINE

## 2022-07-17 PROCEDURE — P9016 RBC LEUKOCYTES REDUCED: HCPCS

## 2022-07-17 PROCEDURE — 96375 TX/PRO/DX INJ NEW DRUG ADDON: CPT

## 2022-07-17 PROCEDURE — 83735 ASSAY OF MAGNESIUM: CPT | Performed by: EMERGENCY MEDICINE

## 2022-07-17 PROCEDURE — 84443 ASSAY THYROID STIM HORMONE: CPT | Performed by: EMERGENCY MEDICINE

## 2022-07-17 PROCEDURE — 80048 BASIC METABOLIC PNL TOTAL CA: CPT | Performed by: EMERGENCY MEDICINE

## 2022-07-17 PROCEDURE — 96365 THER/PROPH/DIAG IV INF INIT: CPT

## 2022-07-17 PROCEDURE — 86923 COMPATIBILITY TEST ELECTRIC: CPT

## 2022-07-17 PROCEDURE — 93010 ELECTROCARDIOGRAM REPORT: CPT | Performed by: INTERNAL MEDICINE

## 2022-07-17 PROCEDURE — 85025 COMPLETE CBC W/AUTO DIFF WBC: CPT | Performed by: EMERGENCY MEDICINE

## 2022-07-17 PROCEDURE — 86900 BLOOD TYPING SEROLOGIC ABO: CPT | Performed by: EMERGENCY MEDICINE

## 2022-07-17 PROCEDURE — 86850 RBC ANTIBODY SCREEN: CPT | Performed by: EMERGENCY MEDICINE

## 2022-07-17 PROCEDURE — 86901 BLOOD TYPING SEROLOGIC RH(D): CPT | Performed by: EMERGENCY MEDICINE

## 2022-07-17 PROCEDURE — 93005 ELECTROCARDIOGRAM TRACING: CPT

## 2022-07-17 PROCEDURE — U0003 INFECTIOUS AGENT DETECTION BY NUCLEIC ACID (DNA OR RNA); SEVERE ACUTE RESPIRATORY SYNDROME CORONAVIRUS 2 (SARS-COV-2) (CORONAVIRUS DISEASE [COVID-19]), AMPLIFIED PROBE TECHNIQUE, MAKING USE OF HIGH THROUGHPUT TECHNOLOGIES AS DESCRIBED BY CMS-2020-01-R: HCPCS | Performed by: EMERGENCY MEDICINE

## 2022-07-17 PROCEDURE — 99284 EMERGENCY DEPT VISIT MOD MDM: CPT

## 2022-07-17 PROCEDURE — U0005 INFEC AGEN DETEC AMPLI PROBE: HCPCS | Performed by: EMERGENCY MEDICINE

## 2022-07-17 PROCEDURE — 36430 TRANSFUSION BLD/BLD COMPNT: CPT

## 2022-07-17 PROCEDURE — 36415 COLL VENOUS BLD VENIPUNCTURE: CPT | Performed by: EMERGENCY MEDICINE

## 2022-07-17 RX ORDER — ONDANSETRON 2 MG/ML
4 INJECTION INTRAMUSCULAR; INTRAVENOUS ONCE
Status: COMPLETED | OUTPATIENT
Start: 2022-07-17 | End: 2022-07-17

## 2022-07-17 RX ORDER — MAGNESIUM SULFATE HEPTAHYDRATE 40 MG/ML
2 INJECTION, SOLUTION INTRAVENOUS ONCE
Status: COMPLETED | OUTPATIENT
Start: 2022-07-17 | End: 2022-07-17

## 2022-07-17 RX ORDER — ACETAMINOPHEN 325 MG/1
650 TABLET ORAL ONCE
Status: COMPLETED | OUTPATIENT
Start: 2022-07-17 | End: 2022-07-17

## 2022-07-17 RX ORDER — DIPHENHYDRAMINE HYDROCHLORIDE 50 MG/ML
25 INJECTION INTRAMUSCULAR; INTRAVENOUS ONCE
Status: COMPLETED | OUTPATIENT
Start: 2022-07-17 | End: 2022-07-17

## 2022-07-17 RX ADMIN — MAGNESIUM SULFATE HEPTAHYDRATE 2 G: 40 INJECTION, SOLUTION INTRAVENOUS at 17:14

## 2022-07-17 RX ADMIN — DIPHENHYDRAMINE HYDROCHLORIDE 25 MG: 50 INJECTION, SOLUTION INTRAMUSCULAR; INTRAVENOUS at 20:28

## 2022-07-17 RX ADMIN — SODIUM CHLORIDE 1000 ML: 0.9 INJECTION, SOLUTION INTRAVENOUS at 17:04

## 2022-07-17 RX ADMIN — ONDANSETRON 4 MG: 2 INJECTION INTRAMUSCULAR; INTRAVENOUS at 17:10

## 2022-07-17 RX ADMIN — ACETAMINOPHEN 650 MG: 325 TABLET, FILM COATED ORAL at 18:15

## 2022-07-17 NOTE — ED RE-EVALUATION NOTE
Received handover care for patient completing blood transfusion in the ED  23:00 Transfusion will be completed shortly  No complications  She can be discharged with instructions prepared by Dr Payal Maldonado MD  07/17/22 [id number]

## 2022-07-17 NOTE — ED PROVIDER NOTES
History  Chief Complaint   Patient presents with    Dizziness     Patient reports increased weakness, tiredness, nausea, decrease in appetite, headache, dizziness, palpitations (denies chest pain) that started yesterday but has gotten increasingly worse today  37 y o  F w/h/o anemia p/w multiple complaintsp/w x 1 day  States she has a h/o anemia due to heavy periods, so unsure if it may be due to that  Reports feeling lightheaded and fatigued with headache, nausea, and palpitations  Palpitations are intermittent and feels fast   Denies F/C, URI complaints, CP, SOB, cough, abd pain, vomiting, diarrhea, myalgias  Per chart review, pt was evaluated at [facility] 10 days ago for palpitations, had a negative work up, and instructed to f/u with her PCP  History provided by:  Patient   used: No    Dizziness  Quality:  Lightheadedness  Duration:  1 day  Timing:  Intermittent  Progression:  Unchanged  Chronicity:  New  Ineffective treatments:  None tried  Associated symptoms: headaches, nausea and palpitations    Associated symptoms: no chest pain, no diarrhea, no shortness of breath and no vomiting        Prior to Admission Medications   Prescriptions Last Dose Informant Patient Reported? Taking?    Prenatal Vit-Fe Fumarate-FA (PRENATAL VITAMIN PO)  Self Yes No   Sig: Take by mouth   acetaminophen (TYLENOL) 325 mg tablet  Self Yes No   Sig: Take 650 mg by mouth every 6 (six) hours as needed for mild pain   ascorbic acid (VITAMIN C) 500 mg tablet  Self Yes No   Sig: Take 500 mg by mouth daily   famotidine (PEPCID) 20 mg tablet  Self No No   Sig: Take 1 tablet (20 mg total) by mouth 2 (two) times a day   Patient taking differently: Take 20 mg by mouth as needed    ferrous sulfate 325 (65 Fe) mg tablet  Self Yes No   Sig: Take 325 mg by mouth daily with breakfast   linaCLOtide (Linzess) 290 MCG CAPS   No No   Sig: Take 1 capsule by mouth in the morning   meclizine (ANTIVERT) 25 mg tablet  Self No No Sig: Take 1 tablet (25 mg total) by mouth 3 (three) times a day as needed for dizziness   omeprazole (PriLOSEC) 40 MG capsule   No No   Sig: Take 1 capsule (40 mg total) by mouth 2 (two) times a day   ondansetron (ZOFRAN-ODT) 4 mg disintegrating tablet  Self No No   Sig: Take 1 tablet (4 mg total) by mouth every 6 (six) hours as needed for nausea or vomiting   sucralfate (CARAFATE) 1 g/10 mL suspension  Self No No   Sig: Take 10 mL (1 g total) by mouth 4 (four) times a day      Facility-Administered Medications: None       Past Medical History:   Diagnosis Date    Abdominal pain     "almost constant"    Anemia     Anesthesia     "woke up swinging with last  2018  "was fine with EGD"    Back pain     Bariatric surgery status     -gastric sleeve revison RUEN-Y 2019-abd painnow-dx lap today 3/9/2020    Constipation     Dental bridge present     right lower permanent    Diarrhea     Difficulty swallowing     "in the past"    Disease of thyroid gland     not on meds now    Dizzy     Fatigue     "when blood pressure low" and weakness too"    GERD (gastroesophageal reflux disease)     "increase after surgery"    Heart murmur     heart murmer, work up negative with holter monitor    History of 2  sections     most recent 17    History of D&C 2019    History of iron deficiency     anemia/ had IV infusions through pregnancy in 0523-2919    History of transfusion     2019 - pt had allergic reaction - had to stop the blood    Inguinal hernia     right    Loss of appetite     Low BP     "off and on"    Migraine     N&V (nausea and vomiting)     " a little better since on meds"    Non-intractable vomiting     "not since been on medicine"    Palpitations     "having again"    Postgastrectomy malabsorption     Stomach pain        Past Surgical History:   Procedure Laterality Date     SECTION      x2    CHOLECYSTECTOMY      CHROMOSOME ANALYSIS, PRODUCTS OF CONCEPTION (HISTORICAL)  2018    2 miscarriages in 2018/Jan and Nov    LAPAROSCOPY N/A 3/9/2020    Procedure: DIAGNOSTIC LAPAROSCOPY,CLOSURE OF COATES DEFECT, INTRAOP EGD;  Surgeon: Claudean Jubilee, MD;  Location: [facility];  Service: Bariatrics    OVARIAN CYST REMOVAL Right 2018    NV EGD TRANSORAL BIOPSY SINGLE/MULTIPLE N/A 1/30/2019    Procedure: ESOPHAGOGASTRODUODENOSCOPY (EGD) with bx;  Surgeon: Claudean Jubilee, MD;  Location: [facility]; Service: Bariatrics    NV LAP GASTRIC BYPASS/SREE-EN-Y N/A 4/8/2019    Procedure: LAP SREE-EN-Y GASTRIC BYPASS, INTRAOP EGD;  Surgeon: Claudean Jubilee, MD;  [facility];  Service: Stella ISAAC,1ST TRI N/A 9/25/2019    Procedure: DILATATION AND EVACUATION (D&E) (8 weeks) missed ab;  Surgeon: Deric Gan MD;  [facility]: [facility];  Service: Gynecology    REVISION BYPASS LAPAROSCOPIC N/A 4/8/2019    Procedure: LAP REVISION/ CONVERSION;  Surgeon: Claudean Jubilee, MD;  [facility]: [facility];  Service: Bariatrics    SALPINGOOPHORECTOMY Right 09/2018    SLEEVE GASTROPLASTY         Family History   Problem Relation Age of Onset    Hypertension Mother     Heart disease Mother     No Known Problems Father      I have reviewed and agree with the history as documented  E-Cigarette/Vaping    E-Cigarette Use Never User      E-Cigarette/Vaping Substances    Nicotine No     THC No     CBD No     Flavoring No     Other No     Unknown No      Social History     Tobacco Use    Smoking status: Never Smoker    Smokeless tobacco: Never Used   Vaping Use    Vaping Use: Never used   Substance Use Topics    Alcohol use: Not Currently    Drug use: No       Review of Systems   Constitutional: Positive for fatigue  Respiratory: Negative for cough and shortness of breath  Cardiovascular: Positive for palpitations  Negative for chest pain  Gastrointestinal: Positive for nausea  Negative for abdominal pain, diarrhea and vomiting  Musculoskeletal: Negative for myalgias  Neurological: Positive for light-headedness and headaches  Negative for dizziness  All other systems reviewed and are negative  Physical Exam  Physical Exam  Vitals and nursing note reviewed  Constitutional:       General: She is not in acute distress  Appearance: Normal appearance  She is well-developed  She is not ill-appearing, toxic-appearing or diaphoretic  HENT:      Head: Normocephalic and atraumatic  Eyes:      General: No scleral icterus  Neck:      Vascular: No JVD  Trachea: Trachea normal    Cardiovascular:      Rate and Rhythm: Normal rate and regular rhythm  Heart sounds: Normal heart sounds  No murmur heard  No friction rub  Pulmonary:      Effort: Pulmonary effort is normal  No accessory muscle usage or respiratory distress  Breath sounds: Normal breath sounds  No stridor  No wheezing, rhonchi or rales  Abdominal:      General: There is no distension  Palpations: Abdomen is soft  Abdomen is not rigid  There is no mass  Tenderness: There is no abdominal tenderness  There is no guarding or rebound  Negative signs include Larry's sign and McBurney's sign  Musculoskeletal:      Cervical back: Normal range of motion  Skin:     General: Skin is warm and dry  Coloration: Skin is not pale  Findings: No rash  Neurological:      Mental Status: She is alert  GCS: GCS eye subscore is 4  GCS verbal subscore is 5  GCS motor subscore is 6     Psychiatric:         Behavior: Behavior normal          Vital Signs  ED Triage Vitals   Temperature Pulse Respirations Blood Pressure SpO2   07/17/22 1613 07/17/22 1613 07/17/22 1613 07/17/22 1754 07/17/22 1613   98 6 °F (37 °C) 92 20 103/50 100 %      Temp Source Heart Rate Source Patient Position - Orthostatic VS BP Location FiO2 (%)   07/17/22 1613 07/17/22 1613 07/17/22 1613 07/17/22 1613 --   Oral Monitor Sitting Left arm       Pain Score       07/17/22 1613 8           Vitals:    07/17/22 1613 07/17/22 1754   BP:  103/50   Pulse: 92 72   Patient Position - Orthostatic VS: Sitting          Visual Acuity      ED Medications  Medications   diphenhydrAMINE (BENADRYL) injection 25 mg (has no administration in time range)   sodium chloride 0 9 % bolus 1,000 mL (1,000 mL Intravenous New Bag 7/17/22 1704)   ondansetron (ZOFRAN) injection 4 mg (4 mg Intravenous Given 7/17/22 1710)   magnesium sulfate 2 g/50 mL IVPB (premix) 2 g (0 g Intravenous Stopped 7/17/22 1746)   acetaminophen (TYLENOL) tablet 650 mg (650 mg Oral Given 7/17/22 1815)       Diagnostic Studies  Results Reviewed     Procedure Component Value Units Date/Time    COVID only [[id number]]  (Normal) Collected: 07/17/22 1704    Lab Status: Final result Specimen: Nares from Nose Updated: 07/17/22 1803     SARS-CoV-2 Negative    Narrative:      FOR PEDIATRIC PATIENTS - copy/paste COVID Guidelines URL to browser: [email or web address]  [email or web address]    SARS-CoV-2 assay is a Nucleic Acid Amplification assay intended for the  qualitative detection of nucleic acid from SARS-CoV-2 in nasopharyngeal  swabs  Results are for the presumptive identification of SARS-CoV-2 RNA  Positive results are indicative of infection with SARS-CoV-2, the virus  causing COVID-19, but do not rule out bacterial infection or co-infection  with other viruses  Laboratories within the [place] and its  territories are required to report all positive results to the appropriate  public health authorities  Negative results do not preclude SARS-CoV-2  infection and should not be used as the sole basis for treatment or other  patient management decisions  Negative results must be combined with  clinical observations, patient history, and epidemiological information  This test has not been FDA cleared or approved  This test has been authorized by FDA under an Emergency Use Authorization  (EUA)   This test is only authorized for the duration of time the  declaration that circumstances exist justifying the authorization of the  emergency use of an in vitro diagnostic tests for detection of SARS-CoV-2  virus and/or diagnosis of COVID-19 infection under section 564(b)(1) of  the Act, 21 U  S C  008QVV-4(Q)(3), unless the authorization is terminated  or revoked sooner  The test has been validated but independent review by FDA  and CLIA is pending  Test performed using [employer] GeneXpert: This RT-PCR assay targets N2,  a region unique to SARS-CoV-2  A conserved region in the E-gene was chosen  for pan-Sarbecovirus detection which includes SARS-CoV-2  Basic metabolic panel [[id number]]  (Abnormal) Collected: 07/17/22 1704    Lab Status: Final result Specimen: Blood from Arm, Right Updated: 07/17/22 1734     Sodium 142 mmol/L      Potassium 3 7 mmol/L      Chloride 105 mmol/L      CO2 29 mmol/L      ANION GAP 8 mmol/L      BUN 10 mg/dL      Creatinine 0 73 mg/dL      Glucose 87 mg/dL      Calcium 8 1 mg/dL      eGFR 103 ml/min/1 73sq m     Narrative:      [place] guidelines for Chronic Kidney Disease (CKD):     Stage 1 with normal or high GFR (GFR > 90 mL/min/1 73 square meters)    Stage 2 Mild CKD (GFR = 60-89 mL/min/1 73 square meters)    Stage 3A Moderate CKD (GFR = 45-59 mL/min/1 73 square meters)    Stage 3B Moderate CKD (GFR = 30-44 mL/min/1 73 square meters)    Stage 4 Severe CKD (GFR = 15-29 mL/min/1 73 square meters)    Stage 5 End Stage CKD (GFR <15 mL/min/1 73 square meters)  Note: GFR calculation is accurate only with a steady state creatinine    TSH [[id number]]  (Normal) Collected: 07/17/22 1704    Lab Status: Final result Specimen: Blood from Arm, Right Updated: 07/17/22 1734     TSH 3RD GENERATON 1 703 uIU/mL     Narrative:      Patients undergoing fluorescein dye angiography may retain small amounts of fluorescein in the body for 48-72 hours post procedure   Samples containing fluorescein can produce falsely depressed TSH values  If the patient had this procedure,a specimen should be resubmitted post fluorescein clearance        Magnesium [[id number]]  (Normal) Collected: 07/17/22 1704    Lab Status: Final result Specimen: Blood from Arm, Right Updated: 07/17/22 1734     Magnesium 1 9 mg/dL     CBC and differential [[id number]]  (Abnormal) Collected: 07/17/22 1704    Lab Status: Final result Specimen: Blood from Arm, Right Updated: 07/17/22 1712     WBC 7 65 Thousand/uL      RBC 3 86 Million/uL      Hemoglobin 7 6 g/dL      Hematocrit 26 4 %      MCV 68 fL      MCH 19 7 pg      MCHC 28 8 g/dL      RDW 18 3 %      MPV 9 7 fL      Platelets 238 Thousands/uL      nRBC 0 /100 WBCs      Neutrophils Relative 54 %      Immat GRANS % 0 %      Lymphocytes Relative 35 %      Monocytes Relative 10 %      Eosinophils Relative 1 %      Basophils Relative 0 %      Neutrophils Absolute 4 09 Thousands/µL      Immature Grans Absolute 0 01 Thousand/uL      Lymphocytes Absolute 2 67 Thousands/µL      Monocytes Absolute 0 75 Thousand/µL      Eosinophils Absolute 0 11 Thousand/µL      Basophils Absolute 0 02 Thousands/µL     POCT pregnancy, urine [[id number]]     Lab Status: No result                  No orders to display              Procedures  ECG 12 Lead Documentation Only    Date/Time: 7/17/2022 5:07 PM  Performed by: Jonna Mejia DO  Authorized by: Jonna Mejia DO     Indications / Diagnosis:  Palpitations  ECG reviewed by me, the ED Provider: yes    Patient location:  Bedside  Rate:     ECG rate:  82    ECG rate assessment: normal    Rhythm:     Rhythm: sinus rhythm    Ectopy:     Ectopy: none    QRS:     QRS axis:  Normal    QRS intervals:  Normal  ST segments:     ST segments:  Normal  T waves:     T waves: normal      CriticalCare Time  Performed by: Jonna Mejia DO  Authorized by: Jonna Mejia DO     Critical care provider statement:     Critical care time (minutes):  30 Critical care time was exclusive of:  Separately billable procedures and treating other patients and teaching time    Critical care was necessary to treat or prevent imminent or life-threatening deterioration of the following conditions:  Circulatory failure    Critical care was time spent personally by me on the following activities:  Blood draw for specimens, obtaining history from patient or surrogate, development of treatment plan with patient or surrogate, evaluation of patient's response to treatment, examination of patient, ordering and performing treatments and interventions, ordering and review of laboratory studies, re-evaluation of patient's condition and review of old charts    I assumed direction of critical care for this patient from another provider in my specialty: no    Comments:      Pt with symptomatic anemia requiring transfusion             ED Course  ED Course as of 07/17/22 1848   Sun Jul 17, 2022   1713 Hemoglobin(!): 7 6  8 8 on 7/10/22   1721 Updated pt on Hg result  Pt's period ended 1-2 days ago  No longer bleeding  1757 Pt reports still has BARBER  Will give Tylenol  1822 Will give pt 1 unit of blood  Will also order benadryl IV to be given prior to blood transfusion  The last transfusion, pt felt like she had a scratchy throat and was given 25 mg IV Benadryl which resolved her symptoms  Pt agreeable to transfusion and signed consent  RN made aware to give Benadryl prior to transfusion  1923 Signed out to Dr Hailey Gamboa  Pt can be discharged after transfusion  SBIRT 20yo+    Flowsheet Row Most Recent Value   SBIRT (23 yo +)    In order to provide better care to our patients, we are screening all of our patients for alcohol and drug use  Would it be okay to ask you these screening questions? No Filed at: 07/17/2022 1750   Initial Alcohol Screen: US AUDIT-C     1  How often do you have a drink containing alcohol? 0 Filed at: 07/17/2022 1750   2   How many drinks containing alcohol do you have on a typical day you are drinking? 0 Filed at: 07/17/2022 1750   3b  FEMALE Any Age, or MALE 65+: How often do you have 4 or more drinks on one occassion? 0 Filed at: 07/17/2022 1750   Audit-C Score 0 Filed at: 07/17/2022 1750   BOO: How many times in the past year have you    Used an illegal drug or used a prescription medication for non-medical reasons? Never Filed at: 07/17/2022 1750                    MDM    Disposition  Final diagnoses:   Anemia   Headache   Nausea   Palpitations   Fatigue     Time reflects when diagnosis was documented in both MDM as applicable and the Disposition within this note     Time User Action Codes Description Comment    7/17/2022  5:46 PM Paramjit Hun [D64 9] Anemia     7/17/2022  5:46 PM Perry Fernandesurga 49 [R51 9] Headache     7/17/2022  5:46 PM Dom Metsanurga 48 [R11 0] Nausea     7/17/2022  5:46 PM Pillo Fernandesanurga 48 [R00 2] Palpitations     7/17/2022  6:24 PM Pillo Fernandesanurga [address] [R53 83] Fatigue       ED Disposition     None      Follow-up Information     Follow up With Specialties Details Why Contact Info    Adal Valdez PA-C Physician Assistant Schedule an appointment as soon as possible for a visit  For follow up [address] [place] [id number] [phone]      Your hematologist  Call in 1 day For follow up           Patient's Medications   Discharge Prescriptions    No medications on file       No discharge procedures on file      PDMP Review     None          ED Provider  Electronically Signed by           Jesús Ordaz 25, DO  07/17/22 7235

## 2022-07-18 ENCOUNTER — TELEPHONE (OUTPATIENT)
Dept: HEMATOLOGY ONCOLOGY | Facility: CLINIC | Age: 42
End: 2022-07-18

## 2022-07-18 LAB
ABO GROUP BLD BPU: NORMAL
BPU ID: NORMAL
CROSSMATCH: NORMAL
UNIT DISPENSE STATUS: NORMAL
UNIT PRODUCT CODE: NORMAL
UNIT PRODUCT VOLUME: 350 ML
UNIT RH: NORMAL

## 2022-07-18 NOTE — TELEPHONE ENCOUNTER
Scheduling Appointment     Who Is Calling to Schedule self   Doctor Madison Liu   Date and Time 7/25 10AM   Reason for scheduling appointment Low hemoglobin follow up   Patient verbalized understanding   yes

## 2022-07-18 NOTE — ED NOTES
Discharge instructions reviewed with patient by provider  Verbalized understanding with no further questions at this time       Jung Pollack RN  07/17/22 2002

## 2022-07-25 ENCOUNTER — TELEPHONE (OUTPATIENT)
Dept: HEMATOLOGY ONCOLOGY | Facility: CLINIC | Age: 42
End: 2022-07-25

## 2022-07-25 NOTE — TELEPHONE ENCOUNTER
Appointment Cancellation Or Reschedule     Person calling in Patient    Provider [facility] Visit Date and Time 07/25 at 10:00am    Office Visit Location [facility]   Did patient want to reschedule their office appointment? If so, when was it scheduled to? Yes, 08/01 at 2:00pm    Is this patient calling to reschedule an infusion appointment? no   When is their next infusion appointment? n/a   Is this patient a Chemo patient? no   Reason for Cancellation or Reschedule Patient has been trying to call since Thursday 07/21 to reschedule and was not able to get through     If the patient is a treatment patient, please route this to the office nurse  If the patient is not on treatment, please route to the office MA  If the patient is a surgical oncology patient, please route to surg/onc clinical pool

## 2022-07-28 ENCOUNTER — HOSPITAL ENCOUNTER (EMERGENCY)
Facility: HOSPITAL | Age: 42
Discharge: HOME | End: 2022-07-29
Attending: EMERGENCY MEDICINE
Payer: MEDICARE

## 2022-07-28 ENCOUNTER — APPOINTMENT (EMERGENCY)
Dept: CT IMAGING | Facility: HOSPITAL | Age: 42
End: 2022-07-28
Payer: MEDICARE

## 2022-07-28 DIAGNOSIS — R10.11 RIGHT UPPER QUADRANT ABDOMINAL PAIN: ICD-10-CM

## 2022-07-28 DIAGNOSIS — R10.9 RIGHT FLANK PAIN: Primary | ICD-10-CM

## 2022-07-28 DIAGNOSIS — R06.02 SHORTNESS OF BREATH: ICD-10-CM

## 2022-07-28 LAB
ALBUMIN SERPL BCP-MCNC: 3.3 G/DL (ref 3.5–5)
ALP SERPL-CCNC: 88 U/L (ref 46–116)
ALT SERPL W P-5'-P-CCNC: 19 U/L (ref 12–78)
ANION GAP SERPL CALCULATED.3IONS-SCNC: 8 MMOL/L (ref 4–13)
AST SERPL W P-5'-P-CCNC: 18 U/L (ref 5–45)
ATRIAL RATE: 69 BPM
BASOPHILS # BLD AUTO: 0.02 THOUSANDS/ΜL (ref 0–0.1)
BASOPHILS NFR BLD AUTO: 0 % (ref 0–1)
BILIRUB SERPL-MCNC: 0.22 MG/DL (ref 0.2–1)
BILIRUB UR QL STRIP: NEGATIVE
BUN SERPL-MCNC: 16 MG/DL (ref 5–25)
CALCIUM ALBUM COR SERPL-MCNC: 9 MG/DL (ref 8.3–10.1)
CALCIUM SERPL-MCNC: 8.4 MG/DL (ref 8.3–10.1)
CARDIAC TROPONIN I PNL SERPL HS: <2 NG/L
CHLORIDE SERPL-SCNC: 105 MMOL/L (ref 96–108)
CLARITY UR: CLEAR
CO2 SERPL-SCNC: 27 MMOL/L (ref 21–32)
COLOR UR: YELLOW
CREAT SERPL-MCNC: 0.83 MG/DL (ref 0.6–1.3)
D DIMER PPP FEU-MCNC: 0.59 UG/ML FEU
EOSINOPHIL # BLD AUTO: 0.1 THOUSAND/ΜL (ref 0–0.61)
EOSINOPHIL NFR BLD AUTO: 1 % (ref 0–6)
ERYTHROCYTE [DISTWIDTH] IN BLOOD BY AUTOMATED COUNT: 20.4 % (ref 11.6–15.1)
EXT PREG TEST URINE: NEGATIVE
EXT. CONTROL ED NAV: NORMAL
FLUAV RNA RESP QL NAA+PROBE: NEGATIVE
FLUBV RNA RESP QL NAA+PROBE: NEGATIVE
GFR SERPL CREATININE-BSD FRML MDRD: 87 ML/MIN/1.73SQ M
GLUCOSE SERPL-MCNC: 89 MG/DL (ref 65–140)
GLUCOSE UR STRIP-MCNC: NEGATIVE MG/DL
HCT VFR BLD AUTO: 30.1 % (ref 34.8–46.1)
HGB BLD-MCNC: 9 G/DL (ref 11.5–15.4)
HGB UR QL STRIP.AUTO: NEGATIVE
IMM GRANULOCYTES # BLD AUTO: 0.01 THOUSAND/UL (ref 0–0.2)
IMM GRANULOCYTES NFR BLD AUTO: 0 % (ref 0–2)
KETONES UR STRIP-MCNC: NEGATIVE MG/DL
LEUKOCYTE ESTERASE UR QL STRIP: NEGATIVE
LYMPHOCYTES # BLD AUTO: 3.54 THOUSANDS/ΜL (ref 0.6–4.47)
LYMPHOCYTES NFR BLD AUTO: 47 % (ref 14–44)
MCH RBC QN AUTO: 20.8 PG (ref 26.8–34.3)
MCHC RBC AUTO-ENTMCNC: 29.9 G/DL (ref 31.4–37.4)
MCV RBC AUTO: 70 FL (ref 82–98)
MONOCYTES # BLD AUTO: 0.51 THOUSAND/ΜL (ref 0.17–1.22)
MONOCYTES NFR BLD AUTO: 7 % (ref 4–12)
NEUTROPHILS # BLD AUTO: 3.43 THOUSANDS/ΜL (ref 1.85–7.62)
NEUTS SEG NFR BLD AUTO: 45 % (ref 43–75)
NITRITE UR QL STRIP: NEGATIVE
NRBC BLD AUTO-RTO: 0 /100 WBCS
P AXIS: 90 DEGREES
PH UR STRIP.AUTO: 6 [PH] (ref 4.5–8)
PLATELET # BLD AUTO: 212 THOUSANDS/UL (ref 149–390)
PMV BLD AUTO: 9.1 FL (ref 8.9–12.7)
POTASSIUM SERPL-SCNC: 3.7 MMOL/L (ref 3.5–5.3)
PR INTERVAL: 150 MS
PROT SERPL-MCNC: 7.7 G/DL (ref 6.4–8.4)
PROT UR STRIP-MCNC: NEGATIVE MG/DL
QRS AXIS: 74 DEGREES
QRSD INTERVAL: 86 MS
QT INTERVAL: 384 MS
QTC INTERVAL: 411 MS
RBC # BLD AUTO: 4.32 MILLION/UL (ref 3.81–5.12)
RSV RNA RESP QL NAA+PROBE: NEGATIVE
SARS-COV-2 RNA RESP QL NAA+PROBE: NEGATIVE
SODIUM SERPL-SCNC: 140 MMOL/L (ref 135–147)
SP GR UR STRIP.AUTO: 1.01 (ref 1–1.03)
T WAVE AXIS: 62 DEGREES
UROBILINOGEN UR QL STRIP.AUTO: 0.2 E.U./DL
VENTRICULAR RATE: 69 BPM
WBC # BLD AUTO: 7.61 THOUSAND/UL (ref 4.31–10.16)

## 2022-07-28 PROCEDURE — 36415 COLL VENOUS BLD VENIPUNCTURE: CPT

## 2022-07-28 PROCEDURE — 96374 THER/PROPH/DIAG INJ IV PUSH: CPT

## 2022-07-28 PROCEDURE — 80053 COMPREHEN METABOLIC PANEL: CPT

## 2022-07-28 PROCEDURE — 99285 EMERGENCY DEPT VISIT HI MDM: CPT | Performed by: EMERGENCY MEDICINE

## 2022-07-28 PROCEDURE — G1004 CDSM NDSC: HCPCS

## 2022-07-28 PROCEDURE — 0241U HB NFCT DS VIR RESP RNA 4 TRGT: CPT

## 2022-07-28 PROCEDURE — 93010 ELECTROCARDIOGRAM REPORT: CPT

## 2022-07-28 PROCEDURE — 81003 URINALYSIS AUTO W/O SCOPE: CPT

## 2022-07-28 PROCEDURE — 85025 COMPLETE CBC W/AUTO DIFF WBC: CPT

## 2022-07-28 PROCEDURE — 83690 ASSAY OF LIPASE: CPT

## 2022-07-28 PROCEDURE — 93005 ELECTROCARDIOGRAM TRACING: CPT

## 2022-07-28 PROCEDURE — 84484 ASSAY OF TROPONIN QUANT: CPT

## 2022-07-28 PROCEDURE — 85379 FIBRIN DEGRADATION QUANT: CPT

## 2022-07-28 PROCEDURE — 71275 CT ANGIOGRAPHY CHEST: CPT

## 2022-07-28 PROCEDURE — 74177 CT ABD & PELVIS W/CONTRAST: CPT

## 2022-07-28 PROCEDURE — 99284 EMERGENCY DEPT VISIT MOD MDM: CPT

## 2022-07-28 PROCEDURE — 81025 URINE PREGNANCY TEST: CPT

## 2022-07-28 RX ORDER — DIPHENHYDRAMINE HYDROCHLORIDE 50 MG/ML
50 INJECTION INTRAMUSCULAR; INTRAVENOUS ONCE
Status: COMPLETED | OUTPATIENT
Start: 2022-07-28 | End: 2022-07-28

## 2022-07-28 RX ADMIN — DIPHENHYDRAMINE HYDROCHLORIDE 50 MG: 50 INJECTION, SOLUTION INTRAMUSCULAR; INTRAVENOUS at 22:55

## 2022-07-28 RX ADMIN — IOHEXOL 80 ML: 350 INJECTION, SOLUTION INTRAVENOUS at 23:09

## 2022-07-29 VITALS
DIASTOLIC BLOOD PRESSURE: 65 MMHG | SYSTOLIC BLOOD PRESSURE: 114 MMHG | TEMPERATURE: 99.3 F | OXYGEN SATURATION: 99 % | HEART RATE: 78 BPM | RESPIRATION RATE: 17 BRPM

## 2022-07-29 LAB — LIPASE SERPL-CCNC: 101 U/L (ref 73–393)

## 2022-07-29 RX ORDER — LIDOCAINE 50 MG/G
1 PATCH TOPICAL DAILY
Qty: 6 PATCH | Refills: 0 | Status: SHIPPED | OUTPATIENT
Start: 2022-07-29 | End: 2022-10-28

## 2022-07-29 RX ORDER — LIDOCAINE 50 MG/G
1 PATCH TOPICAL ONCE
Status: DISCONTINUED | OUTPATIENT
Start: 2022-07-29 | End: 2022-07-29 | Stop reason: HOSPADM

## 2022-07-29 RX ADMIN — LIDOCAINE 1 PATCH: 50 PATCH CUTANEOUS at 01:17

## 2022-07-29 NOTE — ED PROVIDER NOTES
History  Chief Complaint   Patient presents with    Flank Pain     Right flank pain for two days, pain radiates to abdomen, denies urinary s/s     Patient is a 41 y/o F with PMH candace-en-y gastric bypass presenting with right mid back pain  Patient states one week ago she started with this dull pain however it went away until yesterday returned but more severe  Wraps around to RUQ and epigastric region  No known inciting event  Today associated with fever this morning and shortness of breath  Denies dysuria, hematuria, cough, chest pain, leg swelling, hx of DVT/PE  Pain constant, slight improvement with tylenol  Pt took tylenol at 5am and 1pm, feels her fever/chills have resolved since first administration  Did not take temp at home just felt hot  Prior to Admission Medications   Prescriptions Last Dose Informant Patient Reported? Taking?    Prenatal Vit-Fe Fumarate-FA (PRENATAL VITAMIN PO)  Self Yes No   Sig: Take by mouth   acetaminophen (TYLENOL) 325 mg tablet  Self Yes No   Sig: Take 650 mg by mouth every 6 (six) hours as needed for mild pain   ascorbic acid (VITAMIN C) 500 mg tablet  Self Yes No   Sig: Take 500 mg by mouth daily   famotidine (PEPCID) 20 mg tablet  Self No No   Sig: Take 1 tablet (20 mg total) by mouth 2 (two) times a day   Patient taking differently: Take 20 mg by mouth as needed    ferrous sulfate 325 (65 Fe) mg tablet  Self Yes No   Sig: Take 325 mg by mouth daily with breakfast   linaCLOtide (Linzess) 290 MCG CAPS   No No   Sig: Take 1 capsule by mouth in the morning   meclizine (ANTIVERT) 25 mg tablet  Self No No   Sig: Take 1 tablet (25 mg total) by mouth 3 (three) times a day as needed for dizziness   omeprazole (PriLOSEC) 40 MG capsule   No No   Sig: Take 1 capsule (40 mg total) by mouth 2 (two) times a day   ondansetron (ZOFRAN-ODT) 4 mg disintegrating tablet  Self No No   Sig: Take 1 tablet (4 mg total) by mouth every 6 (six) hours as needed for nausea or vomiting   sucralfate (CARAFATE) 1 g/10 mL suspension  Self No No   Sig: Take 10 mL (1 g total) by mouth 4 (four) times a day      Facility-Administered Medications: None       Past Medical History:   Diagnosis Date    Abdominal pain     "almost constant"    Anemia     Anesthesia     "woke up swinging with last  2018  "was fine with EGD"    Back pain     Bariatric surgery status     -gastric sleeve revison RUEN-Y 2019-abd painnow-dx lap today 3/9/2020    Constipation     Dental bridge present     right lower permanent    Diarrhea     Difficulty swallowing     "in the past"    Disease of thyroid gland     not on meds now    Dizzy     Fatigue     "when blood pressure low" and weakness too"    GERD (gastroesophageal reflux disease)     "increase after surgery"    Heart murmur     heart murmer, work up negative with holter monitor    History of 2  sections     most recent 17    History of D&C 2019    History of iron deficiency     anemia/ had IV infusions through pregnancy in 0312-8825    History of transfusion     2019 - pt had allergic reaction - had to stop the blood    Inguinal hernia     right    Loss of appetite     Low BP     "off and on"    Migraine     N&V (nausea and vomiting)     " a little better since on meds"    Non-intractable vomiting     "not since been on medicine"    Palpitations     "having again"    Postgastrectomy malabsorption     Stomach pain        Past Surgical History:   Procedure Laterality Date     SECTION      x2    CHOLECYSTECTOMY      CHROMOSOME ANALYSIS, PRODUCTS OF CONCEPTION (HISTORICAL)      2 miscarriages in  and Nov    LAPAROSCOPY N/A 3/9/2020    Procedure: DIAGNOSTIC LAPAROSCOPY,CLOSURE OF COATES DEFECT, INTRAOP EGD;  Surgeon: Jonna Yu MD;  Location: [facility];  Service: Bariatrics    OVARIAN CYST REMOVAL Right     AK EGD TRANSORAL BIOPSY SINGLE/MULTIPLE N/A 2019    Procedure: ESOPHAGOGASTRODUODENOSCOPY (EGD) with bx;  Surgeon: Adolfo Francis MD;  Location: [facility]; Service: Bariatrics    VT LAP GASTRIC BYPASS/SREE-EN-Y N/A 4/8/2019    Procedure: LAP SREE-EN-Y GASTRIC BYPASS, INTRAOP EGD;  Surgeon: Adolfo Francis MD;  [facility];  Service: Stella ISAAC,1ST TRI N/A 9/25/2019    Procedure: DILATATION AND EVACUATION (D&E) (8 weeks) missed ab;  Surgeon: Zane Carrizales MD;  [facility]: [facility];  Service: Gynecology    REVISION BYPASS LAPAROSCOPIC N/A 4/8/2019    Procedure: LAP REVISION/ CONVERSION;  Surgeon: Adolfo Francis MD;  [facility];  Service: Bariatrics    SALPINGOOPHORECTOMY Right 09/2018    SLEEVE GASTROPLASTY         Family History   Problem Relation Age of Onset    Hypertension Mother     Heart disease Mother     No Known Problems Father      I have reviewed and agree with the history as documented  E-Cigarette/Vaping    E-Cigarette Use Never User      E-Cigarette/Vaping Substances    Nicotine No     THC No     CBD No     Flavoring No     Other No     Unknown No      Social History     Tobacco Use    Smoking status: Never Smoker    Smokeless tobacco: Never Used   Vaping Use    Vaping Use: Never used   Substance Use Topics    Alcohol use: Not Currently    Drug use: No        Review of Systems   Constitutional: Positive for chills and fever  HENT: Negative for ear pain and sore throat  Eyes: Negative for pain and visual disturbance  Respiratory: Positive for shortness of breath  Negative for cough  Cardiovascular: Negative for chest pain and palpitations  Gastrointestinal: Negative for abdominal pain and vomiting  Genitourinary: Positive for flank pain  Negative for dysuria and hematuria  Musculoskeletal: Negative for arthralgias and back pain  Skin: Negative for color change and rash  Neurological: Negative for seizures and syncope     All other systems reviewed and are negative  Physical Exam  ED Triage Vitals   Temperature Pulse Respirations Blood Pressure SpO2   07/28/22 1954 07/28/22 1954 07/28/22 1954 07/28/22 1954 07/28/22 1954   99 3 °F (37 4 °C) 77 17 129/60 100 %      Temp src Heart Rate Source Patient Position - Orthostatic VS BP Location FiO2 (%)   -- 07/28/22 2234 07/28/22 2234 07/28/22 2234 --    Monitor Lying Left arm       Pain Score       --                    Orthostatic Vital Signs  Vitals:    07/28/22 1954 07/28/22 2234 07/29/22 0048   BP: 129/60 102/64 114/65   Pulse: 77 65 78   Patient Position - Orthostatic VS:  Lying Lying       Physical Exam  Vitals and nursing note reviewed  Constitutional:       General: She is not in acute distress  Appearance: She is not toxic-appearing  HENT:      Head: Normocephalic and atraumatic  Right Ear: External ear normal       Left Ear: External ear normal       Nose: Nose normal       Mouth/Throat:      Pharynx: Oropharynx is clear  Eyes:      Extraocular Movements: Extraocular movements intact  Pupils: Pupils are equal, round, and reactive to light  Cardiovascular:      Rate and Rhythm: Normal rate and regular rhythm  Pulses: Normal pulses  Heart sounds: Normal heart sounds  No murmur heard  No friction rub  No gallop  Pulmonary:      Effort: Pulmonary effort is normal  No respiratory distress  Breath sounds: Normal breath sounds  No wheezing, rhonchi or rales  Abdominal:      General: Abdomen is flat  There is no distension  Palpations: Abdomen is soft  Tenderness: There is no abdominal tenderness  There is no right CVA tenderness, left CVA tenderness, guarding or rebound  Musculoskeletal:         General: Tenderness present  No deformity  Normal range of motion  Cervical back: Normal range of motion  Right lower leg: No edema  Left lower leg: No edema  Comments: R posterior axillary line mid back tenderness along ribs   No CVA tenderness Skin:     General: Skin is warm and dry  Capillary Refill: Capillary refill takes less than 2 seconds  Findings: No rash  Neurological:      General: No focal deficit present  Mental Status: She is alert and oriented to person, place, and time  Gait: Gait normal    Psychiatric:         Mood and Affect: Mood normal          ED Medications  Medications   iohexol (OMNIPAQUE) 240 MG/ML solution 15 mL (has no administration in time range)   lidocaine (LIDODERM) 5 % patch 1 patch (has no administration in time range)   diphenhydrAMINE (BENADRYL) injection 50 mg (50 mg Intravenous Given 7/28/22 2255)   iohexol (OMNIPAQUE) 350 MG/ML injection (SINGLE-DOSE) 80 mL (80 mL Intravenous Given 7/28/22 2309)       Diagnostic Studies  Results Reviewed     Procedure Component Value Units Date/Time    FLU/RSV/COVID - if FLU/RSV clinically relevant [[id number]]  (Normal) Collected: 07/28/22 2211    Lab Status: Final result Specimen: Nares from Nose Updated: 07/28/22 2256     SARS-CoV-2 Negative     INFLUENZA A PCR Negative     INFLUENZA B PCR Negative     RSV PCR Negative    Narrative:      FOR PEDIATRIC PATIENTS - copy/paste COVID Guidelines URL to browser: [email or web address]    SARS-CoV-2 assay is a Nucleic Acid Amplification assay intended for the  qualitative detection of nucleic acid from SARS-CoV-2 in nasopharyngeal  swabs  Results are for the presumptive identification of SARS-CoV-2 RNA  Positive results are indicative of infection with SARS-CoV-2, the virus  causing COVID-19, but do not rule out bacterial infection or co-infection  with other viruses  Laboratories within the [place] and its  territories are required to report all positive results to the appropriate  public health authorities  Negative results do not preclude SARS-CoV-2  infection and should not be used as the sole basis for treatment or other  patient management decisions  Negative results must be combined with  clinical observations, patient history, and epidemiological information  This test has not been FDA cleared or approved  This test has been authorized by FDA under an Emergency Use Authorization  (EUA)  This test is only authorized for the duration of time the  declaration that circumstances exist justifying the authorization of the  emergency use of an in vitro diagnostic tests for detection of SARS-CoV-2  virus and/or diagnosis of COVID-19 infection under section 564(b)(1) of  the Act, 21 U  S C  025TYJ-8(S)(6), unless the authorization is terminated  or revoked sooner  The test has been validated but independent review by FDA  and CLIA is pending  Test performed using [employer] GeneXpert: This RT-PCR assay targets N2,  a region unique to SARS-CoV-2  A conserved region in the E-gene was chosen  for pan-Sarbecovirus detection which includes SARS-CoV-2      POCT pregnancy, urine [[id number]]  (Normal) Resulted: 07/28/22 2244    Lab Status: Final result Updated: 07/28/22 2244     EXT PREG TEST UR (Ref: Negative) negative     Control valid    Urine Macroscopic, POC [[id number]] Collected: 07/28/22 2242    Lab Status: Final result Specimen: Urine Updated: 07/28/22 2243     Color, UA Yellow     Clarity, UA Clear     pH, UA 6 0     Leukocytes, UA Negative     Nitrite, UA Negative     Protein, UA Negative mg/dl      Glucose, UA Negative mg/dl      Ketones, UA Negative mg/dl      Urobilinogen, UA 0 2 E U /dl      Bilirubin, UA Negative     Occult Blood, UA Negative     Specific Gravity, UA 1 015    Narrative:      CLINITEK RESULT    Comprehensive metabolic panel [[id number]]  (Abnormal) Collected: 07/28/22 2211    Lab Status: Final result Specimen: Blood from Arm, Right Updated: 07/28/22 2239     Sodium 140 mmol/L      Potassium 3 7 mmol/L      Chloride 105 mmol/L      CO2 27 mmol/L      ANION GAP 8 mmol/L      BUN 16 mg/dL      Creatinine 0 83 mg/dL      Glucose 89 mg/dL Calcium 8 4 mg/dL      Corrected Calcium 9 0 mg/dL      AST 18 U/L      ALT 19 U/L      Alkaline Phosphatase 88 U/L      Total Protein 7 7 g/dL      Albumin 3 3 g/dL      Total Bilirubin 0 22 mg/dL      eGFR 87 ml/min/1 73sq m     Narrative:      National Kidney Disease Foundation guidelines for Chronic Kidney Disease (CKD):     Stage 1 with normal or high GFR (GFR > 90 mL/min/1 73 square meters)    Stage 2 Mild CKD (GFR = 60-89 mL/min/1 73 square meters)    Stage 3A Moderate CKD (GFR = 45-59 mL/min/1 73 square meters)    Stage 3B Moderate CKD (GFR = 30-44 mL/min/1 73 square meters)    Stage 4 Severe CKD (GFR = 15-29 mL/min/1 73 square meters)    Stage 5 End Stage CKD (GFR <15 mL/min/1 73 square meters)  Note: GFR calculation is accurate only with a steady state creatinine    HS Troponin 0hr (reflex protocol) [[id number]]  (Normal) Collected: 07/28/22 2211    Lab Status: Final result Specimen: Blood from Arm, Right Updated: 07/28/22 2239     hs TnI 0hr <2 ng/L     D-Dimer [[id number]]  (Abnormal) Collected: 07/28/22 2211    Lab Status: Final result Specimen: Blood from Arm, Right Updated: 07/28/22 2234     D-Dimer, Quant 0 59 ug/ml FEU     CBC and differential [[id number]]  (Abnormal) Collected: 07/28/22 2211    Lab Status: Final result Specimen: Blood from Arm, Right Updated: 07/28/22 2218     WBC 7 61 Thousand/uL      RBC 4 32 Million/uL      Hemoglobin 9 0 g/dL      Hematocrit 30 1 %      MCV 70 fL      MCH 20 8 pg      MCHC 29 9 g/dL      RDW 20 4 %      MPV 9 1 fL      Platelets 858 Thousands/uL      nRBC 0 /100 WBCs      Neutrophils Relative 45 %      Immat GRANS % 0 %      Lymphocytes Relative 47 %      Monocytes Relative 7 %      Eosinophils Relative 1 %      Basophils Relative 0 %      Neutrophils Absolute 3 43 Thousands/µL      Immature Grans Absolute 0 01 Thousand/uL      Lymphocytes Absolute 3 54 Thousands/µL      Monocytes Absolute 0 51 Thousand/µL      Eosinophils Absolute 0 10 Thousand/µL Basophils Absolute 0 02 Thousands/µL     Lipase [[id number]] Collected: 07/28/22 2211    Lab Status: No result Specimen: Blood from Arm, Right                  CT pe study w abdomen pelvis w contrast   Final Result by Yusef Vaughn MD (07/29 0058)      No pulmonary embolism or aortic dissection  No effusion, airspace disease, or pneumothorax  No acute pathology visualized on CT of the abdomen and pelvis with IV without oral contrast       Multiple nodules measuring up to 3 mm  Based on current Fleischner Society 2017 Guidelines on incidental pulmonary nodule, no routine follow-up is needed if the patient is low risk  If the patient is high risk, optional follow-up chest CT at 12 months    can be considered          Workstation performed: [id number]               Procedures  Procedures      ED Course  ED Course as of 07/29/22 0110   Thu Jul 28, 2022 2137 ECG 12 lead  Procedure Note: EKG  Date/Time: 07/28/22 9:37 PM   Interpreted by: Vivien Lundborg, MD  Indications / Diagnosis: SOB  ECG reviewed by me, the ED Provider: yes   The EKG demonstrates:  Rhythm: normal sinus  Intervals: normal intervals  Axis: normal axis  QRS/Blocks: normal QRS  ST Changes: No acute ST Changes, no STD/MAKAYLA      2238 D-Dimer, Quant(!): 0 59                                 Wells' Criteria for PE    Flowsheet Row Most Recent Value   Wells' Criteria for PE    Clinical signs and symptoms of DVT 0 Filed at: 07/28/2022 2111   PE is primary diagnosis or equally likely 3 Filed at: 07/28/2022 2111   HR >100 0 Filed at: 07/28/2022 2111   Immobilization at least 3 days or Surgery in the previous 4 weeks 0 Filed at: 07/28/2022 2111   Previous, objectively diagnosed PE or DVT 0 Filed at: 07/28/2022 2111   Hemoptysis 0 Filed at: 07/28/2022 2111   Malignancy with treatment within 6 months or palliative 0 Filed at: 07/28/2022 2111   Stacy Beckett Criteria Total 3 Filed at: 07/28/2022 2111            MDM  Number of Diagnoses or Management Options  Right flank pain  Right upper quadrant abdominal pain  Shortness of breath  Diagnosis management comments: 39 y/o F presenting with R flank pain radiating to epigastrium associated with new onset SOB  Will obtain cardiac w/u with dimer as well as abdominal labs  Decision for CT PE with abd after positive d dimer result  CT scan read as negative, based on read of pulmonary nodules, d/w pt and felt low risk with no need for follow up  Recommend PCP f/u for ongoing symptoms  Disposition  Final diagnoses:   Right flank pain   Right upper quadrant abdominal pain   Shortness of breath     Time reflects when diagnosis was documented in both MDM as applicable and the Disposition within this note     Time User Action Codes Description Comment    7/29/2022  1:01 AM Uma Rivers Add [R10 9] Right flank pain     7/29/2022  1:01 AM Uma Rivers Add [R10 11] Right upper quadrant abdominal pain     7/29/2022  1:01 AM Uma Rivers Add [R06 02] Shortness of breath       ED Disposition     ED Disposition   Discharge    Condition   Stable    Date/Time   Fri Jul 29, 2022  1:01 AM    Comment   Fela So discharge to home/self care  Follow-up Information     Follow up With Specialties Details Why Contact Info    Love Hollingsworth PA-C Physician Assistant   [address] [facility] [id number] [phone]            Patient's Medications   Discharge Prescriptions    LIDOCAINE (LIDODERM) 5 %    Apply 1 patch topically daily Remove & Discard patch within 12 hours or as directed by MD       Start Date: 7/29/2022 End Date: --       Order Dose: 1 patch       Quantity: 6 patch    Refills: 0     No discharge procedures on file  PDMP Review     None           ED Provider  Attending physically available and evaluated Winifred Begum I managed the patient along with the ED Attending      Electronically Signed by         Aiden Gallagher MD  07/29/22 011

## 2022-07-29 NOTE — DISCHARGE INSTRUCTIONS
Please follow up with your PCP for your symptoms  You can try lidoderm patches, I sent some to your pharmacy, also available over the counter  If you develop new or worsening symptoms, please return to the Emergency Department for further evaluation

## 2022-07-29 NOTE — ED ATTENDING ATTESTATION
7/28/2022  ISyed MD, saw and evaluated the patient  I have discussed the patient with the resident/non-physician practitioner and agree with the resident's/non-physician practitioner's findings, Plan of Care, and MDM as documented in the resident's/non-physician practitioner's note, except where noted  All available labs and Radiology studies were reviewed  I was present for key portions of any procedure(s) performed by the resident/non-physician practitioner and I was immediately available to provide assistance  At this point I agree with the current assessment done in the Emergency Department  I have conducted an independent evaluation of this patient a history and physical is as follows:  39 y/o F hx bariatric surgery presents for evaluation of R flank pain that radiates into the epigastrium and chest   assocaited with sob, nausea, fevers  10 systems reviewed and otherwise neg  On exam no distress, lungs nml, cardiac nml, abd nml, no andrea  MDM: flank/upper abd/chest pain-will do cardiac/abdominal work up, ct to r/o acute pathology, prn pain meds, reassess    ED Course         Critical Care Time  Procedures

## 2022-09-11 DIAGNOSIS — R10.9 ABDOMINAL PAIN, UNSPECIFIED ABDOMINAL LOCATION: ICD-10-CM

## 2022-09-11 RX ORDER — OMEPRAZOLE 40 MG/1
CAPSULE, DELAYED RELEASE ORAL
Qty: 60 CAPSULE | Refills: 2 | Status: SHIPPED | OUTPATIENT
Start: 2022-09-11 | End: 2022-10-28

## 2022-09-19 ENCOUNTER — NURSE TRIAGE (OUTPATIENT)
Dept: OTHER | Facility: OTHER | Age: 42
End: 2022-09-19

## 2022-09-19 DIAGNOSIS — R11.2 NAUSEA AND VOMITING, UNSPECIFIED VOMITING TYPE: Primary | ICD-10-CM

## 2022-09-19 RX ORDER — PROCHLORPERAZINE MALEATE 10 MG
10 TABLET ORAL EVERY 6 HOURS PRN
Qty: 30 TABLET | Refills: 0 | Status: SHIPPED | OUTPATIENT
Start: 2022-09-19 | End: 2022-10-28

## 2022-09-19 NOTE — TELEPHONE ENCOUNTER
Patient reports abdominal pain, nausea, vomiting, and off and on constipation/diarrhea since last week  She reports that the only time she feels better is when she is not eating  She has some relief after taking her Prilosec and Zofran but does not feel like the Linzess is helping  Patient would like a call back to advise  Reason for Disposition   MILD pain (e g , does not interfere with normal activities) and pain comes and goes (cramps) lasts > 48 hours (Exception: this same abdominal pain is a chronic symptom recurrent or ongoing AND present > 4 weeks)    Answer Assessment - Initial Assessment Questions  1  LOCATION: "Where does it hurt?"       Stomach (middle, lower)   2  RADIATION: "Does the pain shoot anywhere else?" (e g , chest, back)      Denies  3  ONSET: "When did the pain begin?" (e g , minutes, hours or days ago)       Last few days   4  SUDDEN: "Gradual or sudden onset?"     Sudden   5  PATTERN "Does the pain come and go, or is it constant?"     - If constant: "Is it getting better, staying the same, or worsening?"       (Note: Constant means the pain never goes away completely; most serious pain is constant and it progresses)      - If intermittent: "How long does it last?" "Do you have pain now?"      (Note: Intermittent means the pain goes away completely between bouts)      Comes and goes   6  SEVERITY: "How bad is the pain?"  (e g , Scale 1-10; mild, moderate, or severe)    - MILD (1-3): doesn't interfere with normal activities, abdomen soft and not tender to touch     - MODERATE (4-7): interferes with normal activities or awakens from sleep, tender to touch     - SEVERE (8-10): excruciating pain, doubled over, unable to do any normal activities       Mild-moderate  7  RECURRENT SYMPTOM: "Have you ever had this type of stomach pain before?" If Yes, ask: "When was the last time?" and "What happened that time?"       Denies   8   CAUSE: "What do you think is causing the stomach pain?" Unaware  9  RELIEVING/AGGRAVATING FACTORS: "What makes it better or worse?" (e g , movement, antacids, bowel movement)      Omeprazole, Anti nausea medication  10  OTHER SYMPTOMS: "Has there been any vomiting, diarrhea, constipation, or urine problems?"        Vomiting, nausea, bloating, diarrhea, constipation  11   PREGNANCY: "Is there any chance you are pregnant?" "When was your last menstrual period?"        Denies    Protocols used: ABDOMINAL PAIN - [facility] - SUJATHA CRAWFORD

## 2022-09-19 NOTE — ANESTHESIA PREPROCEDURE EVALUATION
Procedure:  EGD    Relevant Problems   GI/HEPATIC   (+) Bariatric surgery status   (+) Dysphagia   (+) Status post bariatric surgery      HEMATOLOGY   (+) Iron deficiency anemia secondary to inadequate dietary iron intake   (+) Symptomatic anemia      NEURO/PSYCH   (+) History of multiple miscarriages      Digestive   (+) Chronic vomiting      Other   (+) Abdominal pain   (+) Epigastric abdominal pain   (+) Heart burn   (+) Obesity, Class II, BMI 35-39.9        Physical Exam    Airway    Mallampati score: II  TM Distance: >3 FB  Neck ROM: full     Dental   No notable dental hx     Cardiovascular  Cardiovascular exam normal    Pulmonary  Pulmonary exam normal     Other Findings        Anesthesia Plan  ASA Score- 3     Anesthesia Type- IV sedation with anesthesia with ASA Monitors. Additional Monitors:   Airway Plan:           Plan Factors-Exercise tolerance (METS): >4 METS. Chart reviewed. Existing labs reviewed. Patient is not a current smoker. Patient not instructed to abstain from smoking on day of procedure. Patient did not smoke on day of surgery. Induction- intravenous. Postoperative Plan-     Informed Consent- Anesthetic plan and risks discussed with patient. I personally reviewed this patient with the CRNA. Discussed and agreed on the Anesthesia Plan with the CRNA. Mary Ellen Black Ear Star Wedge Flap Text: The defect edges were debeveled with a #15 blade scalpel.  Given the location of the defect and the proximity to free margins (helical rim) an ear star wedge flap was deemed most appropriate.  Using a sterile surgical marker, the appropriate flap was drawn incorporating the defect and placing the expected incisions between the helical rim and antihelix where possible.  The area thus outlined was incised through and through with a #15 scalpel blade.

## 2022-09-19 NOTE — TELEPHONE ENCOUNTER
Regarding: Stomach pain, vomiting, nausea and bloating  ----- Message from Denny Benton sent at 9/19/2022  1:22 PM EDT -----  "I am having some issues lately with stomach pain, vomiting, nausea, bloating   Its been ongoing for few days now "

## 2022-09-20 ENCOUNTER — HOSPITAL ENCOUNTER (OUTPATIENT)
Dept: RADIOLOGY | Facility: HOSPITAL | Age: 42
Discharge: HOME | End: 2022-09-20
Payer: MEDICARE

## 2022-09-20 DIAGNOSIS — R11.2 NAUSEA AND VOMITING, UNSPECIFIED VOMITING TYPE: ICD-10-CM

## 2022-09-20 PROCEDURE — 74022 RADEX COMPL AQT ABD SERIES: CPT

## 2022-10-18 ENCOUNTER — TELEPHONE (OUTPATIENT)
Dept: GASTROENTEROLOGY | Facility: CLINIC | Age: 42
End: 2022-10-18

## 2022-10-18 DIAGNOSIS — K58.1 IRRITABLE BOWEL SYNDROME WITH CONSTIPATION: Primary | ICD-10-CM

## 2022-10-18 RX ORDER — LINACLOTIDE 290 UG/1
290 CAPSULE, GELATIN COATED ORAL DAILY
Qty: 30 CAPSULE | Refills: 3 | Status: ON HOLD | OUTPATIENT
Start: 2022-10-18 | End: 2022-11-17

## 2022-10-28 ENCOUNTER — APPOINTMENT (EMERGENCY)
Dept: CT IMAGING | Facility: HOSPITAL | Age: 42
End: 2022-10-28
Payer: MEDICARE

## 2022-10-28 ENCOUNTER — HOSPITAL ENCOUNTER (OUTPATIENT)
Facility: HOSPITAL | Age: 42
Setting detail: OBSERVATION
Discharge: LEFT BEFORE DISCHARGE | End: 2022-10-28
Attending: EMERGENCY MEDICINE | Admitting: INTERNAL MEDICINE
Payer: MEDICARE

## 2022-10-28 VITALS
BODY MASS INDEX: 38.56 KG/M2 | WEIGHT: 231.7 LBS | RESPIRATION RATE: 18 BRPM | DIASTOLIC BLOOD PRESSURE: 73 MMHG | HEART RATE: 76 BPM | SYSTOLIC BLOOD PRESSURE: 120 MMHG | OXYGEN SATURATION: 100 % | TEMPERATURE: 98 F

## 2022-10-28 DIAGNOSIS — R10.9 INTRACTABLE ABDOMINAL PAIN: ICD-10-CM

## 2022-10-28 DIAGNOSIS — Z98.84 BARIATRIC SURGERY STATUS: Chronic | ICD-10-CM

## 2022-10-28 DIAGNOSIS — R11.2 NAUSEA AND VOMITING: Primary | ICD-10-CM

## 2022-10-28 DIAGNOSIS — R10.13 EPIGASTRIC ABDOMINAL PAIN: ICD-10-CM

## 2022-10-28 PROBLEM — R07.9 CHEST PAIN: Status: ACTIVE | Noted: 2022-10-28

## 2022-10-28 LAB
ALBUMIN SERPL BCP-MCNC: 3.5 G/DL (ref 3.5–5)
ALP SERPL-CCNC: 99 U/L (ref 46–116)
ALT SERPL W P-5'-P-CCNC: 19 U/L (ref 12–78)
ANION GAP SERPL CALCULATED.3IONS-SCNC: 6 MMOL/L (ref 4–13)
AST SERPL W P-5'-P-CCNC: 22 U/L (ref 5–45)
BACTERIA UR QL AUTO: ABNORMAL /HPF
BASOPHILS # BLD AUTO: 0.03 THOUSANDS/ÂΜL (ref 0–0.1)
BASOPHILS NFR BLD AUTO: 1 % (ref 0–1)
BILIRUB SERPL-MCNC: 0.29 MG/DL (ref 0.2–1)
BILIRUB UR QL STRIP: NEGATIVE
BUN SERPL-MCNC: 9 MG/DL (ref 5–25)
CALCIUM SERPL-MCNC: 8.9 MG/DL (ref 8.3–10.1)
CARDIAC TROPONIN I PNL SERPL HS: <2 NG/L
CHLORIDE SERPL-SCNC: 104 MMOL/L (ref 96–108)
CLARITY UR: CLEAR
CO2 SERPL-SCNC: 28 MMOL/L (ref 21–32)
COLOR UR: YELLOW
CREAT SERPL-MCNC: 0.92 MG/DL (ref 0.6–1.3)
EOSINOPHIL # BLD AUTO: 0.08 THOUSAND/ÂΜL (ref 0–0.61)
EOSINOPHIL NFR BLD AUTO: 1 % (ref 0–6)
ERYTHROCYTE [DISTWIDTH] IN BLOOD BY AUTOMATED COUNT: 19.2 % (ref 11.6–15.1)
EXT PREG TEST URINE: NEGATIVE
EXT. CONTROL ED NAV: NORMAL
FLUAV RNA RESP QL NAA+PROBE: NEGATIVE
FLUBV RNA RESP QL NAA+PROBE: NEGATIVE
GFR SERPL CREATININE-BSD FRML MDRD: 77 ML/MIN/1.73SQ M
GLUCOSE SERPL-MCNC: 94 MG/DL (ref 65–140)
GLUCOSE UR STRIP-MCNC: NEGATIVE MG/DL
HCT VFR BLD AUTO: 31.9 % (ref 34.8–46.1)
HGB BLD-MCNC: 9.3 G/DL (ref 11.5–15.4)
HGB UR QL STRIP.AUTO: ABNORMAL
IMM GRANULOCYTES # BLD AUTO: 0.02 THOUSAND/UL (ref 0–0.2)
IMM GRANULOCYTES NFR BLD AUTO: 0 % (ref 0–2)
KETONES UR STRIP-MCNC: NEGATIVE MG/DL
LEUKOCYTE ESTERASE UR QL STRIP: NEGATIVE
LIPASE SERPL-CCNC: 63 U/L (ref 73–393)
LYMPHOCYTES # BLD AUTO: 2.65 THOUSANDS/ÂΜL (ref 0.6–4.47)
LYMPHOCYTES NFR BLD AUTO: 42 % (ref 14–44)
MCH RBC QN AUTO: 20.8 PG (ref 26.8–34.3)
MCHC RBC AUTO-ENTMCNC: 29.2 G/DL (ref 31.4–37.4)
MCV RBC AUTO: 71 FL (ref 82–98)
MONOCYTES # BLD AUTO: 0.53 THOUSAND/ÂΜL (ref 0.17–1.22)
MONOCYTES NFR BLD AUTO: 8 % (ref 4–12)
MUCOUS THREADS UR QL AUTO: ABNORMAL
NEUTROPHILS # BLD AUTO: 2.97 THOUSANDS/ÂΜL (ref 1.85–7.62)
NEUTS SEG NFR BLD AUTO: 48 % (ref 43–75)
NITRITE UR QL STRIP: NEGATIVE
NON-SQ EPI CELLS URNS QL MICRO: ABNORMAL /HPF
NRBC BLD AUTO-RTO: 0 /100 WBCS
PH UR STRIP.AUTO: 7 [PH] (ref 4.5–8)
PLATELET # BLD AUTO: 471 THOUSANDS/UL (ref 149–390)
PMV BLD AUTO: 9 FL (ref 8.9–12.7)
POTASSIUM SERPL-SCNC: 3.2 MMOL/L (ref 3.5–5.3)
PROT SERPL-MCNC: 8.3 G/DL (ref 6.4–8.4)
PROT UR STRIP-MCNC: NEGATIVE MG/DL
RBC # BLD AUTO: 4.47 MILLION/UL (ref 3.81–5.12)
RBC #/AREA URNS AUTO: ABNORMAL /HPF
RSV RNA RESP QL NAA+PROBE: NEGATIVE
SARS-COV-2 RNA RESP QL NAA+PROBE: NEGATIVE
SODIUM SERPL-SCNC: 138 MMOL/L (ref 135–147)
SP GR UR STRIP.AUTO: 1.02 (ref 1–1.03)
UROBILINOGEN UR QL STRIP.AUTO: 1 E.U./DL
WBC # BLD AUTO: 6.28 THOUSAND/UL (ref 4.31–10.16)
WBC #/AREA URNS AUTO: ABNORMAL /HPF

## 2022-10-28 PROCEDURE — 36415 COLL VENOUS BLD VENIPUNCTURE: CPT | Performed by: PHYSICIAN ASSISTANT

## 2022-10-28 PROCEDURE — 0241U HB NFCT DS VIR RESP RNA 4 TRGT: CPT | Performed by: STUDENT IN AN ORGANIZED HEALTH CARE EDUCATION/TRAINING PROGRAM

## 2022-10-28 PROCEDURE — 93005 ELECTROCARDIOGRAM TRACING: CPT

## 2022-10-28 PROCEDURE — C9113 INJ PANTOPRAZOLE SODIUM, VIA: HCPCS | Performed by: PHYSICIAN ASSISTANT

## 2022-10-28 PROCEDURE — 74177 CT ABD & PELVIS W/CONTRAST: CPT

## 2022-10-28 PROCEDURE — 83690 ASSAY OF LIPASE: CPT | Performed by: PHYSICIAN ASSISTANT

## 2022-10-28 PROCEDURE — 81001 URINALYSIS AUTO W/SCOPE: CPT

## 2022-10-28 PROCEDURE — 85025 COMPLETE CBC W/AUTO DIFF WBC: CPT | Performed by: PHYSICIAN ASSISTANT

## 2022-10-28 PROCEDURE — 81025 URINE PREGNANCY TEST: CPT | Performed by: PHYSICIAN ASSISTANT

## 2022-10-28 PROCEDURE — 84484 ASSAY OF TROPONIN QUANT: CPT | Performed by: PHYSICIAN ASSISTANT

## 2022-10-28 PROCEDURE — G1004 CDSM NDSC: HCPCS

## 2022-10-28 PROCEDURE — 80053 COMPREHEN METABOLIC PANEL: CPT | Performed by: PHYSICIAN ASSISTANT

## 2022-10-28 RX ORDER — PANTOPRAZOLE SODIUM 40 MG/1
40 TABLET, DELAYED RELEASE ORAL 2 TIMES DAILY
Qty: 60 TABLET | Refills: 0 | Status: SHIPPED | OUTPATIENT
Start: 2022-10-28

## 2022-10-28 RX ORDER — POTASSIUM CHLORIDE 20 MEQ/1
40 TABLET, EXTENDED RELEASE ORAL ONCE
Status: COMPLETED | OUTPATIENT
Start: 2022-10-28 | End: 2022-10-28

## 2022-10-28 RX ORDER — ONDANSETRON 4 MG/1
4 TABLET, ORALLY DISINTEGRATING ORAL EVERY 6 HOURS PRN
Qty: 20 TABLET | Refills: 0 | Status: ON HOLD | OUTPATIENT
Start: 2022-10-28 | End: 2022-11-04 | Stop reason: SDUPTHER

## 2022-10-28 RX ORDER — FENTANYL CITRATE 50 UG/ML
25 INJECTION, SOLUTION INTRAMUSCULAR; INTRAVENOUS ONCE
Status: COMPLETED | OUTPATIENT
Start: 2022-10-28 | End: 2022-10-28

## 2022-10-28 RX ORDER — MAGNESIUM HYDROXIDE/ALUMINUM HYDROXICE/SIMETHICONE 120; 1200; 1200 MG/30ML; MG/30ML; MG/30ML
30 SUSPENSION ORAL ONCE
Status: COMPLETED | OUTPATIENT
Start: 2022-10-28 | End: 2022-10-28

## 2022-10-28 RX ORDER — ONDANSETRON 2 MG/ML
4 INJECTION INTRAMUSCULAR; INTRAVENOUS ONCE
Status: COMPLETED | OUTPATIENT
Start: 2022-10-28 | End: 2022-10-28

## 2022-10-28 RX ORDER — DIPHENHYDRAMINE HYDROCHLORIDE 50 MG/ML
50 INJECTION INTRAMUSCULAR; INTRAVENOUS ONCE
Status: COMPLETED | OUTPATIENT
Start: 2022-10-28 | End: 2022-10-28

## 2022-10-28 RX ADMIN — ONDANSETRON 4 MG: 2 INJECTION INTRAMUSCULAR; INTRAVENOUS at 14:59

## 2022-10-28 RX ADMIN — FENTANYL CITRATE 25 MCG: 50 INJECTION, SOLUTION INTRAMUSCULAR; INTRAVENOUS at 18:28

## 2022-10-28 RX ADMIN — IOHEXOL 50 ML: 240 INJECTION, SOLUTION INTRATHECAL; INTRAVASCULAR; INTRAVENOUS; ORAL at 16:45

## 2022-10-28 RX ADMIN — PANTOPRAZOLE SODIUM 80 MG: 40 INJECTION, POWDER, FOR SOLUTION INTRAVENOUS at 17:36

## 2022-10-28 RX ADMIN — FENTANYL CITRATE 25 MCG: 50 INJECTION, SOLUTION INTRAMUSCULAR; INTRAVENOUS at 14:58

## 2022-10-28 RX ADMIN — IOHEXOL 100 ML: 350 INJECTION, SOLUTION INTRAVENOUS at 16:45

## 2022-10-28 RX ADMIN — DIPHENHYDRAMINE HYDROCHLORIDE 50 MG: 50 INJECTION, SOLUTION INTRAMUSCULAR; INTRAVENOUS at 16:32

## 2022-10-28 RX ADMIN — ALUMINUM HYDROXIDE, MAGNESIUM HYDROXIDE, AND SIMETHICONE 30 ML: 200; 200; 20 SUSPENSION ORAL at 16:52

## 2022-10-28 RX ADMIN — SODIUM CHLORIDE 1000 ML: 0.9 INJECTION, SOLUTION INTRAVENOUS at 14:47

## 2022-10-28 RX ADMIN — POTASSIUM CHLORIDE 40 MEQ: 1500 TABLET, EXTENDED RELEASE ORAL at 16:32

## 2022-10-28 NOTE — ED PROVIDER NOTES
History  Chief Complaint   Patient presents with   • Vomiting     Patient reports vomiting since Sunday  Also c/o weakness and diarrhea  Kings Gaming is a 39 yo F, history of candace-en-y in 2019, previous gastric ulcers, cholecystectomy, anemia, presenting with 5 days of nausea, vomiting, mild diarrhea, and upper abdominal pain  She notes her vomitus has been intermittently blood tinged  No melena or BRBPR  Reports her pain feels similar to previous episodes of gastric ulcers  Denies chest pain or radiation of pain to back  Reports taking all prescribed medications with exception of stopping omeprazole 2 days ago as she believed she had an allergic reaction to this, although this reaction happened after taking compazine for the first time  Notes facial swelling shortly after this medicine which has since resolved  Denies alcohol or NSAID use  No fevers/chills  History provided by:  Patient   used: No        Prior to Admission Medications   Prescriptions Last Dose Informant Patient Reported?  Taking?   ascorbic acid (VITAMIN C) 500 mg tablet  Self Yes Yes   Sig: Take 500 mg by mouth daily   ferrous sulfate 325 (65 Fe) mg tablet  Self Yes Yes   Sig: Take 325 mg by mouth daily with breakfast   linaCLOtide (Linzess) 290 MCG CAPS   No Yes   Sig: Take 1 capsule by mouth in the morning      Facility-Administered Medications: None       Past Medical History:   Diagnosis Date   • Abdominal pain     "almost constant"   • Anemia    • Anesthesia     "woke up swinging with last  9/2018  "was fine with EGD"   • Back pain    • Bariatric surgery status     2008-gastric sleeve revison RUALISSON-Y 4/2019-abd painnow-dx lap today 3/9/2020   • Constipation    • Dental bridge present     right lower permanent   • Diarrhea    • Difficulty swallowing     "in the past"   • Disease of thyroid gland     not on meds now   • Dizzy    • Fatigue     "when blood pressure low" and weakness too"   • GERD (gastroesophageal reflux disease)     "increase after surgery"   • Heart murmur     heart murmer, work up negative with holter monitor   • History of 2  sections     most recent 17   • History of D&C 2019   • History of iron deficiency     anemia/ had IV infusions through pregnancy in 1332-7272   • History of transfusion     2019 - pt had allergic reaction - had to stop the blood   • Inguinal hernia     right   • Loss of appetite    • Low BP     "off and on"   • Migraine    • N&V (nausea and vomiting)     " a little better since on meds"   • Non-intractable vomiting     "not since been on medicine"   • Palpitations     "having again"   • Postgastrectomy malabsorption    • Stomach pain        Past Surgical History:   Procedure Laterality Date   •  SECTION      x2   • CHOLECYSTECTOMY     • CHROMOSOME ANALYSIS, PRODUCTS OF CONCEPTION (HISTORICAL)      2 miscarriages in  and Nov   • LAPAROSCOPY N/A 3/9/2020    Procedure: DIAGNOSTIC LAPAROSCOPY,CLOSURE OF COATES DEFECT, INTRAOP EGD;  Surgeon: Shyla Haas MD;  [facility];  Service: Bariatrics   • OVARIAN CYST REMOVAL Right    • [place] EGD TRANSORAL BIOPSY SINGLE/MULTIPLE N/A 2019    Procedure: ESOPHAGOGASTRODUODENOSCOPY (EGD) with bx;  Surgeon: Shyla Haas MD;  [facility]: [facility];   Service: Bariatrics   • [place] LAP GASTRIC BYPASS/SREE-EN-Y N/A 2019    Procedure: LAP SREE-EN-Y GASTRIC BYPASS, INTRAOP EGD;  Surgeon: Shyla Haas MD;  [facility];  Service: Bariatrics   • [place] SURG RX MISSED ABORTN,1ST TRI N/A 2019    Procedure: DILATATION AND EVACUATION (D&E) (8 weeks) missed ab;  Surgeon: Flora Sosa MD;  [facility];  Service: Gynecology   • REVISION BYPASS LAPAROSCOPIC N/A 2019    Procedure: LAP REVISION/ CONVERSION;  Surgeon: Shyla Haas MD;  [facility];  Service: Bariatrics   • SALPINGOOPHORECTOMY Right 2018   • SLEEVE GASTROPLASTY         Family History   Problem Relation Age of Onset   • Hypertension Mother    • Heart disease Mother    • No Known Problems Father      I have reviewed and agree with the history as documented  E-Cigarette/Vaping   • E-Cigarette Use Never User      E-Cigarette/Vaping Substances   • Nicotine No    • THC No    • CBD No    • Flavoring No    • Other No    • Unknown No      Social History     Tobacco Use   • Smoking status: Never Smoker   • Smokeless tobacco: Never Used   Vaping Use   • Vaping Use: Never used   Substance Use Topics   • Alcohol use: Not Currently   • Drug use: No       Review of Systems   Constitutional: Positive for fatigue  Negative for chills and fever  HENT: Negative for congestion, rhinorrhea and sore throat  Eyes: Negative for pain and visual disturbance  Respiratory: Negative for cough, shortness of breath and wheezing  Cardiovascular: Negative for chest pain and palpitations  Gastrointestinal: Positive for abdominal pain, nausea and vomiting  Negative for constipation and diarrhea  Genitourinary: Negative for dysuria, frequency and urgency  Musculoskeletal: Negative for back pain, neck pain and neck stiffness  Skin: Negative for rash and wound  Neurological: Negative for dizziness, weakness, light-headedness and numbness  Physical Exam  Physical Exam  Constitutional:       General: She is not in acute distress  Appearance: She is well-developed  She is ill-appearing  She is not toxic-appearing or diaphoretic  HENT:      Head: Normocephalic and atraumatic  Right Ear: External ear normal       Left Ear: External ear normal    Eyes:      Conjunctiva/sclera: Conjunctivae normal       Pupils: Pupils are equal, round, and reactive to light  Cardiovascular:      Rate and Rhythm: Normal rate and regular rhythm  Heart sounds: Normal heart sounds  No murmur heard  No friction rub  No gallop  Pulmonary:      Effort: Pulmonary effort is normal  No respiratory distress        Breath sounds: Normal breath sounds  No wheezing, rhonchi or rales  Abdominal:      General: There is no distension  Palpations: Abdomen is soft  Tenderness: There is abdominal tenderness  There is no right CVA tenderness, left CVA tenderness or guarding  Comments: TTP to epigastrium  No rigidity, rebound, or guarding  Neg Larry's  No CVAT   Musculoskeletal:      Cervical back: Normal range of motion and neck supple  Lymphadenopathy:      Cervical: No cervical adenopathy  Skin:     General: Skin is warm and dry  Capillary Refill: Capillary refill takes less than 2 seconds  Findings: No erythema or rash  Neurological:      Mental Status: She is alert and oriented to person, place, and time  Motor: No abnormal muscle tone  Coordination: Coordination normal    Psychiatric:         Behavior: Behavior normal          Thought Content:  Thought content normal          Judgment: Judgment normal          Vital Signs  ED Triage Vitals   Temperature Pulse Respirations Blood Pressure SpO2   10/28/22 1330 10/28/22 1330 10/28/22 1330 10/28/22 1330 10/28/22 1330   98 °F (36 7 °C) 96 18 134/65 100 %      Temp Source Heart Rate Source Patient Position - Orthostatic VS BP Location FiO2 (%)   10/28/22 1330 10/28/22 1330 10/28/22 1330 10/28/22 1330 --   Oral Monitor Sitting Right arm       Pain Score       10/28/22 1458       8           Vitals:    10/28/22 1330 10/28/22 1611   BP: 134/65 108/70   Pulse: 96 71   Patient Position - Orthostatic VS: Sitting Lying         Visual Acuity      ED Medications  Medications   diphenhydrAMINE (BENADRYL) injection 50 mg (has no administration in time range)   potassium chloride (K-DUR,KLOR-CON) CR tablet 40 mEq (has no administration in time range)   pantoprazole (PROTONIX) 80 mg in sodium chloride 0 9 % 100 mL IVPB (has no administration in time range)   aluminum-magnesium hydroxide-simethicone (MYLANTA) oral suspension 30 mL (has no administration in time range)   sodium chloride 0 9 % bolus 1,000 mL (1,000 mL Intravenous New Bag 10/28/22 1447)   fentanyl citrate (PF) 100 MCG/2ML 25 mcg (25 mcg Intravenous Given 10/28/22 1458)   ondansetron (ZOFRAN) injection 4 mg (4 mg Intravenous Given 10/28/22 1459)       Diagnostic Studies  Results Reviewed     Procedure Component Value Units Date/Time    Comprehensive metabolic panel [[id number]]  (Abnormal) Collected: 10/28/22 1447    Lab Status: Final result Specimen: Blood from Arm, Right Updated: 10/28/22 1526     Sodium 138 mmol/L      Potassium 3 2 mmol/L      Chloride 104 mmol/L      CO2 28 mmol/L      ANION GAP 6 mmol/L      BUN 9 mg/dL      Creatinine 0 92 mg/dL      Glucose 94 mg/dL      Calcium 8 9 mg/dL      AST 22 U/L      ALT 19 U/L      Alkaline Phosphatase 99 U/L      Total Protein 8 3 g/dL      Albumin 3 5 g/dL      Total Bilirubin 0 29 mg/dL      eGFR 77 ml/min/1 73sq m     Narrative:      [place] guidelines for Chronic Kidney Disease (CKD):   •  Stage 1 with normal or high GFR (GFR > 90 mL/min/1 73 square meters)  •  Stage 2 Mild CKD (GFR = 60-89 mL/min/1 73 square meters)  •  Stage 3A Moderate CKD (GFR = 45-59 mL/min/1 73 square meters)  •  Stage 3B Moderate CKD (GFR = 30-44 mL/min/1 73 square meters)  •  Stage 4 Severe CKD (GFR = 15-29 mL/min/1 73 square meters)  •  Stage 5 End Stage CKD (GFR <15 mL/min/1 73 square meters)  Note: GFR calculation is accurate only with a steady state creatinine    Lipase [[id number]]  (Abnormal) Collected: 10/28/22 1447    Lab Status: Final result Specimen: Blood from Arm, Right Updated: 10/28/22 1526     Lipase 63 u/L     Urine Microscopic [[id number]]  (Abnormal) Collected: 10/28/22 1450    Lab Status: Final result Specimen: Urine, Clean Catch Updated: 10/28/22 1525     RBC, UA 4-10 /hpf      WBC, UA 0-1 /hpf      Epithelial Cells Occasional /hpf      Bacteria, UA Moderate /hpf      MUCUS THREADS Occasional    CBC and differential [[id number]]  (Abnormal) Collected: 10/28/22 1447    Lab Status: Final result Specimen: Blood from Arm, Right Updated: 10/28/22 1453     WBC 6 28 Thousand/uL      RBC 4 47 Million/uL      Hemoglobin 9 3 g/dL      Hematocrit 31 9 %      MCV 71 fL      MCH 20 8 pg      MCHC 29 2 g/dL      RDW 19 2 %      MPV 9 0 fL      Platelets 837 Thousands/uL      nRBC 0 /100 WBCs      Neutrophils Relative 48 %      Immat GRANS % 0 %      Lymphocytes Relative 42 %      Monocytes Relative 8 %      Eosinophils Relative 1 %      Basophils Relative 1 %      Neutrophils Absolute 2 97 Thousands/µL      Immature Grans Absolute 0 02 Thousand/uL      Lymphocytes Absolute 2 65 Thousands/µL      Monocytes Absolute 0 53 Thousand/µL      Eosinophils Absolute 0 08 Thousand/µL      Basophils Absolute 0 03 Thousands/µL     POCT pregnancy, urine [[id number]]  (Normal) Resulted: 10/28/22 1453    Lab Status: Final result Updated: 10/28/22 1453     EXT PREG TEST UR (Ref: Negative) Negative     Control Valid    Urine Macroscopic, POC [[id number]]  (Abnormal) Collected: 10/28/22 1450    Lab Status: Final result Specimen: Urine Updated: 10/28/22 1452     Color, UA Yellow     Clarity, UA Clear     pH, UA 7 0     Leukocytes, UA Negative     Nitrite, UA Negative     Protein, UA Negative mg/dl      Glucose, UA Negative mg/dl      Ketones, UA Negative mg/dl      Urobilinogen, UA 1 0 E U /dl      Bilirubin, UA Negative     Occult Blood, UA Large     Specific Morton, UA 1 020    Narrative:      [facility] RESULT                 CT abdomen pelvis with contrast    (Results Pending)              Procedures  Procedures         ED Course  ED Course as of 10/28/22 1631   Fri Oct 28, 2022   1554 Pt notes ongoing nausea and pain is unchanged  Limited antiemetic options due to allergies to compazine, reglan, minimal relief with Zofran  1620 Case discussed with Dr Hayden Torres, on call bariatric surgery fellow - to see patient in ED shortly     1627 Pt signed out to [employer] ARMINDA pending bariatric evaluation and CT abd/pelvis  SBIRT 22yo+    Flowsheet Row Most Recent Value   SBIRT (25 yo +)    In order to provide better care to our patients, we are screening all of our patients for alcohol and drug use  Would it be okay to ask you these screening questions? No Filed at: 10/28/2022 6318                    MDM  Number of Diagnoses or Management Options  Bariatric surgery status  Epigastric abdominal pain  Nausea and vomiting  Diagnosis management comments: Nausea, intermittently blood tinged emesis, and diarrhea for the past day along with epigastric pain, reportedly similar to previous gastric ulcers  On exam patient has epigastric TTP without peritoneal signs, is somewhat ill appearing and dry heaving during exam  Plan to check abdominal labs, CT abd/pelvis per bariatric protocol, providing nausea/pain control, IV fluids  Will discuss with bariatric surgery  Disposition pending ED labs/imaging          Amount and/or Complexity of Data Reviewed  Clinical lab tests: ordered  Tests in the radiology section of CPT®: ordered    Patient Progress  Patient progress: stable      Disposition  Final diagnoses:   Bariatric surgery status   Nausea and vomiting   Epigastric abdominal pain     Time reflects when diagnosis was documented in both MDM as applicable and the Disposition within this note     Time User Action Codes Description Comment    10/28/2022  4:19 PM Kg Maudlin Add [F12 69] Bariatric surgery status     10/28/2022  4:20 PM Kg Maudlin Add [R11 2] Nausea and vomiting     10/28/2022  4:20 PM Kg Maudlin Modify [M94 83] Bariatric surgery status     10/28/2022  4:20 PM Kg Maudlin Modify [R11 2] Nausea and vomiting     10/28/2022  4:20 PM Kg Maudlin Add [R10 13] Epigastric abdominal pain       ED Disposition     None      Follow-up Information    None         Patient's Medications   Discharge Prescriptions    No medications on file       No discharge procedures on file      PDMP Review     None          ED Provider  Electronically Signed by           Rm Jain PA-C  10/28/22 7660

## 2022-10-28 NOTE — CONSULTS
Consultation - Bariatric Surgery   Kae Recio 40 y o  female MRN: [id number]  Unit/Bed#: ED-02 Encounter: [id number]    Assessment/Plan     Assessment:  Patient is a 40 y o  female with history of Laparoscopic conversion of Sleeve to RNY with Dr Krupa Christopher in 2019 with subsequent Diagnostic Laparoscopy in 2020 who presents to the ED with complaints of nausea, vomiting and abdominal pain  Due to the lack of findings on CT scan, her labwork, and her overall benign examination, these symptoms are highly unlikely to due with her previous bariatric procedures  It is possible that he current issues are due to an acute viral illness    Plan:  - Symptomatic treatment of nausea and pain  - IVF for hydration  - Patient cleared for discharge by Bariatric Surgery team  - Ensure patient can tolerate PO liquids prior to DC  - Patient may follow up with us in the [facility] Management Office    History of Present Illness     HPI:  Kae Recio is a 40 y o  female Body mass index is 38 56 kg/m²  with history of Laparoscopic conversion of Sleeve to RNY with Dr Krupa Christopher in 2019 with subsequent Diagnostic Laparoscopy in 2020 who presents to the ED with complaints of Nausea, vomiting and abdominal pain  She states that the nausea started on Sunday and comes after meals, causing her to vomit  She has been very weak as well too  Also complains of diarrhea, headache, and abdominal pain  Of note, she sees a GI for chronic nausea  She has a history of gastric ulcers as well, but her endoscopy as of June did not show any  A CT scan was obtained and consult to our Bariatric team was placed  Review of Systems   Constitutional: Positive for activity change, appetite change and fatigue  Negative for fever  Respiratory: Negative  Cardiovascular: Negative  Gastrointestinal: Positive for abdominal pain, diarrhea, nausea and vomiting         Historical Information   Past Medical History:   Diagnosis Date   • Abdominal pain     "almost constant"   • Anemia    • Anesthesia     "woke up swinging with last  2018  "was fine with EGD"   • Back pain    • Bariatric surgery status     2008-gastric sleeve revison RUEN-Y 2019-abd painnow-dx lap today 3/9/2020   • Constipation    • Dental bridge present     right lower permanent   • Diarrhea    • Difficulty swallowing     "in the past"   • Disease of thyroid gland     not on meds now   • Dizzy    • Fatigue     "when blood pressure low" and weakness too"   • GERD (gastroesophageal reflux disease)     "increase after surgery"   • Heart murmur     heart murmer, work up negative with holter monitor   • History of 2  sections     most recent 17   • History of D&C 2019   • History of iron deficiency     anemia/ had IV infusions through pregnancy in [id number]   • History of transfusion     2019 - pt had allergic reaction - had to stop the blood   • Inguinal hernia     right   • Loss of appetite    • Low BP     "off and on"   • Migraine    • N&V (nausea and vomiting)     " a little better since on meds"   • Non-intractable vomiting     "not since been on medicine"   • Palpitations     "having again"   • Postgastrectomy malabsorption    • Stomach pain      Past Surgical History:   Procedure Laterality Date   •  SECTION      x2   • CHOLECYSTECTOMY     • CHROMOSOME ANALYSIS, PRODUCTS OF CONCEPTION (HISTORICAL)      2 miscarriages in  and Nov   • LAPAROSCOPY N/A 3/9/2020    Procedure: DIAGNOSTIC LAPAROSCOPY,CLOSURE OF COATES DEFECT, INTRAOP EGD;  Surgeon: Olga Ybarra MD;  [facility];  Service: Bariatrics   • OVARIAN CYST REMOVAL Right 2018   • [place] EGD TRANSORAL BIOPSY SINGLE/MULTIPLE N/A 2019    Procedure: ESOPHAGOGASTRODUODENOSCOPY (EGD) with bx;  Surgeon: Olga Ybarra MD;  [facility];   Service: Bariatrics   • [place] LAP GASTRIC BYPASS/SREE-EN-Y N/A 2019    Procedure: LAP SREE-EN-Y GASTRIC BYPASS, INTRAOP EGD; Surgeon: Ana Juan MD;  [facility];  Service: Bariatrics   • [place] SURG RX MISSED ABORTN,1ST TRI N/A 9/25/2019    Procedure: DILATATION AND EVACUATION (D&E) (8 weeks) missed ab;  Surgeon: Harley Mcguire MD;  [facility];  Service: Gynecology   • REVISION BYPASS LAPAROSCOPIC N/A 4/8/2019    Procedure: LAP REVISION/ CONVERSION;  Surgeon: Ana Juan MD;  [facility];  Service: Bariatrics   • SALPINGOOPHORECTOMY Right 09/2018   • SLEEVE GASTROPLASTY       Social History   Social History     Substance and Sexual Activity   Alcohol Use Not Currently     Social History     Substance and Sexual Activity   Drug Use No     Social History     Tobacco Use   Smoking Status Never Smoker   Smokeless Tobacco Never Used     Family History:   Family History   Problem Relation Age of Onset   • Hypertension Mother    • Heart disease Mother    • No Known Problems Father        Meds/Allergies   PTA meds:   Prior to Admission Medications   Prescriptions Last Dose Informant Patient Reported?  Taking?   ascorbic acid (VITAMIN C) 500 mg tablet  Self Yes Yes   Sig: Take 500 mg by mouth daily   ferrous sulfate 325 (65 Fe) mg tablet  Self Yes Yes   Sig: Take 325 mg by mouth daily with breakfast   linaCLOtide (Linzess) 290 MCG CAPS   No Yes   Sig: Take 1 capsule by mouth in the morning      Facility-Administered Medications: None     Allergies   Allergen Reactions   • Aspirin Anaphylaxis   • Shellfish-Derived Products - Food Allergy Anaphylaxis   • Contrast [Iodinated Diagnostic Agents] Itching and Swelling   • Ibuprofen Edema   • Reglan [Metoclopramide] Itching and Confusion       Objective   First Vitals:   Blood Pressure: 134/65 (10/28/22 1330)  Pulse: 96 (10/28/22 1330)  Temperature: 98 °F (36 7 °C) (10/28/22 1330)  Temp Source: Oral (10/28/22 1330)  Respirations: 18 (10/28/22 1330)  Weight - Scale: 105 kg (231 lb 11 3 oz) (10/28/22 1330)  SpO2: 100 % (10/28/22 1330)    Current Vitals:   Blood Pressure: 108/70 (10/28/22 1611)  Pulse: 71 (10/28/22 1611)  Temperature: 98 °F (36 7 °C) (10/28/22 1330)  Temp Source: Oral (10/28/22 1330)  Respirations: 18 (10/28/22 1611)  Weight - Scale: 105 kg (231 lb 11 3 oz) (10/28/22 1330)  SpO2: 100 % (10/28/22 1611)    No intake or output data in the 24 hours ending 10/28/22 1719    Invasive Devices  Report    Peripheral Intravenous Line  Duration           Peripheral IV 10/28/22 Right Antecubital <1 day                Physical Exam  Constitutional:       General: She is not in acute distress  Appearance: She is normal weight  She is not toxic-appearing  Cardiovascular:      Rate and Rhythm: Normal rate and regular rhythm  Pulses: Normal pulses  Heart sounds: Normal heart sounds  Pulmonary:      Effort: Pulmonary effort is normal  No respiratory distress  Breath sounds: Normal breath sounds  No wheezing  Abdominal:      General: Abdomen is flat  There is no distension  Palpations: Abdomen is soft  Comments: +very mild epigastric tenderness   Neurological:      Mental Status: She is alert  Lab Results:   I have personally reviewed pertinent lab results  , CBC:   Lab Results   Component Value Date    WBC 6 28 10/28/2022    HGB 9 3 (L) 10/28/2022    HCT 31 9 (L) 10/28/2022    MCV 71 (L) 10/28/2022     (H) 10/28/2022    MCH 20 8 (L) 10/28/2022    MCHC 29 2 (L) 10/28/2022    RDW 19 2 (H) 10/28/2022    MPV 9 0 10/28/2022    NRBC 0 10/28/2022   , CMP:   Lab Results   Component Value Date    SODIUM 138 10/28/2022    K 3 2 (L) 10/28/2022     10/28/2022    CO2 28 10/28/2022    BUN 9 10/28/2022    CREATININE 0 92 10/28/2022    CALCIUM 8 9 10/28/2022    AST 22 10/28/2022    ALT 19 10/28/2022    ALKPHOS 99 10/28/2022    EGFR 77 10/28/2022     Imaging: I have personally reviewed pertinent reports  and I have personally reviewed pertinent films in PACS  EKG, Pathology, and Other Studies: I have personally reviewed pertinent reports  FINDINGS:     ABDOMEN     LOWER CHEST:  No clinically significant abnormality identified in the visualized lower chest      LIVER/BILIARY TREE:  Liver is diffusely decreased in density consistent with fatty change  No CT evidence of suspicious hepatic mass  Normal hepatic contours  No biliary dilatation      GALLBLADDER:  Gallbladder is surgically absent      SPLEEN:  Unremarkable      PANCREAS:  Unremarkable      ADRENAL GLANDS:  Unremarkable      KIDNEYS/URETERS:  Unremarkable  No hydronephrosis      STOMACH AND BOWEL:  Findings of Karmen-en-Y gastric bypass surgery      APPENDIX:  No findings to suggest appendicitis      ABDOMINOPELVIC CAVITY:  No ascites  No pneumoperitoneum  No lymphadenopathy      VESSELS:  Unremarkable for patient's age      PELVIS     REPRODUCTIVE ORGANS:  Unremarkable for patient's age      URINARY BLADDER:  Unremarkable      ABDOMINAL WALL/INGUINAL REGIONS:  Unremarkable      OSSEOUS STRUCTURES:  No acute fracture or destructive osseous lesion      IMPRESSION:     No acute intra-abdominal finding  Counseling / Coordination of Care  Total floor / unit time spent today 30 minutes  Greater than 50% of total time was spent with the patient and / or family counseling and / or coordination of care

## 2022-10-29 LAB
ATRIAL RATE: 73 BPM
P AXIS: 62 DEGREES
PR INTERVAL: 144 MS
QRS AXIS: 40 DEGREES
QRSD INTERVAL: 86 MS
QT INTERVAL: 392 MS
QTC INTERVAL: 431 MS
T WAVE AXIS: 19 DEGREES
VENTRICULAR RATE: 73 BPM

## 2022-10-29 NOTE — ASSESSMENT & PLAN NOTE
· Patient underwent laparoscopic sleeve gastrectomy with revision to laparoscopic Karmen-en-Y gastric bypass surgery  · Follows with Dr Romeo Garcia  · Patient presented the ER complaining of nausea, vomiting, and abdominal pain    She has previous history of marginal ulcer  · She was seen by the bariatric surgery team in the ER who recommended outpatient follow-up for endoscopy

## 2022-10-29 NOTE — ASSESSMENT & PLAN NOTE
· Patient notes she has been having chest pain  She indicates a burning that extends from her epigastric region up to her substernal notch  · Patient has 5 days of nausea and vomiting  · Patient's EKG without any acute abnormality  Initial troponin unremarkable  · Patient was scheduled for admission to the hospital and workup with serial EKGs and troponins however she has elected to sign out against medical advice  · No current evidence of ischemia  Suspect chest pain is most likely secondary to GI etiology, peptic ulcer disease and recent nausea/vomiting  · Future EGD recommended  · She will continue her home proton pump inhibitor b i d

## 2022-10-29 NOTE — ASSESSMENT & PLAN NOTE
· Patient noted pain throughout her entire abdomen, as well as epigastric region  · CT scan of the abdomen/pelvis without acute abnormality  · Patient notes her symptoms are similar to her previous marginal ulcer  · Continue proton pump inhibitor b i d   · Outpatient Endoscopy anticipated by bariatric

## 2022-10-29 NOTE — ED CARE HANDOFF
Emergency Department Sign Out Note        Sign out and transfer of care from [facility]  See Separate Emergency Department note  The patient, Mark Anaya, was evaluated by the previous provider for abdominal pain and vomiting      Workup Completed:  Results for orders placed or performed during the hospital encounter of 10/28/22   COVID/FLU/RSV    Specimen: Nasopharyngeal Swab; Nares   Result Value Ref Range    SARS-CoV-2 Negative Negative    INFLUENZA A PCR Negative Negative    INFLUENZA B PCR Negative Negative    RSV PCR Negative Negative   CBC and differential   Result Value Ref Range    WBC 6 28 4 31 - 10 16 Thousand/uL    RBC 4 47 3 81 - 5 12 Million/uL    Hemoglobin 9 3 (L) 11 5 - 15 4 g/dL    Hematocrit 31 9 (L) 34 8 - 46 1 %    MCV 71 (L) 82 - 98 fL    MCH 20 8 (L) 26 8 - 34 3 pg    MCHC 29 2 (L) 31 4 - 37 4 g/dL    RDW 19 2 (H) 11 6 - 15 1 %    MPV 9 0 8 9 - 12 7 fL    Platelets 802 (H) 133 - 390 Thousands/uL    nRBC 0 /100 WBCs    Neutrophils Relative 48 43 - 75 %    Immat GRANS % 0 0 - 2 %    Lymphocytes Relative 42 14 - 44 %    Monocytes Relative 8 4 - 12 %    Eosinophils Relative 1 0 - 6 %    Basophils Relative 1 0 - 1 %    Neutrophils Absolute 2 97 1 85 - 7 62 Thousands/µL    Immature Grans Absolute 0 02 0 00 - 0 20 Thousand/uL    Lymphocytes Absolute 2 65 0 60 - 4 47 Thousands/µL    Monocytes Absolute 0 53 0 17 - 1 22 Thousand/µL    Eosinophils Absolute 0 08 0 00 - 0 61 Thousand/µL    Basophils Absolute 0 03 0 00 - 0 10 Thousands/µL   Comprehensive metabolic panel   Result Value Ref Range    Sodium 138 135 - 147 mmol/L    Potassium 3 2 (L) 3 5 - 5 3 mmol/L    Chloride 104 96 - 108 mmol/L    CO2 28 21 - 32 mmol/L    ANION GAP 6 4 - 13 mmol/L    BUN 9 5 - 25 mg/dL    Creatinine 0 92 0 60 - 1 30 mg/dL    Glucose 94 65 - 140 mg/dL    Calcium 8 9 8 3 - 10 1 mg/dL    AST 22 5 - 45 U/L    ALT 19 12 - 78 U/L    Alkaline Phosphatase 99 46 - 116 U/L    Total Protein 8 3 6 4 - 8 4 g/dL Albumin 3 5 3 5 - 5 0 g/dL    Total Bilirubin 0 29 0 20 - 1 00 mg/dL    eGFR 77 ml/min/1 73sq m   Lipase   Result Value Ref Range    Lipase 63 (L) 73 - 393 u/L   Urine Microscopic   Result Value Ref Range    RBC, UA 4-10 (A) None Seen, 2-4 /hpf    WBC, UA 0-1 (A) None Seen, 2-4, 5-60 /hpf    Epithelial Cells Occasional None Seen, Occasional /hpf    Bacteria, UA Moderate (A) None Seen, Occasional /hpf    MUCUS THREADS Occasional (A) None Seen   HS Troponin 0hr (reflex protocol)   Result Value Ref Range    hs TnI 0hr <2 "Refer to ACS Flowchart"- see link ng/L   POCT pregnancy, urine   Result Value Ref Range    EXT PREG TEST UR (Ref: Negative) Negative     Control Valid    ECG 12 lead   Result Value Ref Range    Ventricular Rate 73 BPM    Atrial Rate 73 BPM    UT Interval 144 ms    QRSD Interval 86 ms    QT Interval 392 ms    QTC Interval 431 ms    P Axis 62 degrees    QRS Axis 40 degrees    T Wave Axis 19 degrees   Urine Macroscopic, POC   Result Value Ref Range    Color, UA Yellow     Clarity, UA Clear     pH, UA 7 0 4 5 - 8 0    Leukocytes, UA Negative Negative    Nitrite, UA Negative Negative    Protein, UA Negative Negative mg/dl    Glucose, UA Negative Negative mg/dl    Ketones, UA Negative Negative mg/dl    Urobilinogen, UA 1 0 0 2, 1 0 E U /dl E U /dl    Bilirubin, UA Negative Negative    Occult Blood, UA Large (A) Negative    Specific Gravity, UA 1 020 1 003 - 1 030         ED Course / Workup Pending (followup):  CT abdomen pelvis with contrast   Final Result by Emre Garcia MD (10/28 1708)      No acute intra-abdominal finding  Workstation performed: [id number]                                             ED Course as of 10/28/22 2036   Fri Oct 28, 2022   1736 CT abdomen pelvis with contrast  IMPRESSION:     No acute intra-abdominal finding  Procedures  MDM     Patient seen and evaluated by bariatrics in the ER  States no acute bariatric intervention needed at this time   Patient with persistent pain and nausea in the ER  Patient also noting intermittent chest pain at this time, EKG and troponin ordered  Offered patient admission due to persistent pain and nausea in the ER and further work up/evaluation of her symptoms  Patient agreeable to admission at this time  Stable throughout ER stay  Disposition  Final diagnoses:   Bariatric surgery status   Nausea and vomiting   Epigastric abdominal pain   Intractable abdominal pain     Time reflects when diagnosis was documented in both MDM as applicable and the Disposition within this note     Time User Action Codes Description Comment    10/28/2022  4:19 PM Maureen Lucila Add [J60 28] Bariatric surgery status     10/28/2022  4:20 PM Maureen Lucila Add [R11 2] Nausea and vomiting     10/28/2022  4:20 PM Maureen Lucila Modify [S71 23] Bariatric surgery status     10/28/2022  4:20 PM Maureen Lucila Modify [R11 2] Nausea and vomiting     10/28/2022  4:20 PM Maureen Lucila Add [R10 13] Epigastric abdominal pain     10/28/2022  6:13 PM Carletha Montane Add [R10 9] Intractable abdominal pain       ED Disposition     ED Disposition   Admit    Condition   Stable    Date/Time   Fri Oct 28, 2022  6:13 PM    Comment   Case was discussed with LANDON and the patient's admission status was agreed to be Admission Status: observation status to the service of Dr Ian Fonseca              Follow-up Information    None       Current Discharge Medication List      START taking these medications    Details   ondansetron (Zofran ODT) 4 mg disintegrating tablet Take 1 tablet (4 mg total) by mouth every 6 (six) hours as needed for nausea or vomiting  Qty: 20 tablet, Refills: 0    Associated Diagnoses: Nausea and vomiting      pantoprazole (PROTONIX) 40 mg tablet Take 1 tablet (40 mg total) by mouth 2 (two) times a day  Qty: 60 tablet, Refills: 0    Associated Diagnoses: Nausea and vomiting         CONTINUE these medications which have NOT CHANGED    Details ascorbic acid (VITAMIN C) 500 mg tablet Take 500 mg by mouth daily      ferrous sulfate 325 (65 Fe) mg tablet Take 325 mg by mouth daily with breakfast      linaCLOtide (Linzess) 290 MCG CAPS Take 1 capsule by mouth in the morning  Qty: 30 capsule, Refills: 3    Associated Diagnoses: Irritable bowel syndrome with constipation           No discharge procedures on file         ED Provider  Electronically Signed by     Kat Dugan PA-C  10/28/22 9916

## 2022-10-29 NOTE — CONSULTS
[address] 1980, 40 y o  female MRN: [id number]  Unit/Bed#: E5 -01 Encounter: [id number]  Primary Care Provider: Joyce Abreu PA-C   Date and time admitted to hospital: 10/28/2022  1:49 PM    Consults    * Intractable nausea and vomiting  Assessment & Plan  Patient is a 52-year-old female with past medical history significant for previous gastric bypass surgery, who presented to the ER for evaluation of intractable nausea and vomiting as well as abdominal pain    · Patient relates her symptoms been present for the past 5 days  · She notes she has been unable to tolerate any p o  At all today  She was able to eat only soup yesterday  · Patient notes she has been vomiting any time she attempted to eat or drink anything  She feels dehydrated  · She also notes pain in her epigastric region  · Patient notes her symptoms are similar to when she had the previous marginal ulcer  · Patient has been compliant with her omeprazole b i d  She was taking Zofran without improvement  · Patient had a CT scan of her abdomen and pelvis without any acute intra-abdominal finding  · She was evaluated by the bariatric surgery team in the ER did not recommend any additional intervention  They noted patient will need outpatient follow-up in outpatient endoscopy  · Patient was referred for admission, however she is currently declining admission and wishes to leave against medical advice  Patient notes her nausea has improved however she has not yet tolerating any significant p o  Lacy Tryon · Patient relates she has been having these symptoms for several months, worsening the past 5 days  She verbalizes frustration regarding the long course of her illness, and the failure to improve despite courses of medications  She wishes to be discharged home  She also notes she does not have  for her 3 children tomorrow      Chest pain  Assessment & Plan  · Patient notes she has been having chest pain  She indicates a burning that extends from her epigastric region up to her substernal notch  · Patient has 5 days of nausea and vomiting  · Patient's EKG without any acute abnormality  Initial troponin unremarkable  · Patient was scheduled for admission to the hospital and workup with serial EKGs and troponins however she has elected to sign out against medical advice  · No current evidence of ischemia  Suspect chest pain is most likely secondary to GI etiology, peptic ulcer disease and recent nausea/vomiting  · Future EGD recommended  · She will continue her home proton pump inhibitor b i d  Abdominal pain  Assessment & Plan  · Patient noted pain throughout her entire abdomen, as well as epigastric region  · CT scan of the abdomen/pelvis without acute abnormality  · Patient notes her symptoms are similar to her previous marginal ulcer  · Continue proton pump inhibitor b i d   · Outpatient Endoscopy anticipated by bariatric    Postsurgical malabsorption  Assessment & Plan  · Patient postsurgery malabsorption  · Resume bariatric vitamins and supplements as an outpatient and iron    Bariatric surgery status  Assessment & Plan  · Patient underwent laparoscopic sleeve gastrectomy with revision to laparoscopic Karmen-en-Y gastric bypass surgery  · Follows with Dr Yang Boyd  · Patient presented the ER complaining of nausea, vomiting, and abdominal pain  She has previous history of marginal ulcer  · She was seen by the bariatric surgery team in the ER who recommended outpatient follow-up for endoscopy      =========================================    History of Present Illness   Consulted for Medical Management by:  Dr Wilson [facility] ER  HPI:  Adry Love is a 40 y o  female with past medical history significant for bariatric surgery, with previous sleeve gastrectomy converted to laparoscopic Karmen-en-Y gastric bypass    Patient follows with gastric surgery team as well as the GI team as an outpatient  Patient relates that since her surgery she has been having bouts of abdominal pain  She notes on previous endoscopic evaluations she had marginal ulcers that were treated with proton pump inhibitor b i d     Patient notes for the past 5 days she has been having intractable nausea and vomiting  She denies any blood in the emesis  She notes it is clear, as well as some “frothy” mucus-like material   Patient notes anything she has tried to eat or drink she immediately vomits back up  She notes yesterday she was able to tolerate only soup  Today she did not have any significant p o  Intake  Patient notes she has associated abdominal pain across her anterior abdomen, as well as pain that radiates from her epigastric region up to her throat  Patient relates her symptoms are identical to that prior to the diagnosis of her ulcer  Patient notes she feels dehydrated  She denies any pain anywhere else  Patient currently is requesting to sign out against medical advice  She notes she is very frustrated as she has had these symptoms for several months, and the treatment has been the proton pump inhibitor b i d  Which she is already taking  She notes her Zofran at home was not working  She is allergic to Reglan and Phenergan and believe she had a recent allergic reaction to Compazine, with swelling of her eyes  Was swelling is since resolved  Patient notes she has 3 children at home  She relates that she does not have  for them tomorrow when her  is working  She requesting to leave against medical advice  She notes she will follow-up with her GI team, as well as the bariatric team in the office        Historical Information   Past Medical History:   Diagnosis Date   • Abdominal pain     "almost constant"   • Anemia    • Anesthesia     "woke up swinging with last  9/2018  "was fine with EGD"   • Back pain    • Bariatric surgery status     2008-gastric sleeve celia ALLEN-Y 2019-abd painnow-dx lap today 3/9/2020   • Constipation    • Dental bridge present     right lower permanent   • Diarrhea    • Difficulty swallowing     "in the past"   • Disease of thyroid gland     not on meds now   • Dizzy    • Fatigue     "when blood pressure low" and weakness too"   • GERD (gastroesophageal reflux disease)     "increase after surgery"   • Heart murmur     heart murmer, work up negative with holter monitor   • History of 2  sections     most recent 17   • History of D&C 2019   • History of iron deficiency     anemia/ had IV infusions through pregnancy in [id number]   • History of transfusion     2019 - pt had allergic reaction - had to stop the blood   • Inguinal hernia     right   • Loss of appetite    • Low BP     "off and on"   • Migraine    • N&V (nausea and vomiting)     " a little better since on meds"   • Non-intractable vomiting     "not since been on medicine"   • Palpitations     "having again"   • Postgastrectomy malabsorption    • Stomach pain      Patient Active Problem List   Diagnosis   • Bariatric surgery status   • Postsurgical malabsorption   • Obesity, Class II, BMI 35-39 9   • Bilious vomiting with nausea   • Chronic vomiting   • Gastritis   • Abdominal pain   • Symptomatic anemia   • S/P laparoscopic sleeve gastrectomy   • Heart burn   • Epigastric abdominal pain   • Other constipation   • Decreased oral intake   • Dysphagia   • History of multiple miscarriages   • Pouchitis (HCC)   • Leg swelling   • Pregnancy complicated by previous bariatric surgery, first trimester   • Amenorrhea   • Missed    • Status post suction D&C   • BV (bacterial vaginosis)   • Pelvic pain   • Menorrhagia with regular cycle   • Iron deficiency anemia secondary to inadequate dietary iron intake   • Status post bariatric surgery   • Abnormal uterine bleeding (AUB)   • GBS bacteriuria   • Intractable nausea and vomiting   • Chest pain     Past Surgical History:   Procedure Laterality Date   •  SECTION      x2   • CHOLECYSTECTOMY     • CHROMOSOME ANALYSIS, PRODUCTS OF CONCEPTION (HISTORICAL)  2018    2 miscarriages in  and Nov   • LAPAROSCOPY N/A 3/9/2020    Procedure: DIAGNOSTIC LAPAROSCOPY,CLOSURE OF COATES DEFECT, INTRAOP EGD;  Surgeon: Robert Reynolds MD;  Location: [facility];  Service: Bariatrics   • OVARIAN CYST REMOVAL Right 2018   • UT EGD TRANSORAL BIOPSY SINGLE/MULTIPLE N/A 2019    Procedure: ESOPHAGOGASTRODUODENOSCOPY (EGD) with bx;  Surgeon: Robert Reynolds MD;  Location: [facility]; Service: Bariatrics   • UT LAP GASTRIC BYPASS/SREE-EN-Y N/A 2019    Procedure: LAP SREE-EN-Y GASTRIC BYPASS, INTRAOP EGD;  Surgeon: Robert Reynolds MD;  Location: [facility];  Service: Bariatrics   • UT SURG RX MISSED ABORTN,1ST TRI N/A 2019    Procedure: DILATATION AND EVACUATION (D&E) (8 weeks) missed ab;  Surgeon: Vashti Sal MD;  [facility]: [facility];  Service: Gynecology   • REVISION BYPASS LAPAROSCOPIC N/A 2019    Procedure: LAP REVISION/ CONVERSION;  Surgeon: Robert Reynolds MD;  Location: [facility];  Service: Bariatrics   • SALPINGOOPHORECTOMY Right 2018   • SLEEVE GASTROPLASTY         Social History   Social History     Substance and Sexual Activity   Alcohol Use Not Currently     Social History     Substance and Sexual Activity   Drug Use No     Social History     Tobacco Use   Smoking Status Never Smoker   Smokeless Tobacco Never Used       Family History:   Family History   Problem Relation Age of Onset   • Hypertension Mother    • Heart disease Mother    • No Known Problems Father        Meds/Allergies     No current facility-administered medications for this encounter      Current Outpatient Medications:   •  ascorbic acid (VITAMIN C) 500 mg tablet, Take 500 mg by mouth daily, Disp: , Rfl:   •  ferrous sulfate 325 (65 Fe) mg tablet, Take 325 mg by mouth daily with breakfast, Disp: , Rfl:   •  linaCLOtide (Linzess) 290 MCG CAPS, Take 1 capsule by mouth in the morning, Disp: 30 capsule, Rfl: 3  •  ondansetron (Zofran ODT) 4 mg disintegrating tablet, Take 1 tablet (4 mg total) by mouth every 6 (six) hours as needed for nausea or vomiting, Disp: 20 tablet, Rfl: 0  •  pantoprazole (PROTONIX) 40 mg tablet, Take 1 tablet (40 mg total) by mouth 2 (two) times a day, Disp: 60 tablet, Rfl: 0    Allergies   Allergen Reactions   • Aspirin Anaphylaxis   • Shellfish-Derived Products - Food Allergy Anaphylaxis   • Contrast [Iodinated Diagnostic Agents] Itching and Swelling   • Ibuprofen Edema   • Reglan [Metoclopramide] Itching and Confusion       Review of Systems  A detailed 12 point review of systems was conducted and is negative apart from those mentioned in the HPI  Objective   Vitals: Blood pressure 120/73, pulse 76, temperature 98 °F (36 7 °C), temperature source Oral, resp  rate 18, weight 105 kg (231 lb 11 3 oz), SpO2 100 %, unknown if currently breastfeeding  Physical Exam   General:  Pleasant female  No acute distress  Not tachypneic  Seated in a chair at the bedside, with her  sitting next to her  HEENT: dry mucous membranes  Neck: supple  Heart: Regular rate and rhythm, S1S2 present  No murmur, rub or gallop  Lungs; Clear to auscultation bilaterally  No wheezing, crackles or rhonchi  No accessory muscle use or respiratory distress  Abdomen: soft, non-tender with palpation, non-distended, NABS  No guarding or rebound  No peritoneal sound or mass  Neurologic:  Awake alert  Fluent speech  Interactive  Skin: warm and dry  No petechiae, purpura or rash      Lab Results:     Results from last 7 days   Lab Units 10/28/22  1447   WBC Thousand/uL 6 28   HEMOGLOBIN g/dL 9 3*   HEMATOCRIT % 31 9*   PLATELETS Thousands/uL 471*     Results from last 7 days   Lab Units 10/28/22  1447   POTASSIUM mmol/L 3 2*   CHLORIDE mmol/L 104   CO2 mmol/L 28   BUN mg/dL 9   CREATININE mg/dL 0 92   CALCIUM mg/dL 8 9 Imaging:  CT abdomen/pelvis:  No acute abnormality    EKG:  Normal sinus rhythm  No acute ST or T-wave changes        Code Status:  Full    Family:  Updated patient and her  at the bedside  Patient currently requesting to sign out against medical advice  Discussed with them that I am quite concerned that she has not been tolerating any significant p o  Intake, any dizzy IV fluids to prevent dehydration  Discussed with them that I am quite concerned if she leaves now, she has not improved at all, and will need to come right back to the hospital   Discussed with them the risk of leaving against medical advice, including worsening condition, dehydration, dizziness, permanent disability or death  Patient verbalized understanding however notes she wishes to leave the hospital   To note out of Zofran and a prescription for Zofran 0 DT was called to her pharmacy  She notes she still has omeprazole and pantoprazole at home and does not need a refill  Patient states she will call her GI office in the morning attempt to be seen  She will also follow with bariatric surgery for an EGD        Portions of the record may have been created with voice recognition software

## 2022-10-29 NOTE — NURSING NOTE
Pt arrived to room from ED  RN attempted to complete admission navigator and assessment  Pt refused, stated she wanted to leave  MD notified  Pt signed out AMA  IV removed  Pt left

## 2022-10-29 NOTE — ASSESSMENT & PLAN NOTE
· Patient postsurgery malabsorption  · Resume bariatric vitamins and supplements as an outpatient and iron

## 2022-10-29 NOTE — ASSESSMENT & PLAN NOTE
Patient is a 26-year-old female with past medical history significant for previous gastric bypass surgery, who presented to the ER for evaluation of intractable nausea and vomiting as well as abdominal pain    · Patient relates her symptoms been present for the past 5 days  · She notes she has been unable to tolerate any p o  At all today  She was able to eat only soup yesterday  · Patient notes she has been vomiting any time she attempted to eat or drink anything  She feels dehydrated  · She also notes pain in her epigastric region  · Patient notes her symptoms are similar to when she had the previous marginal ulcer  · Patient has been compliant with her omeprazole b i d  She was taking Zofran without improvement  · Patient had a CT scan of her abdomen and pelvis without any acute intra-abdominal finding  · She was evaluated by the bariatric surgery team in the ER did not recommend any additional intervention  They noted patient will need outpatient follow-up in outpatient endoscopy  · Patient was referred for admission, however she is currently declining admission and wishes to leave against medical advice  Patient notes her nausea has improved however she has not yet tolerating any significant p o  Elnor  · Patient relates she has been having these symptoms for several months, worsening the past 5 days  She verbalizes frustration regarding the long course of her illness, and the failure to improve despite courses of medications  She wishes to be discharged home  She also notes she does not have  for her 3 children tomorrow

## 2022-10-30 ENCOUNTER — HOSPITAL ENCOUNTER (INPATIENT)
Facility: HOSPITAL | Age: 42
LOS: 5 days | Discharge: HOME | End: 2022-11-04
Attending: EMERGENCY MEDICINE | Admitting: INTERNAL MEDICINE

## 2022-10-30 DIAGNOSIS — K58.1 IRRITABLE BOWEL SYNDROME WITH CONSTIPATION: ICD-10-CM

## 2022-10-30 DIAGNOSIS — R11.2 NAUSEA AND VOMITING: ICD-10-CM

## 2022-10-30 DIAGNOSIS — R10.84 GENERALIZED ABDOMINAL PAIN: ICD-10-CM

## 2022-10-30 DIAGNOSIS — R11.2 INTRACTABLE NAUSEA AND VOMITING: Primary | ICD-10-CM

## 2022-10-30 DIAGNOSIS — Z98.84 BARIATRIC SURGERY STATUS: ICD-10-CM

## 2022-10-30 PROBLEM — R07.9 CHEST PAIN: Status: RESOLVED | Noted: 2022-10-28 | Resolved: 2022-10-30

## 2022-10-30 LAB
ALBUMIN SERPL BCP-MCNC: 3.6 G/DL (ref 3.5–5)
ALP SERPL-CCNC: 106 U/L (ref 46–116)
ALT SERPL W P-5'-P-CCNC: 18 U/L (ref 12–78)
ANION GAP SERPL CALCULATED.3IONS-SCNC: 9 MMOL/L (ref 4–13)
BASOPHILS # BLD AUTO: 0.02 THOUSANDS/ÂΜL (ref 0–0.1)
BASOPHILS NFR BLD AUTO: 0 % (ref 0–1)
BILIRUB SERPL-MCNC: 0.18 MG/DL (ref 0.2–1)
BUN SERPL-MCNC: 14 MG/DL (ref 5–25)
CALCIUM SERPL-MCNC: 8.9 MG/DL (ref 8.3–10.1)
CHLORIDE SERPL-SCNC: 104 MMOL/L (ref 96–108)
CO2 SERPL-SCNC: 26 MMOL/L (ref 21–32)
CREAT SERPL-MCNC: 0.89 MG/DL (ref 0.6–1.3)
EOSINOPHIL # BLD AUTO: 0.14 THOUSAND/ÂΜL (ref 0–0.61)
EOSINOPHIL NFR BLD AUTO: 2 % (ref 0–6)
ERYTHROCYTE [DISTWIDTH] IN BLOOD BY AUTOMATED COUNT: 18.8 % (ref 11.6–15.1)
GFR SERPL CREATININE-BSD FRML MDRD: 80 ML/MIN/1.73SQ M
GLUCOSE SERPL-MCNC: 93 MG/DL (ref 65–140)
HCT VFR BLD AUTO: 31.2 % (ref 34.8–46.1)
HGB BLD-MCNC: 9.1 G/DL (ref 11.5–15.4)
IMM GRANULOCYTES # BLD AUTO: 0.01 THOUSAND/UL (ref 0–0.2)
IMM GRANULOCYTES NFR BLD AUTO: 0 % (ref 0–2)
LIPASE SERPL-CCNC: 91 U/L (ref 73–393)
LYMPHOCYTES # BLD AUTO: 2.76 THOUSANDS/ÂΜL (ref 0.6–4.47)
LYMPHOCYTES NFR BLD AUTO: 41 % (ref 14–44)
MCH RBC QN AUTO: 20.6 PG (ref 26.8–34.3)
MCHC RBC AUTO-ENTMCNC: 29.2 G/DL (ref 31.4–37.4)
MCV RBC AUTO: 71 FL (ref 82–98)
MONOCYTES # BLD AUTO: 0.74 THOUSAND/ÂΜL (ref 0.17–1.22)
MONOCYTES NFR BLD AUTO: 11 % (ref 4–12)
NEUTROPHILS # BLD AUTO: 3.03 THOUSANDS/ÂΜL (ref 1.85–7.62)
NEUTS SEG NFR BLD AUTO: 46 % (ref 43–75)
NRBC BLD AUTO-RTO: 0 /100 WBCS
PLATELET # BLD AUTO: 492 THOUSANDS/UL (ref 149–390)
PMV BLD AUTO: 9 FL (ref 8.9–12.7)
POTASSIUM SERPL-SCNC: 3.9 MMOL/L (ref 3.5–5.3)
PROT SERPL-MCNC: 8.2 G/DL (ref 6.4–8.4)
RBC # BLD AUTO: 4.41 MILLION/UL (ref 3.81–5.12)
SODIUM SERPL-SCNC: 139 MMOL/L (ref 135–147)
WBC # BLD AUTO: 6.7 THOUSAND/UL (ref 4.31–10.16)

## 2022-10-30 RX ORDER — FENTANYL CITRATE 50 UG/ML
50 INJECTION, SOLUTION INTRAMUSCULAR; INTRAVENOUS ONCE
Status: COMPLETED | OUTPATIENT
Start: 2022-10-30 | End: 2022-10-30

## 2022-10-30 RX ORDER — PANTOPRAZOLE SODIUM 40 MG/10ML
40 INJECTION, POWDER, LYOPHILIZED, FOR SOLUTION INTRAVENOUS ONCE
Status: COMPLETED | OUTPATIENT
Start: 2022-10-30 | End: 2022-10-30

## 2022-10-30 RX ORDER — PANTOPRAZOLE SODIUM 40 MG/10ML
40 INJECTION, POWDER, LYOPHILIZED, FOR SOLUTION INTRAVENOUS EVERY 12 HOURS SCHEDULED
Status: DISCONTINUED | OUTPATIENT
Start: 2022-10-30 | End: 2022-11-04 | Stop reason: HOSPADM

## 2022-10-30 RX ORDER — ONDANSETRON 2 MG/ML
4 INJECTION INTRAMUSCULAR; INTRAVENOUS EVERY 6 HOURS PRN
Status: DISCONTINUED | OUTPATIENT
Start: 2022-10-30 | End: 2022-11-02

## 2022-10-30 RX ORDER — SODIUM CHLORIDE 9 MG/ML
100 INJECTION, SOLUTION INTRAVENOUS CONTINUOUS
Status: DISCONTINUED | OUTPATIENT
Start: 2022-10-30 | End: 2022-10-31 | Stop reason: SDUPTHER

## 2022-10-30 RX ORDER — DOCUSATE SODIUM 100 MG/1
100 CAPSULE, LIQUID FILLED ORAL 2 TIMES DAILY
Status: DISCONTINUED | OUTPATIENT
Start: 2022-10-31 | End: 2022-11-01

## 2022-10-30 RX ORDER — ENOXAPARIN SODIUM 100 MG/ML
40 INJECTION SUBCUTANEOUS
Status: DISCONTINUED | OUTPATIENT
Start: 2022-10-31 | End: 2022-11-04 | Stop reason: HOSPADM

## 2022-10-30 RX ORDER — ONDANSETRON HYDROCHLORIDE 4 MG/5ML
4 SOLUTION ORAL ONCE
Status: COMPLETED | OUTPATIENT
Start: 2022-10-30 | End: 2022-10-30

## 2022-10-30 RX ADMIN — PANTOPRAZOLE SODIUM 40 MG: 40 INJECTION, POWDER, FOR SOLUTION INTRAVENOUS at 22:50

## 2022-10-30 RX ADMIN — ONDANSETRON HYDROCHLORIDE 4 MG: 4 SOLUTION ORAL at 20:38

## 2022-10-30 RX ADMIN — SODIUM CHLORIDE 1000 ML: 0.9 INJECTION, SOLUTION INTRAVENOUS at 20:42

## 2022-10-30 RX ADMIN — FENTANYL CITRATE 50 MCG: 50 INJECTION, SOLUTION INTRAMUSCULAR; INTRAVENOUS at 20:38

## 2022-10-30 NOTE — LETTER
[id number]  [place] [address]  Dept: [phone]    November 7, 2022     Patient: Sandra De La Rosa   YOB: 1980   Date of Visit: 10/30/2022       To Whom it May Concern:    Marcia Ford is under my professional care  She was seen in the hospital from 10/30/2022   to 11/04/22  She may return to work on 11/7/22 without limitations  If you have any questions or concerns, please don't hesitate to call           Sincerely,          Loc Reyes PA-C
no

## 2022-10-31 ENCOUNTER — APPOINTMENT (OUTPATIENT)
Dept: GASTROENTEROLOGY | Facility: HOSPITAL | Age: 42
End: 2022-10-31

## 2022-10-31 ENCOUNTER — ANESTHESIA EVENT (INPATIENT)
Dept: GASTROENTEROLOGY | Facility: HOSPITAL | Age: 42
End: 2022-10-31

## 2022-10-31 ENCOUNTER — ANESTHESIA (INPATIENT)
Dept: GASTROENTEROLOGY | Facility: HOSPITAL | Age: 42
End: 2022-10-31

## 2022-10-31 LAB
ABO GROUP BLD: NORMAL
ANION GAP SERPL CALCULATED.3IONS-SCNC: 7 MMOL/L (ref 4–13)
ATRIAL RATE: 66 BPM
ATRIAL RATE: 72 BPM
BLD GP AB SCN SERPL QL: NEGATIVE
BUN SERPL-MCNC: 11 MG/DL (ref 5–25)
CALCIUM SERPL-MCNC: 8.7 MG/DL (ref 8.3–10.1)
CHLORIDE SERPL-SCNC: 108 MMOL/L (ref 96–108)
CO2 SERPL-SCNC: 24 MMOL/L (ref 21–32)
CREAT SERPL-MCNC: 0.85 MG/DL (ref 0.6–1.3)
ERYTHROCYTE [DISTWIDTH] IN BLOOD BY AUTOMATED COUNT: 18.8 % (ref 11.6–15.1)
GFR SERPL CREATININE-BSD FRML MDRD: 85 ML/MIN/1.73SQ M
GLUCOSE SERPL-MCNC: 99 MG/DL (ref 65–140)
HCT VFR BLD AUTO: 28.3 % (ref 34.8–46.1)
HGB BLD-MCNC: 8.3 G/DL (ref 11.5–15.4)
MCH RBC QN AUTO: 21 PG (ref 26.8–34.3)
MCHC RBC AUTO-ENTMCNC: 29.3 G/DL (ref 31.4–37.4)
MCV RBC AUTO: 72 FL (ref 82–98)
P AXIS: 63 DEGREES
P AXIS: 98 DEGREES
PLATELET # BLD AUTO: 438 THOUSANDS/UL (ref 149–390)
PMV BLD AUTO: 9.4 FL (ref 8.9–12.7)
POTASSIUM SERPL-SCNC: 3.5 MMOL/L (ref 3.5–5.3)
PR INTERVAL: 152 MS
PR INTERVAL: 156 MS
QRS AXIS: 17 DEGREES
QRS AXIS: 78 DEGREES
QRSD INTERVAL: 90 MS
QRSD INTERVAL: 90 MS
QT INTERVAL: 386 MS
QT INTERVAL: 420 MS
QTC INTERVAL: 404 MS
QTC INTERVAL: 459 MS
RBC # BLD AUTO: 3.96 MILLION/UL (ref 3.81–5.12)
RH BLD: POSITIVE
SODIUM SERPL-SCNC: 139 MMOL/L (ref 135–147)
SPECIMEN EXPIRATION DATE: NORMAL
T WAVE AXIS: 13 DEGREES
T WAVE AXIS: 63 DEGREES
VENTRICULAR RATE: 66 BPM
VENTRICULAR RATE: 72 BPM
WBC # BLD AUTO: 4.98 THOUSAND/UL (ref 4.31–10.16)

## 2022-10-31 PROCEDURE — 0DJ08ZZ INSPECTION OF UPPER INTESTINAL TRACT, VIA NATURAL OR ARTIFICIAL OPENING ENDOSCOPIC: ICD-10-PCS | Performed by: INTERNAL MEDICINE

## 2022-10-31 RX ORDER — FAMOTIDINE 10 MG/ML
20 INJECTION, SOLUTION INTRAVENOUS ONCE
Status: COMPLETED | OUTPATIENT
Start: 2022-10-31 | End: 2022-10-31

## 2022-10-31 RX ORDER — EPHEDRINE SULFATE 50 MG/ML
INJECTION INTRAVENOUS AS NEEDED
Status: DISCONTINUED | OUTPATIENT
Start: 2022-10-31 | End: 2022-10-31

## 2022-10-31 RX ORDER — SUCCINYLCHOLINE/SOD CL,ISO/PF 100 MG/5ML
SYRINGE (ML) INTRAVENOUS AS NEEDED
Status: DISCONTINUED | OUTPATIENT
Start: 2022-10-31 | End: 2022-10-31

## 2022-10-31 RX ORDER — ONDANSETRON 2 MG/ML
INJECTION INTRAMUSCULAR; INTRAVENOUS AS NEEDED
Status: DISCONTINUED | OUTPATIENT
Start: 2022-10-31 | End: 2022-10-31

## 2022-10-31 RX ORDER — LIDOCAINE HYDROCHLORIDE 10 MG/ML
INJECTION, SOLUTION EPIDURAL; INFILTRATION; INTRACAUDAL; PERINEURAL AS NEEDED
Status: DISCONTINUED | OUTPATIENT
Start: 2022-10-31 | End: 2022-10-31

## 2022-10-31 RX ORDER — ACETAMINOPHEN 325 MG/1
975 TABLET ORAL EVERY 6 HOURS PRN
Status: DISCONTINUED | OUTPATIENT
Start: 2022-10-31 | End: 2022-11-01

## 2022-10-31 RX ORDER — PROPOFOL 10 MG/ML
INJECTION, EMULSION INTRAVENOUS AS NEEDED
Status: DISCONTINUED | OUTPATIENT
Start: 2022-10-31 | End: 2022-10-31

## 2022-10-31 RX ORDER — METOCLOPRAMIDE HYDROCHLORIDE 5 MG/ML
10 INJECTION INTRAMUSCULAR; INTRAVENOUS ONCE
Status: DISCONTINUED | OUTPATIENT
Start: 2022-10-31 | End: 2022-11-04 | Stop reason: HOSPADM

## 2022-10-31 RX ORDER — SODIUM CHLORIDE 9 MG/ML
75 INJECTION, SOLUTION INTRAVENOUS CONTINUOUS
Status: DISCONTINUED | OUTPATIENT
Start: 2022-10-31 | End: 2022-11-04 | Stop reason: HOSPADM

## 2022-10-31 RX ADMIN — PANTOPRAZOLE SODIUM 40 MG: 40 INJECTION, POWDER, FOR SOLUTION INTRAVENOUS at 08:19

## 2022-10-31 RX ADMIN — PANTOPRAZOLE SODIUM 40 MG: 40 INJECTION, POWDER, FOR SOLUTION INTRAVENOUS at 20:34

## 2022-10-31 RX ADMIN — LIDOCAINE HYDROCHLORIDE 100 MG: 10 INJECTION, SOLUTION EPIDURAL; INFILTRATION; INTRACAUDAL; PERINEURAL at 13:50

## 2022-10-31 RX ADMIN — EPHEDRINE SULFATE 5 MG: 50 INJECTION, SOLUTION INTRAVENOUS at 14:08

## 2022-10-31 RX ADMIN — PROPOFOL 40 MG: 10 INJECTION, EMULSION INTRAVENOUS at 13:59

## 2022-10-31 RX ADMIN — PROPOFOL 40 MG: 10 INJECTION, EMULSION INTRAVENOUS at 13:56

## 2022-10-31 RX ADMIN — ACETAMINOPHEN 975 MG: 325 TABLET ORAL at 20:33

## 2022-10-31 RX ADMIN — MORPHINE SULFATE 2 MG: 2 INJECTION, SOLUTION INTRAMUSCULAR; INTRAVENOUS at 22:14

## 2022-10-31 RX ADMIN — Medication 150 MG: at 13:51

## 2022-10-31 RX ADMIN — ACETAMINOPHEN 975 MG: 325 TABLET ORAL at 09:46

## 2022-10-31 RX ADMIN — ONDANSETRON 4 MG: 2 INJECTION INTRAMUSCULAR; INTRAVENOUS at 20:33

## 2022-10-31 RX ADMIN — DOCUSATE SODIUM 100 MG: 100 CAPSULE, LIQUID FILLED ORAL at 16:11

## 2022-10-31 RX ADMIN — SODIUM CHLORIDE 125 ML/HR: 0.9 INJECTION, SOLUTION INTRAVENOUS at 13:30

## 2022-10-31 RX ADMIN — FAMOTIDINE 20 MG: 10 INJECTION, SOLUTION INTRAVENOUS at 00:39

## 2022-10-31 RX ADMIN — DOCUSATE SODIUM 100 MG: 100 CAPSULE, LIQUID FILLED ORAL at 08:19

## 2022-10-31 RX ADMIN — PROPOFOL 200 MG: 10 INJECTION, EMULSION INTRAVENOUS at 13:50

## 2022-10-31 RX ADMIN — EPHEDRINE SULFATE 5 MG: 50 INJECTION, SOLUTION INTRAVENOUS at 14:15

## 2022-10-31 RX ADMIN — SODIUM CHLORIDE 100 ML/HR: 0.9 INJECTION, SOLUTION INTRAVENOUS at 00:09

## 2022-10-31 RX ADMIN — ONDANSETRON 4 MG: 2 INJECTION INTRAMUSCULAR; INTRAVENOUS at 14:05

## 2022-10-31 RX ADMIN — EPHEDRINE SULFATE 5 MG: 50 INJECTION, SOLUTION INTRAVENOUS at 14:13

## 2022-10-31 RX ADMIN — PANTOPRAZOLE SODIUM 40 MG: 40 INJECTION, POWDER, FOR SOLUTION INTRAVENOUS at 00:43

## 2022-10-31 RX ADMIN — SODIUM CHLORIDE 125 ML/HR: 0.9 INJECTION, SOLUTION INTRAVENOUS at 20:40

## 2022-10-31 RX ADMIN — SODIUM CHLORIDE 100 ML/HR: 0.9 INJECTION, SOLUTION INTRAVENOUS at 09:47

## 2022-10-31 NOTE — ANESTHESIA POSTPROCEDURE EVALUATION
Post-Op Assessment Note    CV Status:  Stable    Pain management: adequate     Mental Status:  Alert and awake   Hydration Status:  Euvolemic   PONV Controlled:  Controlled   Airway Patency:  Patent      Post Op Vitals Reviewed: Yes      Staff: Anesthesiologist         No complications documented      BP      Temp      Pulse     Resp      SpO2      /50   Pulse 88   Temp 99 1 °F (37 3 °C) (Temporal)   Resp 16   Wt 106 kg (234 lb 9 1 oz)   LMP 10/29/2022   SpO2 98%   BMI 39 03 kg/m²

## 2022-10-31 NOTE — PLAN OF CARE
Problem: PAIN - ADULT  Goal: Verbalizes/displays adequate comfort level or baseline comfort level  Description: Interventions:  - Encourage patient to monitor pain and request assistance  - Assess pain using appropriate pain scale  - Administer analgesics based on type and severity of pain and evaluate response  - Implement non-pharmacological measures as appropriate and evaluate response  - Consider cultural and social influences on pain and pain management  - Notify physician/advanced practitioner if interventions unsuccessful or patient reports new pain  Outcome: Progressing     Problem: INFECTION - ADULT  Goal: Absence or prevention of progression during hospitalization  Description: INTERVENTIONS:  - Assess and monitor for signs and symptoms of infection  - Monitor lab/diagnostic results  - Monitor all insertion sites, i e  indwelling lines, tubes, and drains  - Monitor endotracheal if appropriate and nasal secretions for changes in amount and color  - [place] appropriate cooling/warming therapies per order  - Administer medications as ordered  - Instruct and encourage patient and family to use good hand hygiene technique  - Identify and instruct in appropriate isolation precautions for identified infection/condition  Outcome: Progressing  Goal: Absence of fever/infection during neutropenic period  Description: INTERVENTIONS:  - Monitor WBC    Outcome: Progressing     Problem: SAFETY ADULT  Goal: Patient will remain free of falls  Description: INTERVENTIONS:  - Educate patient/family on patient safety including physical limitations  - Instruct patient to call for assistance with activity   - Consult OT/PT to assist with strengthening/mobility   - Keep Call bell within reach  - Keep bed low and locked with side rails adjusted as appropriate  - Keep care items and personal belongings within reach  - Initiate and maintain comfort rounds  - Make Fall Risk Sign visible to staff  - Offer Toileting every 2 Hours, in advance of need  - Initiate/Maintain alarm  - Obtain necessary fall risk management equipment:   - Apply yellow socks and bracelet for high fall risk patients  - Consider moving patient to room near nurses station  Outcome: Progressing  Goal: Maintain or return to baseline ADL function  Description: INTERVENTIONS:  -  Assess patient's ability to carry out ADLs; assess patient's baseline for ADL function and identify physical deficits which impact ability to perform ADLs (bathing, care of mouth/teeth, toileting, grooming, dressing, etc )  - Assess/evaluate cause of self-care deficits   - Assess range of motion  - Assess patient's mobility; develop plan if impaired  - Assess patient's need for assistive devices and provide as appropriate  - Encourage maximum independence but intervene and supervise when necessary  - Involve family in performance of ADLs  - Assess for home care needs following discharge   - Consider OT consult to assist with ADL evaluation and planning for discharge  - Provide patient education as appropriate  Outcome: Progressing  Goal: Maintains/Returns to pre admission functional level  Description: INTERVENTIONS:  - Perform BMAT or MOVE assessment daily    - Set and communicate daily mobility goal to care team and patient/family/caregiver  - Collaborate with rehabilitation services on mobility goals if consulted  - Perform Range of Motion 3 times a day  - Reposition patient every 2 hours    - Dangle patient 3 times a day  - Stand patient 3 times a day  - Ambulate patient 3 times a day  - Out of bed to chair 3 times a day   - Out of bed for meals 3 times a day  - Out of bed for toileting  - Record patient progress and toleration of activity level   Outcome: Progressing     Problem: DISCHARGE PLANNING  Goal: Discharge to home or other facility with appropriate resources  Description: INTERVENTIONS:  - Identify barriers to discharge w/patient and caregiver  - Arrange for needed discharge resources and transportation as appropriate  - Identify discharge learning needs (meds, wound care, etc )  - Arrange for interpretive services to assist at discharge as needed  - Refer to Case Management Department for coordinating discharge planning if the patient needs post-hospital services based on physician/advanced practitioner order or complex needs related to functional status, cognitive ability, or social support system  Outcome: Progressing     Problem: Knowledge Deficit  Goal: Patient/family/caregiver demonstrates understanding of disease process, treatment plan, medications, and discharge instructions  Description: Complete learning assessment and assess knowledge base    Interventions:  - Provide teaching at level of understanding  - Provide teaching via preferred learning methods  Outcome: Progressing     Problem: GASTROINTESTINAL - ADULT  Goal: Minimal or absence of nausea and/or vomiting  Description: INTERVENTIONS:  - Administer IV fluids if ordered to ensure adequate hydration  - Maintain NPO status until nausea and vomiting are resolved  - Nasogastric tube if ordered  - Administer ordered antiemetic medications as needed  - Provide nonpharmacologic comfort measures as appropriate  - Advance diet as tolerated, if ordered  - Consider nutrition services referral to assist patient with adequate nutrition and appropriate food choices  Outcome: Progressing  Goal: Maintains or returns to baseline bowel function  Description: INTERVENTIONS:  - Assess bowel function  - Encourage oral fluids to ensure adequate hydration  - Administer IV fluids if ordered to ensure adequate hydration  - Administer ordered medications as needed  - Encourage mobilization and activity  - Consider nutritional services referral to assist patient with adequate nutrition and appropriate food choices  Outcome: Progressing  Goal: Maintains adequate nutritional intake  Description: INTERVENTIONS:  - Monitor percentage of each meal consumed  - Identify factors contributing to decreased intake, treat as appropriate  - Assist with meals as needed  - Monitor I&O, weight, and lab values if indicated  - Obtain nutrition services referral as needed  Outcome: Progressing  Goal: Establish and maintain optimal ostomy function  Description: INTERVENTIONS:  - Assess bowel function  - Encourage oral fluids to ensure adequate hydration  - Administer IV fluids if ordered to ensure adequate hydration   - Administer ordered medications as needed  - Encourage mobilization and activity  - Nutrition services referral to assist patient with appropriate food choices  - Assess stoma site  - Consider wound care consult   Outcome: Progressing  Goal: Oral mucous membranes remain intact  Description: INTERVENTIONS  - Assess oral mucosa and hygiene practices  - Implement preventative oral hygiene regimen  - Implement oral medicated treatments as ordered  - Initiate Nutrition services referral as needed  Outcome: Progressing     Problem: METABOLIC, FLUID AND ELECTROLYTES - ADULT  Goal: Electrolytes maintained within normal limits  Description: INTERVENTIONS:  - Monitor labs and assess patient for signs and symptoms of electrolyte imbalances  - Administer electrolyte replacement as ordered  - Monitor response to electrolyte replacements, including repeat lab results as appropriate  - Instruct patient on fluid and nutrition as appropriate  Outcome: Progressing  Goal: Fluid balance maintained  Description: INTERVENTIONS:  - Monitor labs   - Monitor I/O and WT  - Instruct patient on fluid and nutrition as appropriate  - Assess for signs & symptoms of volume excess or deficit  Outcome: Progressing  Goal: Glucose maintained within target range  Description: INTERVENTIONS:  - Monitor Blood Glucose as ordered  - Assess for signs and symptoms of hyperglycemia and hypoglycemia  - Administer ordered medications to maintain glucose within target range  - Assess nutritional intake and initiate nutrition service referral as needed  Outcome: Progressing     Problem: Nutrition/Hydration-ADULT  Goal: Nutrient/Hydration intake appropriate for improving, restoring or maintaining nutritional needs  Description: Monitor and assess patient's nutrition/hydration status for malnutrition  Collaborate with interdisciplinary team and initiate plan and interventions as ordered  Monitor patient's weight and dietary intake as ordered or per policy  Utilize nutrition screening tool and intervene as necessary  Determine patient's food preferences and provide high-protein, high-caloric foods as appropriate       INTERVENTIONS:  - Monitor oral intake, urinary output, labs, and treatment plans  - Assess nutrition and hydration status and recommend course of action  - Evaluate amount of meals eaten  - Assist patient with eating if necessary   - Allow adequate time for meals  - Recommend/ encourage appropriate diets, oral nutritional supplements, and vitamin/mineral supplements  - Order, calculate, and assess calorie counts as needed  - Recommend, monitor, and adjust tube feedings and TPN/PPN based on assessed needs  - Assess need for intravenous fluids  - Provide specific nutrition/hydration education as appropriate  - Include patient/family/caregiver in decisions related to nutrition  Outcome: Progressing

## 2022-10-31 NOTE — ASSESSMENT & PLAN NOTE
· Patient's history of iron deficiency anemia secondary to malabsorption  · Patient also follows with gyn for menorrhagia which could contribute to her anemia  · Will resume outpatient iron supplements at discharge  · Hgb currently stable; will recheck CBC with AM labs

## 2022-10-31 NOTE — ANESTHESIA PREPROCEDURE EVALUATION
Procedure:  EGD    Relevant Problems   GI/HEPATIC   (+) Bariatric surgery status   (+) Dysphagia   (+) Status post bariatric surgery      HEMATOLOGY   (+) Iron deficiency anemia secondary to inadequate dietary iron intake   (+) Symptomatic anemia      NEURO/PSYCH   (+) History of multiple miscarriages     (+) N/V     Physical Exam    Airway    Mallampati score: III  TM Distance: >3 FB  Neck ROM: full     Dental       Cardiovascular  Rhythm: regular, Rate: normal,     Pulmonary  Breath sounds clear to auscultation,     Other Findings        Anesthesia Plan  ASA Score- 2     Anesthesia Type- general with ASA Monitors  Additional Monitors:   Airway Plan: ETT  Plan Factors-    Chart reviewed  Existing labs reviewed  Induction- rapid sequence induction  Postoperative Plan- Plan for postoperative opioid use  Planned trial extubation    Informed Consent- Anesthetic plan and risks discussed with patient

## 2022-10-31 NOTE — CONSULTS
CONSULT: GASTROENTEROLOGY          Inpatient consult to gastroenterology     Performed by  Jen Martin MD     Authorized by Georgette Trinidad MD            PATIENT INFORMATION      Blair Reyes 40 y o  female MRN: [id number]  Unit/Bed#: [address] 87 209-01 Encounter: [id number]  PCP: Meliton Conley PA-C  Date of Admission:  10/30/2022  Date of Consultation: 10/31/22  Requesting Physician: Ann Fine Pe*    ASSESSMENTS & PLAN   Blair Reyes is a 40 y o  old female with PMH including but not limited to sleeve gastrectomy in 2009 which was later converted to Karmen-en-Y gastric bypass and 2018 given worsening GERD, part checked his and anastomotic ulcer, IBS with constipation who is currently admitted given worsening abdominal pain, nausea and vomiting  Gastroenterology team has been consulted for assistance with management of intractable nausea and vomiting  1  Intractable nausea and vomiting  Worsening sudden-onset nausea and vomiting since past Sunday, nearly 7-8 days, not improved with ongoing omeprazole and as needed Compazine therapy at home  Was not able to tolerate liquids  Unfortunately she also had an allergic reaction to Compazine  Reports that later in the week she had some blood streaking of vomitus  Has had similar episodes in the past   Has a history of marginal ulcer in 2019, with documented healing on follow-up EGDs  She also had an EGD at [facility] which she reports was significant for 3 gastric anastomosis ulcers  Last EGD in June 2022 showing healthy gastric jejunal anastomosis with normal biopsies  As an outpatient she takes Prilosec 40 twice daily along with as needed Pepcid    Last outpatient follow-up visit in June 2022    -- that could be a component of constipation contributing to the symptoms  -- d/w patient that the blood streaking could be related to [employer] injury  -- keep NPO for now, continue IV Protonix  -- IV fluids  -- will plan for EGD later today,   -- will await bariatric surgery recommendations, if EGD shows marginal ulcer, it might require surgical revision because she has been on maximal therapy    2  IBS constipation  History of constipation predominant IBS, has bowel movement once every week  On Linzess 290 mcg daily  This time reports her last bowel movement was this past Thursday  Is passing gas at this time  -- continue home dose linzess    3  Iron deficiency anemia  Last colonoscopy 03/22 unable to complete due to poor prep  Would recommend colonoscopy but in this state of health she would not be able to tolerate prep at this time also she would need extended prep  HISTORY OF PRESENT ILLNESS      Yennifer Pandya is a 40 y o  female who is originally admitted for intractable nausea and vomiting on 10/30/2022  GI team is consulted for  the same  81-year-old female with past medical history including but not limited to sleeve gastrectomy in 2009 converted to Karmen-en-Y gastric bypass in 2018 due to worsening GERD, proctitis, and automatic ulcer; IBS with constipation was currently admitted with intractable nausea, vomiting and abdominal pain  Patient reports that she initially started with an onset nausea and vomiting last Sunday without any clear triggers, she was in usual state of health prior to the onset of the symptoms  This was followed by multiple episodes of clear nonbloody vomitus especially after she would try to eat anything  This was followed by intense burning epigastric pain  She initially tried taking her p r n  medications like omeprazole, Compazine without any significant relief  Symptoms gradually kept on worsening, was not even able to tolerate water, soup, oatmeal   The only thing that provided relief was complete NPO  She also took Compazine which gave her reaction like facial itching, swelling  Subsequently patient came to the ER    Patient initially presented to the ER on 10/28, was recommended admission but she unfortunately had to leave AMA to address childcare issues  She return to the ER on 10/30 with persistent symptoms  Complaining of abdominal pain in her entire abdomen but mainly localized in the epigastric area associated with intractable nausea and multiple episodes of vomiting  During her ER visit on 10/28 she was evaluated by Bariatric surgery team in the ER, at the time her CT scan, lab work were unremarkable and the symptoms were thought to be from viral illness  She return to the ER on 10/30 with similar complaints, bariatric team recommended evaluation by our service for possible endoscopy  Patient does report a past medical history of marginal ulcer, pouch itis in   Her last upper endoscopy was in 2022 which showed hold the gastrojejunal anastomosis, biopsies at that time were unremarkable for H pylori or celiac disease as well  Patient is also underwent diagnostic laparoscopy in  which likely was for similar complaints  REVIEW OF SYSTEMS     A thorough 12-point review of systems has been conducted  Pertinent positives and negatives are mentioned in the history of present illness       PAST MEDICAL & SURGICAL HISTORY      Past Medical History:   Diagnosis Date   • Abdominal pain     "almost constant"   • Anemia    • Anesthesia     "woke up swinging with last  2018  "was fine with EGD"   • Back pain    • Bariatric surgery status     -gastric sleeve revison RUEN-Y 2019-abd painnow-dx lap today 3/9/2020   • Constipation    • Dental bridge present     right lower permanent   • Diarrhea    • Difficulty swallowing     "in the past"   • Disease of thyroid gland     not on meds now   • Dizzy    • Fatigue     "when blood pressure low" and weakness too"   • GERD (gastroesophageal reflux disease)     "increase after surgery"   • Heart murmur     heart murmer, work up negative with holter monitor   • History of 2  sections     most recent 17 • History of D&C 2019   • History of iron deficiency     anemia/ had IV infusions through pregnancy in [id number]   • History of transfusion     2019 - pt had allergic reaction - had to stop the blood   • Inguinal hernia     right   • Loss of appetite    • Low BP     "off and on"   • Migraine    • N&V (nausea and vomiting)     " a little better since on meds"   • Non-intractable vomiting     "not since been on medicine"   • Palpitations     "having again"   • Postgastrectomy malabsorption    • Stomach pain        Past Surgical History:   Procedure Laterality Date   •  SECTION      x2   • CHOLECYSTECTOMY     • CHROMOSOME ANALYSIS, PRODUCTS OF CONCEPTION (HISTORICAL)  2018    2 miscarriages in  and Nov   • LAPAROSCOPY N/A 3/9/2020    Procedure: DIAGNOSTIC LAPAROSCOPY,CLOSURE OF COATES DEFECT, INTRAOP EGD;  Surgeon: Darya Navarrete MD;  Location: [facility];  Service: Bariatrics   • OVARIAN CYST REMOVAL Right 2018   • OH EGD TRANSORAL BIOPSY SINGLE/MULTIPLE N/A 2019    Procedure: ESOPHAGOGASTRODUODENOSCOPY (EGD) with bx;  Surgeon: Darya Navarrete MD;  Location: [facility]; Service: Bariatrics   • OH LAP GASTRIC BYPASS/SREE-EN-Y N/A 2019    Procedure: LAP SREE-EN-Y GASTRIC BYPASS, INTRAOP EGD;  Surgeon: Darya Navarrete MD;  Location: [facility];  Service: Bariatrics   • OH SURG RX MISSED ABORTN,1ST TRI N/A 2019    Procedure: DILATATION AND EVACUATION (D&E) (8 weeks) missed ab;  Surgeon: Tigist Reilly MD;  [facility]: [facility];  Service: Gynecology   • REVISION BYPASS LAPAROSCOPIC N/A 2019    Procedure: LAP REVISION/ CONVERSION;  Surgeon: Darya Navarrete MD;  [facility]: [facility];  Service: Bariatrics   • SALPINGOOPHORECTOMY Right 2018   • SLEEVE GASTROPLASTY         MEDICATIONS & ALLERGIES       Medications:   Prior to Admission medications    Medication Sig Start Date End Date Taking?  Authorizing Provider   ascorbic acid (VITAMIN C) 500 mg tablet Take 500 mg by mouth daily    Historical Provider, MD   ferrous sulfate 325 (65 Fe) mg tablet Take 325 mg by mouth daily with breakfast    Historical Provider, MD   linaCLOtide (Linzess) 290 MCG CAPS Take 1 capsule by mouth in the morning 10/18/22 11/17/22  Kvng Murillo DO   ondansetron (Zofran ODT) 4 mg disintegrating tablet Take 1 tablet (4 mg total) by mouth every 6 (six) hours as needed for nausea or vomiting 10/28/22   Monica Rangel MD   pantoprazole (PROTONIX) 40 mg tablet Take 1 tablet (40 mg total) by mouth 2 (two) times a day 10/28/22   Monica Rangel MD       Allergies: Allergies   Allergen Reactions   • Aspirin Anaphylaxis   • Shellfish-Derived Products - Food Allergy Anaphylaxis   • Contrast [Iodinated Diagnostic Agents] Itching and Swelling   • Ibuprofen Edema   • Reglan [Metoclopramide] Itching and Confusion       SOCIAL HISTORY      Substance Use History:   Social History     Substance and Sexual Activity   Alcohol Use Not Currently     Social History     Tobacco Use   Smoking Status Never Smoker   Smokeless Tobacco Never Used     Social History     Substance and Sexual Activity   Drug Use No       FAMILY HISTORY      As in the HPI  PHYSICAL EXAM     Vitals:   Blood Pressure: 115/73 (10/30/22 2356)  Pulse: 79 (10/30/22 2356)  Temperature: 98 °F (36 7 °C) (10/30/22 2356)  Temp Source: Oral (10/30/22 1926)  Respirations: 16 (10/30/22 2129)  Weight - Scale: 106 kg (234 lb 9 1 oz) (10/31/22 0600)  SpO2: 99 % (10/30/22 2356)    Physical Exam:   GENERAL: NAD  HEENT:  NC/AT, MMM  CARDIAC:  RRR, +S1/S2, no S3/S4 heard  PULMONARY:  CTA B/L, no wheezing/rales/rhonci, non-labored breathing  ABDOMEN: tender on deep palpation, non distended  RECTAL:  Normal appearing stool  NEUROLOGIC:  Alert/oriented x3     EXTREMITIES: No swelling  SKIN:  No rashes or erythema     ADDITIONAL DATA     Lab Results:     Results from last 7 days   Lab Units 10/31/22  0552 10/30/22  2036   WBC Thousand/uL 4 98 6 70   HEMOGLOBIN g/dL 8  3* 9 1*   HEMATOCRIT % 28 3* 31 2*   PLATELETS Thousands/uL 438* 492*   NEUTROS PCT %  --  46   LYMPHS PCT %  --  41   MONOS PCT %  --  11   EOS PCT %  --  2     Results from last 7 days   Lab Units 10/31/22  0552 10/30/22  2036 10/28/22  1447   POTASSIUM mmol/L 3 5 3 9 3 2*   CHLORIDE mmol/L 108 104 104   CO2 mmol/L 24 26 28   BUN mg/dL 11 14 9   CREATININE mg/dL 0 85 0 89 0 92   CALCIUM mg/dL 8 7 8 9 8 9   ALK PHOS U/L  --  106 99   ALT U/L  --  18 19   AST U/L  --   --  22           Imaging:    CT abdomen pelvis with contrast    Result Date: 10/28/2022  Narrative: CT ABDOMEN AND PELVIS WITH IV CONTRAST INDICATION:   Epigastric pain Upper abdominal pain, nausea, vomiting, history of bariatric surgery  Premedicating with benadryl (previously tolerated)  COMPARISON:  None  TECHNIQUE:  CT examination of the abdomen and pelvis was performed  Axial, sagittal, and coronal 2D reformatted images were created from the source data and submitted for interpretation  Radiation dose length product (DLP) for this visit:  863 mGy-cm   This examination, like all CT scans performed in the [facility], was performed utilizing techniques to minimize radiation dose exposure, including the use of iterative reconstruction and automated exposure control  IV Contrast:  50 mL of iohexol (OMNIPAQUE) 100 mL of iohexol (OMNIPAQUE) Enteric Contrast:  Enteric contrast was not administered  FINDINGS: ABDOMEN LOWER CHEST:  No clinically significant abnormality identified in the visualized lower chest  LIVER/BILIARY TREE:  Liver is diffusely decreased in density consistent with fatty change  No CT evidence of suspicious hepatic mass  Normal hepatic contours  No biliary dilatation  GALLBLADDER:  Gallbladder is surgically absent  SPLEEN:  Unremarkable  PANCREAS:  Unremarkable  ADRENAL GLANDS:  Unremarkable  KIDNEYS/URETERS:  Unremarkable  No hydronephrosis  STOMACH AND BOWEL:  Findings of Karmen-en-Y gastric bypass surgery  APPENDIX:  No findings to suggest appendicitis  ABDOMINOPELVIC CAVITY:  No ascites  No pneumoperitoneum  No lymphadenopathy  VESSELS:  Unremarkable for patient's age  PELVIS REPRODUCTIVE ORGANS:  Unremarkable for patient's age  URINARY BLADDER:  Unremarkable  ABDOMINAL WALL/INGUINAL REGIONS:  Unremarkable  OSSEOUS STRUCTURES:  No acute fracture or destructive osseous lesion  Impression: No acute intra-abdominal finding  Workstation performed: [id number]       EKG, Pathology, and Other Studies Reviewed on Admission:   · EKG: Reviewed    Counseling / Coordination of Care Time: 30 total mins spent n consult  Greater than 50% of total time spent on patient counseling and coordination of care  Lele Guan MD   PGY-4, Department of Gastroenterology     ** Please Note: This note is constructed using a voice recognition dictation system   **

## 2022-10-31 NOTE — ASSESSMENT & PLAN NOTE
· Patient's history of iron deficiency anemia secondary to malabsorption  · Patient also follows with gyn for menorrhagia which could contribute to her anemia  · Will resume outpatient iron supplements at discharge  · Hemoglobin currently at her baseline of 9

## 2022-10-31 NOTE — ASSESSMENT & PLAN NOTE
· Patient presents with abdominal pain throughout her upper abdomen bilaterally, and in the epigastric region  · She notes her symptoms are worse after eating or drinking anything  · With associated nausea and vomiting immediately after taking any p o  · She notes symptoms are identical to the pain she had with her previous marginal ulcer in 2019  · Patient a CT scan 10/28 without any acute intra-abdominal abnormality  · Continue proton pump inhibitor b i d  · Bariatric surgery team had evaluated patient on 10/28 and recommended future EGD:  The request GI team be consulted to perform EGD:  GI team aware, anticipate EGD in a m  · Patient also follows with the [address] [place] [address] team:    · She was seen in the office for abdominal pain 6/22, and her abdominal pain was suspected to be "multifactorial and may be secondary to functional dyspepsia, food sensitivities, possible adhesive disease given prior gastric surgeries, as well as discomfort from stool burden "  · Patient was also diagnosed with IBS and constipation; takes Linzess as an OP   Will continue as IP

## 2022-10-31 NOTE — UTILIZATION REVIEW
Continued Stay Review    Date: 10/31/22                    Current Patient Class: IP Current Level of Care: MS    HPI:42 y o  female initially admitted on 10/30/22    Assessment/Plan:   Pt had EGD today  10/31 ECG - IMPRESSION:  Mild erythema at the suture site  Otherwise normal exam   Random biopsy taken from gastric pouch      RECOMMENDATION:  Await pathology results  Cause of symptoms could be uncontrolled GERD, esophageal dysmotility, viral gastroenteritis  If symptoms persist consider who states syndrome as part of the differential as well  Continue PPI 40 mg daily  Bariatrics, surgery consulted    Appreciate recommendations    Vital Signs:   10/31/22 1445 -- 88 16 100/50 -- 98 % None (Room air) --   10/31/22 1430 -- 96 16 100/49 Abnormal  -- 98 % None (Room air) --   10/31/22 1314 99 1 °F (37 3 °C) 70 16 98/56 -- 98 % None (Room air) --   10/31/22 08:28:08 97 2 °F (36 2 °C) Abnormal  72 14 104/66 79 98 % -- --   10/30/22 2356 98 °F (36 7 °C) 79 -- 115/73 87 99 % -- --   10/30/22 2129 -- 75 16 99/55 -- 100 % None (Room air) Lying   10/30/22 2048 -- -- -- -- -- -- None (Room air) --   10/30/22 1926 97 7 °F (36 5 °C) 85 14 124/78 -- 100 % None (Room air) Sitting     Pertinent Labs/Diagnostic Results:   Results from last 7 days   Lab Units 10/28/22  1658   SARS-COV-2  Negative     Results from last 7 days   Lab Units 10/31/22  0552 10/30/22  2036 10/28/22  1447   WBC Thousand/uL 4 98 6 70 6 28   HEMOGLOBIN g/dL 8 3* 9 1* 9 3*   HEMATOCRIT % 28 3* 31 2* 31 9*   PLATELETS Thousands/uL 438* 492* 471*   NEUTROS ABS Thousands/µL  --  3 03 2 97         Results from last 7 days   Lab Units 10/31/22  0552 10/30/22  2036 10/28/22  1447   SODIUM mmol/L 139 139 138   POTASSIUM mmol/L 3 5 3 9 3 2*   CHLORIDE mmol/L 108 104 104   CO2 mmol/L 24 26 28   ANION GAP mmol/L 7 9 6   BUN mg/dL 11 14 9   CREATININE mg/dL 0 85 0 89 0 92   EGFR ml/min/1 73sq m 85 80 77   CALCIUM mg/dL 8 7 8 9 8 9     Results from last 7 days Lab Units 10/30/22  2036 10/28/22  1447   AST U/L  --  22   ALT U/L 18 19   ALK PHOS U/L 106 99   TOTAL PROTEIN g/dL 8 2 8 3   ALBUMIN g/dL 3 6 3 5   TOTAL BILIRUBIN mg/dL 0 18* 0 29         Results from last 7 days   Lab Units 10/31/22  0552 10/30/22  2036 10/28/22  1447   GLUCOSE RANDOM mg/dL 99 93 94     Results from last 7 days   Lab Units 10/28/22  1821   HS TNI 0HR ng/L <2     Results from last 7 days   Lab Units 10/30/22  2036 10/28/22  1447   LIPASE u/L 91 63*                 Results from last 7 days   Lab Units 10/28/22  1450   CLARITY UA  Clear   COLOR UA  Yellow   SPEC GRAV UA  1 020   PH UA  7 0   GLUCOSE UA mg/dl Negative   KETONES UA mg/dl Negative   BLOOD UA  Large*   PROTEIN UA mg/dl Negative   NITRITE UA  Negative   BILIRUBIN UA  Negative   UROBILINOGEN UA E U /dl 1 0   LEUKOCYTES UA  Negative   WBC UA /hpf 0-1*   RBC UA /hpf 4-10*   BACTERIA UA /hpf Moderate*   EPITHELIAL CELLS WET PREP /hpf Occasional   MUCUS THREADS  Occasional*     Results from last 7 days   Lab Units 10/28/22  1658   INFLUENZA A PCR  Negative   INFLUENZA B PCR  Negative   RSV PCR  Negative     Medications:   Scheduled Medications:  docusate sodium, 100 mg, Oral, BID  enoxaparin, 40 mg, Subcutaneous, Q24H NURA  pantoprazole, 40 mg, Intravenous, Q12H [facility]      Continuous IV Infusions:  sodium chloride, 125 mL/hr, Intravenous, Continuous      PRN Meds:  acetaminophen, 975 mg, Oral, Q6H PRN - x 1 10/31  ondansetron, 4 mg, Intravenous, Q6H PRN    Discharge Plan: RBD      Network Utilization Review Department  ATTENTION: Please call with any questions or concerns to [phone] and carefully listen to the prompts so that you are directed to the right person  All voicemails are confidential   Cari Silvestre all requests for admission clinical reviews, approved or denied determinations and any other requests to dedicated fax number below belonging to the campus where the patient is receiving treatment   List of dedicated fax numbers for the Facilities:  FACILITY NAME UR FAX NUMBER   ADMISSION DENIALS (Administrative/Medical Necessity) [phone]   [address]  (Maternity/NICU/Pediatrics) [phone]   [address] Megan Mccoy  [phone]   [address] [address] [facility] [address] [facility] [[email or web address]] [place] [address] [phone]

## 2022-10-31 NOTE — ASSESSMENT & PLAN NOTE
Patient is a 78-year-old female with history of previous gastric bypass surgery, followed by Dr Jeannine Calderon, previous pouchitis and marginal ulcer in 2019, who presented to the ER 10/28 with abdominal pain, intractable nausea and vomiting  Patient unfortunately had to leave against medical advice due to childcare issues    · Patient return to the ER due to persistence of her intractable nausea, vomiting, as well as abdominal pain through her anterior abdomen, especially in the epigastric region  · She notes anything she eats or drinks immediately vomits back up  · She notes flecks of pink blood in her emesis  · Patient notes since she left the hospital on 10/28 she has not tolerated any significant p o  Intake  · Patient notes her symptoms are similar to symptoms she had with her marginal ulcer in 2019  · Patient has been compliant with her proton pump inhibitor b i d   · 10/28 patient had a CT scan of her abdomen and pelvis without any acute abnormalities  · She was evaluated by the bariatric surgery team at that time who recommended EGD  · Discussed with bariatric team on-call:   They request GI team be consulted to perform EGD  · D/w GI team:  Will keep npo for EGD  · Patient's previous EGD in 4/22 revealed that previous pouchitis and marginal ulcer from 2019 had healed  · Continue IV fluids, antiemetics, analgesics

## 2022-10-31 NOTE — ASSESSMENT & PLAN NOTE
Patient is a 80-year-old female with history of previous gastric bypass surgery, followed by Dr Deirdre Livingston, previous pouchitis and marginal ulcer in 2019, who presented to the ER 10/28 with abdominal pain, intractable nausea and vomiting  Patient unfortunately had to leave against medical advice due to childcare issues    · Patient return to the ER due to persistence of her intractable nausea, vomiting, as well as abdominal pain through her anterior abdomen, especially in the epigastric region  · She notes anything she eats or drinks immediately vomits back up  · She notes flecks of pink blood in her emesis; suspect andreia phong tearing in the setting of persistent vomiting  · Patient notes since she left the hospital on 10/28 she has not tolerated any significant p o  Intake  · Patient notes her symptoms are similar to symptoms she had with her marginal ulcer in 2019  · Patient has been compliant with her proton pump inhibitor b i d   · 10/28 patient had a CT scan of her abdomen and pelvis without any acute abnormalities  · She was evaluated by the bariatric surgery team at that time who recommended EGD  · Discussed with bariatric team on-call:   They request GI team be consulted to perform EGD  · D/w GI team:  Will keep npo for EGD  · Patient's previous EGD in 4/22 revealed that previous pouchitis and marginal ulcer from 2019 had healed  · Continue IV fluids, antiemetics, analgesics  · Will likely continue with bowel rest s/p EGD for the next 24hrs then advance as tolerated to [facility] tomorrow

## 2022-10-31 NOTE — ASSESSMENT & PLAN NOTE
· Patient underwent laparoscopic sleeve gastrectomy with revision to laparoscopic Karmen-en-Y gastric bypass surgery  · Follows with Dr Fabiola Chicas  · Bariatric Team consult

## 2022-10-31 NOTE — PLAN OF CARE
Problem: PAIN - ADULT  Goal: Verbalizes/displays adequate comfort level or baseline comfort level  Description: Interventions:  - Encourage patient to monitor pain and request assistance  - Assess pain using appropriate pain scale  - Administer analgesics based on type and severity of pain and evaluate response  - Implement non-pharmacological measures as appropriate and evaluate response  - Consider cultural and social influences on pain and pain management  - Notify physician/advanced practitioner if interventions unsuccessful or patient reports new pain  Outcome: Progressing     Problem: INFECTION - ADULT  Goal: Absence or prevention of progression during hospitalization  Description: INTERVENTIONS:  - Assess and monitor for signs and symptoms of infection  - Monitor lab/diagnostic results  - Monitor all insertion sites, i e  indwelling lines, tubes, and drains  - Monitor endotracheal if appropriate and nasal secretions for changes in amount and color  - Paloma appropriate cooling/warming therapies per order  - Administer medications as ordered  - Instruct and encourage patient and family to use good hand hygiene technique  - Identify and instruct in appropriate isolation precautions for identified infection/condition  Outcome: Progressing  Goal: Absence of fever/infection during neutropenic period  Description: INTERVENTIONS:  - Monitor WBC    Outcome: Progressing     Problem: SAFETY ADULT  Goal: Patient will remain free of falls  Description: INTERVENTIONS:  - Educate patient/family on patient safety including physical limitations  - Instruct patient to call for assistance with activity   - Consult OT/PT to assist with strengthening/mobility   - Keep Call bell within reach  - Keep bed low and locked with side rails adjusted as appropriate  - Keep care items and personal belongings within reach  - Initiate and maintain comfort rounds  - Make Fall Risk Sign visible to staff  - Offer Toileting every  Hours, in advance of need  - Initiate/Maintain alarm  - Obtain necessary fall risk management equipment:   - Apply yellow socks and bracelet for high fall risk patients  - Consider moving patient to room near nurses station  Outcome: Progressing  Goal: Maintain or return to baseline ADL function  Description: INTERVENTIONS:  -  Assess patient's ability to carry out ADLs; assess patient's baseline for ADL function and identify physical deficits which impact ability to perform ADLs (bathing, care of mouth/teeth, toileting, grooming, dressing, etc )  - Assess/evaluate cause of self-care deficits   - Assess range of motion  - Assess patient's mobility; develop plan if impaired  - Assess patient's need for assistive devices and provide as appropriate  - Encourage maximum independence but intervene and supervise when necessary  - Involve family in performance of ADLs  - Assess for home care needs following discharge   - Consider OT consult to assist with ADL evaluation and planning for discharge  - Provide patient education as appropriate  Outcome: Progressing  Goal: Maintains/Returns to pre admission functional level  Description: INTERVENTIONS:  - Perform BMAT or MOVE assessment daily    - Set and communicate daily mobility goal to care team and patient/family/caregiver  - Collaborate with rehabilitation services on mobility goals if consulted  - Perform Range of [place] times a day  - Reposition patient every  hours    - Dangle patient  times a day  - Stand patient  times a day  - Ambulate patient  times a day  - Out of bed to chair  times a day   - Out of bed for sandra times a day  - Out of bed for toileting  - Record patient progress and toleration of activity level   Outcome: Progressing     Problem: DISCHARGE PLANNING  Goal: Discharge to home or other facility with appropriate resources  Description: INTERVENTIONS:  - Identify barriers to discharge w/patient and caregiver  - Arrange for needed discharge resources and transportation as appropriate  - Identify discharge learning needs (meds, wound care, etc )  - Arrange for interpretive services to assist at discharge as needed  - Refer to Case Management Department for coordinating discharge planning if the patient needs post-hospital services based on physician/advanced practitioner order or complex needs related to functional status, cognitive ability, or social support system  Outcome: Progressing     Problem: Knowledge Deficit  Goal: Patient/family/caregiver demonstrates understanding of disease process, treatment plan, medications, and discharge instructions  Description: Complete learning assessment and assess knowledge base    Interventions:  - Provide teaching at level of understanding  - Provide teaching via preferred learning methods  Outcome: Progressing     Problem: GASTROINTESTINAL - ADULT  Goal: Minimal or absence of nausea and/or vomiting  Description: INTERVENTIONS:  - Administer IV fluids if ordered to ensure adequate hydration  - Maintain NPO status until nausea and vomiting are resolved  - Nasogastric tube if ordered  - Administer ordered antiemetic medications as needed  - Provide nonpharmacologic comfort measures as appropriate  - Advance diet as tolerated, if ordered  - Consider nutrition services referral to assist patient with adequate nutrition and appropriate food choices  Outcome: Progressing  Goal: Maintains or returns to baseline bowel function  Description: INTERVENTIONS:  - Assess bowel function  - Encourage oral fluids to ensure adequate hydration  - Administer IV fluids if ordered to ensure adequate hydration  - Administer ordered medications as needed  - Encourage mobilization and activity  - Consider nutritional services referral to assist patient with adequate nutrition and appropriate food choices  Outcome: Progressing  Goal: Maintains adequate nutritional intake  Description: INTERVENTIONS:  - Monitor percentage of each meal consumed  - Identify factors contributing to decreased intake, treat as appropriate  - Assist with meals as needed  - Monitor I&O, weight, and lab values if indicated  - Obtain nutrition services referral as needed  Outcome: Progressing  Goal: Establish and maintain optimal ostomy function  Description: INTERVENTIONS:  - Assess bowel function  - Encourage oral fluids to ensure adequate hydration  - Administer IV fluids if ordered to ensure adequate hydration   - Administer ordered medications as needed  - Encourage mobilization and activity  - Nutrition services referral to assist patient with appropriate food choices  - Assess stoma site  - Consider wound care consult   Outcome: Progressing  Goal: Oral mucous membranes remain intact  Description: INTERVENTIONS  - Assess oral mucosa and hygiene practices  - Implement preventative oral hygiene regimen  - Implement oral medicated treatments as ordered  - Initiate Nutrition services referral as needed  Outcome: Progressing     Problem: METABOLIC, FLUID AND ELECTROLYTES - ADULT  Goal: Electrolytes maintained within normal limits  Description: INTERVENTIONS:  - Monitor labs and assess patient for signs and symptoms of electrolyte imbalances  - Administer electrolyte replacement as ordered  - Monitor response to electrolyte replacements, including repeat lab results as appropriate  - Instruct patient on fluid and nutrition as appropriate  Outcome: Progressing  Goal: Fluid balance maintained  Description: INTERVENTIONS:  - Monitor labs   - Monitor I/O and WT  - Instruct patient on fluid and nutrition as appropriate  - Assess for signs & symptoms of volume excess or deficit  Outcome: Progressing  Goal: Glucose maintained within target range  Description: INTERVENTIONS:  - Monitor Blood Glucose as ordered  - Assess for signs and symptoms of hyperglycemia and hypoglycemia  - Administer ordered medications to maintain glucose within target range  - Assess nutritional intake and initiate nutrition service referral as needed  Outcome: Progressing     Problem: Nutrition/Hydration-ADULT  Goal: Nutrient/Hydration intake appropriate for improving, restoring or maintaining nutritional needs  Description: Monitor and assess patient's nutrition/hydration status for malnutrition  Collaborate with interdisciplinary team and initiate plan and interventions as ordered  Monitor patient's weight and dietary intake as ordered or per policy  Utilize nutrition screening tool and intervene as necessary  Determine patient's food preferences and provide high-protein, high-caloric foods as appropriate       INTERVENTIONS:  - Monitor oral intake, urinary output, labs, and treatment plans  - Assess nutrition and hydration status and recommend course of action  - Evaluate amount of meals eaten  - Assist patient with eating if necessary   - Allow adequate time for meals  - Recommend/ encourage appropriate diets, oral nutritional supplements, and vitamin/mineral supplements  - Order, calculate, and assess calorie counts as needed  - Recommend, monitor, and adjust tube feedings and TPN/PPN based on assessed needs  - Assess need for intravenous fluids  - Provide specific nutrition/hydration education as appropriate  - Include patient/family/caregiver in decisions related to nutrition  Outcome: Progressing     Problem: Potential for Falls  Goal: Patient will remain free of falls  Description: INTERVENTIONS:  - Educate patient/family on patient safety including physical limitations  - Instruct patient to call for assistance with activity   - Consult OT/PT to assist with strengthening/mobility   - Keep Call bell within reach  - Keep bed low and locked with side rails adjusted as appropriate  - Keep care items and personal belongings within reach  - Initiate and maintain comfort rounds  - Make Fall Risk Sign visible to staff  - Offer Toileting every  Hours, in advance of need  - Initiate/Maintain alarm  - Obtain necessary fall risk management equipment:   - Apply yellow socks and bracelet for high fall risk patients  - Consider moving patient to room near nurses station  Outcome: Progressing

## 2022-10-31 NOTE — ASSESSMENT & PLAN NOTE
· Patient presents with abdominal pain throughout her upper abdomen bilaterally, and in the epigastric region  · She notes her symptoms are worse after eating or drinking anything  · With associated nausea and vomiting immediately after taking any p o  · She notes symptoms are identical to the pain she had with her previous marginal ulcer in 2019  · Patient a CT scan 10/28 without any acute intra-abdominal abnormality  · Continue proton pump inhibitor b i d  · Bariatric surgery team had evaluated patient on 10/28 and recommended future EGD:  The request GI team be consulted to perform EGD:  GI team aware, anticipate EGD in a m    · Patient also follows with the [address] [place] [address] team:    · She was seen in the office for abdominal pain 6/22, and her abdominal pain was suspected to be "multifactorial and may be secondary to functional dyspepsia, food sensitivities, possible adhesive disease given prior gastric surgeries, as well as discomfort from stool burden "  · Patient was also diagnosed with IBS and constipation

## 2022-10-31 NOTE — H&P
[address] [facility]  H&P- 300 [facility] 1980, 40 y o  female MRN: [id number]  Unit/Bed#: ED 29 Encounter: [id number]  Primary Care Provider: Shaq Stevens PA-C   Date and time admitted to hospital: 10/30/2022  7:57 PM    * Intractable nausea and vomiting  Assessment & Plan  Patient is a 27-year-old female with history of previous gastric bypass surgery, followed by Dr Tigist Carrillo, previous pouchitis and marginal ulcer in 2019, who presented to the ER 10/28 with abdominal pain, intractable nausea and vomiting  Patient unfortunately had to leave against medical advice due to childcare issues    · Patient return to the ER due to persistence of her intractable nausea, vomiting, as well as abdominal pain through her anterior abdomen, especially in the epigastric region  · She notes anything she eats or drinks immediately vomits back up  · She notes flecks of pink blood in her emesis  · Patient notes since she left the hospital on 10/28 she has not tolerated any significant p o  Intake  · Patient notes her symptoms are similar to symptoms she had with her marginal ulcer in 2019  · Patient has been compliant with her proton pump inhibitor b i d   · 10/28 patient had a CT scan of her abdomen and pelvis without any acute abnormalities  · She was evaluated by the bariatric surgery team at that time who recommended EGD  · Discussed with bariatric team on-call: They request GI team be consulted to perform EGD  · D/w GI team:  Will keep npo for EGD  · Patient's previous EGD in 4/22 revealed that previous pouchitis and marginal ulcer from 2019 had healed  · Continue IV fluids, antiemetics, analgesics    Abdominal pain  Assessment & Plan  · Patient presents with abdominal pain throughout her upper abdomen bilaterally, and in the epigastric region  · She notes her symptoms are worse after eating or drinking anything  · With associated nausea and vomiting immediately after taking any p o    · She notes symptoms are identical to the pain she had with her previous marginal ulcer in 2019  · Patient a CT scan 10/28 without any acute intra-abdominal abnormality  · Continue proton pump inhibitor b i d  · Bariatric surgery team had evaluated patient on 10/28 and recommended future EGD:  The request GI team be consulted to perform EGD:  GI team aware, anticipate EGD in a m    · Patient also follows with the [address] [place] [address] team:    · She was seen in the office for abdominal pain 6/22, and her abdominal pain was suspected to be "multifactorial and may be secondary to functional dyspepsia, food sensitivities, possible adhesive disease given prior gastric surgeries, as well as discomfort from stool burden "  · Patient was also diagnosed with IBS and constipation    Chest pain  Assessment & Plan  · Patient noted she had prior chest pain, related to her epigastric pain, and acid burning up into her chest  · She states she has not had any since she left 10/28  · Will check baseline EKG for admission    Status post bariatric surgery  Assessment & Plan  · Patient underwent laparoscopic sleeve gastrectomy with revision to laparoscopic Karmen-en-Y gastric bypass surgery  · Follows with Dr Darlene Varela  · Bariatric Team consult    Iron deficiency anemia secondary to inadequate dietary iron intake  Assessment & Plan  · Patient's history of iron deficiency anemia secondary to malabsorption  · Patient also follows with gyn for menorrhagia which could contribute to her anemia  · Will resume outpatient iron supplements at discharge  · Hemoglobin currently at her baseline of 9    Postsurgical malabsorption  Assessment & Plan  · Patient developed postsurgical malabsorption after bariatric surgery  · Resume bariatric vitamins at discharge, supplements and iron        ===============================================    History of Present Illness     HPI:  Merle Mcburney is a 40 y o  female with history of laparoscopic sleeve gastrectomy with revision to laparoscopic Karmen-en-Y gastric bypass who presents to the ER for abdominal pain, intractable nausea and vomiting  History is currently taken from patient at the bedside  Patient is known for me from 10/28 when she was admitted for similar symptoms however signed out against medical advice due to childcare issues  Patient notes since she returned home she has not tolerated any significant p o  Intake  She notes anything she eats or drinks she immediately vomits back up  She notes intermittent flecks of bright red/pink blood in her emesis  No coffee-ground emesis  Patient notes she has pain in her abdomen  She indicates it is in a band across her anterior abdomen in her left upper quadrant, epigastrium and right upper quadrant  She notes the pain is worse after eating or drinking anything  It is a burning pain  Patient notes she feels dehydrated does not tolerated any significant liquid or solid intake since she left 10/28  Patient denies any pain anywhere else  She denies any chest pain  She denies any chest pressure, chest burning or discomfort  She denies any shortness of breath  She notes she has been feeling lightheaded  No focal weakness          Historical Information   Past Medical History:   Diagnosis Date   • Abdominal pain     "almost constant"   • Anemia    • Anesthesia     "woke up swinging with last  2018  "was fine with EGD"   • Back pain    • Bariatric surgery status     -gastric sleeve revison RUEN-Y 2019-abd painnow-dx lap today 3/9/2020   • Constipation    • Dental bridge present     right lower permanent   • Diarrhea    • Difficulty swallowing     "in the past"   • Disease of thyroid gland     not on meds now   • Dizzy    • Fatigue     "when blood pressure low" and weakness too"   • GERD (gastroesophageal reflux disease)     "increase after surgery"   • Heart murmur     heart murmer, work up negative with holter monitor   • History of 2  sections most recent 17   • History of D&C 2019   • History of iron deficiency     anemia/ had IV infusions through pregnancy in [id number]   • History of transfusion     2019 - pt had allergic reaction - had to stop the blood   • Inguinal hernia     right   • Loss of appetite    • Low BP     "off and on"   • Migraine    • N&V (nausea and vomiting)     " a little better since on meds"   • Non-intractable vomiting     "not since been on medicine"   • Palpitations     "having again"   • Postgastrectomy malabsorption    • Stomach pain      Patient Active Problem List   Diagnosis   • Bariatric surgery status   • Postsurgical malabsorption   • Obesity, Class II, BMI 35-39 9   • Bilious vomiting with nausea   • Chronic vomiting   • Gastritis   • Abdominal pain   • Symptomatic anemia   • S/P laparoscopic sleeve gastrectomy   • Heart burn   • Epigastric abdominal pain   • Other constipation   • Decreased oral intake   • Dysphagia   • History of multiple miscarriages   • Pouchitis (HCC)   • Leg swelling   • Pregnancy complicated by previous bariatric surgery, first trimester   • Amenorrhea   • Missed    • Status post suction D&C   • BV (bacterial vaginosis)   • Pelvic pain   • Menorrhagia with regular cycle   • Iron deficiency anemia secondary to inadequate dietary iron intake   • Status post bariatric surgery   • Abnormal uterine bleeding (AUB)   • GBS bacteriuria   • Intractable nausea and vomiting   • Chest pain     Past Surgical History:   Procedure Laterality Date   •  SECTION      x2   • CHOLECYSTECTOMY     • CHROMOSOME ANALYSIS, PRODUCTS OF CONCEPTION (HISTORICAL)  2018    2 miscarriages in  and Nov   • LAPAROSCOPY N/A 3/9/2020    Procedure: DIAGNOSTIC LAPAROSCOPY,CLOSURE OF COATES DEFECT, INTRAOP EGD;  Surgeon: Teena Fletcher MD;  Location: [facility];  Service: Bariatrics   • OVARIAN CYST REMOVAL Right 2018   • MT EGD TRANSORAL BIOPSY SINGLE/MULTIPLE N/A 2019    Procedure: ESOPHAGOGASTRODUODENOSCOPY (EGD) with bx;  Surgeon: Dion Bowman MD;  Location: [facility]; Service: Bariatrics   • DC LAP GASTRIC BYPASS/SREE-EN-Y N/A 4/8/2019    Procedure: LAP SREE-EN-Y GASTRIC BYPASS, INTRAOP EGD;  Surgeon: Dion Bowman MD;  Location: [facility];  Service: Bariatrics   • DC SURG RX MISSED ABORTN,1ST TRI N/A 9/25/2019    Procedure: DILATATION AND EVACUATION (D&E) (8 weeks) missed ab;  Surgeon: Ronny Foy MD;  Location: [facility];  Service: Gynecology   • REVISION BYPASS LAPAROSCOPIC N/A 4/8/2019    Procedure: LAP REVISION/ CONVERSION;  Surgeon: Dion Bowman MD;  Location: [facility];  Service: Bariatrics   • SALPINGOOPHORECTOMY Right 09/2018   • SLEEVE GASTROPLASTY         Social History   Social History     Substance and Sexual Activity   Alcohol Use Not Currently     Social History     Substance and Sexual Activity   Drug Use No     Social History     Tobacco Use   Smoking Status Never Smoker   Smokeless Tobacco Never Used       Family History:   Family History   Problem Relation Age of Onset   • Hypertension Mother    • Heart disease Mother    • No Known Problems Father        Meds/Allergies     No current facility-administered medications for this encounter      Current Outpatient Medications:   •  ascorbic acid (VITAMIN C) 500 mg tablet, Take 500 mg by mouth daily, Disp: , Rfl:   •  ferrous sulfate 325 (65 Fe) mg tablet, Take 325 mg by mouth daily with breakfast, Disp: , Rfl:   •  linaCLOtide (Linzess) 290 MCG CAPS, Take 1 capsule by mouth in the morning, Disp: 30 capsule, Rfl: 3  •  ondansetron (Zofran ODT) 4 mg disintegrating tablet, Take 1 tablet (4 mg total) by mouth every 6 (six) hours as needed for nausea or vomiting, Disp: 20 tablet, Rfl: 0  •  pantoprazole (PROTONIX) 40 mg tablet, Take 1 tablet (40 mg total) by mouth 2 (two) times a day, Disp: 60 tablet, Rfl: 0    Allergies   Allergen Reactions   • Aspirin Anaphylaxis   • Shellfish-Derived Products - Food Allergy Anaphylaxis   • Contrast [Iodinated Diagnostic Agents] Itching and Swelling   • Ibuprofen Edema   • Reglan [Metoclopramide] Itching and Confusion       Review of Systems  A detailed 12 point review of systems was conducted and is negative apart from those mentioned in the HPI  Objective   Vitals: Blood pressure 99/55, pulse 75, temperature 97 7 °F (36 5 °C), temperature source Oral, resp  rate 16, weight 106 kg (234 lb 9 1 oz), last menstrual period 10/29/2022, SpO2 100 %, unknown if currently breastfeeding  Physical Exam   General:  Very pleasant female  No acute distress  Not tachypneic, nondistended  HEENT: EOMI, sclera anicteric, dry mucous membranes  Neck: supple,   Heart: Regular rate and rhythm, S1S2 present  No murmur, rub or gallop  Lungs; Clear to auscultation bilaterally  No wheezing, crackles or rhonchi  No accessory muscle use or respiratory distress  Abdomen: soft, +tender with palpation in the left upper quadrant, and epigastric region  , non-distended, NABS  No guarding or rebound  No peritoneal sound or mass  Extremities: no clubbing, cyanosis  Trace pedal and pretibial edema     2+ pedal pulses bilaterally  Full range of motion  Neurologic:  Awake alert  Fluent and goal directed speech  Interactive  Moving all 4 extremities  Skin: warm and dry  No petechiae, purpura or rash  Lab Results:   Results from last 7 days   Lab Units 10/30/22  2036 10/28/22  1447   WBC Thousand/uL 6 70 6 28   HEMOGLOBIN g/dL 9 1* 9 3*   HEMATOCRIT % 31 2* 31 9*   PLATELETS Thousands/uL 492* 471*     Results from last 7 days   Lab Units 10/30/22  2036 10/28/22  1447   POTASSIUM mmol/L 3 9 3 2*   CHLORIDE mmol/L 104 104   CO2 mmol/L 26 28   BUN mg/dL 14 9   CREATININE mg/dL 0 89 0 92   CALCIUM mg/dL 8 9 8 9         Imaging:  CT abdomen/pelvis:No acute intra-abdominal finding    EKG:  Pending    Code Status:  Full    Family:  Updated patient and her  at the bedside    Reviewed all test results and treatment plan    Discussed with bariatric surgery: Dr Magali Ortega: Request GI team consulted if EGD is required    Discussed with GI team: Dr Claudean Bears: Will keep NPO for probable EGD in a      Disposition:  Due to patient's intractable nausea and vomiting, clinical dehydration, and abdominal pain she will be admitted to the hospital   It is anticipated that her length of stay will be greater than 2 midnights and will be placed on inpatient status    Portions of the record may have been created with voice recognition software

## 2022-10-31 NOTE — ED PROVIDER NOTES
History  Chief Complaint   Patient presents with   • Vomiting     Vomiting that started last Sunday  Seen in ED last week  Pt was told to return to Ed if symptoms not better  Stating she has noted blood in emesis  Patient presents to the ER for evaluation of abdominal pain and vomiting  Patient states that the symptoms started 1 week ago  States that she was seen in the ER 2 days ago and was admitted however had a sign-out due to not having   States that since leaving she has had persistent symptoms  States that she has not been able to keep anything down  States the soon as she eats, she vomits it back up  Patient does note some blood in her vomit  States that she has her food however states that the and of her vomit is pink tinge  Patient notes pain in her epigastrium  Denies any radiation of pain  Patient denies any recent abdominal trauma  Patient does have a history of gastric bypass  States that it was done by Dr Madhu Larsen  States that she had a normal CT scan 2 days ago  Patient denies taking any medication PTA  Denies any fevers, syncope, chest pain, shortness of breath, difficulty urinating, or any other concerning symptoms  Prior to Admission Medications   Prescriptions Last Dose Informant Patient Reported?  Taking?   ascorbic acid (VITAMIN C) 500 mg tablet  Self Yes No   Sig: Take 500 mg by mouth daily   ferrous sulfate 325 (65 Fe) mg tablet  Self Yes No   Sig: Take 325 mg by mouth daily with breakfast   linaCLOtide (Linzess) 290 MCG CAPS   No No   Sig: Take 1 capsule by mouth in the morning   ondansetron (Zofran ODT) 4 mg disintegrating tablet   No No   Sig: Take 1 tablet (4 mg total) by mouth every 6 (six) hours as needed for nausea or vomiting   pantoprazole (PROTONIX) 40 mg tablet   No No   Sig: Take 1 tablet (40 mg total) by mouth 2 (two) times a day      Facility-Administered Medications: None       Past Medical History:   Diagnosis Date   • Abdominal pain "almost constant"   • Anemia    • Anesthesia     "woke up swinging with last  2018  "was fine with EGD"   • Back pain    • Bariatric surgery status     2008-gastric sleeve revison RUEN-Y 2019-abd painnow-dx lap today 3/9/2020   • Constipation    • Dental bridge present     right lower permanent   • Diarrhea    • Difficulty swallowing     "in the past"   • Disease of thyroid gland     not on meds now   • Dizzy    • Fatigue     "when blood pressure low" and weakness too"   • GERD (gastroesophageal reflux disease)     "increase after surgery"   • Heart murmur     heart murmer, work up negative with holter monitor   • History of 2  sections     most recent 17   • History of D&C 2019   • History of iron deficiency     anemia/ had IV infusions through pregnancy in 9870-7020   • History of transfusion     2019 - pt had allergic reaction - had to stop the blood   • Inguinal hernia     right   • Loss of appetite    • Low BP     "off and on"   • Migraine    • N&V (nausea and vomiting)     " a little better since on meds"   • Non-intractable vomiting     "not since been on medicine"   • Palpitations     "having again"   • Postgastrectomy malabsorption    • Stomach pain        Past Surgical History:   Procedure Laterality Date   •  SECTION      x2   • CHOLECYSTECTOMY     • CHROMOSOME ANALYSIS, PRODUCTS OF CONCEPTION (HISTORICAL)      2 miscarriages in  and Nov   • LAPAROSCOPY N/A 3/9/2020    Procedure: DIAGNOSTIC LAPAROSCOPY,CLOSURE OF COATES DEFECT, INTRAOP EGD;  Surgeon: James Benoit MD;  [facility];  Service: Bariatrics   • OVARIAN CYST REMOVAL Right 2018   • MA EGD TRANSORAL BIOPSY SINGLE/MULTIPLE N/A 2019    Procedure: ESOPHAGOGASTRODUODENOSCOPY (EGD) with bx;  Surgeon: James Benoit MD;  Location: [facility];   Service: Bariatrics   • MA LAP GASTRIC BYPASS/SREE-EN-Y N/A 2019    Procedure: LAP SREE-EN-Y GASTRIC BYPASS, INTRAOP EGD;  Surgeon: Bryant Martin Kamila Benavides MD;  [facility];  Service: Bariatrics   • NE SURG RX MISSED ABORTN,1ST TRI N/A 9/25/2019    Procedure: DILATATION AND EVACUATION (D&E) (8 weeks) missed ab;  Surgeon: Judi Orozco MD;  [facility];  Service: Gynecology   • REVISION BYPASS LAPAROSCOPIC N/A 4/8/2019    Procedure: LAP REVISION/ CONVERSION;  Surgeon: Flor Frereira MD;  [facility];  Service: Bariatrics   • SALPINGOOPHORECTOMY Right 09/2018   • SLEEVE GASTROPLASTY         Family History   Problem Relation Age of Onset   • Hypertension Mother    • Heart disease Mother    • No Known Problems Father      I have reviewed and agree with the history as documented  E-Cigarette/Vaping   • E-Cigarette Use Never User      E-Cigarette/Vaping Substances   • Nicotine No    • THC No    • CBD No    • Flavoring No    • Other No    • Unknown No      Social History     Tobacco Use   • Smoking status: Never Smoker   • Smokeless tobacco: Never Used   Vaping Use   • Vaping Use: Never used   Substance Use Topics   • Alcohol use: Not Currently   • Drug use: No       Review of Systems   Constitutional: Negative for fever  HENT: Negative for congestion, rhinorrhea and sore throat  Respiratory: Negative for shortness of breath  Cardiovascular: Negative for chest pain  Gastrointestinal: Positive for abdominal pain, nausea and vomiting  Genitourinary: Negative for dysuria  Musculoskeletal: Negative for back pain and neck pain  Skin: Negative for rash  Neurological: Negative for weakness, numbness and headaches  All other systems reviewed and are negative  Physical Exam  Physical Exam  Constitutional:       Appearance: She is well-developed  HENT:      Head: Normocephalic and atraumatic  Nose: Nose normal    Eyes:      Conjunctiva/sclera: Conjunctivae normal    Cardiovascular:      Rate and Rhythm: Normal rate  Heart sounds: Normal heart sounds     Pulmonary:      Effort: Pulmonary effort is normal  Breath sounds: Normal breath sounds  Abdominal:      Palpations: Abdomen is soft  Tenderness: There is abdominal tenderness in the epigastric area  Musculoskeletal:         General: Normal range of motion  Cervical back: Normal range of motion  Skin:     General: Skin is warm  Capillary Refill: Capillary refill takes less than 2 seconds  Neurological:      Mental Status: She is alert and oriented to person, place, and time           Vital Signs  ED Triage Vitals [10/30/22 1926]   Temperature Pulse Respirations Blood Pressure SpO2   97 7 °F (36 5 °C) 85 14 124/78 100 %      Temp Source Heart Rate Source Patient Position - Orthostatic VS BP Location FiO2 (%)   Oral Monitor Sitting Right arm --      Pain Score       10 - Worst Possible Pain           Vitals:    10/30/22 1926 10/30/22 2129 10/30/22 2356   BP: 124/78 99/55 115/73   Pulse: 85 75 79   Patient Position - Orthostatic VS: Sitting Lying          Visual Acuity      ED Medications  Medications   sodium chloride 0 9 % infusion (100 mL/hr Intravenous New Bag 10/31/22 0009)   docusate sodium (COLACE) capsule 100 mg (has no administration in time range)   ondansetron (ZOFRAN) injection 4 mg (has no administration in time range)   enoxaparin (LOVENOX) subcutaneous injection 40 mg (has no administration in time range)   pantoprazole (PROTONIX) injection 40 mg (has no administration in time range)   sodium chloride 0 9 % infusion (has no administration in time range)   ondansetron (ZOFRAN) oral solution 4 mg (4 mg Oral Given 10/30/22 2038)   fentanyl citrate (PF) 100 MCG/2ML 50 mcg (50 mcg Intravenous Given 10/30/22 2038)   sodium chloride 0 9 % bolus 1,000 mL (0 mL Intravenous Stopped 10/30/22 2142)   pantoprazole (PROTONIX) injection 40 mg (40 mg Intravenous Given 10/30/22 2250)       Diagnostic Studies  Results Reviewed     Procedure Component Value Units Date/Time    Comprehensive metabolic panel [[id number]]  (Abnormal) Collected: 10/30/22 2036    Lab Status: Final result Specimen: Blood from Arm, Left Updated: 10/30/22 2111     Sodium 139 mmol/L      Potassium 3 9 mmol/L      Chloride 104 mmol/L      CO2 26 mmol/L      ANION GAP 9 mmol/L      BUN 14 mg/dL      Creatinine 0 89 mg/dL      Glucose 93 mg/dL      Calcium 8 9 mg/dL      AST --     ALT 18 U/L      Alkaline Phosphatase 106 U/L      Total Protein 8 2 g/dL      Albumin 3 6 g/dL      Total Bilirubin 0 18 mg/dL      eGFR 80 ml/min/1 73sq m     Narrative:      National Kidney Disease Foundation guidelines for Chronic Kidney Disease (CKD):   •  Stage 1 with normal or high GFR (GFR > 90 mL/min/1 73 square meters)  •  Stage 2 Mild CKD (GFR = 60-89 mL/min/1 73 square meters)  •  Stage 3A Moderate CKD (GFR = 45-59 mL/min/1 73 square meters)  •  Stage 3B Moderate CKD (GFR = 30-44 mL/min/1 73 square meters)  •  Stage 4 Severe CKD (GFR = 15-29 mL/min/1 73 square meters)  •  Stage 5 End Stage CKD (GFR <15 mL/min/1 73 square meters)  Note: GFR calculation is accurate only with a steady state creatinine    Lipase [[id number]]  (Normal) Collected: 10/30/22 2036    Lab Status: Final result Specimen: Blood from Arm, Left Updated: 10/30/22 2105     Lipase 91 u/L     CBC and differential [[id number]]  (Abnormal) Collected: 10/30/22 2036    Lab Status: Final result Specimen: Blood from Arm, Left Updated: 10/30/22 2047     WBC 6 70 Thousand/uL      RBC 4 41 Million/uL      Hemoglobin 9 1 g/dL      Hematocrit 31 2 %      MCV 71 fL      MCH 20 6 pg      MCHC 29 2 g/dL      RDW 18 8 %      MPV 9 0 fL      Platelets 066 Thousands/uL      nRBC 0 /100 WBCs      Neutrophils Relative 46 %      Immat GRANS % 0 %      Lymphocytes Relative 41 %      Monocytes Relative 11 %      Eosinophils Relative 2 %      Basophils Relative 0 %      Neutrophils Absolute 3 03 Thousands/µL      Immature Grans Absolute 0 01 Thousand/uL      Lymphocytes Absolute 2 76 Thousands/µL      Monocytes Absolute 0 74 Thousand/µL      Eosinophils Absolute 0 14 Thousand/µL      Basophils Absolute 0 02 Thousands/µL     UA w Reflex to Microscopic w Reflex to Culture [[id number]]     Lab Status: No result Specimen: Urine     POCT pregnancy, urine [[id number]]     Lab Status: No result                  No orders to display              Procedures  Procedures         ED Course  ED Course as of 10/31/22 0010   Sun Oct 30, 2022   2003 Blood Pressure: 124/78   2003 Temperature: 97 7 °F (36 5 °C)   2003 Pulse: 85   2003 Respirations: 14   2004 SpO2: 100 %   2117 WBC: 6 70   2117 Hemoglobin(!): 9 1  baseline   2117 Creatinine: 0 89   2117 ALT: 18   2118 Alkaline Phosphatase: 106   2227 Patient notes nausea improved however still has mild pain                               SBIRT 20yo+    Flowsheet Row Most Recent Value   SBIRT (25 yo +)    In order to provide better care to our patients, we are screening all of our patients for alcohol and drug use  Would it be okay to ask you these screening questions? Yes Filed at: 10/30/2022 2114   Initial Alcohol Screen: US AUDIT-C     1  How often do you have a drink containing alcohol? 0 Filed at: 10/30/2022 2114   2  How many drinks containing alcohol do you have on a typical day you are drinking? 0 Filed at: 10/30/2022 2114   3b  FEMALE Any Age, or MALE 65+: How often do you have 4 or more drinks on one occassion? 0 Filed at: 10/30/2022 2114   Audit-C Score 0 Filed at: 10/30/2022 2114   BOO: How many times in the past year have you    Used an illegal drug or used a prescription medication for non-medical reasons? Never Filed at: 10/30/2022 2114                    [facility]     Patient well appearing in the ER  Reviewed with bariatric see states no need for repeat imaging at this time as patient just had imaging 2 days ago which was negative  On board with plan for symptomatic care and admission  Patient with persistent symptoms and was told to return if symptoms continue to be admitted for possible EGD tomorrow    Patient states that she was able to get   States that her symptoms have persisted  Patient stable throughout ER stay  Disposition  Final diagnoses:   Nausea and vomiting   Generalized abdominal pain   Bariatric surgery status     Time reflects when diagnosis was documented in both MDM as applicable and the Disposition within this note     Time User Action Codes Description Comment    10/30/2022  9:47 PM Won Grant Add [R11 2] Intractable nausea and vomiting     10/31/2022 12:10 AM [address] Add [R11 2] Nausea and vomiting     10/31/2022 12:10 AM [address] Add [R10 84] Generalized abdominal pain     10/31/2022 12:10 AM [address] Add [Y90 86] Bariatric surgery status       ED Disposition     ED Disposition   Admit    Condition   Stable    Date/Time   Mon Oct 31, 2022 12:09 AM    Comment   Case was discussed with LANDON and the patient's admission status was agreed to be Admission Status: inpatient status to the service of Dr Dex Cullen   Follow-up Information    None         Current Discharge Medication List      CONTINUE these medications which have NOT CHANGED    Details   ascorbic acid (VITAMIN C) 500 mg tablet Take 500 mg by mouth daily      ferrous sulfate 325 (65 Fe) mg tablet Take 325 mg by mouth daily with breakfast      linaCLOtide (Linzess) 290 MCG CAPS Take 1 capsule by mouth in the morning  Qty: 30 capsule, Refills: 3    Associated Diagnoses: Irritable bowel syndrome with constipation      ondansetron (Zofran ODT) 4 mg disintegrating tablet Take 1 tablet (4 mg total) by mouth every 6 (six) hours as needed for nausea or vomiting  Qty: 20 tablet, Refills: 0    Associated Diagnoses: Nausea and vomiting      pantoprazole (PROTONIX) 40 mg tablet Take 1 tablet (40 mg total) by mouth 2 (two) times a day  Qty: 60 tablet, Refills: 0    Associated Diagnoses: Nausea and vomiting             No discharge procedures on file      PDMP Review     None          ED Provider  Electronically Signed by           Naif Mcguire PA-C  10/31/22 0010

## 2022-10-31 NOTE — ASSESSMENT & PLAN NOTE
· Patient developed postsurgical malabsorption after bariatric surgery  · Resume bariatric vitamins at discharge, supplements and iron

## 2022-10-31 NOTE — ASSESSMENT & PLAN NOTE
· Patient underwent laparoscopic sleeve gastrectomy with revision to laparoscopic Karmen-en-Y gastric bypass surgery  · Follows with Dr Yoav Paul  · Bariatric Team consult; awaiting formal eval

## 2022-10-31 NOTE — PROGRESS NOTES
[address] [place] [address]  Progress Note - Ryder Beverly 1980, 40 y o  female MRN: [id number]  Unit/Bed#: [address] [address] [id number] Encounter: [id number]  Primary Care Provider: Nathanael Stephens PA-C   Date and time admitted to hospital: 10/30/2022  7:57 PM    * Intractable nausea and vomiting  Assessment & Plan  Patient is a 42-year-old female with history of previous gastric bypass surgery, followed by Dr Drew Vega, previous pouchitis and marginal ulcer in 2019, who presented to the ER 10/28 with abdominal pain, intractable nausea and vomiting  Patient unfortunately had to leave against medical advice due to childcare issues    · Patient return to the ER due to persistence of her intractable nausea, vomiting, as well as abdominal pain through her anterior abdomen, especially in the epigastric region  · She notes anything she eats or drinks immediately vomits back up  · She notes flecks of pink blood in her emesis; suspect andreia phong tearing in the setting of persistent vomiting  · Patient notes since she left the hospital on 10/28 she has not tolerated any significant p o  Intake  · Patient notes her symptoms are similar to symptoms she had with her marginal ulcer in 2019  · Patient has been compliant with her proton pump inhibitor b i d   · 10/28 patient had a CT scan of her abdomen and pelvis without any acute abnormalities  · She was evaluated by the bariatric surgery team at that time who recommended EGD  · Discussed with bariatric team on-call:   They request GI team be consulted to perform EGD  · D/w GI team:  Will keep npo for EGD  · Patient's previous EGD in 4/22 revealed that previous pouchitis and marginal ulcer from 2019 had healed  · Continue IV fluids, antiemetics, analgesics  · Will likely continue with bowel rest s/p EGD for the next 24hrs then advance as tolerated to [facility] tomorrow    Abdominal pain  Assessment & Plan  · Patient presents with abdominal pain throughout her upper abdomen bilaterally, and in the epigastric region  · She notes her symptoms are worse after eating or drinking anything  · With associated nausea and vomiting immediately after taking any p o  · She notes symptoms are identical to the pain she had with her previous marginal ulcer in 2019  · Patient a CT scan 10/28 without any acute intra-abdominal abnormality  · Continue proton pump inhibitor b i d  · Bariatric surgery team had evaluated patient on 10/28 and recommended future EGD:  The request GI team be consulted to perform EGD:  GI team aware, anticipate EGD in a m  · Patient also follows with the [address] [place] [address] team:    · She was seen in the office for abdominal pain 6/22, and her abdominal pain was suspected to be "multifactorial and may be secondary to functional dyspepsia, food sensitivities, possible adhesive disease given prior gastric surgeries, as well as discomfort from stool burden "  · Patient was also diagnosed with IBS and constipation; takes Linzess as an OP  Will continue as IP    Status post bariatric surgery  Assessment & Plan  · Patient underwent laparoscopic sleeve gastrectomy with revision to laparoscopic Karmen-en-Y gastric bypass surgery  · Follows with Dr Jeannine Calderon  · Bariatric Team consult; awaiting formal eval    Iron deficiency anemia secondary to inadequate dietary iron intake  Assessment & Plan  · Patient's history of iron deficiency anemia secondary to malabsorption  · Patient also follows with gyn for menorrhagia which could contribute to her anemia  · Will resume outpatient iron supplements at discharge  · Hgb currently stable; will recheck CBC with AM labs    Postsurgical malabsorption  Assessment & Plan  · Patient developed postsurgical malabsorption after bariatric surgery  · Resume bariatric vitamins at discharge, supplements and iron        VTE Pharmacologic Prophylaxis: VTE Score: 2 Low Risk (Score 0-2) - Encourage Ambulation  Lovenox ordered      Patient Centered Rounds: I performed bedside rounds with nursing staff today  Discussions with Specialists or Other Care Team Provider: N/A    Education and Discussions with Family / Patient: Patient declined call to   Time Spent for Care: 20 minutes  More than 50% of total time spent on counseling and coordination of care as described above  Current Length of Stay: 1 day(s)  Current Patient Status: Inpatient   Certification Statement: The patient will continue to require additional inpatient hospital stay due to inpatient EGD  Discharge Plan: Anticipate discharge in 24-48 hrs to home  Code Status: Level 1 - Full Code    Subjective:   Pt examined at bedside today endorsing mild improvement in nausea/vomiting  States she has remained free of these symptoms since last night but endorses persistent epigastric discomfort  Objective:     Vitals:   Temp (24hrs), Av 6 °F (36 4 °C), Min:97 2 °F (36 2 °C), Max:98 °F (36 7 °C)    Temp:  [97 2 °F (36 2 °C)-98 °F (36 7 °C)] 97 2 °F (36 2 °C)  HR:  [72-85] 72  Resp:  [14-16] 14  BP: ()/(55-78) 104/66  SpO2:  [98 %-100 %] 98 %  Body mass index is 39 03 kg/m²  Input and Output Summary (last 24 hours): Intake/Output Summary (Last 24 hours) at 10/31/2022 1107  Last data filed at 10/30/2022 2142  Gross per 24 hour   Intake 1000 ml   Output --   Net 1000 ml       Physical Exam:   Physical Exam  Vitals reviewed  Constitutional:       General: She is not in acute distress  Appearance: Normal appearance  Cardiovascular:      Rate and Rhythm: Normal rate and regular rhythm  Pulses: Normal pulses  Heart sounds: Normal heart sounds  Pulmonary:      Effort: Pulmonary effort is normal       Breath sounds: Normal breath sounds  Abdominal:      General: Bowel sounds are normal       Palpations: Abdomen is soft  Tenderness: There is abdominal tenderness (epigastric)  Musculoskeletal:         General: Normal range of motion     Skin:     General: Skin is warm and dry  Capillary Refill: Capillary refill takes less than 2 seconds  Neurological:      Mental Status: She is alert and oriented to person, place, and time  Mental status is at baseline           Additional Data:     Labs:  Results from last 7 days   Lab Units 10/31/22  0552 10/30/22  2036   WBC Thousand/uL 4 98 6 70   HEMOGLOBIN g/dL 8 3* 9 1*   HEMATOCRIT % 28 3* 31 2*   PLATELETS Thousands/uL 438* 492*   NEUTROS PCT %  --  46   LYMPHS PCT %  --  41   MONOS PCT %  --  11   EOS PCT %  --  2     Results from last 7 days   Lab Units 10/31/22  0552 10/30/22  2036 10/28/22  1447   SODIUM mmol/L 139 139 138   POTASSIUM mmol/L 3 5 3 9 3 2*   CHLORIDE mmol/L 108 104 104   CO2 mmol/L 24 26 28   BUN mg/dL 11 14 9   CREATININE mg/dL 0 85 0 89 0 92   ANION GAP mmol/L 7 9 6   CALCIUM mg/dL 8 7 8 9 8 9   ALBUMIN g/dL  --  3 6 3 5   TOTAL BILIRUBIN mg/dL  --  0 18* 0 29   ALK PHOS U/L  --  106 99   ALT U/L  --  18 19   AST U/L  --   --  22   GLUCOSE RANDOM mg/dL 99 93 94                       Lines/Drains:  Invasive Devices  Report    Peripheral Intravenous Line  Duration           Peripheral IV 10/30/22 Left Arm <1 day                      Imaging: Reviewed radiology reports from this admission including: abdominal/pelvic CT    Recent Cultures (last 7 days):         Last 24 Hours Medication List:   Current Facility-Administered Medications   Medication Dose Route Frequency Provider Last Rate   • acetaminophen  975 mg Oral Q6H PRN DIGNA Molina     • docusate sodium  100 mg Oral BID Santa Whitehead MD     • enoxaparin  40 mg Subcutaneous Q24H Yon Gaspar MD     • ondansetron  4 mg Intravenous Q6H PRN Santa Whitehead MD     • pantoprazole  40 mg Intravenous Q12H Yon Gaspar MD     • sodium chloride  100 mL/hr Intravenous Continuous Santa Whitehead  mL/hr (10/31/22 0009)   • sodium chloride  100 mL/hr Intravenous Continuous Santa Whitehead  mL/hr (10/31/22 0947)        Today, Patient Was Seen By: Jacky Mo    **Please Note: This note may have been constructed using a voice recognition system  **

## 2022-10-31 NOTE — ASSESSMENT & PLAN NOTE
· Patient noted she had prior chest pain, related to her epigastric pain, and acid burning up into her chest  · She states she has not had any since she left 10/28  · Will check baseline EKG for admission

## 2022-10-31 NOTE — UTILIZATION REVIEW
NOTIFICATION OF INPATIENT ADMISSION   AUTHORIZATION REQUEST   SERVICING FACILITY:   [address] [place] [address], [address]   Tax ID: [id number]  NPI: [id number] ATTENDING PROVIDER:  Attending Name and NPI#: Elmo Sanchez [[id number]]  Address: [address] [place] [address], [address]   Phone: [phone]     ADMISSION INFORMATION:  [facility]  [address]  Place of Service Code: 21  Inpatient Admission Date/Time: 10/30/22 10:03 PM  Discharge Date/Time: No discharge date for patient encounter  Admitting Diagnosis Code/Description:  Vomiting [R11 10]  Intractable nausea and vomiting [R11 2]     UTILIZATION REVIEW CONTACT:  Ria Harris Utilization   Network Utilization Review Department  Phone: [phone]  Fax: [phone]  Email: [email or web address]  Contact for approvals/pending authorizations, clinical reviews, and discharge  PHYSICIAN ADVISORY SERVICES:  Medical Necessity Denial & [id number]-[id number]-Ggny Review  Phone: [phone]  Fax: [phone]  Email: [email or web address]

## 2022-11-01 LAB
ANION GAP SERPL CALCULATED.3IONS-SCNC: 6 MMOL/L (ref 4–13)
BASOPHILS # BLD AUTO: 0.02 THOUSANDS/ÂΜL (ref 0–0.1)
BASOPHILS NFR BLD AUTO: 0 % (ref 0–1)
BUN SERPL-MCNC: 6 MG/DL (ref 5–25)
CALCIUM SERPL-MCNC: 8.3 MG/DL (ref 8.3–10.1)
CHLORIDE SERPL-SCNC: 110 MMOL/L (ref 96–108)
CO2 SERPL-SCNC: 25 MMOL/L (ref 21–32)
CREAT SERPL-MCNC: 0.9 MG/DL (ref 0.6–1.3)
EOSINOPHIL # BLD AUTO: 0.08 THOUSAND/ÂΜL (ref 0–0.61)
EOSINOPHIL NFR BLD AUTO: 2 % (ref 0–6)
ERYTHROCYTE [DISTWIDTH] IN BLOOD BY AUTOMATED COUNT: 18.8 % (ref 11.6–15.1)
GFR SERPL CREATININE-BSD FRML MDRD: 79 ML/MIN/1.73SQ M
GLUCOSE SERPL-MCNC: 95 MG/DL (ref 65–140)
HCT VFR BLD AUTO: 26.6 % (ref 34.8–46.1)
HGB BLD-MCNC: 7.7 G/DL (ref 11.5–15.4)
IMM GRANULOCYTES # BLD AUTO: 0.01 THOUSAND/UL (ref 0–0.2)
IMM GRANULOCYTES NFR BLD AUTO: 0 % (ref 0–2)
LYMPHOCYTES # BLD AUTO: 2.67 THOUSANDS/ÂΜL (ref 0.6–4.47)
LYMPHOCYTES NFR BLD AUTO: 52 % (ref 14–44)
MCH RBC QN AUTO: 20.8 PG (ref 26.8–34.3)
MCHC RBC AUTO-ENTMCNC: 28.9 G/DL (ref 31.4–37.4)
MCV RBC AUTO: 72 FL (ref 82–98)
MONOCYTES # BLD AUTO: 0.47 THOUSAND/ÂΜL (ref 0.17–1.22)
MONOCYTES NFR BLD AUTO: 9 % (ref 4–12)
NEUTROPHILS # BLD AUTO: 1.89 THOUSANDS/ÂΜL (ref 1.85–7.62)
NEUTS SEG NFR BLD AUTO: 37 % (ref 43–75)
NRBC BLD AUTO-RTO: 0 /100 WBCS
PLATELET # BLD AUTO: 403 THOUSANDS/UL (ref 149–390)
PMV BLD AUTO: 9.4 FL (ref 8.9–12.7)
POTASSIUM SERPL-SCNC: 3.4 MMOL/L (ref 3.5–5.3)
RBC # BLD AUTO: 3.7 MILLION/UL (ref 3.81–5.12)
SODIUM SERPL-SCNC: 141 MMOL/L (ref 135–147)
WBC # BLD AUTO: 5.14 THOUSAND/UL (ref 4.31–10.16)

## 2022-11-01 RX ORDER — POTASSIUM CHLORIDE 14.9 MG/ML
20 INJECTION INTRAVENOUS ONCE
Status: COMPLETED | OUTPATIENT
Start: 2022-11-01 | End: 2022-11-01

## 2022-11-01 RX ORDER — SCOLOPAMINE TRANSDERMAL SYSTEM 1 MG/1
1 PATCH, EXTENDED RELEASE TRANSDERMAL
Status: DISCONTINUED | OUTPATIENT
Start: 2022-11-01 | End: 2022-11-04 | Stop reason: HOSPADM

## 2022-11-01 RX ADMIN — SCOPALAMINE 1 PATCH: 1 PATCH, EXTENDED RELEASE TRANSDERMAL at 11:44

## 2022-11-01 RX ADMIN — POTASSIUM CHLORIDE 20 MEQ: 14.9 INJECTION, SOLUTION INTRAVENOUS at 09:22

## 2022-11-01 RX ADMIN — SODIUM CHLORIDE 125 ML/HR: 0.9 INJECTION, SOLUTION INTRAVENOUS at 13:26

## 2022-11-01 RX ADMIN — ONDANSETRON 4 MG: 2 INJECTION INTRAMUSCULAR; INTRAVENOUS at 20:17

## 2022-11-01 RX ADMIN — MORPHINE SULFATE 2 MG: 2 INJECTION, SOLUTION INTRAMUSCULAR; INTRAVENOUS at 09:29

## 2022-11-01 RX ADMIN — ONDANSETRON 4 MG: 2 INJECTION INTRAMUSCULAR; INTRAVENOUS at 04:17

## 2022-11-01 RX ADMIN — PANTOPRAZOLE SODIUM 40 MG: 40 INJECTION, POWDER, FOR SOLUTION INTRAVENOUS at 20:17

## 2022-11-01 RX ADMIN — ENOXAPARIN SODIUM 40 MG: 40 INJECTION SUBCUTANEOUS at 08:12

## 2022-11-01 RX ADMIN — SODIUM CHLORIDE 125 ML/HR: 0.9 INJECTION, SOLUTION INTRAVENOUS at 20:22

## 2022-11-01 RX ADMIN — IRON SUCROSE 200 MG: 20 INJECTION, SOLUTION INTRAVENOUS at 11:11

## 2022-11-01 RX ADMIN — SODIUM CHLORIDE 125 ML/HR: 0.9 INJECTION, SOLUTION INTRAVENOUS at 04:17

## 2022-11-01 RX ADMIN — PANTOPRAZOLE SODIUM 40 MG: 40 INJECTION, POWDER, FOR SOLUTION INTRAVENOUS at 08:12

## 2022-11-01 RX ADMIN — DOCUSATE SODIUM 100 MG: 100 CAPSULE, LIQUID FILLED ORAL at 08:12

## 2022-11-01 RX ADMIN — ACETAMINOPHEN 975 MG: 325 TABLET ORAL at 04:24

## 2022-11-01 NOTE — ASSESSMENT & PLAN NOTE
· Patient's history of iron deficiency anemia secondary to malabsorption  · Patient also follows with gyn for menorrhagia which could contribute to her anemia  · With Hgb 7 7 this AM; pt reports feeling lightheaded and weak this AM  · IV Venofer ordered x3 days  · Recheck CBC in the AM

## 2022-11-01 NOTE — ASSESSMENT & PLAN NOTE
· Patient presents with abdominal pain throughout her upper abdomen bilaterally, and in the epigastric region  · She notes her symptoms are worse after eating or drinking anything  · With associated nausea and vomiting immediately after taking any p o  · She notes symptoms are identical to the pain she had with her previous marginal ulcer in 2019  · Patient a CT scan 10/28 without any acute intra-abdominal abnormality  · Continue proton pump inhibitor b i d  · Bariatrics and GI following; underwent EGD on 10/31 which was negative   · Patient also follows with the [address] [place] [address] team:    · She was seen in the office for abdominal pain 6/22, and her abdominal pain was suspected to be "multifactorial and may be secondary to functional dyspepsia, food sensitivities, possible adhesive disease given prior gastric surgeries, as well as discomfort from stool burden "  · Patient was also diagnosed with IBS and constipation; takes Linzess as an OP   Will continue as IP

## 2022-11-01 NOTE — CASE MANAGEMENT
Case Management Assessment & Discharge Planning Note    Patient name Navid Barnett  Location [place] 2 /[facility] 2 Princess Story* MRN [id number]  : 1980 Date 2022       Current Admission Date: 10/30/2022  Current Admission Diagnosis:Intractable nausea and vomiting   Patient Active Problem List    Diagnosis Date Noted   • Intractable nausea and vomiting 10/28/2022   • GBS bacteriuria 2020   • Abnormal uterine bleeding (AUB) 2020   • Status post bariatric surgery 2020   • Iron deficiency anemia secondary to inadequate dietary iron intake 2019   • Menorrhagia with regular cycle 2019   • Pelvic pain 2019   • BV (bacterial vaginosis) 2019   • Status post suction D&C 2019   • Missed  2019   • Amenorrhea 2019   • Pregnancy complicated by previous bariatric surgery, first trimester 2019   • Pouchitis ([address] ) 2019   • Leg swelling 2019   • Decreased oral intake 2019   • Dysphagia 2019   • Heart burn 2019   • Epigastric abdominal pain 2019   • Other constipation 2019   • Chronic vomiting 2019   • Gastritis 2019   • Abdominal pain 2019   • Symptomatic anemia 2019   • S/P laparoscopic sleeve gastrectomy 2019   • Bilious vomiting with nausea 2019   • Obesity, Class II, BMI 35-39 9 2019   • Bariatric surgery status 2018   • Postsurgical malabsorption 2018   • History of multiple miscarriages 10/10/2018      LOS (days): 2  Geometric Mean LOS (GMLOS) (days):   Days to GMLOS:     OBJECTIVE:    Risk of Unplanned Readmission Score: 11 77         Current admission status: Inpatient       Preferred Pharmacy:   [facility]/pharmacy #[id number] Ade, [address]  [address]  Phone: [phone] Fax: [phone] [facility], [facility] ,  171 [facility] [address]   Phone: [phone] Fax: [phone]    Primary Care Provider: Hermelinda Landau, PA-C    Primary Insurance: Madelyn JOHNSON  Secondary Insurance:     ASSESSMENT:  [address], [address] Carlene Packer Representative - Spouse   Primary Phone: [phone] (Mobile)  Home Phone: [phone]               Advance Directives  Does patient have a [facility] [address]?: No  Was patient offered paperwork?: Yes (declined)  Does patient have Advance Directives?: No  Was patient offered paperwork?: Yes (declined)  Primary Contact: Wale Ignacio          Readmission Root Cause  30 Day Readmission: No    Patient Information  Admitted from[de-identified] Home  Mental Status: Alert  During Assessment patient was accompanied by: Not accompanied during assessment  Assessment information provided by[de-identified] Patient  Primary Caregiver: Self  Support Systems: Self, Spouse/significant other  What city do you live in?: [address] entry access options   Select all that apply : Stairs  Number of steps to enter home : 8  Do the steps have railings?: Yes  Type of Current Residence: [address] [place] home  Upon entering residence, is there a bedroom on the main floor (no further steps)?: Yes  Upon entering residence, is there a bathroom on the main floor (no further steps)?: Yes  In the last 12 months, was there a time when you were not able to pay the mortgage or rent on time?: No  In the last 12 months, how many places have you lived?: 1  In the last 12 months, was there a time when you did not have a steady place to sleep or slept in a shelter (including now)?: No  Homeless/housing insecurity resource given?: N/A  Living Arrangements: Lives w/ Spouse/significant other  Is patient a ?: No    Activities of Daily Living Prior to Admission  Functional Status: Independent  Completes ADLs independently?: Yes  Ambulates independently?: Yes  Does patient use assisted devices?: No  Does patient currently own DME?: No  Does patient have a history of Outpatient Therapy (PT/OT)?: No  Does the patient have a history of Short-Term Rehab?: No  Does patient have a history of HHC?: No  Does patient currently have [facility]?: No         Patient Information Continued  Income Source: Employed  Does patient have prescription coverage?: Yes  Within the past 12 months, you worried that your food would run out before you got the money to buy more : Never true  Within the past 12 months, the food you bought just didn't last and you didn't have money to get more : Never true  Food insecurity resource given?: N/A  Does patient receive dialysis treatments?: No  Does patient have a history of substance abuse?: No  Does patient have a history of Mental Health Diagnosis?: No         Means of Transportation  Means of Transport to Appts[de-identified] Drives Self  In the past 12 months, has lack of transportation kept you from medical appointments or from getting medications?: No  In the past 12 months, has lack of transportation kept you from meetings, work, or from getting things needed for daily living?: No  Was application for public transport provided?: N/A        DISCHARGE DETAILS:    Discharge planning discussed with[de-identified] pt        CM contacted family/caregiver?: Yes (dtr in room)  Were Treatment Team discharge recommendations reviewed with patient/caregiver?: Yes  Did patient/caregiver verbalize understanding of patient care needs?: Yes       Contacts  Patient Contacts: Ivy Gibbs   Relationship to Patient[de-identified] [address]         Is the patient interested in [facility] at discharge?: No    DME Referral Provided  Referral made for DME?: No    Other Referral/Resources/Interventions Provided:  Interventions: None Indicated         Treatment Team Recommendation: Home  Discharge Destination Plan[de-identified] Home  Transport at Discharge : Automobile ( will  on d/c)

## 2022-11-01 NOTE — PROGRESS NOTES
Progress Note- Blair Reyes 40 y o  female MRN: [id number]    Unit/Bed#: [place] 2 -01 Encounter: [id number]      Assessment and Plan:    79-year-old female with past medical history including but not limited to sleeve gastrectomy later converted into Karmen-en-Y gastric bypass who presented with worsening abdominal pain, nausea, vomiting, status post EGD 10/31 not showing any marginal ulcers  1  Intractable nausea and vomiting  Will recommend NPO, bowel rest for now  Consider trial of clear liquid diet later today as well  Then advance as tolerated gradually  Continue IV Protonix b i d , antiemetic medications as needed  Will also add scopolamine patch  Plan is for outpatient pH impedance and manometry study with ongoing GI follow-up  2  Constipation  Last bowel movement reported last Thursday  Patient reports that on her usual baseline she has a bowel movement once every week  Is not distressed at this time  Denies any abdominal distension  Passing gas  Continue home dose linzess  ______________________________________________________________________    Subjective:     Patient seen and examined at bedside  She continues to complain of significant abdominal pain  Reports that she tried consumed clear liquid diet for dinner last night, was followed by profuse vomiting, nausea  Since then she has been NPO again  Denies any fever  Denies any bowel movements  Last bowel movement last Thursday      Medication Administration - last 24 hours from 10/31/2022 0827 to 11/01/2022 0827       Date/Time Order Dose Route Action Action by     11/01/2022 0812 docusate sodium (COLACE) capsule 100 mg 100 mg Oral Given Danita Workman RN     10/31/2022 1611 docusate sodium (COLACE) capsule 100 mg 100 mg Oral Given Danita Workman RN     11/01/2022 0417 ondansetron (ZOFRAN) injection 4 mg 4 mg Intravenous Given Arely Serrano RN     10/31/2022 2033 ondansetron (ZOFRAN) injection 4 mg 4 mg Intravenous Given Shanthi Bhatia RN     11/01/2022 5324 enoxaparin (LOVENOX) subcutaneous injection 40 mg 40 mg Subcutaneous Given Carrillo Koenig RN     11/01/2022 2867 pantoprazole (PROTONIX) injection 40 mg 40 mg Intravenous Given Carrillo Koenig RN     10/31/2022 2034 pantoprazole (PROTONIX) injection 40 mg 40 mg Intravenous Given Shanthi Bhatia RN     10/31/2022 0947 sodium chloride 0 9 % infusion 100 mL/hr Intravenous [address] Carrillo Koenig RN     11/01/2022 0424 acetaminophen (TYLENOL) tablet 975 mg 975 mg Oral Given Shanthi Bhatia RN     10/31/2022 2033 acetaminophen (TYLENOL) tablet 975 mg 975 mg Oral Given Shanthi Bhatia RN     10/31/2022 0946 acetaminophen (TYLENOL) tablet 975 mg 975 mg Oral Given Carrillo Koenig RN     11/01/2022 0417 sodium chloride 0 9 % infusion 125 mL/hr Intravenous New [address] Shanthi Bhatia RN     10/31/2022 2040 sodium chloride 0 9 % infusion 125 mL/hr Intravenous New [address] [facility] [address] Shanthi Bhatia RN     10/31/2022 1425 sodium chloride 0 9 % infusion 0  Intravenous Stopped Marcella Driscoll MD     10/31/2022 1339 sodium chloride 0 9 % infusion   Intravenous Continue from [facility], MD     10/31/2022 1330 sodium chloride 0 9 % infusion 125 mL/hr Intravenous New Bag Sera Lowery RN     10/31/2022 2210 metoclopramide (REGLAN) injection 10 mg 10 mg Intravenous Not Given Shanthi Bhatia RN     10/31/2022 2214 morphine injection 2 mg 2 mg Intravenous Given Shanthi Bhatia RN          Objective:     Vitals: Blood pressure 93/56, pulse 70, temperature 98 1 °F (36 7 °C), resp  rate 17, weight 109 kg (239 lb 3 2 oz), last menstrual period 10/29/2022, SpO2 98 %, unknown if currently breastfeeding  ,Body mass index is 39 8 kg/m²        Intake/Output Summary (Last 24 hours) at 11/1/2022 0827  Last data filed at 11/1/2022 0414  Gross per 24 hour   Intake 1950 ml   Output 1 ml   Net 1949 ml       Physical Exam:   General Appearance: Awake and alert, in no acute distress  Abdomen: Soft, non-tender, non-distended; bowel sounds normal; no masses or no organomegaly    Invasive Devices  Report    Peripheral Intravenous Line  Duration           Peripheral IV 10/30/22 Left Arm 1 day                Lab Results:  Admission on 10/30/2022   Component Date Value   • Sodium 10/30/2022 139    • Potassium 10/30/2022 3 9    • Chloride 10/30/2022 104    • CO2 10/30/2022 26    • ANION GAP 10/30/2022 9    • BUN 10/30/2022 14    • Creatinine 10/30/2022 0 89    • Glucose 10/30/2022 93    • Calcium 10/30/2022 8 9    • AST 10/30/2022     • ALT 10/30/2022 18    • Alkaline Phosphatase 10/30/2022 106    • Total Protein 10/30/2022 8 2    • Albumin 10/30/2022 3 6    • Total Bilirubin 10/30/2022 0 18 (A)   • eGFR 10/30/2022 80    • WBC 10/30/2022 6 70    • RBC 10/30/2022 4 41    • Hemoglobin 10/30/2022 9 1 (A)   • Hematocrit 10/30/2022 31 2 (A)   • MCV 10/30/2022 71 (A)   • MCH 10/30/2022 20 6 (A)   • MCHC 10/30/2022 29 2 (A)   • RDW 10/30/2022 18 8 (A)   • MPV 10/30/2022 9 0    • Platelets 35/25/2737 492 (A)   • nRBC 10/30/2022 0    • Neutrophils Relative 10/30/2022 46    • Immat GRANS % 10/30/2022 0    • Lymphocytes Relative 10/30/2022 41    • Monocytes Relative 10/30/2022 11    • Eosinophils Relative 10/30/2022 2    • Basophils Relative 10/30/2022 0    • Neutrophils Absolute 10/30/2022 3 03    • Immature Grans Absolute 10/30/2022 0 01    • Lymphocytes Absolute 10/30/2022 2 76    • Monocytes Absolute 10/30/2022 0 74    • Eosinophils Absolute 10/30/2022 0 14    • Basophils Absolute 10/30/2022 0 02    • Lipase 10/30/2022 91    • Sodium 10/31/2022 139    • Potassium 10/31/2022 3 5    • Chloride 10/31/2022 108    • CO2 10/31/2022 24    • ANION GAP 10/31/2022 7    • BUN 10/31/2022 11    • Creatinine 10/31/2022 0 85    • Glucose 10/31/2022 99    • Calcium 10/31/2022 8 7    • eGFR 10/31/2022 85    • WBC 10/31/2022 4 98    • RBC 10/31/2022 3 96    • Hemoglobin 10/31/2022 8 3 (A)   • Hematocrit 10/31/2022 28 3 (A)   • MCV 10/31/2022 72 (A)   • MCH 10/31/2022 21 0 (A)   • MCHC 10/31/2022 29 3 (A)   • RDW 10/31/2022 18 8 (A)   • Platelets 08/81/1833 438 (A)   • MPV 10/31/2022 9 4    • Ventricular Rate 10/31/2022 72    • Atrial Rate 10/31/2022 72    • MN Interval 10/31/2022 156    • QRSD Interval 10/31/2022 90    • QT Interval 10/31/2022 420    • QTC Interval 10/31/2022 459    • P Axis 10/31/2022 63    • QRS Axis 10/31/2022 17    • T Wave Axis 10/31/2022 13    • Ventricular Rate 10/30/2022 66    • Atrial Rate 10/30/2022 66    • MN Interval 10/30/2022 152    • QRSD Interval 10/30/2022 90    • QT Interval 10/30/2022 386    • QTC Interval 10/30/2022 404    • P Axis 10/30/2022 98    • QRS Axis 10/30/2022 78    • T Wave Axis 10/30/2022 63    • ABO Grouping 10/31/2022 O    • Rh Factor 10/31/2022 Positive    • Antibody Screen 10/31/2022 Negative    • Specimen Expiration Date 10/31/2022 [id number]    • WBC 11/01/2022 5 14    • RBC 11/01/2022 3 70 (A)   • Hemoglobin 11/01/2022 7 7 (A)   • Hematocrit 11/01/2022 26 6 (A)   • MCV 11/01/2022 72 (A)   • MCH 11/01/2022 20 8 (A)   • MCHC 11/01/2022 28 9 (A)   • RDW 11/01/2022 18 8 (A)   • MPV 11/01/2022 9 4    • Platelets 27/64/0788 403 (A)   • nRBC 11/01/2022 0    • Neutrophils Relative 11/01/2022 37 (A)   • Immat GRANS % 11/01/2022 0    • Lymphocytes Relative 11/01/2022 52 (A)   • Monocytes Relative 11/01/2022 9    • Eosinophils Relative 11/01/2022 2    • Basophils Relative 11/01/2022 0    • Neutrophils Absolute 11/01/2022 1 89    • Immature Grans Absolute 11/01/2022 0 01    • Lymphocytes Absolute 11/01/2022 2 67    • Monocytes Absolute 11/01/2022 0 47    • Eosinophils Absolute 11/01/2022 0 08    • Basophils Absolute 11/01/2022 0 02    • Sodium 11/01/2022 141    • Potassium 11/01/2022 3 4 (A)   • Chloride 11/01/2022 110 (A)   • CO2 11/01/2022 25    • ANION GAP 11/01/2022 6    • BUN 11/01/2022 6    • Creatinine 11/01/2022 0 90    • Glucose 11/01/2022 95    • Calcium 11/01/2022 8 3    • eGFR 11/01/2022 79        Imaging Studies: I have personally reviewed pertinent imaging studies

## 2022-11-01 NOTE — PLAN OF CARE
Problem: PAIN - ADULT  Goal: Verbalizes/displays adequate comfort level or baseline comfort level  Description: Interventions:  - Encourage patient to monitor pain and request assistance  - Assess pain using appropriate pain scale  - Administer analgesics based on type and severity of pain and evaluate response  - Implement non-pharmacological measures as appropriate and evaluate response  - Consider cultural and social influences on pain and pain management  - Notify physician/advanced practitioner if interventions unsuccessful or patient reports new pain  Outcome: Progressing     Problem: INFECTION - ADULT  Goal: Absence or prevention of progression during hospitalization  Description: INTERVENTIONS:  - Assess and monitor for signs and symptoms of infection  - Monitor lab/diagnostic results  - Monitor all insertion sites, i e  indwelling lines, tubes, and drains  - Monitor endotracheal if appropriate and nasal secretions for changes in amount and color  - [place] appropriate cooling/warming therapies per order  - Administer medications as ordered  - Instruct and encourage patient and family to use good hand hygiene technique  - Identify and instruct in appropriate isolation precautions for identified infection/condition  Outcome: Progressing  Goal: Absence of fever/infection during neutropenic period  Description: INTERVENTIONS:  - Monitor WBC    Outcome: Progressing     Problem: SAFETY ADULT  Goal: Patient will remain free of falls  Description: INTERVENTIONS:  - Educate patient/family on patient safety including physical limitations  - Instruct patient to call for assistance with activity   - Consult OT/PT to assist with strengthening/mobility   - Keep Call bell within reach  - Keep bed low and locked with side rails adjusted as appropriate  - Keep care items and personal belongings within reach  - Initiate and maintain comfort rounds  - Make Fall Risk Sign visible to staff  - Offer Toileting every  Hours, in advance of need  - Initiate/[place]  - Obtain necessary fall risk management equipment:   - Apply yellow socks and bracelet for high fall risk patients  - Consider moving patient to room near nurses station  Outcome: Progressing  Goal: Maintain or return to baseline ADL function  Description: INTERVENTIONS:  -  Assess patient's ability to carry out ADLs; assess patient's baseline for ADL function and identify physical deficits which impact ability to perform ADLs (bathing, care of mouth/teeth, toileting, grooming, dressing, etc )  - Assess/evaluate cause of self-care deficits   - Assess range of motion  - Assess patient's mobility; develop plan if impaired  - Assess patient's need for assistive devices and provide as appropriate  - Encourage maximum independence but intervene and supervise when necessary  - Involve family in performance of ADLs  - Assess for home care needs following discharge   - Consider OT consult to assist with ADL evaluation and planning for discharge  - Provide patient education as appropriate  Outcome: Progressing  Goal: Maintains/Returns to pre admission functional level  Description: INTERVENTIONS:  - Perform BMAT or MOVE assessment daily    - Set and communicate daily mobility goal to care team and patient/family/caregiver  - Collaborate with rehabilitation services on mobility goals if consulted  - Perform Range of Motion  times a day  - Reposition patient every  hours    - Dangle patient  times a day  - Stand patient  times a day  - Ambulate patient  times a day  - Out of bed to chair  times a day   - Out of bed for meals  times a day  - Out of bed for toileting  - Record patient progress and toleration of activity level   Outcome: Progressing     Problem: DISCHARGE PLANNING  Goal: Discharge to home or other facility with appropriate resources  Description: INTERVENTIONS:  - Identify barriers to discharge w/patient and caregiver  - Arrange for needed discharge resources and transportation as appropriate  - Identify discharge learning needs (meds, wound care, etc )  - Arrange for interpretive services to assist at discharge as needed  - Refer to Case Management Department for coordinating discharge planning if the patient needs post-hospital services based on physician/advanced practitioner order or complex needs related to functional status, cognitive ability, or social support system  Outcome: Progressing     Problem: Knowledge Deficit  Goal: Patient/family/caregiver demonstrates understanding of disease process, treatment plan, medications, and discharge instructions  Description: Complete learning assessment and assess knowledge base    Interventions:  - Provide teaching at level of understanding  - Provide teaching via preferred learning methods  Outcome: Progressing     Problem: GASTROINTESTINAL - ADULT  Goal: Minimal or absence of nausea and/or vomiting  Description: INTERVENTIONS:  - Administer IV fluids if ordered to ensure adequate hydration  - Maintain NPO status until nausea and vomiting are resolved  - Nasogastric tube if ordered  - Administer ordered antiemetic medications as needed  - Provide nonpharmacologic comfort measures as appropriate  - Advance diet as tolerated, if ordered  - Consider nutrition services referral to assist patient with adequate nutrition and appropriate food choices  Outcome: Progressing  Goal: Maintains or returns to baseline bowel function  Description: INTERVENTIONS:  - Assess bowel function  - Encourage oral fluids to ensure adequate hydration  - Administer IV fluids if ordered to ensure adequate hydration  - Administer ordered medications as needed  - Encourage mobilization and activity  - Consider nutritional services referral to assist patient with adequate nutrition and appropriate food choices  Outcome: Progressing  Goal: Maintains adequate nutritional intake  Description: INTERVENTIONS:  - Monitor percentage of each meal consumed  - Identify factors contributing to decreased intake, treat as appropriate  - Assist with meals as needed  - Monitor I&O, weight, and lab values if indicated  - Obtain nutrition services referral as needed  Outcome: Progressing  Goal: Oral mucous membranes remain intact  Description: INTERVENTIONS  - Assess oral mucosa and hygiene practices  - Implement preventative oral hygiene regimen  - Implement oral medicated treatments as ordered  - Initiate Nutrition services referral as needed  Outcome: Progressing     Problem: METABOLIC, FLUID AND ELECTROLYTES - ADULT  Goal: Electrolytes maintained within normal limits  Description: INTERVENTIONS:  - Monitor labs and assess patient for signs and symptoms of electrolyte imbalances  - Administer electrolyte replacement as ordered  - Monitor response to electrolyte replacements, including repeat lab results as appropriate  - Instruct patient on fluid and nutrition as appropriate  Outcome: Progressing  Goal: Fluid balance maintained  Description: INTERVENTIONS:  - Monitor labs   - Monitor I/O and WT  - Instruct patient on fluid and nutrition as appropriate  - Assess for signs & symptoms of volume excess or deficit  Outcome: Progressing  Goal: Glucose maintained within target range  Description: INTERVENTIONS:  - Monitor Blood Glucose as ordered  - Assess for signs and symptoms of hyperglycemia and hypoglycemia  - Administer ordered medications to maintain glucose within target range  - Assess nutritional intake and initiate nutrition service referral as needed  Outcome: Progressing     Problem: Nutrition/Hydration-ADULT  Goal: Nutrient/Hydration intake appropriate for improving, restoring or maintaining nutritional needs  Description: Monitor and assess patient's nutrition/hydration status for malnutrition  Collaborate with interdisciplinary team and initiate plan and interventions as ordered  Monitor patient's weight and dietary intake as ordered or per policy   Utilize nutrition screening tool and intervene as necessary  Determine patient's food preferences and provide high-protein, high-caloric foods as appropriate       INTERVENTIONS:  - Monitor oral intake, urinary output, labs, and treatment plans  - Assess nutrition and hydration status and recommend course of action  - Evaluate amount of meals eaten  - Assist patient with eating if necessary   - Allow adequate time for meals  - Recommend/ encourage appropriate diets, oral nutritional supplements, and vitamin/mineral supplements  - Order, calculate, and assess calorie counts as needed  - Recommend, monitor, and adjust tube feedings and TPN/PPN based on assessed needs  - Assess need for intravenous fluids  - Provide specific nutrition/hydration education as appropriate  - Include patient/family/caregiver in decisions related to nutrition  Outcome: Progressing     Problem: Potential for Falls  Goal: Patient will remain free of falls  Description: INTERVENTIONS:  - Educate patient/family on patient safety including physical limitations  - Instruct patient to call for assistance with activity   - Consult OT/PT to assist with strengthening/mobility   - Keep Call bell within reach  - Keep bed low and locked with side rails adjusted as appropriate  - Keep care items and personal belongings within reach  - Initiate and maintain comfort rounds  - Make Fall Risk Sign visible to staff  - Offer Toileting every  Hours, in advance of need  - Initiate/Maintain alarm  - Obtain necessary fall risk management equipment:   - Apply yellow socks and bracelet for high fall risk patients  - Consider moving patient to room near nurses station  Outcome: Progressing

## 2022-11-01 NOTE — ASSESSMENT & PLAN NOTE
· Patient underwent laparoscopic sleeve gastrectomy with revision to laparoscopic Karmen-en-Y gastric bypass surgery  · Follows with Dr Sheela Arriaga  · Bariatric following

## 2022-11-01 NOTE — ASSESSMENT & PLAN NOTE
Patient is a 45-year-old female with history of previous gastric bypass surgery, followed by Dr Gilford Rota, previous pouchitis and marginal ulcer in 2019, who presented to the ER 10/28 with abdominal pain, intractable nausea and vomiting  Patient unfortunately had to leave against medical advice due to childcare issues  · Patient return to the ER due to persistence of her intractable nausea, vomiting, as well as abdominal pain through her anterior abdomen, especially in the epigastric region  · She notes anything she eats or drinks immediately vomits back up  · She notes flecks of pink blood in her emesis; suspect andreia phong tearing in the setting of persistent vomiting  · Patient notes since she left the hospital on 10/28 she has not tolerated any significant p o   Intake  · Patient notes her symptoms are similar to symptoms she had with her marginal ulcer in 2019  · Patient has been compliant with her proton pump inhibitor b i d   · 10/28 patient had a CT scan of her abdomen and pelvis without any acute abnormalities  · Patient's previous EGD in 4/22 revealed that previous pouchitis and marginal ulcer from 2019 had healed  · Bariatrics and GI following; underwent EGD on 10/31 which was essentially benign  · Was initiated on CLD s/p EGD on 10/31 but did not tolerate; strict bowel rest today and will advance diet as tolerated tomorrow  · Suspect slowed gastric emptying; continue reglan  · Continue pain regimen and antiemetics

## 2022-11-01 NOTE — PLAN OF CARE
Problem: PAIN - ADULT  Goal: Verbalizes/displays adequate comfort level or baseline comfort level  Description: Interventions:  - Encourage patient to monitor pain and request assistance  - Assess pain using appropriate pain scale  - Administer analgesics based on type and severity of pain and evaluate response  - Implement non-pharmacological measures as appropriate and evaluate response  - Consider cultural and social influences on pain and pain management  - Notify physician/advanced practitioner if interventions unsuccessful or patient reports new pain  Outcome: Progressing     Problem: INFECTION - ADULT  Goal: Absence or prevention of progression during hospitalization  Description: INTERVENTIONS:  - Assess and monitor for signs and symptoms of infection  - Monitor lab/diagnostic results  - Monitor all insertion sites, i e  indwelling lines, tubes, and drains  - Monitor endotracheal if appropriate and nasal secretions for changes in amount and color  - [place] appropriate cooling/warming therapies per order  - Administer medications as ordered  - Instruct and encourage patient and family to use good hand hygiene technique  - Identify and instruct in appropriate isolation precautions for identified infection/condition  Outcome: Progressing  Goal: Absence of fever/infection during neutropenic period  Description: INTERVENTIONS:  - Monitor WBC    Outcome: Progressing     Problem: SAFETY ADULT  Goal: Patient will remain free of falls  Description: INTERVENTIONS:  - Educate patient/family on patient safety including physical limitations  - Instruct patient to call for assistance with activity   - Consult OT/PT to assist with strengthening/mobility   - Keep Call bell within reach  - Keep bed low and locked with side rails adjusted as appropriate  - Keep care items and personal belongings within reach  - Initiate and maintain comfort rounds  - Make Fall Risk Sign visible to staff  - Apply yellow socks and bracelet for high fall risk patients  - Consider moving patient to room near nurses station  Outcome: Progressing  Goal: Maintain or return to baseline ADL function  Description: INTERVENTIONS:  -  Assess patient's ability to carry out ADLs; assess patient's baseline for ADL function and identify physical deficits which impact ability to perform ADLs (bathing, care of mouth/teeth, toileting, grooming, dressing, etc )  - Assess/evaluate cause of self-care deficits   - Assess range of motion  - Assess patient's mobility; develop plan if impaired  - Assess patient's need for assistive devices and provide as appropriate  - Encourage maximum independence but intervene and supervise when necessary  - Involve family in performance of ADLs  - Assess for home care needs following discharge   - Consider OT consult to assist with ADL evaluation and planning for discharge  - Provide patient education as appropriate  Outcome: Progressing  Goal: Maintains/Returns to pre admission functional level  Description: INTERVENTIONS:  - Perform BMAT or MOVE assessment daily    - Set and communicate daily mobility goal to care team and patient/family/caregiver     - Collaborate with rehabilitation services on mobility goals if consulted  - Out of bed for toileting  - Record patient progress and toleration of activity level   Outcome: Progressing     Problem: DISCHARGE PLANNING  Goal: Discharge to home or other facility with appropriate resources  Description: INTERVENTIONS:  - Identify barriers to discharge w/patient and caregiver  - Arrange for needed discharge resources and transportation as appropriate  - Identify discharge learning needs (meds, wound care, etc )  - Arrange for interpretive services to assist at discharge as needed  - Refer to Case Management Department for coordinating discharge planning if the patient needs post-hospital services based on physician/advanced practitioner order or complex needs related to functional status, cognitive ability, or social support system  Outcome: Progressing     Problem: Knowledge Deficit  Goal: Patient/family/caregiver demonstrates understanding of disease process, treatment plan, medications, and discharge instructions  Description: Complete learning assessment and assess knowledge base    Interventions:  - Provide teaching at level of understanding  - Provide teaching via preferred learning methods  Outcome: Progressing     Problem: GASTROINTESTINAL - ADULT  Goal: Maintains or returns to baseline bowel function  Description: INTERVENTIONS:  - Assess bowel function  - Encourage oral fluids to ensure adequate hydration  - Administer IV fluids if ordered to ensure adequate hydration  - Administer ordered medications as needed  - Encourage mobilization and activity  - Consider nutritional services referral to assist patient with adequate nutrition and appropriate food choices  Outcome: Progressing  Goal: Oral mucous membranes remain intact  Description: INTERVENTIONS  - Assess oral mucosa and hygiene practices  - Implement preventative oral hygiene regimen  - Implement oral medicated treatments as ordered  - Initiate Nutrition services referral as needed  Outcome: Progressing     Problem: METABOLIC, FLUID AND ELECTROLYTES - ADULT  Goal: Electrolytes maintained within normal limits  Description: INTERVENTIONS:  - Monitor labs and assess patient for signs and symptoms of electrolyte imbalances  - Administer electrolyte replacement as ordered  - Monitor response to electrolyte replacements, including repeat lab results as appropriate  - Instruct patient on fluid and nutrition as appropriate  Outcome: Progressing  Goal: Fluid balance maintained  Description: INTERVENTIONS:  - Monitor labs   - Monitor I/O and WT  - Instruct patient on fluid and nutrition as appropriate  - Assess for signs & symptoms of volume excess or deficit  Outcome: Progressing  Goal: Glucose maintained within target range  Description: INTERVENTIONS:  - Monitor Blood Glucose as ordered  - Assess for signs and symptoms of hyperglycemia and hypoglycemia  - Administer ordered medications to maintain glucose within target range  - Assess nutritional intake and initiate nutrition service referral as needed  Outcome: Progressing     Problem: Nutrition/Hydration-ADULT  Goal: Nutrient/Hydration intake appropriate for improving, restoring or maintaining nutritional needs  Description: Monitor and assess patient's nutrition/hydration status for malnutrition  Collaborate with interdisciplinary team and initiate plan and interventions as ordered  Monitor patient's weight and dietary intake as ordered or per policy  Utilize nutrition screening tool and intervene as necessary  Determine patient's food preferences and provide high-protein, high-caloric foods as appropriate       INTERVENTIONS:  - Monitor oral intake, urinary output, labs, and treatment plans  - Assess nutrition and hydration status and recommend course of action  - Evaluate amount of meals eaten  - Assist patient with eating if necessary   - Allow adequate time for meals  - Recommend/ encourage appropriate diets, oral nutritional supplements, and vitamin/mineral supplements  - Order, calculate, and assess calorie counts as needed  - Recommend, monitor, and adjust tube feedings and TPN/PPN based on assessed needs  - Assess need for intravenous fluids  - Provide specific nutrition/hydration education as appropriate  - Include patient/family/caregiver in decisions related to nutrition  Outcome: Progressing     Problem: Potential for Falls  Goal: Patient will remain free of falls  Description: INTERVENTIONS:  - Educate patient/family on patient safety including physical limitations  - Instruct patient to call for assistance with activity   - Consult OT/PT to assist with strengthening/mobility   - Keep Call bell within reach  - Keep bed low and locked with side rails adjusted as appropriate  - Keep care items and personal belongings within reach  - Initiate and maintain comfort rounds  - Make Fall Risk Sign visible to staff  - Apply yellow socks and bracelet for high fall risk patients  - Consider moving patient to room near nurses station  Outcome: Progressing

## 2022-11-01 NOTE — ASSESSMENT & PLAN NOTE
· Patient developed postsurgical malabsorption after bariatric surgery  · Resume bariatric vitamins/supplements at time of discharge

## 2022-11-01 NOTE — PROGRESS NOTES
[address] [facility]  Progress Note - Cruz Villafana 1980, 40 y o  female MRN: [id number]  Unit/Bed#: [facility] 87 209-01 Encounter: [id number]  Primary Care Provider: Jojo Buck PA-C   Date and time admitted to hospital: 10/30/2022  7:57 PM    * Intractable nausea and vomiting  Assessment & Plan  Patient is a 50-year-old female with history of previous gastric bypass surgery, followed by Dr Jaylon Mo, previous pouchitis and marginal ulcer in 2019, who presented to the ER 10/28 with abdominal pain, intractable nausea and vomiting  Patient unfortunately had to leave against medical advice due to childcare issues  · Patient return to the ER due to persistence of her intractable nausea, vomiting, as well as abdominal pain through her anterior abdomen, especially in the epigastric region  · She notes anything she eats or drinks immediately vomits back up  · She notes flecks of pink blood in her emesis; suspect andreia phong tearing in the setting of persistent vomiting  · Patient notes since she left the hospital on 10/28 she has not tolerated any significant p o  Intake  · Patient notes her symptoms are similar to symptoms she had with her marginal ulcer in 2019  · Patient has been compliant with her proton pump inhibitor b i d   · 10/28 patient had a CT scan of her abdomen and pelvis without any acute abnormalities  · Patient's previous EGD in 4/22 revealed that previous pouchitis and marginal ulcer from 2019 had healed  · Bariatrics and GI following; underwent EGD on 10/31 which was essentially benign  · Was initiated on CLD s/p EGD on 10/31 but did not tolerate; strict bowel rest today and will advance diet as tolerated tomorrow  · Suspect slowed gastric emptying; continue reglan  · Continue pain regimen and antiemetics    Abdominal pain  Assessment & Plan  · Patient presents with abdominal pain throughout her upper abdomen bilaterally, and in the epigastric region    · She notes her symptoms are worse after eating or drinking anything  · With associated nausea and vomiting immediately after taking any p o  · She notes symptoms are identical to the pain she had with her previous marginal ulcer in 2019  · Patient a CT scan 10/28 without any acute intra-abdominal abnormality  · Continue proton pump inhibitor b i d  · Bariatrics and GI following; underwent EGD on 10/31 which was negative   · Patient also follows with the [address] [place] [address] team:    · She was seen in the office for abdominal pain 6/22, and her abdominal pain was suspected to be "multifactorial and may be secondary to functional dyspepsia, food sensitivities, possible adhesive disease given prior gastric surgeries, as well as discomfort from stool burden "  · Patient was also diagnosed with IBS and constipation; takes Linzess as an OP  Will continue as IP    Postsurgical malabsorption  Assessment & Plan  · Patient developed postsurgical malabsorption after bariatric surgery  · Resume bariatric vitamins/supplements at time of discharge    Status post bariatric surgery  Assessment & Plan  · Patient underwent laparoscopic sleeve gastrectomy with revision to laparoscopic Karmen-en-Y gastric bypass surgery  · Follows with Dr Yoav Paul  · Bariatric following    Iron deficiency anemia secondary to inadequate dietary iron intake  Assessment & Plan  · Patient's history of iron deficiency anemia secondary to malabsorption  · Patient also follows with gyn for menorrhagia which could contribute to her anemia  · With Hgb 7 7 this AM; pt reports feeling lightheaded and weak this AM  · IV Venofer ordered x3 days  · Recheck CBC in the AM        VTE Pharmacologic Prophylaxis: VTE Score: 2 Low Risk (Score 0-2) - Encourage Ambulation  Patient Centered Rounds: I performed bedside rounds with nursing staff today    Discussions with Specialists or Other Care Team Provider: GI    Education and Discussions with Family / Patient: Patient declined call to   Time Spent for Care: 20 minutes  More than 50% of total time spent on counseling and coordination of care as described above  Current Length of Stay: 2 day(s)  Current Patient Status: Inpatient   Certification Statement: The patient will continue to require additional inpatient hospital stay due to further treatment of intractible n/v  Discharge Plan: Anticipate discharge in 24-48 hrs to home  Code Status: Level 1 - Full Code    Subjective:   Pt reports nausea and vomiting overnight after being initiated on CLD yesterday  Attempted to eat again this morning and reported 5/10 abdominal pain  States she is feeling weak and tired this AM  Reports "greasy" BM overnight  Objective:     Vitals:   Temp (24hrs), Av 5 °F (36 9 °C), Min:98 1 °F (36 7 °C), Max:99 1 °F (37 3 °C)    Temp:  [98 1 °F (36 7 °C)-99 1 °F (37 3 °C)] 98 1 °F (36 7 °C)  HR:  [70-96] 70  Resp:  [16-18] 17  BP: ()/(49-66) 93/56  SpO2:  [97 %-99 %] 98 %  Body mass index is 39 8 kg/m²  Input and Output Summary (last 24 hours): Intake/Output Summary (Last 24 hours) at 2022 0940  Last data filed at 2022 0414  Gross per 24 hour   Intake 1950 ml   Output 1 ml   Net 1949 ml       Physical Exam:   Physical Exam  Vitals reviewed  Constitutional:       Appearance: Normal appearance  She is ill-appearing  Cardiovascular:      Rate and Rhythm: Normal rate and regular rhythm  Pulses: Normal pulses  Heart sounds: Normal heart sounds  Pulmonary:      Effort: Pulmonary effort is normal       Breath sounds: Normal breath sounds  Abdominal:      General: Bowel sounds are normal  There is no distension  Palpations: Abdomen is soft  Tenderness: There is abdominal tenderness  Musculoskeletal:         General: Normal range of motion  Skin:     General: Skin is warm and dry  Capillary Refill: Capillary refill takes less than 2 seconds     Neurological:      Mental Status: She is alert and oriented to person, place, and time  Mental status is at baseline  Additional Data:     Labs:  Results from last 7 days   Lab Units 11/01/22  0430   WBC Thousand/uL 5 14   HEMOGLOBIN g/dL 7 7*   HEMATOCRIT % 26 6*   PLATELETS Thousands/uL 403*   NEUTROS PCT % 37*   LYMPHS PCT % 52*   MONOS PCT % 9   EOS PCT % 2     Results from last 7 days   Lab Units 11/01/22  0430 10/31/22  0552 10/30/22  2036 10/28/22  1447   SODIUM mmol/L 141   < > 139 138   POTASSIUM mmol/L 3 4*   < > 3 9 3 2*   CHLORIDE mmol/L 110*   < > 104 104   CO2 mmol/L 25   < > 26 28   BUN mg/dL 6   < > 14 9   CREATININE mg/dL 0 90   < > 0 89 0 92   ANION GAP mmol/L 6   < > 9 6   CALCIUM mg/dL 8 3   < > 8 9 8 9   ALBUMIN g/dL  --   --  3 6 3 5   TOTAL BILIRUBIN mg/dL  --   --  0 18* 0 29   ALK PHOS U/L  --   --  106 99   ALT U/L  --   --  18 19   AST U/L  --   --   --  22   GLUCOSE RANDOM mg/dL 95   < > 93 94    < > = values in this interval not displayed                         Lines/Drains:  Invasive Devices  Report    Peripheral Intravenous Line  Duration           Peripheral IV 10/30/22 Left Arm 1 day                      Imaging: Reviewed radiology reports from this admission including: abdominal/pelvic CT and procedure reports    Recent Cultures (last 7 days):         Last 24 Hours Medication List:   Current Facility-Administered Medications   Medication Dose Route Frequency Provider Last Rate   • enoxaparin  40 mg Subcutaneous Q24H Won Wilder MD     • iron sucrose  200 mg Intravenous Daily Lorella Marking, GEGENP     • metoclopramide  10 mg Intravenous Once DIGNA Abernathy     • morphine injection  2 mg Intravenous Q4H PRN DIGNA Abernathy     • ondansetron  4 mg Intravenous Q6H PRN Matthew Charles MD     • pantoprazole  40 mg Intravenous Q12H Won Wilder MD     • potassium chloride  20 mEq Intravenous Once Lorella MarkingGEGENP 20 mEq (11/01/22 5535)   • sodium chloride  125 mL/hr Intravenous Continuous Jazmine Tobi  mL/hr (11/01/22 0417)        Today, Patient Was Seen By: DIGNA Calvo    **Please Note: This note may have been constructed using a voice recognition system  **

## 2022-11-02 ENCOUNTER — APPOINTMENT (INPATIENT)
Dept: RADIOLOGY | Facility: HOSPITAL | Age: 42
End: 2022-11-02

## 2022-11-02 LAB
ANION GAP SERPL CALCULATED.3IONS-SCNC: 7 MMOL/L (ref 4–13)
BASOPHILS # BLD AUTO: 0.01 THOUSANDS/ÂΜL (ref 0–0.1)
BASOPHILS NFR BLD AUTO: 0 % (ref 0–1)
BUN SERPL-MCNC: 4 MG/DL (ref 5–25)
CALCIUM SERPL-MCNC: 8.3 MG/DL (ref 8.3–10.1)
CHLORIDE SERPL-SCNC: 108 MMOL/L (ref 96–108)
CO2 SERPL-SCNC: 25 MMOL/L (ref 21–32)
CREAT SERPL-MCNC: 0.85 MG/DL (ref 0.6–1.3)
EOSINOPHIL # BLD AUTO: 0.09 THOUSAND/ÂΜL (ref 0–0.61)
EOSINOPHIL NFR BLD AUTO: 2 % (ref 0–6)
ERYTHROCYTE [DISTWIDTH] IN BLOOD BY AUTOMATED COUNT: 19 % (ref 11.6–15.1)
GFR SERPL CREATININE-BSD FRML MDRD: 85 ML/MIN/1.73SQ M
GLUCOSE SERPL-MCNC: 95 MG/DL (ref 65–140)
HCT VFR BLD AUTO: 28.1 % (ref 34.8–46.1)
HGB BLD-MCNC: 8.3 G/DL (ref 11.5–15.4)
IMM GRANULOCYTES # BLD AUTO: 0.01 THOUSAND/UL (ref 0–0.2)
IMM GRANULOCYTES NFR BLD AUTO: 0 % (ref 0–2)
LYMPHOCYTES # BLD AUTO: 1.6 THOUSANDS/ÂΜL (ref 0.6–4.47)
LYMPHOCYTES NFR BLD AUTO: 36 % (ref 14–44)
MCH RBC QN AUTO: 21.2 PG (ref 26.8–34.3)
MCHC RBC AUTO-ENTMCNC: 29.5 G/DL (ref 31.4–37.4)
MCV RBC AUTO: 72 FL (ref 82–98)
MONOCYTES # BLD AUTO: 0.37 THOUSAND/ÂΜL (ref 0.17–1.22)
MONOCYTES NFR BLD AUTO: 8 % (ref 4–12)
NEUTROPHILS # BLD AUTO: 2.31 THOUSANDS/ÂΜL (ref 1.85–7.62)
NEUTS SEG NFR BLD AUTO: 54 % (ref 43–75)
NRBC BLD AUTO-RTO: 0 /100 WBCS
PLATELET # BLD AUTO: 448 THOUSANDS/UL (ref 149–390)
PMV BLD AUTO: 9.8 FL (ref 8.9–12.7)
POTASSIUM SERPL-SCNC: 3.5 MMOL/L (ref 3.5–5.3)
RBC # BLD AUTO: 3.91 MILLION/UL (ref 3.81–5.12)
SODIUM SERPL-SCNC: 140 MMOL/L (ref 135–147)
WBC # BLD AUTO: 4.39 THOUSAND/UL (ref 4.31–10.16)

## 2022-11-02 RX ORDER — ACETAMINOPHEN 325 MG/1
975 TABLET ORAL EVERY 6 HOURS PRN
Status: DISCONTINUED | OUTPATIENT
Start: 2022-11-02 | End: 2022-11-04 | Stop reason: HOSPADM

## 2022-11-02 RX ORDER — BISACODYL 10 MG
10 SUPPOSITORY, RECTAL RECTAL DAILY
Status: DISCONTINUED | OUTPATIENT
Start: 2022-11-02 | End: 2022-11-04 | Stop reason: HOSPADM

## 2022-11-02 RX ORDER — POLYETHYLENE GLYCOL 3350 17 G/17G
17 POWDER, FOR SOLUTION ORAL 2 TIMES DAILY
Status: DISCONTINUED | OUTPATIENT
Start: 2022-11-02 | End: 2022-11-04 | Stop reason: HOSPADM

## 2022-11-02 RX ORDER — ONDANSETRON 4 MG/1
4 TABLET, ORALLY DISINTEGRATING ORAL EVERY 6 HOURS PRN
Status: DISCONTINUED | OUTPATIENT
Start: 2022-11-02 | End: 2022-11-04 | Stop reason: HOSPADM

## 2022-11-02 RX ORDER — AMOXICILLIN 250 MG
2 CAPSULE ORAL 2 TIMES DAILY
Status: DISCONTINUED | OUTPATIENT
Start: 2022-11-02 | End: 2022-11-04 | Stop reason: HOSPADM

## 2022-11-02 RX ADMIN — PANTOPRAZOLE SODIUM 40 MG: 40 INJECTION, POWDER, FOR SOLUTION INTRAVENOUS at 10:20

## 2022-11-02 RX ADMIN — SODIUM CHLORIDE 75 ML/HR: 0.9 INJECTION, SOLUTION INTRAVENOUS at 16:29

## 2022-11-02 RX ADMIN — ONDANSETRON 4 MG: 4 TABLET, ORALLY DISINTEGRATING ORAL at 16:29

## 2022-11-02 RX ADMIN — SODIUM CHLORIDE 125 ML/HR: 0.9 INJECTION, SOLUTION INTRAVENOUS at 04:34

## 2022-11-02 RX ADMIN — IRON SUCROSE 200 MG: 20 INJECTION, SOLUTION INTRAVENOUS at 10:20

## 2022-11-02 RX ADMIN — ONDANSETRON 4 MG: 2 INJECTION INTRAMUSCULAR; INTRAVENOUS at 05:54

## 2022-11-02 RX ADMIN — ACETAMINOPHEN 975 MG: 325 TABLET ORAL at 04:44

## 2022-11-02 RX ADMIN — SENNOSIDES AND DOCUSATE SODIUM 2 TABLET: 8.6; 5 TABLET ORAL at 18:23

## 2022-11-02 RX ADMIN — ENOXAPARIN SODIUM 40 MG: 40 INJECTION SUBCUTANEOUS at 10:25

## 2022-11-02 RX ADMIN — PANTOPRAZOLE SODIUM 40 MG: 40 INJECTION, POWDER, FOR SOLUTION INTRAVENOUS at 21:07

## 2022-11-02 RX ADMIN — BISACODYL 10 MG: 10 SUPPOSITORY RECTAL at 10:21

## 2022-11-02 NOTE — NURSING NOTE
Patient had one hard and one soft BM 11/2/2022 after medications ordered  Patient deferred enema due to having BMs      Yari Hewitt 11/2/2022 5:12 PM

## 2022-11-02 NOTE — ASSESSMENT & PLAN NOTE
Patient is a 45-year-old female with history of previous gastric bypass surgery, followed by Dr Aneta Roth, previous pouchitis and marginal ulcer in 2019, who presented to the ER 10/28 with abdominal pain, intractable nausea and vomiting  Patient unfortunately had to leave against medical advice due to childcare issues then presented back on 10/30 for persistent symptoms    · Patient with intractable nausea, vomiting, as well as abdominal pain through her anterior abdomen, especially in the epigastric region  · 10/28 patient had a CT scan of her abdomen and pelvis without any acute abnormalities  · Patient's previous EGD in 4/22 revealed that previous pouchitis and marginal ulcer from 2019 had healed  · Bariatrics and GI following; underwent EGD on 10/31 which was essentially benign  · Reports mild improvement in nausea with vomiting on CLD; pt reports last emesis approximately 30 mins after dinnertime last evening  · D/w GI; will continue with Zofran and scopolamine patch given pt reported mild improvement in symptoms  · Suspect slowed gastric emptying; unable to give reglan due to reported allergy  · Continue on tylenol and morphine for pain control  · Per GI recommendations, will likely benefit from pH impedence and manometry testing as an outpatient  · Continue CLD; advance as tolerated

## 2022-11-02 NOTE — PROGRESS NOTES
[facility]  Progress Note - Gildardo Mood 1980, 40 y o  female MRN: [id number]  Unit/Bed#: [facility] 87 209-01 Encounter: [id number]  Primary Care Provider: Paige Duarte PA-C   Date and time admitted to hospital: 10/30/2022  7:57 PM    * Intractable nausea and vomiting  Assessment & Plan  Patient is a 45-year-old female with history of previous gastric bypass surgery, followed by Dr Brayan Kumar, previous pouchitis and marginal ulcer in 2019, who presented to the ER 10/28 with abdominal pain, intractable nausea and vomiting  Patient unfortunately had to leave against medical advice due to childcare issues then presented back on 10/30 for persistent symptoms  · Patient with intractable nausea, vomiting, as well as abdominal pain through her anterior abdomen, especially in the epigastric region  · 10/28 patient had a CT scan of her abdomen and pelvis without any acute abnormalities  · Patient's previous EGD in 4/22 revealed that previous pouchitis and marginal ulcer from 2019 had healed  · Bariatrics and GI following; underwent EGD on 10/31 which was essentially benign  · Reports mild improvement in nausea with vomiting on CLD; pt reports last emesis approximately 30 mins after dinnertime last evening  · D/w GI; will continue with Zofran and scopolamine patch given pt reported mild improvement in symptoms  · Suspect slowed gastric emptying; unable to give reglan due to reported allergy  · Continue on tylenol and morphine for pain control  · Per GI recommendations, will likely benefit from pH impedence and manometry testing as an outpatient  · Continue CLD; advance as tolerated    Abdominal pain  Assessment & Plan  · Patient presents with abdominal pain throughout her upper abdomen bilaterally, and in the epigastric region  · CT abdomen/pelvis on 10/28 without any acute intra-abdominal abnormality  · Continue proton pump inhibitor b i d    · Bariatrics and GI following; underwent EGD on 10/31 which was negative   · Patient also follows with the [address] [place] [address] team:    · She was seen in the office for abdominal pain 6/22, and her abdominal pain was suspected to be "multifactorial and may be secondary to functional dyspepsia, food sensitivities, possible adhesive disease given prior gastric surgeries, as well as discomfort from stool burden "  · Patient was also diagnosed with IBS and constipation  · Jerry Hernándezern as an OP but unable to reorder as an IP given it is nonformulary; will d/w patient to have family bring in from home  · Also unable to substitute with Amitiza given side effect profile includes nausea and vomiting    Postsurgical malabsorption  Assessment & Plan  · Patient developed postsurgical malabsorption after bariatric surgery  · Resume bariatric vitamins/supplements at time of discharge    Status post bariatric surgery  Assessment & Plan  · Patient underwent laparoscopic sleeve gastrectomy with revision to laparoscopic Karmen-en-Y gastric bypass surgery  · Follows with Dr Jessica Rodríguez  · Bariatrics signed off    Iron deficiency anemia secondary to inadequate dietary iron intake  Assessment & Plan  · Patient's history of iron deficiency anemia secondary to malabsorption  · Patient also follows with gyn for menorrhagia which could contribute to her anemia  · Hgb improved with IV iron; currently D#2/3  · Recheck CBC in the AM      VTE Pharmacologic Prophylaxis: VTE Score: 2 Low Risk (Score 0-2) - Encourage Ambulation  SC Lovenox  Patient Centered Rounds: I performed bedside rounds with nursing staff today  Discussions with Specialists or Other Care Team Provider: GI    Education and Discussions with Family / Patient: Patient declined call to   Time Spent for Care: 20 minutes  More than 50% of total time spent on counseling and coordination of care as described above      Current Length of Stay: 3 day(s)  Current Patient Status: Inpatient   Certification Statement: The patient will continue to require additional inpatient hospital stay due to continued food intolerance  Discharge Plan: Anticipate discharge in 24-48 hrs to home  Code Status: Level 1 - Full Code    Subjective:   Pt reporting mild improvement in n/v since the addition of scopolamine patch yesterday  Reports last emesis approx 30 mins after eating dinner last night  States she is without nausea or abdominal pain at present  Objective:     Vitals:   Temp (24hrs), Av 3 °F (36 8 °C), Min:97 9 °F (36 6 °C), Max:98 9 °F (37 2 °C)    Temp:  [97 9 °F (36 6 °C)-98 9 °F (37 2 °C)] 97 9 °F (36 6 °C)  HR:  [64-76] 64  Resp:  [16-18] 16  BP: ()/(40-76) 81/40  SpO2:  [98 %-99 %] 99 %  Body mass index is 39 29 kg/m²  Input and Output Summary (last 24 hours):   No intake or output data in the 24 hours ending 22 0948    Physical Exam:   Physical Exam  Vitals reviewed  Constitutional:       General: She is not in acute distress  Appearance: Normal appearance  Cardiovascular:      Rate and Rhythm: Normal rate and regular rhythm  Pulses: Normal pulses  Heart sounds: Normal heart sounds  Pulmonary:      Effort: Pulmonary effort is normal       Breath sounds: Normal breath sounds  Abdominal:      General: Bowel sounds are normal  There is no distension  Palpations: Abdomen is soft  Musculoskeletal:         General: Normal range of motion  Skin:     General: Skin is warm and dry  Capillary Refill: Capillary refill takes less than 2 seconds  Neurological:      Mental Status: She is alert and oriented to person, place, and time  Mental status is at baseline  Motor: No weakness           Additional Data:     Labs:  Results from last 7 days   Lab Units 22  0435   WBC Thousand/uL 4 39   HEMOGLOBIN g/dL 8 3*   HEMATOCRIT % 28 1*   PLATELETS Thousands/uL 448*   NEUTROS PCT % 54   LYMPHS PCT % 36   MONOS PCT % 8   EOS PCT % 2     Results from last 7 days   Lab Units 11/02/22 0435 10/31/22  0552 10/30/22  2036 10/28/22  1447   SODIUM mmol/L 140   < > 139 138   POTASSIUM mmol/L 3 5   < > 3 9 3 2*   CHLORIDE mmol/L 108   < > 104 104   CO2 mmol/L 25   < > 26 28   BUN mg/dL 4*   < > 14 9   CREATININE mg/dL 0 85   < > 0 89 0 92   ANION GAP mmol/L 7   < > 9 6   CALCIUM mg/dL 8 3   < > 8 9 8 9   ALBUMIN g/dL  --   --  3 6 3 5   TOTAL BILIRUBIN mg/dL  --   --  0 18* 0 29   ALK PHOS U/L  --   --  106 99   ALT U/L  --   --  18 19   AST U/L  --   --   --  22   GLUCOSE RANDOM mg/dL 95   < > 93 94    < > = values in this interval not displayed  Lines/Drains:  Invasive Devices  Report    Peripheral Intravenous Line  Duration           Peripheral IV 10/30/22 Left Arm 2 days                      Imaging: Reviewed radiology reports from this admission including: abdominal/pelvic CT    Recent Cultures (last 7 days):         Last 24 Hours Medication List:   Current Facility-Administered Medications   Medication Dose Route Frequency Provider Last Rate   • acetaminophen  975 mg Oral Q6H PRN DIGAN Estrella     • enoxaparin  40 mg Subcutaneous Q24H Torrey Alba MD     • iron sucrose  200 mg Intravenous Daily Janet Dys, CRNP     • metoclopramide  10 mg Intravenous Once Redia Ket, CRNP     • morphine injection  2 mg Intravenous Q4H PRN Redia Ket, CRNP     • ondansetron  4 mg Intravenous Q6H PRN Brien Underwood MD     • pantoprazole  40 mg Intravenous Q12H Torrey Alba MD     • polyethylene glycol  17 g Oral BID Ganesh Canada MD     • scopolamine  1 patch Transdermal Q72H Vinnie Cummins MD     • senna-docusate sodium  2 tablet Oral BID Vinnie Cummins MD     • sodium chloride  75 mL/hr Intravenous Continuous Janet Dys, CRNP 125 mL/hr (11/02/22 0434)        Today, Patient Was Seen By: Janet Ashlie, CRNP    **Please Note: This note may have been constructed using a voice recognition system  **

## 2022-11-02 NOTE — PROGRESS NOTES
Progress Note- Lurdes Cohen 40 y o  female MRN: [id number]    Unit/Bed#: [place] [address] -01 Encounter: [id number]      Assessment and Plan:    39 yo F with hx of sleeve gastrectomy later changed to RnY ELISA who is currently admitted for worsening abdominal pain, nausea, vomiting, s/p EGD 10/31 not showing any ulcers  1  Intractable nausea and vomiting  Continue clear liq diet as tolerated  Advance slowly if tolerated  This morning she reports good improvement in nausea and abdominal pain, does get intermittent heartburn  Continue PPI BID; anti emetics as needed  Continue scopolamine patch  Will see if she has any improvement with enemas for constipation  Will also get upper GI series  2  Constipation  Suspected IBS-C  Will recommend to bring linzess from home to continue  Also discussed to try enemas, she is agreeable at this time  Ordered  3  Anemia  Microcytic  Iron deficiency noted  Colonoscopy earlier this year poor prep  Will need outpatient colonoscopy after current symptoms resolved  ______________________________________________________________________    Subjective:     Pt seen and examined at bedside  Patient continues to complain of nausea, reports vomiting after trying clear liquid diet yesterday  This morning reports improvement in nausea and abdominal pain  Willing to try clear liquid this morning as well  No BMs reported since last Thursday       Medication Administration - last 24 hours from 11/01/2022 0902 to 11/02/2022 0902       Date/Time Order Dose Route Action Action by     11/02/2022 0554 ondansetron (ZOFRAN) injection 4 mg 4 mg Intravenous Given Susanna Garza RN     11/01/2022 2017 ondansetron (ZOFRAN) injection 4 mg 4 mg Intravenous Given Susanna Garza RN     11/01/2022 2017 pantoprazole (PROTONIX) injection 40 mg 40 mg Intravenous Given Susanna Garza RN     11/02/2022 0434 sodium chloride 0 9 % infusion 125 mL/hr Intravenous New [address] Susanna Garza RN     11/01/2022 2022 sodium chloride 0 9 % infusion 125 mL/hr Intravenous New Bag Jose Alberto Mcarthur RN     11/01/2022 1326 sodium chloride 0 9 % infusion 125 mL/hr Intravenous [address] Nellie Given, [facility]     11/01/2022 1419 morphine injection 2 mg 2 mg Intravenous Given Nellie Abhinav, RN     11/01/2022 1111 iron sucrose (VENOFER) 200 mg in sodium chloride 0 9 % 100 mL IVPB 200 mg Intravenous [address] Nellie Given, RN     11/01/2022 5079 potassium chloride 20 mEq IVPB (premix) 20 mEq Intravenous [address] Nellie Given, RN     11/01/2022 1144 scopolamine (TRANSDERM-SCOP) 1 mg/3 days TD 72 hr patch 1 patch 1 patch Transdermal Medication Applied Nellie Given, RN     11/02/2022 0444 acetaminophen (TYLENOL) tablet 975 mg 975 mg Oral Given Jose Alberto Mcarthur RN          Objective:     Vitals: Blood pressure (!) 81/40, pulse 64, temperature 97 9 °F (36 6 °C), temperature source Oral, resp  rate 16, weight 107 kg (236 lb 1 8 oz), last menstrual period 10/29/2022, SpO2 99 %, unknown if currently breastfeeding  ,Body mass index is 39 29 kg/m²      No intake or output data in the 24 hours ending 11/02/22 0902    Physical Exam:   General Appearance: Awake and alert, in no acute distress  Abdomen: Soft, non-tender, non-distended; bowel sounds normal; no masses or no organomegaly    Invasive Devices  Report    Peripheral Intravenous Line  Duration           Peripheral IV 10/30/22 Left Arm 2 days                Lab Results:  Admission on 10/30/2022   Component Date Value   • Sodium 10/30/2022 139    • Potassium 10/30/2022 3 9    • Chloride 10/30/2022 104    • CO2 10/30/2022 26    • ANION GAP 10/30/2022 9    • BUN 10/30/2022 14    • Creatinine 10/30/2022 0 89    • Glucose 10/30/2022 93    • Calcium 10/30/2022 8 9    • AST 10/30/2022     • ALT 10/30/2022 18    • Alkaline Phosphatase 10/30/2022 106    • Total Protein 10/30/2022 8 2    • Albumin 10/30/2022 3 6    • Total Bilirubin 10/30/2022 0 18 (A)   • eGFR 10/30/2022 80    • WBC 10/30/2022 6 70    • RBC 10/30/2022 4 41    • Hemoglobin 10/30/2022 9 1 (A)   • Hematocrit 10/30/2022 31 2 (A)   • MCV 10/30/2022 71 (A)   • MCH 10/30/2022 20 6 (A)   • MCHC 10/30/2022 29 2 (A)   • RDW 10/30/2022 18 8 (A)   • MPV 10/30/2022 9 0    • Platelets 20/61/4247 492 (A)   • nRBC 10/30/2022 0    • Neutrophils Relative 10/30/2022 46    • Immat GRANS % 10/30/2022 0    • Lymphocytes Relative 10/30/2022 41    • Monocytes Relative 10/30/2022 11    • Eosinophils Relative 10/30/2022 2    • Basophils Relative 10/30/2022 0    • Neutrophils Absolute 10/30/2022 3 03    • Immature Grans Absolute 10/30/2022 0 01    • Lymphocytes Absolute 10/30/2022 2 76    • Monocytes Absolute 10/30/2022 0 74    • Eosinophils Absolute 10/30/2022 0 14    • Basophils Absolute 10/30/2022 0 02    • Lipase 10/30/2022 91    • Sodium 10/31/2022 139    • Potassium 10/31/2022 3 5    • Chloride 10/31/2022 108    • CO2 10/31/2022 24    • ANION GAP 10/31/2022 7    • BUN 10/31/2022 11    • Creatinine 10/31/2022 0 85    • Glucose 10/31/2022 99    • Calcium 10/31/2022 8 7    • eGFR 10/31/2022 85    • WBC 10/31/2022 4 98    • RBC 10/31/2022 3 96    • Hemoglobin 10/31/2022 8 3 (A)   • Hematocrit 10/31/2022 28 3 (A)   • MCV 10/31/2022 72 (A)   • MCH 10/31/2022 21 0 (A)   • MCHC 10/31/2022 29 3 (A)   • RDW 10/31/2022 18 8 (A)   • Platelets 64/95/6469 438 (A)   • MPV 10/31/2022 9 4    • Ventricular Rate 10/31/2022 72    • Atrial Rate 10/31/2022 72    • UT Interval 10/31/2022 156    • QRSD Interval 10/31/2022 90    • QT Interval 10/31/2022 420    • QTC Interval 10/31/2022 459    • P Axis 10/31/2022 63    • QRS Axis 10/31/2022 17    • T Wave Axis 10/31/2022 13    • Ventricular Rate 10/30/2022 66    • Atrial Rate 10/30/2022 66    • UT Interval 10/30/2022 152    • QRSD Interval 10/30/2022 90    • QT Interval 10/30/2022 386    • QTC Interval 10/30/2022 404    • P Axis 10/30/2022 98    • QRS Axis 10/30/2022 78    • T Wave Axis 10/30/2022 63    • ABO Grouping 10/31/2022 O    • Rh Factor 10/31/2022 Positive    • Antibody Screen 10/31/2022 Negative    • Specimen Expiration Date 10/31/2022 [id number]    • WBC 11/01/2022 5 14    • RBC 11/01/2022 3 70 (A)   • Hemoglobin 11/01/2022 7 7 (A)   • Hematocrit 11/01/2022 26 6 (A)   • MCV 11/01/2022 72 (A)   • MCH 11/01/2022 20 8 (A)   • MCHC 11/01/2022 28 9 (A)   • RDW 11/01/2022 18 8 (A)   • MPV 11/01/2022 9 4    • Platelets 78/66/1976 403 (A)   • nRBC 11/01/2022 0    • Neutrophils Relative 11/01/2022 37 (A)   • Immat GRANS % 11/01/2022 0    • Lymphocytes Relative 11/01/2022 52 (A)   • Monocytes Relative 11/01/2022 9    • Eosinophils Relative 11/01/2022 2    • Basophils Relative 11/01/2022 0    • Neutrophils Absolute 11/01/2022 1 89    • Immature Grans Absolute 11/01/2022 0 01    • Lymphocytes Absolute 11/01/2022 2 67    • Monocytes Absolute 11/01/2022 0 47    • Eosinophils Absolute 11/01/2022 0 08    • Basophils Absolute 11/01/2022 0 02    • Sodium 11/01/2022 141    • Potassium 11/01/2022 3 4 (A)   • Chloride 11/01/2022 110 (A)   • CO2 11/01/2022 25    • ANION GAP 11/01/2022 6    • BUN 11/01/2022 6    • Creatinine 11/01/2022 0 90    • Glucose 11/01/2022 95    • Calcium 11/01/2022 8 3    • eGFR 11/01/2022 79    • WBC 11/02/2022 4 39    • RBC 11/02/2022 3 91    • Hemoglobin 11/02/2022 8 3 (A)   • Hematocrit 11/02/2022 28 1 (A)   • MCV 11/02/2022 72 (A)   • MCH 11/02/2022 21 2 (A)   • MCHC 11/02/2022 29 5 (A)   • RDW 11/02/2022 19 0 (A)   • MPV 11/02/2022 9 8    • Platelets 78/71/3099 448 (A)   • nRBC 11/02/2022 0    • Neutrophils Relative 11/02/2022 54    • Immat GRANS % 11/02/2022 0    • Lymphocytes Relative 11/02/2022 36    • Monocytes Relative 11/02/2022 8    • Eosinophils Relative 11/02/2022 2    • Basophils Relative 11/02/2022 0    • Neutrophils Absolute 11/02/2022 2 31    • Immature Grans Absolute 11/02/2022 0 01    • Lymphocytes Absolute 11/02/2022 1 60    • Monocytes Absolute 11/02/2022 0 37    • Eosinophils Absolute 11/02/2022 0 09    • Basophils Absolute 11/02/2022 0 01    • Sodium 11/02/2022 140    • Potassium 11/02/2022 3 5    • Chloride 11/02/2022 108    • CO2 11/02/2022 25    • ANION GAP 11/02/2022 7    • BUN 11/02/2022 4 (A)   • Creatinine 11/02/2022 0 85    • Glucose 11/02/2022 95    • Calcium 11/02/2022 8 3    • eGFR 11/02/2022 85        Imaging Studies: I have personally reviewed pertinent imaging studies

## 2022-11-02 NOTE — ASSESSMENT & PLAN NOTE
· Patient underwent laparoscopic sleeve gastrectomy with revision to laparoscopic Karmen-en-Y gastric bypass surgery  · Follows with Dr Tigist Carrillo  · Bariatrics signed off

## 2022-11-02 NOTE — PLAN OF CARE
Problem: SAFETY ADULT  Goal: Patient will remain free of falls  Description: INTERVENTIONS:  - Educate patient/family on patient safety including physical limitations  - Instruct patient to call for assistance with activity   - Consult OT/PT to assist with strengthening/mobility   - Keep Call bell within reach  - Keep bed low and locked with side rails adjusted as appropriate  - Keep care items and personal belongings within reach  - Initiate and maintain comfort rounds  - Make Fall Risk Sign visible to staff  - Offer Toileting every 2 Hours, in advance of need  - Initiate/Maintain bed alarm  - Obtain necessary fall risk management equipment: none  - Apply yellow socks and bracelet for high fall risk patients  - Consider moving patient to room near nurses station  Outcome: Progressing

## 2022-11-02 NOTE — ASSESSMENT & PLAN NOTE
· Patient presents with abdominal pain throughout her upper abdomen bilaterally, and in the epigastric region  · CT abdomen/pelvis on 10/28 without any acute intra-abdominal abnormality  · Continue proton pump inhibitor b i d  · Bariatrics and GI following; underwent EGD on 10/31 which was negative   · Patient also follows with the [address] [place] [address] team:    · She was seen in the office for abdominal pain 6/22, and her abdominal pain was suspected to be "multifactorial and may be secondary to functional dyspepsia, food sensitivities, possible adhesive disease given prior gastric surgeries, as well as discomfort from stool burden "  · Patient was also diagnosed with IBS and constipation  · [place] as an OP but unable to reorder as an IP given it is nonformulary; will d/w patient to have family bring in from home    · Also unable to substitute with [place] given side effect profile includes nausea and vomiting

## 2022-11-02 NOTE — ASSESSMENT & PLAN NOTE
· Patient's history of iron deficiency anemia secondary to malabsorption  · Patient also follows with gyn for menorrhagia which could contribute to her anemia  · Hgb improved with IV iron; currently D#2/3  · Recheck CBC in the AM

## 2022-11-02 NOTE — PLAN OF CARE
Problem: PAIN - ADULT  Goal: Verbalizes/displays adequate comfort level or baseline comfort level  Description: Interventions:  - Encourage patient to monitor pain and request assistance  - Assess pain using appropriate pain scale  - Administer analgesics based on type and severity of pain and evaluate response  - Implement non-pharmacological measures as appropriate and evaluate response  - Consider cultural and social influences on pain and pain management  - Notify physician/advanced practitioner if interventions unsuccessful or patient reports new pain  Outcome: Progressing     Problem: INFECTION - ADULT  Goal: Absence or prevention of progression during hospitalization  Description: INTERVENTIONS:  - Assess and monitor for signs and symptoms of infection  - Monitor lab/diagnostic results  - Monitor all insertion sites, i e  indwelling lines, tubes, and drains  - Monitor endotracheal if appropriate and nasal secretions for changes in amount and color  - [place] appropriate cooling/warming therapies per order  - Administer medications as ordered  - Instruct and encourage patient and family to use good hand hygiene technique  - Identify and instruct in appropriate isolation precautions for identified infection/condition  Outcome: Progressing  Goal: Absence of fever/infection during neutropenic period  Description: INTERVENTIONS:  - Monitor WBC    Outcome: Progressing     Problem: SAFETY ADULT  Goal: Patient will remain free of falls  Description: INTERVENTIONS:  - Educate patient/family on patient safety including physical limitations  - Instruct patient to call for assistance with activity   - Consult OT/PT to assist with strengthening/mobility   - Keep Call bell within reach  - Keep bed low and locked with side rails adjusted as appropriate  - Keep care items and personal belongings within reach  - Initiate and maintain comfort rounds  - Make Fall Risk Sign visible to staff  - Apply yellow socks and bracelet for high fall risk patients  - Consider moving patient to room near nurses station  Outcome: Progressing  Goal: Maintain or return to baseline ADL function  Description: INTERVENTIONS:  -  Assess patient's ability to carry out ADLs; assess patient's baseline for ADL function and identify physical deficits which impact ability to perform ADLs (bathing, care of mouth/teeth, toileting, grooming, dressing, etc )  - Assess/evaluate cause of self-care deficits   - Assess range of motion  - Assess patient's mobility; develop plan if impaired  - Assess patient's need for assistive devices and provide as appropriate  - Encourage maximum independence but intervene and supervise when necessary  - Involve family in performance of ADLs  - Assess for home care needs following discharge   - Consider OT consult to assist with ADL evaluation and planning for discharge  - Provide patient education as appropriate  Outcome: Progressing  Goal: Maintains/Returns to pre admission functional level  Description: INTERVENTIONS:  - Perform BMAT or MOVE assessment daily    - Set and communicate daily mobility goal to care team and patient/family/caregiver     - Collaborate with rehabilitation services on mobility goals if consulted  - Out of bed for toileting  - Record patient progress and toleration of activity level   Outcome: Progressing     Problem: DISCHARGE PLANNING  Goal: Discharge to home or other facility with appropriate resources  Description: INTERVENTIONS:  - Identify barriers to discharge w/patient and caregiver  - Arrange for needed discharge resources and transportation as appropriate  - Identify discharge learning needs (meds, wound care, etc )  - Arrange for interpretive services to assist at discharge as needed  - Refer to Case Management Department for coordinating discharge planning if the patient needs post-hospital services based on physician/advanced practitioner order or complex needs related to functional status, cognitive ability, or social support system  Outcome: Progressing     Problem: Knowledge Deficit  Goal: Patient/family/caregiver demonstrates understanding of disease process, treatment plan, medications, and discharge instructions  Description: Complete learning assessment and assess knowledge base    Interventions:  - Provide teaching at level of understanding  - Provide teaching via preferred learning methods  Outcome: Progressing     Problem: GASTROINTESTINAL - ADULT  Goal: Maintains or returns to baseline bowel function  Description: INTERVENTIONS:  - Assess bowel function  - Encourage oral fluids to ensure adequate hydration  - Administer IV fluids if ordered to ensure adequate hydration  - Administer ordered medications as needed  - Encourage mobilization and activity  - Consider nutritional services referral to assist patient with adequate nutrition and appropriate food choices  Outcome: Progressing  Goal: Oral mucous membranes remain intact  Description: INTERVENTIONS  - Assess oral mucosa and hygiene practices  - Implement preventative oral hygiene regimen  - Implement oral medicated treatments as ordered  - Initiate Nutrition services referral as needed  Outcome: Progressing     Problem: METABOLIC, FLUID AND ELECTROLYTES - ADULT  Goal: Electrolytes maintained within normal limits  Description: INTERVENTIONS:  - Monitor labs and assess patient for signs and symptoms of electrolyte imbalances  - Administer electrolyte replacement as ordered  - Monitor response to electrolyte replacements, including repeat lab results as appropriate  - Instruct patient on fluid and nutrition as appropriate  Outcome: Progressing  Goal: Fluid balance maintained  Description: INTERVENTIONS:  - Monitor labs   - Monitor I/O and WT  - Instruct patient on fluid and nutrition as appropriate  - Assess for signs & symptoms of volume excess or deficit  Outcome: Progressing  Goal: Glucose maintained within target range  Description: INTERVENTIONS:  - Monitor Blood Glucose as ordered  - Assess for signs and symptoms of hyperglycemia and hypoglycemia  - Administer ordered medications to maintain glucose within target range  - Assess nutritional intake and initiate nutrition service referral as needed  Outcome: Progressing     Problem: Potential for Falls  Goal: Patient will remain free of falls  Description: INTERVENTIONS:  - Educate patient/family on patient safety including physical limitations  - Instruct patient to call for assistance with activity   - Consult OT/PT to assist with strengthening/mobility   - Keep Call bell within reach  - Keep bed low and locked with side rails adjusted as appropriate  - Keep care items and personal belongings within reach  - Initiate and maintain comfort rounds  - Make Fall Risk Sign visible to staff  - Apply yellow socks and bracelet for high fall risk patients  - Consider moving patient to room near nurses station  Outcome: Progressing

## 2022-11-03 PROBLEM — K58.9 IRRITABLE BOWEL SYNDROME (IBS): Status: ACTIVE | Noted: 2019-03-26

## 2022-11-03 LAB
ANION GAP SERPL CALCULATED.3IONS-SCNC: 8 MMOL/L (ref 4–13)
BASOPHILS # BLD AUTO: 0.03 THOUSANDS/ÂΜL (ref 0–0.1)
BASOPHILS NFR BLD AUTO: 1 % (ref 0–1)
BUN SERPL-MCNC: 4 MG/DL (ref 5–25)
CALCIUM SERPL-MCNC: 8.2 MG/DL (ref 8.3–10.1)
CHLORIDE SERPL-SCNC: 110 MMOL/L (ref 96–108)
CO2 SERPL-SCNC: 25 MMOL/L (ref 21–32)
CREAT SERPL-MCNC: 0.82 MG/DL (ref 0.6–1.3)
EOSINOPHIL # BLD AUTO: 0.13 THOUSAND/ÂΜL (ref 0–0.61)
EOSINOPHIL NFR BLD AUTO: 3 % (ref 0–6)
ERYTHROCYTE [DISTWIDTH] IN BLOOD BY AUTOMATED COUNT: 18.7 % (ref 11.6–15.1)
GFR SERPL CREATININE-BSD FRML MDRD: 89 ML/MIN/1.73SQ M
GLUCOSE SERPL-MCNC: 90 MG/DL (ref 65–140)
HCT VFR BLD AUTO: 28.2 % (ref 34.8–46.1)
HGB BLD-MCNC: 8.1 G/DL (ref 11.5–15.4)
IMM GRANULOCYTES # BLD AUTO: 0.01 THOUSAND/UL (ref 0–0.2)
IMM GRANULOCYTES NFR BLD AUTO: 0 % (ref 0–2)
LYMPHOCYTES # BLD AUTO: 2.69 THOUSANDS/ÂΜL (ref 0.6–4.47)
LYMPHOCYTES NFR BLD AUTO: 51 % (ref 14–44)
MCH RBC QN AUTO: 20.4 PG (ref 26.8–34.3)
MCHC RBC AUTO-ENTMCNC: 28.7 G/DL (ref 31.4–37.4)
MCV RBC AUTO: 71 FL (ref 82–98)
MONOCYTES # BLD AUTO: 0.59 THOUSAND/ÂΜL (ref 0.17–1.22)
MONOCYTES NFR BLD AUTO: 11 % (ref 4–12)
NEUTROPHILS # BLD AUTO: 1.77 THOUSANDS/ÂΜL (ref 1.85–7.62)
NEUTS SEG NFR BLD AUTO: 34 % (ref 43–75)
NRBC BLD AUTO-RTO: 0 /100 WBCS
PLATELET # BLD AUTO: 407 THOUSANDS/UL (ref 149–390)
PMV BLD AUTO: 9 FL (ref 8.9–12.7)
POTASSIUM SERPL-SCNC: 3.3 MMOL/L (ref 3.5–5.3)
RBC # BLD AUTO: 3.98 MILLION/UL (ref 3.81–5.12)
SODIUM SERPL-SCNC: 143 MMOL/L (ref 135–147)
WBC # BLD AUTO: 5.22 THOUSAND/UL (ref 4.31–10.16)

## 2022-11-03 RX ADMIN — POLYETHYLENE GLYCOL 3350 17 G: 17 POWDER, FOR SOLUTION ORAL at 08:23

## 2022-11-03 RX ADMIN — POLYETHYLENE GLYCOL 3350 17 G: 17 POWDER, FOR SOLUTION ORAL at 21:42

## 2022-11-03 RX ADMIN — SENNOSIDES AND DOCUSATE SODIUM 2 TABLET: 8.6; 5 TABLET ORAL at 16:03

## 2022-11-03 RX ADMIN — ONDANSETRON 4 MG: 4 TABLET, ORALLY DISINTEGRATING ORAL at 00:48

## 2022-11-03 RX ADMIN — MORPHINE SULFATE 2 MG: 2 INJECTION, SOLUTION INTRAMUSCULAR; INTRAVENOUS at 00:47

## 2022-11-03 RX ADMIN — ONDANSETRON 4 MG: 4 TABLET, ORALLY DISINTEGRATING ORAL at 13:21

## 2022-11-03 RX ADMIN — PANTOPRAZOLE SODIUM 40 MG: 40 INJECTION, POWDER, FOR SOLUTION INTRAVENOUS at 08:23

## 2022-11-03 RX ADMIN — SENNOSIDES AND DOCUSATE SODIUM 2 TABLET: 8.6; 5 TABLET ORAL at 08:23

## 2022-11-03 RX ADMIN — PANTOPRAZOLE SODIUM 40 MG: 40 INJECTION, POWDER, FOR SOLUTION INTRAVENOUS at 21:42

## 2022-11-03 RX ADMIN — SCOPALAMINE 1 PATCH: 1 PATCH, EXTENDED RELEASE TRANSDERMAL at 10:47

## 2022-11-03 RX ADMIN — ENOXAPARIN SODIUM 40 MG: 40 INJECTION SUBCUTANEOUS at 08:23

## 2022-11-03 RX ADMIN — IRON SUCROSE 200 MG: 20 INJECTION, SOLUTION INTRAVENOUS at 08:25

## 2022-11-03 RX ADMIN — SODIUM CHLORIDE 75 ML/HR: 0.9 INJECTION, SOLUTION INTRAVENOUS at 05:48

## 2022-11-03 NOTE — PROGRESS NOTES
[facility]  Progress Note - Anton Ward 1980, 40 y o  female MRN: [id number]  Unit/Bed#: [place] [facility] 2 [facility] 87 209-01 Encounter: [id number]  Primary Care Provider: Antonette Acosta PA-C   Date and time admitted to hospital: 10/30/2022  7:57 PM    * Intractable nausea and vomiting  Assessment & Plan  · Unclear trigger  ? Related to IBS on top of gastric bypass surgery status  · She has a history of gastric sleeve in 2009 and was converted to Karmen-en-Y gastric bypass in 2019  · Her most recent EGD was unremarkable with no evidence of ulcer  · Her most recent CT was unremarkable  · Upper GI series unremarkable with no extravasation  · Advance diet as tolerated  · Continue Protonix  · Outpatient follow-up with GI for further study      Iron deficiency anemia secondary to inadequate dietary iron intake  Assessment & Plan  Continue Venofer  Repeat CBC in a m  Irritable bowel syndrome (IBS)  Assessment & Plan  · IBS with abdominal pain and constipation  · Outpatient follow-up with GI  · Continue bowel regimen  · Outpatient follow-up with GI    Postsurgical malabsorption  Assessment & Plan  Advance diet as tolerated  Continue supplements at discharge    VTE Pharmacologic Prophylaxis:   Pharmacologic: Enoxaparin (Lovenox)  Mechanical VTE Prophylaxis in Place: No    Patient Centered Rounds: I have performed bedside rounds with nursing staff today  Discussions with Specialists or Other Care Team Provider:  Hospital course reviewed    Time Spent for Care: 20 minutes  More than 50% of total time spent on counseling and coordination of care as described above      Current Length of Stay: 4 day(s)    Current Patient Status: Inpatient   Certification Statement: The patient will continue to require additional inpatient hospital stay due to Nausea    Discharge Plan:  In 24-48    Code Status: Level 1 - Full Code      Subjective:   Seen and examined earlier during rounds  At that time she had only tolerated clear liquids since admission  Her scopolamine patch fell    Objective:     Vitals:   Temp (24hrs), Av °F (36 7 °C), Min:97 9 °F (36 6 °C), Max:98 °F (36 7 °C)    Temp:  [97 9 °F (36 6 °C)-98 °F (36 7 °C)] 98 °F (36 7 °C)  HR:  [67-76] 75  Resp:  [16-20] 16  BP: ()/(50-63) 99/63  SpO2:  [96 %-98 %] 98 %  Body mass index is 39 33 kg/m²  Input and Output Summary (last 24 hours):     No intake or output data in the 24 hours ending 22 1720    Physical Exam:     Physical Exam  Constitutional:       Appearance: She is not ill-appearing or diaphoretic  HENT:      Head: Normocephalic and atraumatic  Nose: No congestion or rhinorrhea  Eyes:      General: No scleral icterus  Cardiovascular:      Rate and Rhythm: Normal rate and regular rhythm  Heart sounds: No murmur heard  No gallop  Pulmonary:      Effort: No respiratory distress  Breath sounds: No wheezing or rales  Abdominal:      General: Abdomen is flat  There is no distension  Palpations: Abdomen is soft  Musculoskeletal:      Cervical back: Neck supple  Right lower leg: No edema  Left lower leg: No edema  Skin:     General: Skin is warm and dry  Coloration: Skin is not jaundiced or pale  Neurological:      Mental Status: She is alert and oriented to person, place, and time     Psychiatric:         Mood and Affect: Mood normal          Behavior: Behavior normal        Additional Data:     Labs:    Results from last 7 days   Lab Units 22  0556   WBC Thousand/uL 5 22   HEMOGLOBIN g/dL 8 1*   HEMATOCRIT % 28 2*   PLATELETS Thousands/uL 407*   NEUTROS PCT % 34*   LYMPHS PCT % 51*   MONOS PCT % 11   EOS PCT % 3     Results from last 7 days   Lab Units 22  0556 10/31/22  0552 10/30/22  2036 10/28/22  1447   SODIUM mmol/L 143   < > 139 138   POTASSIUM mmol/L 3 3*   < > 3 9 3 2*   CHLORIDE mmol/L 110*   < > 104 104   CO2 mmol/L 25   < > 26 28   BUN mg/dL 4*   < > 14 9   CREATININE mg/dL 0 82   < > 0 89 0 92   ANION GAP mmol/L 8   < > 9 6   CALCIUM mg/dL 8 2*   < > 8 9 8 9   ALBUMIN g/dL  --   --  3 6 3 5   TOTAL BILIRUBIN mg/dL  --   --  0 18* 0 29   ALK PHOS U/L  --   --  106 99   ALT U/L  --   --  18 19   AST U/L  --   --   --  22   GLUCOSE RANDOM mg/dL 90   < > 93 94    < > = values in this interval not displayed  * I Have Reviewed All Lab Data Listed Above  * Additional Pertinent Lab Tests Reviewed: All Labs Within Last 24 Hours Reviewed    Imaging:    Imaging Reports Reviewed Today Include:  CT, EGD, abdominal x-ray      Recent Cultures (last 7 days):           Last 24 Hours Medication List:   Current Facility-Administered Medications   Medication Dose Route Frequency Provider Last Rate   • acetaminophen  975 mg Oral Q6H PRN DIGNA Castaneda     • bisacodyl  10 mg Rectal Daily Bria Mckeon MD     • enoxaparin  40 mg Subcutaneous Q24H Neto Stratton MD     • metoclopramide  10 mg Intravenous Once DIGNA Callahan     • morphine injection  2 mg Intravenous Q4H PRN DIGNA Callahan     • ondansetron  4 mg Oral Q6H PRN Vinnie Cummins MD     • pantoprazole  40 mg Intravenous Q12H [address] Juan Cabrera MD     • polyethylene glycol  17 g Oral BID Bria Mckeon MD     • scopolamine  1 patch Transdermal Q72H Bria Mckeon MD     • senna-docusate sodium  2 tablet Oral BID Vinnie Cummins MD     • sodium chloride  75 mL/hr Intravenous Continuous DIGNA Calvo Stopped (11/03/22 1022)        Today, Patient Was Seen By: Pooja Cortés    ** Please Note: Dictation voice to text software may have been used in the creation of this document   **

## 2022-11-03 NOTE — ASSESSMENT & PLAN NOTE
· IBS with abdominal pain and constipation  · Outpatient follow-up with GI  · Continue bowel regimen  · Outpatient follow-up with GI

## 2022-11-03 NOTE — PLAN OF CARE
Problem: PAIN - ADULT  Goal: Verbalizes/displays adequate comfort level or baseline comfort level  Description: Interventions:  - Encourage patient to monitor pain and request assistance  - Assess pain using appropriate pain scale  - Administer analgesics based on type and severity of pain and evaluate response  - Implement non-pharmacological measures as appropriate and evaluate response  - Consider cultural and social influences on pain and pain management  - Notify physician/advanced practitioner if interventions unsuccessful or patient reports new pain  Outcome: Progressing     Problem: INFECTION - ADULT  Goal: Absence or prevention of progression during hospitalization  Description: INTERVENTIONS:  - Assess and monitor for signs and symptoms of infection  - Monitor lab/diagnostic results  - Monitor all insertion sites, i e  indwelling lines, tubes, and drains  - Monitor endotracheal if appropriate and nasal secretions for changes in amount and color  - [place] appropriate cooling/warming therapies per order  - Administer medications as ordered  - Instruct and encourage patient and family to use good hand hygiene technique  - Identify and instruct in appropriate isolation precautions for identified infection/condition  Outcome: Progressing  Goal: Absence of fever/infection during neutropenic period  Description: INTERVENTIONS:  - Monitor WBC    Outcome: Progressing     Problem: SAFETY ADULT  Goal: Patient will remain free of falls  Description: INTERVENTIONS:  - Educate patient/family on patient safety including physical limitations  - Instruct patient to call for assistance with activity   - Consult OT/PT to assist with strengthening/mobility   - Keep Call bell within reach  - Keep bed low and locked with side rails adjusted as appropriate  - Keep care items and personal belongings within reach  - Initiate and maintain comfort rounds  - Make Fall Risk Sign visible to staff  - Offer Toileting every 2 Hours, in advance of need  - Initiate/Maintain 2alarm  - Obtain necessary fall risk management equipment: 2  - Apply yellow socks and bracelet for high fall risk patients  - Consider moving patient to room near nurses station  Outcome: Progressing  Goal: Maintain or return to baseline ADL function  Description: INTERVENTIONS:  -  Assess patient's ability to carry out ADLs; assess patient's baseline for ADL function and identify physical deficits which impact ability to perform ADLs (bathing, care of mouth/teeth, toileting, grooming, dressing, etc )  - Assess/evaluate cause of self-care deficits   - Assess range of motion  - Assess patient's mobility; develop plan if impaired  - Assess patient's need for assistive devices and provide as appropriate  - Encourage maximum independence but intervene and supervise when necessary  - Involve family in performance of ADLs  - Assess for home care needs following discharge   - Consider OT consult to assist with ADL evaluation and planning for discharge  - Provide patient education as appropriate  Outcome: Progressing  Goal: Maintains/Returns to pre admission functional level  Description: INTERVENTIONS:  - Perform BMAT or MOVE assessment daily    - Set and communicate daily mobility goal to care team and patient/family/caregiver  - Collaborate with rehabilitation services on mobility goals if consulted  - Perform Range of Motion 2 times a day  - Reposition patient every 2 hours    - Dangle patient 2 times a day  - Stand patient 2 times a day  - Ambulate patient 2 times a day  - Out of bed to chair 2 times a day   - Out of bed for meals 2 times a day  - Out of bed for toileting  - Record patient progress and toleration of activity level   Outcome: Progressing     Problem: DISCHARGE PLANNING  Goal: Discharge to home or other facility with appropriate resources  Description: INTERVENTIONS:  - Identify barriers to discharge w/patient and caregiver  - Arrange for needed discharge resources and transportation as appropriate  - Identify discharge learning needs (meds, wound care, etc )  - Arrange for interpretive services to assist at discharge as needed  - Refer to Case Management Department for coordinating discharge planning if the patient needs post-hospital services based on physician/advanced practitioner order or complex needs related to functional status, cognitive ability, or social support system  Outcome: Progressing     Problem: Knowledge Deficit  Goal: Patient/family/caregiver demonstrates understanding of disease process, treatment plan, medications, and discharge instructions  Description: Complete learning assessment and assess knowledge base    Interventions:  - Provide teaching at level of understanding  - Provide teaching via preferred learning methods  Outcome: Progressing     Problem: GASTROINTESTINAL - ADULT  Goal: Minimal or absence of nausea and/or vomiting  Description: INTERVENTIONS:  - Administer IV fluids if ordered to ensure adequate hydration  - Maintain NPO status until nausea and vomiting are resolved  - Nasogastric tube if ordered  - Administer ordered antiemetic medications as needed  - Provide nonpharmacologic comfort measures as appropriate  - Advance diet as tolerated, if ordered  - Consider nutrition services referral to assist patient with adequate nutrition and appropriate food choices  Outcome: Progressing  Goal: Maintains or returns to baseline bowel function  Description: INTERVENTIONS:  - Assess bowel function  - Encourage oral fluids to ensure adequate hydration  - Administer IV fluids if ordered to ensure adequate hydration  - Administer ordered medications as needed  - Encourage mobilization and activity  - Consider nutritional services referral to assist patient with adequate nutrition and appropriate food choices  Outcome: Progressing  Goal: Maintains adequate nutritional intake  Description: INTERVENTIONS:  - Monitor percentage of each meal consumed  - Identify factors contributing to decreased intake, treat as appropriate  - Assist with meals as needed  - Monitor I&O, weight, and lab values if indicated  - Obtain nutrition services referral as needed  Outcome: Progressing  Goal: Oral mucous membranes remain intact  Description: INTERVENTIONS  - Assess oral mucosa and hygiene practices  - Implement preventative oral hygiene regimen  - Implement oral medicated treatments as ordered  - Initiate Nutrition services referral as needed  Outcome: Progressing     Problem: METABOLIC, FLUID AND ELECTROLYTES - ADULT  Goal: Electrolytes maintained within normal limits  Description: INTERVENTIONS:  - Monitor labs and assess patient for signs and symptoms of electrolyte imbalances  - Administer electrolyte replacement as ordered  - Monitor response to electrolyte replacements, including repeat lab results as appropriate  - Instruct patient on fluid and nutrition as appropriate  Outcome: Progressing  Goal: Fluid balance maintained  Description: INTERVENTIONS:  - Monitor labs   - Monitor I/O and WT  - Instruct patient on fluid and nutrition as appropriate  - Assess for signs & symptoms of volume excess or deficit  Outcome: Progressing  Goal: Glucose maintained within target range  Description: INTERVENTIONS:  - Monitor Blood Glucose as ordered  - Assess for signs and symptoms of hyperglycemia and hypoglycemia  - Administer ordered medications to maintain glucose within target range  - Assess nutritional intake and initiate nutrition service referral as needed  Outcome: Progressing     Problem: Nutrition/Hydration-ADULT  Goal: Nutrient/Hydration intake appropriate for improving, restoring or maintaining nutritional needs  Description: Monitor and assess patient's nutrition/hydration status for malnutrition  Collaborate with interdisciplinary team and initiate plan and interventions as ordered  Monitor patient's weight and dietary intake as ordered or per policy  Utilize nutrition screening tool and intervene as necessary  Determine patient's food preferences and provide high-protein, high-caloric foods as appropriate       INTERVENTIONS:  - Monitor oral intake, urinary output, labs, and treatment plans  - Assess nutrition and hydration status and recommend course of action  - Evaluate amount of meals eaten  - Assist patient with eating if necessary   - Allow adequate time for meals  - Recommend/ encourage appropriate diets, oral nutritional supplements, and vitamin/mineral supplements  - Order, calculate, and assess calorie counts as needed  - Recommend, monitor, and adjust tube feedings and TPN/PPN based on assessed needs  - Assess need for intravenous fluids  - Provide specific nutrition/hydration education as appropriate  - Include patient/family/caregiver in decisions related to nutrition  Outcome: Progressing     Problem: Potential for Falls  Goal: Patient will remain free of falls  Description: INTERVENTIONS:  - Educate patient/family on patient safety including physical limitations  - Instruct patient to call for assistance with activity   - Consult OT/PT to assist with strengthening/mobility   - Keep Call bell within reach  - Keep bed low and locked with side rails adjusted as appropriate  - Keep care items and personal belongings within reach  - Initiate and maintain comfort rounds  - Make Fall Risk Sign visible to staff  - Offer Toileting every 2 Hours, in advance of need  - Initiate/Maintain 2alarm  - Obtain necessary fall risk management equipment: 2  - Apply yellow socks and bracelet for high fall risk patients  - Consider moving patient to room near nurses station  Outcome: Progressing

## 2022-11-03 NOTE — ASSESSMENT & PLAN NOTE
· Unclear trigger  ?   Related to IBS on top of gastric bypass surgery status  · She has a history of gastric sleeve in 2009 and was converted to [place]-en-Y gastric bypass in 2019  · Her most recent EGD was unremarkable with no evidence of ulcer  · Her most recent CT was unremarkable  · Upper GI series unremarkable with no extravasation  · Advance diet as tolerated  · Continue Protonix  · Outpatient follow-up with GI for further study

## 2022-11-03 NOTE — PLAN OF CARE
Problem: PAIN - ADULT  Goal: Verbalizes/displays adequate comfort level or baseline comfort level  Description: Interventions:  - Encourage patient to monitor pain and request assistance  - Assess pain using appropriate pain scale  - Administer analgesics based on type and severity of pain and evaluate response  - Implement non-pharmacological measures as appropriate and evaluate response  - Consider cultural and social influences on pain and pain management  - Notify physician/advanced practitioner if interventions unsuccessful or patient reports new pain  Outcome: Progressing     Problem: INFECTION - ADULT  Goal: Absence or prevention of progression during hospitalization  Description: INTERVENTIONS:  - Assess and monitor for signs and symptoms of infection  - Monitor lab/diagnostic results  - Monitor all insertion sites, i e  indwelling lines, tubes, and drains  - Monitor endotracheal if appropriate and nasal secretions for changes in amount and color  - [place] appropriate cooling/warming therapies per order  - Administer medications as ordered  - Instruct and encourage patient and family to use good hand hygiene technique  - Identify and instruct in appropriate isolation precautions for identified infection/condition  Outcome: Progressing  Goal: Absence of fever/infection during neutropenic period  Description: INTERVENTIONS:  - Monitor WBC    Outcome: Progressing     Problem: SAFETY ADULT  Goal: Patient will remain free of falls  Description: INTERVENTIONS:  - Educate patient/family on patient safety including physical limitations  - Instruct patient to call for assistance with activity   - Consult OT/PT to assist with strengthening/mobility   - Keep Call bell within reach  - Keep bed low and locked with side rails adjusted as appropriate  - Keep care items and personal belongings within reach  - Initiate and maintain comfort rounds  - Make Fall Risk Sign visible to staff  - Offer Toileting every  Hours, in advance of need  - Initiate/Maintain alarm  - Obtain necessary fall risk management equipment:   - Apply yellow socks and bracelet for high fall risk patients  - Consider moving patient to room near nurses station  Outcome: Progressing  Goal: Maintain or return to baseline ADL function  Description: INTERVENTIONS:  -  Assess patient's ability to carry out ADLs; assess patient's baseline for ADL function and identify physical deficits which impact ability to perform ADLs (bathing, care of mouth/teeth, toileting, grooming, dressing, etc )  - Assess/evaluate cause of self-care deficits   - Assess range of motion  - Assess patient's mobility; develop plan if impaired  - Assess patient's need for assistive devices and provide as appropriate  - Encourage maximum independence but intervene and supervise when necessary  - Involve family in performance of ADLs  - Assess for home care needs following discharge   - Consider OT consult to assist with ADL evaluation and planning for discharge  - Provide patient education as appropriate  Outcome: Progressing  Goal: Maintains/Returns to pre admission functional level  Description: INTERVENTIONS:  - Perform BMAT or MOVE assessment daily    - Set and communicate daily mobility goal to care team and patient/family/caregiver  - Collaborate with rehabilitation services on mobility goals if consulted  - Perform Range of Motion  times a day  - Reposition patient every  hours    - Dangle patient  times a day  - Stand patient  times a day  - Ambulate patient  times a day  - Out of bed to chair  times a day   - Out of bed for me times a day  - Out of bed for toileting  - Record patient progress and toleration of activity level   Outcome: Progressing     Problem: DISCHARGE PLANNING  Goal: Discharge to home or other facility with appropriate resources  Description: INTERVENTIONS:  - Identify barriers to discharge w/patient and caregiver  - Arrange for needed discharge resources and transportation as appropriate  - Identify discharge learning needs (meds, wound care, etc )  - Arrange for interpretive services to assist at discharge as needed  - Refer to Case Management Department for coordinating discharge planning if the patient needs post-hospital services based on physician/advanced practitioner order or complex needs related to functional status, cognitive ability, or social support system  Outcome: Progressing     Problem: Knowledge Deficit  Goal: Patient/family/caregiver demonstrates understanding of disease process, treatment plan, medications, and discharge instructions  Description: Complete learning assessment and assess knowledge base    Interventions:  - Provide teaching at level of understanding  - Provide teaching via preferred learning methods  Outcome: Progressing     Problem: GASTROINTESTINAL - ADULT  Goal: Minimal or absence of nausea and/or vomiting  Description: INTERVENTIONS:  - Administer IV fluids if ordered to ensure adequate hydration  - Maintain NPO status until nausea and vomiting are resolved  - Nasogastric tube if ordered  - Administer ordered antiemetic medications as needed  - Provide nonpharmacologic comfort measures as appropriate  - Advance diet as tolerated, if ordered  - Consider nutrition services referral to assist patient with adequate nutrition and appropriate food choices  Outcome: Progressing  Goal: Maintains or returns to baseline bowel function  Description: INTERVENTIONS:  - Assess bowel function  - Encourage oral fluids to ensure adequate hydration  - Administer IV fluids if ordered to ensure adequate hydration  - Administer ordered medications as needed  - Encourage mobilization and activity  - Consider nutritional services referral to assist patient with adequate nutrition and appropriate food choices  Outcome: Progressing  Goal: Maintains adequate nutritional intake  Description: INTERVENTIONS:  - Monitor percentage of each meal consumed  - Identify factors contributing to decreased intake, treat as appropriate  - Assist with meals as needed  - Monitor I&O, weight, and lab values if indicated  - Obtain nutrition services referral as needed  Outcome: Progressing  Goal: Oral mucous membranes remain intact  Description: INTERVENTIONS  - Assess oral mucosa and hygiene practices  - Implement preventative oral hygiene regimen  - Implement oral medicated treatments as ordered  - Initiate Nutrition services referral as needed  Outcome: Progressing     Problem: METABOLIC, FLUID AND ELECTROLYTES - ADULT  Goal: Electrolytes maintained within normal limits  Description: INTERVENTIONS:  - Monitor labs and assess patient for signs and symptoms of electrolyte imbalances  - Administer electrolyte replacement as ordered  - Monitor response to electrolyte replacements, including repeat lab results as appropriate  - Instruct patient on fluid and nutrition as appropriate  Outcome: Progressing  Goal: Fluid balance maintained  Description: INTERVENTIONS:  - Monitor labs   - Monitor I/O and WT  - Instruct patient on fluid and nutrition as appropriate  - Assess for signs & symptoms of volume excess or deficit  Outcome: Progressing  Goal: Glucose maintained within target range  Description: INTERVENTIONS:  - Monitor Blood Glucose as ordered  - Assess for signs and symptoms of hyperglycemia and hypoglycemia  - Administer ordered medications to maintain glucose within target range  - Assess nutritional intake and initiate nutrition service referral as needed  Outcome: Progressing     Problem: Nutrition/Hydration-ADULT  Goal: Nutrient/Hydration intake appropriate for improving, restoring or maintaining nutritional needs  Description: Monitor and assess patient's nutrition/hydration status for malnutrition  Collaborate with interdisciplinary team and initiate plan and interventions as ordered  Monitor patient's weight and dietary intake as ordered or per policy   Utilize nutrition screening tool and intervene as necessary  Determine patient's food preferences and provide high-protein, high-caloric foods as appropriate       INTERVENTIONS:  - Monitor oral intake, urinary output, labs, and treatment plans  - Assess nutrition and hydration status and recommend course of action  - Evaluate amount of meals eaten  - Assist patient with eating if necessary   - Allow adequate time for meals  - Recommend/ encourage appropriate diets, oral nutritional supplements, and vitamin/mineral supplements  - Order, calculate, and assess calorie counts as needed  - Recommend, monitor, and adjust tube feedings and TPN/PPN based on assessed needs  - Assess need for intravenous fluids  - Provide specific nutrition/hydration education as appropriate  - Include patient/family/caregiver in decisions related to nutrition  Outcome: Progressing     Problem: Potential for Falls  Goal: Patient will remain free of falls  Description: INTERVENTIONS:  - Educate patient/family on patient safety including physical limitations  - Instruct patient to call for assistance with activity   - Consult OT/PT to assist with strengthening/mobility   - Keep Call bell within reach  - Keep bed low and locked with side rails adjusted as appropriate  - Keep care items and personal belongings within reach  - Initiate and maintain comfort rounds  - Make Fall Risk Sign visible to staff  - Offer Toileting every  Hours, in advance of need  - Initiate/Maintain alarm  - Obtain necessary fall risk management equipment:   - Apply yellow socks and bracelet for high fall risk patients  - Consider moving patient to room near nurses station  Outcome: Progressing

## 2022-11-04 ENCOUNTER — PREP FOR PROCEDURE (OUTPATIENT)
Dept: GASTROENTEROLOGY | Facility: CLINIC | Age: 42
End: 2022-11-04

## 2022-11-04 VITALS
OXYGEN SATURATION: 98 % | BODY MASS INDEX: 39.29 KG/M2 | WEIGHT: 236.11 LBS | SYSTOLIC BLOOD PRESSURE: 99 MMHG | HEART RATE: 66 BPM | DIASTOLIC BLOOD PRESSURE: 57 MMHG | TEMPERATURE: 98.2 F | RESPIRATION RATE: 15 BRPM

## 2022-11-04 DIAGNOSIS — R11.2 NAUSEA AND VOMITING, UNSPECIFIED VOMITING TYPE: ICD-10-CM

## 2022-11-04 DIAGNOSIS — R10.9 ABDOMINAL PAIN, UNSPECIFIED ABDOMINAL LOCATION: Primary | ICD-10-CM

## 2022-11-04 LAB
ANION GAP SERPL CALCULATED.3IONS-SCNC: 6 MMOL/L (ref 4–13)
BASOPHILS # BLD AUTO: 0.01 THOUSANDS/ÂΜL (ref 0–0.1)
BASOPHILS NFR BLD AUTO: 0 % (ref 0–1)
BUN SERPL-MCNC: 4 MG/DL (ref 5–25)
CALCIUM SERPL-MCNC: 8.3 MG/DL (ref 8.3–10.1)
CHLORIDE SERPL-SCNC: 107 MMOL/L (ref 96–108)
CO2 SERPL-SCNC: 28 MMOL/L (ref 21–32)
CREAT SERPL-MCNC: 0.81 MG/DL (ref 0.6–1.3)
EOSINOPHIL # BLD AUTO: 0.11 THOUSAND/ÂΜL (ref 0–0.61)
EOSINOPHIL NFR BLD AUTO: 2 % (ref 0–6)
ERYTHROCYTE [DISTWIDTH] IN BLOOD BY AUTOMATED COUNT: 19.2 % (ref 11.6–15.1)
GFR SERPL CREATININE-BSD FRML MDRD: 90 ML/MIN/1.73SQ M
GLUCOSE SERPL-MCNC: 95 MG/DL (ref 65–140)
HCT VFR BLD AUTO: 27.5 % (ref 34.8–46.1)
HGB BLD-MCNC: 8 G/DL (ref 11.5–15.4)
IMM GRANULOCYTES # BLD AUTO: 0.01 THOUSAND/UL (ref 0–0.2)
IMM GRANULOCYTES NFR BLD AUTO: 0 % (ref 0–2)
LYMPHOCYTES # BLD AUTO: 2.03 THOUSANDS/ÂΜL (ref 0.6–4.47)
LYMPHOCYTES NFR BLD AUTO: 42 % (ref 14–44)
MCH RBC QN AUTO: 20.7 PG (ref 26.8–34.3)
MCHC RBC AUTO-ENTMCNC: 29.1 G/DL (ref 31.4–37.4)
MCV RBC AUTO: 71 FL (ref 82–98)
MONOCYTES # BLD AUTO: 0.53 THOUSAND/ÂΜL (ref 0.17–1.22)
MONOCYTES NFR BLD AUTO: 11 % (ref 4–12)
NEUTROPHILS # BLD AUTO: 2.12 THOUSANDS/ÂΜL (ref 1.85–7.62)
NEUTS SEG NFR BLD AUTO: 45 % (ref 43–75)
NRBC BLD AUTO-RTO: 0 /100 WBCS
PLATELET # BLD AUTO: 422 THOUSANDS/UL (ref 149–390)
PMV BLD AUTO: 9.4 FL (ref 8.9–12.7)
POTASSIUM SERPL-SCNC: 3.1 MMOL/L (ref 3.5–5.3)
RBC # BLD AUTO: 3.86 MILLION/UL (ref 3.81–5.12)
SODIUM SERPL-SCNC: 141 MMOL/L (ref 135–147)
WBC # BLD AUTO: 4.81 THOUSAND/UL (ref 4.31–10.16)

## 2022-11-04 RX ORDER — SCOLOPAMINE TRANSDERMAL SYSTEM 1 MG/1
1 PATCH, EXTENDED RELEASE TRANSDERMAL
Qty: 10 PATCH | Refills: 0 | Status: SHIPPED | OUTPATIENT
Start: 2022-11-06

## 2022-11-04 RX ORDER — ONDANSETRON 4 MG/1
4 TABLET, ORALLY DISINTEGRATING ORAL EVERY 6 HOURS PRN
Qty: 20 TABLET | Refills: 1 | Status: SHIPPED | OUTPATIENT
Start: 2022-11-04

## 2022-11-04 RX ORDER — AMOXICILLIN 250 MG
1 CAPSULE ORAL 2 TIMES DAILY
Qty: 60 TABLET | Refills: 0 | Status: SHIPPED | OUTPATIENT
Start: 2022-11-04 | End: 2022-12-04

## 2022-11-04 RX ADMIN — SENNOSIDES AND DOCUSATE SODIUM 2 TABLET: 8.6; 5 TABLET ORAL at 08:08

## 2022-11-04 RX ADMIN — ENOXAPARIN SODIUM 40 MG: 40 INJECTION SUBCUTANEOUS at 08:08

## 2022-11-04 RX ADMIN — POLYETHYLENE GLYCOL 3350 17 G: 17 POWDER, FOR SOLUTION ORAL at 08:08

## 2022-11-04 RX ADMIN — ONDANSETRON 4 MG: 4 TABLET, ORALLY DISINTEGRATING ORAL at 08:29

## 2022-11-04 RX ADMIN — ACETAMINOPHEN 975 MG: 325 TABLET ORAL at 08:13

## 2022-11-04 RX ADMIN — PANTOPRAZOLE SODIUM 40 MG: 40 INJECTION, POWDER, FOR SOLUTION INTRAVENOUS at 08:08

## 2022-11-04 NOTE — DISCHARGE SUMMARY
[address] [facility]  Discharge- 300 [facility] 1980, 40 y o  female MRN: [id number]  Unit/Bed#: [facility] 87 209-01 Encounter: [id number]  Primary Care Provider: Sherryle Kos, PA-C   Date and time admitted to hospital: 10/30/2022  7:57 PM    * Intractable nausea and vomiting  Assessment & Plan  · Improving  · Possibly related to IBS on top of gastric bypass surgery status  · She has a history of gastric sleeve in 2009 and was converted to Karmen-en-Y gastric bypass in 2019  · Her most recent EGD was unremarkable with no evidence of ulcer  · Her most recent CT was unremarkable  · Upper GI series unremarkable with no extravasation  · Continue diet as tolerated  · Continue Protonix  · Outpatient follow-up with GI for further study      Iron deficiency anemia secondary to inadequate dietary iron intake  Assessment & Plan  Continue iron supplements    Irritable bowel syndrome (IBS)  Assessment & Plan  · IBS with abdominal pain and constipation  · Outpatient follow-up with GI  · Continue Linzess, senna and Colace     Postsurgical malabsorption  Assessment & Plan  Advance diet as tolerated  Continue supplements at discharge      Discharging Physician / Practitioner: Randell Tavares  PCP: Sherryle Kos, PA-C  Admission Date:   Admission Orders (From admission, onward)     Ordered        10/30/22 2203  Inpatient Admission  Once                      Discharge Date: 11/04/22    Medical Problems             Resolved Problems  Date Reviewed: 11/4/2022   None                 Consultations During Hospital Stay:  · GI  · Bariatric surgery    Procedures Performed:   · 10/31/2022 EGD-mild erythema at the suture site    Otherwise normal exam     Significant Findings / Test Results:   · 11/02/2022-upper GI series no evidence of complication from his Karmen-en-Y gastric bypass  · 10/28/2022-CT abdomen no acute intra-abdominal findings    Incidental Findings:   · None     Test Results Pending at Discharge (will require follow up):   · Gastric biopsy     Outpatient Tests Requested:  · GI follow-up for esophageal manometry testing    Complications:  None    Reason for Admission:  Abdominal pain nausea and vomiting    Hospital Course:     Lesa Kaiser is a 40 y o  female patient who originally presented to the hospital on 10/30/2022 due to abdominal pain, nausea and vomiting  She has known history of obesity status post remote gastric sleeve in 2009 and eventual conversion to Karmen-en-Y gastric bypass in 2019  She was seen by both GI and bariatric surgery  Upper GI series and CT showed no acute findings or complications from her gastric bypass  EGD showed mild erythema at the suture site but otherwise was normal   She was treated with supportive care including Zofran and scopolamine patch  She had relieved especially with these 2 medications  Her diet was slowly advanced and she was able to tolerate solids  She is stable for discharge home today with outpatient GI follow-up  Please see above list of diagnoses and related plan for additional information  Condition at Discharge: good     Discharge Day Visit / Exam:     Subjective:  Seen and examined earlier during rounds  Mild discomfort after eating but kept it down without vomiting  Vitals: Blood Pressure: 99/57 (11/04/22 0631)  Pulse: 66 (11/04/22 0631)  Temperature: 98 2 °F (36 8 °C) (11/04/22 0631)  Temp Source: Tympanic (11/03/22 1524)  Respirations: 15 (11/04/22 0631)  Weight - Scale: 107 kg (236 lb 1 8 oz) (11/04/22 0600)  SpO2: 98 % (11/04/22 0631)  Exam:   Physical Exam  Vitals reviewed  HENT:      Head: Normocephalic and atraumatic  Nose: No congestion or rhinorrhea  Eyes:      General: No scleral icterus  Cardiovascular:      Rate and Rhythm: Normal rate and regular rhythm  Heart sounds: No murmur heard  No gallop  Pulmonary:      Effort: Pulmonary effort is normal  No respiratory distress        Breath sounds: No wheezing or rales  Abdominal:      General: Abdomen is flat  There is no distension  Palpations: Abdomen is soft  Tenderness: There is no abdominal tenderness  Musculoskeletal:      Cervical back: Neck supple  Right lower leg: No edema  Left lower leg: No edema  Skin:     General: Skin is warm and dry  Neurological:      Mental Status: She is alert and oriented to person, place, and time  Psychiatric:         Mood and Affect: Mood normal          Behavior: Behavior normal          Discharge instructions/Information to patient and family:   See after visit summary for information provided to patient and family  Provisions for Follow-Up Care:  See after visit summary for information related to follow-up care and any pertinent home health orders  Disposition:     Home    Planned Readmission: no     Discharge Statement:  I spent >30 minutes discharging the patient  This time was spent on the day of discharge  I had direct contact with the patient on the day of discharge  Greater than 50% of the total time was spent examining patient, answering all patient questions, arranging and discussing plan of care with patient as well as directly providing post-discharge instructions  Additional time then spent on discharge activities  Discharge Medications:  See after visit summary for reconciled discharge medications provided to patient and family        ** Please Note: This note has been constructed using a voice recognition system **

## 2022-11-04 NOTE — DISCHARGE INSTR - AVS FIRST PAGE
The GI team will contact you regarding a follow-up appointment and further testing  Continue diet as tolerated  Try to eat small meals slowly    May continue scopolamine patch every 3 days and Zofran as needed

## 2022-11-04 NOTE — PROGRESS NOTES
Progress Note- Samuel Dorsey 40 y o  female MRN: [id number]    Unit/Bed#: [place] 2 -01 Encounter: [id number]      Assessment and Plan:    45-year-old female with past medical history including but not limited to gastric sleeve which was converted into Karmen-en-Y gastric bypass given intractable GERD in 5451 complicated by marginal ulcers in past, had presented with intractable nausea and vomiting with abdominal pain, underwent EGD on 10/31 which did not show any evidence of marginal ulcers  1  Nausea, vomiting, abdominal pain  Now improving  Will recommend continuing diet as tolerated, continue scopalamine patch with PRN anti emetics  Continue PPI BID  Ordered outpatient PH with manometry study, recommended to hold PPI for 5 days prior to the procedure  Continue outpatient GI fup  Continue bariatric fup  GI team will sign off  Please contact the on-call provider or re-consult if there are any questions or concerns  ______________________________________________________________________    Subjective:     Patient seen and examined at bedside  Patient currently reports feeling slightly better with the ongoing therapy  Tolerating diet well  Had a bowel movement with the use of suppositories  Passing gas      Medication Administration - last 24 hours from 11/03/2022 0934 to 11/04/2022 0934       Date/Time Order Dose Route Action Action by     11/04/2022 0808 enoxaparin (LOVENOX) subcutaneous injection 40 mg 40 mg Subcutaneous Given Phil Carson RN     11/04/2022 6965 pantoprazole (PROTONIX) injection 40 mg 40 mg Intravenous Given Phil Carson RN     11/03/2022 2142 pantoprazole (PROTONIX) injection 40 mg 40 mg Intravenous Given Alisia Mcfarland RN     11/03/2022 1022 sodium chloride 0 9 % infusion 0 mL/hr Intravenous Hold Phil Carson RN     11/04/2022 1044 scopolamine (TRANSDERM-SCOP) 1 mg/3 days TD 72 hr patch 1 patch 1 patch Transdermal Patch Removed Phil Carson RN 11/03/2022 1047 scopolamine (TRANSDERM-SCOP) 1 mg/3 days TD 72 hr patch 1 patch 1 patch Transdermal Medication Applied Jack Marlo, RN     11/04/2022 0813 acetaminophen (TYLENOL) tablet 975 mg 975 mg Oral Given Jack Marlo, RN     11/04/2022 0808 polyethylene glycol (MIRALAX) packet 17 g 17 g Oral Given Jack Marlo, RN     11/03/2022 2142 polyethylene glycol (MIRALAX) packet 17 g 17 g Oral Given [facility], RN     11/04/2022 0492 senna-docusate sodium (SENOKOT S) 8 6-50 mg per tablet 2 tablet 2 tablet Oral Given Jack Marlo, RN     11/03/2022 1603 senna-docusate sodium (SENOKOT S) 8 6-50 mg per tablet 2 tablet 2 tablet Oral Given Jack Marlo, RN     11/04/2022 0806 bisacodyl (DULCOLAX) rectal suppository 10 mg 10 mg Rectal Refused Jack Marlo, RN     11/04/2022 0829 ondansetron (ZOFRAN-ODT) dispersible tablet 4 mg 4 mg Oral Given Jack Marlo, RN     11/03/2022 1753 ondansetron (ZOFRAN-ODT) dispersible tablet 4 mg 4 mg Oral Not Given Jack Marlo, RN     11/03/2022 1321 ondansetron (ZOFRAN-ODT) dispersible tablet 4 mg 4 mg Oral Given Jack Marlo, RN          Objective:     Vitals: Blood pressure 99/57, pulse 66, temperature 98 2 °F (36 8 °C), resp  rate 15, weight 107 kg (236 lb 1 8 oz), last menstrual period 10/29/2022, SpO2 98 %, unknown if currently breastfeeding  ,Body mass index is 39 29 kg/m²  No intake or output data in the 24 hours ending 11/04/22 0934    Physical Exam:   General Appearance: Awake and alert, in no acute distress  Abdomen: Soft, non-tender, non-distended; bowel sounds normal; no masses or no organomegaly    Invasive Devices  Report    Peripheral Intravenous Line  Duration           Peripheral IV 11/03/22 Left;Proximal;Ventral (anterior) Forearm <1 day                Lab Results:  No results displayed because visit has over 200 results  Imaging Studies: I have personally reviewed pertinent imaging studies

## 2022-11-04 NOTE — PLAN OF CARE
Problem: PAIN - ADULT  Goal: Verbalizes/displays adequate comfort level or baseline comfort level  Description: Interventions:  - Encourage patient to monitor pain and request assistance  - Assess pain using appropriate pain scale  - Administer analgesics based on type and severity of pain and evaluate response  - Implement non-pharmacological measures as appropriate and evaluate response  - Consider cultural and social influences on pain and pain management  - Notify physician/advanced practitioner if interventions unsuccessful or patient reports new pain  Outcome: Progressing     Problem: INFECTION - ADULT  Goal: Absence or prevention of progression during hospitalization  Description: INTERVENTIONS:  - Assess and monitor for signs and symptoms of infection  - Monitor lab/diagnostic results  - Monitor all insertion sites, i e  indwelling lines, tubes, and drains  - Monitor endotracheal if appropriate and nasal secretions for changes in amount and color  - Brush appropriate cooling/warming therapies per order  - Administer medications as ordered  - Instruct and encourage patient and family to use good hand hygiene technique  - Identify and instruct in appropriate isolation precautions for identified infection/condition  Outcome: Progressing  Goal: Absence of fever/infection during neutropenic period  Description: INTERVENTIONS:  - Monitor WBC    Outcome: Progressing     Problem: SAFETY ADULT  Goal: Patient will remain free of falls  Description: INTERVENTIONS:  - Educate patient/family on patient safety including physical limitations  - Instruct patient to call for assistance with activity   - Consult OT/PT to assist with strengthening/mobility   - Keep Call bell within reach  - Keep bed low and locked with side rails adjusted as appropriate  - Keep care items and personal belongings within reach  - Initiate and maintain comfort rounds  - Make Fall Risk Sign visible to staff  - Offer Toileting every  Hours, in advance of need  - Initiate/Maintain alarm  - Obtain necessary fall risk management equipment:   - Apply yellow socks and bracelet for high fall risk patients  - Consider moving patient to room near nurses station  Outcome: Progressing  Goal: Maintain or return to baseline ADL function  Description: INTERVENTIONS:  -  Assess patient's ability to carry out ADLs; assess patient's baseline for ADL function and identify physical deficits which impact ability to perform ADLs (bathing, care of mouth/teeth, toileting, grooming, dressing, etc )  - Assess/evaluate cause of self-care deficits   - Assess range of motion  - Assess patient's mobility; develop plan if impaired  - Assess patient's need for assistive devices and provide as appropriate  - Encourage maximum independence but intervene and supervise when necessary  - Involve family in performance of ADLs  - Assess for home care needs following discharge   - Consider OT consult to assist with ADL evaluation and planning for discharge  - Provide patient education as appropriate  Outcome: Progressing  Goal: Maintains/Returns to pre admission functional level  Description: INTERVENTIONS:  - Perform BMAT or MOVE assessment daily    - Set and communicate daily mobility goal to care team and patient/family/caregiver  - Collaborate with rehabilitation services on mobility goals if consulted  - Perform Range of Motion times a day  - Reposition patient every  hours    - Dangle patient  times a day  - Stand patient  times a day  - Ambulate patient  times a day  - Out of bed to chair  times a day   - Out of bed for meals  times a day  - Out of bed for toileting  - Record patient progress and toleration of activity level   Outcome: Progressing     Problem: DISCHARGE PLANNING  Goal: Discharge to home or other facility with appropriate resources  Description: INTERVENTIONS:  - Identify barriers to discharge w/patient and caregiver  - Arrange for needed discharge resources and transportation as appropriate  - Identify discharge learning needs (meds, wound care, etc )  - Arrange for interpretive services to assist at discharge as needed  - Refer to Case Management Department for coordinating discharge planning if the patient needs post-hospital services based on physician/advanced practitioner order or complex needs related to functional status, cognitive ability, or social support system  Outcome: Progressing     Problem: Knowledge Deficit  Goal: Patient/family/caregiver demonstrates understanding of disease process, treatment plan, medications, and discharge instructions  Description: Complete learning assessment and assess knowledge base    Interventions:  - Provide teaching at level of understanding  - Provide teaching via preferred learning methods  Outcome: Progressing     Problem: GASTROINTESTINAL - ADULT  Goal: Minimal or absence of nausea and/or vomiting  Description: INTERVENTIONS:  - Administer IV fluids if ordered to ensure adequate hydration  - Maintain NPO status until nausea and vomiting are resolved  - Nasogastric tube if ordered  - Administer ordered antiemetic medications as needed  - Provide nonpharmacologic comfort measures as appropriate  - Advance diet as tolerated, if ordered  - Consider nutrition services referral to assist patient with adequate nutrition and appropriate food choices  Outcome: Progressing  Goal: Maintains or returns to baseline bowel function  Description: INTERVENTIONS:  - Assess bowel function  - Encourage oral fluids to ensure adequate hydration  - Administer IV fluids if ordered to ensure adequate hydration  - Administer ordered medications as needed  - Encourage mobilization and activity  - Consider nutritional services referral to assist patient with adequate nutrition and appropriate food choices  Outcome: Progressing  Goal: Maintains adequate nutritional intake  Description: INTERVENTIONS:  - Monitor percentage of each meal consumed  - Identify factors contributing to decreased intake, treat as appropriate  - Assist with meals as needed  - Monitor I&O, weight, and lab values if indicated  - Obtain nutrition services referral as needed  Outcome: Progressing  Goal: Oral mucous membranes remain intact  Description: INTERVENTIONS  - Assess oral mucosa and hygiene practices  - Implement preventative oral hygiene regimen  - Implement oral medicated treatments as ordered  - Initiate Nutrition services referral as needed  Outcome: Progressing     Problem: METABOLIC, FLUID AND ELECTROLYTES - ADULT  Goal: Electrolytes maintained within normal limits  Description: INTERVENTIONS:  - Monitor labs and assess patient for signs and symptoms of electrolyte imbalances  - Administer electrolyte replacement as ordered  - Monitor response to electrolyte replacements, including repeat lab results as appropriate  - Instruct patient on fluid and nutrition as appropriate  Outcome: Progressing  Goal: Fluid balance maintained  Description: INTERVENTIONS:  - Monitor labs   - Monitor I/O and WT  - Instruct patient on fluid and nutrition as appropriate  - Assess for signs & symptoms of volume excess or deficit  Outcome: Progressing  Goal: Glucose maintained within target range  Description: INTERVENTIONS:  - Monitor Blood Glucose as ordered  - Assess for signs and symptoms of hyperglycemia and hypoglycemia  - Administer ordered medications to maintain glucose within target range  - Assess nutritional intake and initiate nutrition service referral as needed  Outcome: Progressing     Problem: Nutrition/Hydration-ADULT  Goal: Nutrient/Hydration intake appropriate for improving, restoring or maintaining nutritional needs  Description: Monitor and assess patient's nutrition/hydration status for malnutrition  Collaborate with interdisciplinary team and initiate plan and interventions as ordered  Monitor patient's weight and dietary intake as ordered or per policy   Utilize nutrition screening tool and intervene as necessary  Determine patient's food preferences and provide high-protein, high-caloric foods as appropriate       INTERVENTIONS:  - Monitor oral intake, urinary output, labs, and treatment plans  - Assess nutrition and hydration status and recommend course of action  - Evaluate amount of meals eaten  - Assist patient with eating if necessary   - Allow adequate time for meals  - Recommend/ encourage appropriate diets, oral nutritional supplements, and vitamin/mineral supplements  - Order, calculate, and assess calorie counts as needed  - Recommend, monitor, and adjust tube feedings and TPN/PPN based on assessed needs  - Assess need for intravenous fluids  - Provide specific nutrition/hydration education as appropriate  - Include patient/family/caregiver in decisions related to nutrition  Outcome: Progressing     Problem: Potential for Falls  Goal: Patient will remain free of falls  Description: INTERVENTIONS:  - Educate patient/family on patient safety including physical limitations  - Instruct patient to call for assistance with activity   - Consult OT/PT to assist with strengthening/mobility   - Keep Call bell within reach  - Keep bed low and locked with side rails adjusted as appropriate  - Keep care items and personal belongings within reach  - Initiate and maintain comfort rounds  - Make Fall Risk Sign visible to staff  - Offer Toileting every  Hours, in advance of need  - Initiate/Maintainalarm  - Obtain necessary fall risk management equipment  - Apply yellow socks and bracelet for high fall risk patients  - Consider moving patient to room near nurses station  Outcome: Progressing

## 2022-11-04 NOTE — ASSESSMENT & PLAN NOTE
· Improving  · Possibly related to IBS on top of gastric bypass surgery status  · She has a history of gastric sleeve in 2009 and was converted to Karmen-en-Y gastric bypass in 2019  · Her most recent EGD was unremarkable with no evidence of ulcer  · Her most recent CT was unremarkable  · Upper GI series unremarkable with no extravasation  · Continue diet as tolerated  · Continue Protonix  · Outpatient follow-up with GI for further study

## 2022-11-04 NOTE — ASSESSMENT & PLAN NOTE
· IBS with abdominal pain and constipation  · Outpatient follow-up with GI  · Continue Linzess, senna and Colace

## 2022-11-10 ENCOUNTER — TELEPHONE (OUTPATIENT)
Dept: GASTROENTEROLOGY | Facility: HOSPITAL | Age: 42
End: 2022-11-10

## 2022-11-11 ENCOUNTER — TELEPHONE (OUTPATIENT)
Dept: GASTROENTEROLOGY | Facility: HOSPITAL | Age: 42
End: 2022-11-11

## 2022-11-14 ENCOUNTER — TELEPHONE (OUTPATIENT)
Dept: GASTROENTEROLOGY | Facility: HOSPITAL | Age: 42
End: 2022-11-14

## 2022-11-16 ENCOUNTER — HOSPITAL ENCOUNTER (OUTPATIENT)
Dept: GASTROENTEROLOGY | Facility: HOSPITAL | Age: 42
Discharge: HOME | End: 2022-11-16

## 2022-11-16 VITALS
OXYGEN SATURATION: 100 % | RESPIRATION RATE: 18 BRPM | HEART RATE: 94 BPM | SYSTOLIC BLOOD PRESSURE: 123 MMHG | DIASTOLIC BLOOD PRESSURE: 86 MMHG | TEMPERATURE: 97.7 F

## 2022-11-16 DIAGNOSIS — R11.2 NAUSEA AND VOMITING, UNSPECIFIED VOMITING TYPE: ICD-10-CM

## 2022-11-16 DIAGNOSIS — R10.9 ABDOMINAL PAIN, UNSPECIFIED ABDOMINAL LOCATION: ICD-10-CM

## 2022-11-16 NOTE — PERIOPERATIVE NURSING NOTE
Patient brought in the room and explained the esophageal manometry procedure  After the confirmation of allergies, lidocaine 2% inserted via right /left nostril and  a transnasal insertion of the High Resolution esophageal manometry catheter was inserted via left nostril  Patient given water to drink during the insertion and once the catheter inserted pressure bands of both Upper esophageal sphincter  (UES) and Lower esophageal sphincter ( LES) were observed on the color contour  Patient instructed to take a deep breath to verify placement of the catheter, diaphragmatic pinch noted on inspiration  Catheter was secured to right/left cheek  Patient was assisted to supine position   Patient was instructed to relax  while acclimating the catheter for about 5 minutes  A 30 second baseline resting pressure was obtained to identify the UES and LES followed by a series of 10 liquid swallows using 5 cc room temperature normal saline to assess esophageal motility and bolus transit  No 10 viscous swallows, 1 multiple rapid drink swallow using 2 cc room temperature normal saline given a total of 5 drinks  Patient instructed to sit up at the edge of the stretcher and given 5 upright liquid swallows using 5 cc room temperature normal saline and 1 rapid drink challenge using 200 cc room temperature water  At the end of the procedure the high resolution esophageal manometry catheter was removed from the nostril intact  Sensor PH probe inserted via erika  nostril and secured at 33 cm  Zephr recorder teachback performed and patient verbalized understanding  Patient instructed to return next day to have probe remove  Discharge instructions given and patient ambulated out of room in stable condition

## 2022-11-29 NOTE — RESULT ENCOUNTER NOTE
I sent patient a [employer] message regarding the results from manometry perfomed on 11/16   Mentioned that every thing essentially appears normal; but can discuss in detail if she desires during next office visit which is in Dec

## 2022-12-28 ENCOUNTER — TELEPHONE (OUTPATIENT)
Dept: GASTROENTEROLOGY | Facility: CLINIC | Age: 42
End: 2022-12-28

## 2022-12-28 ENCOUNTER — OFFICE VISIT (OUTPATIENT)
Dept: GASTROENTEROLOGY | Facility: CLINIC | Age: 42
End: 2022-12-28

## 2022-12-28 VITALS
TEMPERATURE: 97.8 F | WEIGHT: 228 LBS | DIASTOLIC BLOOD PRESSURE: 76 MMHG | SYSTOLIC BLOOD PRESSURE: 130 MMHG | BODY MASS INDEX: 38.93 KG/M2 | HEIGHT: 64 IN

## 2022-12-28 DIAGNOSIS — R11.2 NAUSEA AND VOMITING IN ADULT: ICD-10-CM

## 2022-12-28 DIAGNOSIS — R11.2 INTRACTABLE NAUSEA AND VOMITING: ICD-10-CM

## 2022-12-28 DIAGNOSIS — D50.8 OTHER IRON DEFICIENCY ANEMIA: ICD-10-CM

## 2022-12-28 DIAGNOSIS — K59.09 OTHER CONSTIPATION: Primary | ICD-10-CM

## 2022-12-28 DIAGNOSIS — K59.01 SLOW TRANSIT CONSTIPATION: ICD-10-CM

## 2022-12-28 DIAGNOSIS — R11.2 NAUSEA AND VOMITING: ICD-10-CM

## 2022-12-28 DIAGNOSIS — K21.9 GASTROESOPHAGEAL REFLUX DISEASE WITHOUT ESOPHAGITIS: ICD-10-CM

## 2022-12-28 RX ORDER — FUROSEMIDE 20 MG/1
20 TABLET ORAL DAILY
COMMUNITY
Start: 2022-10-13

## 2022-12-28 RX ORDER — BUPROPION HYDROCHLORIDE 300 MG/1
TABLET ORAL
COMMUNITY
Start: 2022-12-21

## 2022-12-28 RX ORDER — TOPIRAMATE 25 MG/1
TABLET ORAL
COMMUNITY
Start: 2022-12-21

## 2022-12-28 RX ORDER — SCOLOPAMINE TRANSDERMAL SYSTEM 1 MG/1
1 PATCH, EXTENDED RELEASE TRANSDERMAL
Qty: 10 PATCH | Refills: 0 | Status: SHIPPED | OUTPATIENT
Start: 2022-12-28

## 2022-12-28 RX ORDER — PANTOPRAZOLE SODIUM 40 MG/1
40 TABLET, DELAYED RELEASE ORAL 2 TIMES DAILY
Qty: 60 TABLET | Refills: 0 | Status: SHIPPED | OUTPATIENT
Start: 2022-12-28

## 2022-12-28 NOTE — PROGRESS NOTES
Nidia Sofia [facility] - Outpatient Follow-up Note  Verito Enriquez 40 y o  female MRN: [id number]  Encounter: [id number]      Assessment and Plan    1  Nausea and vomiting  2  Reflux  3  History of Karmen-en-Y gastric bypass  4  Anastomotic ulcer    5  History of H pylori infection s/p treatment   The patient has a history of sleeve gastrectomy in 2009 which was converted to a Karmen-en-Y gastric bypass in 2018 due to worsening GERD, pouchitis, and anastomotic ulcer  The patient did have an EGD 8/18/21 revealed another anastomotic ulcer, thankfully most recent EGD 10/31/22 was normal  She has had continued nausea, vomiting, and reflux so she has also had a CT, upper GI, and 24 hour pH and manometry testing which were normal as well  - discussed with the patient thus far testing has been normal, we will proceed with GES to ensure no underlying gastroparesis, if gastroparesis is seen we will start treatment of this with lifestyle modifications, otherwise if no gastroparesis is seen will start amitriptyline for functional symptoms     6  Constipation  She has tried and failed all over-the-counter options include MiraLax, stool softeners, fiber, magnesium citrate, enemas, and suppositories  Currently on Linzess 290mcg  Was initially doing well but has now been going every 3+ days and feels as though there is a blockage in her colon  - stop Linzess 290mcg  - start Amitiza 24mcg daily, can increase to twice daily if needed (message sent to clinical staff to start prior authorization, patient aware to continue linzess 290mcg plus add miralax 17g once daily until Amitiza approved)  - proceed with colonoscopy with Golytely bowel prep, if patient is constipated leading up to colonoscopy she is aware to call so when can tailor her prep to ensure this is adequate     7  Iron def anemia   The patient has BLANK with a HGB of 8, MCV of 71, and when last checked in September an iron of 16 and ferritin < 4   EGD negative for etiology  - colonoscopy for further evaluation    Follow up after colonoscopy     ______________________________________________________________________    History of Present Illness  Ted Taylor is a 40 y o  female here for follow up evaluation of a recent hospitalization  The patient was admitted late October/early November secondary to acute on chronic nausea and vomiting  While inpatient she underwent EGD with mild erythema at her anastomosis but no other abnormalities  Stomach pouch biopsy was obtained and negative for H  pylori  She also underwent an upper GI series which was normal and then later 24-hour pH and manometry testing indicating normal esophageal motility with a 2 9 cm hiatal hernia and no significant pathologic acid reflux disease  The patient presents today with continued complaints of nausea, vomiting, and acid reflux  She also has continued constipation  She has tried and failed all over-the-counter options include MiraLax, stool softeners, fiber, magnesium citrate, and was, and suppositories  Currently on Linzess 290mcg  Was initially doing well but has no been going every 3+ days and feels as though there is a blockage in her colon  Review of Systems   Constitutional: Negative for activity change, appetite change, chills, fatigue, fever and unexpected weight change  Gastrointestinal: Positive for constipation, nausea and vomiting  Musculoskeletal: Negative for back pain and gait problem  Psychiatric/Behavioral: Negative for confusion         Past Medical History  Past Medical History:   Diagnosis Date   • Abdominal pain     "almost constant"   • Anemia    • Anesthesia     "woke up swinging with last  9/2018  "was fine with EGD"   • Back pain    • Bariatric surgery status     2008-gastric sleeve revison RUEN-Y 4/2019-abd painnow-dx lap today 3/9/2020   • Constipation    • Dental bridge present     right lower permanent   • Diarrhea    • Difficulty swallowing     "in the past"   • Disease of thyroid gland     not on meds now   • Dizzy    • Fatigue     "when blood pressure low" and weakness too"   • GERD (gastroesophageal reflux disease)     "increase after surgery"   • Heart murmur     heart murmer, work up negative with holter monitor   • History of 2  sections     most recent 17   • History of D&C 2019   • History of iron deficiency     anemia/ had IV infusions through pregnancy in 1104-6837   • History of transfusion     2019 - pt had allergic reaction - had to stop the blood   • Inguinal hernia     right   • Loss of appetite    • Low BP     "off and on"   • Migraine    • N&V (nausea and vomiting)     " a little better since on meds"   • Non-intractable vomiting     "not since been on medicine"   • Palpitations     "having again"   • Postgastrectomy malabsorption    • Stomach pain        Past Social history  Past Surgical History:   Procedure Laterality Date   •  SECTION      x2   • CHOLECYSTECTOMY     • CHROMOSOME ANALYSIS, PRODUCTS OF CONCEPTION (HISTORICAL)      2 miscarriages in  and Nov   • LAPAROSCOPY N/A 3/9/2020    Procedure: DIAGNOSTIC LAPAROSCOPY,CLOSURE OF COATES DEFECT, INTRAOP EGD;  Surgeon: Ramón Mejia MD;  Location: [facility];  Service: Bariatrics   • OVARIAN CYST REMOVAL Right 2018   • KS EGD TRANSORAL BIOPSY SINGLE/MULTIPLE N/A 2019    Procedure: ESOPHAGOGASTRODUODENOSCOPY (EGD) with bx;  Surgeon: Ramón Mejia MD;  Location: [facility];   Service: Bariatrics   • KS LAPS GSTR RSTCV [address] W/BYP SREE-EN-Y LIMB <150 CM N/A 2019    Procedure: LAP SREE-EN-Y GASTRIC BYPASS, INTRAOP EGD;  Surgeon: Ramón Mejia MD;  Location: [facility];  Service: Bariatrics   • KS TX MISSED  FIRST TRIMESTER SURGICAL N/A 2019    Procedure: DILATATION AND EVACUATION (D&E) (8 weeks) missed ab;  Surgeon: uHmberto Beebe MD;  Location:  MAIN OR;  Service: Gynecology   • REVISION BYPASS LAPAROSCOPIC N/A 4/8/2019    Procedure: LAP REVISION/ CONVERSION;  Surgeon: Ambika Kaiser MD;  [facility]: [facility];  Service: Bariatrics   • SALPINGOOPHORECTOMY Right 09/2018   • SLEEVE GASTROPLASTY       Social History     Socioeconomic History   • Marital status: /Civil Union     Spouse name: Not on file   • Number of children: Not on file   • Years of education: Not on file   • Highest education level: Not on file   Occupational History   • Not on file   Tobacco Use   • Smoking status: Never   • Smokeless tobacco: Never   Vaping Use   • Vaping Use: Never used   Substance and Sexual Activity   • Alcohol use: Not Currently   • Drug use: No   • Sexual activity: Yes     Partners: Male   Other Topics Concern   • Not on file   Social History Narrative   • Not on file     Social Determinants of Health     Financial Resource Strain: Not on file   Food Insecurity: No Food Insecurity   • Worried About Running Out of Food in the Last Year: Never true   • Ran Out of Food in the Last Year: Never true   Transportation Needs: No Transportation Needs   • Lack of Transportation (Medical): No   • Lack of Transportation (Non-Medical):  No   Physical Activity: Not on file   Stress: Not on file   Social Connections: Not on file   Intimate Partner Violence: Not on file   Housing Stability: Low Risk    • Unable to Pay for Housing in the Last Year: No   • Number of Places Lived in the Last Year: 1   • Unstable Housing in the Last Year: No     Social History     Substance and Sexual Activity   Alcohol Use Not Currently     Social History     Substance and Sexual Activity   Drug Use No     Social History     Tobacco Use   Smoking Status Never   Smokeless Tobacco Never       Past Family History  Family History   Problem Relation Age of Onset   • Hypertension Mother    • Heart disease Mother    • No Known Problems Father        Current Medications  Current Outpatient Medications   Medication Sig Dispense Refill   • ascorbic acid (VITAMIN C) 500 mg tablet Take 500 mg by mouth daily     • ferrous sulfate 325 (65 Fe) mg tablet Take 325 mg by mouth daily with breakfast     • furosemide (LASIX) 20 mg tablet Take 20 mg by mouth daily     • linaCLOtide (Linzess) 290 MCG CAPS Take 1 capsule by mouth in the morning 30 capsule 3   • ondansetron (Zofran ODT) 4 mg disintegrating tablet Take 1 tablet (4 mg total) by mouth every 6 (six) hours as needed for nausea or vomiting 20 tablet 1   • pantoprazole (PROTONIX) 40 mg tablet Take 1 tablet (40 mg total) by mouth 2 (two) times a day 60 tablet 0   • senna-docusate sodium (SENOKOT S) 8 6-50 mg per tablet Take 1 tablet by mouth 2 (two) times a day 60 tablet 0   • buPROPion (WELLBUTRIN XL) 300 mg 24 hr tablet  (Patient not taking: Reported on 12/28/2022)     • scopolamine (TRANSDERM-SCOP) 1 mg/3 days TD 72 hr patch Place 1 patch on the skin every third day Do not start before November 6, 2022  (Patient not taking: Reported on 12/28/2022) 10 patch 0   • topiramate (TOPAMAX) 25 mg tablet  (Patient not taking: Reported on 12/28/2022)       No current facility-administered medications for this visit         Allergies  Allergies   Allergen Reactions   • Aspirin Anaphylaxis   • Shellfish-Derived Products - Food Allergy Anaphylaxis   • Contrast [Iodinated Contrast Media] Itching and Swelling   • Ibuprofen Edema   • Reglan [Metoclopramide] Itching and Confusion         The following portions of the patient's history were reviewed and updated as appropriate: allergies, current medications, past medical history, past social history, past surgical history and problem list       Vitals  Vitals:    12/28/22 1025   BP: 130/76   BP Location: Right arm   Patient Position: Sitting   Cuff Size: Standard   Temp: 97 8 °F (36 6 °C)   TempSrc: Tympanic   Weight: 103 kg (228 lb)   Height: 5' 4" (1 626 m)         Physical Exam  Constitutional   General appearance: Patient is seated and in no acute distress, well appearing and well nourished  Head and Face   Head and face: Normal     Eyes   Conjunctiva and lids: No erythema, swelling or discharge  Anicteric  Ears, Nose, Mouth, and Throat   Hearing: Normal     Neck: Supple, trachea midline  Pulmonary   Respiratory effort: No increased work of breathing or signs of respiratory distress  Cardiovascular    Examination of extremities for edema and/or varicosities: Normal     Musculoskeletal   Gait and station: Normal   Skin   Skin and subcutaneous tissue: Warm, dry, and intact  No visible jaundice, lesions or rashes  Psychiatric   Judgment and insight: Normal  Recent and remote memory:  Normal  Mood and affect: Normal      Results  No visits with results within 1 Day(s) from this visit  Latest known visit with results is:   No results displayed because visit has over 200 results  Radiology Results  No results found  Orders  No orders of the defined types were placed in this encounter

## 2022-12-28 NOTE — PATIENT INSTRUCTIONS
Scheduled date of colonoscopy (as of today):03 02 23  Physician performing colonoscopy:DR DICK  Location of colonoscopy:[place]  Bowel prep reviewed with patient:MENDEZ  Instructions reviewed with patient by:DALILA  Clearances:  N/A  Gastric Emptying Study scheduled 02/01/23 @[facility]

## 2023-01-04 ENCOUNTER — TELEPHONE (OUTPATIENT)
Dept: GASTROENTEROLOGY | Facility: CLINIC | Age: 43
End: 2023-01-04

## 2023-01-04 DIAGNOSIS — R11.2 NAUSEA AND VOMITING, UNSPECIFIED VOMITING TYPE: Primary | ICD-10-CM

## 2023-01-04 RX ORDER — ONDANSETRON 4 MG/1
4 TABLET, ORALLY DISINTEGRATING ORAL EVERY 6 HOURS PRN
Qty: 20 TABLET | Refills: 1 | Status: SHIPPED | OUTPATIENT
Start: 2023-01-04

## 2023-01-04 NOTE — TELEPHONE ENCOUNTER
Spoke with patient  History of n/v, reflux, rouex-en-y bypass, anastomotic ulcer, hpylori infection    Patient c/o generalized abdominal pain, bloating, fullness, nausea, vomiting, weakness, and ongoing constipation  Last BM 2 days ago  Denies fever, chills, SOB, black or bloody stools  Patient has not started [employer] (which is approved)  Taking only linzess 290mcg (miralax was not taken with this), pantoprazole 40mg BID, zofran PRN, and scopolamine patch  Patient understands that any persistent vomiting x 24hours patient should go to ED for hydration  GES is scheduled 2/1/2023  Advised patient to call pharmacy and  amitiza, also call for waitlist for GES, and go to ED for severe abdominal pain, fever, persistent vomiting  Sent refill for zofran  Currently she is with her child at home and cannot go to ED  Any other suggestions?

## 2023-01-04 NOTE — TELEPHONE ENCOUNTER
----- Message from Leonor Guillen MA sent at 1/4/2023 10:00 AM EST -----  Regarding: FW: Bloated stomach   Contact: [phone]    ----- Message -----  From: Gris Avilez PA-C  Sent: 1/3/2023   2:34 PM EST  To: , #  Subject: FW: Bloated stomach                              Can nursing please call patient to triage? She has constipation so if she hasn't had a bowel movement in awhile she may need a bowel cleanse      ----- Message -----  From: Leonor Guillen MA  Sent: 1/3/2023   1:26 PM EST  To: Gris Avilez PA-C  Subject: FW: Bloated stomach                                ----- Message -----  From: Ayan Jade  Sent: 1/3/2023   1:14 PM EST  To: , #  Subject: Bloated stomach                                  Corine Watkins,    I hope you are having a great day, I have been vomiting, having a lot of nauseas even though I am taking the medications, but my stomach is really really bloated  I have never felt this way before, and I just ate literally like 3 or 4 small wheat crackers and like 3 or 4 pieces of cheese and my regular pills, I haven't been able to go to the bathroom  What do you recommend?     Thank you,    Orion Sosa

## 2023-01-11 DIAGNOSIS — K59.01 SLOW TRANSIT CONSTIPATION: Primary | ICD-10-CM

## 2023-01-11 RX ORDER — PLECANATIDE 3 MG/1
3 TABLET ORAL DAILY
Qty: 30 TABLET | Refills: 5 | Status: SHIPPED | OUTPATIENT
Start: 2023-01-11 | End: 2023-06-09

## 2023-01-19 ENCOUNTER — OFFICE VISIT (OUTPATIENT)
Dept: OBGYN CLINIC | Facility: CLINIC | Age: 43
End: 2023-01-19

## 2023-01-19 VITALS
DIASTOLIC BLOOD PRESSURE: 74 MMHG | WEIGHT: 227 LBS | HEIGHT: 64 IN | BODY MASS INDEX: 38.76 KG/M2 | SYSTOLIC BLOOD PRESSURE: 124 MMHG

## 2023-01-19 DIAGNOSIS — Z98.84 STATUS POST BARIATRIC SURGERY: ICD-10-CM

## 2023-01-19 DIAGNOSIS — Z98.891 HISTORY OF C-SECTION: ICD-10-CM

## 2023-01-19 DIAGNOSIS — N92.0 MENORRHAGIA WITH REGULAR CYCLE: ICD-10-CM

## 2023-01-19 DIAGNOSIS — Z90.79 HISTORY OF RIGHT SALPINGO-OOPHORECTOMY: ICD-10-CM

## 2023-01-19 DIAGNOSIS — R10.2 PELVIC PAIN: Primary | ICD-10-CM

## 2023-01-19 DIAGNOSIS — N93.9 ABNORMAL UTERINE BLEEDING (AUB): ICD-10-CM

## 2023-01-19 DIAGNOSIS — D50.8 IRON DEFICIENCY ANEMIA SECONDARY TO INADEQUATE DIETARY IRON INTAKE: ICD-10-CM

## 2023-01-19 DIAGNOSIS — Z90.721 HISTORY OF RIGHT SALPINGO-OOPHORECTOMY: ICD-10-CM

## 2023-01-19 DIAGNOSIS — Z12.4 SCREENING FOR CERVICAL CANCER: ICD-10-CM

## 2023-01-19 RX ORDER — TRANEXAMIC ACID 650 MG/1
1300 TABLET ORAL 3 TIMES DAILY
Qty: 30 TABLET | Refills: 0 | Status: SHIPPED | OUTPATIENT
Start: 2023-01-19 | End: 2023-01-24

## 2023-01-19 NOTE — PROGRESS NOTES
Assessment/Plan:  Reviewed extensive records  Will obtain pelvic ultrasound  Discussed options for cycle control including hormonal versus surgical intervention  She will continue to follow-up with hematology as scheduled  Return to office in 3 weeks to discuss the above results as well as options  With her next menstrual cycle she will try lysteda to decrease menstrual flow  Risks and benefits reviewed  All questions answered at this time  No problem-specific Assessment & Plan notes found for this encounter  Diagnoses and all orders for this visit:    Pelvic pain  -     US pelvis complete w transvaginal    Menorrhagia with regular cycle  -     Tranexamic Acid 650 MG TABS; Take 2 tablets (1,300 mg total) by mouth 3 (three) times a day for 5 days    History of right salpingo-oophorectomy    Screening for cervical cancer  -     Liquid-based pap, screening    Iron deficiency anemia secondary to inadequate dietary iron intake    Status post bariatric surgery    Abnormal uterine bleeding (AUB)    History of           Subjective:      Patient ID: Jluis England is a 40 y o  female  HPI     This is a pleasant 78-year-old female G6, P3 ( x3, age 2, 11, 16) presents as a new patient complaining of right lower quadrant pain for the last 4 to 5 weeks described as sharp, waxes and wanes, sometimes radiate to right back  She does recall having this pain approximately 4 years ago  At that time she was diagnosed with right ovarian cysts and underwent laparoscopic right salpingo-oophorectomy 2018 revealing benign ovarian cysts  She has a long history of heavy prolonged menses  Menstrual cycles are usually every 4 weeks lasting 8 to 10 days described as heavy for 5 days passing large clots since her last delivery  She is sexually active and has been in a monogamous relationship with her  for over 10 years  Method of contraception is tubal sterilization    She denies any changes in bladder function  She does have difficulty with constipation and loose bowel movements  Patient underwent gastric sleeve in 2009 followed by revision gastric bypass in 2018  She has a long history of anemia resulting in iron infusions and blood transfusions  She does follow-up with hematology on a regular basis  Last GYN exam was at the time of her last delivery  She does recall being on birth control pills several years ago, 2 different brands which resulted in breakthrough bleeding  Gastric sleeve 2009  Gastric bypass 2018  Endoscopy 8/2021    Continues to follow-up with GI for generalized abdominal pain, bloating, fullness, nausea vomiting and weakness, ongoing constipation, medications being altered  Occupation, behavioral therapist pediatrics      11/2022 Hb=8 0    Menstrual diary 9/19-29, 10/18-28, 11/20-30, 12/23 x 11 days    The following portions of the patient's history were reviewed and updated as appropriate: allergies, current medications, past family history, past medical history, past social history, past surgical history and problem list     Review of Systems   Constitutional: Negative for fatigue, fever and unexpected weight change  Respiratory: Negative for cough, chest tightness, shortness of breath and wheezing  Cardiovascular: Negative  Negative for chest pain and palpitations  Gastrointestinal: Negative  Negative for abdominal distention, abdominal pain, blood in stool, constipation, diarrhea, nausea and vomiting  Genitourinary: Positive for pelvic pain  Negative for difficulty urinating, dyspareunia, dysuria, flank pain, frequency, genital sores, hematuria, urgency, vaginal bleeding, vaginal discharge and vaginal pain  Skin: Negative for rash  Objective:      /74   Ht 5' 4" (1 626 m)   Wt 103 kg (227 lb)   LMP 12/23/2022   BMI 38 96 kg/m²          Physical Exam  Constitutional:       Appearance: Normal appearance     Cardiovascular:      Rate and Rhythm: Normal rate  Pulmonary:      Effort: Pulmonary effort is normal       Breath sounds: Normal breath sounds  Abdominal:      General: Bowel sounds are normal  There is no distension  Palpations: Abdomen is soft  Tenderness: There is no abdominal tenderness  There is no guarding or rebound  Genitourinary:     Labia:         Right: No rash, tenderness or lesion  Left: No rash, tenderness or lesion  Vagina: No signs of injury  No vaginal discharge or tenderness  Cervix: No cervical motion tenderness, discharge, friability, lesion, erythema or cervical bleeding  Uterus: Not enlarged, not tender and no uterine prolapse  Adnexa:         Right: No mass, tenderness or fullness  Left: No mass, tenderness or fullness  Neurological:      Mental Status: She is alert and oriented to person, place, and time  Psychiatric:         Behavior: Behavior normal        I have spent 45 minutes with Patient  today in which greater than 50% of this time was spent in counseling/coordination of care regarding Intructions for management

## 2023-01-20 LAB
HPV HR 12 DNA CVX QL NAA+PROBE: NEGATIVE
HPV16 DNA CVX QL NAA+PROBE: NEGATIVE
HPV18 DNA CVX QL NAA+PROBE: NEGATIVE

## 2023-01-25 LAB
LAB AP GYN PRIMARY INTERPRETATION: NORMAL
LAB AP LMP: NORMAL
Lab: NORMAL

## 2023-01-27 ENCOUNTER — TELEPHONE (OUTPATIENT)
Dept: OBGYN CLINIC | Facility: CLINIC | Age: 43
End: 2023-01-27

## 2023-01-27 DIAGNOSIS — D50.8 IRON DEFICIENCY ANEMIA SECONDARY TO INADEQUATE DIETARY IRON INTAKE: Primary | ICD-10-CM

## 2023-01-27 DIAGNOSIS — D50.0 IRON DEFICIENCY ANEMIA DUE TO CHRONIC BLOOD LOSS: ICD-10-CM

## 2023-01-27 DIAGNOSIS — N92.0 MENORRHAGIA WITH REGULAR CYCLE: ICD-10-CM

## 2023-01-27 NOTE — TELEPHONE ENCOUNTER
----- Message from Gladys Dotson DO sent at 1/26/2023  7:42 PM EST -----  Regarding: FW: Menstrual Period  Contact: [phone]  Please inform p, rec check CBC w diff today  Keep appt as schedule  Also rec she f/u with her hematologist for iron deficient anemia  Only other option to slow menses is either IUD mirena or endometrial ablation  I discussed these options on last appt       ----- Message -----  From: Fernandez Peralta MA  Sent: 1/26/2023  12:07 PM EST  To: Gladys Dotson DO  Subject: FW: Menstrual Period                               ----- Message -----  From: Palomo Stewart  Sent: 1/26/2023  11:49 AM EST  To: [facility]  Subject: Menstrual Gwenpadmini Goodrich    Just to let you know that I got my period last week and it was light only the very first day the second day was really heavy so I waited until the 3rd day to start the medications but the reaction I had was really adverse  I felt burning inside of me, nauseas, vomiting, dizziness, headache, totally uncomfortable  I didn't take it again  The bleeding is so heavy, that it passed to my cloth I had to put 3 pads on the same time and I had been changing more than 4 or 5 times in the day  I honestly don't know why but is heavier than any other time  I still feel dizzy and weak but I think I am ok for now  Just wanted to let you know       Thank you     Reynaldo Duran

## 2023-02-09 ENCOUNTER — APPOINTMENT (EMERGENCY)
Dept: CT IMAGING | Facility: HOSPITAL | Age: 43
End: 2023-02-09

## 2023-02-09 ENCOUNTER — APPOINTMENT (EMERGENCY)
Dept: ULTRASOUND IMAGING | Facility: HOSPITAL | Age: 43
End: 2023-02-09

## 2023-02-09 ENCOUNTER — TELEPHONE (OUTPATIENT)
Dept: OBGYN CLINIC | Facility: CLINIC | Age: 43
End: 2023-02-09

## 2023-02-09 ENCOUNTER — HOSPITAL ENCOUNTER (EMERGENCY)
Facility: HOSPITAL | Age: 43
Discharge: HOME | End: 2023-02-09
Attending: EMERGENCY MEDICINE

## 2023-02-09 VITALS
TEMPERATURE: 98.6 F | DIASTOLIC BLOOD PRESSURE: 73 MMHG | OXYGEN SATURATION: 100 % | RESPIRATION RATE: 19 BRPM | SYSTOLIC BLOOD PRESSURE: 117 MMHG | HEART RATE: 72 BPM

## 2023-02-09 DIAGNOSIS — N83.12 CORPUS LUTEUM CYST OF LEFT OVARY: ICD-10-CM

## 2023-02-09 DIAGNOSIS — R10.2 PELVIC PAIN: Primary | ICD-10-CM

## 2023-02-09 LAB
BILIRUB UR QL STRIP: NEGATIVE
CLARITY UR: NORMAL
COLOR UR: YELLOW
EXT PREGNANCY TEST URINE: NEGATIVE
EXT. CONTROL: NORMAL
GLUCOSE UR STRIP-MCNC: NEGATIVE MG/DL
HGB UR QL STRIP.AUTO: NEGATIVE
KETONES UR STRIP-MCNC: NEGATIVE MG/DL
LEUKOCYTE ESTERASE UR QL STRIP: NEGATIVE
NITRITE UR QL STRIP: NEGATIVE
PH UR STRIP.AUTO: 7 [PH] (ref 4.5–8)
PROT UR STRIP-MCNC: NEGATIVE MG/DL
SP GR UR STRIP.AUTO: 1.02 (ref 1–1.03)
UROBILINOGEN UR QL STRIP.AUTO: 0.2 E.U./DL

## 2023-02-09 RX ORDER — ACETAMINOPHEN 325 MG/1
650 TABLET ORAL ONCE
Status: COMPLETED | OUTPATIENT
Start: 2023-02-09 | End: 2023-02-09

## 2023-02-09 RX ORDER — ONDANSETRON 4 MG/1
4 TABLET, ORALLY DISINTEGRATING ORAL ONCE
Status: COMPLETED | OUTPATIENT
Start: 2023-02-09 | End: 2023-02-09

## 2023-02-09 RX ORDER — OXYCODONE HYDROCHLORIDE AND ACETAMINOPHEN 5; 325 MG/1; MG/1
1 TABLET ORAL EVERY 4 HOURS PRN
Qty: 10 TABLET | Refills: 0 | Status: SHIPPED | OUTPATIENT
Start: 2023-02-09 | End: 2023-02-19

## 2023-02-09 RX ORDER — TRAMADOL HYDROCHLORIDE 50 MG/1
50 TABLET ORAL ONCE
Status: DISCONTINUED | OUTPATIENT
Start: 2023-02-09 | End: 2023-02-09

## 2023-02-09 RX ADMIN — ACETAMINOPHEN 325MG 650 MG: 325 TABLET ORAL at 15:00

## 2023-02-09 RX ADMIN — ONDANSETRON 4 MG: 4 TABLET, ORALLY DISINTEGRATING ORAL at 14:54

## 2023-02-09 RX ADMIN — IOHEXOL 35 ML: 300 INJECTION, SOLUTION INTRAVENOUS at 17:55

## 2023-02-09 NOTE — Clinical Note
Dewaelizabeth Lama was seen and treated in our emergency department on 2/9/2023  No restrictions            Diagnosis:     Elisa Oreilly  may return to work on return date  She may return on this date: 02/13/2023         If you have any questions or concerns, please don't hesitate to call        Elijah Ku, DO    ______________________________           _______________          _______________  Hospital Representative                              Date                                Time

## 2023-02-09 NOTE — TELEPHONE ENCOUNTER
Patient in ER for right sided pelvic/abdominal pain  Had ultrasound which shows left sided cyst, she denies pain on the left side  She states pain radiates into her back and leg and they want to refer her back to gyn  She is questioning if she should follow up with primary doctor for the pain    She can be reached at her number or spouse phone # [phone]

## 2023-02-09 NOTE — ED NOTES
Pt stated "I'm not comfortable leaving at this time - not knowing why I have pain"  Pt states that she has a problem everytime she has this Dr Dr Kel Zhang advised of situation & spoke to pt again, she offered to order CT for pt  Pt stated yes she would get CT  Pt has had pain for many months, missed outpatient US scheduled by GYN & did not reschedule it        Valeria Collier, RN  02/09/23 6893

## 2023-02-09 NOTE — ED NOTES
Returned from [id number] [place] [address], [address] [place] [address]  02/09/23 4946

## 2023-02-09 NOTE — ED NOTES
Pt has been at [address] [facility] , [address] [place] [address]  02/09/23 7130

## 2023-02-09 NOTE — ED PROVIDER NOTES
History  Chief Complaint   Patient presents with   • Abdominal Pain     Pt c/o intermittent  lower abdominal pain for a few weeks with nausea denies vomiting and diarrhea      60-year-old female with history of sleeve gastrectomy in 2009 followed by Karmen-en-Y bariatric surgery in 2018, chronic nausea and vomiting presents to the ED with a 3-week history of constant pelvic pain  She had a recent visit with her OB/GYN for evaluation of this pain and also menorrhagia  She was tested negative for malignancy via Pap smear along with HPV testing which was negative  She was supposed to have a pelvic ultrasound done but felt too ill to go to the appointment  She denies urinary symptoms  No vaginal bleeding or discharge  History provided by:  Patient   used: No    Pelvic Pain  Location:  Right inguinal  Onset quality:  Gradual  Duration:  3 weeks  Timing:  Constant  Progression:  Unchanged  Chronicity:  New  Relieved by:  Nothing  Worsened by:  Nothing  Ineffective treatments:  Tylenol  Associated symptoms: abdominal pain (Chronic) and nausea    Associated symptoms: no chest pain, no congestion, no cough, no diarrhea, no ear pain, no fatigue, no fever, no headaches, no loss of consciousness, no myalgias, no rash, no rhinorrhea, no shortness of breath, no sore throat, no vomiting and no wheezing        Prior to Admission Medications   Prescriptions Last Dose Informant Patient Reported? Taking?    Plecanatide (Trulance) 3 MG TABS   No No   Sig: Take 3 mg by mouth daily   ascorbic acid (VITAMIN C) 500 mg tablet   Yes No   Sig: Take 500 mg by mouth daily   Patient not taking: Reported on 1/19/2023   bisacodyl (DULCOLAX) 5 mg EC tablet   No No   Sig: Take 1 tablet (5 mg total) by mouth once for 1 dose   Patient not taking: Reported on 1/19/2023   buPROPion (WELLBUTRIN XL) 300 mg 24 hr tablet   Yes No   Patient not taking: Reported on 12/28/2022   ferrous sulfate 325 (65 Fe) mg tablet   Yes No   Sig: Take 325 mg by mouth daily with breakfast   Patient not taking: Reported on 2023   furosemide (LASIX) 20 mg tablet   Yes No   Sig: Take 20 mg by mouth daily   lubiprostone (AMITIZA) 24 mcg capsule   No No   Sig: Take 1 capsule (24 mcg total) by mouth daily with breakfast   ondansetron (Zofran ODT) 4 mg disintegrating tablet   No No   Sig: Take 1 tablet (4 mg total) by mouth every 6 (six) hours as needed for nausea or vomiting   pantoprazole (PROTONIX) 40 mg tablet   No No   Sig: Take 1 tablet (40 mg total) by mouth 2 (two) times a day   polyethylene glycol (GOLYTELY) 4000 mL solution   No No   Sig: Take 4,000 mL by mouth once for 1 dose   scopolamine (TRANSDERM-SCOP) 1 mg/3 days TD 72 hr patch   No No   Sig: Place 1 patch on the skin every third day   senna-docusate sodium (SENOKOT S) 8 6-50 mg per tablet   No No   Sig: Take 1 tablet by mouth 2 (two) times a day   Patient not taking: Reported on 2023   topiramate (TOPAMAX) 25 mg tablet   Yes No      Facility-Administered Medications: None       Past Medical History:   Diagnosis Date   • Abdominal pain     "almost constant"   • Anemia    • Anesthesia     "woke up swinging with last  2018  "was fine with EGD"   • Back pain    • Bariatric surgery status     -gastric sleeve revison RUALISSON-Y 2019-abd painnow-dx lap today 3/9/2020   • Constipation    • Dental bridge present     right lower permanent   • Diarrhea    • Difficulty swallowing     "in the past"   • Disease of thyroid gland     not on meds now   • Dizzy    • Fatigue     "when blood pressure low" and weakness too"   • GERD (gastroesophageal reflux disease)     "increase after surgery"   • Heart murmur     heart murmer, work up negative with holter monitor   • History of 2  sections     most recent 17   • History of D&C 2019   • History of iron deficiency     anemia/ had IV infusions through pregnancy in 5812-8062   • History of transfusion     2019 - pt had allergic reaction - had to stop the blood   • Inguinal hernia     right   • Loss of appetite    • Low BP     "off and on"   • Migraine    • N&V (nausea and vomiting)     " a little better since on meds"   • Non-intractable vomiting     "not since been on medicine"   • Palpitations     "having again"   • Postgastrectomy malabsorption    • Stomach pain        Past Surgical History:   Procedure Laterality Date   •  SECTION      x2   • CHOLECYSTECTOMY     • CHROMOSOME ANALYSIS, PRODUCTS OF CONCEPTION (HISTORICAL)      2 miscarriages in  and Nov   • LAPAROSCOPIC SALPINGOOPHERECTOMY Right 2018    LVH, 6 cm benign ovarian cyst   • LAPAROSCOPY N/A 2020    Procedure: DIAGNOSTIC LAPAROSCOPY,CLOSURE OF COATES DEFECT, INTRAOP EGD;  Surgeon: Ernesto Stuart MD;  [facility];  Service: Bariatrics   • CT EGD TRANSORAL BIOPSY SINGLE/MULTIPLE N/A 2019    Procedure: ESOPHAGOGASTRODUODENOSCOPY (EGD) with bx;  Surgeon: Ernesto Stuart MD;  [facility]; Service: Bariatrics   • CT LAPS GSTR RSTCV [address] W/BYP SREE-EN-Y LIMB <150 CM N/A 2019    Procedure: LAP SREE-EN-Y GASTRIC BYPASS, INTRAOP EGD;  Surgeon: Ernesto Stuart MD;  [facility];  Service: Bariatrics   • CT [address] [place] [address] FIRST TRIMESTER SURGICAL N/A 2019    Procedure: DILATATION AND EVACUATION (D&E) (8 weeks) missed ab;  Surgeon: Nathaniel Alejo MD;  [facility];  Service: Gynecology   • REVISION BYPASS LAPAROSCOPIC N/A 2019    Procedure: LAP REVISION/ CONVERSION;  Surgeon: Ernesto Stuart MD;  [facility];  Service: Bariatrics   • SALPINGOOPHORECTOMY Right 2018   • SLEEVE GASTROPLASTY         Family History   Problem Relation Age of Onset   • Hypertension Mother    • Heart disease Mother    • No Known Problems Father      I have reviewed and agree with the history as documented      E-Cigarette/Vaping   • E-Cigarette Use Never User      E-Cigarette/Vaping Substances   • Nicotine No • THC No    • CBD No    • Flavoring No    • Other No    • Unknown No      Social History     Tobacco Use   • Smoking status: Never   • Smokeless tobacco: Never   Vaping Use   • Vaping Use: Never used   Substance Use Topics   • Alcohol use: Not Currently   • Drug use: No       Review of Systems   Constitutional: Negative  Negative for chills, diaphoresis, fatigue, fever and unexpected weight change  HENT: Negative  Negative for congestion, ear pain, rhinorrhea and sore throat  Eyes: Negative  Negative for discharge, redness and itching  Respiratory: Negative  Negative for apnea, cough, chest tightness, shortness of breath and wheezing  Cardiovascular: Negative for chest pain, palpitations and leg swelling  Gastrointestinal: Positive for abdominal pain (Chronic), constipation and nausea  Negative for abdominal distention, blood in stool, diarrhea and vomiting  Endocrine: Negative  Genitourinary: Positive for dyspareunia and pelvic pain  Negative for decreased urine volume, difficulty urinating, dysuria, flank pain, frequency, genital sores, hematuria, urgency, vaginal bleeding, vaginal discharge and vaginal pain  Musculoskeletal: Negative  Negative for back pain and myalgias  Skin: Negative  Negative for rash  Allergic/Immunologic: Negative  Neurological: Negative  Negative for dizziness, loss of consciousness, syncope, weakness, light-headedness, numbness and headaches  Hematological: Negative  All other systems reviewed and are negative  Physical Exam  Physical Exam  Vitals and nursing note reviewed  Constitutional:       General: She is awake  She is not in acute distress  Appearance: Normal appearance  She is well-developed and overweight  She is not ill-appearing, toxic-appearing or diaphoretic  HENT:      Head: Normocephalic and atraumatic        Right Ear: External ear normal       Left Ear: External ear normal       Nose: Nose normal       Mouth/Throat: Mouth: Mucous membranes are moist       Pharynx: No oropharyngeal exudate  Eyes:      General: No scleral icterus  Right eye: No discharge  Left eye: No discharge  Conjunctiva/sclera: Conjunctivae normal    Neck:      Thyroid: No thyromegaly  Vascular: No JVD  Trachea: No tracheal deviation  Cardiovascular:      Rate and Rhythm: Normal rate and regular rhythm  Heart sounds: Normal heart sounds  No murmur heard  No friction rub  No gallop  Pulmonary:      Effort: Pulmonary effort is normal  No respiratory distress  Breath sounds: Normal breath sounds  No stridor  No wheezing, rhonchi or rales  Chest:      Chest wall: No tenderness  Abdominal:      General: Bowel sounds are normal  There is no distension  Palpations: Abdomen is soft  There is no mass  Tenderness: There is abdominal tenderness (Right inguinal) in the suprapubic area  There is guarding  There is no rebound  Hernia: No hernia is present  Skin:     General: Skin is warm and dry  Coloration: Skin is not jaundiced or pale  Findings: No bruising, erythema, lesion or rash  Neurological:      General: No focal deficit present  Mental Status: She is alert and oriented to person, place, and time  Motor: No weakness or abnormal muscle tone  Deep Tendon Reflexes: Reflexes are normal and symmetric  Psychiatric:         Mood and Affect: Mood normal          Behavior: Behavior is cooperative           Vital Signs  ED Triage Vitals [02/09/23 1124]   Temperature Pulse Respirations Blood Pressure SpO2   98 6 °F (37 °C) 90 18 128/58 100 %      Temp Source Heart Rate Source Patient Position - Orthostatic VS BP Location FiO2 (%)   Oral -- Sitting Right arm --      Pain Score       10 - Worst Possible Pain           Vitals:    02/09/23 1124   BP: 128/58   Pulse: 90   Patient Position - Orthostatic VS: Sitting         Visual Acuity      ED Medications  Medications - No data to display    Diagnostic Studies  Results Reviewed     Procedure Component Value Units Date/Time    POCT pregnancy, urine [[id number]]     Lab Status: No result                  US pelvis complete w transvaginal    (Results Pending)              Procedures  Procedures         ED Course  ED Course as of 02/09/23 Thi Lawrence Feb 09, 2023   0680 Patient states she does not feel comfortable going home secondary to her pain  I told the patient I did not feel that there was any acute abdominal process given the duration of her symptoms, however the only other test we could do in the ED would be a CT scan  I did explain to her that she had a CT scan in October that was normal and it would subject her to more radiation, however she and her  would both like to proceed with the CT scan  Medical Decision Making  52-year-old female presents to the ED with ongoing right inguinal pain  She states she has had this for 3 weeks constantly described as sharp  She has had some dyspareunia  No vaginal bleeding, discharge or urinary complaints  She saw her OB/GYN and was supposed to have a pelvic ultrasound done but did not feel well enough to go get it  She was taking Tylenol without relief  She has had some chronic abdominal pain as well and she is following up with GI and her family physician regarding this  On exam she appears well in no distress  She does have right inguinal tenderness with voluntary guarding  Differential includes but is not limited to ovarian/uterine pathology, UTI, STD, less likely appendicitis or torsion given the duration of her symptoms  Will check urine to rule out UTI/rule out pregnancy  Will obtain pelvic ultrasound    Amount and/or Complexity of Data Reviewed  Labs: ordered  Radiology: ordered            Disposition  Final diagnoses:   None     ED Disposition     None      Follow-up Information    None         Patient's Medications   Discharge Prescriptions    No medications on file       No discharge procedures on file      PDMP Review     None          ED Provider  Electronically Signed by           Hannah Zambrano, DO  02/09/23 1800 [address], DO  02/11/23 1324

## 2023-02-09 NOTE — Clinical Note
Rigopaul Ortega was seen and treated in our emergency department on 2/9/2023  No restrictions            Diagnosis:     Sivan Ram  may return to work on return date  She may return on this date: 02/13/2023         If you have any questions or concerns, please don't hesitate to call        Fortino Mello DO    ______________________________           _______________          _______________  Hospital Representative                              Date                                Time

## 2023-02-09 NOTE — ED NOTES
Pt was upset, stating she got her urine result & that nothing is being done & no US is ordered because she can't see it on My Chart (explained how results work & not tests that are ordered), also stating that she has had a problem with this Dr in the past, she thinks that she has something against her race, she ordered a drug screen on her in the past   Explained that tests are ordered for various reasons to r/o different causes  Pt calmed down with explanation  Advised that US should be done shortly       Myrtle Schlatter, RN  02/09/23 1791

## 2023-02-10 LAB
C TRACH DNA SPEC QL NAA+PROBE: NEGATIVE
N GONORRHOEA DNA SPEC QL NAA+PROBE: NEGATIVE

## 2023-02-10 NOTE — H&P
History and Physical -  [facility]  Ryder Beverly 37 y o  female MRN: [id number]                  HPI: Ryder Beverly is a 37y o  year old female who presents for abnormal celiac serology      REVIEW OF SYSTEMS: Per the HPI, and otherwise unremarkable      Historical Information   Past Medical History:   Diagnosis Date    Abdominal pain     "almost constant"    Anemia     Anesthesia     "woke up swinging with last  2018  "was fine with EGD"    Back pain     Bariatric surgery status     2008-gastric sleeve revison RUEN-Y 2019-abd painnow-dx lap today 3/9/2020    Constipation     Dental bridge present     right lower permanent    Diarrhea     Difficulty swallowing     "in the past"    Disease of thyroid gland     not on meds now    Dizzy     Fatigue     "when blood pressure low" and weakness too"    GERD (gastroesophageal reflux disease)     "increase after surgery"    Heart murmur     heart murmer, work up negative with holter monitor    History of 2  sections     most recent 17    History of D&C 2019    History of iron deficiency     anemia/ had IV infusions through pregnancy in 5134-3318    History of transfusion     2019 - pt had allergic reaction - had to stop the blood    Inguinal hernia     right    Loss of appetite     Low BP     "off and on"    Migraine     N&V (nausea and vomiting)     " a little better since on meds"    Non-intractable vomiting     "not since been on medicine"    Palpitations     "having again"    Postgastrectomy malabsorption     Stomach pain      Past Surgical History:   Procedure Laterality Date     SECTION      x2    CHOLECYSTECTOMY      CHROMOSOME ANALYSIS, PRODUCTS OF CONCEPTION (HISTORICAL)      2 miscarriages in  and Nov    LAPAROSCOPY N/A 3/9/2020    Procedure: DIAGNOSTIC LAPAROSCOPY,CLOSURE OF COATES DEFECT, INTRAOP EGD;  Surgeon: Ynes Lama MD;  Location[facility];  Service: Bariatrics    OVARIAN CYST REMOVAL Right 2018    LA EGD TRANSORAL BIOPSY SINGLE/MULTIPLE N/A 1/30/2019    Procedure: ESOPHAGOGASTRODUODENOSCOPY (EGD) with bx;  Surgeon: Teena Fletcher MD;  [facility];   Service: Bariatrics    LA LAP GASTRIC BYPASS/SREE-EN-Y N/A 4/8/2019    Procedure: LAP SREE-EN-Y GASTRIC BYPASS, INTRAOP EGD;  Surgeon: Teena Fletcher MD;  [facility];  Service: Stella ISAAC,1ST TRI N/A 9/25/2019    Procedure: DILATATION AND EVACUATION (D&E) (8 weeks) missed ab;  Surgeon: Nash Gutierrez MD;  [facility];  Service: Gynecology    REVISION BYPASS LAPAROSCOPIC N/A 4/8/2019    Procedure: LAP REVISION/ CONVERSION;  Surgeon: Teena Fletcher MD;  [facility];  Service: Bariatrics    SALPINGOOPHORECTOMY Right 09/2018    SLEEVE GASTROPLASTY       Social History   Social History     Substance and Sexual Activity   Alcohol Use Not Currently     Social History     Substance and Sexual Activity   Drug Use No     Social History     Tobacco Use   Smoking Status Never Smoker   Smokeless Tobacco Never Used     Family History   Problem Relation Age of Onset    Hypertension Mother     Heart disease Mother     No Known Problems Father        Meds/Allergies       Current Outpatient Medications:     acetaminophen (TYLENOL) 325 mg tablet    ascorbic acid (VITAMIN C) 500 mg tablet    omeprazole (PriLOSEC) 40 MG capsule    sucralfate (CARAFATE) 1 g/10 mL suspension    famotidine (PEPCID) 20 mg tablet    ferrous sulfate 325 (65 Fe) mg tablet    Linzess 145 MCG CAPS    meclizine (ANTIVERT) 25 mg tablet    ondansetron (ZOFRAN-ODT) 4 mg disintegrating tablet    Prenatal Vit-Fe Fumarate-FA (PRENATAL VITAMIN PO)    Allergies   Allergen Reactions    Aspirin Anaphylaxis    Shellfish-Derived Products - Food Allergy Anaphylaxis    Contrast [Iodinated Diagnostic Agents] Itching and Swelling    Ibuprofen Edema    Reglan [Metoclopramide] Itching and Confusion       Objective     /73 (BP Location: Left arm)   Temp 97 6 °F (36 4 °C) (Temporal)   Resp 20   Ht 5' 5" (1 651 m)   Wt 99 8 kg (220 lb)   LMP 05/15/2022 (Exact Date)   SpO2 99%   BMI 36 61 kg/m²       PHYSICAL EXAM    Gen: NAD  Head: NCAT  CV: RRR  CHEST: Clear  ABD: soft, NT/ND  EXT: no edema      ASSESSMENT/PLAN:  This is a 37y o  year old female here for EGD, and she is stable and optimized for her procedure  39w

## 2023-02-13 ENCOUNTER — TELEPHONE (OUTPATIENT)
Dept: GASTROENTEROLOGY | Facility: CLINIC | Age: 43
End: 2023-02-13

## 2023-02-13 ENCOUNTER — TELEPHONE (OUTPATIENT)
Dept: OBGYN CLINIC | Facility: CLINIC | Age: 43
End: 2023-02-13

## 2023-02-13 NOTE — TELEPHONE ENCOUNTER
----- Message from Navarro So sent at 2/13/2023  8:03 AM EST -----  Regarding: Menstrual Period  Contact: [phone]  Barry Floyd,    Everything still normal my period was still heavy after I tried the medication  I couldnt do the ultrasound before cause me and my kids were very sick  I would like to schedule another appointment to talk about other methods or solutions for this problem  I received a call from one of your office nurses but I couldn't answer I called back but she was probably with another patient  If she calls again today can she leave a message for me?        Thank you,    Trevin Mendieta

## 2023-02-13 NOTE — TELEPHONE ENCOUNTER
Pt informed to have CBC as prev recom & also f/u with hematology  Pt scheduled appt to discuss tx options for menorrhagia

## 2023-02-13 NOTE — TELEPHONE ENCOUNTER
----- Message from Barbara Welsh, [place] sent at 2/13/2023 12:15 PM EST -----  Regarding: FW: Nauseas and vomiting for 4 days   Contact: [phone]  Please triage     ----- Message -----  From: Miguel Moon RN  Sent: 2/13/2023   9:25 AM EST  To: Barbara Welsh PA-C  Subject: FW: Nauseas and vomiting for 4 days                ----- Message -----  From: Isaías Topete  Sent: 2/13/2023   8:05 AM EST  To: , #  Subject: Nauseas and vomiting for 4 days                  I have been vomiting since a Thursday unable to keep anything down again, my stomach hurts a lot my lower abdomen as well  Any suggestions?     Thank you Dr Megan Urena

## 2023-02-14 ENCOUNTER — TELEPHONE (OUTPATIENT)
Dept: GASTROENTEROLOGY | Facility: MEDICAL CENTER | Age: 43
End: 2023-02-14

## 2023-02-14 NOTE — TELEPHONE ENCOUNTER
Tried to call patient and left VM to call back  Sending my-chart message per patient's preference from when we previously spoke on the phone

## 2023-02-14 NOTE — TELEPHONE ENCOUNTER
If the pt is vomiting blood and feeling weak, I recommend ED evaluation for potential admission if necessary (when she was in the ED on 2-9, it looks like she complained of pain but denied n/v at the ED so she should be evaluated for this urgently now)  Thanks!

## 2023-02-14 NOTE — TELEPHONE ENCOUNTER
----- Message from Chikis So sent at 2/14/2023  9:52 AM EST -----  Regarding: Nauseas and vomiting for 4 days   Contact: [phone]  Ryan Link    This is how looks the blood that i am vomiting

## 2023-02-14 NOTE — TELEPHONE ENCOUNTER
Spoke with patient  Patient states she had a fever with chills last week on Wednesday 2/8  Patient states she then began vomiting with severe abdominal pain 2/9 and went to the ER  Patient states the ER prescribed her Percocet, but she cannot take it  Patient states she has been vomiting everyday since 2/9  Patient states she is able to tolerate clear liquids such as water and juice  However, patient cannot tolerate food or coffee  Patient states her nausea is increased in the morning and she vomits after all food intake  Patient states the vomit is white foamy, and contains blood clots  Patient states the blood clots are a brown-red and the size of a nickel  Asked patient to send a picture through my-chart  Patient states she has decreased appetite and is eating one time a day  Patient states she is taking Zofran PRN and using a scopolamine patch with partial relief  Patient states she is experiencing epigastric burning with increased acid in her throat  Patient stated she is taking pantoprazole BID as prescribed  Patient complains of RLQ abdominal pain  Patient rates the pain a 8/10 on a numerical pain scale  Patient describes the pain as stabbing with severe pressure  Patient states she has been taking tylenol with some improvement in pain  Patient states movement, eating, and coughing worsens the pain  Patient denies SOB, light headedness, and dizziness  Patient states she is feeling weakness       Recommendations: ER evaluation, clear liquid diet of water/Gatorade/Pedialyte and slowly introducing bland dry foods, consume smaller meals

## 2023-02-16 ENCOUNTER — TELEPHONE (OUTPATIENT)
Dept: GASTROENTEROLOGY | Facility: AMBULARY SURGERY CENTER | Age: 43
End: 2023-02-16

## 2023-02-16 NOTE — TELEPHONE ENCOUNTER
Patients GI provider:  KRISTIN Stauffer    Number to return call: (  [phone]    Reason for call: Pt returning missed call from the nurse. You can send her a message in [employer] or leave a voicemail    Scheduled procedure/appointment date if applicable: Aprocedure 3/2/23

## 2023-02-16 NOTE — TELEPHONE ENCOUNTER
Ryan Stafford,     This is Leonie the nurse you spoke with on the phone. One of our PA's reviewed everything we discussed and recommends that you go to the emergency room as soon as possible for evaluation. Please let me know if you have any questions or concerns.     Thank you,  ETTA Hadley

## 2023-02-16 NOTE — TELEPHONE ENCOUNTER
I called and spoke with pt , she received messages about going to ER, pt expressed she was in [place] ER last Thursday and had a very bad experience, and ER sent her home  The DR did not explain anything and told her to go to OBGYN    Pt reached out to OB and was advised her symptoms are not OB related     I advised patient to try to come to our ER here in [place]       Patient agreeable and will call us back for anything further

## 2023-02-18 ENCOUNTER — HOSPITAL ENCOUNTER (EMERGENCY)
Facility: HOSPITAL | Age: 43
Discharge: HOME | End: 2023-02-18
Attending: EMERGENCY MEDICINE

## 2023-02-18 VITALS
SYSTOLIC BLOOD PRESSURE: 124 MMHG | RESPIRATION RATE: 18 BRPM | HEART RATE: 74 BPM | DIASTOLIC BLOOD PRESSURE: 76 MMHG | TEMPERATURE: 97.6 F | OXYGEN SATURATION: 100 %

## 2023-02-18 DIAGNOSIS — Z98.84 BARIATRIC SURGERY STATUS: ICD-10-CM

## 2023-02-18 DIAGNOSIS — R11.2 NAUSEA AND VOMITING: ICD-10-CM

## 2023-02-18 DIAGNOSIS — R10.9 ABDOMINAL PAIN: Primary | ICD-10-CM

## 2023-02-18 DIAGNOSIS — K31.84 GASTROPARESIS: ICD-10-CM

## 2023-02-18 LAB
ALBUMIN SERPL BCP-MCNC: 3.2 G/DL (ref 3.5–5)
ALP SERPL-CCNC: 92 U/L (ref 46–116)
ALT SERPL W P-5'-P-CCNC: 15 U/L (ref 12–78)
ANION GAP SERPL CALCULATED.3IONS-SCNC: 5 MMOL/L (ref 4–13)
AST SERPL W P-5'-P-CCNC: 18 U/L (ref 5–45)
BASOPHILS # BLD AUTO: 0.01 THOUSANDS/ÂΜL (ref 0–0.1)
BASOPHILS NFR BLD AUTO: 0 % (ref 0–1)
BILIRUB SERPL-MCNC: 0.11 MG/DL (ref 0.2–1)
BUN SERPL-MCNC: 16 MG/DL (ref 5–25)
CALCIUM ALBUM COR SERPL-MCNC: 9.1 MG/DL (ref 8.3–10.1)
CALCIUM SERPL-MCNC: 8.5 MG/DL (ref 8.3–10.1)
CHLORIDE SERPL-SCNC: 110 MMOL/L (ref 96–108)
CO2 SERPL-SCNC: 25 MMOL/L (ref 21–32)
CREAT SERPL-MCNC: 0.79 MG/DL (ref 0.6–1.3)
EOSINOPHIL # BLD AUTO: 0.08 THOUSAND/ÂΜL (ref 0–0.61)
EOSINOPHIL NFR BLD AUTO: 1 % (ref 0–6)
ERYTHROCYTE [DISTWIDTH] IN BLOOD BY AUTOMATED COUNT: 16.1 % (ref 11.6–15.1)
GFR SERPL CREATININE-BSD FRML MDRD: 93 ML/MIN/1.73SQ M
GLUCOSE SERPL-MCNC: 82 MG/DL (ref 65–140)
HCT VFR BLD AUTO: 30.7 % (ref 34.8–46.1)
HGB BLD-MCNC: 9.8 G/DL (ref 11.5–15.4)
IMM GRANULOCYTES # BLD AUTO: 0.02 THOUSAND/UL (ref 0–0.2)
IMM GRANULOCYTES NFR BLD AUTO: 0 % (ref 0–2)
LIPASE SERPL-CCNC: 141 U/L (ref 73–393)
LYMPHOCYTES # BLD AUTO: 2.57 THOUSANDS/ÂΜL (ref 0.6–4.47)
LYMPHOCYTES NFR BLD AUTO: 36 % (ref 14–44)
MCH RBC QN AUTO: 25.9 PG (ref 26.8–34.3)
MCHC RBC AUTO-ENTMCNC: 31.9 G/DL (ref 31.4–37.4)
MCV RBC AUTO: 81 FL (ref 82–98)
MONOCYTES # BLD AUTO: 0.64 THOUSAND/ÂΜL (ref 0.17–1.22)
MONOCYTES NFR BLD AUTO: 9 % (ref 4–12)
NEUTROPHILS # BLD AUTO: 3.82 THOUSANDS/ÂΜL (ref 1.85–7.62)
NEUTS SEG NFR BLD AUTO: 54 % (ref 43–75)
NRBC BLD AUTO-RTO: 0 /100 WBCS
PLATELET # BLD AUTO: 321 THOUSANDS/UL (ref 149–390)
PMV BLD AUTO: 9.9 FL (ref 8.9–12.7)
POTASSIUM SERPL-SCNC: 4.1 MMOL/L (ref 3.5–5.3)
PROT SERPL-MCNC: 6.7 G/DL (ref 6.4–8.4)
RBC # BLD AUTO: 3.79 MILLION/UL (ref 3.81–5.12)
SODIUM SERPL-SCNC: 140 MMOL/L (ref 135–147)
WBC # BLD AUTO: 7.14 THOUSAND/UL (ref 4.31–10.16)

## 2023-02-18 RX ORDER — DROPERIDOL 2.5 MG/ML
0.62 INJECTION, SOLUTION INTRAMUSCULAR; INTRAVENOUS ONCE
Status: COMPLETED | OUTPATIENT
Start: 2023-02-18 | End: 2023-02-18

## 2023-02-18 RX ORDER — PANTOPRAZOLE SODIUM 40 MG/10ML
40 INJECTION, POWDER, LYOPHILIZED, FOR SOLUTION INTRAVENOUS ONCE
Status: COMPLETED | OUTPATIENT
Start: 2023-02-18 | End: 2023-02-18

## 2023-02-18 RX ORDER — FAMOTIDINE 20 MG/1
20 TABLET, FILM COATED ORAL 2 TIMES DAILY
Qty: 30 TABLET | Refills: 0 | Status: SHIPPED | OUTPATIENT
Start: 2023-02-18

## 2023-02-18 RX ORDER — SUCRALFATE ORAL 1 G/10ML
1 SUSPENSION ORAL 4 TIMES DAILY
Qty: 414 ML | Refills: 0 | Status: SHIPPED | OUTPATIENT
Start: 2023-02-18

## 2023-02-18 RX ADMIN — SODIUM CHLORIDE 1000 ML: 0.9 INJECTION, SOLUTION INTRAVENOUS at 19:50

## 2023-02-18 RX ADMIN — DROPERIDOL 0.62 MG: 2.5 INJECTION, SOLUTION INTRAMUSCULAR; INTRAVENOUS at 19:53

## 2023-02-18 RX ADMIN — PANTOPRAZOLE SODIUM 40 MG: 40 INJECTION, POWDER, FOR SOLUTION INTRAVENOUS at 19:53

## 2023-02-18 NOTE — ED PROVIDER NOTES
History  Chief Complaint   Patient presents with   • Vomiting     Pt states that she has been vomiting for a week     40 y o F, with hx of bariatric surgery, gastroparesis presents for nausea, vomiting and epigastric discomfort since a week  States that she chronically has similar symptoms but has worsening symptoms since a week and is unable to keep anything down  Last BM on Thursday, passing gas  Spoke to GI doctor outpatient, who recommended to come to the ED  Prior to Admission Medications   Prescriptions Last Dose Informant Patient Reported? Taking?    Plecanatide (Trulance) 3 MG TABS   No No   Sig: Take 3 mg by mouth daily   ascorbic acid (VITAMIN C) 500 mg tablet   Yes No   Sig: Take 500 mg by mouth daily   Patient not taking: Reported on 1/19/2023   bisacodyl (DULCOLAX) 5 mg EC tablet   No No   Sig: Take 1 tablet (5 mg total) by mouth once for 1 dose   Patient not taking: Reported on 1/19/2023   buPROPion (WELLBUTRIN XL) 300 mg 24 hr tablet   Yes No   Patient not taking: Reported on 12/28/2022   ferrous sulfate 325 (65 Fe) mg tablet   Yes No   Sig: Take 325 mg by mouth daily with breakfast   Patient not taking: Reported on 1/19/2023   furosemide (LASIX) 20 mg tablet   Yes No   Sig: Take 20 mg by mouth daily as needed   lubiprostone (AMITIZA) 24 mcg capsule   No No   Sig: Take 1 capsule (24 mcg total) by mouth daily with breakfast   ondansetron (Zofran ODT) 4 mg disintegrating tablet   No No   Sig: Take 1 tablet (4 mg total) by mouth every 6 (six) hours as needed for nausea or vomiting   oxyCODONE-acetaminophen (Percocet) 5-325 mg per tablet   No No   Sig: Take 1 tablet by mouth every 4 (four) hours as needed for moderate pain for up to 10 days Max Daily Amount: 6 tablets   pantoprazole (PROTONIX) 40 mg tablet   No No   Sig: Take 1 tablet (40 mg total) by mouth 2 (two) times a day   scopolamine (TRANSDERM-SCOP) 1 mg/3 days TD 72 hr patch   No No   Sig: Place 1 patch on the skin every third day senna-docusate sodium (SENOKOT S) 8 6-50 mg per tablet   No No   Sig: Take 1 tablet by mouth 2 (two) times a day   Patient not taking: Reported on 2023   topiramate (TOPAMAX) 25 mg tablet   Yes No      Facility-Administered Medications: None       Past Medical History:   Diagnosis Date   • Abdominal pain     "almost constant"   • Anemia    • Anesthesia     "woke up swinging with last  2018  "was fine with EGD"   • Back pain    • Bariatric surgery status     -gastric sleeve revison RUEN-Y 2019-abd painnow-dx lap today 3/9/2020   • Constipation    • Dental bridge present     right lower permanent   • Diarrhea    • Difficulty swallowing     "in the past"   • Disease of thyroid gland     not on meds now   • Dizzy    • Fatigue     "when blood pressure low" and weakness too"   • GERD (gastroesophageal reflux disease)     "increase after surgery"   • Heart murmur     heart murmer, work up negative with holter monitor   • History of 2  sections     most recent 17   • History of D&C 2019   • History of iron deficiency     anemia/ had IV infusions through pregnancy in [id number]   • History of transfusion     2019 - pt had allergic reaction - had to stop the blood   • Inguinal hernia     right   • Loss of appetite    • Low BP     "off and on"   • Migraine    • N&V (nausea and vomiting)     " a little better since on meds"   • Non-intractable vomiting     "not since been on medicine"   • Palpitations     "having again"   • Postgastrectomy malabsorption    • Stomach pain        Past Surgical History:   Procedure Laterality Date   •  SECTION      x2   • CHOLECYSTECTOMY     • CHROMOSOME ANALYSIS, PRODUCTS OF CONCEPTION (HISTORICAL)      2 miscarriages in  and Nov   • LAPAROSCOPIC SALPINGOOPHERECTOMY Right 2018    LVH, 6 cm benign ovarian cyst   • LAPAROSCOPY N/A 2020    Procedure: DIAGNOSTIC LAPAROSCOPY,CLOSURE OF COATES DEFECT, INTRAOP EGD;  Surgeon: Geovany Recinos Ubaldo Davison MD;  Location: [facility];  Service: Bariatrics   • CO EGD TRANSORAL BIOPSY SINGLE/MULTIPLE N/A 01/30/2019    Procedure: ESOPHAGOGASTRODUODENOSCOPY (EGD) with bx;  Surgeon: Robert Kumar MD;  Location: [facility]; Service: Bariatrics   • CO LAPS GSTR RSTCV [address] W/BYP SREE-EN-Y LIMB <150 CM N/A 04/08/2019    Procedure: LAP SREE-EN-Y GASTRIC BYPASS, INTRAOP EGD;  Surgeon: Robert Kumar MD;  Location: [facility];  Service: Bariatrics   • CO [address] FIRST TRIMESTER SURGICAL N/A 09/25/2019    Procedure: DILATATION AND EVACUATION (D&E) (8 weeks) missed ab;  Surgeon: Adeola Bañuelos MD;  [facility]: [facility];  Service: Gynecology   • REVISION BYPASS LAPAROSCOPIC N/A 04/08/2019    Procedure: LAP REVISION/ CONVERSION;  Surgeon: Robert Kumar MD;  Location[facility];  Service: Bariatrics   • SALPINGOOPHORECTOMY Right 09/2018   • SLEEVE GASTROPLASTY         Family History   Problem Relation Age of Onset   • Hypertension Mother    • Heart disease Mother    • No Known Problems Father      I have reviewed and agree with the history as documented  E-Cigarette/Vaping   • E-Cigarette Use Never User      E-Cigarette/Vaping Substances   • Nicotine No    • THC No    • CBD No    • Flavoring No    • Other No    • Unknown No      Social History     Tobacco Use   • Smoking status: Never   • Smokeless tobacco: Never   Vaping Use   • Vaping Use: Never used   Substance Use Topics   • Alcohol use: Not Currently   • Drug use: No        Review of Systems   Constitutional: Negative for chills and fever  HENT: Negative for ear pain and sore throat  Eyes: Negative for pain and visual disturbance  Respiratory: Negative for cough and shortness of breath  Cardiovascular: Negative for chest pain and palpitations  Gastrointestinal: Positive for abdominal pain  Negative for vomiting  Genitourinary: Negative for dysuria and hematuria  Musculoskeletal: Negative for arthralgias and back pain     Skin: Negative for color change and rash  Neurological: Negative for seizures and syncope  All other systems reviewed and are negative  Physical Exam  ED Triage Vitals [02/18/23 1742]   Temperature Pulse Respirations Blood Pressure SpO2   97 6 °F (36 4 °C) 74 18 124/76 100 %      Temp Source Heart Rate Source Patient Position - Orthostatic VS BP Location FiO2 (%)   Oral Monitor Sitting Left arm --      Pain Score       No Pain             Orthostatic Vital Signs  Vitals:    02/18/23 1742   BP: 124/76   Pulse: 74   Patient Position - Orthostatic VS: Sitting       Physical Exam  Vitals and nursing note reviewed  Constitutional:       General: She is not in acute distress  Appearance: She is well-developed  She is not ill-appearing, toxic-appearing or diaphoretic  HENT:      Head: Normocephalic and atraumatic  Eyes:      Conjunctiva/sclera: Conjunctivae normal    Cardiovascular:      Rate and Rhythm: Normal rate and regular rhythm  Heart sounds: Normal heart sounds  No murmur heard  Pulmonary:      Effort: Pulmonary effort is normal  No respiratory distress  Breath sounds: Normal breath sounds  No wheezing, rhonchi or rales  Abdominal:      General: There is no distension  Palpations: Abdomen is soft  Tenderness: There is abdominal tenderness  There is no guarding or rebound  Musculoskeletal:         General: No swelling or tenderness  Cervical back: Neck supple  Skin:     General: Skin is warm and dry  Capillary Refill: Capillary refill takes less than 2 seconds  Coloration: Skin is not jaundiced or pale  Neurological:      General: No focal deficit present  Mental Status: She is alert and oriented to person, place, and time     Psychiatric:         Mood and Affect: Mood normal          ED Medications  Medications   droperidol (INAPSINE) injection 0 625 mg (0 625 mg Intravenous Given 2/18/23 1953)   sodium chloride 0 9 % bolus 1,000 mL (0 mL Intravenous Stopped 2/18/23 2046)   pantoprazole (PROTONIX) injection 40 mg (40 mg Intravenous Given 2/18/23 1953)       Diagnostic Studies  Results Reviewed     Procedure Component Value Units Date/Time    Comprehensive metabolic panel [[id number]]  (Abnormal) Collected: 02/18/23 1949    Lab Status: Final result Specimen: Blood from Arm, Right Updated: 02/18/23 2042     Sodium 140 mmol/L      Potassium 4 1 mmol/L      Chloride 110 mmol/L      CO2 25 mmol/L      ANION GAP 5 mmol/L      BUN 16 mg/dL      Creatinine 0 79 mg/dL      Glucose 82 mg/dL      Calcium 8 5 mg/dL      Corrected Calcium 9 1 mg/dL      AST 18 U/L      ALT 15 U/L      Alkaline Phosphatase 92 U/L      Total Protein 6 7 g/dL      Albumin 3 2 g/dL      Total Bilirubin 0 11 mg/dL      eGFR 93 ml/min/1 73sq m     Narrative:      [place] guidelines for Chronic Kidney Disease (CKD):   •  Stage 1 with normal or high GFR (GFR > 90 mL/min/1 73 square meters)  •  Stage 2 Mild CKD (GFR = 60-89 mL/min/1 73 square meters)  •  Stage 3A Moderate CKD (GFR = 45-59 mL/min/1 73 square meters)  •  Stage 3B Moderate CKD (GFR = 30-44 mL/min/1 73 square meters)  •  Stage 4 Severe CKD (GFR = 15-29 mL/min/1 73 square meters)  •  Stage 5 End Stage CKD (GFR <15 mL/min/1 73 square meters)  Note: GFR calculation is accurate only with a steady state creatinine    Lipase [[id number]]  (Normal) Collected: 02/18/23 1949    Lab Status: Final result Specimen: Blood from Arm, Right Updated: 02/18/23 2029     Lipase 141 u/L     CBC and differential [[id number]]  (Abnormal) Collected: 02/18/23 1949    Lab Status: Final result Specimen: Blood from Arm, Right Updated: 02/18/23 2001     WBC 7 14 Thousand/uL      RBC 3 79 Million/uL      Hemoglobin 9 8 g/dL      Hematocrit 30 7 %      MCV 81 fL      MCH 25 9 pg      MCHC 31 9 g/dL      RDW 16 1 %      MPV 9 9 fL      Platelets 094 Thousands/uL      nRBC 0 /100 WBCs      Neutrophils Relative 54 %      Immat GRANS % 0 %      Lymphocytes Relative 36 %      Monocytes Relative 9 %      Eosinophils Relative 1 %      Basophils Relative 0 %      Neutrophils Absolute 3 82 Thousands/µL      Immature Grans Absolute 0 02 Thousand/uL      Lymphocytes Absolute 2 57 Thousands/µL      Monocytes Absolute 0 64 Thousand/µL      Eosinophils Absolute 0 08 Thousand/µL      Basophils Absolute 0 01 Thousands/µL                  No orders to display         Procedures  Procedures      ED Course  ED Course as of 02/21/23 1445   Sat Feb 18, 2023 2027 Pt  Declined further evaluation and wants to be discharged  Understands risks including worsening symptoms, disability and death  SBIRT 22yo+    Flowsheet Row Most Recent Value   SBIRT (25 yo +)    In order to provide better care to our patients, we are screening all of our patients for alcohol and drug use  Would it be okay to ask you these screening questions? No Filed at: 02/18/2023 1850                Medical Decision Making  40 y o F, with hx of bariatric surgery, gastroparesis presents for nausea, vomiting and epigastric discomfort since a week    Vitals stable  Physical exam remarkable for mild epigastric tenderness  Pt was treated with droperidol, ivf, protonix and evaluated with abdominal plans  Pt requested to be discharged immediately  Recommended to stay for further evaluation with possible CT , pt declined and verbalized understanding of risks  Discharged with pepcid and outpatient followup with GI  Discussed strict return precautions  Abdominal pain: acute illness or injury  Amount and/or Complexity of Data Reviewed  Labs: ordered  Risk  Prescription drug management              Disposition  Final diagnoses:   Abdominal pain   Nausea and vomiting   Bariatric surgery status   Gastroparesis     Time reflects when diagnosis was documented in both MDM as applicable and the Disposition within this note     Time User Action Codes Description Comment    2/18/2023  8:25 PM Jone Evy Add [R10 9] Abdominal pain     2/21/2023  2:45 PM Evy Becerril Add [R11 2] Nausea and vomiting     2/21/2023  2:45 PM Kelly Skcarmelo Add [Z98 84] Bariatric surgery status     2/21/2023  2:45 PM Kelly Skcarmelo Add [J62 95] Gastroparesis       ED Disposition     ED Disposition   Discharge    Condition   Stable    Date/Time   Sat Feb 18, 2023  8:25 PM    Comment   Luciano So discharge to home/self care                 Follow-up Information     Follow up With Specialties Details Why Contact Info Additional Information    Gurwinder Butler PA-C Physician Assistant In 1 week  [address] [facility] [id number]  [address] Emergency Department Emergency Medicine  If symptoms worsen [address] [id number]  [address] [facility] Emergency Department, [address], [address]          Discharge Medication List as of 2/18/2023  8:27 PM      START taking these medications    Details   famotidine (PEPCID) 20 mg tablet Take 1 tablet (20 mg total) by mouth 2 (two) times a day, Starting Sat 2/18/2023, Normal         CONTINUE these medications which have NOT CHANGED    Details   ascorbic acid (VITAMIN C) 500 mg tablet Take 500 mg by mouth daily, Historical Med      bisacodyl (DULCOLAX) 5 mg EC tablet Take 1 tablet (5 mg total) by mouth once for 1 dose, Starting Wed 12/28/2022, Normal      buPROPion (WELLBUTRIN XL) 300 mg 24 hr tablet Starting Wed 12/21/2022, Historical Med      ferrous sulfate 325 (65 Fe) mg tablet Take 325 mg by mouth daily with breakfast, Historical Med      furosemide (LASIX) 20 mg tablet Take 20 mg by mouth daily as needed, Starting u 10/13/2022, Historical Med      lubiprostone (AMITIZA) 24 mcg capsule Take 1 capsule (24 mcg total) by mouth daily with breakfast, Starting Wed 12/28/2022, Normal      ondansetron (Zofran ODT) 4 mg disintegrating tablet Take 1 tablet (4 mg total) by mouth every 6 (six) hours as needed for nausea or vomiting, Starting Wed 1/4/2023, Normal      oxyCODONE-acetaminophen (Percocet) 5-325 mg per tablet Take 1 tablet by mouth every 4 (four) hours as needed for moderate pain for up to 10 days Max Daily Amount: 6 tablets, Starting Thu 2/9/2023, Until Sun 2/19/2023 at 2359, Normal      pantoprazole (PROTONIX) 40 mg tablet Take 1 tablet (40 mg total) by mouth 2 (two) times a day, Starting Wed 12/28/2022, Normal      Plecanatide (Trulance) 3 MG TABS Take 3 mg by mouth daily, Starting Wed 1/11/2023, Normal      scopolamine (TRANSDERM-SCOP) 1 mg/3 days TD 72 hr patch Place 1 patch on the skin every third day, Starting Wed 12/28/2022, Normal      senna-docusate sodium (SENOKOT S) 8 6-50 mg per tablet Take 1 tablet by mouth 2 (two) times a day, Starting Fri 11/4/2022, Until Wed 12/28/2022, Normal      topiramate (TOPAMAX) 25 mg tablet Starting Wed 12/21/2022, Historical Med           No discharge procedures on file  PDMP Review       Value Time User    PDMP Reviewed  Yes 2/9/2023  3:25 PM Lotus Medrano DO           ED Provider  Attending physically available and evaluated Dalton Dale I managed the patient along with the ED Attending      Electronically Signed by         Toña Leonard DO  02/21/23 6257

## 2023-02-19 LAB
ATRIAL RATE: 60 BPM
P AXIS: 51 DEGREES
PR INTERVAL: 168 MS
QRS AXIS: 60 DEGREES
QRSD INTERVAL: 86 MS
QT INTERVAL: 412 MS
QTC INTERVAL: 412 MS
T WAVE AXIS: 39 DEGREES
VENTRICULAR RATE: 60 BPM

## 2023-02-19 NOTE — DISCHARGE INSTRUCTIONS
We recommend staying in the ED for further work up and management  Please come back to the ED if your symptoms worsen   Continue taking Protonix + Pepcid and follow up with GI outpatient

## 2023-02-19 NOTE — ED ATTENDING ATTESTATION
Final Diagnoses:     1  Abdominal pain      ED Course as of 02/19/23 1928   Sat Feb 18, 2023   9572 This is a 39 y/o F here for nausea/vomiting  Has it chronically, but for the past 1 week, has been consistent, can't tolerate PO intake for at least 4 days  +epigastric dicomfort  Discussed with GI who told her to come in  The pain isn't improving  Last BM on Thursday  Can't tolerate PO intake  Gastric bypass 10 years ago, revision 4 years ago  Hx of gastroparesis; doesn't like reglan though  1853 General: VSS, NAD, awake, alert  Well-nourished, well-developed  Appears stated age  Head: Normocephalic, atraumatic, nontender  Eyes: PERRL, EOM-I  No diplopia  No hyphema  No subconjunctival hemorrhages  Symmetrical lids  ENTAtraumatic external nose and ears  Mildly dry MM  No stridor  Normal phonation  No drooling  Base of mouth is soft  No mastoid tenderness  Neck: Symmetric, trachea midline  No JVD  CV: no tachycardia  Peripheral pulses +2 throughout  No chest wall tenderness  Lungs:   Unlabored   No retractions  No crepitus  No tachypnea  No paradoxical motion  Abd: +BS, soft, upper belly tenderness  ND  No guarding  No rigidity  No rebound  MSK:   FROM   Back:   No CVAT  Skin: Dry, intact  Neuro: AAOx3, GCS 15, CN II-XII grossly intact  Motor grossly intact  Psychiatric/Behavioral: Appropriate mood and affect   Exam: deferred     1853 A/P:  - Recent CT scan for pelvic pain which was unremarkable  - Will do labs, fluids, symptomatic measures (droperidol)  Ok Ojeda MD, saw and evaluated the patient  All available labs and X-rays were ordered by me or the resident and have been reviewed by myself  I discussed the patient with the resident / non-physician and agree with the resident's / non-physician practitioner's findings and plan as documented in the resident's / non-physician practicitioner's note, except where noted     At this point, I agree with the current assessment done in the ED  I was present during key portions of all procedures performed unless otherwise stated  Nursing Triage:     Chief Complaint   Patient presents with   • Vomiting     Pt states that she has been vomiting for a week       HPI:   As above    ASSESSMENT + PLAN:   As above     Physical:     Vitals:    23 1742   BP: 124/76   BP Location: Left arm   Pulse: 74   Resp: 18   Temp: 97 6 °F (36 4 °C)   TempSrc: Oral   SpO2: 100%     - There are no obvious limitations to social determinants of care  - Nursing note reviewed  - Vitals reviewed  - Orders placed by myself and/or advanced practitioner / resident     - Previous chart was reviewed  - No language barrier    - History obtained from patient  - There are no limitations to the history obtained:     Past Medical:    has a past medical history of Abdominal pain, Anemia, Anesthesia, Back pain, Bariatric surgery status, Constipation, Dental bridge present, Diarrhea, Difficulty swallowing, Disease of thyroid gland, Dizzy, Fatigue, GERD (gastroesophageal reflux disease), Heart murmur, History of 2  sections, History of D&C (2019), History of iron deficiency, History of transfusion, Inguinal hernia, Loss of appetite, Low BP, Migraine, N&V (nausea and vomiting), Non-intractable vomiting, Palpitations, Postgastrectomy malabsorption, and Stomach pain  Past Surgical:    has a past surgical history that includes Sleeve Gastroplasty; Cholecystectomy; pr egd transoral biopsy single/multiple (N/A, 2019); Salpingoophorectomy (Right, 2018);  section; CHROMOSOME ANALYSIS, PRODUCTS OF CONCEPTION (HISTORICAL) (); pr laps gstr rstcv px w/byp candace-en-y limb <150 cm (N/A, 2019); REVISION BYPASS LAPAROSCOPIC (N/A, 2019); pr tx missed  first trimester surgical (N/A, 2019); LAPAROSCOPY (N/A, 2020); and Laparoscopic salpingoopherectomy (Right, 2018)      Social: Social History     Substance and Sexual Activity   Alcohol Use Not Currently     Social History     Tobacco Use   Smoking Status Never   Smokeless Tobacco Never     Social History     Substance and Sexual Activity   Drug Use No       Echo:   No results found for this or any previous visit  No results found for this or any previous visit  Cath:    No results found for this or any previous visit        Code Status: Prior  Advance Directive and Living Will:      Power of :    POLST:    Medications   droperidol (INAPSINE) injection 0 625 mg (0 625 mg Intravenous Given 2/18/23 1953)   sodium chloride 0 9 % bolus 1,000 mL (0 mL Intravenous Stopped 2/18/23 2046)   pantoprazole (PROTONIX) injection 40 mg (40 mg Intravenous Given 2/18/23 1953)     No orders to display     Orders Placed This Encounter   Procedures   • CBC and differential   • Comprehensive metabolic panel   • Lipase   • ECG 12 lead   • ECG 12 lead     Labs Reviewed   CBC AND DIFFERENTIAL - Abnormal       Result Value Ref Range Status    WBC 7 14  4 31 - 10 16 Thousand/uL Final    RBC 3 79 (*) 3 81 - 5 12 Million/uL Final    Hemoglobin 9 8 (*) 11 5 - 15 4 g/dL Final    Hematocrit 30 7 (*) 34 8 - 46 1 % Final    MCV 81 (*) 82 - 98 fL Final    MCH 25 9 (*) 26 8 - 34 3 pg Final    MCHC 31 9  31 4 - 37 4 g/dL Final    RDW 16 1 (*) 11 6 - 15 1 % Final    MPV 9 9  8 9 - 12 7 fL Final    Platelets 969  964 - 390 Thousands/uL Final    nRBC 0  /100 WBCs Final    Neutrophils Relative 54  43 - 75 % Final    Immat GRANS % 0  0 - 2 % Final    Lymphocytes Relative 36  14 - 44 % Final    Monocytes Relative 9  4 - 12 % Final    Eosinophils Relative 1  0 - 6 % Final    Basophils Relative 0  0 - 1 % Final    Neutrophils Absolute 3 82  1 85 - 7 62 Thousands/µL Final    Immature Grans Absolute 0 02  0 00 - 0 20 Thousand/uL Final    Lymphocytes Absolute 2 57  0 60 - 4 47 Thousands/µL Final    Monocytes Absolute 0 64  0 17 - 1 22 Thousand/µL Final    Eosinophils Absolute 0 08  0 00 - 0 61 Thousand/µL Final    Basophils Absolute 0 01  0 00 - 0 10 Thousands/µL Final   COMPREHENSIVE METABOLIC PANEL - Abnormal    Sodium 140  135 - 147 mmol/L Final    Potassium 4 1  3 5 - 5 3 mmol/L Final    Chloride 110 (*) 96 - 108 mmol/L Final    CO2 25  21 - 32 mmol/L Final    ANION GAP 5  4 - 13 mmol/L Final    BUN 16  5 - 25 mg/dL Final    Creatinine 0 79  0 60 - 1 30 mg/dL Final    Comment: Standardized to IDMS reference method    Glucose 82  65 - 140 mg/dL Final    Comment: If the patient is fasting, the ADA then defines impaired fasting glucose as > 100 mg/dL and diabetes as > or equal to 123 mg/dL  Specimen collection should occur prior to Sulfasalazine administration due to the potential for falsely depressed results  Specimen collection should occur prior to Sulfapyridine administration due to the potential for falsely elevated results  Calcium 8 5  8 3 - 10 1 mg/dL Final    Corrected Calcium 9 1  8 3 - 10 1 mg/dL Final    AST 18  5 - 45 U/L Final    Comment: Specimen collection should occur prior to Sulfasalazine administration due to the potential for falsely depressed results  ALT 15  12 - 78 U/L Final    Comment: Specimen collection should occur prior to Sulfasalazine and/or Sulfapyridine administration due to the potential for falsely depressed results  Alkaline Phosphatase 92  46 - 116 U/L Final    Total Protein 6 7  6 4 - 8 4 g/dL Final    Albumin 3 2 (*) 3 5 - 5 0 g/dL Final    Total Bilirubin 0 11 (*) 0 20 - 1 00 mg/dL Final    Comment: Use of this assay is not recommended for patients undergoing treatment with eltrombopag due to the potential for falsely elevated results      eGFR 93  ml/min/1 73sq m Final    Narrative:     [place] guidelines for Chronic Kidney Disease (CKD):   •  Stage 1 with normal or high GFR (GFR > 90 mL/min/1 73 square meters)  •  Stage 2 Mild CKD (GFR = 60-89 mL/min/1 73 square meters)  •  Stage 3A Moderate CKD (GFR = 45-59 mL/min/1 73 square meters)  •  Stage 3B Moderate CKD (GFR = 30-44 mL/min/1 73 square meters)  •  Stage 4 Severe CKD (GFR = 15-29 mL/min/1 73 square meters)  •  Stage 5 End Stage CKD (GFR <15 mL/min/1 73 square meters)  Note: GFR calculation is accurate only with a steady state creatinine   LIPASE - Normal    Lipase 141  73 - 393 u/L Final     Time reflects when diagnosis was documented in both MDM as applicable and the Disposition within this note     Time User Action Codes Description Comment    2/18/2023  8:25 PM Kanika Leader Add [R10 9] Abdominal pain       ED Disposition     ED Disposition   Discharge    Condition   Stable    Date/Time   Sat Feb 18, 2023  8:25 PM    Comment   Fela So discharge to home/self care                 Follow-up Information     Follow up With Specialties Details Why Contact Info Additional Information    Love Hollingsworth PA-C Physician Assistant In 1 week  [address] [facility] [address] [facility] [id number]  [address] [place] [address] Emergency Department Emergency Medicine  If symptoms worsen [address] [id number]  [facility] Emergency Department, [address], [place], [place], [address]        Discharge Medication List as of 2/18/2023  8:27 PM      START taking these medications    Details   famotidine (PEPCID) 20 mg tablet Take 1 tablet (20 mg total) by mouth 2 (two) times a day, Starting Sat 2/18/2023, Normal         CONTINUE these medications which have NOT CHANGED    Details   ascorbic acid (VITAMIN C) 500 mg tablet Take 500 mg by mouth daily, Historical Med      bisacodyl (DULCOLAX) 5 mg EC tablet Take 1 tablet (5 mg total) by mouth once for 1 dose, Starting Wed 12/28/2022, Normal      buPROPion (WELLBUTRIN XL) 300 mg 24 hr tablet Starting Wed 12/21/2022, Historical Med      ferrous sulfate 325 (65 Fe) mg tablet Take 325 mg by mouth daily with breakfast, Historical Med      furosemide (LASIX) 20 mg tablet Take 20 mg by mouth daily as needed, Starting Thu 10/13/2022, Historical Med      lubiprostone (AMITIZA) 24 mcg capsule Take 1 capsule (24 mcg total) by mouth daily with breakfast, Starting Wed 12/28/2022, Normal      ondansetron (Zofran ODT) 4 mg disintegrating tablet Take 1 tablet (4 mg total) by mouth every 6 (six) hours as needed for nausea or vomiting, Starting Wed 1/4/2023, Normal      oxyCODONE-acetaminophen (Percocet) 5-325 mg per tablet Take 1 tablet by mouth every 4 (four) hours as needed for moderate pain for up to 10 days Max Daily Amount: 6 tablets, Starting u 2/9/2023, Until Sun 2/19/2023 at 2359, Normal      pantoprazole (PROTONIX) 40 mg tablet Take 1 tablet (40 mg total) by mouth 2 (two) times a day, Starting Wed 12/28/2022, Normal      Plecanatide (Trulance) 3 MG TABS Take 3 mg by mouth daily, Starting Wed 1/11/2023, Normal      scopolamine (TRANSDERM-SCOP) 1 mg/3 days TD 72 hr patch Place 1 patch on the skin every third day, Starting Wed 12/28/2022, Normal      senna-docusate sodium (SENOKOT S) 8 6-50 mg per tablet Take 1 tablet by mouth 2 (two) times a day, Starting Fri 11/4/2022, Until Wed 12/28/2022, Normal      topiramate (TOPAMAX) 25 mg tablet Starting Wed 12/21/2022, Historical Med           No discharge procedures on file  Prior to Admission Medications   Prescriptions Last Dose Informant Patient Reported? Taking?    Plecanatide (Trulance) 3 MG TABS   No No   Sig: Take 3 mg by mouth daily   ascorbic acid (VITAMIN C) 500 mg tablet   Yes No   Sig: Take 500 mg by mouth daily   Patient not taking: Reported on 1/19/2023   bisacodyl (DULCOLAX) 5 mg EC tablet   No No   Sig: Take 1 tablet (5 mg total) by mouth once for 1 dose   Patient not taking: Reported on 1/19/2023   buPROPion (WELLBUTRIN XL) 300 mg 24 hr tablet   Yes No   Patient not taking: Reported on 12/28/2022   ferrous sulfate 325 (65 Fe) mg tablet   Yes No   Sig: Take 325 mg by mouth daily with breakfast   Patient not taking: Reported on 1/19/2023   furosemide (LASIX) 20 mg tablet   Yes No   Sig: Take 20 mg by mouth daily as needed   lubiprostone (AMITIZA) 24 mcg capsule   No No   Sig: Take 1 capsule (24 mcg total) by mouth daily with breakfast   ondansetron (Zofran ODT) 4 mg disintegrating tablet   No No   Sig: Take 1 tablet (4 mg total) by mouth every 6 (six) hours as needed for nausea or vomiting   oxyCODONE-acetaminophen (Percocet) 5-325 mg per tablet   No No   Sig: Take 1 tablet by mouth every 4 (four) hours as needed for moderate pain for up to 10 days Max Daily Amount: 6 tablets   pantoprazole (PROTONIX) 40 mg tablet   No No   Sig: Take 1 tablet (40 mg total) by mouth 2 (two) times a day   scopolamine (TRANSDERM-SCOP) 1 mg/3 days TD 72 hr patch   No No   Sig: Place 1 patch on the skin every third day   senna-docusate sodium (SENOKOT S) 8 6-50 mg per tablet   No No   Sig: Take 1 tablet by mouth 2 (two) times a day   Patient not taking: Reported on 1/19/2023   topiramate (TOPAMAX) 25 mg tablet   Yes No      Facility-Administered Medications: None                        Portions of the record may have been created with voice recognition software  Occasional wrong word or "sound a like" substitutions may have occurred due to the inherent limitations of voice recognition software  Read the chart carefully and recognize, using context, where substitutions have occurred      Electronically signed by:  Ariadne Land

## 2023-02-28 ENCOUNTER — HOSPITAL ENCOUNTER (EMERGENCY)
Facility: HOSPITAL | Age: 43
Discharge: HOME | End: 2023-02-28
Attending: EMERGENCY MEDICINE

## 2023-02-28 ENCOUNTER — APPOINTMENT (EMERGENCY)
Dept: CT IMAGING | Facility: HOSPITAL | Age: 43
End: 2023-02-28

## 2023-02-28 VITALS
HEART RATE: 71 BPM | BODY MASS INDEX: 40.04 KG/M2 | SYSTOLIC BLOOD PRESSURE: 101 MMHG | WEIGHT: 233.25 LBS | OXYGEN SATURATION: 98 % | RESPIRATION RATE: 18 BRPM | TEMPERATURE: 98.2 F | DIASTOLIC BLOOD PRESSURE: 60 MMHG

## 2023-02-28 DIAGNOSIS — R11.2 NAUSEA & VOMITING: ICD-10-CM

## 2023-02-28 DIAGNOSIS — R10.13 EPIGASTRIC PAIN: Primary | ICD-10-CM

## 2023-02-28 DIAGNOSIS — R11.2 NAUSEA AND VOMITING, UNSPECIFIED VOMITING TYPE: ICD-10-CM

## 2023-02-28 DIAGNOSIS — Z98.84 BARIATRIC SURGERY STATUS: ICD-10-CM

## 2023-02-28 LAB
ALBUMIN SERPL BCP-MCNC: 3.9 G/DL (ref 3.5–5)
ALP SERPL-CCNC: 83 U/L (ref 34–104)
ALT SERPL W P-5'-P-CCNC: 8 U/L (ref 7–52)
ANION GAP SERPL CALCULATED.3IONS-SCNC: 6 MMOL/L (ref 4–13)
APTT PPP: 21 SECONDS (ref 23–37)
AST SERPL W P-5'-P-CCNC: 17 U/L (ref 13–39)
BASOPHILS # BLD AUTO: 0.02 THOUSANDS/ÂΜL (ref 0–0.1)
BASOPHILS NFR BLD AUTO: 0 % (ref 0–1)
BILIRUB SERPL-MCNC: 0.3 MG/DL (ref 0.2–1)
BILIRUB UR QL STRIP: NEGATIVE
BUN SERPL-MCNC: 11 MG/DL (ref 5–25)
CALCIUM SERPL-MCNC: 8.5 MG/DL (ref 8.4–10.2)
CHLORIDE SERPL-SCNC: 104 MMOL/L (ref 96–108)
CLARITY UR: CLEAR
CO2 SERPL-SCNC: 28 MMOL/L (ref 21–32)
COLOR UR: YELLOW
CREAT SERPL-MCNC: 0.75 MG/DL (ref 0.6–1.3)
EOSINOPHIL # BLD AUTO: 0.08 THOUSAND/ÂΜL (ref 0–0.61)
EOSINOPHIL NFR BLD AUTO: 1 % (ref 0–6)
ERYTHROCYTE [DISTWIDTH] IN BLOOD BY AUTOMATED COUNT: 15.3 % (ref 11.6–15.1)
EXT PREGNANCY TEST URINE: NEGATIVE
EXT. CONTROL: NORMAL
GFR SERPL CREATININE-BSD FRML MDRD: 99 ML/MIN/1.73SQ M
GLUCOSE SERPL-MCNC: 90 MG/DL (ref 65–140)
GLUCOSE UR STRIP-MCNC: NEGATIVE MG/DL
HCT VFR BLD AUTO: 32.5 % (ref 34.8–46.1)
HGB BLD-MCNC: 9.8 G/DL (ref 11.5–15.4)
HGB UR QL STRIP.AUTO: NEGATIVE
IMM GRANULOCYTES # BLD AUTO: 0.02 THOUSAND/UL (ref 0–0.2)
IMM GRANULOCYTES NFR BLD AUTO: 0 % (ref 0–2)
INR PPP: 0.99 (ref 0.84–1.19)
KETONES UR STRIP-MCNC: NEGATIVE MG/DL
LEUKOCYTE ESTERASE UR QL STRIP: NEGATIVE
LIPASE SERPL-CCNC: 14 U/L (ref 11–82)
LYMPHOCYTES # BLD AUTO: 2.69 THOUSANDS/ÂΜL (ref 0.6–4.47)
LYMPHOCYTES NFR BLD AUTO: 32 % (ref 14–44)
MCH RBC QN AUTO: 24.7 PG (ref 26.8–34.3)
MCHC RBC AUTO-ENTMCNC: 30.2 G/DL (ref 31.4–37.4)
MCV RBC AUTO: 82 FL (ref 82–98)
MONOCYTES # BLD AUTO: 0.58 THOUSAND/ÂΜL (ref 0.17–1.22)
MONOCYTES NFR BLD AUTO: 7 % (ref 4–12)
NEUTROPHILS # BLD AUTO: 5.06 THOUSANDS/ÂΜL (ref 1.85–7.62)
NEUTS SEG NFR BLD AUTO: 60 % (ref 43–75)
NITRITE UR QL STRIP: NEGATIVE
NRBC BLD AUTO-RTO: 0 /100 WBCS
PH UR STRIP.AUTO: 6.5 [PH] (ref 4.5–8)
PLATELET # BLD AUTO: 353 THOUSANDS/UL (ref 149–390)
PMV BLD AUTO: 9.6 FL (ref 8.9–12.7)
POTASSIUM SERPL-SCNC: 3.5 MMOL/L (ref 3.5–5.3)
PROT SERPL-MCNC: 7.1 G/DL (ref 6.4–8.4)
PROT UR STRIP-MCNC: NEGATIVE MG/DL
PROTHROMBIN TIME: 13.1 SECONDS (ref 11.6–14.5)
RBC # BLD AUTO: 3.97 MILLION/UL (ref 3.81–5.12)
SODIUM SERPL-SCNC: 138 MMOL/L (ref 135–147)
SP GR UR STRIP.AUTO: 1.01 (ref 1–1.03)
UROBILINOGEN UR QL STRIP.AUTO: 0.2 E.U./DL
WBC # BLD AUTO: 8.45 THOUSAND/UL (ref 4.31–10.16)

## 2023-02-28 RX ORDER — ONDANSETRON 2 MG/ML
4 INJECTION INTRAMUSCULAR; INTRAVENOUS ONCE
Status: COMPLETED | OUTPATIENT
Start: 2023-02-28 | End: 2023-02-28

## 2023-02-28 RX ORDER — PANTOPRAZOLE SODIUM 40 MG/10ML
40 INJECTION, POWDER, LYOPHILIZED, FOR SOLUTION INTRAVENOUS ONCE
Status: COMPLETED | OUTPATIENT
Start: 2023-02-28 | End: 2023-02-28

## 2023-02-28 RX ORDER — PANTOPRAZOLE SODIUM 20 MG/1
20 TABLET, DELAYED RELEASE ORAL DAILY
Qty: 20 TABLET | Refills: 0 | Status: SHIPPED | OUTPATIENT
Start: 2023-02-28

## 2023-02-28 RX ORDER — ONDANSETRON 4 MG/1
TABLET, ORALLY DISINTEGRATING ORAL
Qty: 20 TABLET | Refills: 1 | Status: SHIPPED | OUTPATIENT
Start: 2023-02-28

## 2023-02-28 RX ORDER — PANTOPRAZOLE SODIUM 20 MG/1
20 TABLET, DELAYED RELEASE ORAL DAILY
Qty: 20 TABLET | Refills: 0 | Status: SHIPPED | OUTPATIENT
Start: 2023-02-28 | End: 2023-02-28 | Stop reason: SDUPTHER

## 2023-02-28 RX ORDER — SUCRALFATE 1 G/1
1 TABLET ORAL 4 TIMES DAILY
Qty: 20 TABLET | Refills: 0 | Status: SHIPPED | OUTPATIENT
Start: 2023-02-28 | End: 2023-02-28 | Stop reason: SDUPTHER

## 2023-02-28 RX ORDER — SUCRALFATE 1 G/1
1 TABLET ORAL 4 TIMES DAILY
Qty: 20 TABLET | Refills: 0 | Status: SHIPPED | OUTPATIENT
Start: 2023-02-28

## 2023-02-28 RX ORDER — DIPHENHYDRAMINE HYDROCHLORIDE 50 MG/ML
50 INJECTION INTRAMUSCULAR; INTRAVENOUS ONCE
Status: COMPLETED | OUTPATIENT
Start: 2023-02-28 | End: 2023-02-28

## 2023-02-28 RX ADMIN — PANTOPRAZOLE SODIUM 40 MG: 40 INJECTION, POWDER, FOR SOLUTION INTRAVENOUS at 13:56

## 2023-02-28 RX ADMIN — DIPHENHYDRAMINE HYDROCHLORIDE 50 MG: 50 INJECTION, SOLUTION INTRAMUSCULAR; INTRAVENOUS at 13:55

## 2023-02-28 RX ADMIN — IOHEXOL 30 ML: 300 INJECTION, SOLUTION INTRAVENOUS at 16:45

## 2023-02-28 RX ADMIN — IOHEXOL 100 ML: 350 INJECTION, SOLUTION INTRAVENOUS at 16:46

## 2023-02-28 RX ADMIN — ONDANSETRON HYDROCHLORIDE 4 MG: 2 SOLUTION INTRAMUSCULAR; INTRAVENOUS at 13:56

## 2023-02-28 NOTE — DISCHARGE INSTRUCTIONS
Take medications as prescribed  Contact GI office tomorrow regarding upcoming scope  Follow up with bariatrics

## 2023-02-28 NOTE — Clinical Note
Larisa Kelly was seen and treated in our emergency department on 2/28/2023  Diagnosis:     Molina Brown  may return to work on return date  She may return on this date: 03/03/2023         If you have any questions or concerns, please don't hesitate to call        Maximino Anne PA-C    ______________________________           _______________          _______________  Hospital Representative                              Date                                Time

## 2023-02-28 NOTE — ED PROVIDER NOTES
History  Chief Complaint   Patient presents with   • Vomiting     N/v/d beginning Sunday  Patient reports large amount of dark red blood in stool  +dizziness +upper abdominal pain     Patient is a 40year old female with a history of Karmen-en-Y presenting for evaluation of nausea and vomiting for 3 days  States that she has had these symptoms intermittently for a few weeks, most recently seen 1 week ago in ED but left before CT scan was performed  Symptoms initially improved but restarted 3 days ago  Also had episode of dizziness and shakiness 3 days ago  She had an episode of stool with dark red blood in it this morning  Has associated epigastric pain that is constant but is worsened with food intake  She has been able to tolerate some PO  Denies chest pain, SOB, URI symptoms, fevers, dysuria, hematuria  Prior to Admission Medications   Prescriptions Last Dose Informant Patient Reported? Taking?    Plecanatide (Trulance) 3 MG TABS Not Taking  No No   Sig: Take 3 mg by mouth daily   Patient not taking: Reported on 2/28/2023   ascorbic acid (VITAMIN C) 500 mg tablet Not Taking  Yes No   Sig: Take 500 mg by mouth daily   Patient not taking: Reported on 1/19/2023   bisacodyl (DULCOLAX) 5 mg EC tablet   No No   Sig: Take 1 tablet (5 mg total) by mouth once for 1 dose   Patient not taking: Reported on 1/19/2023   buPROPion (WELLBUTRIN XL) 300 mg 24 hr tablet Not Taking  Yes No   Patient not taking: Reported on 12/28/2022   famotidine (PEPCID) 20 mg tablet Not Taking  No No   Sig: Take 1 tablet (20 mg total) by mouth 2 (two) times a day   Patient not taking: Reported on 2/28/2023   ferrous sulfate 325 (65 Fe) mg tablet Not Taking  Yes No   Sig: Take 325 mg by mouth daily with breakfast   Patient not taking: Reported on 1/19/2023   furosemide (LASIX) 20 mg tablet Not Taking  Yes No   Sig: Take 20 mg by mouth daily as needed   Patient not taking: Reported on 2/28/2023   lubiprostone (AMITIZA) 24 mcg capsule Not Taking  No No   Sig: Take 1 capsule (24 mcg total) by mouth daily with breakfast   Patient not taking: Reported on 2023   ondansetron (ZOFRAN-ODT) 4 mg disintegrating tablet   No No   Sig: TAKE 1 TABLET BY MOUTH EVERY 6 HOURS AS NEEDED FOR NAUSEA OR VOMITING   pantoprazole (PROTONIX) 40 mg tablet 2023  No Yes   Sig: Take 1 tablet (40 mg total) by mouth 2 (two) times a day   scopolamine (TRANSDERM-SCOP) 1 mg/3 days TD 72 hr patch 2023  No Yes   Sig: Place 1 patch on the skin every third day   senna-docusate sodium (SENOKOT S) 8 6-50 mg per tablet   No No   Sig: Take 1 tablet by mouth 2 (two) times a day   Patient not taking: Reported on 2023   sucralfate (CARAFATE) 1 g/10 mL suspension 2023  No Yes   Sig: Take 10 mL (1 g total) by mouth 4 (four) times a day   topiramate (TOPAMAX) 25 mg tablet 2023  Yes Yes      Facility-Administered Medications: None       Past Medical History:   Diagnosis Date   • Abdominal pain     "almost constant"   • Anemia    • Anesthesia     "woke up swinging with last  2018  "was fine with EGD"   • Back pain    • Bariatric surgery status     -gastric sleeve revison RUALISSON-Y 2019-abd painnow-dx lap today 3/9/2020   • Constipation    • Dental bridge present     right lower permanent   • Diarrhea    • Difficulty swallowing     "in the past"   • Disease of thyroid gland     not on meds now   • Dizzy    • Fatigue     "when blood pressure low" and weakness too"   • GERD (gastroesophageal reflux disease)     "increase after surgery"   • Heart murmur     heart murmer, work up negative with holter monitor   • History of 2  sections     most recent 17   • History of D&C 2019   • History of iron deficiency     anemia/ had IV infusions through pregnancy in 3508-4715   • History of transfusion     2019 - pt had allergic reaction - had to stop the blood   • Inguinal hernia     right   • Loss of appetite    • Low BP     "off and on"   • Migraine • N&V (nausea and vomiting)     " a little better since on meds"   • Non-intractable vomiting     "not since been on medicine"   • Palpitations     "having again"   • Postgastrectomy malabsorption    • Stomach pain        Past Surgical History:   Procedure Laterality Date   •  SECTION      x2   • CHOLECYSTECTOMY     • CHROMOSOME ANALYSIS, PRODUCTS OF CONCEPTION (HISTORICAL)  2018    2 miscarriages in  and Nov   • LAPAROSCOPIC SALPINGOOPHERECTOMY Right 2018    LVH, 6 cm benign ovarian cyst   • LAPAROSCOPY N/A 2020    Procedure: DIAGNOSTIC LAPAROSCOPY,CLOSURE OF CAOTES DEFECT, INTRAOP EGD;  Surgeon: Cary Gleason MD;  [facility];  Service: Bariatrics   • NE EGD TRANSORAL BIOPSY SINGLE/MULTIPLE N/A 2019    Procedure: ESOPHAGOGASTRODUODENOSCOPY (EGD) with bx;  Surgeon: Cary Gleason MD;  Location: [facility]; Service: Bariatrics   • NE LAPS GSTR RSTCV [address] W/BYP SREE-EN-Y LIMB <150 CM N/A 2019    Procedure: LAP SREE-EN-Y GASTRIC BYPASS, INTRAOP EGD;  Surgeon: Cary Gleason MD;  [facility];  Service: Bariatrics   • [place] [address] FIRST TRIMESTER SURGICAL N/A 2019    Procedure: DILATATION AND EVACUATION (D&E) (8 weeks) missed ab;  Surgeon: Kenney James MD;  [facility];  Service: Gynecology   • REVISION BYPASS LAPAROSCOPIC N/A 2019    Procedure: LAP REVISION/ CONVERSION;  Surgeon: Cary Gleason MD;  [facility];  Service: Bariatrics   • SALPINGOOPHORECTOMY Right 2018   • SLEEVE GASTROPLASTY         Family History   Problem Relation Age of Onset   • Hypertension Mother    • Heart disease Mother    • No Known Problems Father      I have reviewed and agree with the history as documented      E-Cigarette/Vaping   • E-Cigarette Use Never User      E-Cigarette/Vaping Substances   • Nicotine No    • THC No    • CBD No    • Flavoring No    • Other No    • Unknown No      Social History     Tobacco Use   • Smoking status: Never   • Smokeless tobacco: Never   Vaping Use   • Vaping Use: Never used   Substance Use Topics   • Alcohol use: Not Currently   • Drug use: No       Review of Systems   Constitutional: Negative for chills and fever  HENT: Negative for congestion, ear pain, sinus pressure, sore throat and trouble swallowing  Eyes: Negative for pain and visual disturbance  Respiratory: Negative for cough and shortness of breath  Cardiovascular: Negative for chest pain and palpitations  Gastrointestinal: Positive for abdominal pain, blood in stool, diarrhea, nausea and vomiting  Genitourinary: Negative for decreased urine volume, dysuria, hematuria, vaginal bleeding and vaginal discharge  Musculoskeletal: Negative for arthralgias and back pain  Skin: Negative for color change and rash  Neurological: Positive for dizziness  Negative for seizures, syncope and headaches  All other systems reviewed and are negative  Physical Exam  Physical Exam  Vitals and nursing note reviewed  Constitutional:       General: She is not in acute distress  Appearance: Normal appearance  She is not ill-appearing or toxic-appearing  HENT:      Head: Normocephalic and atraumatic  Right Ear: External ear normal       Left Ear: External ear normal       Nose: Nose normal       Mouth/Throat:      Mouth: Mucous membranes are moist       Pharynx: No oropharyngeal exudate or posterior oropharyngeal erythema  Eyes:      General: No scleral icterus  Right eye: No discharge  Left eye: No discharge  Extraocular Movements: Extraocular movements intact  Conjunctiva/sclera: Conjunctivae normal    Cardiovascular:      Rate and Rhythm: Normal rate and regular rhythm  Pulses: Normal pulses  Heart sounds: Normal heart sounds  Pulmonary:      Effort: Pulmonary effort is normal  No respiratory distress  Breath sounds: Normal breath sounds  Abdominal:      Palpations: Abdomen is soft  Tenderness: There is abdominal tenderness in the right upper quadrant, epigastric area, periumbilical area and left upper quadrant  There is guarding  There is no right CVA tenderness, left CVA tenderness or rebound  Musculoskeletal:         General: No tenderness, deformity or signs of injury  Cervical back: Normal range of motion and neck supple  Right lower leg: No edema  Left lower leg: No edema  Skin:     General: Skin is dry  Coloration: Skin is not jaundiced  Findings: No erythema or rash  Neurological:      General: No focal deficit present  Mental Status: She is alert and oriented to person, place, and time  Mental status is at baseline  Motor: No weakness  Gait: Gait normal    Psychiatric:         Mood and Affect: Mood normal          Behavior: Behavior normal          Thought Content:  Thought content normal          Vital Signs  ED Triage Vitals [02/28/23 1255]   Temperature Pulse Respirations Blood Pressure SpO2   98 2 °F (36 8 °C) 80 16 110/76 100 %      Temp Source Heart Rate Source Patient Position - Orthostatic VS BP Location FiO2 (%)   Oral Monitor Sitting Right arm --      Pain Score       --           Vitals:    02/28/23 1255 02/28/23 1647   BP: 110/76 101/60   Pulse: 80 71   Patient Position - Orthostatic VS: Sitting Lying         Visual Acuity      ED Medications  Medications   ondansetron (ZOFRAN) injection 4 mg (4 mg Intravenous Given 2/28/23 1356)   pantoprazole (PROTONIX) injection 40 mg (40 mg Intravenous Given 2/28/23 1356)   diphenhydrAMINE (BENADRYL) injection 50 mg (50 mg Intravenous Given 2/28/23 1355)   iohexol (OMNIPAQUE) 350 MG/ML injection (SINGLE-DOSE) 100 mL (100 mL Intravenous Given 2/28/23 1646)   iohexol (OMNIPAQUE) 300 mg/mL injection 30 mL (30 mL Intravenous Given 2/28/23 1645)       Diagnostic Studies  Results Reviewed     Procedure Component Value Units Date/Time    CMP [[id number]] Collected: 02/28/23 1355    Lab Status: Final result Specimen: Blood from Arm, Left Updated: 02/28/23 1434     Sodium 138 mmol/L      Potassium 3 5 mmol/L      Chloride 104 mmol/L      CO2 28 mmol/L      ANION GAP 6 mmol/L      BUN 11 mg/dL      Creatinine 0 75 mg/dL      Glucose 90 mg/dL      Calcium 8 5 mg/dL      AST 17 U/L      ALT 8 U/L      Alkaline Phosphatase 83 U/L      Total Protein 7 1 g/dL      Albumin 3 9 g/dL      Total Bilirubin 0 30 mg/dL      eGFR 99 ml/min/1 73sq m     Narrative:      [place] guidelines for Chronic Kidney Disease (CKD):   •  Stage 1 with normal or high GFR (GFR > 90 mL/min/1 73 square meters)  •  Stage 2 Mild CKD (GFR = 60-89 mL/min/1 73 square meters)  •  Stage 3A Moderate CKD (GFR = 45-59 mL/min/1 73 square meters)  •  Stage 3B Moderate CKD (GFR = 30-44 mL/min/1 73 square meters)  •  Stage 4 Severe CKD (GFR = 15-29 mL/min/1 73 square meters)  •  Stage 5 End Stage CKD (GFR <15 mL/min/1 73 square meters)  Note: GFR calculation is accurate only with a steady state creatinine    Lipase [[id number]]  (Normal) Collected: 02/28/23 1355    Lab Status: Final result Specimen: Blood from Arm, Left Updated: 02/28/23 1434     Lipase 14 u/L     Protime-INR [[id number]]  (Normal) Collected: 02/28/23 1355    Lab Status: Final result Specimen: Blood from Arm, Left Updated: 02/28/23 1430     Protime 13 1 seconds      INR 0 99    APTT [[id number]]  (Abnormal) Collected: 02/28/23 1355    Lab Status: Final result Specimen: Blood from Arm, Left Updated: 02/28/23 1430     PTT 21 seconds     CBC and differential [[id number]]  (Abnormal) Collected: 02/28/23 1355    Lab Status: Final result Specimen: Blood from Arm, Left Updated: 02/28/23 1416     WBC 8 45 Thousand/uL      RBC 3 97 Million/uL      Hemoglobin 9 8 g/dL      Hematocrit 32 5 %      MCV 82 fL      MCH 24 7 pg      MCHC 30 2 g/dL      RDW 15 3 %      MPV 9 6 fL      Platelets 772 Thousands/uL      nRBC 0 /100 WBCs      Neutrophils Relative 60 %      Immat GRANS % 0 %      Lymphocytes Relative 32 %      Monocytes Relative 7 %      Eosinophils Relative 1 %      Basophils Relative 0 %      Neutrophils Absolute 5 06 Thousands/µL      Immature Grans Absolute 0 02 Thousand/uL      Lymphocytes Absolute 2 69 Thousands/µL      Monocytes Absolute 0 58 Thousand/µL      Eosinophils Absolute 0 08 Thousand/µL      Basophils Absolute 0 02 Thousands/µL     POCT pregnancy, urine [[id number]]  (Normal) Resulted: 02/28/23 1355    Lab Status: Final result Updated: 02/28/23 1355     EXT Preg Test, Ur Negative     Control Valid    Urine Macroscopic, POC [[id number]] Collected: 02/28/23 1350    Lab Status: Final result Specimen: Urine Updated: 02/28/23 1351     Color, UA Yellow     Clarity, UA Clear     pH, UA 6 5     Leukocytes, UA Negative     Nitrite, UA Negative     Protein, UA Negative mg/dl      Glucose, UA Negative mg/dl      Ketones, UA Negative mg/dl      Urobilinogen, UA 0 2 E U /dl      Bilirubin, UA Negative     Occult Blood, UA Negative     Specific Gravity, UA 1 015    Narrative:      [facility] RESULT                 CT abdomen pelvis with contrast   Final Result by Stu Adams DO (02/28 1746)   No acute intra-abdominal finding  Workstation performed: [id number]                    Procedures  Procedures         ED Course  ED Course as of 02/28/23 2017 Tue Feb 28, 2023   1406 PREGNANCY TEST URINE: Negative   1406 Urine Macroscopic, POC  Negative  1424 Hemoglobin(!): 9 8  Unchanged from 10 days ago  1435 Lipase: 14   1744 Pending CT read  SBIRT 20yo+    Flowsheet Row Most Recent Value   SBIRT (25 yo +)    In order to provide better care to our patients, we are screening all of our patients for alcohol and drug use  Would it be okay to ask you these screening questions?  Unable to answer at this time Filed at: 02/28/2023 1257                    Medical Decision Making  Patient is a 27-year-old female with a history of bariatric surgery presenting for evaluation of nausea, vomiting, bloody stools, epigastric pain for 3 days  She had an episode of dizziness and shakiness 3 days ago  Has been able to tolerate some p o  Denies chest pain, shortness of breath, fevers, URI symptoms, cough, dysuria, hematuria  On exam, patient is well appearing  All VS stable  Heart and lung sounds normal  Abdomen soft  Tenderness to epigastric and upper quadrants with some guarding, no rebound  No CVA tenderness bilaterally  DDx including gastritis/PUD, gastric bypass complication, gastroenteritis, colitis, pancreatitis, pyelonephritis, nephrolithiasis  Hemoglobin stable from 10 days ago  Remaining labs unremarkable - no leukocytosis, WHITLEY, electrolyte abnormalities  UA negative for infection, blood  CT negative for acute intrabdominal findings  Discussed findings with patient, explained that we are unable to fully evaluate for gastritis/PUD with CT and she needs GI/bariatric follow up for this  She is able to tolerate PO in the ED, without acute findings on CT she can be discharged home with supportive care and close GI/bariatric follow up  Prescribed protonix and carafate, she received refill of zofran from GI today  She is in agreement with the plan  Return precautions discussed with patient as outlined in AVS and patient verbally expressed understanding  Patient stable at time of discharge and ambulated out of the emergency department  Bariatric surgery status: self-limited or minor problem  Epigastric pain: acute illness or injury  Nausea & vomiting: acute illness or injury  Amount and/or Complexity of Data Reviewed  Labs: ordered  Decision-making details documented in ED Course  Radiology: ordered  Risk  Prescription drug management            Disposition  Final diagnoses:   Epigastric pain   Nausea & vomiting   Bariatric surgery status     Time reflects when diagnosis was documented in both MDM as applicable and the Disposition within this note     Time User Action Codes Description Comment    2/28/2023  6:27 PM Terell Monks Add [R10 13] Epigastric pain     2/28/2023  6:27 PM Terell Monks Add [R11 2] Nausea & vomiting     2/28/2023  6:27 PM Terell Monks Add [A81 98] Bariatric surgery status       ED Disposition     ED Disposition   Discharge    Condition   Stable    Date/Time   Tue Feb 28, 2023  6:27 PM    Comment   Sara So discharge to home/self care  Follow-up Information     Follow up With Specialties Details Why Contact Info Additional Information    Kerline Apodaca PA-C Physician Assistant Schedule an appointment as soon as possible for a visit   [address] [facility] [address]  [address] [address] [facility] Schedule an appointment as soon as possible for a visit in 1 day  [address] [address] [phone] [facility] [address], [address] [place] [address],  [address], [address], [place], [address]    [place] [address] Schedule an appointment as soon as possible for a visit in 1 day  [address] [facility] [address]  [address] [place] [address], [address] [place] [address], [place], [place], [address]          Discharge Medication List as of 2/28/2023  6:36 PM      START taking these medications    Details   ondansetron (ZOFRAN-ODT) 4 mg disintegrating tablet TAKE 1 TABLET BY MOUTH EVERY 6 HOURS AS NEEDED FOR NAUSEA OR VOMITING, Normal         CONTINUE these medications which have CHANGED    Details   ! ! pantoprazole (PROTONIX) 20 mg tablet Take 1 tablet (20 mg total) by mouth daily, Starting Tue 2/28/2023, Normal      sucralfate (CARAFATE) 1 g tablet Take 1 tablet (1 g total) by mouth 4 (four) times a day, Starting Tue 2/28/2023, Normal       !! - Potential duplicate medications found  Please discuss with provider  CONTINUE these medications which have NOT CHANGED    Details   ! ! pantoprazole (PROTONIX) 40 mg tablet Take 1 tablet (40 mg total) by mouth 2 (two) times a day, Starting Wed 12/28/2022, Normal      scopolamine (TRANSDERM-SCOP) 1 mg/3 days TD 72 hr patch Place 1 patch on the skin every third day, Starting Wed 12/28/2022, Normal      sucralfate (CARAFATE) 1 g/10 mL suspension Take 10 mL (1 g total) by mouth 4 (four) times a day, Starting Sat 2/18/2023, Normal      topiramate (TOPAMAX) 25 mg tablet Starting Wed 12/21/2022, Historical Med      ascorbic acid (VITAMIN C) 500 mg tablet Take 500 mg by mouth daily, Historical Med      bisacodyl (DULCOLAX) 5 mg EC tablet Take 1 tablet (5 mg total) by mouth once for 1 dose, Starting Wed 12/28/2022, Normal      buPROPion (WELLBUTRIN XL) 300 mg 24 hr tablet Starting Wed 12/21/2022, Historical Med      famotidine (PEPCID) 20 mg tablet Take 1 tablet (20 mg total) by mouth 2 (two) times a day, Starting Sat 2/18/2023, Normal      ferrous sulfate 325 (65 Fe) mg tablet Take 325 mg by mouth daily with breakfast, Historical Med      furosemide (LASIX) 20 mg tablet Take 20 mg by mouth daily as needed, Starting Thu 10/13/2022, Historical Med      lubiprostone (AMITIZA) 24 mcg capsule Take 1 capsule (24 mcg total) by mouth daily with breakfast, Starting Wed 12/28/2022, Normal      Plecanatide (Trulance) 3 MG TABS Take 3 mg by mouth daily, Starting Wed 1/11/2023, Normal      senna-docusate sodium (SENOKOT S) 8 6-50 mg per tablet Take 1 tablet by mouth 2 (two) times a day, Starting Fri 11/4/2022, Until Wed 12/28/2022, Normal       !! - Potential duplicate medications found  Please discuss with provider                PDMP Review       Value Time User    PDMP Reviewed  Yes 2/9/2023  3:25 PM Hannah Zambrano DO          ED Provider  Electronically Signed by           Eleanore Lesches, PA-C  02/28/23 2017

## 2023-02-28 NOTE — Clinical Note
Nacho Slaughter was seen and treated in our emergency department on 2/28/2023  Diagnosis:     Giovanna Young  may return to work on return date  She may return on this date: 03/03/2023         If you have any questions or concerns, please don't hesitate to call        Tiara Pratt PA-C    ______________________________           _______________          _______________  Hospital Representative                              Date                                Time

## 2023-03-01 RX ORDER — SODIUM CHLORIDE 9 MG/ML
125 INJECTION, SOLUTION INTRAVENOUS CONTINUOUS
Status: CANCELLED | OUTPATIENT
Start: 2023-03-01

## 2023-03-02 ENCOUNTER — ANESTHESIA (OUTPATIENT)
Dept: GASTROENTEROLOGY | Facility: HOSPITAL | Age: 43
End: 2023-03-02

## 2023-03-02 ENCOUNTER — HOSPITAL ENCOUNTER (OUTPATIENT)
Dept: GASTROENTEROLOGY | Facility: HOSPITAL | Age: 43
Setting detail: OUTPATIENT SURGERY
End: 2023-03-02
Attending: INTERNAL MEDICINE

## 2023-03-02 ENCOUNTER — TELEPHONE (OUTPATIENT)
Dept: GASTROENTEROLOGY | Facility: CLINIC | Age: 43
End: 2023-03-02

## 2023-03-02 ENCOUNTER — ANESTHESIA EVENT (OUTPATIENT)
Dept: GASTROENTEROLOGY | Facility: HOSPITAL | Age: 43
End: 2023-03-02

## 2023-03-02 VITALS
TEMPERATURE: 97.9 F | DIASTOLIC BLOOD PRESSURE: 61 MMHG | RESPIRATION RATE: 16 BRPM | SYSTOLIC BLOOD PRESSURE: 112 MMHG | BODY MASS INDEX: 38.41 KG/M2 | HEART RATE: 72 BPM | WEIGHT: 225 LBS | HEIGHT: 64 IN | OXYGEN SATURATION: 98 %

## 2023-03-02 DIAGNOSIS — R11.14 BILIOUS VOMITING WITH NAUSEA: ICD-10-CM

## 2023-03-02 DIAGNOSIS — D50.8 OTHER IRON DEFICIENCY ANEMIA: ICD-10-CM

## 2023-03-02 LAB
ATRIAL RATE: 73 BPM
P AXIS: 67 DEGREES
PR INTERVAL: 152 MS
QRS AXIS: 33 DEGREES
QRSD INTERVAL: 88 MS
QT INTERVAL: 404 MS
QTC INTERVAL: 445 MS
T WAVE AXIS: 21 DEGREES
VENTRICULAR RATE: 73 BPM

## 2023-03-02 RX ORDER — SODIUM CHLORIDE 9 MG/ML
125 INJECTION, SOLUTION INTRAVENOUS CONTINUOUS
Status: DISCONTINUED | OUTPATIENT
Start: 2023-03-02 | End: 2023-03-06 | Stop reason: HOSPADM

## 2023-03-02 RX ORDER — LIDOCAINE HYDROCHLORIDE 20 MG/ML
INJECTION, SOLUTION EPIDURAL; INFILTRATION; INTRACAUDAL; PERINEURAL AS NEEDED
Status: DISCONTINUED | OUTPATIENT
Start: 2023-03-02 | End: 2023-03-02

## 2023-03-02 RX ORDER — PROPOFOL 10 MG/ML
INJECTION, EMULSION INTRAVENOUS AS NEEDED
Status: DISCONTINUED | OUTPATIENT
Start: 2023-03-02 | End: 2023-03-02

## 2023-03-02 RX ORDER — FENTANYL CITRATE 50 UG/ML
INJECTION, SOLUTION INTRAMUSCULAR; INTRAVENOUS AS NEEDED
Status: DISCONTINUED | OUTPATIENT
Start: 2023-03-02 | End: 2023-03-02

## 2023-03-02 RX ORDER — PROPOFOL 10 MG/ML
INJECTION, EMULSION INTRAVENOUS CONTINUOUS PRN
Status: DISCONTINUED | OUTPATIENT
Start: 2023-03-02 | End: 2023-03-02

## 2023-03-02 RX ORDER — SODIUM CHLORIDE 9 MG/ML
INJECTION, SOLUTION INTRAVENOUS CONTINUOUS PRN
Status: DISCONTINUED | OUTPATIENT
Start: 2023-03-02 | End: 2023-03-02

## 2023-03-02 RX ADMIN — FENTANYL CITRATE 25 MCG: 50 INJECTION INTRAMUSCULAR; INTRAVENOUS at 13:56

## 2023-03-02 RX ADMIN — SODIUM CHLORIDE: 0.9 INJECTION, SOLUTION INTRAVENOUS at 13:53

## 2023-03-02 RX ADMIN — SODIUM CHLORIDE 125 ML/HR: 0.9 INJECTION, SOLUTION INTRAVENOUS at 13:34

## 2023-03-02 RX ADMIN — PROPOFOL 150 MG: 10 INJECTION, EMULSION INTRAVENOUS at 13:56

## 2023-03-02 RX ADMIN — FENTANYL CITRATE 25 MCG: 50 INJECTION INTRAMUSCULAR; INTRAVENOUS at 14:15

## 2023-03-02 RX ADMIN — PROPOFOL 100 MCG/KG/MIN: 10 INJECTION, EMULSION INTRAVENOUS at 13:56

## 2023-03-02 RX ADMIN — FENTANYL CITRATE 25 MCG: 50 INJECTION INTRAMUSCULAR; INTRAVENOUS at 14:10

## 2023-03-02 RX ADMIN — FENTANYL CITRATE 25 MCG: 50 INJECTION INTRAMUSCULAR; INTRAVENOUS at 13:58

## 2023-03-02 RX ADMIN — LIDOCAINE HYDROCHLORIDE 100 MG: 20 INJECTION, SOLUTION EPIDURAL; INFILTRATION; INTRACAUDAL; PERINEURAL at 13:56

## 2023-03-02 NOTE — ANESTHESIA PREPROCEDURE EVALUATION
Procedure:  COLONOSCOPY  EGD    Relevant Problems   ANESTHESIA (within normal limits)      GI/HEPATIC   (+) Bariatric surgery status   (+) Dysphagia   (+) Status post bariatric surgery      HEMATOLOGY   (+) Iron deficiency anemia secondary to inadequate dietary iron intake   (+) Symptomatic anemia      NEURO/PSYCH   (+) History of multiple miscarriages      PULMONARY (within normal limits)        Physical Exam    Airway    Mallampati score: I  TM Distance: >3 FB  Neck ROM: full     Dental   No notable dental hx     Cardiovascular  Cardiovascular exam normal    Pulmonary  Pulmonary exam normal     Other Findings        Anesthesia Plan  ASA Score- 2     Anesthesia Type- IV sedation with anesthesia with ASA Monitors  Additional Monitors:   Airway Plan:           Plan Factors-    Chart reviewed  Existing labs reviewed  Patient summary reviewed  Induction- intravenous  Postoperative Plan-     Informed Consent- Anesthetic plan and risks discussed with patient

## 2023-03-02 NOTE — H&P
History and Physical -  [facility]  Ted Taylor 40 y o  female MRN: [id number]                  HPI: Ted Taylor is a 40y o  year old female who presents for abdominal pain, nausea and vomiting, history of H  pylori infection, iron deficiency anemia, change in bowel habits  REVIEW OF SYSTEMS: Per the HPI, and otherwise unremarkable      Historical Information   Past Medical History:   Diagnosis Date   • Abdominal pain     "almost constant"   • Anemia    • Anesthesia     "woke up swinging with last  2018  "was fine with EGD"   • Back pain    • Bariatric surgery status     -gastric sleeve revison RUEN-Y 2019-abd painnow-dx lap today 3/9/2020   • Constipation    • Dental bridge present     right lower permanent   • Diarrhea    • Difficulty swallowing     "in the past"   • Disease of thyroid gland     not on meds now   • Dizzy    • Fatigue     "when blood pressure low" and weakness too"   • GERD (gastroesophageal reflux disease)     "increase after surgery"   • Heart murmur     heart murmer, work up negative with holter monitor   • History of 2  sections     most recent 17   • History of D&C 2019   • History of iron deficiency     anemia/ had IV infusions through pregnancy in 2351-2668   • History of transfusion     2019 - pt had allergic reaction - had to stop the blood   • Inguinal hernia     right   • Loss of appetite    • Low BP     "off and on"   • Migraine    • N&V (nausea and vomiting)     " a little better since on meds"   • Non-intractable vomiting     "not since been on medicine"   • Palpitations     "having again"   • Postgastrectomy malabsorption    • Stomach pain      Past Surgical History:   Procedure Laterality Date   •  SECTION      x2   • CHOLECYSTECTOMY     • CHROMOSOME ANALYSIS, PRODUCTS OF CONCEPTION (HISTORICAL)      2 miscarriages in  and Nov   • LAPAROSCOPIC SALPINGOOPHERECTOMY Right 2018    LVH, 6 cm benign ovarian cyst   • LAPAROSCOPY N/A 2020    Procedure: DIAGNOSTIC LAPAROSCOPY,CLOSURE OF COATES DEFECT, INTRAOP EGD;  Surgeon: Casandra Ayala MD;  Location: [facility];  Service: Bariatrics   • VT EGD TRANSORAL BIOPSY SINGLE/MULTIPLE N/A 2019    Procedure: ESOPHAGOGASTRODUODENOSCOPY (EGD) with bx;  Surgeon: Casandra Ayala MD;  Location: [facility]; Service: Bariatrics   • VT LAPS GSTR RSTCV PX W/BYP SREE-EN-Y LIMB <150 CM N/A 2019    Procedure: LAP SREE-EN-Y GASTRIC BYPASS, INTRAOP EGD;  Surgeon: Casandra Ayala MD;  Location: [facility];  Service: Bariatrics   • VT TX MISSED  FIRST TRIMESTER SURGICAL N/A 2019    Procedure: DILATATION AND EVACUATION (D&E) (8 weeks) missed ab;  Surgeon: Priscilla Cassidy MD;  [facility]: [facility];  Service: Gynecology   • REVISION BYPASS LAPAROSCOPIC N/A 2019    Procedure: LAP REVISION/ CONVERSION;  Surgeon: Casandra Ayala MD;  Location: [facility];  Service: Bariatrics   • SALPINGOOPHORECTOMY Right 2018   • SLEEVE GASTROPLASTY       Social History   Social History     Substance and Sexual Activity   Alcohol Use Not Currently     Social History     Substance and Sexual Activity   Drug Use No     Social History     Tobacco Use   Smoking Status Never   Smokeless Tobacco Never     Family History   Problem Relation Age of Onset   • Hypertension Mother    • Heart disease Mother    • No Known Problems Father        Meds/Allergies     (Not in a hospital admission)      Allergies   Allergen Reactions   • Aspirin Anaphylaxis   • Shellfish-Derived Products - Food Allergy Anaphylaxis   • Contrast [Iodinated Contrast Media] Itching and Swelling   • Ibuprofen Edema   • Reglan [Metoclopramide] Itching and Confusion       Objective     unknown if currently breastfeeding  PHYSICAL EXAMINATION:    General Appearance:   Alert, cooperative, no distress   HEENT:  Normocephalic, atraumatic, anicteric  Neck supple, symmetrical, trachea midline  Lungs:   Equal chest rise and unlabored breathing, normal effort, no coughing  Cardiovascular:   No visualized JVD  Abdomen:   No abdominal distension  Skin:   No jaundice, rashes, or lesions  Musculoskeletal:   Normal range of motion visualized  Psych:  Normal affect and normal insight  Neuro:  Alert and appropriate  ASSESSMENT/PLAN:  This is a 40y o  year old female here for EGD and colonoscopy, and she is stable and optimized for her procedure

## 2023-03-02 NOTE — ANESTHESIA POSTPROCEDURE EVALUATION
Post-Op Assessment Note    CV Status:  Stable    Pain management: adequate     Mental Status:  Alert and awake   Hydration Status:  Euvolemic   PONV Controlled:  Controlled   Airway Patency:  Patent      Post Op Vitals Reviewed: Yes      Staff: Anesthesiologist, CRNA         No notable events documented      BP      Temp      Pulse     Resp      SpO2      /61   Pulse 72   Temp 97 9 °F (36 6 °C) (Temporal)   Resp 16   Ht 5' 4" (1 626 m)   Wt 102 kg (225 lb)   LMP 02/20/2023   SpO2 98%   BMI 38 62 kg/m²

## 2023-03-07 ENCOUNTER — OFFICE VISIT (OUTPATIENT)
Dept: OBGYN CLINIC | Facility: CLINIC | Age: 43
End: 2023-03-07

## 2023-03-07 VITALS
BODY MASS INDEX: 39.27 KG/M2 | WEIGHT: 230 LBS | SYSTOLIC BLOOD PRESSURE: 112 MMHG | DIASTOLIC BLOOD PRESSURE: 62 MMHG | HEIGHT: 64 IN

## 2023-03-07 DIAGNOSIS — N92.0 MENORRHAGIA WITH REGULAR CYCLE: Primary | ICD-10-CM

## 2023-03-07 NOTE — PROGRESS NOTES
Assessment/Plan:  Discussed treatment options for menorrhagia including medical versus surgical including medication/progesterone agents/IUD/OCPs versus endometrial ablation  Patient aware this would decrease menstrual cycle, right lower quadrant pain etiology unknown  She is reluctant to have an IUD due to significant allergies  At this point she would like to proceed with endometrial ablation  Risks and benefits reviewed  She will return to office for endometrial biopsy (discussed premedicate NSAID/Tylenol, hydration)  Our office will notify her of surgical date  On next visit we will sign consent form  All questions answered  No problem-specific Assessment & Plan notes found for this encounter  There are no diagnoses linked to this encounter  Subjective:      Patient ID: Ruthann Watt is a 40 y o  female  HPI     This is a pleasant 28-year-old female P3 ( x3, age 2, 11, 16) presents with her  today to discuss treatment options for menorrhagia  Her menstrual cycles are approximately every 4 weeks lasting 8 to 10 days described as heavy passing large clots since her last delivery  She also has had right lower quadrant pain cramping for the last 3 months, waxes and wanes and sometimes radiates to back  She recently underwent endoscopy and colonoscopy which was essentially normal, biopsies pending with follow-up with gastroenterology  She did try with her last menstrual cycle which resulted in vomiting for 2 to 3 hours lysteda  EGD/colonoscopy ? ?WNL    Past surgical history significant for  x3, gastric sleeve, gastric bypass    The following portions of the patient's history were reviewed and updated as appropriate: allergies, current medications, past family history, past medical history, past social history, past surgical history and problem list     Review of Systems   Constitutional: Negative for fatigue, fever and unexpected weight change  Respiratory: Negative for cough, chest tightness, shortness of breath and wheezing  Cardiovascular: Negative  Negative for chest pain and palpitations  Gastrointestinal: Negative  Negative for abdominal distention, abdominal pain, blood in stool, constipation, diarrhea, nausea and vomiting  Genitourinary: Positive for menstrual problem  Negative for difficulty urinating, dyspareunia, dysuria, flank pain, frequency, genital sores, hematuria, pelvic pain, urgency, vaginal bleeding, vaginal discharge and vaginal pain  Skin: Negative for rash  Objective:      /62   Ht 5' 4" (1 626 m)   Wt 104 kg (230 lb)   LMP 02/20/2023   BMI 39 48 kg/m²          Physical Exam        I have spent a total time of 40 minutes on 03/07/23 in caring for this patient including Risks and benefits of tx options

## 2023-03-09 ENCOUNTER — TELEPHONE (OUTPATIENT)
Dept: OBGYN CLINIC | Facility: CLINIC | Age: 43
End: 2023-03-09

## 2023-03-10 ENCOUNTER — TELEPHONE (OUTPATIENT)
Dept: OBGYN CLINIC | Facility: CLINIC | Age: 43
End: 2023-03-10

## 2023-03-13 DIAGNOSIS — K59.01 SLOW TRANSIT CONSTIPATION: ICD-10-CM

## 2023-03-14 ENCOUNTER — PREP FOR PROCEDURE (OUTPATIENT)
Dept: OBGYN CLINIC | Facility: CLINIC | Age: 43
End: 2023-03-14

## 2023-03-14 DIAGNOSIS — N92.0 EXCESSIVE OR FREQUENT MENSTRUATION: Primary | ICD-10-CM

## 2023-03-14 DIAGNOSIS — Z01.818 PREOP TESTING: ICD-10-CM

## 2023-03-14 RX ORDER — LUBIPROSTONE 24 UG/1
CAPSULE ORAL
Qty: 150 CAPSULE | Refills: 3 | Status: SHIPPED | OUTPATIENT
Start: 2023-03-14

## 2023-03-16 DIAGNOSIS — R10.9 ABDOMINAL PAIN: ICD-10-CM

## 2023-03-16 RX ORDER — SUCRALFATE ORAL 1 G/10ML
SUSPENSION ORAL
Qty: 420 ML | Refills: 0 | Status: SHIPPED | OUTPATIENT
Start: 2023-03-16

## 2023-03-17 ENCOUNTER — TELEPHONE (OUTPATIENT)
Dept: GASTROENTEROLOGY | Facility: CLINIC | Age: 43
End: 2023-03-17

## 2023-03-28 ENCOUNTER — PROCEDURE VISIT (OUTPATIENT)
Dept: OBGYN CLINIC | Facility: CLINIC | Age: 43
End: 2023-03-28

## 2023-03-28 VITALS
BODY MASS INDEX: 40.29 KG/M2 | WEIGHT: 236 LBS | HEIGHT: 64 IN | SYSTOLIC BLOOD PRESSURE: 120 MMHG | DIASTOLIC BLOOD PRESSURE: 68 MMHG

## 2023-03-28 DIAGNOSIS — N92.0 MENORRHAGIA WITH REGULAR CYCLE: Primary | ICD-10-CM

## 2023-03-28 NOTE — PROGRESS NOTES
Assessment/Plan:  Patient was consented for endometrial ablation, D&C, hysteroscopy  Endometrial biopsy done today  Patient aware if abnormal would not be a candidate for endometrial ablation  Risks and benefits of surgery reviewed including failure rate less than 3%, injury to adjacent structures (uterus, bladder, intestines) requiring further surgery  I will notify her of the above results via telephone  She will schedule postop visit 2 to 3 weeks from surgical date  No problem-specific Assessment & Plan notes found for this encounter  Diagnoses and all orders for this visit:    Menorrhagia with regular cycle  -     Tissue Exam    Other orders  -     Endometrial biopsy          Subjective:      Patient ID: Gabe Connor is a 40 y o  female  HPI     This is a very pleasant 52-year-old female P3 ( x3, age 2, 11, 16) complaining of heavy prolonged menses, every 4 weeks lasting 8 to 9 days, passing large clots for approximately 3 to 4 days  She denies any breakthrough bleeding  Menses have been significantly heavy since her last delivery  She denies any breakthrough bleeding  No changes in bowel or bladder function  Pap 2023 wnl  Pelvic US 2023 nl, AV     Hemoglobin 2023 9 8    The following portions of the patient's history were reviewed and updated as appropriate: allergies, current medications, past family history, past medical history, past social history, past surgical history and problem list     Review of Systems   Constitutional: Negative for fatigue, fever and unexpected weight change  Respiratory: Negative for cough, chest tightness, shortness of breath and wheezing  Cardiovascular: Negative  Negative for chest pain and palpitations  Gastrointestinal: Negative  Negative for abdominal distention, abdominal pain, blood in stool, constipation, diarrhea, nausea and vomiting  Genitourinary: Positive for menstrual problem   Negative for difficulty urinating, "dyspareunia, dysuria, flank pain, frequency, genital sores, hematuria, pelvic pain, urgency, vaginal bleeding, vaginal discharge and vaginal pain  Skin: Negative for rash  Objective:      /68   Ht 5' 4\" (1 626 m)   Wt 107 kg (236 lb)   LMP 03/18/2023   Breastfeeding No   BMI 40 51 kg/m²          Physical Exam  Constitutional:       Appearance: Normal appearance  HENT:      Head: Normocephalic and atraumatic  Cardiovascular:      Rate and Rhythm: Normal rate and regular rhythm  Pulmonary:      Effort: Pulmonary effort is normal       Breath sounds: Normal breath sounds  Abdominal:      General: Bowel sounds are normal  There is no distension  Palpations: Abdomen is soft  Tenderness: There is no abdominal tenderness  There is no guarding or rebound  Genitourinary:     Labia:         Right: No rash, tenderness or lesion  Left: No rash, tenderness or lesion  Vagina: No signs of injury  No vaginal discharge or tenderness  Cervix: No cervical motion tenderness, discharge, friability, lesion, erythema or cervical bleeding  Uterus: Not enlarged and not tender  Adnexa:         Right: No mass, tenderness or fullness  Left: No mass, tenderness or fullness  Neurological:      Mental Status: She is alert and oriented to person, place, and time  Extremities: present x 4      Endometrial biopsy    Date/Time: 3/28/2023 11:01 AM  Performed by: Kavita Ramos DO  Authorized by: Kavita Ramos DO   Universal Protocol:  Consent: Verbal consent not obtained    Consent given by: patient  Patient understanding: patient states understanding of the procedure being performed  Patient identity confirmed: verbally with patient      Procedure:     Procedure: endometrial biopsy with Pipelle      A bivalve speculum was placed in the vagina: yes      Cervix cleaned and prepped: yes      The cervix was dilated: yes      Uterus sounded: yes      Uterus " sound depth (cm):  8    Specimen collected: specimen collected and sent to pathology      Patient tolerated procedure well with no complications: yes    Findings:     Uterus size:  6-8 weeks    Cervix: normal      Adnexa: normal    Comments:     Procedure comments:  Patient was consented for endometrial biopsy  Next the cervix was visualized cleansed with chlorhexidine  Cervix was grasped using Allis clamp  Cervical os was then dilated  Endometrial Pipelle was then inserted, sounded to 8 cm   2 passes were obtained with ample amount of tissue  All instruments removed from the vagina  Patient tolerated the procedure well

## 2023-03-30 ENCOUNTER — TELEPHONE (OUTPATIENT)
Dept: GASTROENTEROLOGY | Facility: CLINIC | Age: 43
End: 2023-03-30

## 2023-03-31 ENCOUNTER — TELEPHONE (OUTPATIENT)
Dept: OBGYN CLINIC | Facility: CLINIC | Age: 43
End: 2023-03-31

## 2023-03-31 NOTE — TELEPHONE ENCOUNTER
----- Message from Susan Carver DO sent at 3/31/2023  8:45 AM EDT -----  Please call the patient inform EBx normal, all set for ablation next month

## 2023-04-06 ENCOUNTER — TELEPHONE (OUTPATIENT)
Dept: OBGYN CLINIC | Facility: CLINIC | Age: 43
End: 2023-04-06

## 2023-04-06 NOTE — TELEPHONE ENCOUNTER
Phone call in response to patient's physician email  She expresses concerns regarding her 8-week size uterus  She reviewed her endometrial biopsy note which had specified uterine size 6-8 weeks  Expresses concerns regarding a large uterus and proceeding with endometrial biopsy  Reviewed results of endometrial biopsy, physical exam, pelvic ultrasound done recently revealing normal size uterus, no evidence of fibroids  Discussed treatment options for menorrhagia including medical versus surgical intervention  Discussed the risks and benefits of endometrial ablation versus definitive surgery (in the face of multiple abdominal surgeries)  All questions answered to the best of my ability today  She will notify me if she would like to cancel/delay her endometrial biopsy next week

## 2023-05-25 NOTE — CONSULTS
[address] 1980, 40 y o  female MRN: [id number]  Unit/Bed#: [address] 209-01 Encounter: [id number]  Primary Care Provider: Katharine Harada, PA-C   Date and time admitted to hospital: 10/30/2022  7:57 PM    Consults    * Intractable nausea and vomiting  Assessment & Plan  - GI to perform endoscopy today  - F/u Results  - Recommend PPI BID  - Admitted to Medical team, rest of care per IM    Status post bariatric surgery  Assessment & Plan  Bariatrics on consult      Subjective:  Suzanne Jimenez is a 40 y o  female Body mass index is 38 56 kg/m²  with history of Laparoscopic conversion of Sleeve to RNY with Dr Delaney Slaughter in 2019 with subsequent Diagnostic Laparoscopy in 2020 who presents to the ED with complaints of Nausea, vomiting and abdominal pain        She states that the nausea started on Sunday a week ago and comes after meals, causing her to vomit  She has been very weak as well too  Also complains of diarrhea, headache, and abdominal pain  Of note, she sees a GI for chronic nausea  She has a history of gastric ulcers as well, but her endoscopy as of June did not show any  She also presented to the ED  on Friday with similar complaints  At that time, a CT scan was obtained showing no acute intra-abdominal finding  Bariatrics cleared for discharge at time  Reconsulted upon this admission       Objective:    /66   Pulse 72   Temp (!) 97 2 °F (36 2 °C)   Resp 14   Wt 106 kg (234 lb 9 1 oz)   LMP 10/29/2022   SpO2 98%   BMI 39 03 kg/m²       Intake/Output Summary (Last 24 hours) at 10/31/2022 1226  Last data filed at 10/30/2022 2142  Gross per 24 hour   Intake 1000 ml   Output --   Net 1000 ml       Invasive Devices  Report    Peripheral Intravenous Line  Duration           Peripheral IV 10/30/22 Left Arm <1 day                ROS: 10-point system completed  All negative except see HPI      Physical Exam    General Appearance: Pt overdue for fu and labs. Help her sched a fu  Dr. YEAGER   Alert, cooperative, no distress, appears stated age   Head:    Normocephalic, without obvious abnormality, atraumatic   Lungs:     Respirations unlabored   Heart:    Regular rate and rhythm   Abdomen:     Soft, mild epigastric tenderness, no masses, no organomegaly, non-distended   Extremities:   Extremities normal, atraumatic, no cyanosis or edema   Neurologic:  Incision:  Psych:   Normal strength and sensation    Clean, dry, and intact    Normal mood and affect       Lab, Imaging and other studies:  I have personally reviewed pertinent lab results  , CBC:   Lab Results   Component Value Date    WBC 4 98 10/31/2022    HGB 8 3 (L) 10/31/2022    HCT 28 3 (L) 10/31/2022    MCV 72 (L) 10/31/2022     (H) 10/31/2022    MCH 21 0 (L) 10/31/2022    MCHC 29 3 (L) 10/31/2022    RDW 18 8 (H) 10/31/2022    MPV 9 4 10/31/2022    NRBC 0 10/30/2022   , CMP:   Lab Results   Component Value Date    SODIUM 139 10/31/2022    K 3 5 10/31/2022     10/31/2022    CO2 24 10/31/2022    BUN 11 10/31/2022    CREATININE 0 85 10/31/2022    CALCIUM 8 7 10/31/2022    AST  10/30/2022      Comment:      No Result; if an AST is required for patient care, it must be ordered separately     Specimen collection should occur prior to Sulfasalazine administration due to the potential for falsely depressed results  ALT 18 10/30/2022    ALKPHOS 106 10/30/2022    EGFR 85 10/31/2022        VTE Mechanical Prophylaxis: sequential compression device    Plan of care was discussed with patient    Care plan discussed with Dr Manisha Milian, DO  Bariatric Surgery Fellow  10/31/2022  12:26 PM

## 2023-06-09 ENCOUNTER — OFFICE VISIT (OUTPATIENT)
Dept: GYNECOLOGY | Facility: CLINIC | Age: 43
End: 2023-06-09
Payer: MEDICARE

## 2023-06-09 VITALS
SYSTOLIC BLOOD PRESSURE: 110 MMHG | DIASTOLIC BLOOD PRESSURE: 70 MMHG | HEART RATE: 90 BPM | HEIGHT: 64 IN | WEIGHT: 229.6 LBS | BODY MASS INDEX: 39.2 KG/M2

## 2023-06-09 DIAGNOSIS — N80.03 ADENOMYOSIS: ICD-10-CM

## 2023-06-09 DIAGNOSIS — N92.1 MENORRHAGIA WITH IRREGULAR CYCLE: Primary | ICD-10-CM

## 2023-06-09 DIAGNOSIS — N83.202 CYST OF LEFT OVARY: ICD-10-CM

## 2023-06-09 PROCEDURE — 99215 OFFICE O/P EST HI 40 MIN: CPT | Performed by: OBSTETRICS & GYNECOLOGY

## 2023-06-09 NOTE — PROGRESS NOTES
Assessment/Plan:       Diagnoses and all orders for this visit:    Menorrhagia with irregular cycle; discussed menorrhagia and treatment options including alternative medical management versus endometrial ablation versus a hysterectomy  Patient may not be a candidate for an ablation if lower uterine segment is too thin from her prior  sections  She also is aware that if she does not ablation, if adequate lower uterine segment, this may not be successful if she does have adenomyosis  Adenomyosis; discussed treatment options including medical management versus hysterectomy  Patient would like to proceed with a hysterectomy  Discussed LSH BS versus TLH BS  The plan will be to proceed with an [facility] BS  The surgery with the risks were discussed including but not limited to infection, hemorrhage, bowel bladder or vascular injury, possible Marilee for laparotomy  She is aware that if there is endometriosis on her remaining ovary we may proceed with a left oophorectomy should this occur this will place her into surgical menopause  Cyst of left ovary; suspect hemorrhagic corpus luteum cyst possible endometrioma  She will return to the office for TVS SIS  Subjective:      Patient ID: Jorge Erickson is a 40 y o  female  HPI G6 P 3033,new patient, C section x3 with tubal, presents to the office complaining of heavy and prolonged menses with dysmenorrhea and pelvic pain  She states that over the past year she has been experiencing heavy and prolonged menses with passage of large clots  She had seen Dr Minnie Najera in March  There was discussion regarding proceeding with an endometrial ablation  Patient has been tried on medical management in the past but due to side effects from medication and ineffectiveness this was discontinued  She was seen in the emergency room recently because of the pain  Ultrasound revealed 2 4 cm complex cyst on the left ovary    Patient has had 3 prior  sections  She had a uterine rupture during a  attempt with her second pregnancy  Patient is also had a prior laparoscopic right salpingo-oophorectomy in 2018  She has had a prior gastric sleeve in   She had a Karmen-en-Y in   She has had a prior laparoscopic cholecystectomy  Last hemoglobin in the emergency room was 9 5  The patient has had blood transfusions in the past and she is also had prior iron infusions  She is presently on p o  iron  The following portions of the patient's history were reviewed and updated as appropriate:   She  has a past medical history of Abdominal pain, Anemia, Anesthesia, Back pain, Bariatric surgery status, Constipation, Dental bridge present, Diarrhea, Difficulty swallowing, Disease of thyroid gland, Dizzy, Fatigue, GERD (gastroesophageal reflux disease), Heart murmur, History of 2  sections, History of D&C (2019), History of iron deficiency, History of transfusion, Inguinal hernia, Loss of appetite, Low BP, Migraine, N&V (nausea and vomiting), Non-intractable vomiting, Palpitations, Postgastrectomy malabsorption, and Stomach pain    She   Patient Active Problem List    Diagnosis Date Noted   • Intractable nausea and vomiting 10/28/2022   • GBS bacteriuria 2020   • Abnormal uterine bleeding (AUB) 2020   • Status post bariatric surgery 2020   • Iron deficiency anemia secondary to inadequate dietary iron intake 2019   • Menorrhagia with regular cycle 2019   • Pelvic pain 2019   • BV (bacterial vaginosis) 2019   • Status post suction D&C 2019   • Missed  2019   • Amenorrhea 2019   • Pregnancy complicated by previous bariatric surgery, first trimester 2019   • Pouchitis ([facility] [address] ) 2019   • Leg swelling 2019   • Decreased oral intake 2019   • Dysphagia 2019   • Heart burn 2019   • Epigastric abdominal pain 2019   • Other constipation 2019 • Chronic vomiting 2019   • Gastritis 2019   • Irritable bowel syndrome (IBS) 2019   • Symptomatic anemia 2019   • S/P laparoscopic sleeve gastrectomy 2019   • Bilious vomiting with nausea 2019   • Obesity, Class II, BMI 35-39 9 2019   • Bariatric surgery status 2018   • Postsurgical malabsorption 2018   • History of multiple miscarriages 10/10/2018     She  has a past surgical history that includes Sleeve Gastroplasty; Cholecystectomy; pr egd transoral biopsy single/multiple (N/A, 2019); Salpingoophorectomy (Right, 2018);  section; CHROMOSOME ANALYSIS, PRODUCTS OF CONCEPTION (HISTORICAL) (); pr laps gstr rstcv px w/byp candace-en-y limb <150 cm (N/A, 2019); REVISION BYPASS LAPAROSCOPIC (N/A, 2019); pr tx missed  first trimester surgical (N/A, 2019); LAPAROSCOPY (N/A, 2020); and Laparoscopic salpingoopherectomy (Right, 2018)  Her family history includes Heart disease in her mother; Hypertension in her mother; No Known Problems in her father  She  reports that she has never smoked  She has never used smokeless tobacco  She reports that she does not currently use alcohol  She reports that she does not use drugs    Current Outpatient Medications   Medication Sig Dispense Refill   • ascorbic acid (VITAMIN C) 500 mg tablet Take 500 mg by mouth daily (Patient not taking: Reported on 3/28/2023)     • bisacodyl (DULCOLAX) 5 mg EC tablet Take 1 tablet (5 mg total) by mouth once for 1 dose (Patient not taking: Reported on 2023) 2 tablet 0   • buPROPion (WELLBUTRIN XL) 300 mg 24 hr tablet      • famotidine (PEPCID) 20 mg tablet Take 1 tablet (20 mg total) by mouth 2 (two) times a day 30 tablet 0   • ferrous sulfate 325 (65 Fe) mg tablet Take 325 mg by mouth daily with breakfast     • furosemide (LASIX) 20 mg tablet Take 20 mg by mouth daily as needed     • lubiprostone (AMITIZA) 24 mcg capsule TAKE 1 CAPSULE BY MOUTH DAILY WITH BREAKFAST 150 capsule 3   • ondansetron (ZOFRAN-ODT) 4 mg disintegrating tablet TAKE 1 TABLET BY MOUTH EVERY 6 HOURS AS NEEDED FOR NAUSEA OR VOMITING 20 tablet 1   • pantoprazole (PROTONIX) 20 mg tablet Take 1 tablet (20 mg total) by mouth daily 20 tablet 0   • pantoprazole (PROTONIX) 40 mg tablet Take 1 tablet (40 mg total) by mouth 2 (two) times a day 60 tablet 0   • sucralfate (CARAFATE) 1 g tablet Take 1 tablet (1 g total) by mouth 4 (four) times a day 20 tablet 0   • sucralfate (CARAFATE) 1 g/10 mL suspension TAKE 10 ML BY MOUTH 4 TIMES A  mL 0   • topiramate (TOPAMAX) 25 mg tablet        No current facility-administered medications for this visit       Current Outpatient Medications on File Prior to Visit   Medication Sig   • ascorbic acid (VITAMIN C) 500 mg tablet Take 500 mg by mouth daily (Patient not taking: Reported on 3/28/2023)   • bisacodyl (DULCOLAX) 5 mg EC tablet Take 1 tablet (5 mg total) by mouth once for 1 dose (Patient not taking: Reported on 1/19/2023)   • buPROPion (WELLBUTRIN XL) 300 mg 24 hr tablet    • famotidine (PEPCID) 20 mg tablet Take 1 tablet (20 mg total) by mouth 2 (two) times a day   • ferrous sulfate 325 (65 Fe) mg tablet Take 325 mg by mouth daily with breakfast   • furosemide (LASIX) 20 mg tablet Take 20 mg by mouth daily as needed   • lubiprostone (AMITIZA) 24 mcg capsule TAKE 1 CAPSULE BY MOUTH DAILY WITH BREAKFAST   • ondansetron (ZOFRAN-ODT) 4 mg disintegrating tablet TAKE 1 TABLET BY MOUTH EVERY 6 HOURS AS NEEDED FOR NAUSEA OR VOMITING   • pantoprazole (PROTONIX) 20 mg tablet Take 1 tablet (20 mg total) by mouth daily   • pantoprazole (PROTONIX) 40 mg tablet Take 1 tablet (40 mg total) by mouth 2 (two) times a day   • sucralfate (CARAFATE) 1 g tablet Take 1 tablet (1 g total) by mouth 4 (four) times a day   • sucralfate (CARAFATE) 1 g/10 mL suspension TAKE 10 ML BY MOUTH 4 TIMES A DAY   • topiramate (TOPAMAX) 25 mg tablet    • [DISCONTINUED] "Plecanatide (Trulance) 3 MG TABS Take 3 mg by mouth daily (Patient not taking: Reported on 3/28/2023)   • [DISCONTINUED] scopolamine (TRANSDERM-SCOP) 1 mg/3 days TD 72 hr patch Place 1 patch on the skin every third day   • [DISCONTINUED] senna-docusate sodium (SENOKOT S) 8 6-50 mg per tablet Take 1 tablet by mouth 2 (two) times a day (Patient not taking: Reported on 1/19/2023)     No current facility-administered medications on file prior to visit  She is allergic to aspirin, shellfish-derived products - food allergy, contrast [iodinated contrast media], ibuprofen, and reglan [metoclopramide]       Review of Systems   Constitutional: Positive for fatigue  Gastrointestinal: Negative  Genitourinary: Positive for dyspareunia, menstrual problem and pelvic pain  Objective:      /70   Pulse 90   Ht 5' 4\" (1 626 m)   Wt 104 kg (229 lb 9 6 oz)   BMI 39 41 kg/m²          Physical Exam  Vitals reviewed  Cardiovascular:      Rate and Rhythm: Normal rate and regular rhythm  Pulses: Normal pulses  Heart sounds: Normal heart sounds  No murmur heard  Pulmonary:      Effort: Pulmonary effort is normal  No respiratory distress  Breath sounds: Normal breath sounds  Abdominal:      General: There is no distension  Palpations: Abdomen is soft  There is no mass  Tenderness: There is no abdominal tenderness  There is no guarding or rebound  Hernia: No hernia is present  There is no hernia in the left inguinal area or right inguinal area  Genitourinary:     General: Normal vulva  Labia:         Right: No rash, tenderness or lesion  Left: No rash, tenderness or lesion  Vagina: Normal       Cervix: Normal       Uterus: Tender (globular)  Adnexa:         Right: No mass, tenderness or fullness  Left: No mass, tenderness or fullness  Musculoskeletal:      Cervical back: Normal range of motion and neck supple  No tenderness     Lymphadenopathy: " Cervical: No cervical adenopathy  Lower Body: No right inguinal adenopathy  No left inguinal adenopathy  Neurological:      Mental Status: She is alert

## 2023-06-28 ENCOUNTER — ULTRASOUND (OUTPATIENT)
Dept: GYNECOLOGY | Facility: CLINIC | Age: 43
End: 2023-06-28
Payer: MEDICARE

## 2023-06-28 DIAGNOSIS — N93.9 ABNORMAL UTERINE BLEEDING (AUB): Primary | ICD-10-CM

## 2023-06-28 DIAGNOSIS — N93.9 ABNORMAL UTERINE BLEEDING (AUB): ICD-10-CM

## 2023-06-28 DIAGNOSIS — N80.03 ADENOMYOSIS: Primary | ICD-10-CM

## 2023-06-28 DIAGNOSIS — Z01.812 ENCOUNTER FOR PRE-OPERATIVE LABORATORY TESTING: ICD-10-CM

## 2023-06-28 DIAGNOSIS — D50.0 IRON DEFICIENCY ANEMIA DUE TO CHRONIC BLOOD LOSS: ICD-10-CM

## 2023-06-28 PROCEDURE — 88305 TISSUE EXAM BY PATHOLOGIST: CPT | Performed by: PATHOLOGY

## 2023-06-28 PROCEDURE — 99215 OFFICE O/P EST HI 40 MIN: CPT | Performed by: OBSTETRICS & GYNECOLOGY

## 2023-06-28 PROCEDURE — 58100 BIOPSY OF UTERUS LINING: CPT | Performed by: OBSTETRICS & GYNECOLOGY

## 2023-06-28 PROCEDURE — 76830 TRANSVAGINAL US NON-OB: CPT | Performed by: OBSTETRICS & GYNECOLOGY

## 2023-06-28 PROCEDURE — 76831 ECHO EXAM UTERUS: CPT | Performed by: OBSTETRICS & GYNECOLOGY

## 2023-06-28 PROCEDURE — 58340 CATHETER FOR HYSTEROGRAPHY: CPT | Performed by: OBSTETRICS & GYNECOLOGY

## 2023-06-28 NOTE — PROGRESS NOTES
AMB US Pelvic Non OB    Date/Time: 2023 2:30 PM    Performed by: Ramila Yun  Authorized by: Oniel Villavicencio DO  Universal Protocol:  Consent given by: patient  Patient identity confirmed: verbally with patient      Procedure details:     Indications: non-obstetric vaginal bleeding      Technique:  Transvaginal US, Non-OB    Position: lithotomy exam    Uterine findings:     Length (cm): 10 2    Height (cm):  4 77    Width (cm):  6 3    Endometrial stripe: identified      Endometrium thickness (mm):  14 66  Left ovary findings:     Left ovary:  Visualized    Length (cm): 4 83    Height (cm): 2 48    Width (cm): 2 51  Right ovary findings:     Right ovary:  Not visualized  Other findings:     Free pelvic fluid: not identified      Free peritoneal fluid: not identified    Post-Procedure Details:     Impression:  Anteverted uterus is inhomogeneous throughout without fibroids  The left ovary demonstrates a 1 6cm corpus luteal cyst  The right ovary has been surgically removed  There is no free fluid  The  scar appears 6 5mm from the cervical canal      Tolerance: Tolerated well, no immediate complications    Complications: no complications    Additional Procedure Comments:      GE Voluson P8 transvaginal transducer RIC5-RA with Serial Number [id number] was used during procedure and subsequently cleaned with high level disinfection utilizing the [employer]       Ultrasound performed at:     [facility] [address]  [address]  [address], [address]  Phone: [phone]  Fax:  [phone]    Endometrial biopsy    Date/Time: 2023 2:30 PM    Performed by: Oniel Villavicencio DO  Authorized by: Oniel Villavicencio DO  Universal Protocol:  Consent given by: patient  Patient identity confirmed: verbally with patient      Procedure:     Procedure: endometrial biopsy with Pipelle      A bivalve speculum was placed in the vagina: yes      Cervix cleaned and prepped: yes      Specimen collected: specimen collected and sent to pathology      Patient tolerated procedure well with no complications: yes    Sonohysterogram    Date/Time: 6/28/2023 2:30 PM    Performed by: Kathline Harada, DO  Authorized by: Kathline Harada, DO  Universal Protocol:  Consent given by: patient  Patient understanding: patient states understanding of the procedure being performed  Patient consent: the patient's understanding of the procedure matches consent given  Patient identity confirmed: verbally with patient      Pre-procedure:     Prepped with: Betadine    Procedure:     Cervix cleaned and prepped: yes      Uterus sounded: yes      Catheter inserted: yes      Uterine cavity distended with saline: yes    Post-procedure:     Patient observed: yes      Post procedure instructions given to patient: yes      Patient tolerated procedure well with no complications: yes    Comments:      Sonohysterogram demonstrates a smooth appearing endometrium

## 2023-06-28 NOTE — PROGRESS NOTES
Assessment/Plan:       Diagnoses and all orders for this visit:    Adenomyosis: Discussed treatment options including medical management versus hysterectomy  Patient is not a candidate for endometrial ablation secondary to thin lower uterine segment status post 3  sections  Patient has opted to proceed with hysterectomy  Discussed [facility] BS versus [facility] BS  She would like to proceed with an [facility] BS  The surgery with risks were discussed including, but not limited to, infection, hemorrhage, bowel bladder or vascular injury, possible need for laparotomy  Abnormal uterine bleeding (AUB)    Iron deficiency anemia due to chronic blood loss      Visit 50 minutes with greater than 50% discussion and coordinating care    Subjective:      Patient ID: Manuel Martinez is a 40 y o  female  HPI  G6 P 3033,new patient, C section x3 with tubal, presented 23 to the office complaining of heavy and prolonged menses with dysmenorrhea and pelvic pain  She states that over the past year she has been experiencing heavy and prolonged menses with passage of large clots  She had seen Dr Becky Hedrick in March  There was discussion regarding proceeding with an endometrial ablation  Patient has been tried on medical management in the past but due to side effects from medication and ineffectiveness this was discontinued  She was seen in the emergency room recently because of the pain  Ultrasound revealed 2 4 cm complex cyst on the left ovary  Patient has had 3 prior  sections  She had a uterine rupture during a  attempt with her second pregnancy  Patient is also had a prior laparoscopic right salpingo-oophorectomy in 2018  She has had a prior gastric sleeve in   She had a Karmen-en-Y in   She has had a prior laparoscopic cholecystectomy  Last hemoglobin in the emergency room was 9 5  The patient has had blood transfusions in the past and she is also had prior iron infusions    She is presently on p o  iron  TVS/SIS/ biopsy:     AMB US Pelvic Non OB     Date/Time: 2023 2:30 PM     Performed by: Charlotte Soriano  Authorized by: Kathline Harada, DO  Universal Protocol:  Consent given by: patient  Patient identity confirmed: verbally with patient        Procedure details:     Indications: non-obstetric vaginal bleeding      Technique:  Transvaginal US, Non-OB    Position: lithotomy exam    Uterine findings:     Length (cm): 10 2    Height (cm):  4 77    Width (cm):  6 3    Endometrial stripe: identified      Endometrium thickness (mm):  14 66  Left ovary findings:     Left ovary:  Visualized    Length (cm): 4 83    Height (cm): 2 48    Width (cm): 2 51  Right ovary findings:     Right ovary:  Not visualized  Other findings:     Free pelvic fluid: not identified      Free peritoneal fluid: not identified    Post-Procedure Details:     Impression:  Anteverted uterus is inhomogeneous throughout without fibroids  The left ovary demonstrates a 1 6cm corpus luteal cyst  The right ovary has been surgically removed  There is no free fluid  The  scar appears 6 5mm from the cervical canal      Tolerance:   Tolerated well, no immediate complications    Complications: no complications    Additional Procedure Comments:      GE Voluson P8 transvaginal transducer RIC5-RA with Serial Number [id number] was used during procedure and subsequently cleaned with high level disinfection utilizing the [employer] 360on [employer]       Ultrasound performed at:   [place] [address] [address] [facility]  Phone: [phone]  Fax:  [phone]     Endometrial biopsy     Date/Time: 2023 2:30 PM     Performed by: Kathline Harada, DO  Authorized by: Kathline Harada, DO  Universal Protocol:  Consent given by: patient  Patient identity confirmed: verbally with patient        Procedure:     Procedure: endometrial biopsy with Pipelle      A bivalve speculum was placed in the vagina: yes      Cervix cleaned and prepped: yes      Specimen collected: specimen collected and sent to pathology      Patient tolerated procedure well with no complications: yes    Sonohysterogram     Date/Time: 2023 2:30 PM     Performed by: Cass Stein DO  Authorized by: Cass Stein DO  Universal Protocol:  Consent given by: patient  Patient understanding: patient states understanding of the procedure being performed  Patient consent: the patient's understanding of the procedure matches consent given  Patient identity confirmed: verbally with patient        Pre-procedure:     Prepped with: Betadine    Procedure:     Cervix cleaned and prepped: yes      Uterus sounded: yes      Catheter inserted: yes      Uterine cavity distended with saline: yes    Post-procedure:     Patient observed: yes      Post procedure instructions given to patient: yes      Patient tolerated procedure well with no complications: yes    Comments:      Sonohysterogram demonstrates a smooth appearing endometrium                The following portions of the patient's history were reviewed and updated as appropriate:   She  has a past medical history of Abdominal pain, Anemia, Anesthesia, Back pain, Bariatric surgery status, Constipation, Dental bridge present, Diarrhea, Difficulty swallowing, Disease of thyroid gland, Dizzy, Fatigue, GERD (gastroesophageal reflux disease), Heart murmur, History of 2  sections, History of D&C (2019), History of iron deficiency, History of transfusion, Inguinal hernia, Loss of appetite, Low BP, Migraine, N&V (nausea and vomiting), Non-intractable vomiting, Palpitations, Postgastrectomy malabsorption, and Stomach pain    She   Patient Active Problem List    Diagnosis Date Noted   • Intractable nausea and vomiting 10/28/2022   • GBS bacteriuria 2020   • Abnormal uterine bleeding (AUB) 2020   • Status post bariatric surgery 2020   • Iron deficiency anemia secondary to inadequate dietary iron intake 2019   • Menorrhagia with regular cycle 2019   • Pelvic pain 2019   • BV (bacterial vaginosis) 2019   • Status post suction D&C 2019   • Missed  2019   • Amenorrhea 2019   • Pregnancy complicated by previous bariatric surgery, first trimester 2019   • Pouchitis ([address] ) 2019   • Leg swelling 2019   • Decreased oral intake 2019   • Dysphagia 2019   • Heart burn 2019   • Epigastric abdominal pain 2019   • Other constipation 2019   • Chronic vomiting 2019   • Gastritis 2019   • Irritable bowel syndrome (IBS) 2019   • Symptomatic anemia 2019   • S/P laparoscopic sleeve gastrectomy 2019   • Bilious vomiting with nausea 2019   • Obesity, Class II, BMI 35-39 9 2019   • Bariatric surgery status 2018   • Postsurgical malabsorption 2018   • History of multiple miscarriages 10/10/2018     She  has a past surgical history that includes Sleeve Gastroplasty; Cholecystectomy; pr egd transoral biopsy single/multiple (N/A, 2019); Salpingoophorectomy (Right, 2018);  section; CHROMOSOME ANALYSIS, PRODUCTS OF CONCEPTION (HISTORICAL) (); pr laps gstr rstcv px w/byp candace-en-y limb <150 cm (N/A, 2019); REVISION BYPASS LAPAROSCOPIC (N/A, 2019); pr tx missed  first trimester surgical (N/A, 2019); LAPAROSCOPY (N/A, 2020); and Laparoscopic salpingoopherectomy (Right, 2018)  Her family history includes Heart disease in her mother; Hypertension in her mother; No Known Problems in her father  She  reports that she has never smoked  She has never used smokeless tobacco  She reports that she does not currently use alcohol  She reports that she does not use drugs    Current Outpatient Medications   Medication Sig Dispense Refill   • ascorbic acid (VITAMIN C) 500 mg tablet Take 500 mg by mouth daily (Patient not taking: Reported on 3/28/2023)     • bisacodyl (DULCOLAX) 5 mg EC tablet Take 1 tablet (5 mg total) by mouth once for 1 dose (Patient not taking: Reported on 1/19/2023) 2 tablet 0   • buPROPion (WELLBUTRIN XL) 300 mg 24 hr tablet      • famotidine (PEPCID) 20 mg tablet Take 1 tablet (20 mg total) by mouth 2 (two) times a day 30 tablet 0   • ferrous sulfate 325 (65 Fe) mg tablet Take 325 mg by mouth daily with breakfast     • furosemide (LASIX) 20 mg tablet Take 20 mg by mouth daily as needed     • lubiprostone (AMITIZA) 24 mcg capsule TAKE 1 CAPSULE BY MOUTH DAILY WITH BREAKFAST 150 capsule 3   • ondansetron (ZOFRAN-ODT) 4 mg disintegrating tablet TAKE 1 TABLET BY MOUTH EVERY 6 HOURS AS NEEDED FOR NAUSEA OR VOMITING 20 tablet 1   • pantoprazole (PROTONIX) 20 mg tablet Take 1 tablet (20 mg total) by mouth daily 20 tablet 0   • pantoprazole (PROTONIX) 40 mg tablet Take 1 tablet (40 mg total) by mouth 2 (two) times a day 60 tablet 0   • sucralfate (CARAFATE) 1 g tablet Take 1 tablet (1 g total) by mouth 4 (four) times a day 20 tablet 0   • sucralfate (CARAFATE) 1 g/10 mL suspension TAKE 10 ML BY MOUTH 4 TIMES A  mL 0   • topiramate (TOPAMAX) 25 mg tablet        No current facility-administered medications for this visit       Current Outpatient Medications on File Prior to Visit   Medication Sig   • ascorbic acid (VITAMIN C) 500 mg tablet Take 500 mg by mouth daily (Patient not taking: Reported on 3/28/2023)   • bisacodyl (DULCOLAX) 5 mg EC tablet Take 1 tablet (5 mg total) by mouth once for 1 dose (Patient not taking: Reported on 1/19/2023)   • buPROPion (WELLBUTRIN XL) 300 mg 24 hr tablet    • famotidine (PEPCID) 20 mg tablet Take 1 tablet (20 mg total) by mouth 2 (two) times a day   • ferrous sulfate 325 (65 Fe) mg tablet Take 325 mg by mouth daily with breakfast   • furosemide (LASIX) 20 mg tablet Take 20 mg by mouth daily as needed   • lubiprostone (AMITIZA) 24 mcg capsule TAKE 1 CAPSULE BY MOUTH DAILY WITH BREAKFAST   • ondansetron (ZOFRAN-ODT) 4 mg disintegrating tablet TAKE 1 TABLET BY MOUTH EVERY 6 HOURS AS NEEDED FOR NAUSEA OR VOMITING   • pantoprazole (PROTONIX) 20 mg tablet Take 1 tablet (20 mg total) by mouth daily   • pantoprazole (PROTONIX) 40 mg tablet Take 1 tablet (40 mg total) by mouth 2 (two) times a day   • sucralfate (CARAFATE) 1 g tablet Take 1 tablet (1 g total) by mouth 4 (four) times a day   • sucralfate (CARAFATE) 1 g/10 mL suspension TAKE 10 ML BY MOUTH 4 TIMES A DAY   • topiramate (TOPAMAX) 25 mg tablet      No current facility-administered medications on file prior to visit  She is allergic to aspirin, shellfish-derived products - food allergy, contrast [iodinated contrast media], ibuprofen, and reglan [metoclopramide]       Review of Systems   Constitutional: Negative  HENT: Negative for sore throat and trouble swallowing  Gastrointestinal: Negative  Genitourinary: Positive for menstrual problem  Objective: There were no vitals taken for this visit  Physical Exam  Vitals reviewed  Constitutional:       Appearance: Normal appearance  Cardiovascular:      Rate and Rhythm: Normal rate and regular rhythm  Pulses: Normal pulses  Heart sounds: Normal heart sounds  No murmur heard  Pulmonary:      Effort: Pulmonary effort is normal  No respiratory distress  Breath sounds: Normal breath sounds  Abdominal:      General: There is no distension  Palpations: Abdomen is soft  There is no mass  Tenderness: There is no abdominal tenderness  There is no guarding or rebound  Hernia: No hernia is present  There is no hernia in the left inguinal area or right inguinal area  Genitourinary:     General: Normal vulva  Labia:         Right: No rash, tenderness or lesion  Left: No rash, tenderness or lesion         Vagina: Normal       Cervix: Normal       Uterus: Normal        Adnexa:         Right: No mass, tenderness or fullness  Left: No mass, tenderness or fullness  Lymphadenopathy:      Lower Body: No right inguinal adenopathy  No left inguinal adenopathy  Neurological:      Mental Status: She is alert

## 2023-07-05 PROCEDURE — 88305 TISSUE EXAM BY PATHOLOGIST: CPT | Performed by: PATHOLOGY

## 2023-07-13 ENCOUNTER — HOSPITAL ENCOUNTER (EMERGENCY)
Facility: HOSPITAL | Age: 43
Discharge: HOME | End: 2023-07-13
Attending: EMERGENCY MEDICINE
Payer: MEDICARE

## 2023-07-13 VITALS
DIASTOLIC BLOOD PRESSURE: 70 MMHG | SYSTOLIC BLOOD PRESSURE: 115 MMHG | RESPIRATION RATE: 12 BRPM | TEMPERATURE: 97.9 F | WEIGHT: 234.13 LBS | OXYGEN SATURATION: 99 % | HEART RATE: 68 BPM | BODY MASS INDEX: 40.19 KG/M2

## 2023-07-13 DIAGNOSIS — D64.9 SYMPTOMATIC ANEMIA: ICD-10-CM

## 2023-07-13 DIAGNOSIS — R42 DIZZINESS: Primary | ICD-10-CM

## 2023-07-13 LAB
ALBUMIN SERPL BCP-MCNC: 4 G/DL (ref 3.5–5)
ALP SERPL-CCNC: 84 U/L (ref 34–104)
ALT SERPL W P-5'-P-CCNC: 12 U/L (ref 7–52)
ANION GAP SERPL CALCULATED.3IONS-SCNC: 6 MMOL/L
AST SERPL W P-5'-P-CCNC: 20 U/L (ref 13–39)
BACTERIA UR QL AUTO: ABNORMAL /HPF
BASOPHILS # BLD MANUAL: 0.06 THOUSAND/UL (ref 0–0.1)
BASOPHILS NFR MAR MANUAL: 1 % (ref 0–1)
BILIRUB SERPL-MCNC: 0.3 MG/DL (ref 0.2–1)
BILIRUB UR QL STRIP: NEGATIVE
BUN SERPL-MCNC: 14 MG/DL (ref 5–25)
CALCIUM SERPL-MCNC: 8.5 MG/DL (ref 8.4–10.2)
CHLORIDE SERPL-SCNC: 106 MMOL/L (ref 96–108)
CLARITY UR: CLEAR
CO2 SERPL-SCNC: 25 MMOL/L (ref 21–32)
COLOR UR: YELLOW
CREAT SERPL-MCNC: 0.8 MG/DL (ref 0.6–1.3)
EOSINOPHIL # BLD MANUAL: 0.11 THOUSAND/UL (ref 0–0.4)
EOSINOPHIL NFR BLD MANUAL: 2 % (ref 0–6)
ERYTHROCYTE [DISTWIDTH] IN BLOOD BY AUTOMATED COUNT: 17.1 % (ref 11.6–15.1)
EXT PREGNANCY TEST URINE: NEGATIVE
EXT. CONTROL: NORMAL
GFR SERPL CREATININE-BSD FRML MDRD: 91 ML/MIN/1.73SQ M
GLUCOSE SERPL-MCNC: 84 MG/DL (ref 65–140)
GLUCOSE UR STRIP-MCNC: NEGATIVE MG/DL
HCT VFR BLD AUTO: 29.6 % (ref 34.8–46.1)
HGB BLD-MCNC: 8.5 G/DL (ref 11.5–15.4)
HGB UR QL STRIP.AUTO: ABNORMAL
HYPERCHROMIA BLD QL SMEAR: PRESENT
KETONES UR STRIP-MCNC: NEGATIVE MG/DL
LEUKOCYTE ESTERASE UR QL STRIP: NEGATIVE
LIPASE SERPL-CCNC: 18 U/L (ref 11–82)
LYMPHOCYTES # BLD AUTO: 2.24 THOUSAND/UL (ref 0.6–4.47)
LYMPHOCYTES # BLD AUTO: 40 % (ref 14–44)
MCH RBC QN AUTO: 20.7 PG (ref 26.8–34.3)
MCHC RBC AUTO-ENTMCNC: 28.7 G/DL (ref 31.4–37.4)
MCV RBC AUTO: 72 FL (ref 82–98)
MONOCYTES # BLD AUTO: 0.39 THOUSAND/UL (ref 0–1.22)
MONOCYTES NFR BLD: 7 % (ref 4–12)
MUCOUS THREADS UR QL AUTO: ABNORMAL
NEUTROPHILS # BLD MANUAL: 2.8 THOUSAND/UL (ref 1.85–7.62)
NEUTS SEG NFR BLD AUTO: 50 % (ref 43–75)
NITRITE UR QL STRIP: NEGATIVE
NON-SQ EPI CELLS URNS QL MICRO: ABNORMAL /HPF
PH UR STRIP.AUTO: 7 [PH] (ref 4.5–8)
PLATELET # BLD AUTO: 418 THOUSANDS/UL (ref 149–390)
PLATELET BLD QL SMEAR: ABNORMAL
PMV BLD AUTO: 9.3 FL (ref 8.9–12.7)
POTASSIUM SERPL-SCNC: 3.8 MMOL/L (ref 3.5–5.3)
PROT SERPL-MCNC: 7.3 G/DL (ref 6.4–8.4)
PROT UR STRIP-MCNC: NEGATIVE MG/DL
RBC # BLD AUTO: 4.11 MILLION/UL (ref 3.81–5.12)
RBC #/AREA URNS AUTO: ABNORMAL /HPF
RBC MORPH BLD: PRESENT
SODIUM SERPL-SCNC: 137 MMOL/L (ref 135–147)
SP GR UR STRIP.AUTO: 1.01 (ref 1–1.03)
UROBILINOGEN UR QL STRIP.AUTO: 0.2 E.U./DL
WBC # BLD AUTO: 5.6 THOUSAND/UL (ref 4.31–10.16)
WBC #/AREA URNS AUTO: ABNORMAL /HPF

## 2023-07-13 PROCEDURE — 99284 EMERGENCY DEPT VISIT MOD MDM: CPT

## 2023-07-13 PROCEDURE — 83690 ASSAY OF LIPASE: CPT | Performed by: EMERGENCY MEDICINE

## 2023-07-13 PROCEDURE — 85007 BL SMEAR W/DIFF WBC COUNT: CPT | Performed by: EMERGENCY MEDICINE

## 2023-07-13 PROCEDURE — 81001 URINALYSIS AUTO W/SCOPE: CPT

## 2023-07-13 PROCEDURE — 96361 HYDRATE IV INFUSION ADD-ON: CPT

## 2023-07-13 PROCEDURE — 99284 EMERGENCY DEPT VISIT MOD MDM: CPT | Performed by: EMERGENCY MEDICINE

## 2023-07-13 PROCEDURE — 85027 COMPLETE CBC AUTOMATED: CPT | Performed by: EMERGENCY MEDICINE

## 2023-07-13 PROCEDURE — 36415 COLL VENOUS BLD VENIPUNCTURE: CPT | Performed by: EMERGENCY MEDICINE

## 2023-07-13 PROCEDURE — 81025 URINE PREGNANCY TEST: CPT | Performed by: EMERGENCY MEDICINE

## 2023-07-13 PROCEDURE — 80053 COMPREHEN METABOLIC PANEL: CPT | Performed by: EMERGENCY MEDICINE

## 2023-07-13 PROCEDURE — 96374 THER/PROPH/DIAG INJ IV PUSH: CPT

## 2023-07-13 RX ORDER — ONDANSETRON 2 MG/ML
4 INJECTION INTRAMUSCULAR; INTRAVENOUS ONCE
Status: COMPLETED | OUTPATIENT
Start: 2023-07-13 | End: 2023-07-13

## 2023-07-13 RX ORDER — MULTIVITAMIN
1 TABLET ORAL DAILY
COMMUNITY

## 2023-07-13 RX ORDER — ACETAMINOPHEN 325 MG/1
650 TABLET ORAL ONCE
Status: COMPLETED | OUTPATIENT
Start: 2023-07-13 | End: 2023-07-13

## 2023-07-13 RX ADMIN — SODIUM CHLORIDE 1000 ML: 0.9 INJECTION, SOLUTION INTRAVENOUS at 13:07

## 2023-07-13 RX ADMIN — ONDANSETRON 4 MG: 2 INJECTION INTRAMUSCULAR; INTRAVENOUS at 13:12

## 2023-07-13 RX ADMIN — ACETAMINOPHEN 325MG 650 MG: 325 TABLET ORAL at 12:46

## 2023-07-13 NOTE — ED NOTES
Pt ambulatory to bathroom to provide urine sample. Pt states " I feel a little dizzy".       hospitals Standard  07/13/23 150

## 2023-07-13 NOTE — ED PROVIDER NOTES
Pt Name: Natalio Seaman  MRN: [id number]  [address] 1980  Age/Sex: 40 y.o. female  Date of evaluation: 2023  PCP: Alysha Anaya PA-C    CHIEF COMPLAINT    Chief Complaint   Patient presents with   • Dizziness     Pt reports dizziness, weakness since Tuesday which has gotten increasingly worse. Pt reports she currently has her period and that her cycle is normally heavy. Cycle started last Tuesday. Pt reports N/V yesterday along with head pressure on the back of her head. HPI    Sarita Hargrove presents to the Emergency Department complaining of feeling weak and dizzy. She attributes this to heavy periods. She has been evaluated for this and is scheduled for an upcoming hysterectomy.      HPI    Past Medical and Surgical History    Past Medical History:   Diagnosis Date   • Abdominal pain     "almost constant"   • Anemia    • Anesthesia     "woke up swinging with last  2018  "was fine with EGD"   • Back pain    • Bariatric surgery status     -gastric sleeve revison RUEN-Y 2019-abd painnow-dx lap today 3/9/2020   • Constipation    • Dental bridge present     right lower permanent   • Diarrhea    • Difficulty swallowing     "in the past"   • Disease of thyroid gland     not on meds now   • Dizzy    • Fatigue     "when blood pressure low" and weakness too"   • GERD (gastroesophageal reflux disease)     "increase after surgery"   • Heart murmur     heart murmer, work up negative with holter monitor   • History of 2  sections     most recent 17   • History of D&C 2019   • History of iron deficiency     anemia/ had IV infusions through pregnancy in 8050-2878   • History of transfusion     2019 - pt had allergic reaction - had to stop the blood   • Inguinal hernia     right   • Loss of appetite    • Low BP     "off and on"   • Migraine    • N&V (nausea and vomiting)     " a little better since on meds"   • Non-intractable vomiting     "not since been on medicine"   • Palpitations     "having again"   • Postgastrectomy malabsorption    • Stomach pain        Past Surgical History:   Procedure Laterality Date   •  SECTION      x2   • CHOLECYSTECTOMY     • CHROMOSOME ANALYSIS, PRODUCTS OF CONCEPTION (HISTORICAL)  2018    2 miscarriages in  and Nov   • LAPAROSCOPIC SALPINGOOPHERECTOMY Right 2018    LVH, 6 cm benign ovarian cyst   • LAPAROSCOPY N/A 2020    Procedure: DIAGNOSTIC LAPAROSCOPY,CLOSURE OF COATES DEFECT, INTRAOP EGD;  Surgeon: Olimpia Soriano MD;  Location: [facility];  Service: Bariatrics   • CT EGD TRANSORAL BIOPSY SINGLE/MULTIPLE N/A 2019    Procedure: ESOPHAGOGASTRODUODENOSCOPY (EGD) with bx;  Surgeon: Olimpia Soriano MD;  [facility];   Service: Bariatrics   [facility] [address] [facility]BYP SREE-EN-Y LIMB <150 CM N/A 2019    Procedure: LAP SREE-EN-Y GASTRIC BYPASS, INTRAOP EGD;  Surgeon: Olimpia Soriano MD;  Location: [facility];  Service: Bariatrics   •  [address] FIRST TRIMESTER SURGICAL N/A 2019    Procedure: DILATATION AND EVACUATION (D&E) (8 weeks) missed ab;  Surgeon: Trista Montes MD;  [facility]: [facility];  Service: Gynecology   • REVISION BYPASS LAPAROSCOPIC N/A 2019    Procedure: LAP REVISION/ CONVERSION;  Surgeon: Olimpia Soriano MD;  [facility];  Service: Bariatrics   • SALPINGOOPHORECTOMY Right 2018   • SLEEVE GASTROPLASTY         Family History   Problem Relation Age of Onset   • Hypertension Mother    • Heart disease Mother    • No Known Problems Father        Social History     Tobacco Use   • Smoking status: Never   • Smokeless tobacco: Never   Vaping Use   • Vaping Use: Never used   Substance Use Topics   • Alcohol use: Not Currently   • Drug use: No         .    Allergies    Allergies   Allergen Reactions   • Aspirin Anaphylaxis   • Shellfish-Derived Products - Food Allergy Anaphylaxis   • Contrast [Iodinated Contrast Media] Itching and Swelling   • Ibuprofen Edema • Reglan [Metoclopramide] Itching and Confusion       Home Medications    Prior to Admission medications    Medication Sig Start Date End Date Taking?  Authorizing Provider   famotidine (PEPCID) 20 mg tablet Take 1 tablet (20 mg total) by mouth 2 (two) times a day 2/18/23  Yes Evy Becerril DO   ferrous sulfate 325 (65 Fe) mg tablet Take 325 mg by mouth daily with breakfast   Yes Historical Provider, MD   furosemide (LASIX) 20 mg tablet Take 20 mg by mouth daily as needed 10/13/22  Yes Historical Provider, MD   lubiprostone (AMITIZA) 24 mcg capsule TAKE 1 CAPSULE BY MOUTH DAILY WITH BREAKFAST 3/14/23  Yes Sasha Royal PA-C   Multiple Vitamin (multivitamin) tablet Take 1 tablet by mouth daily   Yes Historical Provider, MD   ondansetron (ZOFRAN-ODT) 4 mg disintegrating tablet TAKE 1 TABLET BY MOUTH EVERY 6 HOURS AS NEEDED FOR NAUSEA OR VOMITING 2/28/23  Yes Sasha Royal PA-C   pantoprazole (PROTONIX) 40 mg tablet Take 1 tablet (40 mg total) by mouth 2 (two) times a day 12/28/22  Yes Shelby Stauffer PA-C   sucralfate (CARAFATE) 1 g tablet Take 1 tablet (1 g total) by mouth 4 (four) times a day 2/28/23  Yes Mimi Gates PA-C   topiramate (TOPAMAX) 25 mg tablet  12/21/22  Yes Historical Provider, MD   ascorbic acid (VITAMIN C) 500 mg tablet Take 500 mg by mouth daily  Patient not taking: Reported on 3/28/2023    Historical Provider, MD   buPROPion (WELLBUTRIN XL) 300 mg 24 hr tablet  12/21/22   Historical Provider, MD   bisacodyl (DULCOLAX) 5 mg EC tablet Take 1 tablet (5 mg total) by mouth once for 1 dose  Patient not taking: Reported on 1/19/2023 12/28/22 7/13/23  Sasha Royal PA-C   pantoprazole (PROTONIX) 20 mg tablet Take 1 tablet (20 mg total) by mouth daily  Patient not taking: Reported on 7/13/2023 2/28/23 7/13/23  Dinesh Valentine PA-C   sucralfate (CARAFATE) 1 g/10 mL suspension TAKE 10 ML BY MOUTH 4 TIMES A DAY 3/16/23 7/13/23  Fariba Murray, ARMINDA           Review of Systems    Review of Systems   Constitutional: Negative for chills and fever. HENT: Negative for ear pain and sore throat. Eyes: Negative for pain and visual disturbance. Respiratory: Negative for cough and shortness of breath. Cardiovascular: Negative for chest pain and palpitations. Gastrointestinal: Positive for nausea. Negative for abdominal pain and vomiting. Genitourinary: Positive for vaginal bleeding. Negative for dysuria and hematuria. Musculoskeletal: Negative for arthralgias and back pain. Skin: Negative for color change and rash. Neurological: Positive for dizziness and light-headedness. Negative for seizures and syncope. All other systems reviewed and are negative. Physical Exam      ED Triage Vitals [07/13/23 1200]   Temperature Pulse Respirations Blood Pressure SpO2   97.9 °F (36.6 °C) 75 18 119/84 100 %      Temp Source Heart Rate Source Patient Position - Orthostatic VS BP Location FiO2 (%)   Oral Monitor Sitting Right arm --      Pain Score       10 - Worst Possible Pain               Physical Exam  Vitals and nursing note reviewed. Constitutional:       General: She is not in acute distress. Appearance: She is well-developed. HENT:      Head: Normocephalic and atraumatic. Eyes:      Conjunctiva/sclera: Conjunctivae normal.   Cardiovascular:      Rate and Rhythm: Normal rate and regular rhythm. Heart sounds: No murmur heard. Pulmonary:      Effort: Pulmonary effort is normal. No respiratory distress. Breath sounds: Normal breath sounds. Abdominal:      Palpations: Abdomen is soft. Tenderness: There is no abdominal tenderness. Musculoskeletal:         General: No swelling. Cervical back: Neck supple. Skin:     General: Skin is warm and dry. Capillary Refill: Capillary refill takes less than 2 seconds. Neurological:      Mental Status: She is alert.    Psychiatric:         Mood and Affect: Mood normal. Assessment and Plan    Benjamin Taylor is a 40 y.o. female who presents with dizziness. Physical examination unremarkable. Differential diagnosis (not completely inclusive) includes symptomatic anemia. Plan will be to perform diagnostic testing and treat symptomatically. MDM    Diagnostic Results      .     Labs:    Results for orders placed or performed during the hospital encounter of 07/13/23   CBC and differential   Result Value Ref Range    WBC 5.60 4.31 - 10.16 Thousand/uL    RBC 4.11 3.81 - 5.12 Million/uL    Hemoglobin 8.5 (L) 11.5 - 15.4 g/dL    Hematocrit 29.6 (L) 34.8 - 46.1 %    MCV 72 (L) 82 - 98 fL    MCH 20.7 (L) 26.8 - 34.3 pg    MCHC 28.7 (L) 31.4 - 37.4 g/dL    RDW 17.1 (H) 11.6 - 15.1 %    MPV 9.3 8.9 - 12.7 fL    Platelets 960 (H) 348 - 390 Thousands/uL   Comprehensive metabolic panel   Result Value Ref Range    Sodium 137 135 - 147 mmol/L    Potassium 3.8 3.5 - 5.3 mmol/L    Chloride 106 96 - 108 mmol/L    CO2 25 21 - 32 mmol/L    ANION GAP 6 mmol/L    BUN 14 5 - 25 mg/dL    Creatinine 0.80 0.60 - 1.30 mg/dL    Glucose 84 65 - 140 mg/dL    Calcium 8.5 8.4 - 10.2 mg/dL    AST 20 13 - 39 U/L    ALT 12 7 - 52 U/L    Alkaline Phosphatase 84 34 - 104 U/L    Total Protein 7.3 6.4 - 8.4 g/dL    Albumin 4.0 3.5 - 5.0 g/dL    Total Bilirubin 0.30 0.20 - 1.00 mg/dL    eGFR 91 ml/min/1.73sq m   Lipase   Result Value Ref Range    Lipase 18 11 - 82 u/L   Urine Microscopic   Result Value Ref Range    RBC, UA 1-2 None Seen, 1-2 /hpf    WBC, UA None Seen None Seen, 1-2 /hpf    Epithelial Cells Occasional None Seen, Occasional /hpf    Bacteria, UA None Seen None Seen, Occasional /hpf    MUCUS THREADS Occasional (A) None Seen   POCT pregnancy, urine   Result Value Ref Range    EXT Preg Test, Ur Negative     Control Valid    Urine Macroscopic, POC   Result Value Ref Range    Color, UA Yellow     Clarity, UA Clear     pH, UA 7.0 4.5 - 8.0    Leukocytes, UA Negative Negative    Nitrite, UA Negative Negative    Protein, UA Negative Negative mg/dl    Glucose, UA Negative Negative mg/dl    Ketones, UA Negative Negative mg/dl    Urobilinogen, UA 0.2 0.2, 1.0 E.U./dl E.U./dl    Bilirubin, UA Negative Negative    Occult Blood, UA Small (A) Negative    Specific Gravity, UA 1.015 1.003 - 1.030   Manual Differential(PHLEBS Do Not Order)   Result Value Ref Range    Segmented % 50 43 - 75 %    Lymphocytes % 40 14 - 44 %    Monocytes % 7 4 - 12 %    Eosinophils, % 2 0 - 6 %    Basophils % 1 0 - 1 %    Absolute Neutrophils 2.80 1.85 - 7.62 Thousand/uL    Lymphocytes Absolute 2.24 0.60 - 4.47 Thousand/uL    Monocytes Absolute 0.39 0.00 - 1.22 Thousand/uL    Eosinophils Absolute 0.11 0.00 - 0.40 Thousand/uL    Basophils Absolute 0.06 0.00 - 0.10 Thousand/uL    Total Counted      RBC Morphology Present     Platelet Estimate Increased (A) Adequate    Hypochromia Present        All labs reviewed and utilized in the medical decision making process    Radiology:    No orders to display       All radiology studies independently viewed by me and interpreted by the radiologist.    Procedure    Procedures      ED Course of Care and Re-Assessments      Medications   sodium chloride 0.9 % bolus 1,000 mL (0 mL Intravenous Stopped 7/13/23 1615)   ondansetron (ZOFRAN) injection 4 mg (4 mg Intravenous Given 7/13/23 1312)   acetaminophen (TYLENOL) tablet 650 mg (650 mg Oral Given 7/13/23 1246)           FINAL IMPRESSION    Final diagnoses:   Dizziness   Symptomatic anemia         DISPOSITION/PLAN    Time reflects when diagnosis was documented in both MDM as applicable and the Disposition within this note     Time User Action Codes Description Comment    7/13/2023  3:41 PM Angie Montgomery Add [R42] Dizziness     7/13/2023  3:41 PM Angie Montgomery Add [D64.9] Symptomatic anemia       ED Disposition     ED Disposition   Discharge    Condition   Stable    Date/Time   u Jul 13, 2023  3:41 PM    Comment   Shima Maldonado discharge to home/self care. Follow-up Information     Follow up With Specialties Details Why Contact Info    Betzy Harris PA-C Physician Assistant Schedule an appointment as soon as possible for a visit   [facility] [id number] [phone]              PATIENT REFERRED TO:    Betzy Harris PA-C  [facility] [address] [phone]    Schedule an appointment as soon as possible for a visit         DISCHARGE MEDICATIONS:    Discharge Medication List as of 7/13/2023  3:42 PM      CONTINUE these medications which have NOT CHANGED    Details   famotidine (PEPCID) 20 mg tablet Take 1 tablet (20 mg total) by mouth 2 (two) times a day, Starting Sat 2/18/2023, Normal      ferrous sulfate 325 (65 Fe) mg tablet Take 325 mg by mouth daily with breakfast, Historical Med      furosemide (LASIX) 20 mg tablet Take 20 mg by mouth daily as needed, Starting Thu 10/13/2022, Historical Med      lubiprostone (AMITIZA) 24 mcg capsule TAKE 1 CAPSULE BY MOUTH DAILY WITH BREAKFAST, Normal      Multiple Vitamin (multivitamin) tablet Take 1 tablet by mouth daily, Historical Med      ondansetron (ZOFRAN-ODT) 4 mg disintegrating tablet TAKE 1 TABLET BY MOUTH EVERY 6 HOURS AS NEEDED FOR NAUSEA OR VOMITING, Normal      pantoprazole (PROTONIX) 40 mg tablet Take 1 tablet (40 mg total) by mouth 2 (two) times a day, Starting Wed 12/28/2022, Normal      sucralfate (CARAFATE) 1 g tablet Take 1 tablet (1 g total) by mouth 4 (four) times a day, Starting Tue 2/28/2023, Normal      topiramate (TOPAMAX) 25 mg tablet Starting Wed 12/21/2022, Historical Med      ascorbic acid (VITAMIN C) 500 mg tablet Take 500 mg by mouth daily, Historical Med      buPROPion (WELLBUTRIN XL) 300 mg 24 hr tablet Starting Wed 12/21/2022, Historical Med             No discharge procedures on file.          DO Natan Romero DO  07/13/23 [id number] [facility]

## 2023-07-13 NOTE — Clinical Note
Aftab Lundberg was seen and treated in our emergency department on 7/13/2023. No restrictions            Diagnosis:     Blaise Deleon  may return to work on return date. She may return on this date: 07/14/2023         If you have any questions or concerns, please don't hesitate to call.       Rita Spain, DO    ______________________________           _______________          _______________  Hospital Representative                              Date                                Time

## 2023-07-13 NOTE — Clinical Note
Katie Greenberg was seen and treated in our emergency department on 7/13/2023. No restrictions            Diagnosis:     Shweta Bunch  may return to work on return date. She may return on this date: 07/14/2023         If you have any questions or concerns, please don't hesitate to call.       Ami Donovan, DO    ______________________________           _______________          _______________  Hospital Representative                              Date                                Time

## 2023-07-24 ENCOUNTER — TELEPHONE (OUTPATIENT)
Age: 43
End: 2023-07-24

## 2023-07-24 NOTE — TELEPHONE ENCOUNTER
Pharmacy Benefits     KATHRYN PALOMO/Shankar Naranjo Final ([address])    Covered:  Retail, Specialty    Not covered:  Mail Order    Unknown:  Long-Term Care  Member ID:  [id number]  Group ID:  [id number]  Group name:  [id number] [place] [id number] ADULT     BIN:  [id number]  PCN:  [id number]

## 2023-07-24 NOTE — TELEPHONE ENCOUNTER
Spoke directly with Staci Murray and started a PA for Amitiza 24mcg over the phone. Case # [id number]  Will await their determination usually 3-5 business days. Spoke to Staci Murray. Amitiza 24mcg has been approved. Auth ID [id number]. Pt notified via [address] [place] [address].

## 2023-07-26 PROCEDURE — NC001 PR NO CHARGE: Performed by: OBSTETRICS & GYNECOLOGY

## 2023-07-26 NOTE — H&P
Adenomyosis: Discussed treatment options including medical management versus hysterectomy. Patient is not a candidate for endometrial ablation secondary to thin lower uterine segment status post 3  sections. Patient has opted to proceed with hysterectomy. Discussed LSH BS versus TLH BS. She would like to proceed with an [facility] BS.   The surgery with risks were discussed including, but not limited to, infection, hemorrhage, bowel bladder or vascular injury, possible need for laparotomy.     Abnormal uterine bleeding (AUB)     Iron deficiency anemia due to chronic blood loss      Visit 50 minutes with greater than 50% discussion and coordinating care     Subjective:       Patient ID: Caren So is a 40 y.o. female.     HPI  G6 P 3033,new patient, C section x3 with tubal, presented 23 to the office complaining of heavy and prolonged menses with dysmenorrhea and pelvic pain. Colten Luong states that over the past year she has been experiencing heavy and prolonged menses with passage of large clots.  She had seen Dr. Ian Durham in March. Michael Gaona was discussion regarding proceeding with an endometrial ablation.  Patient has been tried on medical management in the past but due to side effects from medication and ineffectiveness this was discontinued. Colten Luong was seen in the emergency room recently because of the pain.  Ultrasound revealed 2.4 cm complex cyst on the left ovary.  Patient has had 3 prior  sections.  She had a uterine rupture during a  attempt with her second pregnancy. Haley Dugan is also had a prior laparoscopic right salpingo-oophorectomy in 2018.  She has had a prior gastric sleeve in .  She had a Karmen-en-Y in .  She has had a prior laparoscopic cholecystectomy.  Last hemoglobin in the emergency room was 9.5.  The patient has had blood transfusions in the past and she is also had prior iron infusions.  She is presently on p.o. iron.     TVS/SIS/ biopsy:      AMB US Pelvic Non OB     Date/Time: 2023 2:30 PM     Performed by: Elizabeth Reilly  Authorized by: Dayron Gorman Protocol:  Consent given by: patient  Patient identity confirmed: verbally with patient        Procedure details:     Indications: non-obstetric vaginal bleeding      Technique:  Transvaginal US, Non-OB    Position: lithotomy exam    Uterine findings:     Length (cm): 10.2    Height (cm):  4.77    Width (cm):  6.3    Endometrial stripe: identified      Endometrium thickness (mm):  14.66  Left ovary findings:     Left ovary:  Visualized    Length (cm): 4.83    Height (cm): 2.48    Width (cm): 2.51  Right ovary findings:     Right ovary:  Not visualized  Other findings:     Free pelvic fluid: not identified      Free peritoneal fluid: not identified    Post-Procedure Details:     Impression:  Anteverted uterus is inhomogeneous throughout without fibroids. The left ovary demonstrates a 1.6cm corpus luteal cyst. The right ovary has been surgically removed. There is no free fluid.  The  scar appears 6.5mm from the cervical canal.     Tolerance:  Tolerated well, no immediate complications    Complications: no complications    Additional Procedure Comments:      GE Voluson P8 transvaginal transducer RIC5-RA with Serial Number [id number] was used during procedure and subsequently cleaned with high level disinfection utilizing the skyrock[employer].      Ultrasound performed at:   [address] [facility] for [address] [place] [address] Dr Tonny Benjamin, [facility]  Phone: [phone]  Fax:  [phone]     Endometrial biopsy     Date/Time: 2023 2:30 PM     Performed by: Dayron Lou DO  Authorized by: Dayron Gorman Protocol:  Consent given by: patient  Patient identity confirmed: verbally with patient        Procedure:     Procedure: endometrial biopsy with Pipelle      A bivalve speculum was placed in the vagina: yes      Cervix cleaned and prepped: yes      Specimen collected: specimen collected and sent to pathology      Patient tolerated procedure well with no complications: yes    Sonohysterogram     Date/Time: 2023 2:30 PM     Performed by: Dayron Lou DO  Authorized by: Dayron Lou DO  Universal Protocol:  Consent given by: patient  Patient understanding: patient states understanding of the procedure being performed  Patient consent: the patient's understanding of the procedure matches consent given  Patient identity confirmed: verbally with patient        Pre-procedure:     Prepped with: Betadine    Procedure:     Cervix cleaned and prepped: yes      Uterus sounded: yes      Catheter inserted: yes      Uterine cavity distended with saline: yes    Post-procedure:     Patient observed: yes      Post procedure instructions given to patient: yes      Patient tolerated procedure well with no complications: yes    Comments:      Sonohysterogram demonstrates a smooth appearing endometrium.                  The following portions of the patient's history were reviewed and updated as appropriate:   She  has a past medical history of Abdominal pain, Anemia, Anesthesia, Back pain, Bariatric surgery status, Constipation, Dental bridge present, Diarrhea, Difficulty swallowing, Disease of thyroid gland, Dizzy, Fatigue, GERD (gastroesophageal reflux disease), Heart murmur, History of 2  sections, History of D&C (2019), History of iron deficiency, History of transfusion, Inguinal hernia, Loss of appetite, Low BP, Migraine, N&V (nausea and vomiting), Non-intractable vomiting, Palpitations, Postgastrectomy malabsorption, and Stomach pain.   She        Patient Active Problem List     Diagnosis Date Noted   • Intractable nausea and vomiting 10/28/2022   • GBS bacteriuria 2020   • Abnormal uterine bleeding (AUB) 2020   • Status post bariatric surgery 2020   • Iron deficiency anemia secondary to inadequate dietary iron intake 2019   • Menorrhagia with regular cycle 2019   • Pelvic pain 2019   • BV (bacterial vaginosis) 2019   • Status post suction D&C 2019   • Missed  2019   • Amenorrhea 2019   • Pregnancy complicated by previous bariatric surgery, first trimester 2019   • Pouchitis ([address]) 2019   • Leg swelling 2019   • Decreased oral intake 2019   • Dysphagia 2019   • Heart burn 2019   • Epigastric abdominal pain 2019   • Other constipation 2019   • Chronic vomiting 2019   • Gastritis 2019   • Irritable bowel syndrome (IBS) 2019   • Symptomatic anemia 2019   • S/P laparoscopic sleeve gastrectomy 2019   • Bilious vomiting with nausea 2019   • Obesity, Class II, BMI 35-39.9 2019   • Bariatric surgery status 2018   • Postsurgical malabsorption 2018   • History of multiple miscarriages 10/10/2018      She  has a past surgical history that includes Sleeve Gastroplasty; Cholecystectomy; pr egd transoral biopsy single/multiple (N/A, 2019); Salpingoophorectomy (Right, 2018);  section; CHROMOSOME ANALYSIS, PRODUCTS OF CONCEPTION (HISTORICAL) (); pr laps gstr rstcv px w/byp candace-en-y limb <150 cm (N/A, 2019); REVISION BYPASS LAPAROSCOPIC (N/A, 2019); pr tx missed  first trimester surgical (N/A, 2019); LAPAROSCOPY (N/A, 2020); and Laparoscopic salpingoopherectomy (Right, 2018). Her family history includes Heart disease in her mother; Hypertension in her mother; No Known Problems in her father. She  reports that she has never smoked. She has never used smokeless tobacco. She reports that she does not currently use alcohol. She reports that she does not use drugs.          Current Outpatient Medications   Medication Sig Dispense Refill   • ascorbic acid (VITAMIN C) 500 mg tablet Take 500 mg by mouth daily (Patient not taking: Reported on 3/28/2023)       • bisacodyl (DULCOLAX) 5 mg EC tablet Take 1 tablet (5 mg total) by mouth once for 1 dose (Patient not taking: Reported on 1/19/2023) 2 tablet 0   • buPROPion (WELLBUTRIN XL) 300 mg 24 hr tablet         • famotidine (PEPCID) 20 mg tablet Take 1 tablet (20 mg total) by mouth 2 (two) times a day 30 tablet 0   • ferrous sulfate 325 (65 Fe) mg tablet Take 325 mg by mouth daily with breakfast       • furosemide (LASIX) 20 mg tablet Take 20 mg by mouth daily as needed       • lubiprostone (AMITIZA) 24 mcg capsule TAKE 1 CAPSULE BY MOUTH DAILY WITH BREAKFAST 150 capsule 3   • ondansetron (ZOFRAN-ODT) 4 mg disintegrating tablet TAKE 1 TABLET BY MOUTH EVERY 6 HOURS AS NEEDED FOR NAUSEA OR VOMITING 20 tablet 1   • pantoprazole (PROTONIX) 20 mg tablet Take 1 tablet (20 mg total) by mouth daily 20 tablet 0   • pantoprazole (PROTONIX) 40 mg tablet Take 1 tablet (40 mg total) by mouth 2 (two) times a day 60 tablet 0   • sucralfate (CARAFATE) 1 g tablet Take 1 tablet (1 g total) by mouth 4 (four) times a day 20 tablet 0   • sucralfate (CARAFATE) 1 g/10 mL suspension TAKE 10 ML BY MOUTH 4 TIMES A  mL 0   • topiramate (TOPAMAX) 25 mg tablet            No current facility-administered medications for this visit.           Current Outpatient Medications on File Prior to Visit   Medication Sig   • ascorbic acid (VITAMIN C) 500 mg tablet Take 500 mg by mouth daily (Patient not taking: Reported on 3/28/2023)   • bisacodyl (DULCOLAX) 5 mg EC tablet Take 1 tablet (5 mg total) by mouth once for 1 dose (Patient not taking: Reported on 1/19/2023)   • buPROPion (WELLBUTRIN XL) 300 mg 24 hr tablet     • famotidine (PEPCID) 20 mg tablet Take 1 tablet (20 mg total) by mouth 2 (two) times a day   • ferrous sulfate 325 (65 Fe) mg tablet Take 325 mg by mouth daily with breakfast   • furosemide (LASIX) 20 mg tablet Take 20 mg by mouth daily as needed   • lubiprostone (AMITIZA) 24 mcg capsule TAKE 1 CAPSULE BY MOUTH DAILY WITH BREAKFAST   • ondansetron (ZOFRAN-ODT) 4 mg disintegrating tablet TAKE 1 TABLET BY MOUTH EVERY 6 HOURS AS NEEDED FOR NAUSEA OR VOMITING   • pantoprazole (PROTONIX) 20 mg tablet Take 1 tablet (20 mg total) by mouth daily   • pantoprazole (PROTONIX) 40 mg tablet Take 1 tablet (40 mg total) by mouth 2 (two) times a day   • sucralfate (CARAFATE) 1 g tablet Take 1 tablet (1 g total) by mouth 4 (four) times a day   • sucralfate (CARAFATE) 1 g/10 mL suspension TAKE 10 ML BY MOUTH 4 TIMES A DAY   • topiramate (TOPAMAX) 25 mg tablet        No current facility-administered medications on file prior to visit.      She is allergic to aspirin, shellfish-derived products - food allergy, contrast [iodinated contrast media], ibuprofen, and reglan [metoclopramide]. .     Review of Systems   Constitutional: Negative. HENT: Negative for sore throat and trouble swallowing. Gastrointestinal: Negative. Genitourinary: Positive for menstrual problem.          Objective:        There were no vitals taken for this visit.            Physical Exam  Vitals reviewed. Constitutional:       Appearance: Normal appearance. Cardiovascular:      Rate and Rhythm: Normal rate and regular rhythm. Pulses: Normal pulses. Heart sounds: Normal heart sounds. No murmur heard. Pulmonary:      Effort: Pulmonary effort is normal. No respiratory distress. Breath sounds: Normal breath sounds. Abdominal:      General: There is no distension. Palpations: Abdomen is soft. There is no mass. Tenderness: There is no abdominal tenderness. There is no guarding or rebound. Hernia: No hernia is present. There is no hernia in the left inguinal area or right inguinal area. Genitourinary:     General: Normal vulva. Labia:         Right: No rash, tenderness or lesion. Left: No rash, tenderness or lesion.        Vagina: Normal.      Cervix: Normal.      Uterus: Normal.       Adnexa:         Right: No mass, tenderness or fullness. Left: No mass, tenderness or fullness. Lymphadenopathy:      Lower Body: No right inguinal adenopathy. No left inguinal adenopathy. Neurological:      Mental Status: She is alert.

## 2023-07-27 ENCOUNTER — NURSE TRIAGE (OUTPATIENT)
Age: 43
End: 2023-07-27

## 2023-07-28 ENCOUNTER — TELEPHONE (OUTPATIENT)
Dept: PULMONOLOGY | Facility: CLINIC | Age: 43
End: 2023-07-28

## 2023-07-31 NOTE — TELEPHONE ENCOUNTER
Prior auth for Quantum 290 sent through CoverMyMeds  Key: [id number]     Awaiting next steps/questions

## 2023-07-31 NOTE — TELEPHONE ENCOUNTER
Pharmacy Benefits      MELBA PALOMO BASE PLAN NO ACCUMS ([address])     Covered:  Retail, Specialty    Not covered:  Mail Order    Unknown:  Long-Term Care  Member ID:             [id number]  Group ID:    [id number]  Group name:          [employer] [place] COPAY [id number] ADULT            BIN:             [id number]  PCN:           [id number]

## 2023-08-08 ENCOUNTER — TELEPHONE (OUTPATIENT)
Age: 43
End: 2023-08-08

## 2023-08-08 NOTE — TELEPHONE ENCOUNTER
----- Message from Stefany Kathleen sent at 8/8/2023 10:03 AM EDT -----  Regarding: Prior Auth  Patient needs a prior auth for generic Linzess 24mcg please.   Thank You !!!

## 2023-08-08 NOTE — TELEPHONE ENCOUNTER
Looking chart, Linzess 290 mcg was already approved and pt was notificed on 8/1/2023. There is no med of Linzess 24. There is Amitiza 24 mcg. ON 7/27/2023 there was a PA request for LInzess 290 .

## 2023-08-08 NOTE — TELEPHONE ENCOUNTER
[employer] could not complete PA  Will proceed with CoverMyMeds     Pharmacy Benefits     KATHRYN PALOMO/Shankar Naranjo Final ([address])    Covered:  Retail, Specialty    Not covered:  Mail Order    Unknown:  Long-Term Care  Member ID:  [id number]  Group ID:  [id number]  Group name:  [employer] [place] [id number] ADULT     BIN:  [id number]  PCN:  [id number]

## 2023-08-17 ENCOUNTER — APPOINTMENT (OUTPATIENT)
Dept: LAB | Age: 43
End: 2023-08-17
Payer: MEDICARE

## 2023-08-17 ENCOUNTER — ANESTHESIA EVENT (OUTPATIENT)
Dept: PERIOP | Facility: HOSPITAL | Age: 43
End: 2023-08-17
Payer: MEDICARE

## 2023-08-17 DIAGNOSIS — Z01.812 ENCOUNTER FOR PRE-OPERATIVE LABORATORY TESTING: ICD-10-CM

## 2023-08-17 DIAGNOSIS — D50.0 IRON DEFICIENCY ANEMIA DUE TO CHRONIC BLOOD LOSS: Primary | ICD-10-CM

## 2023-08-17 LAB
BASOPHILS # BLD AUTO: 0.04 THOUSANDS/ÂΜL (ref 0–0.1)
BASOPHILS NFR BLD AUTO: 1 % (ref 0–1)
EOSINOPHIL # BLD AUTO: 0.11 THOUSAND/ÂΜL (ref 0–0.61)
EOSINOPHIL NFR BLD AUTO: 2 % (ref 0–6)
ERYTHROCYTE [DISTWIDTH] IN BLOOD BY AUTOMATED COUNT: 17.6 % (ref 11.6–15.1)
EST. AVERAGE GLUCOSE BLD GHB EST-MCNC: 120 MG/DL
HBA1C MFR BLD: 5.8 %
HCT VFR BLD AUTO: 28.2 % (ref 34.8–46.1)
HGB BLD-MCNC: 8.1 G/DL (ref 11.5–15.4)
IMM GRANULOCYTES # BLD AUTO: 0.02 THOUSAND/UL (ref 0–0.2)
IMM GRANULOCYTES NFR BLD AUTO: 0 % (ref 0–2)
LYMPHOCYTES # BLD AUTO: 2.84 THOUSANDS/ÂΜL (ref 0.6–4.47)
LYMPHOCYTES NFR BLD AUTO: 44 % (ref 14–44)
MCH RBC QN AUTO: 20.5 PG (ref 26.8–34.3)
MCHC RBC AUTO-ENTMCNC: 28.7 G/DL (ref 31.4–37.4)
MCV RBC AUTO: 71 FL (ref 82–98)
MONOCYTES # BLD AUTO: 0.59 THOUSAND/ÂΜL (ref 0.17–1.22)
MONOCYTES NFR BLD AUTO: 9 % (ref 4–12)
NEUTROPHILS # BLD AUTO: 2.92 THOUSANDS/ÂΜL (ref 1.85–7.62)
NEUTS SEG NFR BLD AUTO: 44 % (ref 43–75)
NRBC BLD AUTO-RTO: 0 /100 WBCS
PLATELET # BLD AUTO: 470 THOUSANDS/UL (ref 149–390)
PMV BLD AUTO: 10.3 FL (ref 8.9–12.7)
RBC # BLD AUTO: 3.96 MILLION/UL (ref 3.81–5.12)
WBC # BLD AUTO: 6.52 THOUSAND/UL (ref 4.31–10.16)

## 2023-08-17 PROCEDURE — 86901 BLOOD TYPING SEROLOGIC RH(D): CPT

## 2023-08-17 PROCEDURE — 85025 COMPLETE CBC W/AUTO DIFF WBC: CPT

## 2023-08-17 PROCEDURE — 86850 RBC ANTIBODY SCREEN: CPT

## 2023-08-17 PROCEDURE — 83036 HEMOGLOBIN GLYCOSYLATED A1C: CPT

## 2023-08-17 PROCEDURE — 86900 BLOOD TYPING SEROLOGIC ABO: CPT

## 2023-08-17 PROCEDURE — 36415 COLL VENOUS BLD VENIPUNCTURE: CPT

## 2023-08-17 PROCEDURE — 86923 COMPATIBILITY TEST ELECTRIC: CPT

## 2023-08-18 LAB
ABO GROUP BLD: NORMAL
BLD GP AB SCN SERPL QL: NEGATIVE
RH BLD: POSITIVE
SPECIMEN EXPIRATION DATE: NORMAL

## 2023-08-18 RX ORDER — ACETAMINOPHEN 500 MG
500-1000 TABLET ORAL EVERY 6 HOURS PRN
COMMUNITY

## 2023-08-18 NOTE — PRE-PROCEDURE INSTRUCTIONS
Pre-Surgery Instructions:   Medication Instructions   • acetaminophen (TYLENOL) 500 mg tablet Uses PRN- OK to take day of surgery   • famotidine (PEPCID) 20 mg tablet Uses PRN- OK to take day of surgery   • ferrous sulfate 325 (65 Fe) mg tablet Hold day of surgery. • furosemide (LASIX) 20 mg tablet Hold day of surgery. • linaCLOtide 290 MCG CAPS Take night before surgery   • Multiple Vitamin (multivitamin) tablet Stop taking 7 days prior to surgery. • ondansetron (ZOFRAN-ODT) 4 mg disintegrating tablet Uses PRN- OK to take day of surgery   • pantoprazole (PROTONIX) 40 mg tablet Uses PRN- OK to take day of surgery   Medication instructions for day surgery reviewed. Please use only a sip of water to take your instructed medications. Avoid all over the counter vitamins, supplements and NSAIDS for one week prior to surgery per anesthesia guidelines. Tylenol is ok to take as needed. You will receive a call one business day prior to surgery with an arrival time and hospital directions. If your surgery is scheduled on a Monday, the hospital will be calling you on the Friday prior to your surgery. If you have not heard from anyone by 8pm, please call the hospital supervisor through the hospital  at [phone]. Janeth Hernandez [phone]). Do not eat or drink anything after midnight the night before your surgery, including candy, mints, lifesavers, or chewing gum. Do not drink alcohol 24hrs before your surgery. Try not to smoke at least 24hrs before your surgery. Follow the pre surgery showering instructions as listed in the [facility] Surgical Experience Booklet” or otherwise provided by your surgeon's office. Do not shave the surgical area 24 hours before surgery. Do not apply any lotions, creams, including makeup, cologne, deodorant, or perfumes after showering on the day of your surgery. No contact lenses, eye make-up, or artificial eyelashes.  Remove nail polish, including gel polish, and any artificial, gel, or acrylic nails if possible. Remove all jewelry including rings and body piercing jewelry. Wear causal clothing that is easy to take on and off. Consider your type of surgery. Keep any valuables, jewelry, piercings at home. Please bring any specially ordered equipment (sling, braces) if indicated. Arrange for a responsible person to drive you to and from the hospital on the day of your surgery. Visitor Guidelines discussed. Call the surgeon's office with any new illnesses, exposures, or additional questions prior to surgery. Please reference your [facility] Surgical Experience Booklet” for additional information to prepare for your upcoming surgery.

## 2023-08-21 ENCOUNTER — HOSPITAL ENCOUNTER (OUTPATIENT)
Facility: HOSPITAL | Age: 43
Setting detail: OUTPATIENT SURGERY
Discharge: HOME | End: 2023-08-21
Attending: OBSTETRICS & GYNECOLOGY | Admitting: OBSTETRICS & GYNECOLOGY
Payer: MEDICARE

## 2023-08-21 ENCOUNTER — ANESTHESIA (OUTPATIENT)
Dept: PERIOP | Facility: HOSPITAL | Age: 43
End: 2023-08-21
Payer: MEDICARE

## 2023-08-21 VITALS
RESPIRATION RATE: 12 BRPM | TEMPERATURE: 97.3 F | DIASTOLIC BLOOD PRESSURE: 63 MMHG | BODY MASS INDEX: 40.87 KG/M2 | HEART RATE: 68 BPM | SYSTOLIC BLOOD PRESSURE: 108 MMHG | OXYGEN SATURATION: 100 % | HEIGHT: 64 IN | WEIGHT: 239.42 LBS

## 2023-08-21 DIAGNOSIS — D64.9 ANEMIA, UNSPECIFIED TYPE: ICD-10-CM

## 2023-08-21 DIAGNOSIS — N93.8 DYSFUNCTIONAL UTERINE BLEEDING: ICD-10-CM

## 2023-08-21 DIAGNOSIS — N80.03 ADENOMYOSIS: ICD-10-CM

## 2023-08-21 DIAGNOSIS — G89.18 POSTOPERATIVE PAIN: Primary | ICD-10-CM

## 2023-08-21 LAB
EXT PREGNANCY TEST URINE: NEGATIVE
EXT. CONTROL: NORMAL

## 2023-08-21 PROCEDURE — C1782 MORCELLATOR: HCPCS | Performed by: OBSTETRICS & GYNECOLOGY

## 2023-08-21 PROCEDURE — 88307 TISSUE EXAM BY PATHOLOGIST: CPT | Performed by: PATHOLOGY

## 2023-08-21 PROCEDURE — 58541 LSH UTERUS 250 G OR LESS: CPT | Performed by: OBSTETRICS & GYNECOLOGY

## 2023-08-21 PROCEDURE — 81025 URINE PREGNANCY TEST: CPT | Performed by: OBSTETRICS & GYNECOLOGY

## 2023-08-21 RX ORDER — SODIUM CHLORIDE, SODIUM LACTATE, POTASSIUM CHLORIDE, CALCIUM CHLORIDE 600; 310; 30; 20 MG/100ML; MG/100ML; MG/100ML; MG/100ML
125 INJECTION, SOLUTION INTRAVENOUS CONTINUOUS
Status: DISCONTINUED | OUTPATIENT
Start: 2023-08-21 | End: 2023-08-21 | Stop reason: HOSPADM

## 2023-08-21 RX ORDER — MIDAZOLAM HYDROCHLORIDE 2 MG/2ML
INJECTION, SOLUTION INTRAMUSCULAR; INTRAVENOUS AS NEEDED
Status: DISCONTINUED | OUTPATIENT
Start: 2023-08-21 | End: 2023-08-21

## 2023-08-21 RX ORDER — OXYCODONE HYDROCHLORIDE 5 MG/1
5 TABLET ORAL EVERY 4 HOURS PRN
Status: DISCONTINUED | OUTPATIENT
Start: 2023-08-21 | End: 2023-08-21 | Stop reason: HOSPADM

## 2023-08-21 RX ORDER — PROPOFOL 10 MG/ML
INJECTION, EMULSION INTRAVENOUS AS NEEDED
Status: DISCONTINUED | OUTPATIENT
Start: 2023-08-21 | End: 2023-08-21

## 2023-08-21 RX ORDER — LIDOCAINE HYDROCHLORIDE 10 MG/ML
INJECTION, SOLUTION EPIDURAL; INFILTRATION; INTRACAUDAL; PERINEURAL AS NEEDED
Status: DISCONTINUED | OUTPATIENT
Start: 2023-08-21 | End: 2023-08-21

## 2023-08-21 RX ORDER — ROCURONIUM BROMIDE 10 MG/ML
INJECTION, SOLUTION INTRAVENOUS AS NEEDED
Status: DISCONTINUED | OUTPATIENT
Start: 2023-08-21 | End: 2023-08-21

## 2023-08-21 RX ORDER — SCOLOPAMINE TRANSDERMAL SYSTEM 1 MG/1
1 PATCH, EXTENDED RELEASE TRANSDERMAL ONCE AS NEEDED
Status: DISCONTINUED | OUTPATIENT
Start: 2023-08-21 | End: 2023-08-21 | Stop reason: HOSPADM

## 2023-08-21 RX ORDER — ONDANSETRON 2 MG/ML
4 INJECTION INTRAMUSCULAR; INTRAVENOUS EVERY 6 HOURS PRN
Status: DISCONTINUED | OUTPATIENT
Start: 2023-08-21 | End: 2023-08-21 | Stop reason: HOSPADM

## 2023-08-21 RX ORDER — HYDROMORPHONE HCL/PF 1 MG/ML
0.5 SYRINGE (ML) INJECTION
Status: DISCONTINUED | OUTPATIENT
Start: 2023-08-21 | End: 2023-08-21 | Stop reason: HOSPADM

## 2023-08-21 RX ORDER — BUPIVACAINE HYDROCHLORIDE 2.5 MG/ML
INJECTION, SOLUTION EPIDURAL; INFILTRATION; INTRACAUDAL AS NEEDED
Status: DISCONTINUED | OUTPATIENT
Start: 2023-08-21 | End: 2023-08-21 | Stop reason: HOSPADM

## 2023-08-21 RX ORDER — OXYCODONE HYDROCHLORIDE 5 MG/1
10 TABLET ORAL EVERY 4 HOURS PRN
Status: DISCONTINUED | OUTPATIENT
Start: 2023-08-21 | End: 2023-08-21 | Stop reason: HOSPADM

## 2023-08-21 RX ORDER — FENTANYL CITRATE 50 UG/ML
INJECTION, SOLUTION INTRAMUSCULAR; INTRAVENOUS AS NEEDED
Status: DISCONTINUED | OUTPATIENT
Start: 2023-08-21 | End: 2023-08-21

## 2023-08-21 RX ORDER — ONDANSETRON 2 MG/ML
4 INJECTION INTRAMUSCULAR; INTRAVENOUS ONCE AS NEEDED
Status: COMPLETED | OUTPATIENT
Start: 2023-08-21 | End: 2023-08-21

## 2023-08-21 RX ORDER — DEXAMETHASONE SODIUM PHOSPHATE 10 MG/ML
INJECTION, SOLUTION INTRAMUSCULAR; INTRAVENOUS AS NEEDED
Status: DISCONTINUED | OUTPATIENT
Start: 2023-08-21 | End: 2023-08-21

## 2023-08-21 RX ORDER — MEPERIDINE HYDROCHLORIDE 25 MG/ML
12.5 INJECTION INTRAMUSCULAR; INTRAVENOUS; SUBCUTANEOUS
Status: DISCONTINUED | OUTPATIENT
Start: 2023-08-21 | End: 2023-08-21 | Stop reason: HOSPADM

## 2023-08-21 RX ORDER — FENTANYL CITRATE/PF 50 MCG/ML
25 SYRINGE (ML) INJECTION
Status: COMPLETED | OUTPATIENT
Start: 2023-08-21 | End: 2023-08-21

## 2023-08-21 RX ORDER — ACETAMINOPHEN 325 MG/1
975 TABLET ORAL EVERY 6 HOURS PRN
Status: DISCONTINUED | OUTPATIENT
Start: 2023-08-21 | End: 2023-08-21 | Stop reason: HOSPADM

## 2023-08-21 RX ORDER — CEFAZOLIN SODIUM 2 G/50ML
2000 SOLUTION INTRAVENOUS ONCE
Status: COMPLETED | OUTPATIENT
Start: 2023-08-21 | End: 2023-08-21

## 2023-08-21 RX ORDER — PROMETHAZINE HYDROCHLORIDE 25 MG/ML
12.5 INJECTION, SOLUTION INTRAMUSCULAR; INTRAVENOUS EVERY 4 HOURS PRN
Status: DISCONTINUED | OUTPATIENT
Start: 2023-08-21 | End: 2023-08-21 | Stop reason: HOSPADM

## 2023-08-21 RX ORDER — ONDANSETRON 2 MG/ML
INJECTION INTRAMUSCULAR; INTRAVENOUS AS NEEDED
Status: DISCONTINUED | OUTPATIENT
Start: 2023-08-21 | End: 2023-08-21

## 2023-08-21 RX ORDER — OXYCODONE HYDROCHLORIDE AND ACETAMINOPHEN 5; 325 MG/1; MG/1
1 TABLET ORAL EVERY 4 HOURS PRN
Qty: 15 TABLET | Refills: 0 | Status: SHIPPED | OUTPATIENT
Start: 2023-08-21 | End: 2023-09-08

## 2023-08-21 RX ADMIN — ACETAMINOPHEN 325MG 975 MG: 325 TABLET ORAL at 12:16

## 2023-08-21 RX ADMIN — DEXAMETHASONE SODIUM PHOSPHATE 10 MG: 10 INJECTION INTRAMUSCULAR; INTRAVENOUS at 08:10

## 2023-08-21 RX ADMIN — SCOPALAMINE 1 PATCH: 1 PATCH, EXTENDED RELEASE TRANSDERMAL at 10:40

## 2023-08-21 RX ADMIN — PROMETHAZINE HYDROCHLORIDE 12.5 MG: 25 INJECTION INTRAMUSCULAR; INTRAVENOUS at 10:23

## 2023-08-21 RX ADMIN — LIDOCAINE HYDROCHLORIDE 100 MG: 10 INJECTION, SOLUTION EPIDURAL; INFILTRATION; INTRACAUDAL; PERINEURAL at 07:30

## 2023-08-21 RX ADMIN — ONDANSETRON 4 MG: 2 INJECTION INTRAMUSCULAR; INTRAVENOUS at 08:53

## 2023-08-21 RX ADMIN — TRIMETHOBENZAMIDE HYDROCHLORIDE 200 MG: 100 INJECTION INTRAMUSCULAR at 09:41

## 2023-08-21 RX ADMIN — FENTANYL CITRATE 25 MCG: 50 INJECTION INTRAMUSCULAR; INTRAVENOUS at 10:07

## 2023-08-21 RX ADMIN — ROCURONIUM BROMIDE 40 MG: 10 INJECTION, SOLUTION INTRAVENOUS at 07:31

## 2023-08-21 RX ADMIN — FENTANYL CITRATE 25 MCG: 50 INJECTION INTRAMUSCULAR; INTRAVENOUS at 09:46

## 2023-08-21 RX ADMIN — PROPOFOL 200 MG: 10 INJECTION, EMULSION INTRAVENOUS at 07:30

## 2023-08-21 RX ADMIN — FENTANYL CITRATE 50 MCG: 50 INJECTION INTRAMUSCULAR; INTRAVENOUS at 07:30

## 2023-08-21 RX ADMIN — FENTANYL CITRATE 50 MCG: 50 INJECTION INTRAMUSCULAR; INTRAVENOUS at 08:32

## 2023-08-21 RX ADMIN — FENTANYL CITRATE 25 MCG: 50 INJECTION INTRAMUSCULAR; INTRAVENOUS at 09:28

## 2023-08-21 RX ADMIN — FENTANYL CITRATE 25 MCG: 50 INJECTION INTRAMUSCULAR; INTRAVENOUS at 09:57

## 2023-08-21 RX ADMIN — SODIUM CHLORIDE, SODIUM LACTATE, POTASSIUM CHLORIDE, AND CALCIUM CHLORIDE 125 ML/HR: .6; .31; .03; .02 INJECTION, SOLUTION INTRAVENOUS at 06:43

## 2023-08-21 RX ADMIN — ONDANSETRON 4 MG: 2 INJECTION INTRAMUSCULAR; INTRAVENOUS at 09:22

## 2023-08-21 RX ADMIN — ROCURONIUM BROMIDE 10 MG: 10 INJECTION, SOLUTION INTRAVENOUS at 08:05

## 2023-08-21 RX ADMIN — SUGAMMADEX 250 MG: 100 INJECTION, SOLUTION INTRAVENOUS at 09:00

## 2023-08-21 RX ADMIN — OXYCODONE HYDROCHLORIDE 5 MG: 5 TABLET ORAL at 13:42

## 2023-08-21 RX ADMIN — ROCURONIUM BROMIDE 10 MG: 10 INJECTION, SOLUTION INTRAVENOUS at 08:42

## 2023-08-21 RX ADMIN — CEFAZOLIN SODIUM 2000 MG: 2 SOLUTION INTRAVENOUS at 07:35

## 2023-08-21 RX ADMIN — MIDAZOLAM 2 MG: 1 INJECTION INTRAMUSCULAR; INTRAVENOUS at 07:24

## 2023-08-21 NOTE — ANESTHESIA PREPROCEDURE EVALUATION
Procedure:  [facility])  W/ BILATERAL SALPINGECTOMY (Bilateral: Abdomen)    Relevant Problems   ANESTHESIA (within normal limits)      GI/HEPATIC   (+) Dysphagia      HEMATOLOGY   (+) Iron deficiency anemia secondary to inadequate dietary iron intake   (+) Symptomatic anemia      PULMONARY (within normal limits)      Other   (+) Obesity, Class II, BMI 35-39.9        Physical Exam    Airway    Mallampati score: III  TM Distance: >3 FB  Neck ROM: full     Dental   Comment: #9 capped, right lower permanent bridge,     Cardiovascular  Rhythm: regular, Rate: normal, Cardiovascular exam normal    Pulmonary  Pulmonary exam normal Breath sounds clear to auscultation,     Other Findings        Anesthesia Plan  ASA Score- 3     Anesthesia Type- general with ASA Monitors. Additional Monitors:   Airway Plan: ETT. Plan Factors-Exercise tolerance (METS): >4 METS. Chart reviewed. EKG reviewed. Existing labs reviewed. Patient summary reviewed. Patient is not a current smoker. Induction- intravenous. Postoperative Plan-     Informed Consent- Anesthetic plan and risks discussed with patient.

## 2023-08-21 NOTE — INTERVAL H&P NOTE
H&P reviewed. After examining the patient I find no changes in the patients condition since the H&P had been written.     Vitals:    08/21/23 0611   BP: 118/68   Pulse: 73   Resp: 16   Temp: 97.5 °F (36.4 °C)   SpO2: 100%

## 2023-08-21 NOTE — ANESTHESIA POSTPROCEDURE EVALUATION
Post-Op Assessment Note    CV Status:  Stable    Pain management: adequate     Mental Status:  Alert and awake   Hydration Status:  Euvolemic   PONV Controlled:  Controlled   Airway Patency:  Patent      Post Op Vitals Reviewed: Yes      Staff: Anesthesiologist         No notable events documented.     BP      Temp     Pulse     Resp      SpO2      /63   Pulse 68   Temp (!) 97.3 °F (36.3 °C) (Temporal)   Resp 12   Ht 5' 4" (1.626 m)   Wt 109 kg (239 lb 6.7 oz)   LMP 08/06/2023   SpO2 100%   Breastfeeding No Comment: negative pregnancy test  BMI 41.10 kg/m²

## 2023-08-24 PROCEDURE — 88307 TISSUE EXAM BY PATHOLOGIST: CPT | Performed by: PATHOLOGY

## 2023-08-25 LAB
ABO GROUP BLD BPU: NORMAL
ABO GROUP BLD BPU: NORMAL
BPU ID: NORMAL
BPU ID: NORMAL
CROSSMATCH: NORMAL
CROSSMATCH: NORMAL
UNIT DISPENSE STATUS: NORMAL
UNIT DISPENSE STATUS: NORMAL
UNIT PRODUCT CODE: NORMAL
UNIT PRODUCT CODE: NORMAL
UNIT PRODUCT VOLUME: 350 ML
UNIT PRODUCT VOLUME: 350 ML
UNIT RH: NORMAL
UNIT RH: NORMAL

## 2023-09-07 ENCOUNTER — APPOINTMENT (EMERGENCY)
Dept: CT IMAGING | Facility: HOSPITAL | Age: 43
End: 2023-09-07
Payer: MEDICARE

## 2023-09-07 ENCOUNTER — TELEPHONE (OUTPATIENT)
Dept: BARIATRICS | Facility: CLINIC | Age: 43
End: 2023-09-07

## 2023-09-07 ENCOUNTER — HOSPITAL ENCOUNTER (OUTPATIENT)
Facility: HOSPITAL | Age: 43
Setting detail: OBSERVATION
Discharge: HOME | End: 2023-09-08
Attending: EMERGENCY MEDICINE | Admitting: SURGERY
Payer: MEDICARE

## 2023-09-07 DIAGNOSIS — K21.9 GASTROESOPHAGEAL REFLUX DISEASE WITHOUT ESOPHAGITIS: ICD-10-CM

## 2023-09-07 DIAGNOSIS — D64.9 SEVERE ANEMIA: ICD-10-CM

## 2023-09-07 DIAGNOSIS — K56.1 INTUSSUSCEPTION (HCC): ICD-10-CM

## 2023-09-07 DIAGNOSIS — R11.2 NAUSEA AND VOMITING: ICD-10-CM

## 2023-09-07 DIAGNOSIS — R10.13 EPIGASTRIC PAIN: Primary | ICD-10-CM

## 2023-09-07 LAB
ALBUMIN SERPL BCP-MCNC: 3.9 G/DL (ref 3.5–5)
ALP SERPL-CCNC: 79 U/L (ref 34–104)
ALT SERPL W P-5'-P-CCNC: 12 U/L (ref 7–52)
ANION GAP SERPL CALCULATED.3IONS-SCNC: 7 MMOL/L
AST SERPL W P-5'-P-CCNC: 19 U/L (ref 13–39)
ATRIAL RATE: 75 BPM
BASOPHILS # BLD AUTO: 0.02 THOUSANDS/ÂΜL (ref 0–0.1)
BASOPHILS NFR BLD AUTO: 0 % (ref 0–1)
BILIRUB SERPL-MCNC: 0.23 MG/DL (ref 0.2–1)
BILIRUB UR QL STRIP: NEGATIVE
BUN SERPL-MCNC: 11 MG/DL (ref 5–25)
CALCIUM SERPL-MCNC: 8.5 MG/DL (ref 8.4–10.2)
CARDIAC TROPONIN I PNL SERPL HS: <2 NG/L
CHLORIDE SERPL-SCNC: 106 MMOL/L (ref 96–108)
CLARITY UR: CLEAR
CO2 SERPL-SCNC: 25 MMOL/L (ref 21–32)
COLOR UR: YELLOW
CREAT SERPL-MCNC: 0.8 MG/DL (ref 0.6–1.3)
EOSINOPHIL # BLD AUTO: 0.14 THOUSAND/ÂΜL (ref 0–0.61)
EOSINOPHIL NFR BLD AUTO: 2 % (ref 0–6)
ERYTHROCYTE [DISTWIDTH] IN BLOOD BY AUTOMATED COUNT: 17.9 % (ref 11.6–15.1)
GFR SERPL CREATININE-BSD FRML MDRD: 91 ML/MIN/1.73SQ M
GLUCOSE SERPL-MCNC: 77 MG/DL (ref 65–140)
GLUCOSE UR STRIP-MCNC: NEGATIVE MG/DL
HCT VFR BLD AUTO: 25.1 % (ref 34.8–46.1)
HGB BLD-MCNC: 7.1 G/DL (ref 11.5–15.4)
HGB UR QL STRIP.AUTO: NEGATIVE
IMM GRANULOCYTES # BLD AUTO: 0.02 THOUSAND/UL (ref 0–0.2)
IMM GRANULOCYTES NFR BLD AUTO: 0 % (ref 0–2)
KETONES UR STRIP-MCNC: NEGATIVE MG/DL
LACTATE SERPL-SCNC: 0.7 MMOL/L (ref 0.5–2)
LEUKOCYTE ESTERASE UR QL STRIP: NEGATIVE
LIPASE SERPL-CCNC: 15 U/L (ref 11–82)
LYMPHOCYTES # BLD AUTO: 3.06 THOUSANDS/ÂΜL (ref 0.6–4.47)
LYMPHOCYTES NFR BLD AUTO: 38 % (ref 14–44)
MCH RBC QN AUTO: 19.8 PG (ref 26.8–34.3)
MCHC RBC AUTO-ENTMCNC: 28.3 G/DL (ref 31.4–37.4)
MCV RBC AUTO: 70 FL (ref 82–98)
MONOCYTES # BLD AUTO: 0.6 THOUSAND/ÂΜL (ref 0.17–1.22)
MONOCYTES NFR BLD AUTO: 8 % (ref 4–12)
NEUTROPHILS # BLD AUTO: 4.17 THOUSANDS/ÂΜL (ref 1.85–7.62)
NEUTS SEG NFR BLD AUTO: 52 % (ref 43–75)
NITRITE UR QL STRIP: NEGATIVE
NRBC BLD AUTO-RTO: 0 /100 WBCS
P AXIS: 66 DEGREES
PH UR STRIP.AUTO: 5 [PH] (ref 4.5–8)
PLATELET # BLD AUTO: 462 THOUSANDS/UL (ref 149–390)
PMV BLD AUTO: 9.3 FL (ref 8.9–12.7)
POTASSIUM SERPL-SCNC: 3.6 MMOL/L (ref 3.5–5.3)
PR INTERVAL: 146 MS
PROCALCITONIN SERPL-MCNC: <0.05 NG/ML
PROT SERPL-MCNC: 7.1 G/DL (ref 6.4–8.4)
PROT UR STRIP-MCNC: NEGATIVE MG/DL
QRS AXIS: 73 DEGREES
QRSD INTERVAL: 84 MS
QT INTERVAL: 392 MS
QTC INTERVAL: 437 MS
RBC # BLD AUTO: 3.59 MILLION/UL (ref 3.81–5.12)
SODIUM SERPL-SCNC: 138 MMOL/L (ref 135–147)
SP GR UR STRIP.AUTO: 1.01 (ref 1–1.03)
T WAVE AXIS: 55 DEGREES
UROBILINOGEN UR QL STRIP.AUTO: 0.2 E.U./DL
VENTRICULAR RATE: 75 BPM
WBC # BLD AUTO: 8.01 THOUSAND/UL (ref 4.31–10.16)

## 2023-09-07 PROCEDURE — 80053 COMPREHEN METABOLIC PANEL: CPT | Performed by: PHYSICIAN ASSISTANT

## 2023-09-07 PROCEDURE — 99285 EMERGENCY DEPT VISIT HI MDM: CPT | Performed by: PHYSICIAN ASSISTANT

## 2023-09-07 PROCEDURE — 99214 OFFICE O/P EST MOD 30 MIN: CPT | Performed by: STUDENT IN AN ORGANIZED HEALTH CARE EDUCATION/TRAINING PROGRAM

## 2023-09-07 PROCEDURE — 36415 COLL VENOUS BLD VENIPUNCTURE: CPT | Performed by: PHYSICIAN ASSISTANT

## 2023-09-07 PROCEDURE — 83690 ASSAY OF LIPASE: CPT | Performed by: PHYSICIAN ASSISTANT

## 2023-09-07 PROCEDURE — 74177 CT ABD & PELVIS W/CONTRAST: CPT

## 2023-09-07 PROCEDURE — 96376 TX/PRO/DX INJ SAME DRUG ADON: CPT

## 2023-09-07 PROCEDURE — 84484 ASSAY OF TROPONIN QUANT: CPT | Performed by: PHYSICIAN ASSISTANT

## 2023-09-07 PROCEDURE — 96365 THER/PROPH/DIAG IV INF INIT: CPT

## 2023-09-07 PROCEDURE — 86850 RBC ANTIBODY SCREEN: CPT | Performed by: PHYSICIAN ASSISTANT

## 2023-09-07 PROCEDURE — 86923 COMPATIBILITY TEST ELECTRIC: CPT

## 2023-09-07 PROCEDURE — 96361 HYDRATE IV INFUSION ADD-ON: CPT

## 2023-09-07 PROCEDURE — G1004 CDSM NDSC: HCPCS

## 2023-09-07 PROCEDURE — 93010 ELECTROCARDIOGRAM REPORT: CPT | Performed by: INTERNAL MEDICINE

## 2023-09-07 PROCEDURE — 85018 HEMOGLOBIN: CPT | Performed by: PHYSICIAN ASSISTANT

## 2023-09-07 PROCEDURE — 86901 BLOOD TYPING SEROLOGIC RH(D): CPT | Performed by: PHYSICIAN ASSISTANT

## 2023-09-07 PROCEDURE — 81003 URINALYSIS AUTO W/O SCOPE: CPT

## 2023-09-07 PROCEDURE — 83605 ASSAY OF LACTIC ACID: CPT | Performed by: PHYSICIAN ASSISTANT

## 2023-09-07 PROCEDURE — 86900 BLOOD TYPING SEROLOGIC ABO: CPT | Performed by: PHYSICIAN ASSISTANT

## 2023-09-07 PROCEDURE — C9113 INJ PANTOPRAZOLE SODIUM, VIA: HCPCS | Performed by: PHYSICIAN ASSISTANT

## 2023-09-07 PROCEDURE — 99284 EMERGENCY DEPT VISIT MOD MDM: CPT

## 2023-09-07 PROCEDURE — 85014 HEMATOCRIT: CPT | Performed by: PHYSICIAN ASSISTANT

## 2023-09-07 PROCEDURE — 85025 COMPLETE CBC W/AUTO DIFF WBC: CPT | Performed by: PHYSICIAN ASSISTANT

## 2023-09-07 PROCEDURE — 96375 TX/PRO/DX INJ NEW DRUG ADDON: CPT

## 2023-09-07 PROCEDURE — 93005 ELECTROCARDIOGRAM TRACING: CPT

## 2023-09-07 PROCEDURE — 84145 PROCALCITONIN (PCT): CPT | Performed by: PHYSICIAN ASSISTANT

## 2023-09-07 RX ORDER — ACETAMINOPHEN 325 MG/1
975 TABLET ORAL ONCE
Status: COMPLETED | OUTPATIENT
Start: 2023-09-07 | End: 2023-09-07

## 2023-09-07 RX ORDER — DIPHENHYDRAMINE HYDROCHLORIDE 50 MG/ML
25 INJECTION INTRAMUSCULAR; INTRAVENOUS ONCE
Status: COMPLETED | OUTPATIENT
Start: 2023-09-07 | End: 2023-09-07

## 2023-09-07 RX ORDER — SUCRALFATE 1 G/1
1 TABLET ORAL EVERY 6 HOURS SCHEDULED
Status: DISCONTINUED | OUTPATIENT
Start: 2023-09-08 | End: 2023-09-08 | Stop reason: HOSPADM

## 2023-09-07 RX ORDER — CYANOCOBALAMIN 1000 UG/ML
1000 INJECTION, SOLUTION INTRAMUSCULAR; SUBCUTANEOUS ONCE
Status: COMPLETED | OUTPATIENT
Start: 2023-09-07 | End: 2023-09-07

## 2023-09-07 RX ORDER — SUCRALFATE 1 G/1
1 TABLET ORAL ONCE
Status: COMPLETED | OUTPATIENT
Start: 2023-09-07 | End: 2023-09-07

## 2023-09-07 RX ORDER — BISACODYL 10 MG
10 SUPPOSITORY, RECTAL RECTAL DAILY PRN
Status: DISCONTINUED | OUTPATIENT
Start: 2023-09-07 | End: 2023-09-08 | Stop reason: HOSPADM

## 2023-09-07 RX ORDER — OXYCODONE HYDROCHLORIDE 5 MG/1
5 TABLET ORAL EVERY 6 HOURS PRN
Status: DISCONTINUED | OUTPATIENT
Start: 2023-09-07 | End: 2023-09-08 | Stop reason: HOSPADM

## 2023-09-07 RX ORDER — PANTOPRAZOLE SODIUM 40 MG/10ML
40 INJECTION, POWDER, LYOPHILIZED, FOR SOLUTION INTRAVENOUS 2 TIMES DAILY
Status: DISCONTINUED | OUTPATIENT
Start: 2023-09-07 | End: 2023-09-08 | Stop reason: HOSPADM

## 2023-09-07 RX ORDER — ACETAMINOPHEN 325 MG/1
650 TABLET ORAL EVERY 4 HOURS PRN
Status: DISCONTINUED | OUTPATIENT
Start: 2023-09-07 | End: 2023-09-08 | Stop reason: HOSPADM

## 2023-09-07 RX ORDER — DIPHENHYDRAMINE HYDROCHLORIDE 50 MG/ML
50 INJECTION INTRAMUSCULAR; INTRAVENOUS ONCE
Status: COMPLETED | OUTPATIENT
Start: 2023-09-07 | End: 2023-09-07

## 2023-09-07 RX ORDER — ONDANSETRON 2 MG/ML
4 INJECTION INTRAMUSCULAR; INTRAVENOUS EVERY 6 HOURS PRN
Status: DISCONTINUED | OUTPATIENT
Start: 2023-09-07 | End: 2023-09-08 | Stop reason: HOSPADM

## 2023-09-07 RX ORDER — MAGNESIUM HYDROXIDE/ALUMINUM HYDROXICE/SIMETHICONE 120; 1200; 1200 MG/30ML; MG/30ML; MG/30ML
30 SUSPENSION ORAL ONCE
Status: COMPLETED | OUTPATIENT
Start: 2023-09-07 | End: 2023-09-07

## 2023-09-07 RX ORDER — ONDANSETRON 4 MG/1
4 TABLET, ORALLY DISINTEGRATING ORAL ONCE
Status: COMPLETED | OUTPATIENT
Start: 2023-09-07 | End: 2023-09-07

## 2023-09-07 RX ORDER — SODIUM CHLORIDE, SODIUM LACTATE, POTASSIUM CHLORIDE, CALCIUM CHLORIDE 600; 310; 30; 20 MG/100ML; MG/100ML; MG/100ML; MG/100ML
125 INJECTION, SOLUTION INTRAVENOUS CONTINUOUS
Status: DISCONTINUED | OUTPATIENT
Start: 2023-09-07 | End: 2023-09-08 | Stop reason: HOSPADM

## 2023-09-07 RX ORDER — FAMOTIDINE 10 MG/ML
20 INJECTION, SOLUTION INTRAVENOUS ONCE
Status: COMPLETED | OUTPATIENT
Start: 2023-09-07 | End: 2023-09-07

## 2023-09-07 RX ADMIN — PANTOPRAZOLE SODIUM 40 MG: 40 INJECTION, POWDER, FOR SOLUTION INTRAVENOUS at 23:49

## 2023-09-07 RX ADMIN — ACETAMINOPHEN 325MG 975 MG: 325 TABLET ORAL at 18:44

## 2023-09-07 RX ADMIN — FAMOTIDINE 20 MG: 10 INJECTION INTRAVENOUS at 16:18

## 2023-09-07 RX ADMIN — ALUMINUM HYDROXIDE, MAGNESIUM HYDROXIDE, AND DIMETHICONE 30 ML: 200; 20; 200 SUSPENSION ORAL at 16:11

## 2023-09-07 RX ADMIN — IOHEXOL 100 ML: 350 INJECTION, SOLUTION INTRAVENOUS at 17:58

## 2023-09-07 RX ADMIN — ONDANSETRON 4 MG: 4 TABLET, ORALLY DISINTEGRATING ORAL at 16:11

## 2023-09-07 RX ADMIN — CYANOCOBALAMIN 1000 MCG: 1000 INJECTION, SOLUTION INTRAMUSCULAR at 23:54

## 2023-09-07 RX ADMIN — FOLIC ACID 1 MG: 5 INJECTION, SOLUTION INTRAMUSCULAR; INTRAVENOUS; SUBCUTANEOUS at 21:55

## 2023-09-07 RX ADMIN — SUCRALFATE 1 G: 1 TABLET ORAL at 21:55

## 2023-09-07 RX ADMIN — SODIUM CHLORIDE 1000 ML: 0.9 INJECTION, SOLUTION INTRAVENOUS at 16:20

## 2023-09-07 RX ADMIN — DIPHENHYDRAMINE HYDROCHLORIDE 25 MG: 50 INJECTION, SOLUTION INTRAMUSCULAR; INTRAVENOUS at 18:15

## 2023-09-07 RX ADMIN — DIPHENHYDRAMINE HYDROCHLORIDE 50 MG: 50 INJECTION, SOLUTION INTRAMUSCULAR; INTRAVENOUS at 16:11

## 2023-09-07 RX ADMIN — THIAMINE HYDROCHLORIDE 100 MG: 100 INJECTION, SOLUTION INTRAMUSCULAR; INTRAVENOUS at 23:51

## 2023-09-07 NOTE — LETTER
[address]  Dept: [phone]    September 8, 2023     Patient: Jahaira Montenegro   YOB: 1980   Date of Visit: 9/7/2023       To Whom it May Concern:    Lucy Noyola is under my professional care. She was seen in the hospital from 9/7/2023 to 09/08/23. She may return to work on 9/11/23 with the following limitations work breaks to allow for rehydration . If you have any questions or concerns, please don't hesitate to call.          Sincerely,      Darrel Gross MD  Bariatric Surgery

## 2023-09-07 NOTE — ED NOTES
Pt drinking at bedside. Pt verbalized understand on contrast administration.       Shannon Carvalho RN  09/07/23 [id number]

## 2023-09-07 NOTE — ED PROVIDER NOTES
History  Chief Complaint   Patient presents with   • Abdominal Pain     Pt reports RUQ pain 10/10 and diarrhea on starting Monday. Hysterectomy 2 weeks ago on August 21st. Pt reports dizziness, headache and blurred vision. History of migraines. Samuel Gao is a 44 y.o. female with a past medical history of Karmen-en-Y gastric bypass 5 years ago, cholecystectomy 5 years ago, and laparoscopic hysterectomy 2 weeks ago presenting to the ER complaining of left upper quadrant pain which began 3 days ago. Patient reports that initially pain was intermittent and would improve after she ate. She reports that since last night has been persistent and has increased in severity to a 10 out of 10. Patient reports that it will occasionally radiate from her left upper quadrant to her left flank. Reports that the past 2 nights she has felt subjective fevers. She reports she has been attempting treatment with pantoprazole with minimal relief. Today has taken 2 or 3 doses of pantoprazole. Denies any chest pain, shortness of breath, cough, recent travel, lower extremity swelling or pain, vomiting, or diarrhea. Prior to Admission Medications   Prescriptions Last Dose Informant Patient Reported? Taking?    Multiple Vitamin (multivitamin) tablet   Yes No   Sig: Take 1 tablet by mouth daily   acetaminophen (TYLENOL) 500 mg tablet   Yes No   Sig: Take 500-1,000 mg by mouth every 6 (six) hours as needed for mild pain   famotidine (PEPCID) 20 mg tablet   No No   Sig: Take 1 tablet (20 mg total) by mouth 2 (two) times a day   Patient taking differently: Take 20 mg by mouth 2 (two) times a day as needed   ferrous sulfate 325 (65 Fe) mg tablet  Self Yes No   Sig: Take 325 mg by mouth daily with breakfast   furosemide (LASIX) 20 mg tablet   Yes No   Sig: Take 20 mg by mouth daily as needed   linaCLOtide 290 MCG CAPS   No No   Sig: Take 1 capsule by mouth daily before breakfast   Patient taking differently: Take 290 mcg by mouth every evening   ondansetron (ZOFRAN-ODT) 4 mg disintegrating tablet   No No   Sig: TAKE 1 TABLET BY MOUTH EVERY 6 HOURS AS NEEDED FOR NAUSEA OR VOMITING   oxyCODONE-acetaminophen (Percocet) 5-325 mg per tablet   No No   Sig: Take 1 tablet by mouth every 4 (four) hours as needed for severe pain for up to 15 doses Max Daily Amount: 6 tablets   pantoprazole (PROTONIX) 40 mg tablet   No No   Sig: Take 1 tablet (40 mg total) by mouth 2 (two) times a day   Patient taking differently: Take 40 mg by mouth 2 (two) times a day as needed      Facility-Administered Medications: None       Past Medical History:   Diagnosis Date   • Abdominal pain     "almost constant"   • Anemia    • Anesthesia     "woke up swinging with last  2018  "was fine with EGD"   • Back pain    • Bariatric surgery status     -gastric sleeve revison RUEN-Y 2019-abd painnow-dx lap today 3/9/2020   • Constipation    • COVID     x 2 -  and    • Dental bridge present     right lower permanent   • Diarrhea    • Difficulty swallowing     "in the past"   • Disease of thyroid gland     not on meds now   • Dizzy    • Edema     ble's   • Fatigue     "when blood pressure low" and weakness too"   • GERD (gastroesophageal reflux disease)     "increase after surgery"   • Heart murmur     heart murmer, work up negative with holter monitor   • History of 2  sections     most recent 17   • History of D&C 2019   • History of iron deficiency     anemia/ had IV infusions through pregnancy in [id number]   • History of transfusion     2019 - pt had allergic reaction - had to stop the blood   • Inguinal hernia     right   • Low BP     "off and on"   • Migraine    • N&V (nausea and vomiting)     " a little better since on meds"   • Non-intractable vomiting     "not since been on medicine"   • Palpitations     "having again"   • Postgastrectomy malabsorption        Past Surgical History:   Procedure Laterality Date   •  SECTION      x2   • CHOLECYSTECTOMY     • CHROMOSOME ANALYSIS, PRODUCTS OF CONCEPTION (HISTORICAL)  2018    2 miscarriages in  and Nov   • LAPAROSCOPIC SALPINGOOPHERECTOMY Right 2018    LVH, 6 cm benign ovarian cyst   • LAPAROSCOPY N/A 2020    Procedure: DIAGNOSTIC LAPAROSCOPY,CLOSURE OF COATES DEFECT, INTRAOP EGD;  Surgeon: Carisa Livingston MD;  Location: [facility];  Service: Bariatrics   • [place] EGD TRANSORAL BIOPSY SINGLE/MULTIPLE N/A 2019    Procedure: ESOPHAGOGASTRODUODENOSCOPY (EGD) with bx;  Surgeon: Carisa Livingston MD;  Location: [facility]; Service: Bariatrics   • [place] LAPS GSTR RSTCV PX W/BYP SREE-EN-Y LIMB <150 CM N/A 2019    Procedure: LAP SREE-EN-Y GASTRIC BYPASS, INTRAOP EGD;  Surgeon: Carisa Livingston MD;  Location: [facility];  Service: Bariatrics   • [place] LAPS SUPRACRV HYSTERECT 250 GM/< RMVL TUBE/OVAR Bilateral 2023    Procedure: [facility]); Surgeon: Elliot Lao DO;  Location: [facility];  Service: Gynecology   • [place] MISSED  FIRST TRIMESTER SURGICAL N/A 2019    Procedure: DILATATION AND EVACUATION (D&E) (8 weeks) missed ab;  Surgeon: Armando Em MD;  [facility]: [facility];  Service: Gynecology   • REVISION BYPASS LAPAROSCOPIC N/A 2019    Procedure: LAP REVISION/ CONVERSION;  Surgeon: Carisa Livingston MD;  [facility]: [facility];  Service: Bariatrics   • SALPINGOOPHORECTOMY Right 2018   • SLEEVE GASTROPLASTY         Family History   Problem Relation Age of Onset   • Hypertension Mother    • Heart disease Mother    • No Known Problems Father      I have reviewed and agree with the history as documented.     E-Cigarette/Vaping   • E-Cigarette Use Never User      E-Cigarette/Vaping Substances   • Nicotine No    • THC No    • CBD No    • Flavoring No    • Other No    • Unknown No      Social History     Tobacco Use   • Smoking status: Never   • Smokeless tobacco: Never   Vaping Use   • Vaping Use: Never used   Substance Use Topics   • Alcohol use: Yes     Comment: occ   • Drug use: No       Review of Systems   Constitutional: Positive for fever. Negative for chills. HENT: Negative for ear pain and sore throat. Eyes: Negative for pain and visual disturbance. Respiratory: Negative for cough and shortness of breath. Cardiovascular: Negative for chest pain and palpitations. Gastrointestinal: Positive for abdominal pain and nausea. Negative for vomiting. Genitourinary: Negative for dysuria and hematuria. Musculoskeletal: Negative for arthralgias and back pain. Skin: Negative for color change and rash. Neurological: Negative for seizures and syncope. All other systems reviewed and are negative. Physical Exam  Physical Exam  Vitals and nursing note reviewed. Constitutional:       General: She is not in acute distress. Appearance: She is well-developed. She is not ill-appearing, toxic-appearing or diaphoretic. HENT:      Head: Normocephalic and atraumatic. Eyes:      Conjunctiva/sclera: Conjunctivae normal.   Cardiovascular:      Rate and Rhythm: Normal rate and regular rhythm. Heart sounds: No murmur heard. Pulmonary:      Effort: Pulmonary effort is normal. No respiratory distress. Breath sounds: Normal breath sounds. Abdominal:      General: A surgical scar is present. There is no distension. Palpations: Abdomen is soft. There is no mass. Tenderness: There is abdominal tenderness in the epigastric area, left upper quadrant and left lower quadrant. There is left CVA tenderness. There is no guarding. Comments: Laparoscopic surgical scars appear to be healing well without any surrounding erythema, tenderness, or drainage. Musculoskeletal:         General: No swelling. Cervical back: Neck supple. Skin:     General: Skin is warm and dry. Capillary Refill: Capillary refill takes less than 2 seconds. Neurological:      Mental Status: She is alert.    Psychiatric:         Mood and Affect: Mood normal.         Vital Signs  ED Triage Vitals   Temperature Pulse Respirations Blood Pressure SpO2   09/07/23 1528 09/07/23 1528 09/07/23 1528 09/07/23 1528 09/07/23 1528   98 °F (36.7 °C) 81 18 120/61 98 %      Temp src Heart Rate Source Patient Position - Orthostatic VS BP Location FiO2 (%)   -- 09/07/23 1528 09/07/23 1528 09/07/23 1528 --    Monitor Lying Right arm       Pain Score       09/07/23 1809       9           Vitals:    09/07/23 2000 09/07/23 2100 09/07/23 2200 09/08/23 0000   BP: 106/66 112/58 115/55 102/58   Pulse: 67 71 64 76   Patient Position - Orthostatic VS: Lying Lying Lying Lying         Visual Acuity  Visual Acuity    Flowsheet Row Most Recent Value   L Pupil Size (mm) 4   R Pupil Size (mm) 4          ED Medications  Medications   morphine injection 2 mg (2 mg Intravenous Not Given 9/7/23 1837)   pantoprazole (PROTONIX) injection 40 mg (40 mg Intravenous Given 9/7/23 2349)   lactated ringers infusion (has no administration in time range)   bisacodyl (DULCOLAX) rectal suppository 10 mg (has no administration in time range)   ondansetron (ZOFRAN) injection 4 mg (has no administration in time range)   sucralfate (CARAFATE) tablet 1 g (has no administration in time range)   acetaminophen (TYLENOL) tablet 650 mg (has no administration in time range)   oxyCODONE (ROXICODONE) IR tablet 5 mg (has no administration in time range)   sodium chloride 0.9 % bolus 1,000 mL (0 mL Intravenous Stopped 9/7/23 1900)   aluminum-magnesium hydroxide-simethicone (MAALOX) oral suspension 30 mL (30 mL Oral Given 9/7/23 1611)   diphenhydrAMINE (BENADRYL) injection 50 mg (50 mg Intravenous Given 9/7/23 1611)   Famotidine (PF) (PEPCID) injection 20 mg (20 mg Intravenous Given 9/7/23 1618)   ondansetron (ZOFRAN-ODT) dispersible tablet 4 mg (4 mg Oral Given 9/7/23 1611)   iohexol (OMNIPAQUE) 240 MG/ML solution 50 mL (50 mL Oral Given 9/7/23 1800)   iohexol (OMNIPAQUE) 350 MG/ML injection (MULTI-DOSE) 100 mL (100 mL Intravenous Given 9/7/23 1758)   diphenhydrAMINE (BENADRYL) injection 25 mg (25 mg Intravenous Given 9/7/23 1815)   acetaminophen (TYLENOL) tablet 975 mg (975 mg Oral Given 0/0/10 9661)   folic acid 1 mg in sodium chloride 0.9 % 50 mL IVPB (0 mg Intravenous Stopped 9/7/23 2346)   thiamine (VITAMIN B1) 100 mg in sodium chloride 0.9 % 50 mL IVPB (100 mg Intravenous New Bag 9/7/23 2351)   sucralfate (CARAFATE) tablet 1 g (1 g Oral Given 9/7/23 2155)   cyanocobalamin injection 1,000 mcg (1,000 mcg Intramuscular Given 9/7/23 2354)       Diagnostic Studies  Results Reviewed     Procedure Component Value Units Date/Time    Hemoglobin and hematocrit, blood [[id number]] Collected: 09/07/23 2352    Lab Status: In process Specimen: Blood from Arm, Right Updated: 09/08/23 0001    HS Troponin 0hr (reflex protocol) [[id number]]  (Normal) Collected: 09/07/23 2020    Lab Status: Final result Specimen: Blood from Arm, Right Updated: 09/07/23 2049     hs TnI 0hr <2 ng/L     Procalcitonin [[id number]]  (Normal) Collected: 09/07/23 1600    Lab Status: Final result Specimen: Blood from Arm, Right Updated: 09/07/23 2047     Procalcitonin <0.05 ng/ml     Lactic acid, plasma (w/reflex if result > 2.0) [[id number]]  (Normal) Collected: 09/07/23 1707    Lab Status: Final result Specimen: Blood from Arm, Right Updated: 09/07/23 1846     LACTIC ACID 0.7 mmol/L     Narrative:      Result may be elevated if tourniquet was used during collection.     Urine Macroscopic, POC [[id number]] Collected: 09/07/23 1706    Lab Status: Final result Specimen: Urine Updated: 09/07/23 1708     Color, UA Yellow     Clarity, UA Clear     pH, UA 5.0     Leukocytes, UA Negative     Nitrite, UA Negative     Protein, UA Negative mg/dl      Glucose, UA Negative mg/dl      Ketones, UA Negative mg/dl      Urobilinogen, UA 0.2 E.U./dl      Bilirubin, UA Negative     Occult Blood, UA Negative     Specific Gravity, UA 1.010    Narrative:      CLINITEK RESULT    Comprehensive metabolic panel [[id number]] Collected: 09/07/23 1600    Lab Status: Final result Specimen: Blood from Arm, Right Updated: 09/07/23 1646     Sodium 138 mmol/L      Potassium 3.6 mmol/L      Chloride 106 mmol/L      CO2 25 mmol/L      ANION GAP 7 mmol/L      BUN 11 mg/dL      Creatinine 0.80 mg/dL      Glucose 77 mg/dL      Calcium 8.5 mg/dL      AST 19 U/L      ALT 12 U/L      Alkaline Phosphatase 79 U/L      Total Protein 7.1 g/dL      Albumin 3.9 g/dL      Total Bilirubin 0.23 mg/dL      eGFR 91 ml/min/1.73sq m     Narrative:      National Kid[employer] Foundation guidelines for Chronic Kidney Disease (CKD):   •  Stage 1 with normal or high GFR (GFR > 90 mL/min/1.73 square meters)  •  Stage 2 Mild CKD (GFR = 60-89 mL/min/1.73 square meters)  •  Stage 3A Moderate CKD (GFR = 45-59 mL/min/1.73 square meters)  •  Stage 3B Moderate CKD (GFR = 30-44 mL/min/1.73 square meters)  •  Stage 4 Severe CKD (GFR = 15-29 mL/min/1.73 square meters)  •  Stage 5 End Stage CKD (GFR <15 mL/min/1.73 square meters)  Note: GFR calculation is accurate only with a steady state creatinine    Lipase [[id number]]  (Normal) Collected: 09/07/23 1600    Lab Status: Final result Specimen: Blood from Arm, Right Updated: 09/07/23 1646     Lipase 15 u/L     CBC and differential [[id number]]  (Abnormal) Collected: 09/07/23 1600    Lab Status: Final result Specimen: Blood from Arm, Right Updated: 09/07/23 1628     WBC 8.01 Thousand/uL      RBC 3.59 Million/uL      Hemoglobin 7.1 g/dL      Hematocrit 25.1 %      MCV 70 fL      MCH 19.8 pg      MCHC 28.3 g/dL      RDW 17.9 %      MPV 9.3 fL      Platelets 869 Thousands/uL      nRBC 0 /100 WBCs      Neutrophils Relative 52 %      Immat GRANS % 0 %      Lymphocytes Relative 38 %      Monocytes Relative 8 %      Eosinophils Relative 2 %      Basophils Relative 0 %      Neutrophils Absolute 4.17 Thousands/µL      Immature Grans Absolute 0.02 Thousand/uL      Lymphocytes Absolute 3.06 Thousands/µL Monocytes Absolute 0.60 Thousand/µL      Eosinophils Absolute 0.14 Thousand/µL      Basophils Absolute 0.02 Thousands/µL                  CT abdomen pelvis with contrast   Final Result by Mariama Morley MD (09/07 1917)         1. Postoperative change reflecting Karmen-en-Y gastric bypass revising previous sleeve gastrectomy with expected postoperative anatomy. 2. Apparent focal jejunojejunal intussusception without obstruction, likely a transient phenomenon. This should be clinically monitored. 3. Mild constipation. 4. Status post supracervical hysterectomy. 5. Additional findings as noted. Workstation performed: [id number]                    Procedures  Procedures         ED Course                               SBIRT 22yo+    Flowsheet Row Most Recent Value   Initial Alcohol Screen: US AUDIT-C     1. How often do you have a drink containing alcohol? 0 Filed at: 09/07/2023 1538   2. How many drinks containing alcohol do you have on a typical day you are drinking? 0 Filed at: 09/07/2023 1537   3a. Male UNDER 65: How often do you have five or more drinks on one occasion? 0 Filed at: 09/07/2023 1539   3b. FEMALE Any Age, or MALE 65+: How often do you have 4 or more drinks on one occassion? 0 Filed at: 09/07/2023 1535   Audit-C Score 0 Filed at: 09/07/2023 1537   BOO: How many times in the past year have you. .. Used an illegal drug or used a prescription medication for non-medical reasons? Never Filed at: 09/07/2023 1535                    Medical Decision Making  Warren Watkins is a 44 y.o. female with a past medical history of Karmen-en-Y gastric bypass 5 years ago, cholecystectomy 5 years ago, and laparoscopic hysterectomy 2 weeks ago presenting to the ER complaining of left upper quadrant pain which began 3 days ago. Initially intermittent and would improve with food however is now constant and will not improve with food. On exam patient well-appearing in no acute distress. Physical examination reveals epigastric, LUQ, and LLQ tenderness to palpation. There is a left-sided CVA tenderness. No distention or masses. Laparoscopic surgical scars appear clean, dry, no and without tenderness, erythema, or drainage. Discussed with patient that we will evaluate with CT scan of the abdomen pelvis considering recent surgical intervention and we will also check CBC for anemia, CMP for kidney function, liver function, and electrolyte abnormalities. We will also check lipase, lactic acid, procalcitonin. We will attempt treatment this time with Maalox and Pepcid. Given PPIs patient has had 3 doses today. Patient does report allergy to IV contrast which she describes as itching and hives. She has had the Benadryl prep in the past which has worked well for her. Will order Benadryl to be given before IV contrast.    CBC reveals decreased hemoglobin at 7.1. No elevation in white count. Decreased hemoglobin could possibly be due to GI bleed however stool is guaiac negative. Urinalysis, CMP, lipase, procalcitonin, lactic acid, and troponin are all within normal limits. Low suspicion for cardiac etiology. CT scan reveals postoperative change reflecting Karmen-en-Y gastric bypass reports and previous sleeve gastrectomy with expected postoperative anatomy. Scan also shows apparent focal jejunojejunal intussusception without obstruction, likely a transient phenomenon. This should be clinically monitored. Mild constipation, s/p supracervical hysterectomy. I extensively discussed all imaging and lab results with patient. I also discussed with bariatrics Dr. Tommy Ricardo. He recommends rechecking H&H and possible transfusion, thiamine and folate, n.p.o., bowel rest, PPI twice daily, and Carafate. I did redrawn H&H to evaluate if hemoglobin does drop below 7. Blood transfusion consent was signed and I extensively discussed the risk versus benefit of blood transfusion with patient.   Dr. Simon Servin Mariah Kong did come into evaluation and recommended admission under bariatric service Dr. Brien Briones. Discussed this plan with patient and she is understanding and agreeable. Patient is remained stable throughout stay in ER and stable during time of admission. Epigastric pain: acute illness or injury  Intussusception [facility]): acute illness or injury  Severe anemia: acute illness or injury  Amount and/or Complexity of Data Reviewed  Labs: ordered. Radiology: ordered. Risk  OTC drugs. Prescription drug management. Disposition  Final diagnoses:   Epigastric pain   Intussusception ([address])   Severe anemia     Time reflects when diagnosis was documented in both MDM as applicable and the Disposition within this note     Time User Action Codes Description Comment    9/8/2023 12:26 AM Irineo De Jesus Add [R10.13] Epigastric pain     9/8/2023 12:26 AM Laure Goodwin Add [K56.1] Intussusception ([address])     9/8/2023 12:27 AM Laure Goodwin Add [D64.9] Severe anemia       ED Disposition     None      Follow-up Information    None         Patient's Medications   Discharge Prescriptions    No medications on file       No discharge procedures on file.     PDMP Review       Value Time User    PDMP Reviewed  Yes 2/9/2023  3:25 PM Day Cisse DO          ED Provider  Electronically Signed by           Sissy Scott PA-C  09/08/23 0030

## 2023-09-07 NOTE — ED NOTES
Pt ambulatory to bathroom, assist x1 due to pt feeling sleepy/dizzy after Benadryl.       Bertram   09/07/23 4551

## 2023-09-07 NOTE — ED NOTES
Pt requested different pain medication due to feeling lightheaded, dizzy, and sleepy due to benadryl. Provider made aware.      Mica Ha RN  09/07/23 2225

## 2023-09-07 NOTE — ED NOTES
Pt returned from CT. Pt reports itchy throat. Pt denies SOB. Vitals are as documented. Provider made aware.      Nuvia Nava RN  09/07/23 0074

## 2023-09-08 ENCOUNTER — APPOINTMENT (OUTPATIENT)
Dept: CT IMAGING | Facility: HOSPITAL | Age: 43
End: 2023-09-08
Payer: MEDICARE

## 2023-09-08 VITALS
HEIGHT: 64 IN | OXYGEN SATURATION: 96 % | SYSTOLIC BLOOD PRESSURE: 111 MMHG | TEMPERATURE: 98.4 F | RESPIRATION RATE: 16 BRPM | BODY MASS INDEX: 42.08 KG/M2 | DIASTOLIC BLOOD PRESSURE: 67 MMHG | WEIGHT: 246.47 LBS | HEART RATE: 71 BPM

## 2023-09-08 LAB
ABO GROUP BLD BPU: NORMAL
ABO GROUP BLD: NORMAL
ALBUMIN SERPL BCP-MCNC: 3.4 G/DL (ref 3.5–5)
ALP SERPL-CCNC: 71 U/L (ref 34–104)
ALT SERPL W P-5'-P-CCNC: 12 U/L (ref 7–52)
ANION GAP SERPL CALCULATED.3IONS-SCNC: 6 MMOL/L
AST SERPL W P-5'-P-CCNC: 19 U/L (ref 13–39)
ATRIAL RATE: 68 BPM
BASOPHILS # BLD AUTO: 0.01 THOUSANDS/ÂΜL (ref 0–0.1)
BASOPHILS NFR BLD AUTO: 0 % (ref 0–1)
BILIRUB SERPL-MCNC: 0.35 MG/DL (ref 0.2–1)
BLD GP AB SCN SERPL QL: NEGATIVE
BPU ID: NORMAL
BUN SERPL-MCNC: 9 MG/DL (ref 5–25)
CALCIUM ALBUM COR SERPL-MCNC: 8.4 MG/DL (ref 8.3–10.1)
CALCIUM SERPL-MCNC: 7.9 MG/DL (ref 8.4–10.2)
CHLORIDE SERPL-SCNC: 108 MMOL/L (ref 96–108)
CO2 SERPL-SCNC: 24 MMOL/L (ref 21–32)
CREAT SERPL-MCNC: 0.79 MG/DL (ref 0.6–1.3)
CROSSMATCH: NORMAL
EOSINOPHIL # BLD AUTO: 0.18 THOUSAND/ÂΜL (ref 0–0.61)
EOSINOPHIL NFR BLD AUTO: 4 % (ref 0–6)
ERYTHROCYTE [DISTWIDTH] IN BLOOD BY AUTOMATED COUNT: 18.7 % (ref 11.6–15.1)
GFR SERPL CREATININE-BSD FRML MDRD: 92 ML/MIN/1.73SQ M
GLUCOSE P FAST SERPL-MCNC: 86 MG/DL (ref 65–99)
GLUCOSE SERPL-MCNC: 86 MG/DL (ref 65–140)
HCT VFR BLD AUTO: 23 % (ref 34.8–46.1)
HCT VFR BLD AUTO: 25.2 % (ref 34.8–46.1)
HGB BLD-MCNC: 6.5 G/DL (ref 11.5–15.4)
HGB BLD-MCNC: 7.3 G/DL (ref 11.5–15.4)
IMM GRANULOCYTES # BLD AUTO: 0.01 THOUSAND/UL (ref 0–0.2)
IMM GRANULOCYTES NFR BLD AUTO: 0 % (ref 0–2)
LYMPHOCYTES # BLD AUTO: 1.89 THOUSANDS/ÂΜL (ref 0.6–4.47)
LYMPHOCYTES NFR BLD AUTO: 38 % (ref 14–44)
MAGNESIUM SERPL-MCNC: 2.1 MG/DL (ref 1.9–2.7)
MCH RBC QN AUTO: 20.3 PG (ref 26.8–34.3)
MCHC RBC AUTO-ENTMCNC: 29 G/DL (ref 31.4–37.4)
MCV RBC AUTO: 70 FL (ref 82–98)
MONOCYTES # BLD AUTO: 0.48 THOUSAND/ÂΜL (ref 0.17–1.22)
MONOCYTES NFR BLD AUTO: 10 % (ref 4–12)
NEUTROPHILS # BLD AUTO: 2.39 THOUSANDS/ÂΜL (ref 1.85–7.62)
NEUTS SEG NFR BLD AUTO: 48 % (ref 43–75)
NRBC BLD AUTO-RTO: 0 /100 WBCS
P AXIS: 61 DEGREES
PHOSPHATE SERPL-MCNC: 3.4 MG/DL (ref 2.7–4.5)
PLATELET # BLD AUTO: 382 THOUSANDS/UL (ref 149–390)
PMV BLD AUTO: 8.8 FL (ref 8.9–12.7)
POTASSIUM SERPL-SCNC: 3.6 MMOL/L (ref 3.5–5.3)
PR INTERVAL: 156 MS
PROT SERPL-MCNC: 6.4 G/DL (ref 6.4–8.4)
QRS AXIS: 64 DEGREES
QRSD INTERVAL: 86 MS
QT INTERVAL: 404 MS
QTC INTERVAL: 429 MS
RBC # BLD AUTO: 3.6 MILLION/UL (ref 3.81–5.12)
RH BLD: POSITIVE
SODIUM SERPL-SCNC: 138 MMOL/L (ref 135–147)
SPECIMEN EXPIRATION DATE: NORMAL
T WAVE AXIS: 39 DEGREES
UNIT DISPENSE STATUS: NORMAL
UNIT PRODUCT CODE: NORMAL
UNIT PRODUCT VOLUME: 350 ML
UNIT RH: NORMAL
VENTRICULAR RATE: 68 BPM
WBC # BLD AUTO: 4.96 THOUSAND/UL (ref 4.31–10.16)

## 2023-09-08 PROCEDURE — 93010 ELECTROCARDIOGRAM REPORT: CPT

## 2023-09-08 PROCEDURE — 80053 COMPREHEN METABOLIC PANEL: CPT | Performed by: STUDENT IN AN ORGANIZED HEALTH CARE EDUCATION/TRAINING PROGRAM

## 2023-09-08 PROCEDURE — 84100 ASSAY OF PHOSPHORUS: CPT | Performed by: STUDENT IN AN ORGANIZED HEALTH CARE EDUCATION/TRAINING PROGRAM

## 2023-09-08 PROCEDURE — 74176 CT ABD & PELVIS W/O CONTRAST: CPT

## 2023-09-08 PROCEDURE — 83735 ASSAY OF MAGNESIUM: CPT | Performed by: STUDENT IN AN ORGANIZED HEALTH CARE EDUCATION/TRAINING PROGRAM

## 2023-09-08 PROCEDURE — NC001 PR NO CHARGE: Performed by: STUDENT IN AN ORGANIZED HEALTH CARE EDUCATION/TRAINING PROGRAM

## 2023-09-08 PROCEDURE — 99238 HOSP IP/OBS DSCHRG MGMT 30/<: CPT | Performed by: STUDENT IN AN ORGANIZED HEALTH CARE EDUCATION/TRAINING PROGRAM

## 2023-09-08 PROCEDURE — G1004 CDSM NDSC: HCPCS

## 2023-09-08 PROCEDURE — C9113 INJ PANTOPRAZOLE SODIUM, VIA: HCPCS | Performed by: PHYSICIAN ASSISTANT

## 2023-09-08 PROCEDURE — P9016 RBC LEUKOCYTES REDUCED: HCPCS

## 2023-09-08 PROCEDURE — 85025 COMPLETE CBC W/AUTO DIFF WBC: CPT | Performed by: STUDENT IN AN ORGANIZED HEALTH CARE EDUCATION/TRAINING PROGRAM

## 2023-09-08 RX ORDER — FERROUS SULFATE 325(65) MG
325 TABLET ORAL
Qty: 30 TABLET | Refills: 0 | Status: SHIPPED | OUTPATIENT
Start: 2023-09-08 | End: 2023-10-08

## 2023-09-08 RX ORDER — ACETAMINOPHEN 160 MG/5ML
640 SUSPENSION ORAL EVERY 6 HOURS PRN
Qty: 473 ML | Refills: 0 | Status: SHIPPED | OUTPATIENT
Start: 2023-09-08

## 2023-09-08 RX ORDER — DIPHENHYDRAMINE HYDROCHLORIDE 50 MG/ML
25 INJECTION INTRAMUSCULAR; INTRAVENOUS EVERY 6 HOURS PRN
Status: DISCONTINUED | OUTPATIENT
Start: 2023-09-08 | End: 2023-09-08 | Stop reason: HOSPADM

## 2023-09-08 RX ORDER — PANTOPRAZOLE SODIUM 40 MG/1
40 TABLET, DELAYED RELEASE ORAL 2 TIMES DAILY
Qty: 60 TABLET | Refills: 2 | Status: SHIPPED | OUTPATIENT
Start: 2023-09-08

## 2023-09-08 RX ORDER — SUCRALFATE 1 G/1
1 TABLET ORAL 4 TIMES DAILY
Qty: 120 TABLET | Refills: 0 | Status: SHIPPED | OUTPATIENT
Start: 2023-09-08 | End: 2023-10-08

## 2023-09-08 RX ADMIN — DIPHENHYDRAMINE HYDROCHLORIDE 25 MG: 50 INJECTION, SOLUTION INTRAMUSCULAR; INTRAVENOUS at 02:06

## 2023-09-08 RX ADMIN — IOHEXOL 50 ML: 240 INJECTION, SOLUTION INTRATHECAL; INTRAVASCULAR; INTRAVENOUS; ORAL at 12:19

## 2023-09-08 RX ADMIN — ACETAMINOPHEN 325MG 650 MG: 325 TABLET ORAL at 12:56

## 2023-09-08 RX ADMIN — OXYCODONE HYDROCHLORIDE 5 MG: 5 TABLET ORAL at 12:56

## 2023-09-08 RX ADMIN — SUCRALFATE 1 G: 1 TABLET ORAL at 12:50

## 2023-09-08 RX ADMIN — SODIUM CHLORIDE, SODIUM LACTATE, POTASSIUM CHLORIDE, AND CALCIUM CHLORIDE 125 ML/HR: .6; .31; .03; .02 INJECTION, SOLUTION INTRAVENOUS at 00:38

## 2023-09-08 RX ADMIN — SUCRALFATE 1 G: 1 TABLET ORAL at 05:29

## 2023-09-08 RX ADMIN — SUCRALFATE 1 G: 1 TABLET ORAL at 00:38

## 2023-09-08 RX ADMIN — PANTOPRAZOLE SODIUM 40 MG: 40 INJECTION, POWDER, FOR SOLUTION INTRAVENOUS at 09:59

## 2023-09-08 RX ADMIN — OXYCODONE HYDROCHLORIDE 5 MG: 5 TABLET ORAL at 07:42

## 2023-09-08 NOTE — PROGRESS NOTES
Progress Note - Bariatric Surgery   Mikael Hernadez 44 y.o. female MRN: [id number]  Unit/Bed#: E5 -01 Encounter: [id number]    Assessment:  60-year-old female with significant surgical history including prior Karmen-en-Y gastric bypass converted from sleeve and diagnostic laparoscopy, presented to the emergency department with worsening epigastric abdominal pain found to have unremarkable CT finding with exception of a jejunal intussusception likely transient also with significant anemia prior to admission as well. Plan:  No additional transfusion needed. Keep NPO for now   PPI twice daily and Carafate suspension. Suppository for bowel movement. SCDs for VTE prophylaxis, encourage ambulation. Continue IV fluids for now. Will repeat CT scan with bariatric protocol now due to persistent pain. Dispo is pending findings of repeat CT scan, if negative will advance her diet and discharge with follow-up for endoscopy, if intussusception persists, may need to discuss surgical options in which case she will need to remain n.p.o. Subjective/Objective   Chief Complaint: No complaints    Subjective: No acute overnight events, tolerated blood transfusion well, reports that her abdominal pain is better with bowel rest and medications. Objective:     Vitals: Blood pressure 111/67, pulse 71, temperature 98.4 °F (36.9 °C), temperature source Oral, resp. rate 16, height 5' 4" (1.626 m), weight 112 kg (246 lb 7.6 oz), last menstrual period 08/06/2023, SpO2 96 %, not currently breastfeeding. ,Body mass index is 42.31 kg/m².       Intake/Output Summary (Last 24 hours) at 9/8/2023 0809  Last data filed at 9/8/2023 0458  Gross per 24 hour   Intake 1417.5 ml   Output --   Net 1417.5 ml       Invasive Devices     Peripheral Intravenous Line  Duration           Peripheral IV 09/07/23 Right Antecubital <1 day                Physical Exam: /67 (BP Location: Left arm)   Pulse 71   Temp 98.4 °F (36.9 °C) (Oral) Resp 16   Ht 5' 4" (1.626 m)   Wt 112 kg (246 lb 7.6 oz)   LMP 08/06/2023   SpO2 96%   BMI 42.31 kg/m²   General appearance: alert and oriented, in no acute distress  Head: Normocephalic, without obvious abnormality, atraumatic  Eyes: negative  Throat: lips, mucosa, and tongue normal; teeth and gums normal  Lungs: No apparent respiratory distress on room air  Abdomen: Soft, mildly tender in epigastric region, nondistended. Laparoscopic port sites clean dry and intact. Extremities: extremities normal, warm and well-perfused; no cyanosis, clubbing, or edema  Skin: Skin color, texture, turgor normal. No rashes or lesions    Lab, Imaging and other studies:   I have personally reviewed pertinent labs.   CBC:   Lab Results   Component Value Date    WBC 4.96 09/08/2023    HGB 7.3 (L) 09/08/2023    HCT 25.2 (L) 09/08/2023    MCV 70 (L) 09/08/2023     09/08/2023    RBC 3.60 (L) 09/08/2023    MCH 20.3 (L) 09/08/2023    MCHC 29.0 (L) 09/08/2023    RDW 18.7 (H) 09/08/2023    MPV 8.8 (L) 09/08/2023    NRBC 0 09/08/2023     CMP:   Lab Results   Component Value Date    SODIUM 138 09/08/2023     09/08/2023    CO2 24 09/08/2023    BUN 9 09/08/2023    CREATININE 0.79 09/08/2023    CALCIUM 7.9 (L) 09/08/2023    AST 19 09/08/2023    ALT 12 09/08/2023    ALKPHOS 71 09/08/2023    EGFR 92 09/08/2023     VTE Pharmacologic Prophylaxis: Sequential compression device (Venodyne)   VTE Mechanical Prophylaxis: sequential compression device     Jadiel Iverson MD  Bariatric Surgery

## 2023-09-08 NOTE — DISCHARGE INSTR - AVS FIRST PAGE
Bariatric/Weight Loss Surgery  Hospital Discharge Instructions  ACTIVITY:  Progress as feels comfortable - a good rule is:  if you are doing something and it begins to hurt, stop doing the activity. Walk every hour while at home. You may walk stairs if you do so slowly  You may shower 48 hours after surgery. Do not scrub incision sites. Blot gently with clean towel to dry incisions. (see #4 below)   Use your incentive spirometer 10 times per hour while awake for 1 week after surgery. Do NOT drive for 48 hours after surgery. No driving 24 hours after taking certain prescription pain medications. Examples of such medication are Percocet, Darvocet, Oxycodone, Tylenol #3, and Tylenol with Codeine. DIET  Stay on a full liquid diet for 7 days after your surgery date, sipping slowly. Refer to your manual for examples of choices. Remember to keep your liquids sugar free or low calorie. You may have protein drinks. Make sure to drink 48 to 64 ounces per day of fluids. You may advance to a pureed diet one week after surgery as instructed by your diet progression pamphlet. Once you get approval from your surgeon at your first post operative visit, you may advance to the soft diet and remain on soft diet for 8 weeks unless otherwise instructed. MEDICATIONS:  Continue taking vitamins and minerals supplements. Anti-acid Medication, pantoprazole 40mg twice a day and Carafate 4x a day. Crush Carafate tablet and dissolve in warm water before taking. Other medications as indicated on the Physician Patient Discharge Instructions form given to you at the time of discharge. Capsules should be opened and mixed with water or jello. DO NOT TAKE BIRTH CONTROL(BC) MEDICATIONS, INSERT BC VAGINAL RINGS, OR PLACE IUD OR ANY OTHER BC METHODS UNTIL 31 DAYS FROM DAY OF DISCHARGE FROM HOSPITAL. THIS PLACES YOU AT HIGH RISK FOR A POTENTIALLY LIFE THREATENING BLOOD CLOT.  Remember to always use barrier methods for birth control and speak to your GYN about using two forms of birth control to start 31 days after surgery. It is very important to avoid pregnancy until at least 18-24 months after surgery. INCISION CARE  Not needed. FOLLOW-UP APPOINTMENT should be made after your discharge for upper endoscopy with Dr. Aline Lemus.     CALL YOUR DOCTOR FOR:  pain not controlled by pain medications, a temperature greater than 101.5° F, any increase or change in drainage or redness from any incision, any vomiting or inability to keep liquids down, shortness of breath, shoulder pain, or bleeding

## 2023-09-08 NOTE — PLAN OF CARE
Problem: PAIN - ADULT  Goal: Verbalizes/displays adequate comfort level or baseline comfort level  Description: Interventions:  - Encourage patient to monitor pain and request assistance  - Assess pain using appropriate pain scale  - Administer analgesics based on type and severity of pain and evaluate response  - Implement non-pharmacological measures as appropriate and evaluate response  - Consider cultural and social influences on pain and pain management  - Notify physician/advanced practitioner if interventions unsuccessful or patient reports new pain  Outcome: Progressing     Problem: INFECTION - ADULT  Goal: Absence or prevention of progression during hospitalization  Description: INTERVENTIONS:  - Assess and monitor for signs and symptoms of infection  - Monitor lab/diagnostic results  - Monitor all insertion sites, i.e. indwelling lines, tubes, and drains  - Monitor endotracheal if appropriate and nasal secretions for changes in amount and color  - [place] appropriate cooling/warming therapies per order  - Administer medications as ordered  - Instruct and encourage patient and family to use good hand hygiene technique  - Identify and instruct in appropriate isolation precautions for identified infection/condition  Outcome: Progressing  Goal: Absence of fever/infection during neutropenic period  Description: INTERVENTIONS:  - Monitor WBC    Outcome: Progressing     Problem: SAFETY ADULT  Goal: Patient will remain free of falls  Description: INTERVENTIONS:  - Educate patient/family on patient safety including physical limitations  - Instruct patient to call for assistance with activity   - Consult OT/PT to assist with strengthening/mobility   - Keep Call bell within reach  - Keep bed low and locked with side rails adjusted as appropriate  - Keep care items and personal belongings within reach  - Initiate and maintain comfort rounds  - Make Fall Risk Sign visible to staff  - - Apply yellow socks and bracelet for high fall risk patients  - Consider moving patient to room near nurses station  Outcome: Progressing  Goal: Maintain or return to baseline ADL function  Description: INTERVENTIONS:  -  Assess patient's ability to carry out ADLs; assess patient's baseline for ADL function and identify physical deficits which impact ability to perform ADLs (bathing, care of mouth/teeth, toileting, grooming, dressing, etc.)  - Assess/evaluate cause of self-care deficits   - Assess range of motion  - Assess patient's mobility; develop plan if impaired  - Assess patient's need for assistive devices and provide as appropriate  - Encourage maximum independence but intervene and supervise when necessary  - Involve family in performance of ADLs  - Assess for home care needs following discharge   - Consider OT consult to assist with ADL evaluation and planning for discharge  - Provide patient education as appropriate  Outcome: Progressing  Goal: Maintains/Returns to pre admission functional level  Description: INTERVENTIONS:  - Perform BMAT or MOVE assessment daily.   - Set and communicate daily mobility goal to care team and patient/family/caregiver.    - Collaborate with rehabilitation services on mobility goals if consulted  - - Out of bed for toileting  - Record patient progress and toleration of activity level   Outcome: Progressing     Problem: DISCHARGE PLANNING  Goal: Discharge to home or other facility with appropriate resources  Description: INTERVENTIONS:  - Identify barriers to discharge w/patient and caregiver  - Arrange for needed discharge resources and transportation as appropriate  - Identify discharge learning needs (meds, wound care, etc.)  - Arrange for interpretive services to assist at discharge as needed  - Refer to Case Management Department for coordinating discharge planning if the patient needs post-hospital services based on physician/advanced practitioner order or complex needs related to functional status, cognitive ability, or social support system  Outcome: Progressing     Problem: Knowledge Deficit  Goal: Patient/family/caregiver demonstrates understanding of disease process, treatment plan, medications, and discharge instructions  Description: Complete learning assessment and assess knowledge base. Interventions:  - Provide teaching at level of understanding  - Provide teaching via preferred learning methods  Outcome: Progressing     Problem: Nutrition/Hydration-ADULT  Goal: Nutrient/Hydration intake appropriate for improving, restoring or maintaining nutritional needs  Description: Monitor and assess patient's nutrition/hydration status for malnutrition. Collaborate with interdisciplinary team and initiate plan and interventions as ordered. Monitor patient's weight and dietary intake as ordered or per policy. Utilize nutrition screening tool and intervene as necessary. Determine patient's food preferences and provide high-protein, high-caloric foods as appropriate.      INTERVENTIONS:  - Monitor oral intake, urinary output, labs, and treatment plans  - Assess nutrition and hydration status and recommend course of action  - Evaluate amount of meals eaten  - Assist patient with eating if necessary   - Allow adequate time for meals  - Recommend/ encourage appropriate diets, oral nutritional supplements, and vitamin/mineral supplements  - Order, calculate, and assess calorie counts as needed  - Recommend, monitor, and adjust tube feedings and TPN/PPN based on assessed needs  - Assess need for intravenous fluids  - Provide specific nutrition/hydration education as appropriate  - Include patient/family/caregiver in decisions related to nutrition  Outcome: Progressing

## 2023-09-08 NOTE — QUICK NOTE
Repeat CT scan today revealed resolution of the jejunojejunal intussusception with progression of contrast distally. No evidence of obstruction. As per my previous note this was most likely an incidental finding and probably not related to her symptoms. Plan to advance her diet to liquids and discharge her on PPI and Carafate.   She will follow-up with us in the office next week and we will schedule her for an outpatient endoscopy

## 2023-09-08 NOTE — PLAN OF CARE
Problem: PAIN - ADULT  Goal: Verbalizes/displays adequate comfort level or baseline comfort level  Description: Interventions:  - Encourage patient to monitor pain and request assistance  - Assess pain using appropriate pain scale  - Administer analgesics based on type and severity of pain and evaluate response  - Implement non-pharmacological measures as appropriate and evaluate response  - Consider cultural and social influences on pain and pain management  - Notify physician/advanced practitioner if interventions unsuccessful or patient reports new pain  Outcome: Progressing     Problem: INFECTION - ADULT  Goal: Absence or prevention of progression during hospitalization  Description: INTERVENTIONS:  - Assess and monitor for signs and symptoms of infection  - Monitor lab/diagnostic results  - Monitor all insertion sites, i.e. indwelling lines, tubes, and drains  - Monitor endotracheal if appropriate and nasal secretions for changes in amount and color  - [place] appropriate cooling/warming therapies per order  - Administer medications as ordered  - Instruct and encourage patient and family to use good hand hygiene technique  - Identify and instruct in appropriate isolation precautions for identified infection/condition  Outcome: Progressing  Goal: Absence of fever/infection during neutropenic period  Description: INTERVENTIONS:  - Monitor WBC    Outcome: Progressing     Problem: SAFETY ADULT  Goal: Patient will remain free of falls  Description: INTERVENTIONS:  - Educate patient/family on patient safety including physical limitations  - Instruct patient to call for assistance with activity   - Consult OT/PT to assist with strengthening/mobility   - Keep Call bell within reach  - Keep bed low and locked with side rails adjusted as appropriate  - Keep care items and personal belongings within reach  - Initiate and maintain comfort rounds  - Make Fall Risk Sign visible to staff  - Offer Toileting every 2 Hours, in advance of need  - Initiate/Maintain bed alarm  - Obtain necessary fall risk management equipment:   - Apply yellow socks and bracelet for high fall risk patients  - Consider moving patient to room near nurses station  Outcome: Progressing  Goal: Maintain or return to baseline ADL function  Description: INTERVENTIONS:  -  Assess patient's ability to carry out ADLs; assess patient's baseline for ADL function and identify physical deficits which impact ability to perform ADLs (bathing, care of mouth/teeth, toileting, grooming, dressing, etc.)  - Assess/evaluate cause of self-care deficits   - Assess range of motion  - Assess patient's mobility; develop plan if impaired  - Assess patient's need for assistive devices and provide as appropriate  - Encourage maximum independence but intervene and supervise when necessary  - Involve family in performance of ADLs  - Assess for home care needs following discharge   - Consider OT consult to assist with ADL evaluation and planning for discharge  - Provide patient education as appropriate  Outcome: Progressing  Goal: Maintains/Returns to pre admission functional level  Description: INTERVENTIONS:  - Perform BMAT or MOVE assessment daily.   - Set and communicate daily mobility goal to care team and patient/family/caregiver. - Collaborate with rehabilitation services on mobility goals if consulted  - Perform Range of Motion 3 times a day. - Reposition patient every 2 hours.   - Dangle patient 3 times a day  - Stand patient 3 times a day  - Ambulate patient 3 times a day  - Out of bed to chair 3 times a day   - Out of bed for meals 3 times a day  - Out of bed for toileting  - Record patient progress and toleration of activity level   Outcome: Progressing     Problem: DISCHARGE PLANNING  Goal: Discharge to home or other facility with appropriate resources  Description: INTERVENTIONS:  - Identify barriers to discharge w/patient and caregiver  - Arrange for needed discharge resources and transportation as appropriate  - Identify discharge learning needs (meds, wound care, etc.)  - Arrange for interpretive services to assist at discharge as needed  - Refer to Case Management Department for coordinating discharge planning if the patient needs post-hospital services based on physician/advanced practitioner order or complex needs related to functional status, cognitive ability, or social support system  Outcome: Progressing     Problem: Knowledge Deficit  Goal: Patient/family/caregiver demonstrates understanding of disease process, treatment plan, medications, and discharge instructions  Description: Complete learning assessment and assess knowledge base. Interventions:  - Provide teaching at level of understanding  - Provide teaching via preferred learning methods  Outcome: Progressing     Problem: Nutrition/Hydration-ADULT  Goal: Nutrient/Hydration intake appropriate for improving, restoring or maintaining nutritional needs  Description: Monitor and assess patient's nutrition/hydration status for malnutrition. Collaborate with interdisciplinary team and initiate plan and interventions as ordered. Monitor patient's weight and dietary intake as ordered or per policy. Utilize nutrition screening tool and intervene as necessary. Determine patient's food preferences and provide high-protein, high-caloric foods as appropriate.      INTERVENTIONS:  - Monitor oral intake, urinary output, labs, and treatment plans  - Assess nutrition and hydration status and recommend course of action  - Evaluate amount of meals eaten  - Assist patient with eating if necessary   - Allow adequate time for meals  - Recommend/ encourage appropriate diets, oral nutritional supplements, and vitamin/mineral supplements  - Order, calculate, and assess calorie counts as needed  - Recommend, monitor, and adjust tube feedings and TPN/PPN based on assessed needs  - Assess need for intravenous fluids  - Provide specific nutrition/hydration education as appropriate  - Include patient/family/caregiver in decisions related to nutrition  Outcome: Progressing

## 2023-09-08 NOTE — DISCHARGE SUMMARY
Discharge Summary - Yassine Hui 44 y.o. 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 Aline Lemus and diagnostic laparoscopy in 2020 with hx of marginal ulcer development admitted for worsening abdominal pain, with CT-scan demonstrating incidental finding of jejunal intussusception that on follow-up CT-scan was no longer present. Her pain was improved with PPI BID and Carafate, bowel rest and hydration. The primary concern is that she developed a marginal ulcer, which is not an emergency and can be managed outpatient. Therefore, she was advanced to full liquid diet after the second CT-scan results. She tolerated the full liquid diet well and was discharged in good condition with follow-up with Dr. Aline Lemus for upper endoscopy outpatient. Incidentally, she presented with chronic anemia, Hgb 7.1, which on repeat in the ED after IV fluid hydration dropped to 6.5. Therefore she was transfused one unit of pRBC for severe chronic anemia. See H & P for full details of admission and Operative Note for full details of past operations performed. Patient was seen and examined prior to discharge. Provisions for Follow-Up Care:  See After Visit Summary/Discharge Instructions for information related to follow-up care and home orders. Disposition: Home, in stable condition. Planned Readmission: No    Discharge Medications:  See After Visit Summary/Discharge Instructions for reconciled discharge medications provided to patient and family. Pantoprazole 40mg BID and Carafate 4x/day. Post Operative instructions: Reviewed with patient and/or family. Signature:    Yovany Aggarwal MD  Date: 9/8/2023 Time: 12:54 PM

## 2023-09-08 NOTE — UTILIZATION REVIEW
Initial Clinical Review    Admission: Date/Time/Statement:   Admission Orders (From admission, onward)     Ordered        09/07/23 6522  Place in Observation  Once                      Orders Placed This Encounter   Procedures   • Place in Observation     Standing Status:   Standing     Number of Occurrences:   1     Order Specific Question:   Level of Care     Answer:   Med Surg [16]     ED Arrival Information     Expected   -    Arrival   9/7/2023 15:10    Acuity   Urgent            Means of arrival   Walk-In    Escorted by   [address]    Admission type   Emergency            Arrival complaint   abdominal pain/fever/post op           Chief Complaint   Patient presents with   • Abdominal Pain     Pt reports RUQ pain 10/10 and diarrhea on starting Monday. Hysterectomy 2 weeks ago on August 21st. Pt reports dizziness, headache and blurred vision. History of migraines. Initial Presentation: 44 y.o. female presents to ED from home with acute/chronic abdominal pain for past  Several days associated with food, has gotten worse during the week. Pain the  Day of admission more constant, more severe. Of note she has a significant surgical history including a sleeve gastrectomy, followed by conversion of her sleeve gastrectomy to a Karmen-en-Y gastric bypass in 2019 with Dr. Zoila Prather. Postsurgery complications include development of marginal ulcers. She also underwent a diagnostic laparoscopy in 2020 for undifferentiated abdominal pain with no significant findings at that time. More recently she underwent a laparoscopic hysterectomy 2 weeks ago. Her hemoglobin at baseline is 8 when she presented to the ED it was 7. After resuscitation in the ED, she reports that her abdominal pain is slightly improved but still present. No  Recent hematemesis, melena or  BRBPR. CT scan  Unremarkable.   Admit  Observation with  Abdominal pain  And plan is  Monitor labs,  Serial abdominal exams, PPI, carafate , Enemas as needed and NPO for now. Hemoglobin  6.5  And   Transfused 1 U PRBC. No concern for acute bleeding. Remains  NPO. Wait  Repeat  CT scan. ED Triage Vitals   Temperature Pulse Respirations Blood Pressure SpO2   09/07/23 1528 09/07/23 1528 09/07/23 1528 09/07/23 1528 09/07/23 1528   98 °F (36.7 °C) 81 18 120/61 98 %      Temp Source Heart Rate Source Patient Position - Orthostatic VS BP Location FiO2 (%)   09/08/23 0229 09/07/23 1528 09/07/23 1528 09/07/23 1528 --   Oral Monitor Lying Right arm       Pain Score       09/07/23 1809       9          Wt Readings from Last 1 Encounters:   09/07/23 112 kg (246 lb 7.6 oz)     Additional Vital Signs:   98.4 °F (36.9 °C) 71 16 111/67 82 96 % None (Room air) Sitting    09/08/23 03:10:17 -- 61 -- 112/67 82 97 % -- --   09/08/23 02:29:15 98.3 °F (36.8 °C) 66 16 104/69 -- 98 % -- Lying   09/08/23 0211 -- -- -- -- -- 100 % None (Room air) --   09/08/23 02:08:34 97.9 °F (36.6 °C) 64 16 120/71 87 100 % -- --   09/08/23 0154 -- -- -- -- -- 99 % None (Room air) --   09/08/23 01:22:28 98.4 °F (36.9 °C) 65 20 113/72 86 98 % -- --   09/08/23 0000 -- 76 22 102/58 77 100 % None (Room air) Lying   09/07/23 2200 -- 64 12 115/55 79 98 % None (Room air) Lying   09/07/23 2100 -- 71 14 112/58 79 98 % None (Room air) Lying   09/07/23 2000 -- 67 16 106/66 81 97 % None (Room air) Lying   09/07/23 1900 -- 69 18 100/59 74 97 % None (Room air) Lying   09/07/23 1845 -- 82 18 103/59 -- 100 % None (Room air) Lying   09/07/23 1809 -- 76 16 120/66 -- 100 % None (Room air) Lying       Pertinent Labs/Diagnostic Test Results:   CT abdomen pelvis with contrast   Final Result by Princess Purvis MD (09/07 1917)         1. Postoperative change reflecting Karmen-en-Y gastric bypass revising previous sleeve gastrectomy with expected postoperative anatomy. 2. Apparent focal jejunojejunal intussusception without obstruction, likely a transient phenomenon.  This should be clinically monitored. 3. Mild constipation. 4. Status post supracervical hysterectomy. 5. Additional findings as noted.                   Workstation performed: [id number]         CT abdomen pelvis w contrast    (Results Pending)         Results from last 7 days   Lab Units 09/08/23  0650 09/07/23  2352 09/07/23  1600   WBC Thousand/uL 4.96  --  8.01   HEMOGLOBIN g/dL 7.3* 6.5* 7.1*   HEMATOCRIT % 25.2* 23.0* 25.1*   PLATELETS Thousands/uL 382  --  462*   NEUTROS ABS Thousands/µL 2.39  --  4.17         Results from last 7 days   Lab Units 09/08/23  0650 09/07/23  1600   SODIUM mmol/L 138 138   POTASSIUM mmol/L 3.6 3.6   CHLORIDE mmol/L 108 106   CO2 mmol/L 24 25   ANION GAP mmol/L 6 7   BUN mg/dL 9 11   CREATININE mg/dL 0.79 0.80   EGFR ml/min/1.73sq m 92 91   CALCIUM mg/dL 7.9* 8.5   MAGNESIUM mg/dL 2.1  --    PHOSPHORUS mg/dL 3.4  --      Results from last 7 days   Lab Units 09/08/23  0650 09/07/23  1600   AST U/L 19 19   ALT U/L 12 12   ALK PHOS U/L 71 79   TOTAL PROTEIN g/dL 6.4 7.1   ALBUMIN g/dL 3.4* 3.9   TOTAL BILIRUBIN mg/dL 0.35 0.23         Results from last 7 days   Lab Units 09/08/23  0650 09/07/23  1600   GLUCOSE RANDOM mg/dL 86 77               Results from last 7 days   Lab Units 09/07/23  2020   HS TNI 0HR ng/L <2                 Results from last 7 days   Lab Units 09/07/23  1600   PROCALCITONIN ng/ml <0.05     Results from last 7 days   Lab Units 09/07/23  1707   LACTIC ACID mmol/L 0.7                         Results from last 7 days   Lab Units 09/08/23  0530   UNIT PRODUCT CODE  [id number]   UNIT NUMBER  [id number]   UNITABO  O   UNITRH  POS   CROSSMATCH  Compatible   UNIT DISPENSE STATUS  Presumed Trans   UNIT PRODUCT VOL ml 350         Results from last 7 days   Lab Units 09/07/23  1600   LIPASE u/L 15                 Results from last 7 days   Lab Units 09/07/23  1706   CLARITY UA  Clear   COLOR UA  Yellow   SPEC GRAV UA  1.010   PH UA  5.0   GLUCOSE UA mg/dl Negative   KETONES UA mg/dl Negative BLOOD UA  Negative   PROTEIN UA mg/dl Negative   NITRITE UA  Negative   BILIRUBIN UA  Negative   UROBILINOGEN UA E.U./dl 0.2   LEUKOCYTES UA  Negative                 ED Treatment:   Medication Administration from 09/07/2023 1510 to 09/08/2023 0118       Date/Time Order Dose Route Action Comments     09/07/2023 1900 EDT sodium chloride 0.9 % bolus 1,000 mL 0 mL Intravenous Stopped --     09/07/2023 1620 EDT sodium chloride 0.9 % bolus 1,000 mL 1,000 mL Intravenous New Bag --     09/07/2023 1611 EDT aluminum-magnesium hydroxide-simethicone (MAALOX) oral suspension 30 mL 30 mL Oral Given --     09/07/2023 1611 EDT diphenhydrAMINE (BENADRYL) injection 50 mg 50 mg Intravenous Given --     09/07/2023 1618 EDT Famotidine (PF) (PEPCID) injection 20 mg 20 mg Intravenous Given --     09/07/2023 1611 EDT ondansetron (ZOFRAN-ODT) dispersible tablet 4 mg 4 mg Oral Given --     09/07/2023 1837 EDT morphine injection 2 mg 2 mg Intravenous Not Given --     09/07/2023 1800 EDT iohexol (OMNIPAQUE) 240 MG/ML solution 50 mL 50 mL Oral Given --     09/07/2023 1758 EDT iohexol (OMNIPAQUE) 350 MG/ML injection (MULTI-DOSE) 100 mL 100 mL Intravenous Given --     09/07/2023 1815 EDT diphenhydrAMINE (BENADRYL) injection 25 mg 25 mg Intravenous Given --     09/07/2023 1844 EDT acetaminophen (TYLENOL) tablet 975 mg 975 mg Oral Given --     22/11/4796 5658 EDT folic acid 1 mg in sodium chloride 0.9 % 50 mL IVPB 0 mg Intravenous Stopped --     71/64/1316 7077 EDT folic acid 1 mg in sodium chloride 0.9 % 50 mL IVPB 1 mg Intravenous New Bag --     09/08/2023 0038 EDT thiamine (VITAMIN B1) 100 mg in sodium chloride 0.9 % 50 mL IVPB 0 mg Intravenous Stopped --     09/07/2023 2351 EDT thiamine (VITAMIN B1) 100 mg in sodium chloride 0.9 % 50 mL IVPB 100 mg Intravenous New Bag --     09/07/2023 2349 EDT pantoprazole (PROTONIX) injection 40 mg 40 mg Intravenous Given --     09/07/2023 2155 EDT sucralfate (CARAFATE) tablet 1 g 1 g Oral Given -- 09/07/2023 2354 EDT cyanocobalamin injection 1,000 mcg 1,000 mcg Intramuscular Given --     09/08/2023 0038 EDT lactated ringers infusion 125 mL/hr Intravenous New Bag --     09/08/2023 0038 EDT sucralfate (CARAFATE) tablet 1 g 1 g Oral Given --          Present on Admission:  • Anemia      Admitting Diagnosis: Intussusception ([address]) [K56.1]  Epigastric pain [R10.13]  Fever [R50.9]  Severe anemia [D64.9]  Age/Sex: 44 y.o. female  Admission Orders:  Scheduled Medications:  morphine injection, 2 mg, Intravenous, Once  pantoprazole, 40 mg, Intravenous, BID  sucralfate, 1 g, Oral, Q6H [address]      Continuous IV Infusions:  lactated ringers, 125 mL/hr, Intravenous, Continuous      PRN Meds:  acetaminophen, 650 mg, Oral, Q4H PRN  bisacodyl, 10 mg, Rectal, Daily PRN  diphenhydrAMINE, 25 mg, Intravenous, Q6H PRN  ondansetron, 4 mg, Intravenous, Q6H PRN  oxyCODONE, 5 mg, Oral, Q6H PRN        Network Utilization Review Department  ATTENTION: Please call with any questions or concerns to [phone] and carefully listen to the prompts so that you are directed to the right person. All voicemails are confidential.  Cristopher Childs all requests for admission clinical reviews, approved or denied determinations and any other requests to dedicated fax number below belonging to the campus where the patient is receiving treatment.  List of dedicated fax numbers for the Facilities:  [place] DENIALS (Administrative/Medical Necessity) [phone] [address] (Maternity/NICU/Pediatrics) [phone] [address] [place] [address] [address]  [facility] [phone]

## 2023-09-08 NOTE — QUICK NOTE
Notified by ED that repeat Hgb is 6.5 after admission from ED to the bariatric surgery service. Due to the severity of the anemia, one unit of pRBC was ordered for transfusion. Consent was obtained by ED staff. The anemia is likely a chronic issue from iron deficiency and recent procedures and dilutional from IV fluid hydration. No concern for acute bleeding. Will reassess in AM with repeat post-transfusion CBC.     Danni Roy MD  Bariatric Surgery

## 2023-09-08 NOTE — INCIDENTAL FINDINGS
Severe anemia, Hgb 6.5 from 7.1 from baseline of 8. She is s/p hysterectomy and also has iron deficiency anemia. Due to severe anemia, she was transfused with one unit of blood (pRBC).

## 2023-09-08 NOTE — H&P
H&P Exam -Bariatric Surgery   Bin Elmore 44 y.o. female MRN: [id number]  Unit/Bed#: ED-02 Encounter: [id number]        Assessment/Plan     Assessment:  Bin Elmore is a 44 y.o. female s/p sleeve converted to gastric bypass in 2019, and a diagnostic laparoscopy in 2020 for undifferentiated abdominal pain. She presents to the ED this evening for abdominal pain of 4 days that was worsening. Vitals are stable abdomen nonperitoneal.  Labs reassuring except for anemia hemoglobin 7.1 baseline of 8. CT scan demonstrates no surgical concerns, but findings of jejunojejunostomy intussusception. The differential for her abdominal pain includes marginal ulcer, symptomatic intussusception, constipation, and postprocedure anemia. Plan:  Discussed with bariatric surgeon Dr. Domenica Rawls as well as the ED staff. Admit for observation. N.p.o. okay with sips of water and medications. Serial abdominal exams. We will consider repeat CT scan to assess for resolution of intussusception. Enemas for constipation. PPI twice daily and Carafate suspension. Will need outpatient upper endoscopy. Repeat labs in the morning, trend H&H. History of Present Illness   HPI:  Bin Elmore is a 44 y.o. female who presents with acute on chronic abdominal pain which the patient stated started on Monday associated with food, which has gotten worse over the week. This evening the pain which has been intermittent, has become more constant and severe so she presented to the ED. Of note she has a significant surgical history including a sleeve gastrectomy, followed by conversion of her sleeve gastrectomy to a Karmen-en-Y gastric bypass in 2019 with Dr. Domenica Rawls. Postsurgery complications include development of marginal ulcers. She also underwent a diagnostic laparoscopy in 2020 for undifferentiated abdominal pain with no significant findings at that time. More recently she underwent a laparoscopic hysterectomy 2 weeks ago. Her hemoglobin at baseline is 8 when she presented to the ED it was 7. After resuscitation in the ED, she reports that her abdominal pain is slightly improved but still present. She denies taking NSAIDs, steroids and she denies cigarette smoking or alcohol use. She denies any recent episodes of hematemesis, melena or bright red blood per rectum. She had a recent upper endoscopy in March of this year which was insignificant. She also had a colonoscopy back in December. Her CT scan had no significant surgical concerns, there is no free air or significant free fluid, or signs of obstruction. Contrast was able to pass to the distal small bowel without issue. There was a noted finding of jejunal intussusception, could be a transient finding. Review of Systems   Constitutional: Negative for chills and fever. HENT: Negative for ear pain and sore throat. Eyes: Negative for pain and visual disturbance. Respiratory: Negative for cough and shortness of breath. Cardiovascular: Negative for chest pain and palpitations. Gastrointestinal: Positive for abdominal pain, diarrhea and nausea. Negative for blood in stool and vomiting. Genitourinary: Negative for difficulty urinating, hematuria and pelvic pain. Musculoskeletal: Negative for arthralgias and back pain. Skin: Negative for color change and rash. Neurological: Negative for seizures and syncope. All other systems reviewed and are negative.       Historical Information   Past Medical History:   Diagnosis Date   • Abdominal pain     "almost constant"   • Anemia    • Anesthesia     "woke up swinging with last  9/2018  "was fine with EGD"   • Back pain    • Bariatric surgery status     2008-gastric sleeve revison RUEN-Y 4/2019-abd painnow-dx lap today 3/9/2020   • Constipation    • COVID     x 2 - 2020 and 2021   • Dental bridge present     right lower permanent   • Diarrhea    • Difficulty swallowing     "in the past"   • Disease of thyroid gland not on meds now   • Dizzy    • Edema     ble's   • Fatigue     "when blood pressure low" and weakness too"   • GERD (gastroesophageal reflux disease)     "increase after surgery"   • Heart murmur     heart murmer, work up negative with holter monitor   • History of 2  sections     most recent 17   • History of D&C 2019   • History of iron deficiency     anemia/ had IV infusions through pregnancy in 2365-5028   • History of transfusion     2019 - pt had allergic reaction - had to stop the blood   • Inguinal hernia     right   • Low BP     "off and on"   • Migraine    • N&V (nausea and vomiting)     " a little better since on meds"   • Non-intractable vomiting     "not since been on medicine"   • Palpitations     "having again"   • Postgastrectomy malabsorption      Past Surgical History:   Procedure Laterality Date   •  SECTION      x2   • CHOLECYSTECTOMY     • CHROMOSOME ANALYSIS, PRODUCTS OF CONCEPTION (HISTORICAL)  2018    2 miscarriages in  and Nov   • LAPAROSCOPIC SALPINGOOPHERECTOMY Right 2018    LVH, 6 cm benign ovarian cyst   • LAPAROSCOPY N/A 2020    Procedure: DIAGNOSTIC LAPAROSCOPY,CLOSURE OF COATES DEFECT, INTRAOP EGD;  Surgeon: Pamela Simpson MD;  [facility];  Service: Bariatrics   • KS EGD TRANSORAL BIOPSY SINGLE/MULTIPLE N/A 2019    Procedure: ESOPHAGOGASTRODUODENOSCOPY (EGD) with bx;  Surgeon: Pamela Simpson MD;  Location: [facility]; Service: Bariatrics   • KS LAPS GSTR RSTCV PX W/BYP SREE-EN-Y LIMB <150 CM N/A 2019    Procedure: LAP SREE-EN-Y GASTRIC BYPASS, INTRAOP EGD;  Surgeon: Pamela Simpson MD;  [facility];  Service: Bariatrics   • KS LAPS SUPRACRV HYSTERECT 250 GM/< RMVL TUBE/OVAR Bilateral 2023    Procedure: [facility]);   Surgeon: Heron Hall DO;  [facility];  Service: Gynecology   • KS TX MISSED  FIRST TRIMESTER SURGICAL N/A 2019    Procedure: DILATATION AND EVACUATION (D&E) (8 weeks) missed ab;  Surgeon: Kasandra Shelton MD;  [facility];  Service: Gynecology   • REVISION BYPASS LAPAROSCOPIC N/A 04/08/2019    Procedure: LAP REVISION/ CONVERSION;  Surgeon: Nelly Gama MD;  [facility];  Service: Bariatrics   • SALPINGOOPHORECTOMY Right 09/2018   • SLEEVE GASTROPLASTY       Social History   Social History     Substance and Sexual Activity   Alcohol Use Yes    Comment: occ     Social History     Substance and Sexual Activity   Drug Use No     Social History     Tobacco Use   Smoking Status Never   Smokeless Tobacco Never     E-Cigarette/Vaping   • E-Cigarette Use Never User       E-Cigarette/Vaping Substances   • Nicotine No    • THC No    • CBD No    • Flavoring No    • Other No    • Unknown No      Family History: non-contributory    Meds/Allergies   all medications and allergies reviewed  Allergies   Allergen Reactions   • Aspirin Anaphylaxis   • Shellfish-Derived Products - Food Allergy Anaphylaxis   • Contrast [Iodinated Contrast Media] Itching and Swelling   • Ibuprofen Edema   • Reglan [Metoclopramide] Itching and Confusion       Objective   Vitals: Blood pressure 115/55, pulse 64, temperature 98 °F (36.7 °C), resp. rate 12, height 5' 4" (1.626 m), weight 112 kg (246 lb 7.6 oz), last menstrual period 08/06/2023, SpO2 98 %, not currently breastfeeding. ,Body mass index is 42.31 kg/m². Intake/Output Summary (Last 24 hours) at 9/7/2023 2300  Last data filed at 9/7/2023 1900  Gross per 24 hour   Intake 1000 ml   Output --   Net 1000 ml       Invasive Devices     Peripheral Intravenous Line  Duration           Peripheral IV 09/07/23 Right Antecubital <1 day                Physical Exam  Vitals and nursing note reviewed. Constitutional:       General: She is not in acute distress. Appearance: She is well-developed. HENT:      Head: Normocephalic and atraumatic. Mouth/Throat:      Mouth: Mucous membranes are dry.       Pharynx: Oropharynx is clear.   Eyes:      Conjunctiva/sclera: Conjunctivae normal.   Cardiovascular:      Rate and Rhythm: Normal rate and regular rhythm. Heart sounds: No murmur heard. Pulmonary:      Effort: Pulmonary effort is normal. No respiratory distress. Breath sounds: Normal breath sounds. Abdominal:      Palpations: Abdomen is soft. Comments: Moderate tenderness in the epigastric region. Mild tenderness in the mid abdomen with deep palpation. Nonperitoneal abdomen. Laparoscopic port sites with absorbable suture noted but clean dry and intact. Musculoskeletal:         General: No swelling. Cervical back: Neck supple. Skin:     General: Skin is warm and dry. Capillary Refill: Capillary refill takes less than 2 seconds. Neurological:      General: No focal deficit present. Mental Status: She is alert. Mental status is at baseline. Psychiatric:         Mood and Affect: Mood normal.         Lab Results:   I have personally reviewed pertinent labs. CBC:   Lab Results   Component Value Date    WBC 8.01 09/07/2023    HGB 7.1 (L) 09/07/2023    HCT 25.1 (L) 09/07/2023    MCV 70 (L) 09/07/2023     (H) 09/07/2023    RBC 3.59 (L) 09/07/2023    MCH 19.8 (L) 09/07/2023    MCHC 28.3 (L) 09/07/2023    RDW 17.9 (H) 09/07/2023    MPV 9.3 09/07/2023    NRBC 0 09/07/2023     CMP:   Lab Results   Component Value Date    SODIUM 138 09/07/2023     09/07/2023    CO2 25 09/07/2023    BUN 11 09/07/2023    CREATININE 0.80 09/07/2023    CALCIUM 8.5 09/07/2023    AST 19 09/07/2023    ALT 12 09/07/2023    ALKPHOS 79 09/07/2023    EGFR 91 09/07/2023     Lipase:   Lab Results   Component Value Date    LIPASE 15 09/07/2023     Lactic Acid:   Lab Results   Component Value Date    LACTICACID 0.7 09/07/2023     Imaging: I have personally reviewed pertinent films in PACS  EKG, Pathology, and Other Studies: I have personally reviewed pertinent reports.       Code Status: Prior  Advance Directive and Living Will:      Power of :    POLST:      Counseling / Coordination of Care  Total floor / unit time spent today 33 minutes. Greater than 50% of total time was spent with the patient and / or family counseling and / or coordination of care. A description of the counseling / coordination of care: Examining the patient, discussing plan with the patient and the bariatric servi as well as the emergency department.

## 2023-09-08 NOTE — DISCHARGE INSTRUCTIONS
your medications from your pharmacy, Carafate 4x/day. Crush or cut your pills and open capsules, mix with liquid to drink. Take Tylenol every 8 hours as needed for pain, unless instructed otherwise  Take your pantoprazole twice daily. It is important to stay hydrated and follow your discharge diet progression   Mild nausea is ok as long as you can drink fluids, sip very slowly and get up and walk during any periods of nausea  Take home medications as usual unless instructed otherwise while in hospital  Follow up with Dr. Vashti Reynolds for your upper endoscopy.

## 2023-09-08 NOTE — NURSING NOTE
Discharge instructions reviewed with pt. She is aware of diet changes, medication changes, follow-up appointments and prescriptions to be picked up. Pt requested work note for physician which was provided. Pt has no unaddressed questions or concerns, family is providing ride home.

## 2023-09-11 ENCOUNTER — TELEPHONE (OUTPATIENT)
Dept: GASTROENTEROLOGY | Facility: CLINIC | Age: 43
End: 2023-09-11

## 2023-09-11 ENCOUNTER — TELEPHONE (OUTPATIENT)
Dept: MEDSURG UNIT | Facility: HOSPITAL | Age: 43
End: 2023-09-11

## 2023-09-11 ENCOUNTER — CONSULT (OUTPATIENT)
Dept: GASTROENTEROLOGY | Facility: CLINIC | Age: 43
End: 2023-09-11
Payer: MEDICARE

## 2023-09-11 VITALS
WEIGHT: 240 LBS | BODY MASS INDEX: 40.97 KG/M2 | DIASTOLIC BLOOD PRESSURE: 62 MMHG | SYSTOLIC BLOOD PRESSURE: 116 MMHG | HEIGHT: 64 IN | HEART RATE: 80 BPM

## 2023-09-11 DIAGNOSIS — Z98.84 STATUS POST BARIATRIC SURGERY: ICD-10-CM

## 2023-09-11 DIAGNOSIS — K21.9 GASTROESOPHAGEAL REFLUX DISEASE, UNSPECIFIED WHETHER ESOPHAGITIS PRESENT: ICD-10-CM

## 2023-09-11 DIAGNOSIS — R10.13 EPIGASTRIC PAIN: Primary | ICD-10-CM

## 2023-09-11 DIAGNOSIS — R19.8 IRREGULAR BOWEL HABITS: ICD-10-CM

## 2023-09-11 DIAGNOSIS — D50.9 IRON DEFICIENCY ANEMIA, UNSPECIFIED IRON DEFICIENCY ANEMIA TYPE: ICD-10-CM

## 2023-09-11 PROCEDURE — 99214 OFFICE O/P EST MOD 30 MIN: CPT | Performed by: PHYSICIAN ASSISTANT

## 2023-09-11 NOTE — PATIENT INSTRUCTIONS
Start Benefiber powder once daily OTC to bulk stools       GERD (Gastroesophageal Reflux Disease)   AMBULATORY CARE:   Gastroesophageal reflux disease (GERD)  is reflux that happens more than 2 times a week for a few weeks. Reflux means acid and food in your stomach back up into your esophagus. GERD can cause other health problems over time if it is not treated. Common causes of GERD:  GERD often happens because the lower muscle (sphincter) of the esophagus does not close properly. The sphincter normally opens to let food into the stomach. It then closes to keep food and stomach acid in the stomach. If the sphincter does not close properly, stomach acid and food back up (reflux) into the esophagus. The following may increase your risk for GERD:  Certain foods such as spicy foods, chocolate, foods that contain caffeine, peppermint, and fried foods    Hiatal hernia    Certain medicines such as calcium channel blockers (used to treat high blood pressure), allergy medicines, sedatives, or antidepressants    Pregnancy, obesity, or scleroderma    Lying down after a meal    Drinking alcohol or smoking cigarettes    Signs and symptoms:   Heartburn (burning pain in your chest)    Pain after meals that spreads to your neck, jaw, or shoulder    Pain that gets better when you change positions    Bitter or acid taste in your mouth    A dry cough    Trouble swallowing or pain with swallowing    Hoarseness or a sore throat    Burping or hiccups    Feeling full soon after you start eating    Call your local emergency number (911 in the [address]) if:   You have severe chest pain and sudden trouble breathing. Seek care immediately if:   You have trouble breathing after you vomit. You have trouble swallowing, or pain with swallowing. Your bowel movements are black, bloody, or tarry-looking. Your vomit looks like coffee grounds or has blood in it.     Call your doctor or gastroenterologist if:   You feel full and cannot burp or vomit. You vomit large amounts, or you vomit often. You are losing weight without trying. Your symptoms get worse or do not improve with treatment. You have questions or concerns about your condition or care. Treatment for GERD:   Medicines  are used to decrease stomach acid. Medicine may also be used to help your lower esophageal sphincter and stomach contract (tighten) more. Surgery  is done to wrap the upper part of the stomach around the esophageal sphincter. This will strengthen the sphincter and prevent reflux. Manage GERD:       Do not have foods or drinks that may increase heartburn. These include chocolate, peppermint, fried or fatty foods, drinks that contain caffeine, or carbonated drinks (soda). Other foods include spicy foods, onions, tomatoes, and tomato-based foods. Do not have foods or drinks that can irritate your esophagus, such as citrus fruits, juices, and alcohol. Do not eat large meals. When you eat a lot of food at one time, your stomach needs more acid to digest it. Eat 6 small meals each day instead of 3 large meals, and eat slowly. Do not eat meals 2 to 3 hours before bedtime. Elevate the head of your bed. Place 6-inch blocks under the head of your bed frame. You may also use more than one pillow under your head and shoulders while you sleep. Maintain a healthy weight. If you are overweight, weight loss may help relieve symptoms of GERD. Do not smoke. Smoking weakens the lower esophageal sphincter and increases the risk of GERD. Ask your healthcare provider for information if you currently smoke and need help to quit. E-cigarettes or smokeless tobacco still contain nicotine. Talk to your healthcare provider before you use these products. Do not put pressure on your abdomen. Pressure pushes acid up into your esophagus. Do not wear clothing that is tight around your waist. Do not bend over.  Bend at the knees if you need to pick something up.  Follow up with your doctor or gastroenterologist as directed:  Write down your questions so you remember to ask them during your visits. © Copyright [employer] 2022 Information is for End User's use only and may not be sold, redistributed or otherwise used for commercial purposes. The above information is an  only. It is not intended as medical advice for individual conditions or treatments. Talk to your doctor, nurse or pharmacist before following any medical regimen to see if it is safe and effective for you.     Scheduled date of EGD(as of today):9/19/23  Physician performing EGD: Dr. Lawson Thomas  Location of EGD: [facility]  Instructions reviewed with patient by: Mercedes  Clearances:  N/A

## 2023-09-11 NOTE — H&P (VIEW-ONLY)
Ulysses Resendez [facility] - Outpatient Follow-up Note  Balwinder Camarena 44 y.o. female MRN: [id number]  Encounter: [id number]    ASSESSMENT AND PLAN:    1. Epigastric pain  2. Gastroesophageal reflux disease, unspecified whether esophagitis present  3. Status post bariatric surgery  4. Iron deficiency anemia, unspecified iron deficiency anemia type  Patient with history of sleeve gastrectomy in 2009 which was converted to a Karmen-en-Y gastric bypass in 2018 due to worsening GERD, pouchitis, and anastomotic ulcer. The patient did have an EGD 8/18/21 revealed another anastomotic ulcer. Most recent EGD done 3/2/2023 for N/V, BLANK. This showed normal esophagus, RnY  Gastric bypass with patent and healthy anastomosis, normal jejunum. Stomach biopsies negative for H pylori at that time. Normal SBB. She now presents for hospital follow-up of worsening epigastric pain and acute on chronic iron deficiency anemia. Concern for anastomotic ulcer upon discharge and recommended to undergo EGD as an outpatient, will order EGD at this time. I recommended that she continue PPI once daily in the morning and Carafate as prescribed. She is to continue oral iron as well. Discussed and recommended bland GERD diet and lifestyle. I will ask my office staff to have patient recheck CBC later this week prior to procedure which is scheduled for September 19 to ensure adequate hemoglobin    - CBC  - EGD; Future    5. Irregular bowel habits  Patient recently underwent colonoscopy which was normal.  I recommended that she start Benefiber powder over-the-counter once daily to bulk and regulate her stools. Encouraged adequate hydration. Patient was instructed to call the office with any questions, concerns, new/ worsening/ persisting GI symptoms.  Advised patient go to the ER with any severe or worsening abdominal pain, fevers/ chills, intractable N/V, chest pain, SOB, dizziness, lightheadedness, feeling something stuck in esophagus that will not go down. Patient expressed understanding and is in agreement with treatment plan. Will plan to follow up after diagnostic tests   __________________________________________________________    SUBJECTIVE:      Daughter is present for visit today. Patient presents for hospital follow up. The patient has a history of sleeve gastrectomy in 2009 which was converted to a Karmen-en-Y gastric bypass in 2018 due to worsening GERD, pouchitis, and anastomotic ulcer. She also notes she is also recently s/p hysterectomy due to heavy, prolonged periods x 1 year- done 8/21/2023  Discharge summary reviewed. Patient was admitted overnight on September 8 for worsening abdominal pain for 3 weeks. She underwent a CT-scan demonstrating incidental finding of jejunal intussusception that on follow-up CT-scan was no longer present. Her pain was improved with PPI BID and Carafate, bowel rest and hydration. She also presented with acute on chronic anemia and was given 1 unit of packed red blood cells. Upon discharge she was told to undergo EGD as an outpatient. She said she would rather have this endoscopy performed by our GI group, not bariatric surgery    Prior to this hospital stay she was doing well. Not on any antacid medications. Since hospital stay she continues with epigastric pain. Mostly in the left upper quadrant but can radiate across her epigastric area to her right upper quadrant like a band. Describes as burning, can be sharp at times. Comes and goes. Not always related to eating. Associated nausea and decreased appetite, but no vomiting. She denies associated heartburn or acid reflux. Since being discharged from the hospital she has been taking pantoprazole 40 mg daily in the morning, as well as Carafate and iron supplement. Prior to this hospital stay she was not taking iron pill or PPI. She does tell me that she needed  IV iron in the past, ongoing issues with anemia.    She denies changes in diet, medication, travel prior to symptom onset. She does note that since her hysterectomy her bowels have been irregular. Alternating between constipation and diarrhea. Patient denies any fevers/ chills, unintentional weight loss, vomiting,  black or bloody stools, heartburn, dysphagia, odynophagia. Denies chest pain, SOB    Tobacco use- None  Alcohol use- Rare   NSAID use-  None    Review of Systems   Constitutional: Negative for chills and fever. HENT: Negative for ear pain and sore throat. Eyes: Negative for pain and visual disturbance. Respiratory: Negative for cough and shortness of breath. Cardiovascular: Negative for chest pain and palpitations. Gastrointestinal: Negative for abdominal pain and vomiting. Genitourinary: Negative for dysuria and hematuria. Musculoskeletal: Negative for arthralgias and back pain. Skin: Negative for color change and rash. Neurological: Negative for seizures and syncope. All other systems reviewed and are negative.      Historical Information   Past Medical History:   Diagnosis Date   • Abdominal pain     "almost constant"   • Anemia    • Anesthesia     "woke up swinging with last  2018  "was fine with EGD"   • Back pain    • Bariatric surgery status     2008-gastric sleeve revison RUEN-Y 2019-abd painnow-dx lap today 3/9/2020   • Constipation    • COVID     x 2 -  and    • Dental bridge present     right lower permanent   • Diarrhea    • Difficulty swallowing     "in the past"   • Disease of thyroid gland     not on meds now   • Dizzy    • Edema     ble's   • Fatigue     "when blood pressure low" and weakness too"   • GERD (gastroesophageal reflux disease)     "increase after surgery"   • Heart murmur     heart murmer, work up negative with holter monitor   • History of 2  sections     most recent 17   • History of D&C 2019   • History of iron deficiency     anemia/ had IV infusions through pregnancy in [id number]   • History of transfusion     2019 - pt had allergic reaction - had to stop the blood   • Inguinal hernia     right   • Low BP     "off and on"   • Migraine    • N&V (nausea and vomiting)     " a little better since on meds"   • Non-intractable vomiting     "not since been on medicine"   • Palpitations     "having again"   • Postgastrectomy malabsorption      Past Surgical History:   Procedure Laterality Date   •  SECTION      x2   • CHOLECYSTECTOMY     • CHROMOSOME ANALYSIS, PRODUCTS OF CONCEPTION (HISTORICAL)      2 miscarriages in  and Nov   • LAPAROSCOPIC SALPINGOOPHERECTOMY Right 2018    LVH, 6 cm benign ovarian cyst   • LAPAROSCOPY N/A 2020    Procedure: DIAGNOSTIC LAPAROSCOPY,CLOSURE OF COATES DEFECT, INTRAOP EGD;  Surgeon: Lizette Cary MD;  Location: [facility];  Service: Bariatrics   • NM EGD TRANSORAL BIOPSY SINGLE/MULTIPLE N/A 2019    Procedure: ESOPHAGOGASTRODUODENOSCOPY (EGD) with bx;  Surgeon: Lizette Cary MD;  Location: [facility]; Service: Bariatrics   • NM LAPS GSTR RSTCV PX W/BYP SREE-EN-Y LIMB <150 CM N/A 2019    Procedure: LAP SREE-EN-Y GASTRIC BYPASS, INTRAOP EGD;  Surgeon: Lizette Cary MD;  Location: [facility];  Service: Bariatrics   • NM LAPS SUPRACRV HYSTERECT 250 GM/< RMVL TUBE/OVAR Bilateral 2023    Procedure: [facility]);   Surgeon: Isak Montoya DO;  Location: [facility];  Service: Gynecology   • NM TX MISSED  FIRST TRIMESTER SURGICAL N/A 2019    Procedure: DILATATION AND EVACUATION (D&E) (8 weeks) missed ab;  Surgeon: Brendan Ann MD;  [facility]: [facility];  Service: Gynecology   • REVISION BYPASS LAPAROSCOPIC N/A 2019    Procedure: LAP REVISION/ CONVERSION;  Surgeon: Lizette Cary MD;  Location: [facility];  Service: Bariatrics   • SALPINGOOPHORECTOMY Right 2018   • SLEEVE GASTROPLASTY       Social History   Social History     Substance and Sexual Activity   Alcohol Use Yes    Comment: occ Social History     Substance and Sexual Activity   Drug Use No     Social History     Tobacco Use   Smoking Status Never   Smokeless Tobacco Never     Family History   Problem Relation Age of Onset   • Hypertension Mother    • Heart disease Mother    • No Known Problems Father      Meds/Allergies       Current Outpatient Medications:   •  acetaminophen (TYLENOL) 160 mg/5 mL liquid  •  ferrous sulfate 325 (65 Fe) mg tablet  •  furosemide (LASIX) 20 mg tablet  •  linaCLOtide 290 MCG CAPS  •  Multiple Vitamin (multivitamin) tablet  •  ondansetron (ZOFRAN-ODT) 4 mg disintegrating tablet  •  pantoprazole (PROTONIX) 40 mg tablet  •  sucralfate (CARAFATE) 1 g tablet  •  acetaminophen (TYLENOL) 500 mg tablet    Allergies   Allergen Reactions   • Aspirin Anaphylaxis   • Shellfish-Derived Products - Food Allergy Anaphylaxis   • Contrast [Iodinated Contrast Media] Itching and Swelling   • Ibuprofen Edema   • Reglan [Metoclopramide] Itching and Confusion       Objective     Wt Readings from Last 3 Encounters:   09/11/23 109 kg (240 lb)   09/07/23 112 kg (246 lb 7.6 oz)   08/21/23 109 kg (239 lb 6.7 oz)     Temp Readings from Last 3 Encounters:   09/08/23 98.4 °F (36.9 °C) (Oral)   08/21/23 (!) 97.3 °F (36.3 °C) (Temporal)   07/13/23 97.9 °F (36.6 °C) (Oral)     BP Readings from Last 3 Encounters:   09/11/23 116/62   09/08/23 111/67   08/21/23 108/63     Pulse Readings from Last 3 Encounters:   09/11/23 80   09/08/23 71   08/21/23 68      PHYSICAL EXAM:      Physical Exam  Vitals reviewed. Constitutional:       General: She is not in acute distress. Appearance: She is not toxic-appearing. HENT:      Head: Normocephalic and atraumatic. Eyes:      General: No scleral icterus. Extraocular Movements: Extraocular movements intact. Conjunctiva/sclera: Conjunctivae normal.   Cardiovascular:      Rate and Rhythm: Normal rate and regular rhythm.    Pulmonary:      Effort: Pulmonary effort is normal. No respiratory distress. Breath sounds: Normal breath sounds. Abdominal:      General: Bowel sounds are normal.      Palpations: Abdomen is soft. Tenderness: There is abdominal tenderness (mild epigastric ). Musculoskeletal:         General: No swelling or tenderness. Cervical back: Normal range of motion and neck supple. Skin:     General: Skin is warm and dry. Coloration: Skin is not jaundiced. Neurological:      General: No focal deficit present. Mental Status: She is alert and oriented to person, place, and time. Mental status is at baseline. Psychiatric:         Mood and Affect: Mood normal.         Behavior: Behavior normal.         Thought Content: Thought content normal.          Lab Results:   No visits with results within 1 Day(s) from this visit.    Latest known visit with results is:   Admission on 09/07/2023, Discharged on 09/08/2023   Component Date Value   • Ventricular Rate 09/07/2023 75    • Atrial Rate 09/07/2023 75    • WI Interval 09/07/2023 146    • QRSD Interval 09/07/2023 84    • QT Interval 09/07/2023 392    • QTC Interval 09/07/2023 437    • P Axis 09/07/2023 66    • QRS Axis 09/07/2023 73    • T Wave Axis 09/07/2023 55    • WBC 09/07/2023 8.01    • RBC 09/07/2023 3.59 (L)    • Hemoglobin 09/07/2023 7.1 (L)    • Hematocrit 09/07/2023 25.1 (L)    • MCV 09/07/2023 70 (L)    • MCH 09/07/2023 19.8 (L)    • MCHC 09/07/2023 28.3 (L)    • RDW 09/07/2023 17.9 (H)    • MPV 09/07/2023 9.3    • Platelets 45/61/7276 462 (H)    • nRBC 09/07/2023 0    • Neutrophils Relative 09/07/2023 52    • Immat GRANS % 09/07/2023 0    • Lymphocytes Relative 09/07/2023 38    • Monocytes Relative 09/07/2023 8    • Eosinophils Relative 09/07/2023 2    • Basophils Relative 09/07/2023 0    • Neutrophils Absolute 09/07/2023 4.17    • Immature Grans Absolute 09/07/2023 0.02    • Lymphocytes Absolute 09/07/2023 3.06    • Monocytes Absolute 09/07/2023 0.60    • Eosinophils Absolute 09/07/2023 0.14    • Basophils Absolute 09/07/2023 0.02    • Sodium 09/07/2023 138    • Potassium 09/07/2023 3.6    • Chloride 09/07/2023 106    • CO2 09/07/2023 25    • ANION GAP 09/07/2023 7    • BUN 09/07/2023 11    • Creatinine 09/07/2023 0.80    • Glucose 09/07/2023 77    • Calcium 09/07/2023 8.5    • AST 09/07/2023 19    • ALT 09/07/2023 12    • Alkaline Phosphatase 09/07/2023 79    • Total Protein 09/07/2023 7.1    • Albumin 09/07/2023 3.9    • Total Bilirubin 09/07/2023 0.23    • eGFR 09/07/2023 91    • Lipase 09/07/2023 15    • LACTIC ACID 09/07/2023 0.7    • Procalcitonin 09/07/2023 <0.05    • Color, UA 09/07/2023 Yellow    • Clarity, UA 09/07/2023 Clear    • pH, UA 09/07/2023 5.0    • Leukocytes, UA 09/07/2023 Negative    • Nitrite, UA 09/07/2023 Negative    • Protein, UA 09/07/2023 Negative    • Glucose, UA 09/07/2023 Negative    • Ketones, UA 09/07/2023 Negative    • Urobilinogen, UA 09/07/2023 0.2    • Bilirubin, UA 09/07/2023 Negative    • Occult Blood, UA 09/07/2023 Negative    • Specific Gravity, UA 09/07/2023 1.010    • hs TnI 0hr 09/07/2023 <2    • Hemoglobin 09/07/2023 6.5 (LL)    • Hematocrit 09/07/2023 23.0 (L)    • ABO Grouping 09/07/2023 O    • Rh Factor 09/07/2023 Positive    • Antibody Screen 09/07/2023 Negative    • Specimen Expiration Date 09/07/2023 [id number]    • Unit Product Code 09/08/2023 [id number]    • Unit Number 09/08/2023 [id number]    • Unit ABO 09/08/2023 O    • Unit [facility] 09/08/2023 POS    • Crossmatch 09/08/2023 Compatible    • Unit Dispense Status 09/08/2023 Presumed Trans    • Unit Product Volume 09/08/2023 350    • Sodium 09/08/2023 138    • Potassium 09/08/2023 3.6    • Chloride 09/08/2023 108    • CO2 09/08/2023 24    • ANION GAP 09/08/2023 6    • BUN 09/08/2023 9    • Creatinine 09/08/2023 0.79    • Glucose 09/08/2023 86    • Glucose, Fasting 09/08/2023 86    • Calcium 09/08/2023 7.9 (L)    • Corrected Calcium 09/08/2023 8.4    • AST 09/08/2023 19    • ALT 09/08/2023 12    • Alkaline Phosphatase 09/08/2023 71    • Total Protein 09/08/2023 6.4    • Albumin 09/08/2023 3.4 (L)    • Total Bilirubin 09/08/2023 0.35    • eGFR 09/08/2023 92    • Magnesium 09/08/2023 2.1    • Phosphorus 09/08/2023 3.4    • WBC 09/08/2023 4.96    • RBC 09/08/2023 3.60 (L)    • Hemoglobin 09/08/2023 7.3 (L)    • Hematocrit 09/08/2023 25.2 (L)    • MCV 09/08/2023 70 (L)    • MCH 09/08/2023 20.3 (L)    • MCHC 09/08/2023 29.0 (L)    • RDW 09/08/2023 18.7 (H)    • MPV 09/08/2023 8.8 (L)    • Platelets 92/57/9040 382    • nRBC 09/08/2023 0    • Neutrophils Relative 09/08/2023 48    • Immat GRANS % 09/08/2023 0    • Lymphocytes Relative 09/08/2023 38    • Monocytes Relative 09/08/2023 10    • Eosinophils Relative 09/08/2023 4    • Basophils Relative 09/08/2023 0    • Neutrophils Absolute 09/08/2023 2.39    • Immature Grans Absolute 09/08/2023 0.01    • Lymphocytes Absolute 09/08/2023 1.89    • Monocytes Absolute 09/08/2023 0.48    • Eosinophils Absolute 09/08/2023 0.18    • Basophils Absolute 09/08/2023 0.01    • Ventricular Rate 09/07/2023 68    • Atrial Rate 09/07/2023 68    • [place] Interval 09/07/2023 156    • QRSD Interval 09/07/2023 86    • QT Interval 09/07/2023 404    • QTC Interval 09/07/2023 429    • P Axis 09/07/2023 61    • QRS Axis 09/07/2023 64    • T Wave Axis 09/07/2023 39        Lab Results   Component Value Date    WBC 4.96 09/08/2023    HGB 7.3 (L) 09/08/2023    HCT 25.2 (L) 09/08/2023    MCV 70 (L) 09/08/2023     09/08/2023       Lab Results   Component Value Date     10/15/2014    SODIUM 138 09/08/2023    K 3.6 09/08/2023     09/08/2023    CO2 24 09/08/2023    ANIONGAP 10 10/15/2014    AGAP 6 09/08/2023    BUN 9 09/08/2023    CREATININE 0.79 09/08/2023    GLUC 86 09/08/2023    GLUF 86 09/08/2023    CALCIUM 7.9 (L) 09/08/2023    AST 19 09/08/2023    ALT 12 09/08/2023    ALKPHOS 71 09/08/2023    PROT 8.1 10/15/2014    TP 6.4 09/08/2023    BILITOT 0.2 10/15/2014    TBILI 0.35 09/08/2023    EGFR 00 09/08/2023     Radiology Results:   CT abdomen pelvis wo contrast    Result Date: 9/8/2023  Narrative: CT ABDOMEN AND PELVIS WITHOUT IV CONTRAST INDICATION:   Bowel obstruction suspected bariatric protocol (small sip of PO contrast is sufficient) assess for resolution of jejunal intusseception assess for resolution of jejunal intusseception. COMPARISON: CT abdomen and pelvis 9/7/2023. TECHNIQUE:  CT examination of the abdomen and pelvis was performed without intravenous contrast. Multiplanar 2D reformatted images were created from the source data. This examination, like all CT scans performed in the [facility], was performed utilizing techniques to minimize radiation dose exposure, including the use of iterative reconstruction and automated exposure control. Radiation dose length product (DLP) for this visit:  1171 mGy-cm Enteric contrast was administered. FINDINGS: ABDOMEN LOWER CHEST: Mild bibasilar subsegmental atelectasis. LIVER/BILIARY TREE:  Unremarkable. GALLBLADDER: Surgically absent. SPLEEN: Status post sleeve gastrectomy and Karmen-en-Y gastric bypass. Previously seen small bowel intussusception is no longer present. No dilated loops of bowel. Enteric contrast does reach to the proximal left colon. No wall thickening. PANCREAS:  Unremarkable. ADRENAL GLANDS:  Unremarkable. KIDNEYS/URETERS:  Unremarkable. No hydronephrosis. STOMACH AND BOWEL:  Unremarkable. APPENDIX:  No findings to suggest appendicitis. ABDOMINOPELVIC CAVITY:  No ascites. There is a small locule of pneumoperitoneum in the upper abdomen ventral to the left hepatic lobe which is unchanged from previous exam and likely sequela of recent surgery. No lymphadenopathy. VESSELS:  Unremarkable for patient's age. PELVIS REPRODUCTIVE ORGANS:  Surgical changes of prior hysterectomy. URINARY BLADDER:  Unremarkable. ABDOMINAL WALL/INGUINAL REGIONS:  Unremarkable. OSSEOUS STRUCTURES:  No acute fracture or destructive osseous lesion. Impression: 1. Resolution of jejunojejunal intussusception with progression of enteric contrast to the left colon. No evidence of obstruction. 2.  Stable small locule of pneumoperitoneum in the upper abdomen likely the sequela of recent hysterectomy. Workstation performed: [id number]     CT abdomen pelvis with contrast    Result Date: 9/7/2023  Narrative: CT ABDOMEN AND PELVIS WITH IV CONTRAST INDICATION:   Abdominal pain, acute, nonlocalized Worsening constant left upper quadrant abdominal pain which radiates down to left flank. Laparoscopic hysterectomy 2 weeks ago. Reports fevers. COMPARISON: CT 2/28/2023 TECHNIQUE:  CT examination of the abdomen and pelvis was performed. Multiplanar 2D reformatted images were created from the source data. This examination, like all CT scans performed in the [facility], was performed utilizing techniques to minimize radiation dose exposure, including the use of iterative reconstruction and automated exposure control. Radiation dose length product (DLP) for this visit:  1021 mGy-cm IV Contrast:  100 mL of iohexol (OMNIPAQUE) 50 mL of iohexol (OMNIPAQUE) Enteric Contrast:  Enteric contrast was not administered. FINDINGS: ABDOMEN LOWER CHEST: Mild bibasilar subsegmental atelectasis. Normal size heart. LIVER/BILIARY TREE:  Unremarkable. GALLBLADDER: Gallbladder is surgically absent. SPLEEN:  Unremarkable. PANCREAS:  Unremarkable. ADRENAL GLANDS:  Unremarkable. KIDNEYS/URETERS:  Unremarkable. No hydronephrosis. STOMACH AND BOWEL: No postoperative changes identified reflecting sleeve gastrectomy (November 2011) and subsequent revision to Karmen-en-Y gastric bypass 4/8/2019). No evidence for obstruction. There is a mild jejunojejunal intussusception in the left mid  abdomen identified near the jejunojejunal anastomosis (2/79-94) without evidence for obstruction. This may be a transient phenomenon and should be clinically monitored.  No bowel wall thickening or intestinal obstruction is noted. Mild retained fecal material in the colon and rectum is identified. APPENDIX:  No findings to suggest appendicitis. ABDOMINOPELVIC CAVITY:  No ascites. No pneumoperitoneum. No lymphadenopathy. VESSELS:  Unremarkable for patient's age. PELVIS REPRODUCTIVE ORGANS: Status post supracervical hysterectomy and bilateral salpingectomy. URINARY BLADDER:  Unremarkable. ABDOMINAL WALL/INGUINAL REGIONS:  Unremarkable. OSSEOUS STRUCTURES:  No acute fracture or destructive osseous lesion. Impression: 1. Postoperative change reflecting Karmen-en-Y gastric bypass revising previous sleeve gastrectomy with expected postoperative anatomy. 2. Apparent focal jejunojejunal intussusception without obstruction, likely a transient phenomenon. This should be clinically monitored. 3. Mild constipation. 4. Status post supracervical hysterectomy. 5. Additional findings as noted. Workstation performed: [id number]     Prior Procedure Results/Reports   The patient has a history of sleeve gastrectomy in 2009 which was converted to a Karmen-en-Y gastric bypass in 2018 due to worsening GERD, pouchitis, and anastomotic ulcer. The patient did have an EGD 8/18/21 revealed another anastomotic ulcer,  Repeat EGD 10/31/22 was normal.  Last EGD reviewed done 3/2/2023 for N/V, BLANK. This showed normal esophagus, RnY  Gastric bypass with patent and healthy anastomosis, normal jejunum. Stomach biopsies negative for H pylori at that time. Normal SBB.    Last Colonoscopy reviewed 3/2023 for BLANK which was normal, recall recommended in 10 years     Long Fong PA-C   Available on [employer]

## 2023-09-11 NOTE — PROGRESS NOTES
[facility] - Outpatient Follow-up Note  Connie Villa 44 y.o. female MRN: [id number]  Encounter: [id number]    ASSESSMENT AND PLAN:    1. Epigastric pain  2. Gastroesophageal reflux disease, unspecified whether esophagitis present  3. Status post bariatric surgery  4. Iron deficiency anemia, unspecified iron deficiency anemia type  Patient with history of sleeve gastrectomy in 2009 which was converted to a Karmen-en-Y gastric bypass in 2018 due to worsening GERD, pouchitis, and anastomotic ulcer. The patient did have an EGD 8/18/21 revealed another anastomotic ulcer. Most recent EGD done 3/2/2023 for N/V, BLANK. This showed normal esophagus, RnY  Gastric bypass with patent and healthy anastomosis, normal jejunum. Stomach biopsies negative for H pylori at that time. Normal SBB. She now presents for hospital follow-up of worsening epigastric pain and acute on chronic iron deficiency anemia. Concern for anastomotic ulcer upon discharge and recommended to undergo EGD as an outpatient, will order EGD at this time. I recommended that she continue PPI once daily in the morning and Carafate as prescribed. She is to continue oral iron as well. Discussed and recommended bland GERD diet and lifestyle. I will ask my office staff to have patient recheck CBC later this week prior to procedure which is scheduled for September 19 to ensure adequate hemoglobin    - CBC  - EGD; Future    5. Irregular bowel habits  Patient recently underwent colonoscopy which was normal.  I recommended that she start Benefiber powder over-the-counter once daily to bulk and regulate her stools. Encouraged adequate hydration. Patient was instructed to call the office with any questions, concerns, new/ worsening/ persisting GI symptoms.  Advised patient go to the ER with any severe or worsening abdominal pain, fevers/ chills, intractable N/V, chest pain, SOB, dizziness, lightheadedness, feeling something stuck in esophagus that will not go down. Patient expressed understanding and is in agreement with treatment plan. Will plan to follow up after diagnostic tests   __________________________________________________________    SUBJECTIVE:      Daughter is present for visit today. Patient presents for hospital follow up. The patient has a history of sleeve gastrectomy in 2009 which was converted to a Karmen-en-Y gastric bypass in 2018 due to worsening GERD, pouchitis, and anastomotic ulcer. She also notes she is also recently s/p hysterectomy due to heavy, prolonged periods x 1 year- done 8/21/2023  Discharge summary reviewed. Patient was admitted overnight on September 8 for worsening abdominal pain for 3 weeks. She underwent a CT-scan demonstrating incidental finding of jejunal intussusception that on follow-up CT-scan was no longer present. Her pain was improved with PPI BID and Carafate, bowel rest and hydration. She also presented with acute on chronic anemia and was given 1 unit of packed red blood cells. Upon discharge she was told to undergo EGD as an outpatient. She said she would rather have this endoscopy performed by our GI group, not bariatric surgery    Prior to this hospital stay she was doing well. Not on any antacid medications. Since hospital stay she continues with epigastric pain. Mostly in the left upper quadrant but can radiate across her epigastric area to her right upper quadrant like a band. Describes as burning, can be sharp at times. Comes and goes. Not always related to eating. Associated nausea and decreased appetite, but no vomiting. She denies associated heartburn or acid reflux. Since being discharged from the hospital she has been taking pantoprazole 40 mg daily in the morning, as well as Carafate and iron supplement. Prior to this hospital stay she was not taking iron pill or PPI. She does tell me that she needed  IV iron in the past, ongoing issues with anemia.    She denies changes in diet, medication, travel prior to symptom onset. She does note that since her hysterectomy her bowels have been irregular. Alternating between constipation and diarrhea. Patient denies any fevers/ chills, unintentional weight loss, vomiting,  black or bloody stools, heartburn, dysphagia, odynophagia. Denies chest pain, SOB    Tobacco use- None  Alcohol use- Rare   NSAID use-  None    Review of Systems   Constitutional: Negative for chills and fever. HENT: Negative for ear pain and sore throat. Eyes: Negative for pain and visual disturbance. Respiratory: Negative for cough and shortness of breath. Cardiovascular: Negative for chest pain and palpitations. Gastrointestinal: Negative for abdominal pain and vomiting. Genitourinary: Negative for dysuria and hematuria. Musculoskeletal: Negative for arthralgias and back pain. Skin: Negative for color change and rash. Neurological: Negative for seizures and syncope. All other systems reviewed and are negative.      Historical Information   Past Medical History:   Diagnosis Date   • Abdominal pain     "almost constant"   • Anemia    • Anesthesia     "woke up swinging with last  2018  "was fine with EGD"   • Back pain    • Bariatric surgery status     2008-gastric sleeve revison RUEN-Y 2019-abd painnow-dx lap today 3/9/2020   • Constipation    • COVID     x 2 -  and    • Dental bridge present     right lower permanent   • Diarrhea    • Difficulty swallowing     "in the past"   • Disease of thyroid gland     not on meds now   • Dizzy    • Edema     ble's   • Fatigue     "when blood pressure low" and weakness too"   • GERD (gastroesophageal reflux disease)     "increase after surgery"   • Heart murmur     heart murmer, work up negative with holter monitor   • History of 2  sections     most recent 17   • History of D&C 2019   • History of iron deficiency     anemia/ had IV infusions through pregnancy in 1622-9299   • History of transfusion     2019 - pt had allergic reaction - had to stop the blood   • Inguinal hernia     right   • Low BP     "off and on"   • Migraine    • N&V (nausea and vomiting)     " a little better since on meds"   • Non-intractable vomiting     "not since been on medicine"   • Palpitations     "having again"   • Postgastrectomy malabsorption      Past Surgical History:   Procedure Laterality Date   •  SECTION      x2   • CHOLECYSTECTOMY     • CHROMOSOME ANALYSIS, PRODUCTS OF CONCEPTION (HISTORICAL)      2 miscarriages in  and Nov   • LAPAROSCOPIC SALPINGOOPHERECTOMY Right 2018    LVH, 6 cm benign ovarian cyst   • LAPAROSCOPY N/A 2020    Procedure: DIAGNOSTIC LAPAROSCOPY,CLOSURE OF COATES DEFECT, INTRAOP EGD;  Surgeon: Belinda Rodriguez MD;  Location: [facility];  Service: Bariatrics   • IA EGD TRANSORAL BIOPSY SINGLE/MULTIPLE N/A 2019    Procedure: ESOPHAGOGASTRODUODENOSCOPY (EGD) with bx;  Surgeon: Belinda Rodriguez MD;  Location: [facility]; Service: Bariatrics   • IA LAPS GSTR RSTCV PX W/BYP SREE-EN-Y LIMB <150 CM N/A 2019    Procedure: LAP SREE-EN-Y GASTRIC BYPASS, INTRAOP EGD;  Surgeon: Belinda Rodriguez MD;  [facility];  Service: Bariatrics   • IA LAPS SUPRACRV HYSTERECT 250 GM/< RMVL TUBE/OVAR Bilateral 2023    Procedure: [facility]);   Surgeon: Geeta Jaime DO;  Location: [facility];  Service: Gynecology   • IA TX MISSED  FIRST TRIMESTER SURGICAL N/A 2019    Procedure: DILATATION AND EVACUATION (D&E) (8 weeks) missed ab;  Surgeon: Shefali Hidalgo MD;  [facility];  Service: Gynecology   • REVISION BYPASS LAPAROSCOPIC N/A 2019    Procedure: LAP REVISION/ CONVERSION;  Surgeon: Belinda Rodriguez MD;  [facility];  Service: Bariatrics   • SALPINGOOPHORECTOMY Right 2018   • SLEEVE GASTROPLASTY       Social History   Social History     Substance and Sexual Activity   Alcohol Use Yes    Comment: occ Social History     Substance and Sexual Activity   Drug Use No     Social History     Tobacco Use   Smoking Status Never   Smokeless Tobacco Never     Family History   Problem Relation Age of Onset   • Hypertension Mother    • Heart disease Mother    • No Known Problems Father      Meds/Allergies       Current Outpatient Medications:   •  acetaminophen (TYLENOL) 160 mg/5 mL liquid  •  ferrous sulfate 325 (65 Fe) mg tablet  •  furosemide (LASIX) 20 mg tablet  •  linaCLOtide 290 MCG CAPS  •  Multiple Vitamin (multivitamin) tablet  •  ondansetron (ZOFRAN-ODT) 4 mg disintegrating tablet  •  pantoprazole (PROTONIX) 40 mg tablet  •  sucralfate (CARAFATE) 1 g tablet  •  acetaminophen (TYLENOL) 500 mg tablet    Allergies   Allergen Reactions   • Aspirin Anaphylaxis   • Shellfish-Derived Products - Food Allergy Anaphylaxis   • Contrast [Iodinated Contrast Media] Itching and Swelling   • Ibuprofen Edema   • Reglan [Metoclopramide] Itching and Confusion       Objective     Wt Readings from Last 3 Encounters:   09/11/23 109 kg (240 lb)   09/07/23 112 kg (246 lb 7.6 oz)   08/21/23 109 kg (239 lb 6.7 oz)     Temp Readings from Last 3 Encounters:   09/08/23 98.4 °F (36.9 °C) (Oral)   08/21/23 (!) 97.3 °F (36.3 °C) (Temporal)   07/13/23 97.9 °F (36.6 °C) (Oral)     BP Readings from Last 3 Encounters:   09/11/23 116/62   09/08/23 111/67   08/21/23 108/63     Pulse Readings from Last 3 Encounters:   09/11/23 80   09/08/23 71   08/21/23 68      PHYSICAL EXAM:      Physical Exam  Vitals reviewed. Constitutional:       General: She is not in acute distress. Appearance: She is not toxic-appearing. HENT:      Head: Normocephalic and atraumatic. Eyes:      General: No scleral icterus. Extraocular Movements: Extraocular movements intact. Conjunctiva/sclera: Conjunctivae normal.   Cardiovascular:      Rate and Rhythm: Normal rate and regular rhythm.    Pulmonary:      Effort: Pulmonary effort is normal. No respiratory distress. Breath sounds: Normal breath sounds. Abdominal:      General: Bowel sounds are normal.      Palpations: Abdomen is soft. Tenderness: There is abdominal tenderness (mild epigastric ). Musculoskeletal:         General: No swelling or tenderness. Cervical back: Normal range of motion and neck supple. Skin:     General: Skin is warm and dry. Coloration: Skin is not jaundiced. Neurological:      General: No focal deficit present. Mental Status: She is alert and oriented to person, place, and time. Mental status is at baseline. Psychiatric:         Mood and Affect: Mood normal.         Behavior: Behavior normal.         Thought Content: Thought content normal.          Lab Results:   No visits with results within 1 Day(s) from this visit.    Latest known visit with results is:   Admission on 09/07/2023, Discharged on 09/08/2023   Component Date Value   • Ventricular Rate 09/07/2023 75    • Atrial Rate 09/07/2023 75    • SC Interval 09/07/2023 146    • QRSD Interval 09/07/2023 84    • QT Interval 09/07/2023 392    • QTC Interval 09/07/2023 437    • P Axis 09/07/2023 66    • QRS Axis 09/07/2023 73    • T Wave Axis 09/07/2023 55    • WBC 09/07/2023 8.01    • RBC 09/07/2023 3.59 (L)    • Hemoglobin 09/07/2023 7.1 (L)    • Hematocrit 09/07/2023 25.1 (L)    • MCV 09/07/2023 70 (L)    • MCH 09/07/2023 19.8 (L)    • MCHC 09/07/2023 28.3 (L)    • RDW 09/07/2023 17.9 (H)    • MPV 09/07/2023 9.3    • Platelets 59/95/5975 462 (H)    • nRBC 09/07/2023 0    • Neutrophils Relative 09/07/2023 52    • Immat GRANS % 09/07/2023 0    • Lymphocytes Relative 09/07/2023 38    • Monocytes Relative 09/07/2023 8    • Eosinophils Relative 09/07/2023 2    • Basophils Relative 09/07/2023 0    • Neutrophils Absolute 09/07/2023 4.17    • Immature Grans Absolute 09/07/2023 0.02    • Lymphocytes Absolute 09/07/2023 3.06    • Monocytes Absolute 09/07/2023 0.60    • Eosinophils Absolute 09/07/2023 0.14    • Basophils Absolute 09/07/2023 0.02    • Sodium 09/07/2023 138    • Potassium 09/07/2023 3.6    • Chloride 09/07/2023 106    • CO2 09/07/2023 25    • ANION GAP 09/07/2023 7    • BUN 09/07/2023 11    • Creatinine 09/07/2023 0.80    • Glucose 09/07/2023 77    • Calcium 09/07/2023 8.5    • AST 09/07/2023 19    • ALT 09/07/2023 12    • Alkaline Phosphatase 09/07/2023 79    • Total Protein 09/07/2023 7.1    • Albumin 09/07/2023 3.9    • Total Bilirubin 09/07/2023 0.23    • eGFR 09/07/2023 91    • Lipase 09/07/2023 15    • LACTIC ACID 09/07/2023 0.7    • Procalcitonin 09/07/2023 <0.05    • Color, UA 09/07/2023 Yellow    • Clarity, UA 09/07/2023 Clear    • pH, UA 09/07/2023 5.0    • Leukocytes, UA 09/07/2023 Negative    • Nitrite, UA 09/07/2023 Negative    • Protein, UA 09/07/2023 Negative    • Glucose, UA 09/07/2023 Negative    • Ketones, UA 09/07/2023 Negative    • Urobilinogen, UA 09/07/2023 0.2    • Bilirubin, UA 09/07/2023 Negative    • Occult Blood, UA 09/07/2023 Negative    • Specific Gravity, UA 09/07/2023 1.010    • hs TnI 0hr 09/07/2023 <2    • Hemoglobin 09/07/2023 6.5 (LL)    • Hematocrit 09/07/2023 23.0 (L)    • ABO Grouping 09/07/2023 O    • Rh Factor 09/07/2023 Positive    • Antibody Screen 09/07/2023 Negative    • Specimen Expiration Date 09/07/2023 [id number]    • Unit Product Code 09/08/2023 [id number]    • Unit Number 09/08/2023 [id number]    • Unit ABO 09/08/2023 O    • Unit [facility] 09/08/2023 POS    • Crossmatch 09/08/2023 Compatible    • Unit Dispense Status 09/08/2023 Presumed Trans    • Unit Product Volume 09/08/2023 350    • Sodium 09/08/2023 138    • Potassium 09/08/2023 3.6    • Chloride 09/08/2023 108    • CO2 09/08/2023 24    • ANION GAP 09/08/2023 6    • BUN 09/08/2023 9    • Creatinine 09/08/2023 0.79    • Glucose 09/08/2023 86    • Glucose, Fasting 09/08/2023 86    • Calcium 09/08/2023 7.9 (L)    • Corrected Calcium 09/08/2023 8.4    • AST 09/08/2023 19    • ALT 09/08/2023 12    • Alkaline Phosphatase 09/08/2023 71    • Total Protein 09/08/2023 6.4    • Albumin 09/08/2023 3.4 (L)    • Total Bilirubin 09/08/2023 0.35    • eGFR 09/08/2023 92    • Magnesium 09/08/2023 2.1    • Phosphorus 09/08/2023 3.4    • WBC 09/08/2023 4.96    • RBC 09/08/2023 3.60 (L)    • Hemoglobin 09/08/2023 7.3 (L)    • Hematocrit 09/08/2023 25.2 (L)    • MCV 09/08/2023 70 (L)    • MCH 09/08/2023 20.3 (L)    • MCHC 09/08/2023 29.0 (L)    • RDW 09/08/2023 18.7 (H)    • MPV 09/08/2023 8.8 (L)    • Platelets 26/79/8156 382    • nRBC 09/08/2023 0    • Neutrophils Relative 09/08/2023 48    • Immat GRANS % 09/08/2023 0    • Lymphocytes Relative 09/08/2023 38    • Monocytes Relative 09/08/2023 10    • Eosinophils Relative 09/08/2023 4    • Basophils Relative 09/08/2023 0    • Neutrophils Absolute 09/08/2023 2.39    • Immature Grans Absolute 09/08/2023 0.01    • Lymphocytes Absolute 09/08/2023 1.89    • Monocytes Absolute 09/08/2023 0.48    • Eosinophils Absolute 09/08/2023 0.18    • Basophils Absolute 09/08/2023 0.01    • Ventricular Rate 09/07/2023 68    • Atrial Rate 09/07/2023 68    • MI Interval 09/07/2023 156    • QRSD Interval 09/07/2023 86    • QT Interval 09/07/2023 404    • QTC Interval 09/07/2023 429    • P Axis 09/07/2023 61    • QRS Axis 09/07/2023 64    • T Wave Axis 09/07/2023 39        Lab Results   Component Value Date    WBC 4.96 09/08/2023    HGB 7.3 (L) 09/08/2023    HCT 25.2 (L) 09/08/2023    MCV 70 (L) 09/08/2023     09/08/2023       Lab Results   Component Value Date     10/15/2014    SODIUM 138 09/08/2023    K 3.6 09/08/2023     09/08/2023    CO2 24 09/08/2023    ANIONGAP 10 10/15/2014    AGAP 6 09/08/2023    BUN 9 09/08/2023    CREATININE 0.79 09/08/2023    GLUC 86 09/08/2023    GLUF 86 09/08/2023    CALCIUM 7.9 (L) 09/08/2023    AST 19 09/08/2023    ALT 12 09/08/2023    ALKPHOS 71 09/08/2023    PROT 8.1 10/15/2014    TP 6.4 09/08/2023    BILITOT 0.2 10/15/2014    TBILI 0.35 09/08/2023    EGFR 66 09/08/2023     Radiology Results:   CT abdomen pelvis wo contrast    Result Date: 9/8/2023  Narrative: CT ABDOMEN AND PELVIS WITHOUT IV CONTRAST INDICATION:   Bowel obstruction suspected bariatric protocol (small sip of PO contrast is sufficient) assess for resolution of jejunal intusseception assess for resolution of jejunal intusseception. COMPARISON: CT abdomen and pelvis 9/7/2023. TECHNIQUE:  CT examination of the abdomen and pelvis was performed without intravenous contrast. Multiplanar 2D reformatted images were created from the source data. This examination, like all CT scans performed in the [facility], was performed utilizing techniques to minimize radiation dose exposure, including the use of iterative reconstruction and automated exposure control. Radiation dose length product (DLP) for this visit:  1171 mGy-cm Enteric contrast was administered. FINDINGS: ABDOMEN LOWER CHEST: Mild bibasilar subsegmental atelectasis. LIVER/BILIARY TREE:  Unremarkable. GALLBLADDER: Surgically absent. SPLEEN: Status post sleeve gastrectomy and Karemn-en-Y gastric bypass. Previously seen small bowel intussusception is no longer present. No dilated loops of bowel. Enteric contrast does reach to the proximal left colon. No wall thickening. PANCREAS:  Unremarkable. ADRENAL GLANDS:  Unremarkable. KIDNEYS/URETERS:  Unremarkable. No hydronephrosis. STOMACH AND BOWEL:  Unremarkable. APPENDIX:  No findings to suggest appendicitis. ABDOMINOPELVIC CAVITY:  No ascites. There is a small locule of pneumoperitoneum in the upper abdomen ventral to the left hepatic lobe which is unchanged from previous exam and likely sequela of recent surgery. No lymphadenopathy. VESSELS:  Unremarkable for patient's age. PELVIS REPRODUCTIVE ORGANS:  Surgical changes of prior hysterectomy. URINARY BLADDER:  Unremarkable. ABDOMINAL WALL/INGUINAL REGIONS:  Unremarkable. OSSEOUS STRUCTURES:  No acute fracture or destructive osseous lesion. Impression: 1. Resolution of jejunojejunal intussusception with progression of enteric contrast to the left colon. No evidence of obstruction. 2.  Stable small locule of pneumoperitoneum in the upper abdomen likely the sequela of recent hysterectomy. Workstation performed: [id number]     CT abdomen pelvis with contrast    Result Date: 9/7/2023  Narrative: CT ABDOMEN AND PELVIS WITH IV CONTRAST INDICATION:   Abdominal pain, acute, nonlocalized Worsening constant left upper quadrant abdominal pain which radiates down to left flank. Laparoscopic hysterectomy 2 weeks ago. Reports fevers. COMPARISON: CT 2/28/2023 TECHNIQUE:  CT examination of the abdomen and pelvis was performed. Multiplanar 2D reformatted images were created from the source data. This examination, like all CT scans performed in the [facility], was performed utilizing techniques to minimize radiation dose exposure, including the use of iterative reconstruction and automated exposure control. Radiation dose length product (DLP) for this visit:  1021 mGy-cm IV Contrast:  100 mL of iohexol (OMNIPAQUE) 50 mL of iohexol (OMNIPAQUE) Enteric Contrast:  Enteric contrast was not administered. FINDINGS: ABDOMEN LOWER CHEST: Mild bibasilar subsegmental atelectasis. Normal size heart. LIVER/BILIARY TREE:  Unremarkable. GALLBLADDER: Gallbladder is surgically absent. SPLEEN:  Unremarkable. PANCREAS:  Unremarkable. ADRENAL GLANDS:  Unremarkable. KIDNEYS/URETERS:  Unremarkable. No hydronephrosis. STOMACH AND BOWEL: No postoperative changes identified reflecting sleeve gastrectomy (November 2011) and subsequent revision to Karmen-en-Y gastric bypass 4/8/2019). No evidence for obstruction. There is a mild jejunojejunal intussusception in the left mid  abdomen identified near the jejunojejunal anastomosis (2/79-94) without evidence for obstruction. This may be a transient phenomenon and should be clinically monitored.  No bowel wall thickening or intestinal obstruction is noted. Mild retained fecal material in the colon and rectum is identified. APPENDIX:  No findings to suggest appendicitis. ABDOMINOPELVIC CAVITY:  No ascites. No pneumoperitoneum. No lymphadenopathy. VESSELS:  Unremarkable for patient's age. PELVIS REPRODUCTIVE ORGANS: Status post supracervical hysterectomy and bilateral salpingectomy. URINARY BLADDER:  Unremarkable. ABDOMINAL WALL/INGUINAL REGIONS:  Unremarkable. OSSEOUS STRUCTURES:  No acute fracture or destructive osseous lesion. Impression: 1. Postoperative change reflecting Karmen-en-Y gastric bypass revising previous sleeve gastrectomy with expected postoperative anatomy. 2. Apparent focal jejunojejunal intussusception without obstruction, likely a transient phenomenon. This should be clinically monitored. 3. Mild constipation. 4. Status post supracervical hysterectomy. 5. Additional findings as noted. Workstation performed: [id number]     Prior Procedure Results/Reports   The patient has a history of sleeve gastrectomy in 2009 which was converted to a Karmen-en-Y gastric bypass in 2018 due to worsening GERD, pouchitis, and anastomotic ulcer. The patient did have an EGD 8/18/21 revealed another anastomotic ulcer,  Repeat EGD 10/31/22 was normal.  Last EGD reviewed done 3/2/2023 for N/V, BLANK. This showed normal esophagus, RnY  Gastric bypass with patent and healthy anastomosis, normal jejunum. Stomach biopsies negative for H pylori at that time. Normal SBB.    Last Colonoscopy reviewed 3/2023 for BLANK which was normal, recall recommended in 10 years     Kian Lira PA-C   Available on [employer]

## 2023-09-13 ENCOUNTER — OFFICE VISIT (OUTPATIENT)
Dept: GYNECOLOGY | Facility: CLINIC | Age: 43
End: 2023-09-13

## 2023-09-13 VITALS
BODY MASS INDEX: 40.97 KG/M2 | HEART RATE: 80 BPM | WEIGHT: 240 LBS | DIASTOLIC BLOOD PRESSURE: 62 MMHG | SYSTOLIC BLOOD PRESSURE: 116 MMHG | HEIGHT: 64 IN

## 2023-09-13 DIAGNOSIS — Z48.89 POSTOPERATIVE VISIT: Primary | ICD-10-CM

## 2023-09-13 PROCEDURE — 99024 POSTOP FOLLOW-UP VISIT: CPT | Performed by: OBSTETRICS & GYNECOLOGY

## 2023-09-13 NOTE — PROGRESS NOTES
Patient presents for postoperative check. She is status post [facility] BS on August 21. She is doing well since the surgery with no complaints. She did present to the emergency room on September 7 complaining of right upper quadrant pain 10 out of 10 associate with diarrhea. Patient has had a prior Karmen-en-Y gastric bypass 5 years ago. Hemoglobin was 7.1. CT scan showed postoperative change reflecting Karmen-en-Y gastric bypass with revision of her previous sleeve gastrectomy with expected postoperative anatomy. Changes compatible with status post supracervical hysterectomy. Apparently this is attributed to a GI bleed. She was transfused 1 unit. She is due for an endoscopy later this week. Physical exam: Port sites are healing well with no erythema exudate or induration.     Path report: Benign    Impression: Normal postoperative check    Plan: Return to the office as needed/annual

## 2023-09-14 ENCOUNTER — PATIENT MESSAGE (OUTPATIENT)
Dept: GASTROENTEROLOGY | Facility: CLINIC | Age: 43
End: 2023-09-14

## 2023-09-16 RX ORDER — SODIUM CHLORIDE 9 MG/ML
125 INJECTION, SOLUTION INTRAVENOUS CONTINUOUS
Status: CANCELLED | OUTPATIENT
Start: 2023-09-16

## 2023-09-19 ENCOUNTER — HOSPITAL ENCOUNTER (OUTPATIENT)
Dept: GASTROENTEROLOGY | Facility: MEDICAL CENTER | Age: 43
Setting detail: OUTPATIENT SURGERY
Discharge: HOME | End: 2023-09-19
Admitting: INTERNAL MEDICINE
Payer: MEDICARE

## 2023-09-19 ENCOUNTER — ANESTHESIA EVENT (OUTPATIENT)
Dept: GASTROENTEROLOGY | Facility: MEDICAL CENTER | Age: 43
End: 2023-09-19

## 2023-09-19 ENCOUNTER — TELEPHONE (OUTPATIENT)
Dept: HEMATOLOGY ONCOLOGY | Facility: CLINIC | Age: 43
End: 2023-09-19

## 2023-09-19 ENCOUNTER — ANESTHESIA (OUTPATIENT)
Dept: GASTROENTEROLOGY | Facility: MEDICAL CENTER | Age: 43
End: 2023-09-19

## 2023-09-19 VITALS
TEMPERATURE: 97.7 F | BODY MASS INDEX: 39.27 KG/M2 | HEIGHT: 64 IN | RESPIRATION RATE: 17 BRPM | OXYGEN SATURATION: 94 % | SYSTOLIC BLOOD PRESSURE: 105 MMHG | DIASTOLIC BLOOD PRESSURE: 58 MMHG | HEART RATE: 69 BPM | WEIGHT: 230 LBS

## 2023-09-19 DIAGNOSIS — D50.9 IRON DEFICIENCY ANEMIA, UNSPECIFIED IRON DEFICIENCY ANEMIA TYPE: ICD-10-CM

## 2023-09-19 DIAGNOSIS — R10.13 EPIGASTRIC PAIN: ICD-10-CM

## 2023-09-19 DIAGNOSIS — Z98.84 STATUS POST BARIATRIC SURGERY: ICD-10-CM

## 2023-09-19 DIAGNOSIS — K21.9 GASTROESOPHAGEAL REFLUX DISEASE, UNSPECIFIED WHETHER ESOPHAGITIS PRESENT: ICD-10-CM

## 2023-09-19 DIAGNOSIS — D50.8 IRON DEFICIENCY ANEMIA SECONDARY TO INADEQUATE DIETARY IRON INTAKE: Primary | ICD-10-CM

## 2023-09-19 RX ORDER — PROPOFOL 10 MG/ML
INJECTION, EMULSION INTRAVENOUS AS NEEDED
Status: DISCONTINUED | OUTPATIENT
Start: 2023-09-19 | End: 2023-09-19

## 2023-09-19 RX ORDER — SODIUM CHLORIDE 9 MG/ML
125 INJECTION, SOLUTION INTRAVENOUS CONTINUOUS
Status: DISCONTINUED | OUTPATIENT
Start: 2023-09-19 | End: 2023-09-23 | Stop reason: HOSPADM

## 2023-09-19 RX ORDER — LIDOCAINE HYDROCHLORIDE 20 MG/ML
INJECTION, SOLUTION EPIDURAL; INFILTRATION; INTRACAUDAL; PERINEURAL AS NEEDED
Status: DISCONTINUED | OUTPATIENT
Start: 2023-09-19 | End: 2023-09-19

## 2023-09-19 RX ADMIN — PROPOFOL 50 MG: 10 INJECTION, EMULSION INTRAVENOUS at 10:09

## 2023-09-19 RX ADMIN — PROPOFOL 100 MG: 10 INJECTION, EMULSION INTRAVENOUS at 10:07

## 2023-09-19 RX ADMIN — SODIUM CHLORIDE 125 ML/HR: 0.9 INJECTION, SOLUTION INTRAVENOUS at 09:56

## 2023-09-19 RX ADMIN — PROPOFOL 100 MG: 10 INJECTION, EMULSION INTRAVENOUS at 10:08

## 2023-09-19 RX ADMIN — LIDOCAINE HYDROCHLORIDE 100 MG: 20 INJECTION, SOLUTION EPIDURAL; INFILTRATION; INTRACAUDAL; PERINEURAL at 10:04

## 2023-09-19 NOTE — TELEPHONE ENCOUNTER
I called [facility] in response to a referral that was received for patient to establish care with Hematology. Outreach was made to schedule a consultation. I left a voicemail explaining the reason for my call and advised patient to call [facility] at [phone]. Another attempt will be made to contact patient.

## 2023-09-19 NOTE — ANESTHESIA PREPROCEDURE EVALUATION
Procedure:  EGD    Relevant Problems   CARDIO (within normal limits)      GI/HEPATIC   (+) Dysphagia      HEMATOLOGY   (+) Anemia   (+) Iron deficiency anemia secondary to inadequate dietary iron intake      PULMONARY (within normal limits)      Other   (+) Status post bariatric surgery        Physical Exam    Airway    Mallampati score: II  TM Distance: >3 FB  Neck ROM: full     Dental       Cardiovascular  Cardiovascular exam normal    Pulmonary  Pulmonary exam normal     Other Findings        Anesthesia Plan  ASA Score- 3     Anesthesia Type- IV sedation with anesthesia with ASA Monitors. Additional Monitors:   Airway Plan:           Plan Factors-    Chart reviewed. Patient summary reviewed. Induction- intravenous. Postoperative Plan-     Informed Consent- Anesthetic plan and risks discussed with patient.

## 2023-09-19 NOTE — INTERVAL H&P NOTE
H&P reviewed. After examining the patient I find no changes in the patients condition since the H&P had been written.     Vitals:    09/19/23 0943   BP: 110/71   Pulse: 75   Resp: 18   Temp: 97.7 °F (36.5 °C)   SpO2: 100%

## 2023-09-19 NOTE — ANESTHESIA POSTPROCEDURE EVALUATION
Post-Op Assessment Note    CV Status:  Stable    Pain management: adequate     Mental Status:  Alert and awake   Hydration Status:  Euvolemic   PONV Controlled:  Controlled   Airway Patency:  Patent      Post Op Vitals Reviewed: Yes      Staff: Anesthesiologist         There were no known notable events for this encounter.   /58   Pulse 69   Temp 97.7 °F (36.5 °C) (Temporal)   Resp 17   Ht 5' 4" (1.626 m)   Wt 104 kg (230 lb)   LMP 08/06/2023   SpO2 94%   BMI 39.48 kg/m²   BP      Temp      Pulse     Resp      SpO2

## 2023-09-21 ENCOUNTER — TELEPHONE (OUTPATIENT)
Dept: HEMATOLOGY ONCOLOGY | Facility: CLINIC | Age: 43
End: 2023-09-21

## 2023-09-21 NOTE — TELEPHONE ENCOUNTER
I called Verena Angel in response to a referral that was received for patient to establish care with Hematology. Outreach was made to schedule a consultation. I left a voicemail explaining the reason for my call and advised patient to call [facility] [place] at [phone]. Another attempt will be made to contact patient.

## 2023-09-22 ENCOUNTER — TRANSCRIBE ORDERS (OUTPATIENT)
Dept: GASTROENTEROLOGY | Facility: CLINIC | Age: 43
End: 2023-09-22

## 2023-09-22 DIAGNOSIS — N76.0 ACUTE VAGINITIS: Primary | ICD-10-CM

## 2023-09-22 RX ORDER — FLUCONAZOLE 150 MG/1
TABLET ORAL
Qty: 2 TABLET | Refills: 0 | Status: SHIPPED | OUTPATIENT
Start: 2023-09-22 | End: 2023-09-24

## 2023-09-22 NOTE — TELEPHONE ENCOUNTER
Pt states she has a yeast infection via [employer]. She would like a script sent in for diflucan.

## 2023-09-25 ENCOUNTER — TELEPHONE (OUTPATIENT)
Dept: HEMATOLOGY ONCOLOGY | Facility: CLINIC | Age: 43
End: 2023-09-25

## 2023-09-25 NOTE — TELEPHONE ENCOUNTER
I called Isaias De Leon in response to a referral that was received for patient to establish care with Hematology. Outreach was made to schedule a consultation. Patient does not have a voicemail set up. This is the third attempt to schedule patient unsuccessfully. The referral has been closed, a [employer] message has been sent to patient (if applicable) and a letter has been sent to the address on file.

## 2023-10-02 ENCOUNTER — TELEPHONE (OUTPATIENT)
Dept: NEUROLOGY | Facility: CLINIC | Age: 43
End: 2023-10-02

## 2023-10-03 DIAGNOSIS — D64.9 SEVERE ANEMIA: ICD-10-CM

## 2023-10-03 DIAGNOSIS — R10.13 EPIGASTRIC PAIN: ICD-10-CM

## 2023-10-03 RX ORDER — SUCRALFATE 1 G/1
1 TABLET ORAL 4 TIMES DAILY
Qty: 120 TABLET | Refills: 1 | Status: SHIPPED | OUTPATIENT
Start: 2023-10-03 | End: 2023-12-02

## 2023-10-03 RX ORDER — FERROUS SULFATE 325(65) MG
325 TABLET ORAL
Qty: 30 TABLET | Refills: 1 | Status: SHIPPED | OUTPATIENT
Start: 2023-10-03 | End: 2023-12-02

## 2023-10-11 ENCOUNTER — OFFICE VISIT (OUTPATIENT)
Dept: GYNECOLOGY | Facility: CLINIC | Age: 43
End: 2023-10-11

## 2023-10-11 VITALS
HEART RATE: 93 BPM | BODY MASS INDEX: 39.27 KG/M2 | SYSTOLIC BLOOD PRESSURE: 105 MMHG | DIASTOLIC BLOOD PRESSURE: 58 MMHG | WEIGHT: 230 LBS | HEIGHT: 64 IN

## 2023-10-11 DIAGNOSIS — N89.8 VAGINAL DISCHARGE: ICD-10-CM

## 2023-10-11 DIAGNOSIS — N89.8 VAGINAL ODOR: ICD-10-CM

## 2023-10-11 DIAGNOSIS — B96.89 BV (BACTERIAL VAGINOSIS): Primary | ICD-10-CM

## 2023-10-11 DIAGNOSIS — N76.0 BV (BACTERIAL VAGINOSIS): Primary | ICD-10-CM

## 2023-10-11 RX ORDER — CLINDAMYCIN PHOSPHATE 20 MG/G
1 CREAM VAGINAL
Qty: 40 G | Refills: 0 | Status: SHIPPED | OUTPATIENT
Start: 2023-10-11

## 2023-10-11 NOTE — PROGRESS NOTES
Assessment/Plan:         Diagnoses and all orders for this visit:    BV (bacterial vaginosis)  -     clindamycin (CLEOCIN) 2 % vaginal cream; Insert 1 applicator into the vagina daily at bedtime      Subjective:      Patient ID: Ryan Guerra is a 44 y.o. female. HPI    The following portions of the patient's history were reviewed and updated as appropriate: She  has a past medical history of Abdominal pain, Anemia, Anesthesia, Back pain, Bariatric surgery status, Constipation, COVID, Dental bridge present, Diarrhea, Difficulty swallowing, Disease of thyroid gland, Dizzy, Edema, Fatigue, GERD (gastroesophageal reflux disease), Heart murmur, History of 2  sections, History of D&C (2019), History of iron deficiency, History of transfusion, Inguinal hernia, Low BP, Migraine, N&V (nausea and vomiting), Non-intractable vomiting, Palpitations, PONV (postoperative nausea and vomiting), and Postgastrectomy malabsorption.   She   Patient Active Problem List    Diagnosis Date Noted    Adenomyosis 2023    Intractable nausea and vomiting 10/28/2022    GBS bacteriuria 2020    Dysfunctional uterine bleeding 2020    Status post bariatric surgery 2020    Iron deficiency anemia secondary to inadequate dietary iron intake 2019    Menorrhagia with regular cycle 2019    Pelvic pain 2019    BV (bacterial vaginosis) 2019    Status post suction D&C 2019    Missed  2019    Amenorrhea 2019    Pregnancy complicated by previous bariatric surgery, first trimester 2019    Pouchitis ([address]) 2019    Leg swelling 2019    Decreased oral intake 2019    Dysphagia 2019    Heart burn 2019    Epigastric pain 2019    Other constipation 2019    Chronic vomiting 2019    Gastritis 2019    Irritable bowel syndrome (IBS) 2019    Anemia 2019    S/P laparoscopic sleeve gastrectomy 2019 Bilious vomiting with nausea 2019    Obesity, Class II, BMI 35-39.9 2019    Bariatric surgery status 2018    Postsurgical malabsorption 2018    History of multiple miscarriages 10/10/2018     She  has a past surgical history that includes Sleeve Gastroplasty; Cholecystectomy; pr egd transoral biopsy single/multiple (N/A, 2019); Salpingoophorectomy (Right, 2018);  section; CHROMOSOME ANALYSIS, PRODUCTS OF CONCEPTION (HISTORICAL) (); pr laps gstr rstcv px w/byp candace-en-y limb <150 cm (N/A, 2019); REVISION BYPASS LAPAROSCOPIC (N/A, 2019); pr tx missed  first trimester surgical (N/A, 2019); LAPAROSCOPY (N/A, 2020); Laparoscopic salpingoopherectomy (Right, 2018); pr laps supracrv hysterect 250 gm/< rmvl tube/ovar (Bilateral, 2023); and Hysterectomy. Her family history includes Heart disease in her mother; Hypertension in her mother; No Known Problems in her father. She  reports that she has never smoked. She has never used smokeless tobacco. She reports that she does not currently use alcohol. She reports that she does not use drugs.   Current Outpatient Medications   Medication Sig Dispense Refill    clindamycin (CLEOCIN) 2 % vaginal cream Insert 1 applicator into the vagina daily at bedtime 40 g 0    acetaminophen (TYLENOL) 160 mg/5 mL liquid Take 20 mL (640 mg total) by mouth every 6 (six) hours as needed for mild pain for up to 24 doses 473 mL 0    acetaminophen (TYLENOL) 500 mg tablet Take 500-1,000 mg by mouth every 6 (six) hours as needed for mild pain (Patient not taking: Reported on 2023)      ferrous sulfate 325 (65 Fe) mg tablet TAKE 1 TABLET (325 MG TOTAL) BY MOUTH DAILY WITH BREAKFAST 30 tablet 1    furosemide (LASIX) 20 mg tablet Take 20 mg by mouth daily as needed      linaCLOtide 290 MCG CAPS Take 1 capsule by mouth daily before breakfast 90 capsule 3    Multiple Vitamin (multivitamin) tablet Take 1 tablet by mouth daily      ondansetron (ZOFRAN-ODT) 4 mg disintegrating tablet TAKE 1 TABLET BY MOUTH EVERY 6 HOURS AS NEEDED FOR NAUSEA OR VOMITING 20 tablet 1    pantoprazole (PROTONIX) 40 mg tablet Take 1 tablet (40 mg total) by mouth 2 (two) times a day 60 tablet 2    sucralfate (CARAFATE) 1 g tablet TAKE 1 TABLET (1 G TOTAL) BY MOUTH 4 (FOUR) TIMES A DAY CRUSH TABLET AND DISSOLVE IN WARM WATER BEFORE TAKING. 120 tablet 1     No current facility-administered medications for this visit. Current Outpatient Medications on File Prior to Visit   Medication Sig    acetaminophen (TYLENOL) 160 mg/5 mL liquid Take 20 mL (640 mg total) by mouth every 6 (six) hours as needed for mild pain for up to 24 doses    acetaminophen (TYLENOL) 500 mg tablet Take 500-1,000 mg by mouth every 6 (six) hours as needed for mild pain (Patient not taking: Reported on 9/11/2023)    ferrous sulfate 325 (65 Fe) mg tablet TAKE 1 TABLET (325 MG TOTAL) BY MOUTH DAILY WITH BREAKFAST    furosemide (LASIX) 20 mg tablet Take 20 mg by mouth daily as needed    linaCLOtide 290 MCG CAPS Take 1 capsule by mouth daily before breakfast    Multiple Vitamin (multivitamin) tablet Take 1 tablet by mouth daily    ondansetron (ZOFRAN-ODT) 4 mg disintegrating tablet TAKE 1 TABLET BY MOUTH EVERY 6 HOURS AS NEEDED FOR NAUSEA OR VOMITING    pantoprazole (PROTONIX) 40 mg tablet Take 1 tablet (40 mg total) by mouth 2 (two) times a day    sucralfate (CARAFATE) 1 g tablet TAKE 1 TABLET (1 G TOTAL) BY MOUTH 4 (FOUR) TIMES A DAY CRUSH TABLET AND DISSOLVE IN WARM WATER BEFORE TAKING. No current facility-administered medications on file prior to visit. She is allergic to aspirin, shellfish-derived products - food allergy, contrast [iodinated contrast media], ibuprofen, and reglan [metoclopramide]. .    Review of Systems   Gastrointestinal: Negative. Genitourinary:  Positive for vaginal discharge and vaginal pain.  Negative for difficulty urinating, dysuria, frequency, hematuria and pelvic pain. Objective:      /58   Pulse 93   Ht 5' 4" (1.626 m)   Wt 104 kg (230 lb)   LMP 08/06/2023   BMI 39.48 kg/m²          Physical Exam  Vitals reviewed. Abdominal:      General: There is no distension. Palpations: Abdomen is soft. There is no mass. Tenderness: There is no abdominal tenderness. There is no guarding or rebound. Hernia: No hernia is present. There is no hernia in the left inguinal area or right inguinal area. Genitourinary:     General: Normal vulva. Labia:         Right: No rash, tenderness or lesion. Left: No rash, tenderness or lesion. Vagina: Normal.      Cervix: Normal.      Uterus: Absent. Adnexa:         Right: No mass, tenderness or fullness. Left: No mass, tenderness or fullness. Lymphadenopathy:      Lower Body: No right inguinal adenopathy. No left inguinal adenopathy. Neurological:      Mental Status: She is alert.

## 2023-10-13 LAB
BV BACTERIA RRNA VAG QL NAA+PROBE: NEGATIVE
C GLABRATA RNA VAG QL NAA+PROBE: NOT DETECTED
CANDIDA RRNA VAG QL PROBE: DETECTED
T VAGINALIS RRNA SPEC QL NAA+PROBE: NOT DETECTED

## 2023-10-16 DIAGNOSIS — B96.89 BV (BACTERIAL VAGINOSIS): Primary | ICD-10-CM

## 2023-10-16 DIAGNOSIS — N76.0 BV (BACTERIAL VAGINOSIS): Primary | ICD-10-CM

## 2023-10-16 NOTE — TELEPHONE ENCOUNTER
Spoke to [employer]. Had started Cleocin on Friday and used for 2 days and states She is very sore and swollen vaginal area. Pt. Stated She had used Diflucan pills 9/22/2023 and it had done nothing to improve. Please advise.  Pt. Uses [address]

## 2023-10-16 NOTE — TELEPHONE ENCOUNTER
----- Message from March DO Breanna sent at 10/16/2023  7:19 AM EDT -----  + candidiasis on sureswab.  Start diflucan x 2

## 2023-10-17 RX ORDER — METRONIDAZOLE 7.5 MG/G
1 GEL VAGINAL DAILY
Qty: 70 G | Refills: 1 | Status: SHIPPED | OUTPATIENT
Start: 2023-10-17 | End: 2023-10-18

## 2023-10-18 DIAGNOSIS — N76.0 BV (BACTERIAL VAGINOSIS): ICD-10-CM

## 2023-10-18 DIAGNOSIS — B96.89 BV (BACTERIAL VAGINOSIS): ICD-10-CM

## 2023-10-18 RX ORDER — METRONIDAZOLE 7.5 MG/G
1 GEL VAGINAL DAILY
Qty: 70 G | Refills: 0 | Status: SHIPPED | OUTPATIENT
Start: 2023-10-18 | End: 2023-10-25

## 2023-10-20 DIAGNOSIS — N76.1 CHRONIC VAGINITIS: Primary | ICD-10-CM

## 2023-10-20 RX ORDER — FLUCONAZOLE 150 MG/1
TABLET ORAL
Qty: 4 TABLET | Refills: 0 | Status: SHIPPED | OUTPATIENT
Start: 2023-10-20 | End: 2023-11-06

## 2023-10-26 DIAGNOSIS — K21.9 GASTROESOPHAGEAL REFLUX DISEASE WITHOUT ESOPHAGITIS: ICD-10-CM

## 2023-10-26 DIAGNOSIS — R11.2 NAUSEA AND VOMITING: ICD-10-CM

## 2023-10-26 PROCEDURE — RECHECK: Performed by: STUDENT IN AN ORGANIZED HEALTH CARE EDUCATION/TRAINING PROGRAM

## 2023-10-26 RX ORDER — OMEPRAZOLE 20 MG/1
20 CAPSULE, DELAYED RELEASE ORAL DAILY
Qty: 30 CAPSULE | Refills: 3 | Status: SHIPPED | OUTPATIENT
Start: 2023-10-26

## 2023-10-26 NOTE — TELEPHONE ENCOUNTER
Notified that patient will need refill request for pantoprazole which she is taking twice a day. Her EGD demonstrates no ulcers, therefore I will deescalate her PPI to omeprazole daily moving forward. Should she have any questions or concerns, please contact Dr. Sanjay Foster' office at the [facility]. Demetra Valle MD  Bariatric Surgery Fellow.

## 2023-10-27 DIAGNOSIS — R10.13 EPIGASTRIC PAIN: ICD-10-CM

## 2023-10-27 DIAGNOSIS — D64.9 SEVERE ANEMIA: ICD-10-CM

## 2023-10-27 RX ORDER — SUCRALFATE 1 G/1
1 TABLET ORAL 4 TIMES DAILY
Qty: 120 TABLET | Refills: 1 | Status: SHIPPED | OUTPATIENT
Start: 2023-10-27 | End: 2023-12-26

## 2023-10-27 RX ORDER — LANOLIN ALCOHOL/MO/W.PET/CERES
1 CREAM (GRAM) TOPICAL
Qty: 30 TABLET | Refills: 1 | Status: SHIPPED | OUTPATIENT
Start: 2023-10-27

## 2023-10-30 ENCOUNTER — PATIENT MESSAGE (OUTPATIENT)
Dept: GASTROENTEROLOGY | Facility: CLINIC | Age: 43
End: 2023-10-30

## 2023-10-30 ENCOUNTER — HOSPITAL ENCOUNTER (EMERGENCY)
Facility: HOSPITAL | Age: 43
Discharge: HOME | End: 2023-10-30
Attending: EMERGENCY MEDICINE | Admitting: EMERGENCY MEDICINE
Payer: MEDICARE

## 2023-10-30 ENCOUNTER — APPOINTMENT (EMERGENCY)
Dept: CT IMAGING | Facility: HOSPITAL | Age: 43
End: 2023-10-30
Payer: MEDICARE

## 2023-10-30 VITALS
DIASTOLIC BLOOD PRESSURE: 67 MMHG | SYSTOLIC BLOOD PRESSURE: 113 MMHG | HEART RATE: 74 BPM | RESPIRATION RATE: 18 BRPM | OXYGEN SATURATION: 99 % | TEMPERATURE: 98.4 F | WEIGHT: 230.38 LBS | BODY MASS INDEX: 39.54 KG/M2

## 2023-10-30 DIAGNOSIS — Z98.890 HISTORY OF ABDOMINAL SURGERY: ICD-10-CM

## 2023-10-30 DIAGNOSIS — R11.0 NAUSEA: ICD-10-CM

## 2023-10-30 DIAGNOSIS — Z98.84 HISTORY OF GASTRIC BYPASS: ICD-10-CM

## 2023-10-30 DIAGNOSIS — K59.00 CONSTIPATION: Primary | ICD-10-CM

## 2023-10-30 DIAGNOSIS — R10.9 ABDOMINAL PAIN: ICD-10-CM

## 2023-10-30 LAB
ALBUMIN SERPL BCP-MCNC: 3.8 G/DL (ref 3.5–5)
ALP SERPL-CCNC: 85 U/L (ref 34–104)
ALT SERPL W P-5'-P-CCNC: 8 U/L (ref 7–52)
ANION GAP SERPL CALCULATED.3IONS-SCNC: 7 MMOL/L
AST SERPL W P-5'-P-CCNC: 31 U/L (ref 13–39)
BASOPHILS # BLD AUTO: 0.03 THOUSANDS/ÂΜL (ref 0–0.1)
BASOPHILS NFR BLD AUTO: 1 % (ref 0–1)
BILIRUB SERPL-MCNC: 0.3 MG/DL (ref 0.2–1)
BUN SERPL-MCNC: 16 MG/DL (ref 5–25)
CALCIUM SERPL-MCNC: 8.6 MG/DL (ref 8.4–10.2)
CHLORIDE SERPL-SCNC: 107 MMOL/L (ref 96–108)
CO2 SERPL-SCNC: 21 MMOL/L (ref 21–32)
CREAT SERPL-MCNC: 0.82 MG/DL (ref 0.6–1.3)
EOSINOPHIL # BLD AUTO: 0.1 THOUSAND/ÂΜL (ref 0–0.61)
EOSINOPHIL NFR BLD AUTO: 2 % (ref 0–6)
ERYTHROCYTE [DISTWIDTH] IN BLOOD BY AUTOMATED COUNT: 22.2 % (ref 11.6–15.1)
GFR SERPL CREATININE-BSD FRML MDRD: 88 ML/MIN/1.73SQ M
GLUCOSE SERPL-MCNC: 78 MG/DL (ref 65–140)
HCT VFR BLD AUTO: 33.6 % (ref 34.8–46.1)
HGB BLD-MCNC: 9.5 G/DL (ref 11.5–15.4)
IMM GRANULOCYTES # BLD AUTO: 0.01 THOUSAND/UL (ref 0–0.2)
IMM GRANULOCYTES NFR BLD AUTO: 0 % (ref 0–2)
LIPASE SERPL-CCNC: 16 U/L (ref 11–82)
LYMPHOCYTES # BLD AUTO: 2.64 THOUSANDS/ÂΜL (ref 0.6–4.47)
LYMPHOCYTES NFR BLD AUTO: 40 % (ref 14–44)
MCH RBC QN AUTO: 20.5 PG (ref 26.8–34.3)
MCHC RBC AUTO-ENTMCNC: 28.3 G/DL (ref 31.4–37.4)
MCV RBC AUTO: 73 FL (ref 82–98)
MONOCYTES # BLD AUTO: 0.54 THOUSAND/ÂΜL (ref 0.17–1.22)
MONOCYTES NFR BLD AUTO: 8 % (ref 4–12)
NEUTROPHILS # BLD AUTO: 3.34 THOUSANDS/ÂΜL (ref 1.85–7.62)
NEUTS SEG NFR BLD AUTO: 49 % (ref 43–75)
NRBC BLD AUTO-RTO: 0 /100 WBCS
PLATELET # BLD AUTO: 384 THOUSANDS/UL (ref 149–390)
PMV BLD AUTO: 10 FL (ref 8.9–12.7)
POTASSIUM SERPL-SCNC: 4.7 MMOL/L (ref 3.5–5.3)
PROT SERPL-MCNC: 7.3 G/DL (ref 6.4–8.4)
RBC # BLD AUTO: 4.63 MILLION/UL (ref 3.81–5.12)
SODIUM SERPL-SCNC: 135 MMOL/L (ref 135–147)
WBC # BLD AUTO: 6.66 THOUSAND/UL (ref 4.31–10.16)

## 2023-10-30 PROCEDURE — 80053 COMPREHEN METABOLIC PANEL: CPT | Performed by: EMERGENCY MEDICINE

## 2023-10-30 PROCEDURE — 36415 COLL VENOUS BLD VENIPUNCTURE: CPT | Performed by: EMERGENCY MEDICINE

## 2023-10-30 PROCEDURE — 96361 HYDRATE IV INFUSION ADD-ON: CPT

## 2023-10-30 PROCEDURE — 99285 EMERGENCY DEPT VISIT HI MDM: CPT | Performed by: EMERGENCY MEDICINE

## 2023-10-30 PROCEDURE — 85025 COMPLETE CBC W/AUTO DIFF WBC: CPT | Performed by: EMERGENCY MEDICINE

## 2023-10-30 PROCEDURE — 74176 CT ABD & PELVIS W/O CONTRAST: CPT

## 2023-10-30 PROCEDURE — 96374 THER/PROPH/DIAG INJ IV PUSH: CPT

## 2023-10-30 PROCEDURE — 96375 TX/PRO/DX INJ NEW DRUG ADDON: CPT

## 2023-10-30 PROCEDURE — 99284 EMERGENCY DEPT VISIT MOD MDM: CPT

## 2023-10-30 PROCEDURE — 83690 ASSAY OF LIPASE: CPT | Performed by: EMERGENCY MEDICINE

## 2023-10-30 PROCEDURE — G1004 CDSM NDSC: HCPCS

## 2023-10-30 RX ORDER — HYDROMORPHONE HCL/PF 1 MG/ML
0.5 SYRINGE (ML) INJECTION ONCE
Status: COMPLETED | OUTPATIENT
Start: 2023-10-30 | End: 2023-10-30

## 2023-10-30 RX ORDER — DIPHENHYDRAMINE HYDROCHLORIDE 50 MG/ML
25 INJECTION INTRAMUSCULAR; INTRAVENOUS ONCE
Status: COMPLETED | OUTPATIENT
Start: 2023-10-30 | End: 2023-10-30

## 2023-10-30 RX ORDER — ONDANSETRON 2 MG/ML
4 INJECTION INTRAMUSCULAR; INTRAVENOUS ONCE
Status: COMPLETED | OUTPATIENT
Start: 2023-10-30 | End: 2023-10-30

## 2023-10-30 RX ORDER — PROMETHAZINE HYDROCHLORIDE 25 MG/1
25 TABLET ORAL EVERY 6 HOURS PRN
Qty: 10 TABLET | Refills: 0 | Status: SHIPPED | OUTPATIENT
Start: 2023-10-30

## 2023-10-30 RX ORDER — MAGNESIUM CARB/ALUMINUM HYDROX 105-160MG
296 TABLET,CHEWABLE ORAL ONCE
Qty: 296 ML | Refills: 0 | Status: SHIPPED | OUTPATIENT
Start: 2023-10-30 | End: 2023-10-30

## 2023-10-30 RX ORDER — DOCUSATE SODIUM 100 MG/1
100 CAPSULE, LIQUID FILLED ORAL EVERY 12 HOURS
Qty: 60 CAPSULE | Refills: 0 | Status: SHIPPED | OUTPATIENT
Start: 2023-10-30

## 2023-10-30 RX ADMIN — SODIUM CHLORIDE 2000 ML: 0.9 INJECTION, SOLUTION INTRAVENOUS at 12:23

## 2023-10-30 RX ADMIN — MORPHINE SULFATE 6 MG: 2 INJECTION, SOLUTION INTRAMUSCULAR; INTRAVENOUS at 12:29

## 2023-10-30 RX ADMIN — HYDROMORPHONE HYDROCHLORIDE 0.5 MG: 1 INJECTION, SOLUTION INTRAMUSCULAR; INTRAVENOUS; SUBCUTANEOUS at 13:33

## 2023-10-30 RX ADMIN — DIPHENHYDRAMINE HYDROCHLORIDE 25 MG: 50 INJECTION, SOLUTION INTRAMUSCULAR; INTRAVENOUS at 14:08

## 2023-10-30 RX ADMIN — IOHEXOL 50 ML: 240 INJECTION, SOLUTION INTRATHECAL; INTRAVASCULAR; INTRAVENOUS; ORAL at 14:17

## 2023-10-30 RX ADMIN — ONDANSETRON 4 MG: 2 INJECTION INTRAMUSCULAR; INTRAVENOUS at 12:26

## 2023-10-30 NOTE — ED NOTES
Patient rang call bell and told RN that she felt itchy and "I feel like I am going to jump out of my skin." Dr. Juan Diego Navarrete made aware.      Denny Layne RN  10/30/23 [id number]

## 2023-10-30 NOTE — Clinical Note
Rossy Maciel was seen and treated in our emergency department on 10/30/2023. No restrictions            Diagnosis:     Aurelia Nicholas  may return to work on return date. She may return on this date: 10/31/2023         If you have any questions or concerns, please don't hesitate to call.       Nyla Chaves RN    ______________________________           _______________          _______________  Hospital Representative                              Date                                Time

## 2023-10-30 NOTE — ED PROVIDER NOTES
History  Chief Complaint   Patient presents with    Abdominal Pain     Pt reports upper abdominal pain nausea and vomiting. Unable to have a BM, last BM about 1 weeks ago. Tried OTC remedies without relief. 44 y.o. F w/h/o sleeve converted to bypass in 2019 with Dr. Shanda Soto, cholecystectomy, hysterectomy, salpingoophrectomy,  x2 p/w abd pain x 2 days. Upper abd. Initially intermittent, constant since yesterday. Feels full and bloated. Associated with nausea x 6 days and V x yesterday. Last BM 1 week ago. Taking Linzess, tea, and "another laxative" for constipation. Reports unable to pass flatus. History provided by:  Patient   used: No    Abdominal Pain  Associated symptoms: constipation, nausea and vomiting    Associated symptoms: no chills and no fever    Risk factors: multiple surgeries        Prior to Admission Medications   Prescriptions Last Dose Informant Patient Reported? Taking? Multiple Vitamin (multivitamin) tablet   Yes Yes   Sig: Take 1 tablet by mouth daily   acetaminophen (TYLENOL) 160 mg/5 mL liquid   No Yes   Sig: Take 20 mL (640 mg total) by mouth every 6 (six) hours as needed for mild pain for up to 24 doses   acetaminophen (TYLENOL) 500 mg tablet Not Taking  Yes No   Sig: Take 500-1,000 mg by mouth every 6 (six) hours as needed for mild pain   Patient not taking: Reported on 2023   clindamycin (CLEOCIN) 2 % vaginal cream   No Yes   Sig: Insert 1 applicator into the vagina daily at bedtime   ferrous sulfate 325 (65 FE) MG EC tablet   No Yes   Sig: TAKE 1 TABLET (325 MG TOTAL) BY MOUTH DAILY WITH BREAKFAST   fluconazole (DIFLUCAN) 150 mg tablet   No Yes   Sig: Take one tablet every 3 days until finished.    furosemide (LASIX) 20 mg tablet   Yes Yes   Sig: Take 20 mg by mouth daily as needed   linaCLOtide 290 MCG CAPS   No Yes   Sig: Take 1 capsule by mouth daily before breakfast   omeprazole (PriLOSEC) 20 mg delayed release capsule   No Yes   Sig: Take 1 capsule (20 mg total) by mouth daily   ondansetron (ZOFRAN-ODT) 4 mg disintegrating tablet   No Yes   Sig: TAKE 1 TABLET BY MOUTH EVERY 6 HOURS AS NEEDED FOR NAUSEA OR VOMITING   sucralfate (CARAFATE) 1 g tablet   No Yes   Sig: Take 1 tablet (1 g total) by mouth 4 (four) times a day Crush tablet and dissolve in warm water before taking.       Facility-Administered Medications: None       Past Medical History:   Diagnosis Date    Abdominal pain     "almost constant"    Anemia     Anesthesia     "woke up swinging with last  2018  "was fine with EGD"    Back pain     Bariatric surgery status     2008-gastric sleeve revison RUEN-Y 2019-abd painnow-dx lap today 3/9/2020    Constipation     COVID     x 2 -  and     Dental bridge present     right lower permanent    Diarrhea     Difficulty swallowing     "in the past"    Disease of thyroid gland     not on meds now    Dizzy     Edema     ble's    Fatigue     "when blood pressure low" and weakness too"    GERD (gastroesophageal reflux disease)     "increase after surgery"    Heart murmur     heart murmer, work up negative with holter monitor    History of 2  sections     most recent 17    History of D&C 2019    History of iron deficiency     anemia/ had IV infusions through pregnancy in 0020-2451    History of transfusion     2019 - pt had allergic reaction - had to stop the blood    Inguinal hernia     right    Low BP     "off and on"    Migraine     N&V (nausea and vomiting)     " a little better since on meds"    Non-intractable vomiting     "not since been on medicine"    Palpitations     "having again"    PONV (postoperative nausea and vomiting)     Postgastrectomy malabsorption        Past Surgical History:   Procedure Laterality Date     SECTION      x2    CHOLECYSTECTOMY      CHROMOSOME ANALYSIS, PRODUCTS OF CONCEPTION (HISTORICAL)      2 miscarriages in / and Nov    HYSTERECTOMY      LAPAROSCOPIC SALPINGOOPHERECTOMY Right 09/2018    LVH, 6 cm benign ovarian cyst    LAPAROSCOPY N/A 03/09/2020    Procedure: DIAGNOSTIC LAPAROSCOPY,CLOSURE OF COATES DEFECT, INTRAOP EGD;  Surgeon: Belinda Rodriguez MD;  [facility];  Service: Bariatrics    MN EGD TRANSORAL BIOPSY SINGLE/MULTIPLE N/A 01/30/2019    Procedure: ESOPHAGOGASTRODUODENOSCOPY (EGD) with bx;  Surgeon: Belinda Rodriguez MD;  [facility]; Service: Bariatrics    MN LAPS GSTR RSTCV PX W/BYP SREE-EN-Y LIMB <150 CM N/A 04/08/2019    Procedure: LAP SREE-EN-Y GASTRIC BYPASS, INTRAOP EGD;  Surgeon: Belinda Rodriguez MD;  [facility];  Service: Bariatrics    MN LAPS SUPRACRV HYSTERECT 250 GM/< RMVL TUBE/OVAR Bilateral 08/21/2023    Procedure: [facility]); Surgeon: Geeta Jaime DO;  [facility];  Service: Gynecology    MN [address] N/A 09/25/2019    Procedure: DILATATION AND EVACUATION (D&E) (8 weeks) missed ab;  Surgeon: Shefali Hidalgo MD;  [facility];  Service: Gynecology    REVISION BYPASS LAPAROSCOPIC N/A 04/08/2019    Procedure: LAP REVISION/ CONVERSION;  Surgeon: Belinda Rodriguez MD;  [facility];  Service: Bariatrics    SALPINGOOPHORECTOMY Right 09/2018    SLEEVE GASTROPLASTY         Family History   Problem Relation Age of Onset    Hypertension Mother     Heart disease Mother     No Known Problems Father      I have reviewed and agree with the history as documented. E-Cigarette/Vaping    E-Cigarette Use Never User      E-Cigarette/Vaping Substances    Nicotine No     THC No     CBD No     Flavoring No     Other No     Unknown No      Social History     Tobacco Use    Smoking status: Never    Smokeless tobacco: Never   Vaping Use    Vaping Use: Never used   Substance Use Topics    Alcohol use: Not Currently     Comment: occ    Drug use: No       Review of Systems   Constitutional:  Negative for chills and fever.    Gastrointestinal:  Positive for abdominal pain, constipation, nausea and vomiting. Physical Exam  Physical Exam  Vitals and nursing note reviewed. Constitutional:       General: She is not in acute distress. Appearance: Normal appearance. She is well-developed. She is not ill-appearing, toxic-appearing or diaphoretic. HENT:      Head: Normocephalic and atraumatic. Eyes:      General: No scleral icterus. Neck:      Vascular: No JVD. Cardiovascular:      Rate and Rhythm: Normal rate and regular rhythm. Heart sounds: Normal heart sounds. No murmur heard. Pulmonary:      Effort: Pulmonary effort is normal. No accessory muscle usage or respiratory distress. Breath sounds: Normal breath sounds. No stridor. No wheezing, rhonchi or rales. Abdominal:      General: There is no distension. Palpations: Abdomen is soft. Abdomen is not rigid. There is no mass. Tenderness: There is abdominal tenderness in the right upper quadrant, epigastric area, periumbilical area and left upper quadrant. There is no guarding or rebound. Skin:     General: Skin is warm and dry. Coloration: Skin is not jaundiced or pale. Findings: No rash. Neurological:      Mental Status: She is alert. GCS: GCS eye subscore is 4. GCS verbal subscore is 5. GCS motor subscore is 6.          Vital Signs  ED Triage Vitals   Temperature Pulse Respirations Blood Pressure SpO2   10/30/23 1119 10/30/23 1119 10/30/23 1119 10/30/23 1119 10/30/23 1119   98.4 °F (36.9 °C) 74 16 111/59 100 %      Temp Source Heart Rate Source Patient Position - Orthostatic VS BP Location FiO2 (%)   10/30/23 1119 10/30/23 1335 10/30/23 1335 10/30/23 1335 --   Oral Monitor Lying Right arm       Pain Score       10/30/23 1119       10 - Worst Possible Pain           Vitals:    10/30/23 1119 10/30/23 1335 10/30/23 1621   BP: 111/59 136/72 113/67   Pulse: 74 72 74   Patient Position - Orthostatic VS:  Lying Lying         Visual Acuity      ED Medications  Medications   sodium chloride 0.9 % bolus 2,000 mL (0 mL Intravenous Stopped 10/30/23 1700)   ondansetron (ZOFRAN) injection 4 mg (4 mg Intravenous Given 10/30/23 1226)   morphine injection 6 mg (6 mg Intravenous Given 10/30/23 1229)   HYDROmorphone (DILAUDID) injection 0.5 mg (0.5 mg Intravenous Given 10/30/23 1333)   diphenhydrAMINE (BENADRYL) injection 25 mg (25 mg Intravenous Given 10/30/23 1408)   iohexol (OMNIPAQUE) 240 MG/ML solution 50 mL (50 mL Oral Given 10/30/23 1417)       Diagnostic Studies  Results Reviewed       Procedure Component Value Units Date/Time    Comprehensive metabolic panel [[id number]] Collected: 10/30/23 1222    Lab Status: Final result Specimen: Blood from Arm, Right Updated: 10/30/23 1304     Sodium 135 mmol/L      Potassium 4.7 mmol/L      Chloride 107 mmol/L      CO2 21 mmol/L      ANION GAP 7 mmol/L      BUN 16 mg/dL      Creatinine 0.82 mg/dL      Glucose 78 mg/dL      Calcium 8.6 mg/dL      AST 31 U/L      ALT 8 U/L      Alkaline Phosphatase 85 U/L      Total Protein 7.3 g/dL      Albumin 3.8 g/dL      Total Bilirubin 0.30 mg/dL      eGFR 88 ml/min/1.73sq m     Narrative:      [place] guidelines for Chronic Kidney Disease (CKD):     Stage 1 with normal or high GFR (GFR > 90 mL/min/1.73 square meters)    Stage 2 Mild CKD (GFR = 60-89 mL/min/1.73 square meters)    Stage 3A Moderate CKD (GFR = 45-59 mL/min/1.73 square meters)    Stage 3B Moderate CKD (GFR = 30-44 mL/min/1.73 square meters)    Stage 4 Severe CKD (GFR = 15-29 mL/min/1.73 square meters)    Stage 5 End Stage CKD (GFR <15 mL/min/1.73 square meters)  Note: GFR calculation is accurate only with a steady state creatinine    Lipase [[id number]]  (Normal) Collected: 10/30/23 1222    Lab Status: Final result Specimen: Blood from Arm, Right Updated: 10/30/23 1304     Lipase 16 u/L     CBC and differential [[id number]]  (Abnormal) Collected: 10/30/23 1222    Lab Status: Final result Specimen: Blood from Arm, Right Updated: 10/30/23 1241     WBC 6.66 Thousand/uL      RBC 4.63 Million/uL      Hemoglobin 9.5 g/dL      Hematocrit 33.6 %      MCV 73 fL      MCH 20.5 pg      MCHC 28.3 g/dL      RDW 22.2 %      MPV 10.0 fL      Platelets 183 Thousands/uL      nRBC 0 /100 WBCs      Neutrophils Relative 49 %      Immat GRANS % 0 %      Lymphocytes Relative 40 %      Monocytes Relative 8 %      Eosinophils Relative 2 %      Basophils Relative 1 %      Neutrophils Absolute 3.34 Thousands/µL      Immature Grans Absolute 0.01 Thousand/uL      Lymphocytes Absolute 2.64 Thousands/µL      Monocytes Absolute 0.54 Thousand/µL      Eosinophils Absolute 0.10 Thousand/µL      Basophils Absolute 0.03 Thousands/µL                    CT abdomen pelvis wo contrast   Final Result by Rosalio Murillo MD (10/30 1513)   Postsurgical changes in the upper abdomen as above, without evidence of bowel obstruction. Oral contrast demonstrated to the level of the distal ileum. Small amount of contrast in the esophagus, possibly refluxed. Other findings, as per the body of the report. Workstation performed: [id number]                    Procedures  Procedures         ED Course  ED Course as of 10/30/23 1716   Mon Oct 30, 2023   1250 WBC: 6.66  Normal   1250 Hemoglobin(!): 9.5  7.3 on 9/8/23   1306 Comprehensive metabolic panel  Normal   8592 Lipase: 16  Normal   1335 Pt c/o pain. Dilaudid ordered. 1406 Pt reporting itching after Dilaudid. Benadryl ordered. 1436 Updated pt on labs. Pt reports pain is better. A/w CT read. 1524 Updated pt on CT scan. Pt wants more tests done to see the cause of her constipation. I explained that there are no more tests in the ER to determine why she's constipated. I will give rx for mag citrate/colace and phenergan. 18 Pt wants to be admitted. States she frustrated with her ongoing symptoms. Pt states she has no symptoms currently but doesn't want to go home and have symptoms return.  Tiger Text sent to bariatrics. 1629 Discussed case with Dr. Priscilla Munoz (bariatrics) who states pt can be seen in their clinic and have outpt scope but does not require admission. 1649 Updated pt on bariatrics discussion to have pt call office for an appt this week. Medical Decision Making  Will check CBC as marker of infection, CMP to r/o hepatitis/biliary disease, lipase to r/o pancreatitis, CT A/P to r/o SBO. Amount and/or Complexity of Data Reviewed  Labs: ordered. Decision-making details documented in ED Course. Radiology: ordered. Risk  OTC drugs. Prescription drug management. Disposition  Final diagnoses:   Constipation   Abdominal pain   Nausea   History of gastric bypass   History of abdominal surgery     Time reflects when diagnosis was documented in both MDM as applicable and the Disposition within this note       Time User Action Codes Description Comment    10/30/2023  3:23 PM Gio Fernandes [K59.00] Constipation     10/30/2023  3:23 PM Gio Fernandes [R10.9] Abdominal pain     10/30/2023  3:23 PM Gio Fernandes [R11.0] Nausea     10/30/2023  3:32 PM Denisa Awad [Z98.84] History of gastric bypass     10/30/2023  3:33 PM Gio Fernandes [Z98.890] History of abdominal surgery           ED Disposition       ED Disposition   Discharge    Condition   Stable    Date/Time   Mon Oct 30, 2023  4:47 PM    Comment   Rochelle So discharge to home/self care.                    Follow-up Information       Follow up With Specialties Details Why [facility] [place] [facility] Schedule an appointment as soon as possible for a visit   [address] [place] [address]              Discharge Medication List as of 10/30/2023  4:47 PM        START taking these medications    Details   docusate sodium (COLACE) 100 mg capsule Take 1 capsule (100 mg total) by mouth every 12 (twelve) hours, Starting Mon 10/30/2023, Normal      magnesium citrate (CITROMA) 1.745 g/30 mL oral solution Take 296 mL by mouth once for 1 dose, Starting Mon 10/30/2023, Normal      promethazine (PHENERGAN) 25 mg tablet Take 1 tablet (25 mg total) by mouth every 6 (six) hours as needed for nausea or vomiting, Starting Mon 10/30/2023, Normal           CONTINUE these medications which have NOT CHANGED    Details   acetaminophen (TYLENOL) 160 mg/5 mL liquid Take 20 mL (640 mg total) by mouth every 6 (six) hours as needed for mild pain for up to 24 doses, Starting Fri 9/8/2023, Normal      clindamycin (CLEOCIN) 2 % vaginal cream Insert 1 applicator into the vagina daily at bedtime, Starting Wed 10/11/2023, Normal      ferrous sulfate 325 (65 FE) MG EC tablet TAKE 1 TABLET (325 MG TOTAL) BY MOUTH DAILY WITH BREAKFAST, Starting Fri 10/27/2023, Normal      fluconazole (DIFLUCAN) 150 mg tablet Take one tablet every 3 days until finished., Normal      furosemide (LASIX) 20 mg tablet Take 20 mg by mouth daily as needed, Starting Thu 10/13/2022, Historical Med      linaCLOtide 290 MCG CAPS Take 1 capsule by mouth daily before breakfast, Starting Wed 7/26/2023, Normal      Multiple Vitamin (multivitamin) tablet Take 1 tablet by mouth daily, Historical Med      omeprazole (PriLOSEC) 20 mg delayed release capsule Take 1 capsule (20 mg total) by mouth daily, Starting Thu 10/26/2023, Normal      ondansetron (ZOFRAN-ODT) 4 mg disintegrating tablet TAKE 1 TABLET BY MOUTH EVERY 6 HOURS AS NEEDED FOR NAUSEA OR VOMITING, Normal      sucralfate (CARAFATE) 1 g tablet Take 1 tablet (1 g total) by mouth 4 (four) times a day Crush tablet and dissolve in warm water before taking., Starting Fri 10/27/2023, Until Tue 12/26/2023, Normal      acetaminophen (TYLENOL) 500 mg tablet Take 500-1,000 mg by mouth every 6 (six) hours as needed for mild pain, Historical Med             No discharge procedures on file.     PDMP Review         Value Time User    PDMP Reviewed  Yes 2/9/2023  3:25 PM Junior Marquis DO            ED Provider  Electronically Signed by             [address] [place] [address],   10/30/23 0506

## 2023-11-10 ENCOUNTER — TELEPHONE (OUTPATIENT)
Dept: GASTROENTEROLOGY | Facility: CLINIC | Age: 43
End: 2023-11-10

## 2023-11-10 NOTE — TELEPHONE ENCOUNTER
LM for patient that we had to move her appointment from 12/14/23 to 12/15/23 due to the providers schedule change.

## 2023-11-13 NOTE — PROGRESS NOTES
FOLLOW UP VISIT - BARIATRIC SURGERY  Zak Aldrich 46 y o  female MRN: [id number]  Unit/Bed#:  Encounter: [id number]      HPI:  Jossy Arroyo is a 46 y o  female who presents with a history of sleeve gastrectomy converted to a gastric bypass  Review of Systems   Gastrointestinal: Positive for abdominal pain  Historical Information   Past Medical History:   Diagnosis Date    Abdominal pain     "almost constant"    Anemia     Anesthesia     "woke up swinging with last  2018  "was fine with EGD"    Bariatric surgery status     Dental bridge present     right lower permanent    Depression     "not currently taking any meds"    Difficulty swallowing     "in the past"    Disease of thyroid gland     not on meds now    Dizzy     Fatigue     "when blood pressure low" and weakness too"    GERD (gastroesophageal reflux disease)     "increase after surgery"    Heart murmur     heart murmer, work up negative with holter monitor    History of 2  sections     most recent 17    History of D&C 2019    History of iron deficiency     anemia/ had IV infusions through pregnancy in 7329-8700    Inguinal hernia     right    Loss of appetite     Low BP     "off and on"    Migraine     N&V (nausea and vomiting)     " a little better since on meds"    Non-intractable vomiting     "not since been on medicine"    Palpitations     "having again"    Postgastrectomy malabsorption     Stomach pain      Past Surgical History:   Procedure Laterality Date     SECTION      x2    CHOLECYSTECTOMY      CHROMOSOME ANALYSIS, PRODUCTS OF CONCEPTION (HISTORICAL)      2 miscarriages in / and Nov    OVARIAN CYST REMOVAL Right     AR EGD TRANSORAL BIOPSY SINGLE/MULTIPLE N/A 2019    Procedure: ESOPHAGOGASTRODUODENOSCOPY (EGD) with bx;  Surgeon: Griffin Claude, MD;  [facility];   Service: Bariatrics    AR LAP GASTRIC BYPASS/SREE-EN-Y N/A 4/8/2019    Procedure: LAP SREE-EN-Y GASTRIC BYPASS, INTRAOP EGD;  Surgeon: Jes Collier MD;  [facility];  Service: Stella ROGERTN,1ST TRI N/A 9/25/2019    Procedure: DILATATION AND EVACUATION (D&E) (8 weeks) missed ab;  Surgeon: Emelia Yee MD;  [facility];  Service: Gynecology    REVISION BYPASS LAPAROSCOPIC N/A 4/8/2019    Procedure: LAP REVISION/ CONVERSION;  Surgeon: Jes Collier MD;  [facility];  Service: Bariatrics    SALPINGOOPHORECTOMY Right 09/2018    SLEEVE GASTROPLASTY       Social History   Social History     Substance and Sexual Activity   Alcohol Use Not Currently    Frequency: 2-4 times a month    Drinks per session: 1 or 2    Binge frequency: Never    Comment: social     Social History     Substance and Sexual Activity   Drug Use No     Social History     Tobacco Use   Smoking Status Never Smoker   Smokeless Tobacco Never Used     Family History: non-contributory    Meds/Allergies   all medications and allergies reviewed  Allergies   Allergen Reactions    Aspirin Anaphylaxis    Shellfish-Derived Products Anaphylaxis    Contrast [Iodinated Diagnostic Agents] Itching    Ibuprofen Edema    Reglan [Metoclopramide]        Objective       Current Vitals:   Blood Pressure: 120/80 (01/17/20 0903)  Pulse: 67 (01/17/20 0903)  Temperature: (!) 96 4 °F (35 8 °C) (01/17/20 0903)  Temp Source: Tympanic (01/17/20 0903)  Height: 5' 5 5" (166 4 cm) (01/17/20 0903)  Weight - Scale: 97 3 kg (214 lb 8 oz) (01/17/20 0903)        Invasive Devices     None                 Physical Exam   Constitutional: She is oriented to person, place, and time  She appears well-developed and well-nourished  No distress  HENT:   Head: Normocephalic and atraumatic  Right Ear: External ear normal    Left Ear: External ear normal    Nose: Nose normal    Eyes: Conjunctivae are normal  Right eye exhibits no discharge  Left eye exhibits no discharge   No scleral icterus  Neurological: She is alert and oriented to person, place, and time  Skin: She is not diaphoretic  Psychiatric: She has a normal mood and affect  Her behavior is normal  Judgment and thought content normal    Vitals reviewed  Lab Results: I have personally reviewed pertinent lab results  Imaging: I have personally reviewed pertinent reports  EKG, Pathology, and Other Studies: I have personally reviewed pertinent reports  Assessment/PLAN:    46 y o  female with a history of laparoscopic sleeve gastrectomy converted to a gastric bypass in 4/8/2019  Recently she presented to the office complaining of abdominal pain and she was found to be anemic  Hematology is following her up and have scheduled to have iron infusions  She underwent an endoscopy that revealed a marginal ulceration  A repeat her endoscopy about a month and half ago any revealed pouchitis with some improvement of her marginal ulceration  The biopsies were negative for H pylori    She continues to have pain and discomfort despite the treatment  I am telling her to take her PPI twice a day and we will schedule her for a repeat endoscopy to further assess her pouch  She will follow up with us as scheduled      Swetha Triplett MD  1/17/2020  9:22 AM        Finasteride Pregnancy And Lactation Text: This medication is absolutely contraindicated during pregnancy. It is unknown if it is excreted in breast milk.

## 2023-11-14 ENCOUNTER — TELEPHONE (OUTPATIENT)
Dept: NEUROLOGY | Facility: CLINIC | Age: 43
End: 2023-11-14

## 2023-11-14 NOTE — TELEPHONE ENCOUNTER
Called pt to confirm upcoming appointment and pt wanted  r/s due to work schedule.  We r/s for 2/07/2024 @2:45 PM.

## 2023-11-24 DIAGNOSIS — D64.9 SEVERE ANEMIA: ICD-10-CM

## 2023-11-24 DIAGNOSIS — R10.13 EPIGASTRIC PAIN: ICD-10-CM

## 2023-11-25 RX ORDER — SUCRALFATE 1 G/1
1 TABLET ORAL 4 TIMES DAILY
Qty: 120 TABLET | Refills: 1 | Status: SHIPPED | OUTPATIENT
Start: 2023-11-25 | End: 2024-01-24

## 2023-11-25 RX ORDER — LANOLIN ALCOHOL/MO/W.PET/CERES
1 CREAM (GRAM) TOPICAL
Qty: 30 TABLET | Refills: 1 | Status: SHIPPED | OUTPATIENT
Start: 2023-11-25

## 2023-11-28 DIAGNOSIS — N76.1 CHRONIC VAGINITIS: ICD-10-CM

## 2023-11-28 RX ORDER — FLUCONAZOLE 150 MG/1
TABLET ORAL
Qty: 4 TABLET | Refills: 0 | Status: SHIPPED | OUTPATIENT
Start: 2023-11-28 | End: 2023-12-07

## 2024-01-01 NOTE — ED NOTES
Patient transported to Blaise Hutton RN  10/28/22 4052 Verbal/written post procedure instructions were given to patient/caregiver/Instructed patient/caregiver to follow-up with primary care physician

## 2024-01-05 NOTE — OP NOTE
"Please see \"Imaging\" tab under \"Chart Review\" for details of today's US at the [facility].    Farza Brenner MD  Maternal-Fetal Medicine    " OPERATIVE REPORT  PATIENT NAME: Jossy Diaz    :  1980  MRN: [id number]  Pt Location: [facility] OR ROOM 01    SURGERY DATE: 2023    Surgeon(s) and Role:     Sammie Brittle, DO - Primary     * Octaviano Kitchen MD - Resident, Assisting     * Samantha Aaron MD - Fellow, Assisting     Preop Diagnosis:  Adenomyosis [N80.03]  Anemia, unspecified type [D64.9]  Dysfunctional uterine bleeding [N93.8]    Post-Op Diagnosis Codes:     * Adenomyosis [N80.03]     * Anemia, unspecified type [D64.9]     * Dysfunctional uterine bleeding [N93.8]    Procedure(s):  Bilateral - [facility])  W/ BILATERAL SALPINGECTOMY    Specimen(s):  ID Type Source Tests Collected by Time Destination   1 : uterus Tissue Uterus TISSUE Wyatt Vasques DO 2023 0809        Estimated Blood Loss:   25 mL    Drains:  Urethral Catheter Non-latex 16 Fr. (Active)   Number of days: 0       Anesthesia Type:   General    Operative Indications:  Adenomyosis [N80.03]  Anemia, unspecified type [D64.9]  Dysfunctional uterine bleeding [N93.8]    Operative Findings:  - Significant adhesive disease secondary to prior  sections with adhesions to the anterior abdominal wall and bladder serosa   - Absent bilateral Fallopian tubes and right ovary  - Normal appearing left ovary   - Uterus sounded to 10cm and the cervix was anterior   - Bowel is adherent to the right pelvic side wall   - No umbilical adhesions noted    Complications: None    Procedure and Technique:  The patient was properly identified in the holding area by the operating room staff and attending physician. She was taken to operating room where general anesthesia was instituted without complication. She was placed in the dorsal lithotomy position with her legs in [address] with care taken to avoid excessive flexion or extension of her lower extremities. Once the patient was properly positioned, the patient was prepped and draped in normal sterile fashion.  A time-out was performed. The patient was again properly identified by the operating room staff and attending physician. At this time, the patient was receiving antibiotics for prophylaxis. A Foreman catheter was aseptically inserted. The uterine manipulator was placed without complications. Only the tip of the BETHANY was used (size 10) after we sounded the uterus to 10cm with endometrial balloon inflated. Gloves were changed, and attention was directed to the abdomen. Two Allis clamps were used to juliana the umbilicus. A 10-mm incision was created at the level of the umbilicus in a vertical fashion superiorly. A Veress needle was utilized to get into the peritoneal axis. Once we were intraperitoneal with low opening presure, we allowed CO2 gas to insufflate her abdomen until we reached a pressure of 15 mmHg. Once we reached that pressure, a 10-mm optical trocar was inserted under direct visualization at this site. Two additional 10 mm trocars was placed on bilateral lower quadrants approximately 2 fingerbreadths above the anterior superior iliac spine and 2 cm medial to that spot under direct visualization. The inferior epigastric vessels were visualized prior to trocar insertion in trying to avoid injury to this vessel. First, a survey of the cavity was performed, and we confirmed the above-mentioned findings. We started a salpingectomy on the left side. The salpingectomy was performed by transecting the left fallopian tube to the mesosalpinx all the way to the level of the cornu. This was resected and we used the Ligasure device for this. Once it was detached it was removed out of the abdomen. The utero-ovarian ligament on the left side was transected and the broad ligament was taken down to the uterine vessels incorporating the round ligament into the transection. The round ligament was opened on the left side, and we developed the vesicouterine space without complications.  We began cautery of the easily visible uterine arteries at the level of the internal cervical os on the left. These uterine arteries were double burned without complications. The same procedure was then performed on the right side. Once we secured the uterine arteries bilaterally, they were doubly burned and cut. We noticed blanching of the uterus, and we introduced the Olympus laparoscopic bipolar loop. Once we were able to get the loop at the level of the endocervical canal, the uterus was grasped using a Tenaculum. The manipulator was replaced with a sponge stick and the loop was activated and we transected the corpus of the uterus without complications under direct visualization using the tenaculum for stabilization. A laparoscopic J hook bovie tip were used to cauterize the cervical os and areas over the cervical stump that were oozing. The pelvis was hemostatic after irrigation. Laparoscopic scissors and a grasper were utilized to excise the deep endometriotic implant on the left uterosacral ligament. The area was made hemostatic with cautery. At this point, we introduced 15 port on the right with the 15 endocatch bag thorugh the right lower quadrant. The left lower quadrant trocar was replaced with the Cielo power morcellator. The morcellator was introduced through the same incision site after the trocar was removed under direct visualization. The blade was activated after a laparoscopic tenaculum was brought through the device and the specimen was placed into the endocatch bag. Once in the bag, the specimen was grasped with the tenaculum and the morcellator was activated to begin specimen morcellation. The entire morcellation process occurred under direct visualization and remained contained in the specimen bag. Once completed, the morcellator was placed on safe mode and removed from the abdomen.  The endocatch bag was also removed out of the abdomen through the 15 mm trocar with small tissue pieces and residual small volume of blood remaining in the intact bag. At this point, we did a survey of the pelvic cavity and we confirmed good hemostasis. The left and right lower quadrant fascial defects were closed using the M close laparoscopic fascial closure device with 0-vicryl suture under visualization. Local was also infiltrated into these ports prior to closure. The camera port was also removed once CO2 gas was evacuated from the abdomen and after several Valsalva breaths were given. The skin incisions were closed with interrupted 3-0 plaingut suture and bandaids. Additional local was infilatrated into the skin sites. All instrument, sponge and sharps counts were correct x 2. The patient tolerated the procedure well and went to the recovery room in stable condition, and she is stable at the moment of dictation. Dr. Selvin Acevedo was present for the entirety of the procedure.      Patient Disposition: PACU         SIGNATURE: Enoc Richards MD  DATE: August 21, 2023  TIME: 8:58 AM

## 2024-01-06 DIAGNOSIS — K21.9 GASTROESOPHAGEAL REFLUX DISEASE WITHOUT ESOPHAGITIS: ICD-10-CM

## 2024-01-06 DIAGNOSIS — R11.2 NAUSEA AND VOMITING: ICD-10-CM

## 2024-01-08 RX ORDER — OMEPRAZOLE 20 MG/1
20 CAPSULE, DELAYED RELEASE ORAL DAILY
Qty: 30 CAPSULE | Refills: 3 | Status: SHIPPED | OUTPATIENT
Start: 2024-01-08

## 2024-01-12 DIAGNOSIS — D64.9 SEVERE ANEMIA: ICD-10-CM

## 2024-01-12 RX ORDER — LANOLIN ALCOHOL/MO/W.PET/CERES
1 CREAM (GRAM) TOPICAL
Qty: 30 TABLET | Refills: 1 | Status: SHIPPED | OUTPATIENT
Start: 2024-01-12

## 2024-01-30 ENCOUNTER — HOSPITAL ENCOUNTER (EMERGENCY)
Facility: HOSPITAL | Age: 44
Discharge: HOME | End: 2024-01-30
Attending: EMERGENCY MEDICINE | Admitting: EMERGENCY MEDICINE
Payer: MEDICARE

## 2024-01-30 ENCOUNTER — APPOINTMENT (EMERGENCY)
Dept: CT IMAGING | Facility: HOSPITAL | Age: 44
End: 2024-01-30
Payer: MEDICARE

## 2024-01-30 VITALS
RESPIRATION RATE: 16 BRPM | HEIGHT: 64 IN | DIASTOLIC BLOOD PRESSURE: 73 MMHG | OXYGEN SATURATION: 100 % | HEART RATE: 88 BPM | TEMPERATURE: 97.8 F | BODY MASS INDEX: 40.72 KG/M2 | WEIGHT: 238.54 LBS | SYSTOLIC BLOOD PRESSURE: 119 MMHG

## 2024-01-30 DIAGNOSIS — M54.9 MUSCULOSKELETAL BACK PAIN: ICD-10-CM

## 2024-01-30 DIAGNOSIS — S39.012A STRAIN OF LUMBAR REGION, INITIAL ENCOUNTER: Primary | ICD-10-CM

## 2024-01-30 DIAGNOSIS — M54.9 BACK PAIN: ICD-10-CM

## 2024-01-30 LAB
BILIRUB UR QL STRIP: NEGATIVE
CLARITY UR: CLEAR
COLOR UR: YELLOW
EXT PREGNANCY TEST URINE: NEGATIVE
EXT. CONTROL: NORMAL
GLUCOSE UR STRIP-MCNC: NEGATIVE MG/DL
HGB UR QL STRIP.AUTO: NEGATIVE
KETONES UR STRIP-MCNC: NEGATIVE MG/DL
LEUKOCYTE ESTERASE UR QL STRIP: NEGATIVE
NITRITE UR QL STRIP: NEGATIVE
PH UR STRIP.AUTO: 6 [PH] (ref 4.5–8)
PROT UR STRIP-MCNC: NEGATIVE MG/DL
SP GR UR STRIP.AUTO: 1.01 (ref 1–1.03)
UROBILINOGEN UR QL STRIP.AUTO: 0.2 E.U./DL

## 2024-01-30 PROCEDURE — 99284 EMERGENCY DEPT VISIT MOD MDM: CPT

## 2024-01-30 PROCEDURE — G1004 CDSM NDSC: HCPCS

## 2024-01-30 PROCEDURE — 81003 URINALYSIS AUTO W/O SCOPE: CPT

## 2024-01-30 PROCEDURE — 74176 CT ABD & PELVIS W/O CONTRAST: CPT

## 2024-01-30 PROCEDURE — 81025 URINE PREGNANCY TEST: CPT

## 2024-01-30 PROCEDURE — 99283 EMERGENCY DEPT VISIT LOW MDM: CPT

## 2024-01-30 RX ORDER — METHOCARBAMOL 500 MG/1
500 TABLET, FILM COATED ORAL 2 TIMES DAILY
Qty: 20 TABLET | Refills: 0 | Status: SHIPPED | OUTPATIENT
Start: 2024-01-30

## 2024-01-30 RX ORDER — LIDOCAINE 50 MG/G
1 PATCH TOPICAL DAILY
Qty: 5 PATCH | Refills: 0 | Status: SHIPPED | OUTPATIENT
Start: 2024-01-30

## 2024-01-30 RX ORDER — ACETAMINOPHEN 325 MG/1
975 TABLET ORAL ONCE
Status: COMPLETED | OUTPATIENT
Start: 2024-01-30 | End: 2024-01-30

## 2024-01-30 RX ORDER — ACETAMINOPHEN 500 MG
500 TABLET ORAL EVERY 6 HOURS PRN
Qty: 20 TABLET | Refills: 0 | Status: SHIPPED | OUTPATIENT
Start: 2024-01-30

## 2024-01-30 RX ORDER — LIDOCAINE 40 MG/G
CREAM TOPICAL AS NEEDED
Qty: 15 G | Refills: 0 | Status: SHIPPED | OUTPATIENT
Start: 2024-01-30

## 2024-01-30 RX ORDER — LIDOCAINE 50 MG/G
1 PATCH TOPICAL ONCE
Status: DISCONTINUED | OUTPATIENT
Start: 2024-01-30 | End: 2024-01-30 | Stop reason: HOSPADM

## 2024-01-30 RX ADMIN — LIDOCAINE 5% 1 PATCH: 700 PATCH TOPICAL at 16:17

## 2024-01-30 RX ADMIN — ACETAMINOPHEN 325MG 975 MG: 325 TABLET ORAL at 16:18

## 2024-01-30 NOTE — ED NOTES
Pt provided with urine specimen cup and informed that a urine sample is needed.      Valery Alvarado RN  01/30/24 [id number]
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
## 2024-01-31 ENCOUNTER — TELEPHONE (OUTPATIENT)
Dept: PHYSICAL THERAPY | Facility: OTHER | Age: 44
End: 2024-01-31
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
## 2024-01-31 NOTE — DISCHARGE INSTRUCTIONS
Patient advised to call PCP for today's ED visit today.  Patient advised to use lidocaine patches as needed lidocaine cream as needed.  Robaxin and Tylenol sent to the pharmacy.  Patient advised to return to the ED with any worsening symptoms, bladder or bowel dysfunction, bladder or bowel dysfunction, chest pain, shortness of breath, loss of function loss of sensation, ambulatory issues.

## 2024-02-07 ENCOUNTER — TELEPHONE (OUTPATIENT)
Dept: NEUROLOGY | Facility: CLINIC | Age: 44
End: 2024-02-07

## 2024-03-04 ENCOUNTER — TELEPHONE (OUTPATIENT)
Dept: BARIATRICS | Facility: CLINIC | Age: 44
End: 2024-03-04

## 2024-05-16 DIAGNOSIS — K59.09 OTHER CONSTIPATION: ICD-10-CM

## 2024-05-16 DIAGNOSIS — K59.01 SLOW TRANSIT CONSTIPATION: ICD-10-CM

## 2024-05-16 RX ORDER — LINACLOTIDE 290 UG/1
CAPSULE, GELATIN COATED ORAL
Qty: 90 CAPSULE | Refills: 1 | Status: SHIPPED | OUTPATIENT
Start: 2024-05-16

## 2024-06-11 ENCOUNTER — TELEPHONE (OUTPATIENT)
Dept: NEUROSURGERY | Facility: CLINIC | Age: 44
End: 2024-06-11

## 2024-06-11 NOTE — TELEPHONE ENCOUNTER
6/11/24 - PT CALLED TO SCHED NEW PT APPT W/DKO CSPINE/LSPINE    SHE WOULD LIKE TO BE TREATED FOR HER BACK FIRST - SHE HAS A MRI LSPINE FROM AN OUTSIDE FACILITY - SHE WILL HAVE IT UPLOADED @ & GIVE US A CALL TO COMPLETE INTAKE    EXPLAINED PROCESS

## 2024-08-05 ENCOUNTER — OFFICE VISIT (OUTPATIENT)
Age: 44
End: 2024-08-05
Payer: COMMERCIAL

## 2024-08-05 VITALS
TEMPERATURE: 98.7 F | WEIGHT: 235 LBS | OXYGEN SATURATION: 99 % | HEIGHT: 64 IN | BODY MASS INDEX: 40.12 KG/M2 | HEART RATE: 86 BPM | DIASTOLIC BLOOD PRESSURE: 90 MMHG | SYSTOLIC BLOOD PRESSURE: 122 MMHG | RESPIRATION RATE: 20 BRPM

## 2024-08-05 DIAGNOSIS — M54.12 RADICULOPATHY, CERVICAL: ICD-10-CM

## 2024-08-05 DIAGNOSIS — M54.16 LUMBAR RADICULOPATHY: Primary | ICD-10-CM

## 2024-08-05 PROCEDURE — 99205 OFFICE O/P NEW HI 60 MIN: CPT | Performed by: PHYSICIAN ASSISTANT

## 2024-08-05 NOTE — PROGRESS NOTES
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 height. RIGHT subarticular zone disc protrusion measuring 4 mm in anterior posterior dimension. Partial effacement of RIGHT subarticular recess as result. No RIGHT neuroforaminal stenosis. LEFT uncovertebral joint hypertrophy. LEFT facet joint hypertrophy. Moderate RIGHT neuroforaminal stenosis. Grossly unchanged from the prior study. C5-C6: Severe degenerative loss of intervertebral disc space height. Broad-based disc osteophyte complex. Mild spinal canal stenosis. Partial effacement of RIGHT subarticular recess. RIGHT uncovertebral joint hypertrophy. Moderate RIGHT neuroforaminal stenosis. LEFT uncovertebral joint hypertrophy. Moderate LEFT neuroforaminal stenosis. Grossly unchanged from the prior study. C6-C7: Mild degenerative loss of intervertebral disc space height. No spinal canal stenosis. No neuroforaminal stenosis. C7-T1: No spinal canal stenosis. No neuroforaminal stenosis. Questions or Further discussion? Please contact me: Amieect/Text: Sudhakar Kirk [facility] #: [id number] ([phone]) Email: [email or web address] General Radiology #: [phone]    Impression: IMPRESSION: 1.  No acute injury of the cervical spine or cervical spinal cord. 2.  C4-C5: Severe degenerative loss of intervertebral disc  space height. RIGHT subarticular zone disc protrusion measuring 4 mm in anterior posterior dimension. Partial effacement of RIGHT subarticular recess as result. No RIGHT neuroforaminal stenosis. LEFT uncovertebral joint hypertrophy. LEFT facet joint hypertrophy. Moderate RIGHT neuroforaminal stenosis. Grossly unchanged from the prior study. 3.  C5-C6: Severe degenerative loss of intervertebral disc space height. Broad-based disc osteophyte complex. Mild spinal canal stenosis. Partial effacement of RIGHT subarticular recess. RIGHT uncovertebral joint hypertrophy. Moderate RIGHT neuroforaminal stenosis. LEFT uncovertebral joint hypertrophy. Moderate LEFT neuroforaminal stenosis. Grossly unchanged from the prior study. 4.  Radiologist contact information provided above if needed. Workstation:[id number]    MRI lumbar spine wo contrast    Result Date: 7/28/2024  Narrative: History: Low back pain. Procedure: MRI of the lumbar spine was performed without administration of intravenous contrast. Comparison: Radiographs dated 1/23/2024 Findings: For the purposes of this dictation, the lumbar vertebrae are labeled from a caudal to cranial direction, the first vertebra with lumbar morphology is labeled as L5. Some of the sequences are degraded by motion artifact which may obscure pathology. Normal lumbar lordosis. The lumbar vertebral bodies maintain normal height and alignment. Small hemangioma in T12 vertebral body. There is no bone marrow edema. There is disc desiccation at L5-S1. The conus terminates at L1-L2 and is normal in signal and morphology. Mild disc bulge at T11-T12 and T12-L1. No central or foraminal stenosis. L1-L2: Mild bilateral facet hypertrophy and ligamentum flavum hypertrophy. No central or foraminal stenosis. L2-L3 : Bilateral facet hypertrophy and ligamentum flavum hypertrophy. No central or foraminal stenosis. L3-L4: Bilateral facet hypertrophy and ligamentum flavum hypertrophy. No central or foraminal  stenosis. L4-L5: Trace disc bulge, bilateral facet hypertrophy and ligamentum flavum hypertrophy. There is mild to moderate bilateral foraminal stenosis. L5-S1: Disc bulge with an annular fissure and a superimposed central disc herniation. Bilateral facet hypertrophy. There is mild left and moderate right foraminal stenosis. The disc contacts the exiting right L5 nerve root.    Impression: Impression: 1. Motion degraded study. 2. Degenerative changes in the lumbar spine as described above. Workstation:[id number]    I have personally reviewed pertinent reports.   and I have personally reviewed pertinent films in PACS
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
## 2024-08-06 ENCOUNTER — APPOINTMENT (OUTPATIENT)
Dept: RADIOLOGY | Age: 44
End: 2024-08-06
Payer: COMMERCIAL

## 2024-08-06 DIAGNOSIS — M54.12 RADICULOPATHY, CERVICAL: ICD-10-CM

## 2024-08-06 DIAGNOSIS — M54.16 LUMBAR RADICULOPATHY: ICD-10-CM

## 2024-08-06 PROCEDURE — 72052 X-RAY EXAM NECK SPINE 6/>VWS: CPT

## 2024-08-06 PROCEDURE — 72114 X-RAY EXAM L-S SPINE BENDING: CPT

## 2024-08-08 ENCOUNTER — OFFICE VISIT (OUTPATIENT)
Dept: NEUROSURGERY | Facility: CLINIC | Age: 44
End: 2024-08-08
Payer: COMMERCIAL

## 2024-08-08 VITALS
OXYGEN SATURATION: 96 % | HEIGHT: 64 IN | DIASTOLIC BLOOD PRESSURE: 82 MMHG | WEIGHT: 235 LBS | HEART RATE: 99 BPM | SYSTOLIC BLOOD PRESSURE: 124 MMHG | TEMPERATURE: 99.2 F | BODY MASS INDEX: 40.12 KG/M2 | RESPIRATION RATE: 16 BRPM

## 2024-08-08 DIAGNOSIS — M54.41 CHRONIC MIDLINE LOW BACK PAIN WITH BILATERAL SCIATICA: ICD-10-CM

## 2024-08-08 DIAGNOSIS — M54.42 CHRONIC MIDLINE LOW BACK PAIN WITH BILATERAL SCIATICA: ICD-10-CM

## 2024-08-08 DIAGNOSIS — G89.29 CHRONIC MIDLINE LOW BACK PAIN WITH BILATERAL SCIATICA: ICD-10-CM

## 2024-08-08 DIAGNOSIS — M54.16 LUMBAR RADICULOPATHY: Primary | ICD-10-CM

## 2024-08-08 PROCEDURE — 99205 OFFICE O/P NEW HI 60 MIN: CPT | Performed by: STUDENT IN AN ORGANIZED HEALTH CARE EDUCATION/TRAINING PROGRAM
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
## 2024-11-19 ENCOUNTER — TELEPHONE (OUTPATIENT)
Dept: FAMILY MEDICINE CLINIC | Facility: CLINIC | Age: 44
End: 2024-11-19

## 2024-11-19 ENCOUNTER — OFFICE VISIT (OUTPATIENT)
Dept: FAMILY MEDICINE CLINIC | Facility: CLINIC | Age: 44
End: 2024-11-19
Payer: COMMERCIAL

## 2024-11-19 VITALS
RESPIRATION RATE: 16 BRPM | DIASTOLIC BLOOD PRESSURE: 88 MMHG | WEIGHT: 244 LBS | SYSTOLIC BLOOD PRESSURE: 122 MMHG | OXYGEN SATURATION: 99 % | TEMPERATURE: 97.4 F | BODY MASS INDEX: 41.66 KG/M2 | HEIGHT: 64 IN | HEART RATE: 97 BPM

## 2024-11-19 DIAGNOSIS — M54.16 LUMBAR RADICULOPATHY: ICD-10-CM

## 2024-11-19 DIAGNOSIS — E66.813 CLASS 3 SEVERE OBESITY DUE TO EXCESS CALORIES WITH SERIOUS COMORBIDITY AND BODY MASS INDEX (BMI) OF 40.0 TO 44.9 IN ADULT (HCC): ICD-10-CM

## 2024-11-19 DIAGNOSIS — Z98.84 STATUS POST BARIATRIC SURGERY: ICD-10-CM

## 2024-11-19 DIAGNOSIS — K21.9 GASTROESOPHAGEAL REFLUX DISEASE, UNSPECIFIED WHETHER ESOPHAGITIS PRESENT: ICD-10-CM

## 2024-11-19 DIAGNOSIS — E66.01 CLASS 3 SEVERE OBESITY DUE TO EXCESS CALORIES WITH SERIOUS COMORBIDITY AND BODY MASS INDEX (BMI) OF 40.0 TO 44.9 IN ADULT (HCC): ICD-10-CM

## 2024-11-19 DIAGNOSIS — Z00.00 HEALTHCARE MAINTENANCE: ICD-10-CM

## 2024-11-19 DIAGNOSIS — J01.00 ACUTE NON-RECURRENT MAXILLARY SINUSITIS: Primary | ICD-10-CM

## 2024-11-19 DIAGNOSIS — R11.0 NAUSEA: ICD-10-CM

## 2024-11-19 DIAGNOSIS — D50.8 IRON DEFICIENCY ANEMIA SECONDARY TO INADEQUATE DIETARY IRON INTAKE: ICD-10-CM

## 2024-11-19 PROCEDURE — 99204 OFFICE O/P NEW MOD 45 MIN: CPT | Performed by: NURSE PRACTITIONER

## 2024-11-19 PROCEDURE — 99396 PREV VISIT EST AGE 40-64: CPT | Performed by: NURSE PRACTITIONER

## 2024-11-19 RX ORDER — METHYLPREDNISOLONE 4 MG/1
4 TABLET ORAL
COMMUNITY
Start: 2024-10-14

## 2024-11-19 RX ORDER — TIZANIDINE 2 MG/1
2 TABLET ORAL
COMMUNITY
Start: 2024-11-08

## 2024-11-19 RX ORDER — AZITHROMYCIN 250 MG/1
TABLET, FILM COATED ORAL
Qty: 6 TABLET | Refills: 0 | Status: SHIPPED | OUTPATIENT
Start: 2024-11-19 | End: 2024-11-23

## 2024-11-19 RX ORDER — FERROUS SULFATE 325(65) MG
325 TABLET ORAL
COMMUNITY
Start: 2024-07-30 | End: 2025-07-30

## 2024-11-19 RX ORDER — TIRZEPATIDE 2.5 MG/.5ML
2.5 INJECTION, SOLUTION SUBCUTANEOUS WEEKLY
Qty: 2 ML | Refills: 0 | Status: SHIPPED | OUTPATIENT
Start: 2024-11-19 | End: 2024-12-17

## 2024-11-19 RX ORDER — GABAPENTIN 300 MG/1
600 CAPSULE ORAL 3 TIMES DAILY
COMMUNITY
Start: 2024-07-30

## 2024-11-19 RX ORDER — ONDANSETRON 4 MG/1
4 TABLET, FILM COATED ORAL EVERY 8 HOURS PRN
Qty: 20 TABLET | Refills: 0 | Status: SHIPPED | OUTPATIENT
Start: 2024-11-19

## 2024-11-19 RX ORDER — PANTOPRAZOLE SODIUM 20 MG/1
20 TABLET, DELAYED RELEASE ORAL
Qty: 90 TABLET | Refills: 0 | Status: SHIPPED | OUTPATIENT
Start: 2024-11-19 | End: 2025-02-17

## 2024-11-19 RX ORDER — TAMSULOSIN HYDROCHLORIDE 0.4 MG/1
0.4 CAPSULE ORAL
COMMUNITY
Start: 2024-09-30

## 2024-11-19 RX ORDER — OXYCODONE HYDROCHLORIDE 5 MG/1
5 TABLET ORAL
COMMUNITY
Start: 2024-10-14

## 2024-11-19 NOTE — ASSESSMENT & PLAN NOTE
- No longer taking iron supplement.   - Will obtain updated CBC and iron panel.   Orders:    CBC and differential; Future    Iron Panel (Includes Ferritin, Iron Sat%, Iron, and TIBC); Future

## 2024-11-19 NOTE — ASSESSMENT & PLAN NOTE
Prior Authorization Clinical Questions for Weight Management Pharmacotherapy    1. Does the patient have a contrainidcation to medication prescribed for weight management?: No  2. Does the patient have a diagnosis of obesity, confirmed by a BMI greater than or equal to 30 kg/m^2?: Yes  3. Does the patient have a BMI of greater than or equal to 27 kg/m^2 with at least one weight-related comorbidity/risk factor/complication (e.g. diabetes, dyslipidemia, coronary artery disease)?: No  5. Has the patient been on a weight loss regimen of low-calorie diet, increased physical activity, and lifestyle modifications for a minimum of 6 months?: Yes  7. Does the patient have a history of type 2 diabetes?: No  8. Has the member tried and failed other weight loss medication within the past 12 months?: No  9. Will the member use requested medication in combination with another GLP agonist or weight loss drug?: No  10. Is the medication a controlled substance?: No     Baseline weight (in pounds): 244 lbs       - Prescription sent for Zepbound 2.5 mg weekly. Discussed side effects.  - Encourage healthy diet and regular exercise.  - Recommend follow up in 1 month.   Orders:    CBC and differential; Future    Comprehensive metabolic panel; Future    Lipid Panel with Direct LDL reflex; Future    TSH, 3rd generation with Free T4 reflex; Future    tirzepatide (Zepbound) 2.5 mg/0.5 mL auto-injector; Inject 0.5 mL (2.5 mg total) under the skin once a week for 28 days

## 2024-11-19 NOTE — ASSESSMENT & PLAN NOTE
Orders:  •  ondansetron (ZOFRAN) 4 mg tablet; Take 1 tablet (4 mg total) by mouth every 8 (eight) hours as needed for nausea or vomiting

## 2024-11-19 NOTE — PROGRESS NOTES
Name: Nydia So      : 1980      MRN: [id number]  Encounter Provider: DIGNA Vinson  Encounter Date: 2024   Encounter department: [facility]    Assessment & Plan  Acute non-recurrent maxillary sinusitis  - Prescription sent for MICAELA-dhaval.   - Recommend Flonase.  - Increase oral hydration and use humidifier.    Orders:    azithromycin (Zithromax) 250 mg tablet; Take 2 tablets (500 mg total) by mouth daily for 1 day, THEN 1 tablet (250 mg total) daily for 4 days.    Lumbar radiculopathy  - Continue routine follow up with Orthopedic Surgery - sees provider in [facility]. Stable on current medications.        Iron deficiency anemia secondary to inadequate dietary iron intake  - No longer taking iron supplement.   - Will obtain updated CBC and iron panel.   Orders:    CBC and differential; Future    Iron Panel (Includes Ferritin, Iron Sat%, Iron, and TIBC); Future    Class 3 severe obesity due to excess calories with serious comorbidity and body mass index (BMI) of 40.0 to 44.9 in adult (HCC)  Prior Authorization Clinical Questions for Weight Management Pharmacotherapy    1. Does the patient have a contrainidcation to medication prescribed for weight management?: No  2. Does the patient have a diagnosis of obesity, confirmed by a BMI greater than or equal to 30 kg/m^2?: Yes  3. Does the patient have a BMI of greater than or equal to 27 kg/m^2 with at least one weight-related comorbidity/risk factor/complication (e.g. diabetes, dyslipidemia, coronary artery disease)?: No  5. Has the patient been on a weight loss regimen of low-calorie diet, increased physical activity, and lifestyle modifications for a minimum of 6 months?: Yes  7. Does the patient have a history of type 2 diabetes?: No  8. Has the member tried and failed other weight loss medication within the past 12 months?: No  9. Will the member use requested medication in combination with another GLP agonist or  weight loss drug?: No  10. Is the medication a controlled substance?: No     Baseline weight (in pounds): 244 lbs       - Prescription sent for Zepbound 2.5 mg weekly. Discussed side effects.  - Encourage healthy diet and regular exercise.  - Recommend follow up in 1 month.   Orders:    CBC and differential; Future    Comprehensive metabolic panel; Future    Lipid Panel with Direct LDL reflex; Future    TSH, 3rd generation with Free T4 reflex; Future    tirzepatide (Zepbound) 2.5 mg/0.5 mL auto-injector; Inject 0.5 mL (2.5 mg total) under the skin once a week for 28 days    Gastroesophageal reflux disease, unspecified whether esophagitis present  - Not well controlled.  - Prescription sent for pantoprazole 20 mg daily before breakfast.  - Avoid triggering food and beverage.  - Will continue to monitor.   Orders:    pantoprazole (PROTONIX) 20 mg tablet; Take 1 tablet (20 mg total) by mouth daily before breakfast    Status post bariatric surgery  - Had gastric bypass surgery in 2009. Lost about 100 pounds. Has gained about 45 pounds since having her children. Discussed weight loss medication options. Prescription sent for Zepbound. Will continue to follow up.        Nausea    Orders:    ondansetron (ZOFRAN) 4 mg tablet; Take 1 tablet (4 mg total) by mouth every 8 (eight) hours as needed for nausea or vomiting    Healthcare maintenance  - Reviewed immunizations and screenings.   - Discussed regular dental, vision, and GYN exams.   - UTD with mammogram.   - Routine labs ordered.   Orders:    CBC and differential; Future    Comprehensive metabolic panel; Future    Lipid Panel with Direct LDL reflex; Future    TSH, 3rd generation with Free T4 reflex; Future         History of Present Illness     Patient with PMH of iron deficiency anemia and Hx of gastric bypass surgery presents to office today to establish care. She is taking her prescribed medications and reports no side effects. No longer taking iron supplement. Has  "gastric bypass surgery in 2009 and lost about 100 pounds. After having children she has gained about 45 pounds back and is having a hard time losing it. She no longer follows with Bariatrics. She is interested in weight loss medication. Also complains of cough, congestion, sore throat, and sinus pain/pressure which has been occurring for the past 2 weeks. Has been taking over the counter cough and cold medicine with no improvement. Also complains of increased GERD symptoms. Only takes omeprazole as needed. She is due for updated blood work. She denies any other concerns or complaints today.         Review of Systems   Constitutional:  Negative for fatigue and fever.   HENT:  Positive for congestion, sinus pressure, sinus pain and sore throat. Negative for trouble swallowing.    Eyes:  Negative for visual disturbance.   Respiratory:  Positive for cough. Negative for shortness of breath.    Cardiovascular:  Negative for chest pain and palpitations.   Gastrointestinal:  Negative for abdominal pain and blood in stool.        GERD     Endocrine: Negative for cold intolerance and heat intolerance.   Genitourinary:  Negative for difficulty urinating and dysuria.   Musculoskeletal:  Negative for gait problem.   Skin:  Negative for rash.   Neurological:  Negative for dizziness, syncope and headaches.   Hematological:  Negative for adenopathy.   Psychiatric/Behavioral:  Negative for behavioral problems.      Past Medical History:   Diagnosis Date    Abdominal pain     \"almost constant\"    Anemia     Anesthesia     \"woke up swinging with last  9/2018  \"was fine with EGD\"    Back pain     Bariatric surgery status     2008-gastric sleeve revison RUEN-Y 4/2019-abd painnow-dx lap today 3/9/2020    Constipation     COVID     x 2 - 2020 and 2021    Dental bridge present     right lower permanent    Diarrhea     Difficulty swallowing     \"in the past\"    Disease of thyroid gland     not on meds now    Dizzy     Edema     ble's    " "Fatigue     \"when blood pressure low\" and weakness too\"    GERD (gastroesophageal reflux disease)     \"increase after surgery\"    Heart murmur     heart murmer, work up negative with holter monitor    History of 2  sections     most recent 17    History of D&C 2019    History of iron deficiency     anemia/ had IV infusions through pregnancy in 6414-6038    History of transfusion     2019 - pt had allergic reaction - had to stop the blood    Inguinal hernia     right    Low BP     \"off and on\"    Migraine     N&V (nausea and vomiting)     \" a little better since on meds\"    Non-intractable vomiting     \"not since been on medicine\"    Palpitations     \"having again\"    PONV (postoperative nausea and vomiting)     Postgastrectomy malabsorption      Past Surgical History:   Procedure Laterality Date     SECTION      x2    CHOLECYSTECTOMY      CHROMOSOME ANALYSIS, PRODUCTS OF CONCEPTION (HISTORICAL)      2 miscarriages in  and  DISCOGRAM LUMBAR  2024    HYSTERECTOMY      LAPAROSCOPIC SALPINGOOPHERECTOMY Right 2018    LVH, 6 cm benign ovarian cyst    LAPAROSCOPY N/A 2020    Procedure: DIAGNOSTIC LAPAROSCOPY,CLOSURE OF COATES DEFECT, INTRAOP EGD;  Surgeon: Jose Moya MD;  Location: [facility];  Service: Bariatrics    AR EGD TRANSORAL BIOPSY SINGLE/MULTIPLE N/A 2019    Procedure: ESOPHAGOGASTRODUODENOSCOPY (EGD) with bx;  Surgeon: Jose Moya MD;  Location: [facility];  Service: Bariatrics    AR LAPS GSTR RSTCV PX W/BYP SREE-EN-Y LIMB <150 CM N/A 2019    Procedure: LAP SREE-EN-Y GASTRIC BYPASS, INTRAOP EGD;  Surgeon: Jose Moya MD;  Location: [facility];  Service: Bariatrics    AR LAPS SUPRACRV HYSTERECT 250 GM/< RMVL TUBE/OVAR Bilateral 2023    Procedure: (LSH);  Surgeon: Dayron Lou DO;  Location: [facility];  Service: Gynecology    AR TX MISSED  FIRST TRIMESTER SURGICAL N/A 2019    Procedure: " DILATATION AND EVACUATION (D&E) (8 weeks) missed ab;  Surgeon: Madisyn Petty MD;  [facility]: [facility];  Service: Gynecology    REVISION BYPASS LAPAROSCOPIC N/A 04/08/2019    Procedure: LAP REVISION/ CONVERSION;  Surgeon: Jose Moya MD;  Location: [facility];  Service: Bariatrics    SALPINGOOPHORECTOMY Right 09/2018    SLEEVE GASTROPLASTY       Family History   Problem Relation Age of Onset    Hypertension Mother     Heart disease Mother     No Known Problems Father      Social History     Tobacco Use    Smoking status: Never    Smokeless tobacco: Never   Vaping Use    Vaping status: Never Used   Substance and Sexual Activity    Alcohol use: Not Currently     Comment: occ    Drug use: No    Sexual activity: Yes     Partners: Male     Birth control/protection: Female Sterilization     Current Outpatient Medications on File Prior to Visit   Medication Sig    acetaminophen (TYLENOL) 500 mg tablet Take 500-1,000 mg by mouth every 6 (six) hours as needed for mild pain    acetaminophen (TYLENOL) 500 mg tablet Take 1 tablet (500 mg total) by mouth every 6 (six) hours as needed for mild pain    furosemide (LASIX) 20 mg tablet Take 20 mg by mouth daily as needed    gabapentin (NEURONTIN) 300 mg capsule Take 600 mg by mouth Three times a day    methylPREDNISolone 4 MG tablet therapy pack Take 4 mg by mouth    omeprazole (PriLOSEC) 20 mg delayed release capsule TAKE 1 CAPSULE (20 MG TOTAL) BY MOUTH DAILY    tiZANidine (ZANAFLEX) 2 mg tablet Take 2 mg by mouth    clindamycin (CLEOCIN) 2 % vaginal cream Insert 1 applicator into the vagina daily at bedtime (Patient not taking: Reported on 8/5/2024)    docusate sodium (COLACE) 100 mg capsule Take 1 capsule (100 mg total) by mouth every 12 (twelve) hours (Patient not taking: Reported on 11/19/2024)    ferrous sulfate ([employer] Ferrous Sulfate) 325 (65 Fe) mg tablet Take 325 mg by mouth daily with breakfast (Patient not taking: Reported on 11/19/2024)    ferrous sulfate 325 (65  FE) MG EC tablet TAKE 1 TABLET (325 MG TOTAL) BY MOUTH DAILY WITH BREAKFAST (Patient not taking: Reported on 11/19/2024)    lidocaine (Lidoderm) 5 % Apply 1 patch topically over 12 hours daily Remove & Discard patch within 12 hours or as directed by MD (Patient not taking: Reported on 11/19/2024)    lidocaine (LMX) 4 % cream Apply topically as needed for mild pain (Patient not taking: Reported on 8/5/2024)    linaCLOtide (Linzess) 290 MCG CAPS TAKE 1 CAPSULE BY MOUTH DAILY BEFORE BREAKFAST (Patient not taking: Reported on 8/5/2024)    magnesium citrate (CITROMA) 1.745 g/30 mL oral solution Take 296 mL by mouth once for 1 dose    methocarbamol (ROBAXIN) 500 mg tablet Take 1 tablet (500 mg total) by mouth 2 (two) times a day (Patient not taking: Reported on 8/5/2024)    Multiple Vitamin (multivitamin) tablet Take 1 tablet by mouth daily (Patient not taking: Reported on 8/5/2024)    ondansetron (ZOFRAN-ODT) 4 mg disintegrating tablet TAKE 1 TABLET BY MOUTH EVERY 6 HOURS AS NEEDED FOR NAUSEA OR VOMITING (Patient not taking: Reported on 11/19/2024)    oxyCODONE (ROXICODONE) 5 immediate release tablet Take 5 mg by mouth (Patient not taking: Reported on 11/19/2024)    promethazine (PHENERGAN) 25 mg tablet Take 1 tablet (25 mg total) by mouth every 6 (six) hours as needed for nausea or vomiting (Patient not taking: Reported on 8/5/2024)    sucralfate (CARAFATE) 1 g tablet TAKE 1 TABLET (1 G TOTAL) BY MOUTH 4 (FOUR) TIMES A DAY CRUSH TABLET AND DISSOLVE IN WARM WATER BEFORE TAKING.    tamsulosin (FLOMAX) 0.4 mg Take 0.4 mg by mouth (Patient not taking: Reported on 11/19/2024)     Allergies   Allergen Reactions    Aspirin Anaphylaxis    Shellfish-Derived Products - Food Allergy Anaphylaxis    Contrast [Iodinated Contrast Media] Itching and Swelling    Ibuprofen Edema    Reglan [Metoclopramide] Itching and Confusion     There is no immunization history for the selected administration types on file for this patient.  Objective  "  /88 (BP Location: Left arm, Patient Position: Sitting, Cuff Size: Standard)   Pulse 97   Temp (!) 97.4 °F (36.3 °C) (Tympanic)   Resp 16   Ht 5' 4\" (1.626 m)   Wt 111 kg (244 lb)   LMP 08/06/2023   SpO2 99%   BMI 41.88 kg/m²     Physical Exam  Vitals and nursing note reviewed.   Constitutional:       General: She is not in acute distress.     Appearance: Normal appearance. She is well-developed. She is ill-appearing.   HENT:      Head: Normocephalic and atraumatic.      Right Ear: Tympanic membrane, ear canal and external ear normal.      Left Ear: Tympanic membrane, ear canal and external ear normal.      Nose: Congestion present.      Mouth/Throat:      Mouth: Mucous membranes are moist.   Eyes:      Conjunctiva/sclera: Conjunctivae normal.   Cardiovascular:      Rate and Rhythm: Normal rate and regular rhythm.      Heart sounds: Normal heart sounds.   Pulmonary:      Effort: Pulmonary effort is normal.      Breath sounds: Normal breath sounds.   Abdominal:      General: Bowel sounds are normal.      Palpations: Abdomen is soft.   Musculoskeletal:         General: Normal range of motion.      Cervical back: Normal range of motion.   Lymphadenopathy:      Cervical: Cervical adenopathy present.   Skin:     General: Skin is warm and dry.   Neurological:      Mental Status: She is alert and oriented to person, place, and time.   Psychiatric:         Mood and Affect: Mood normal.         Behavior: Behavior normal.         "

## 2024-11-19 NOTE — ASSESSMENT & PLAN NOTE
- Continue routine follow up with Orthopedic Surgery - sees provider in [facility]. Stable on current medications.

## 2024-11-19 NOTE — ASSESSMENT & PLAN NOTE
- Not well controlled.  - Prescription sent for pantoprazole 20 mg daily before breakfast.  - Avoid triggering food and beverage.  - Will continue to monitor.   Orders:    pantoprazole (PROTONIX) 20 mg tablet; Take 1 tablet (20 mg total) by mouth daily before breakfast

## 2024-11-19 NOTE — ASSESSMENT & PLAN NOTE
- Reviewed immunizations and screenings.   - Discussed regular dental, vision, and GYN exams.   - UTD with mammogram.   - Routine labs ordered.   Orders:    CBC and differential; Future    Comprehensive metabolic panel; Future    Lipid Panel with Direct LDL reflex; Future    TSH, 3rd generation with Free T4 reflex; Future

## 2024-11-19 NOTE — ASSESSMENT & PLAN NOTE
- Had gastric bypass surgery in 2009. Lost about 100 pounds. Has gained about 45 pounds since having her children. Discussed weight loss medication options. Prescription sent for Zepbound. Will continue to follow up.

## 2024-11-20 NOTE — TELEPHONE ENCOUNTER
PA for Zepbound 2.5mg/0.5mL APPROVED     Date(s) approved: 11/20/2024-05/20/2025    Case #[id number]    Patient advised by          []MyChart Message  []Phone call   [x]LMOM  []L/M to call office as no active Communication consent on file  []Unable to leave detailed message as VM not approved on Communication consent       Pharmacy advised by    [x]Fax  []Phone call    Approval letter scanned into Media Yes

## 2024-11-20 NOTE — TELEPHONE ENCOUNTER
PA for Zepbound 2.5mg/0.5mL SUBMITTED to [employer]    via    [x]CMM-KEY: [id number]  []Surescripts-Case ID #   []Availity-Auth ID # NDC #   []Faxed to plan   []Other website   []Phone call Case ID #     [x]PA sent as URGENT    All office notes, labs and other pertaining documents and studies sent. Clinical questions answered. Awaiting determination from insurance company.     Turnaround time for your insurance to make a decision on your Prior Authorization can take 7-21 business days.

## 2024-12-10 DIAGNOSIS — E66.813 CLASS 3 SEVERE OBESITY DUE TO EXCESS CALORIES WITH SERIOUS COMORBIDITY AND BODY MASS INDEX (BMI) OF 40.0 TO 44.9 IN ADULT (HCC): ICD-10-CM

## 2024-12-10 DIAGNOSIS — E66.01 CLASS 3 SEVERE OBESITY DUE TO EXCESS CALORIES WITH SERIOUS COMORBIDITY AND BODY MASS INDEX (BMI) OF 40.0 TO 44.9 IN ADULT (HCC): ICD-10-CM

## 2024-12-11 RX ORDER — TIRZEPATIDE 2.5 MG/.5ML
2.5 INJECTION, SOLUTION SUBCUTANEOUS WEEKLY
Qty: 2 ML | Refills: 0 | Status: SHIPPED | OUTPATIENT
Start: 2024-12-11 | End: 2025-01-08

## 2024-12-11 NOTE — TELEPHONE ENCOUNTER
Refills have been requested for the following medications:         tirzepatide (Zepbound) 2.5 mg/0.5 mL auto-injector [DIGNA Deras]     Preferred pharmacy: [facility]AC[facility] #[id number] - [address]    Last seen 11/19/24  Has appt 12/19/24

## 2024-12-19 ENCOUNTER — OFFICE VISIT (OUTPATIENT)
Dept: FAMILY MEDICINE CLINIC | Facility: CLINIC | Age: 44
End: 2024-12-19
Payer: COMMERCIAL

## 2024-12-19 VITALS
SYSTOLIC BLOOD PRESSURE: 118 MMHG | WEIGHT: 235.4 LBS | TEMPERATURE: 96 F | DIASTOLIC BLOOD PRESSURE: 82 MMHG | OXYGEN SATURATION: 99 % | RESPIRATION RATE: 16 BRPM | BODY MASS INDEX: 40.19 KG/M2 | HEIGHT: 64 IN | HEART RATE: 85 BPM

## 2024-12-19 DIAGNOSIS — E66.01 CLASS 3 SEVERE OBESITY DUE TO EXCESS CALORIES WITH SERIOUS COMORBIDITY AND BODY MASS INDEX (BMI) OF 40.0 TO 44.9 IN ADULT (HCC): Primary | ICD-10-CM

## 2024-12-19 DIAGNOSIS — L65.9 HAIR LOSS: ICD-10-CM

## 2024-12-19 DIAGNOSIS — K21.9 GASTROESOPHAGEAL REFLUX DISEASE, UNSPECIFIED WHETHER ESOPHAGITIS PRESENT: ICD-10-CM

## 2024-12-19 DIAGNOSIS — E66.813 CLASS 3 SEVERE OBESITY DUE TO EXCESS CALORIES WITH SERIOUS COMORBIDITY AND BODY MASS INDEX (BMI) OF 40.0 TO 44.9 IN ADULT (HCC): Primary | ICD-10-CM

## 2024-12-19 DIAGNOSIS — R13.10 DYSPHAGIA, UNSPECIFIED TYPE: ICD-10-CM

## 2024-12-19 PROBLEM — Z00.00 HEALTHCARE MAINTENANCE: Status: RESOLVED | Noted: 2024-11-19 | Resolved: 2024-12-19

## 2024-12-19 PROCEDURE — 99214 OFFICE O/P EST MOD 30 MIN: CPT | Performed by: NURSE PRACTITIONER

## 2024-12-19 RX ORDER — METHOCARBAMOL 750 MG/1
750 TABLET, FILM COATED ORAL
COMMUNITY
Start: 2024-09-16

## 2024-12-19 RX ORDER — FAMOTIDINE 40 MG/1
1 TABLET, FILM COATED ORAL EVERY EVENING
COMMUNITY
Start: 2024-12-16 | End: 2025-12-16

## 2024-12-19 RX ORDER — TIRZEPATIDE 5 MG/.5ML
5 INJECTION, SOLUTION SUBCUTANEOUS WEEKLY
Qty: 2 ML | Refills: 0 | Status: SHIPPED | OUTPATIENT
Start: 2024-12-19

## 2024-12-19 NOTE — ASSESSMENT & PLAN NOTE
- Improving. She has lost 9 pounds since last visit.  - She was recently given a refill for Zepbound 2.5mg weekly and was advised to take this for 1 month then can increase to Zepbound 5mg weekly  - Continue Zofran for nausea  - Discussed healthy diet and regular exercise  - Will continue to monitor and follow up in 2 months    Orders:  •  tirzepatide (Zepbound) 5 mg/0.5 mL auto-injector; Inject 0.5 mL (5 mg total) under the skin once a week   PAIN SCALE 8 OF 10.

## 2024-12-19 NOTE — PROGRESS NOTES
Name: Nydia So      : 1980      MRN: [id number]  Encounter Provider: DIGNA Vinson  Encounter Date: 2024   Encounter department: [facility]  :  Assessment & Plan  Class 3 severe obesity due to excess calories with serious comorbidity and body mass index (BMI) of 40.0 to 44.9 in adult (HCC)  - Improving. She has lost 9 pounds since last visit.  - She was recently given a refill for Zepbound 2.5mg weekly and was advised to take this for 1 month then can increase to Zepbound 5mg weekly  - Continue Zofran for nausea  - Discussed healthy diet and regular exercise  - Will continue to monitor and follow up in 2 months    Orders:  •  tirzepatide (Zepbound) 5 mg/0.5 mL auto-injector; Inject 0.5 mL (5 mg total) under the skin once a week    Gastroesophageal reflux disease, unspecified whether esophagitis present  - Improving but not well-controlled  - Continue taking pantoprazole 20mg tab daily  - Start Pepcid 40mg tab daily that was prescribed by ENT which will further help with symptoms  - Discussed avoidance of triggers  - Will continue to monitor symptoms  - Follow up with ENT for swallow study       Hair loss  - Discussed with patient to obtain labs including TSH placed at last visit to evaluate  - Will continue to monitor       Dysphagia, unspecified type  - Evaluated by ENT. Laryngoscopy significant for LPR. She was started on Pepcid at bedtime. Advised to decrease coffee intake.  They also recommended swallow study.  - Will continue to follow up.               History of Present Illness     43 y/o female presenting to the office for follow up of multiple medical problems. She is taking Zepbound 2.5mg weekly and has lost 9 pounds since last visit. She reports she is having some nausea but has been taking the Zofran for these symptoms. She is still having epigastric burning sensation and reflux but it has been improving with starting the pantoprazole last  "visit. She saw ENT who stated it was likely LPR and are recommending a swallow study. They prescribed Pepcid but she has not picked it up yet to begin taking it. She also reports that she has been struggling with increased hair loss and cold intolerance for the past few months. She states she used to be on Synthroid for hypothyroidism but stopped taking it after she had her child and never followed up with the practice. She states these symptoms are similar to when she was first diagnosed. Labs were placed at last visit with TSH but she has not had time to get the labs yet.       Review of Systems   Constitutional:  Negative for chills and fever.   HENT:  Negative for ear pain and sore throat.    Eyes:  Negative for pain and visual disturbance.   Respiratory:  Negative for cough and shortness of breath.    Cardiovascular:  Negative for chest pain and palpitations.   Gastrointestinal:  Negative for nausea and vomiting.        + reflux  + epigastric pain/burning   Genitourinary:  Negative for dysuria and hematuria.   Musculoskeletal:  Negative for arthralgias and back pain.   Skin:  Negative for color change and rash.   Neurological:  Negative for seizures and syncope.   All other systems reviewed and are negative.      Objective   /82 (BP Location: Left arm, Patient Position: Sitting, Cuff Size: Standard)   Pulse 85   Temp (!) 96 °F (35.6 °C) (Tympanic)   Resp 16   Ht 5' 4\" (1.626 m)   Wt 107 kg (235 lb 6.4 oz)   LMP 08/06/2023   SpO2 99%   BMI 40.41 kg/m²      Physical Exam  Vitals and nursing note reviewed.   Constitutional:       General: She is not in acute distress.     Appearance: She is well-developed.   HENT:      Head: Normocephalic and atraumatic.   Eyes:      Conjunctiva/sclera: Conjunctivae normal.   Cardiovascular:      Rate and Rhythm: Normal rate and regular rhythm.      Heart sounds: No murmur heard.  Pulmonary:      Effort: Pulmonary effort is normal. No respiratory distress.      " Breath sounds: Normal breath sounds.   Abdominal:      Palpations: Abdomen is soft.      Tenderness: There is no abdominal tenderness.   Musculoskeletal:         General: No swelling.   Skin:     General: Skin is warm and dry.   Neurological:      Mental Status: She is alert.   Psychiatric:         Mood and Affect: Mood normal.

## 2024-12-19 NOTE — ASSESSMENT & PLAN NOTE
- Evaluated by ENT. Laryngoscopy significant for LPR. She was started on Pepcid at bedtime. Advised to decrease coffee intake.  They also recommended swallow study.  - Will continue to follow up.

## 2024-12-19 NOTE — ASSESSMENT & PLAN NOTE
- Improving but not well-controlled  - Continue taking pantoprazole 20mg tab daily  - Start Pepcid 40mg tab daily that was prescribed by ENT which will further help with symptoms  - Discussed avoidance of triggers  - Will continue to monitor symptoms  - Follow up with ENT for swallow study

## 2025-01-20 DIAGNOSIS — E66.813 CLASS 3 SEVERE OBESITY DUE TO EXCESS CALORIES WITH SERIOUS COMORBIDITY AND BODY MASS INDEX (BMI) OF 40.0 TO 44.9 IN ADULT (HCC): ICD-10-CM

## 2025-01-20 DIAGNOSIS — E66.01 CLASS 3 SEVERE OBESITY DUE TO EXCESS CALORIES WITH SERIOUS COMORBIDITY AND BODY MASS INDEX (BMI) OF 40.0 TO 44.9 IN ADULT (HCC): ICD-10-CM

## 2025-01-20 RX ORDER — FUROSEMIDE 20 MG/1
20 TABLET ORAL DAILY PRN
Refills: 0 | Status: CANCELLED | OUTPATIENT
Start: 2025-01-20

## 2025-01-21 ENCOUNTER — PATIENT MESSAGE (OUTPATIENT)
Dept: FAMILY MEDICINE CLINIC | Facility: CLINIC | Age: 45
End: 2025-01-21

## 2025-01-21 DIAGNOSIS — E66.01 CLASS 3 SEVERE OBESITY DUE TO EXCESS CALORIES WITH SERIOUS COMORBIDITY AND BODY MASS INDEX (BMI) OF 40.0 TO 44.9 IN ADULT (HCC): ICD-10-CM

## 2025-01-21 DIAGNOSIS — E66.813 CLASS 3 SEVERE OBESITY DUE TO EXCESS CALORIES WITH SERIOUS COMORBIDITY AND BODY MASS INDEX (BMI) OF 40.0 TO 44.9 IN ADULT (HCC): ICD-10-CM

## 2025-01-21 RX ORDER — TIRZEPATIDE 5 MG/.5ML
5 INJECTION, SOLUTION SUBCUTANEOUS WEEKLY
Qty: 2 ML | Refills: 0 | Status: SHIPPED | OUTPATIENT
Start: 2025-01-21 | End: 2025-01-21 | Stop reason: SDUPTHER

## 2025-01-22 RX ORDER — TIRZEPATIDE 5 MG/.5ML
5 INJECTION, SOLUTION SUBCUTANEOUS WEEKLY
Qty: 2 ML | Refills: 0 | Status: SHIPPED | OUTPATIENT
Start: 2025-01-22

## 2025-02-19 ENCOUNTER — OFFICE VISIT (OUTPATIENT)
Dept: FAMILY MEDICINE CLINIC | Facility: CLINIC | Age: 45
End: 2025-02-19
Payer: COMMERCIAL

## 2025-02-19 VITALS
OXYGEN SATURATION: 98 % | HEIGHT: 64 IN | HEART RATE: 82 BPM | RESPIRATION RATE: 16 BRPM | WEIGHT: 231.4 LBS | TEMPERATURE: 97.1 F | BODY MASS INDEX: 39.5 KG/M2 | DIASTOLIC BLOOD PRESSURE: 70 MMHG | SYSTOLIC BLOOD PRESSURE: 106 MMHG

## 2025-02-19 DIAGNOSIS — R11.0 NAUSEA: ICD-10-CM

## 2025-02-19 DIAGNOSIS — M54.16 LUMBAR RADICULOPATHY: ICD-10-CM

## 2025-02-19 DIAGNOSIS — K21.9 GASTROESOPHAGEAL REFLUX DISEASE, UNSPECIFIED WHETHER ESOPHAGITIS PRESENT: Primary | ICD-10-CM

## 2025-02-19 DIAGNOSIS — E66.01 CLASS 2 SEVERE OBESITY DUE TO EXCESS CALORIES WITH SERIOUS COMORBIDITY AND BODY MASS INDEX (BMI) OF 39.0 TO 39.9 IN ADULT (HCC): ICD-10-CM

## 2025-02-19 DIAGNOSIS — E66.812 CLASS 2 SEVERE OBESITY DUE TO EXCESS CALORIES WITH SERIOUS COMORBIDITY AND BODY MASS INDEX (BMI) OF 39.0 TO 39.9 IN ADULT (HCC): ICD-10-CM

## 2025-02-19 PROCEDURE — 99214 OFFICE O/P EST MOD 30 MIN: CPT | Performed by: NURSE PRACTITIONER

## 2025-02-19 RX ORDER — TIRZEPATIDE 7.5 MG/.5ML
7.5 INJECTION, SOLUTION SUBCUTANEOUS WEEKLY
Qty: 2 ML | Refills: 0 | Status: SHIPPED | OUTPATIENT
Start: 2025-02-19

## 2025-02-19 RX ORDER — CYCLOBENZAPRINE HCL 10 MG
10 TABLET ORAL
COMMUNITY
Start: 2025-02-04

## 2025-02-19 RX ORDER — PANTOPRAZOLE SODIUM 40 MG/1
40 TABLET, DELAYED RELEASE ORAL DAILY
Qty: 90 TABLET | Refills: 1 | Status: SHIPPED | OUTPATIENT
Start: 2025-02-19

## 2025-02-19 RX ORDER — ONDANSETRON 4 MG/1
4 TABLET, FILM COATED ORAL EVERY 8 HOURS PRN
Qty: 20 TABLET | Refills: 3 | Status: SHIPPED | OUTPATIENT
Start: 2025-02-19

## 2025-02-19 NOTE — PROGRESS NOTES
Name: Nydia So      : 1980      MRN: [id number]  Encounter Provider: DIGNA Vinson  Encounter Date: 2025   Encounter department: [facility]    Assessment & Plan  Gastroesophageal reflux disease, unspecified whether esophagitis present  - Not well controlled.  - Will increase pantoprazole to 40 mg daily.   - continue Pepcid 40 mg daily that was prescribed by ENT which can further help symptoms.   - Discussed avoidance of triggers  - Will continue to monitor symptoms  - Follow up with ENT for swallow study  Orders:  •  pantoprazole (PROTONIX) 40 mg tablet; Take 1 tablet (40 mg total) by mouth daily    Nausea    Orders:  •  ondansetron (ZOFRAN) 4 mg tablet; Take 1 tablet (4 mg total) by mouth every 8 (eight) hours as needed for nausea or vomiting    Class 2 severe obesity due to excess calories with serious comorbidity and body mass index (BMI) of 39.0 to 39.9 in adult (HCC)  - Down 4 pounds since last visit.  - Continue Zepbound 5 mg weekly for next 2 weeks then increase to 7.5 mg weekly. Discussed side effects.  - Encourage healthy diet and regular exercise.  - Recommend follow up in 6 weeks.     Orders:  •  ondansetron (ZOFRAN) 4 mg tablet; Take 1 tablet (4 mg total) by mouth every 8 (eight) hours as needed for nausea or vomiting  •  tirzepatide (Zepbound) 7.5 mg/0.5 mL auto-injector; Inject 0.5 mL (7.5 mg total) under the skin once a week    Lumbar radiculopathy  - Stable on current medications.   - Continue routine follow up with Orthopedic Surgery - sees Von/FLAVIO.             History of Present Illness     Patient with PMH of GERD, iron deficiency, and Hx of gastric bypass surgery presents to office today for follow up. She was prescribed Zepbound for weight loss. She is down 4 pounds since her last visit. Complains of some nausea and increased acid reflux symptoms. She has bee seeing ENT who suggested a swallow study which she did not complete  "yet. She denies any other concerns or complaints today.      Review of Systems   Constitutional:  Negative for fatigue and fever.   HENT:  Negative for trouble swallowing.    Eyes:  Negative for visual disturbance.   Respiratory:  Negative for cough and shortness of breath.    Cardiovascular:  Negative for chest pain and palpitations.   Gastrointestinal:  Positive for nausea. Negative for abdominal pain and blood in stool.        GERD     Endocrine: Negative for cold intolerance and heat intolerance.   Genitourinary:  Negative for difficulty urinating and dysuria.   Musculoskeletal:  Negative for gait problem.   Skin:  Negative for rash.   Neurological:  Negative for dizziness, syncope and headaches.   Hematological:  Negative for adenopathy.   Psychiatric/Behavioral:  Negative for behavioral problems.      Past Medical History:   Diagnosis Date   • Abdominal pain     \"almost constant\"   • Anemia    • Anesthesia     \"woke up swinging with last  2018  \"was fine with EGD\"   • Back pain    • Bariatric surgery status     -gastric sleeve revison RUEN-Y 2019-abd painnow-dx lap today 3/9/2020   • Constipation    • COVID     x 2 -  and    • Dental bridge present     right lower permanent   • Diarrhea    • Difficulty swallowing     \"in the past\"   • Disease of thyroid gland     not on meds now   • Dizzy    • Edema     ble's   • Fatigue     \"when blood pressure low\" and weakness too\"   • GERD (gastroesophageal reflux disease)     \"increase after surgery\"   • Heart murmur     heart murmer, work up negative with holter monitor   • History of 2  sections     most recent 17   • History of D&C 2019   • History of iron deficiency     anemia/ had IV infusions through pregnancy in 8039-3640   • History of transfusion     2019 - pt had allergic reaction - had to stop the blood   • Inguinal hernia     right   • Low BP     \"off and on\"   • Migraine    • N&V (nausea and vomiting)     \" a little " "better since on meds\"   • Non-intractable vomiting     \"not since been on medicine\"   • Palpitations     \"having again\"   • PONV (postoperative nausea and vomiting)    • Postgastrectomy malabsorption      Past Surgical History:   Procedure Laterality Date   •  SECTION      x2   • CHOLECYSTECTOMY     • CHROMOSOME ANALYSIS, PRODUCTS OF CONCEPTION (HISTORICAL)      2 miscarriages in  and Nov   • FL DISCOGRAM LUMBAR  2024   • HYSTERECTOMY     • LAPAROSCOPIC SALPINGOOPHERECTOMY Right 2018    LVH, 6 cm benign ovarian cyst   • LAPAROSCOPY N/A 2020    Procedure: DIAGNOSTIC LAPAROSCOPY,CLOSURE OF COATES DEFECT, INTRAOP EGD;  Surgeon: Jose Moya MD;  [facility];  Service: Bariatrics   • NM EGD TRANSORAL BIOPSY SINGLE/MULTIPLE N/A 2019    Procedure: ESOPHAGOGASTRODUODENOSCOPY (EGD) with bx;  Surgeon: Jose Moya MD;  [facility];  Service: Bariatrics   • NM LAPS GSTR RSTCV PX W/BYP SREE-EN-Y LIMB <150 CM N/A 2019    Procedure: LAP SREE-EN-Y GASTRIC BYPASS, INTRAOP EGD;  Surgeon: Jose Moya MD;  [facility];  Service: Bariatrics   • NM LAPS SUPRACRV HYSTERECT 250 GM/< RMVL TUBE/OVAR Bilateral 2023    Procedure: (LSH);  Surgeon: Dayron Lou DO;  [facility];  Service: Gynecology   • NM TX MISSED  FIRST TRIMESTER SURGICAL N/A 2019    Procedure: DILATATION AND EVACUATION (D&E) (8 weeks) missed ab;  Surgeon: Madisyn Petty MD;  [facility];  Service: Gynecology   • REVISION BYPASS LAPAROSCOPIC N/A 2019    Procedure: LAP REVISION/ CONVERSION;  Surgeon: Jose Moya MD;  [facility];  Service: Bariatrics   • SALPINGOOPHORECTOMY Right 2018   • SLEEVE GASTROPLASTY       Family History   Problem Relation Age of Onset   • Hypertension Mother    • Heart disease Mother    • No Known Problems Father      Social History     Tobacco Use   • Smoking status: Never   • Smokeless tobacco: " Never   Vaping Use   • Vaping status: Never Used   Substance and Sexual Activity   • Alcohol use: Not Currently     Comment: occ   • Drug use: No   • Sexual activity: Yes     Partners: Male     Birth control/protection: Female Sterilization     Current Outpatient Medications on File Prior to Visit   Medication Sig   • cyclobenzaprine (FLEXERIL) 10 mg tablet Take 10 mg by mouth   • famotidine (PEPCID) 40 MG tablet Take 1 tablet by mouth every evening   • furosemide (LASIX) 20 mg tablet Take 20 mg by mouth daily as needed   • gabapentin (NEURONTIN) 300 mg capsule Take 600 mg by mouth Three times a day   • omeprazole (PriLOSEC) 20 mg delayed release capsule TAKE 1 CAPSULE (20 MG TOTAL) BY MOUTH DAILY   • tirzepatide (Zepbound) 5 mg/0.5 mL auto-injector Inject 0.5 mL (5 mg total) under the skin once a week   • tiZANidine (ZANAFLEX) 2 mg tablet Take 2 mg by mouth   • [DISCONTINUED] ondansetron (ZOFRAN) 4 mg tablet Take 1 tablet (4 mg total) by mouth every 8 (eight) hours as needed for nausea or vomiting   • clindamycin (CLEOCIN) 2 % vaginal cream Insert 1 applicator into the vagina daily at bedtime (Patient not taking: Reported on 8/5/2024)   • lidocaine (Lidoderm) 5 % Apply 1 patch topically over 12 hours daily Remove & Discard patch within 12 hours or as directed by MD (Patient not taking: Reported on 11/19/2024)   • lidocaine (LMX) 4 % cream Apply topically as needed for mild pain (Patient not taking: Reported on 8/5/2024)   • methocarbamol (ROBAXIN) 750 mg tablet Take 750 mg by mouth (Patient not taking: Reported on 12/19/2024)   • Multiple Vitamin (multivitamin) tablet Take 1 tablet by mouth daily (Patient not taking: Reported on 8/5/2024)   • promethazine (PHENERGAN) 25 mg tablet Take 1 tablet (25 mg total) by mouth every 6 (six) hours as needed for nausea or vomiting (Patient not taking: Reported on 8/5/2024)   • tamsulosin (FLOMAX) 0.4 mg Take 0.4 mg by mouth (Patient not taking: Reported on 11/19/2024)   •  "[DISCONTINUED] magnesium citrate (CITROMA) 1.745 g/30 mL oral solution Take 296 mL by mouth once for 1 dose   • [DISCONTINUED] methylPREDNISolone 4 MG tablet therapy pack Take 4 mg by mouth   • [DISCONTINUED] ondansetron (ZOFRAN-ODT) 4 mg disintegrating tablet TAKE 1 TABLET BY MOUTH EVERY 6 HOURS AS NEEDED FOR NAUSEA OR VOMITING (Patient not taking: Reported on 11/19/2024)   • [DISCONTINUED] oxyCODONE (ROXICODONE) 5 immediate release tablet Take 5 mg by mouth (Patient not taking: Reported on 11/19/2024)   • [DISCONTINUED] pantoprazole (PROTONIX) 20 mg tablet Take 1 tablet (20 mg total) by mouth daily before breakfast   • [DISCONTINUED] sucralfate (CARAFATE) 1 g tablet TAKE 1 TABLET (1 G TOTAL) BY MOUTH 4 (FOUR) TIMES A DAY CRUSH TABLET AND DISSOLVE IN WARM WATER BEFORE TAKING.     Allergies   Allergen Reactions   • Aspirin Anaphylaxis   • Shellfish-Derived Products - Food Allergy Anaphylaxis   • Contrast [Iodinated Contrast Media] Itching and Swelling   • Ibuprofen Edema   • Morphine Hives   • Reglan [Metoclopramide] Itching and Confusion     Immunization History   Administered Date(s) Administered   • Hep B, adult 08/12/2005, 09/16/2005, 08/12/2020   • INFLUENZA 10/19/2020   • MMR 08/12/2005   • Td (adult), adsorbed 08/12/2005   • Tdap 04/05/2017, 12/15/2020   • Tuberculin Skin Test-PPD Intradermal 06/05/2019, 04/23/2021, 09/02/2022     Objective   /70 (BP Location: Left arm, Patient Position: Sitting, Cuff Size: Large)   Pulse 82   Temp (!) 97.1 °F (36.2 °C) (Tympanic)   Resp 16   Ht 5' 4\" (1.626 m)   Wt 105 kg (231 lb 6.4 oz)   LMP 08/06/2023   SpO2 98%   BMI 39.72 kg/m²     Physical Exam  Vitals and nursing note reviewed.   Constitutional:       Appearance: Normal appearance. She is well-developed.   HENT:      Head: Normocephalic and atraumatic.      Right Ear: External ear normal.      Left Ear: External ear normal.   Eyes:      Conjunctiva/sclera: Conjunctivae normal.   Cardiovascular:      Rate " and Rhythm: Normal rate and regular rhythm.      Heart sounds: Normal heart sounds.   Pulmonary:      Effort: Pulmonary effort is normal.      Breath sounds: Normal breath sounds.   Musculoskeletal:         General: Normal range of motion.      Cervical back: Normal range of motion.   Skin:     General: Skin is warm and dry.   Neurological:      Mental Status: She is alert and oriented to person, place, and time.   Psychiatric:         Mood and Affect: Mood normal.         Behavior: Behavior normal.

## 2025-02-19 NOTE — ASSESSMENT & PLAN NOTE
- Stable on current medications.   - Continue routine follow up with Orthopedic Surgery - sees Von/FLAVIO.

## 2025-02-19 NOTE — ASSESSMENT & PLAN NOTE
- Not well controlled.  - Will increase pantoprazole to 40 mg daily.   - continue Pepcid 40 mg daily that was prescribed by ENT which can further help symptoms.   - Discussed avoidance of triggers  - Will continue to monitor symptoms  - Follow up with ENT for swallow study  Orders:  •  pantoprazole (PROTONIX) 40 mg tablet; Take 1 tablet (40 mg total) by mouth daily

## 2025-04-01 NOTE — ED NOTES
Lov:03/05/2025    Nov:06/09/2025      Patient called in about not having insurance and having the forms sent out to him.    Patient transported to Blaise Hutton RN  10/28/22 [id number]

## 2025-04-02 ENCOUNTER — OFFICE VISIT (OUTPATIENT)
Dept: URGENT CARE | Age: 45
End: 2025-04-02
Payer: COMMERCIAL

## 2025-04-02 VITALS
HEART RATE: 85 BPM | SYSTOLIC BLOOD PRESSURE: 114 MMHG | BODY MASS INDEX: 39.64 KG/M2 | WEIGHT: 232.2 LBS | HEIGHT: 64 IN | RESPIRATION RATE: 18 BRPM | DIASTOLIC BLOOD PRESSURE: 80 MMHG | OXYGEN SATURATION: 97 % | TEMPERATURE: 97.6 F

## 2025-04-02 DIAGNOSIS — J02.0 STREP THROAT: Primary | ICD-10-CM

## 2025-04-02 DIAGNOSIS — M54.50 CHRONIC BILATERAL LOW BACK PAIN, UNSPECIFIED WHETHER SCIATICA PRESENT: ICD-10-CM

## 2025-04-02 DIAGNOSIS — G89.29 CHRONIC BILATERAL LOW BACK PAIN, UNSPECIFIED WHETHER SCIATICA PRESENT: ICD-10-CM

## 2025-04-02 LAB — S PYO AG THROAT QL: POSITIVE

## 2025-04-02 PROCEDURE — 99204 OFFICE O/P NEW MOD 45 MIN: CPT

## 2025-04-02 PROCEDURE — 87880 STREP A ASSAY W/OPTIC: CPT

## 2025-04-02 RX ORDER — AMOXICILLIN 500 MG/1
500 CAPSULE ORAL EVERY 12 HOURS SCHEDULED
Qty: 20 CAPSULE | Refills: 0 | Status: SHIPPED | OUTPATIENT
Start: 2025-04-02 | End: 2025-04-12

## 2025-04-02 NOTE — PATIENT INSTRUCTIONS
Take antibiotic as prescribed. Recommend eating yogurt with antibiotic use.  Recommended to switch out your toothbrush after 24 hours of antibiotic use and 5 days to prevent re-infection.  Recommend lidocaine patches OTC such as salon pas.   Acetaminophen for pain.  Heat or ice as needed.   Follow-up with PCP in 3-5 days.   Go to ER if symptoms worsen.
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
## 2025-04-02 NOTE — LETTER
April 2, 2025     Patient: Nydia So   YOB: 1980   Date of Visit: 4/2/2025       To Whom It May Concern:    It is my medical opinion that Nydia So may return to work on 4/4/25         Sincerely,        DIGNA Rolon    CC: No Recipients

## 2025-04-03 ENCOUNTER — NURSE TRIAGE (OUTPATIENT)
Dept: PHYSICAL THERAPY | Facility: OTHER | Age: 45
End: 2025-04-03

## 2025-04-03 ENCOUNTER — TELEPHONE (OUTPATIENT)
Age: 45
End: 2025-04-03
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
## 2025-04-03 NOTE — TELEPHONE ENCOUNTER
Called pt lvm with call back number to reschedule upcoming visit with Dr. Ruby on 04/04/25. Per providers request visit needs to be rescheduled due to recent visit to urgent care.

## 2025-04-03 NOTE — TELEPHONE ENCOUNTER
This nurse called the patient to proceed with the triage initiated earlier today.   Message left stating reason for the call, [facility] contact information, and hours of operation.    Encouraged the patient to CB if she would like to complete the triage and referral process.    Referral closed per protocol and will await possible call-back from the patient.     Please see triage notes from today.

## 2025-04-09 ENCOUNTER — CONSULT (OUTPATIENT)
Dept: PAIN MEDICINE | Facility: CLINIC | Age: 45
End: 2025-04-09
Payer: COMMERCIAL

## 2025-04-09 ENCOUNTER — TELEPHONE (OUTPATIENT)
Dept: GASTROENTEROLOGY | Facility: MEDICAL CENTER | Age: 45
End: 2025-04-09

## 2025-04-09 ENCOUNTER — OFFICE VISIT (OUTPATIENT)
Dept: GASTROENTEROLOGY | Facility: MEDICAL CENTER | Age: 45
End: 2025-04-09
Payer: COMMERCIAL

## 2025-04-09 ENCOUNTER — OFFICE VISIT (OUTPATIENT)
Dept: FAMILY MEDICINE CLINIC | Facility: CLINIC | Age: 45
End: 2025-04-09
Payer: COMMERCIAL

## 2025-04-09 VITALS
WEIGHT: 231.8 LBS | TEMPERATURE: 97.9 F | DIASTOLIC BLOOD PRESSURE: 73 MMHG | HEART RATE: 90 BPM | SYSTOLIC BLOOD PRESSURE: 106 MMHG | BODY MASS INDEX: 39.79 KG/M2

## 2025-04-09 VITALS
DIASTOLIC BLOOD PRESSURE: 84 MMHG | TEMPERATURE: 96.1 F | RESPIRATION RATE: 16 BRPM | HEIGHT: 64 IN | SYSTOLIC BLOOD PRESSURE: 112 MMHG | HEART RATE: 74 BPM | WEIGHT: 230.4 LBS | BODY MASS INDEX: 39.34 KG/M2 | OXYGEN SATURATION: 99 %

## 2025-04-09 VITALS — BODY MASS INDEX: 39.52 KG/M2 | WEIGHT: 231.48 LBS | HEIGHT: 64 IN

## 2025-04-09 DIAGNOSIS — Z12.31 ENCOUNTER FOR SCREENING MAMMOGRAM FOR BREAST CANCER: ICD-10-CM

## 2025-04-09 DIAGNOSIS — R10.13 EPIGASTRIC PAIN: Primary | ICD-10-CM

## 2025-04-09 DIAGNOSIS — K21.9 GASTROESOPHAGEAL REFLUX DISEASE, UNSPECIFIED WHETHER ESOPHAGITIS PRESENT: ICD-10-CM

## 2025-04-09 DIAGNOSIS — E66.01 CLASS 2 SEVERE OBESITY DUE TO EXCESS CALORIES WITH SERIOUS COMORBIDITY AND BODY MASS INDEX (BMI) OF 39.0 TO 39.9 IN ADULT (HCC): Primary | ICD-10-CM

## 2025-04-09 DIAGNOSIS — E66.812 CLASS 2 SEVERE OBESITY DUE TO EXCESS CALORIES WITH SERIOUS COMORBIDITY AND BODY MASS INDEX (BMI) OF 39.0 TO 39.9 IN ADULT (HCC): Primary | ICD-10-CM

## 2025-04-09 DIAGNOSIS — M99.03 LUMBAR REGION SOMATIC DYSFUNCTION: Primary | ICD-10-CM

## 2025-04-09 DIAGNOSIS — F32.89 OTHER DEPRESSION: ICD-10-CM

## 2025-04-09 DIAGNOSIS — M79.89 LEG SWELLING: ICD-10-CM

## 2025-04-09 DIAGNOSIS — M54.16 LUMBAR RADICULOPATHY: ICD-10-CM

## 2025-04-09 PROBLEM — F32.A DEPRESSION: Status: ACTIVE | Noted: 2025-04-09

## 2025-04-09 PROCEDURE — 99214 OFFICE O/P EST MOD 30 MIN: CPT | Performed by: NURSE PRACTITIONER

## 2025-04-09 PROCEDURE — 99204 OFFICE O/P NEW MOD 45 MIN: CPT | Performed by: ANESTHESIOLOGY

## 2025-04-09 PROCEDURE — 99214 OFFICE O/P EST MOD 30 MIN: CPT | Performed by: INTERNAL MEDICINE

## 2025-04-09 RX ORDER — FUROSEMIDE 20 MG/1
20 TABLET ORAL DAILY PRN
Qty: 30 TABLET | Refills: 1 | Status: SHIPPED | OUTPATIENT
Start: 2025-04-09

## 2025-04-09 RX ORDER — SODIUM CHLORIDE, SODIUM LACTATE, POTASSIUM CHLORIDE, CALCIUM CHLORIDE 600; 310; 30; 20 MG/100ML; MG/100ML; MG/100ML; MG/100ML
125 INJECTION, SOLUTION INTRAVENOUS CONTINUOUS
OUTPATIENT
Start: 2025-04-09

## 2025-04-09 RX ORDER — TIRZEPATIDE 10 MG/.5ML
10 INJECTION, SOLUTION SUBCUTANEOUS WEEKLY
Qty: 2 ML | Refills: 0 | Status: SHIPPED | OUTPATIENT
Start: 2025-04-09

## 2025-04-09 NOTE — ASSESSMENT & PLAN NOTE
- Patient underwent laparoscopic sleeve gastrectomy in 2011 with revision to laparoscopic Karmen-en-Y gastric bypass surgery in 2019.   - Currently on Zepbound for weight loss. She is down 1 pound since last visit and down 14 pounds total since November.  - Will increase Zepbound to 10 mg weekly. Discussed side effects.  - Encourage healthy diet and regular exercise.  - Recommend follow up here in 1 month.  - She was also referred to Weight Management as per her request to discuss other weight loss options.     Orders:  •  tirzepatide (Zepbound) 10 mg/0.5 mL auto-injector; Inject 0.5 mL (10 mg total) under the skin once a week  •  Ambulatory Referral to Weight Management; Future

## 2025-04-09 NOTE — TELEPHONE ENCOUNTER
EGD w Bravo in about 2 weeks per     Procedure: EGD w Bravo  Date: 4/21/25  Physician performing: Dr. Costa  Location of procedure:    Instructions given to patient: EGD w bravo   Diabetic: No  Clearances: N/A

## 2025-04-09 NOTE — ASSESSMENT & PLAN NOTE
She has been experiencing more symptoms over the past year.  She did start Zepbound last year.  However she feels that the symptoms were prior to the Zepbound but may have increased with the increasing dose.  She has been off it for about a week and have recommended for her to continue holding the Zepbound for the time being to see if there is any improvement.  Previously she did not have any significant heartburn and she has had a gastric bypass surgery which should be an antireflux surgery as well.  She may be having sensitivity/hypersensitive esophagus.  EGD with BRAVO   Consider Elavil.     Orders:    Ambulatory Referral to Gastroenterology    EGD; Future    Bravo pH Monitoring (must also order EGD separate); Future
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
## 2025-04-09 NOTE — ASSESSMENT & PLAN NOTE
Depression Screening Follow-up Plan: Patient's depression screening was positive with a PHQ-2 score of 6. Their PHQ-9 score was 18. Patient assessed for underlying major depression. They have no active suicidal ideations. Brief counseling provided and recommend additional follow-up/re-evaluation next office visit.  - Discussed therapy/counseling resources.   - Discussed medication particularly Cymbalta which she is going to think about.  - Will follow up in 1 month.

## 2025-04-09 NOTE — PROGRESS NOTES
Name: Nydia So      : 1980      MRN: [id number]  Encounter Provider: Jeremy Urrutia MD  Encounter Date: 2025   Encounter department: [facility]  :  Assessment & Plan  Gastroesophageal reflux disease, unspecified whether esophagitis present  She has been experiencing more symptoms over the past year.  She did start Zepbound last year.  However she feels that the symptoms were prior to the Zepbound but may have increased with the increasing dose.  She has been off it for about a week and have recommended for her to continue holding the Zepbound for the time being to see if there is any improvement.  Previously she did not have any significant heartburn and she has had a gastric bypass surgery which should be an antireflux surgery as well.  She may be having sensitivity/hypersensitive esophagus.  EGD with BRAVO   Consider Elavil.     Orders:    Ambulatory Referral to Gastroenterology    EGD; Future    Bravo pH Monitoring (must also order EGD separate); Future        History of Present Illness   Nydia So is a 44 y.o. female who presents for abdominal pain and reflux symptoms.  HPI    She is having burning in her epigastric area. She feels burning when the food is going down. At times the pain is bad and has to stop doing what she is doing. May last for a few minutes.   This has been going on for 3 - 4 months.   She had an EGD done :   Mild erythematous mucosa with erosion in the esophagus  The gastric pouch, gastrojejunal anastomosis and candace limb appeared normal.  She had nausea/ vomiting as well in  and had ph and manometry done which showed no significant reflux and normal manometry with a small 2.9 cm hernia.   She was started on protonix and was increased on the dose but no help.   Mild dysphagia a few months ago but feels that it may have been due to cervical surgery.  She has had a gastric bypass and had increased weight.   She has started  Zepbound November last year.   No constipation. No straining.   She has had a cholecystectomy done in the past.           Review of Systems   Constitutional: Negative.    HENT: Negative.     Eyes: Negative.    Respiratory: Negative.     Cardiovascular: Negative.    Gastrointestinal:         See HPI   Endocrine: Negative.    Genitourinary: Negative.    Musculoskeletal: Negative.    Skin: Negative.    Allergic/Immunologic: Negative.    Neurological: Negative.    Hematological: Negative.    Psychiatric/Behavioral: Negative.     All other systems reviewed and are negative.   A complete review of systems is negative other than that noted above in the HPI.      Current Outpatient Medications   Medication Sig Dispense Refill    Acetaminophen (TYLENOL 8 HOUR PO) Tylenol      amoxicillin (AMOXIL) 500 mg capsule Take 1 capsule (500 mg total) by mouth every 12 (twelve) hours for 10 days 20 capsule 0    clindamycin (CLEOCIN) 2 % vaginal cream Insert 1 applicator into the vagina daily at bedtime 40 g 0    cyclobenzaprine (FLEXERIL) 10 mg tablet Take 10 mg by mouth      famotidine (PEPCID) 40 MG tablet Take 1 tablet by mouth every evening      furosemide (LASIX) 20 mg tablet Take 1 tablet (20 mg total) by mouth daily as needed (leg swelling) 30 tablet 1    gabapentin (NEURONTIN) 300 mg capsule Take 600 mg by mouth Three times a day      lidocaine (Lidoderm) 5 % Apply 1 patch topically over 12 hours daily Remove & Discard patch within 12 hours or as directed by MD 5 patch 0    lidocaine (LMX) 4 % cream Apply topically as needed for mild pain 15 g 0    methocarbamol (ROBAXIN) 750 mg tablet Take 750 mg by mouth      Multiple Vitamin (multivitamin) tablet Take 1 tablet by mouth daily      omeprazole (PriLOSEC) 20 mg delayed release capsule TAKE 1 CAPSULE (20 MG TOTAL) BY MOUTH DAILY 30 capsule 3    ondansetron (ZOFRAN) 4 mg tablet Take 1 tablet (4 mg total) by mouth every 8 (eight) hours as needed for nausea or vomiting 20 tablet 3     pantoprazole (PROTONIX) 40 mg tablet Take 1 tablet (40 mg total) by mouth daily 90 tablet 1    promethazine (PHENERGAN) 25 mg tablet Take 1 tablet (25 mg total) by mouth every 6 (six) hours as needed for nausea or vomiting 10 tablet 0    tamsulosin (FLOMAX) 0.4 mg Take 0.4 mg by mouth      tirzepatide (Zepbound) 10 mg/0.5 mL auto-injector Inject 0.5 mL (10 mg total) under the skin once a week 2 mL 0    tiZANidine (ZANAFLEX) 2 mg tablet Take 2 mg by mouth       No current facility-administered medications for this visit.     Objective   /73   Pulse 90   Temp 97.9 °F (36.6 °C)   Wt 105 kg (231 lb 12.8 oz)   LMP 08/06/2023   BMI 39.79 kg/m²     Physical Exam  Constitutional:       Appearance: Normal appearance. She is well-developed.   HENT:      Head: Normocephalic and atraumatic.   Eyes:      General: No scleral icterus.     Conjunctiva/sclera: Conjunctivae normal.      Pupils: Pupils are equal, round, and reactive to light.   Cardiovascular:      Rate and Rhythm: Normal rate and regular rhythm.      Heart sounds: Normal heart sounds.   Pulmonary:      Effort: Pulmonary effort is normal. No respiratory distress.      Breath sounds: Normal breath sounds.   Abdominal:      General: Bowel sounds are normal. There is no distension.      Palpations: Abdomen is soft. There is no mass.      Tenderness: There is no abdominal tenderness.      Hernia: No hernia is present.   Musculoskeletal:         General: Normal range of motion.      Cervical back: Normal range of motion.   Lymphadenopathy:      Cervical: No cervical adenopathy.   Skin:     General: Skin is warm.   Neurological:      Mental Status: She is alert and oriented to person, place, and time.   Psychiatric:         Behavior: Behavior normal.         Thought Content: Thought content normal.            Lab Results: I personally reviewed relevant lab results.       Results for orders placed during the hospital encounter of  09/19/23    EGD    Impression  Mild erythematous mucosa with erosion in the esophagus  The gastric pouch, gastrojejunal anastomosis and candace limb appeared normal.      RECOMMENDATION:  Continue PPI and Carafate  I reviewed diet and lifestyle precautions. This includes limiting coffee, soda, tomatoes, citrus, fatty and spicy foods.  I recommend waiting 3 hours after dinner to lie down. I recommend eating small frequent meals as well as sleeping with head of bed elevated at night.    Referral to hematology for iron infusion.      Mark Rudolph MD

## 2025-04-09 NOTE — H&P (VIEW-ONLY)
Name: Nydia So      : 1980      MRN: [id number]  Encounter Provider: Jeremy Urrutia MD  Encounter Date: 2025   Encounter department: [facility]  :  Assessment & Plan  Gastroesophageal reflux disease, unspecified whether esophagitis present  She has been experiencing more symptoms over the past year.  She did start Zepbound last year.  However she feels that the symptoms were prior to the Zepbound but may have increased with the increasing dose.  She has been off it for about a week and have recommended for her to continue holding the Zepbound for the time being to see if there is any improvement.  Previously she did not have any significant heartburn and she has had a gastric bypass surgery which should be an antireflux surgery as well.  She may be having sensitivity/hypersensitive esophagus.  EGD with BRAVO   Consider Elavil.     Orders:    Ambulatory Referral to Gastroenterology    EGD; Future    Bravo pH Monitoring (must also order EGD separate); Future        History of Present Illness   Ndyia So is a 44 y.o. female who presents for abdominal pain and reflux symptoms.  HPI    She is having burning in her epigastric area. She feels burning when the food is going down. At times the pain is bad and has to stop doing what she is doing. May last for a few minutes.   This has been going on for 3 - 4 months.   She had an EGD done :   Mild erythematous mucosa with erosion in the esophagus  The gastric pouch, gastrojejunal anastomosis and candace limb appeared normal.  She had nausea/ vomiting as well in  and had ph and manometry done which showed no significant reflux and normal manometry with a small 2.9 cm hernia.   She was started on protonix and was increased on the dose but no help.   Mild dysphagia a few months ago but feels that it may have been due to cervical surgery.  She has had a gastric bypass and had increased weight.   She has started  Zepbound November last year.   No constipation. No straining.   She has had a cholecystectomy done in the past.           Review of Systems   Constitutional: Negative.    HENT: Negative.     Eyes: Negative.    Respiratory: Negative.     Cardiovascular: Negative.    Gastrointestinal:         See HPI   Endocrine: Negative.    Genitourinary: Negative.    Musculoskeletal: Negative.    Skin: Negative.    Allergic/Immunologic: Negative.    Neurological: Negative.    Hematological: Negative.    Psychiatric/Behavioral: Negative.     All other systems reviewed and are negative.   A complete review of systems is negative other than that noted above in the HPI.      Current Outpatient Medications   Medication Sig Dispense Refill    Acetaminophen (TYLENOL 8 HOUR PO) Tylenol      amoxicillin (AMOXIL) 500 mg capsule Take 1 capsule (500 mg total) by mouth every 12 (twelve) hours for 10 days 20 capsule 0    clindamycin (CLEOCIN) 2 % vaginal cream Insert 1 applicator into the vagina daily at bedtime 40 g 0    cyclobenzaprine (FLEXERIL) 10 mg tablet Take 10 mg by mouth      famotidine (PEPCID) 40 MG tablet Take 1 tablet by mouth every evening      furosemide (LASIX) 20 mg tablet Take 1 tablet (20 mg total) by mouth daily as needed (leg swelling) 30 tablet 1    gabapentin (NEURONTIN) 300 mg capsule Take 600 mg by mouth Three times a day      lidocaine (Lidoderm) 5 % Apply 1 patch topically over 12 hours daily Remove & Discard patch within 12 hours or as directed by MD 5 patch 0    lidocaine (LMX) 4 % cream Apply topically as needed for mild pain 15 g 0    methocarbamol (ROBAXIN) 750 mg tablet Take 750 mg by mouth      Multiple Vitamin (multivitamin) tablet Take 1 tablet by mouth daily      omeprazole (PriLOSEC) 20 mg delayed release capsule TAKE 1 CAPSULE (20 MG TOTAL) BY MOUTH DAILY 30 capsule 3    ondansetron (ZOFRAN) 4 mg tablet Take 1 tablet (4 mg total) by mouth every 8 (eight) hours as needed for nausea or vomiting 20 tablet 3     pantoprazole (PROTONIX) 40 mg tablet Take 1 tablet (40 mg total) by mouth daily 90 tablet 1    promethazine (PHENERGAN) 25 mg tablet Take 1 tablet (25 mg total) by mouth every 6 (six) hours as needed for nausea or vomiting 10 tablet 0    tamsulosin (FLOMAX) 0.4 mg Take 0.4 mg by mouth      tirzepatide (Zepbound) 10 mg/0.5 mL auto-injector Inject 0.5 mL (10 mg total) under the skin once a week 2 mL 0    tiZANidine (ZANAFLEX) 2 mg tablet Take 2 mg by mouth       No current facility-administered medications for this visit.     Objective   /73   Pulse 90   Temp 97.9 °F (36.6 °C)   Wt 105 kg (231 lb 12.8 oz)   LMP 08/06/2023   BMI 39.79 kg/m²     Physical Exam  Constitutional:       Appearance: Normal appearance. She is well-developed.   HENT:      Head: Normocephalic and atraumatic.   Eyes:      General: No scleral icterus.     Conjunctiva/sclera: Conjunctivae normal.      Pupils: Pupils are equal, round, and reactive to light.   Cardiovascular:      Rate and Rhythm: Normal rate and regular rhythm.      Heart sounds: Normal heart sounds.   Pulmonary:      Effort: Pulmonary effort is normal. No respiratory distress.      Breath sounds: Normal breath sounds.   Abdominal:      General: Bowel sounds are normal. There is no distension.      Palpations: Abdomen is soft. There is no mass.      Tenderness: There is no abdominal tenderness.      Hernia: No hernia is present.   Musculoskeletal:         General: Normal range of motion.      Cervical back: Normal range of motion.   Lymphadenopathy:      Cervical: No cervical adenopathy.   Skin:     General: Skin is warm.   Neurological:      Mental Status: She is alert and oriented to person, place, and time.   Psychiatric:         Behavior: Behavior normal.         Thought Content: Thought content normal.            Lab Results: I personally reviewed relevant lab results.       Results for orders placed during the hospital encounter of  09/19/23    EGD    Impression  Mild erythematous mucosa with erosion in the esophagus  The gastric pouch, gastrojejunal anastomosis and candace limb appeared normal.      RECOMMENDATION:  Continue PPI and Carafate  I reviewed diet and lifestyle precautions. This includes limiting coffee, soda, tomatoes, citrus, fatty and spicy foods.  I recommend waiting 3 hours after dinner to lie down. I recommend eating small frequent meals as well as sleeping with head of bed elevated at night.    Referral to hematology for iron infusion.      Mark Rudolph MD

## 2025-04-09 NOTE — ASSESSMENT & PLAN NOTE
- Not well controlled.  - Continue pantoprazole 40 mg daily before breakfast.   - Avoid triggering food and beverage.  - Referred to GI for further follow up.   Orders:  •  Ambulatory Referral to Gastroenterology; Future
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
## 2025-04-09 NOTE — ASSESSMENT & PLAN NOTE
- Prescription sent for lasix 20 mg daily as needed for leg swelling. Discussed side effects.  - Will continue to monitor.   Orders:  •  furosemide (LASIX) 20 mg tablet; Take 1 tablet (20 mg total) by mouth daily as needed (leg swelling)

## 2025-04-12 ENCOUNTER — HOSPITAL ENCOUNTER (EMERGENCY)
Facility: HOSPITAL | Age: 45
Discharge: HOME | End: 2025-04-13
Attending: EMERGENCY MEDICINE
Payer: COMMERCIAL

## 2025-04-12 DIAGNOSIS — R10.13 EPIGASTRIC ABDOMINAL PAIN: Primary | ICD-10-CM

## 2025-04-12 DIAGNOSIS — Z98.84 S/P GASTRIC BYPASS: ICD-10-CM

## 2025-04-12 DIAGNOSIS — K59.00 CONSTIPATION: ICD-10-CM

## 2025-04-12 PROCEDURE — 99285 EMERGENCY DEPT VISIT HI MDM: CPT | Performed by: EMERGENCY MEDICINE

## 2025-04-12 PROCEDURE — 81025 URINE PREGNANCY TEST: CPT | Performed by: EMERGENCY MEDICINE

## 2025-04-12 PROCEDURE — 99284 EMERGENCY DEPT VISIT MOD MDM: CPT

## 2025-04-12 RX ORDER — HYDROMORPHONE HCL IN WATER/PF 6 MG/30 ML
0.2 PATIENT CONTROLLED ANALGESIA SYRINGE INTRAVENOUS ONCE
Status: COMPLETED | OUTPATIENT
Start: 2025-04-12 | End: 2025-04-13

## 2025-04-12 RX ORDER — ONDANSETRON 2 MG/ML
4 INJECTION INTRAMUSCULAR; INTRAVENOUS ONCE
Status: COMPLETED | OUTPATIENT
Start: 2025-04-12 | End: 2025-04-13

## 2025-04-12 RX ORDER — FAMOTIDINE 10 MG/ML
20 INJECTION, SOLUTION INTRAVENOUS ONCE
Status: COMPLETED | OUTPATIENT
Start: 2025-04-12 | End: 2025-04-13

## 2025-04-13 ENCOUNTER — APPOINTMENT (EMERGENCY)
Dept: CT IMAGING | Facility: HOSPITAL | Age: 45
End: 2025-04-13
Payer: COMMERCIAL

## 2025-04-13 VITALS
RESPIRATION RATE: 16 BRPM | HEART RATE: 87 BPM | OXYGEN SATURATION: 98 % | TEMPERATURE: 98 F | BODY MASS INDEX: 39.77 KG/M2 | SYSTOLIC BLOOD PRESSURE: 124 MMHG | DIASTOLIC BLOOD PRESSURE: 77 MMHG | WEIGHT: 231.7 LBS

## 2025-04-13 LAB
ALBUMIN SERPL BCG-MCNC: 4.5 G/DL (ref 3.5–5)
ALP SERPL-CCNC: 73 U/L (ref 34–104)
ALT SERPL W P-5'-P-CCNC: 11 U/L (ref 7–52)
ANION GAP SERPL CALCULATED.3IONS-SCNC: 6 MMOL/L (ref 4–13)
AST SERPL W P-5'-P-CCNC: 23 U/L (ref 13–39)
BACTERIA UR QL AUTO: ABNORMAL /HPF
BASOPHILS # BLD AUTO: 0.02 THOUSANDS/ÂΜL (ref 0–0.1)
BASOPHILS NFR BLD AUTO: 0 % (ref 0–1)
BILIRUB SERPL-MCNC: 0.34 MG/DL (ref 0.2–1)
BILIRUB UR QL STRIP: NEGATIVE
BUN SERPL-MCNC: 12 MG/DL (ref 5–25)
CALCIUM SERPL-MCNC: 9.4 MG/DL (ref 8.4–10.2)
CHLORIDE SERPL-SCNC: 101 MMOL/L (ref 96–108)
CLARITY UR: CLEAR
CO2 SERPL-SCNC: 29 MMOL/L (ref 21–32)
COLOR UR: YELLOW
CREAT SERPL-MCNC: 0.89 MG/DL (ref 0.6–1.3)
EOSINOPHIL # BLD AUTO: 0.11 THOUSAND/ÂΜL (ref 0–0.61)
EOSINOPHIL NFR BLD AUTO: 2 % (ref 0–6)
ERYTHROCYTE [DISTWIDTH] IN BLOOD BY AUTOMATED COUNT: 16.6 % (ref 11.6–15.1)
EXT PREGNANCY TEST URINE: NEGATIVE
EXT. CONTROL: NORMAL
GFR SERPL CREATININE-BSD FRML MDRD: 79 ML/MIN/1.73SQ M
GLUCOSE SERPL-MCNC: 86 MG/DL (ref 65–140)
GLUCOSE UR STRIP-MCNC: NEGATIVE MG/DL
HCT VFR BLD AUTO: 40.5 % (ref 34.8–46.1)
HGB BLD-MCNC: 13.1 G/DL (ref 11.5–15.4)
HGB UR QL STRIP.AUTO: ABNORMAL
IMM GRANULOCYTES # BLD AUTO: 0.01 THOUSAND/UL (ref 0–0.2)
IMM GRANULOCYTES NFR BLD AUTO: 0 % (ref 0–2)
KETONES UR STRIP-MCNC: NEGATIVE MG/DL
LACTATE SERPL-SCNC: 0.8 MMOL/L (ref 0.5–2)
LEUKOCYTE ESTERASE UR QL STRIP: NEGATIVE
LIPASE SERPL-CCNC: 22 U/L (ref 11–82)
LYMPHOCYTES # BLD AUTO: 3.76 THOUSANDS/ÂΜL (ref 0.6–4.47)
LYMPHOCYTES NFR BLD AUTO: 50 % (ref 14–44)
MCH RBC QN AUTO: 27.9 PG (ref 26.8–34.3)
MCHC RBC AUTO-ENTMCNC: 32.3 G/DL (ref 31.4–37.4)
MCV RBC AUTO: 86 FL (ref 82–98)
MONOCYTES # BLD AUTO: 0.58 THOUSAND/ÂΜL (ref 0.17–1.22)
MONOCYTES NFR BLD AUTO: 8 % (ref 4–12)
MUCOUS THREADS UR QL AUTO: ABNORMAL
NEUTROPHILS # BLD AUTO: 2.99 THOUSANDS/ÂΜL (ref 1.85–7.62)
NEUTS SEG NFR BLD AUTO: 40 % (ref 43–75)
NITRITE UR QL STRIP: NEGATIVE
NON-SQ EPI CELLS URNS QL MICRO: ABNORMAL /HPF
NRBC BLD AUTO-RTO: 0 /100 WBCS
PH UR STRIP.AUTO: 6 [PH] (ref 4.5–8)
PLATELET # BLD AUTO: 281 THOUSANDS/UL (ref 149–390)
PMV BLD AUTO: 9.5 FL (ref 8.9–12.7)
POTASSIUM SERPL-SCNC: 4.3 MMOL/L (ref 3.5–5.3)
PROT SERPL-MCNC: 8.1 G/DL (ref 6.4–8.4)
PROT UR STRIP-MCNC: NEGATIVE MG/DL
RBC # BLD AUTO: 4.69 MILLION/UL (ref 3.81–5.12)
RBC #/AREA URNS AUTO: ABNORMAL /HPF
SODIUM SERPL-SCNC: 136 MMOL/L (ref 135–147)
SP GR UR STRIP.AUTO: 1.02 (ref 1–1.03)
UROBILINOGEN UR QL STRIP.AUTO: 0.2 E.U./DL
WBC # BLD AUTO: 7.47 THOUSAND/UL (ref 4.31–10.16)
WBC #/AREA URNS AUTO: ABNORMAL /HPF

## 2025-04-13 PROCEDURE — 96375 TX/PRO/DX INJ NEW DRUG ADDON: CPT

## 2025-04-13 PROCEDURE — 74176 CT ABD & PELVIS W/O CONTRAST: CPT

## 2025-04-13 PROCEDURE — 85025 COMPLETE CBC W/AUTO DIFF WBC: CPT | Performed by: EMERGENCY MEDICINE

## 2025-04-13 PROCEDURE — 83690 ASSAY OF LIPASE: CPT | Performed by: EMERGENCY MEDICINE

## 2025-04-13 PROCEDURE — 81001 URINALYSIS AUTO W/SCOPE: CPT

## 2025-04-13 PROCEDURE — 80053 COMPREHEN METABOLIC PANEL: CPT | Performed by: EMERGENCY MEDICINE

## 2025-04-13 PROCEDURE — 36415 COLL VENOUS BLD VENIPUNCTURE: CPT | Performed by: EMERGENCY MEDICINE

## 2025-04-13 PROCEDURE — 96361 HYDRATE IV INFUSION ADD-ON: CPT

## 2025-04-13 PROCEDURE — 83605 ASSAY OF LACTIC ACID: CPT | Performed by: EMERGENCY MEDICINE

## 2025-04-13 PROCEDURE — 96374 THER/PROPH/DIAG INJ IV PUSH: CPT

## 2025-04-13 RX ORDER — SUCRALFATE 1 G/1
1 TABLET ORAL ONCE
Status: COMPLETED | OUTPATIENT
Start: 2025-04-13 | End: 2025-04-13

## 2025-04-13 RX ORDER — HYDROMORPHONE HCL/PF 1 MG/ML
0.5 SYRINGE (ML) INJECTION ONCE
Status: COMPLETED | OUTPATIENT
Start: 2025-04-13 | End: 2025-04-13

## 2025-04-13 RX ORDER — POLYETHYLENE GLYCOL 3350 17 G/17G
17 POWDER, FOR SOLUTION ORAL DAILY
Qty: 116 G | Refills: 0 | Status: SHIPPED | OUTPATIENT
Start: 2025-04-13

## 2025-04-13 RX ORDER — ONDANSETRON 4 MG/1
4 TABLET, ORALLY DISINTEGRATING ORAL EVERY 6 HOURS PRN
Qty: 10 TABLET | Refills: 0 | Status: SHIPPED | OUTPATIENT
Start: 2025-04-13

## 2025-04-13 RX ORDER — DOCUSATE SODIUM 100 MG/1
100 CAPSULE, LIQUID FILLED ORAL EVERY 12 HOURS
Qty: 60 CAPSULE | Refills: 0 | Status: SHIPPED | OUTPATIENT
Start: 2025-04-13 | End: 2025-04-14 | Stop reason: ALTCHOICE

## 2025-04-13 RX ADMIN — IOHEXOL 50 ML: 240 INJECTION, SOLUTION INTRATHECAL; INTRAVASCULAR; INTRAVENOUS; ORAL at 02:27

## 2025-04-13 RX ADMIN — ONDANSETRON 4 MG: 2 INJECTION INTRAMUSCULAR; INTRAVENOUS at 00:13

## 2025-04-13 RX ADMIN — SODIUM CHLORIDE 1000 ML: 0.9 INJECTION, SOLUTION INTRAVENOUS at 00:14

## 2025-04-13 RX ADMIN — SUCRALFATE 1 G: 1 TABLET ORAL at 03:31

## 2025-04-13 RX ADMIN — HYDROMORPHONE HYDROCHLORIDE 0.2 MG: 0.2 INJECTION, SOLUTION INTRAMUSCULAR; INTRAVENOUS; SUBCUTANEOUS at 00:20

## 2025-04-13 RX ADMIN — HYDROMORPHONE HYDROCHLORIDE 0.5 MG: 1 INJECTION, SOLUTION INTRAMUSCULAR; INTRAVENOUS; SUBCUTANEOUS at 00:56

## 2025-04-13 RX ADMIN — FAMOTIDINE 20 MG: 10 INJECTION, SOLUTION INTRAVENOUS at 00:15

## 2025-04-13 NOTE — DISCHARGE INSTRUCTIONS
Follow up with GI, call on Monday to see if earlier appointments are available for your endoscopy.    Bowel Regimen:  1.) Colace, take one tablet twice a day  2.) Miralax, take one capful twice a day until you start having bowel movements then decrease to once a day

## 2025-04-13 NOTE — ED PROVIDER NOTES
Time reflects when diagnosis was documented in both MDM as applicable and the Disposition within this note       Time User Action Codes Description Comment    4/13/2025  4:48 AM Bella Pelayo Add [R10.13] Epigastric abdominal pain     4/13/2025  4:48 AM Bella Pelayo Add [K59.00] Constipation     4/13/2025  4:49 AM Bella Pelayo Add [Z98.84] S/P gastric bypass           ED Disposition       ED Disposition   Discharge    Condition   Stable    Date/Time   Sun Apr 13, 2025  4:48 AM    Comment   Nydia So discharge to home/self care.                   Assessment & Plan       Medical Decision Making  A 44-year-old female presents with epigastric abdominal pain, associated with nausea and vomiting.  She appears uncomfortable with reproducible tenderness on exam.  Concern for obstruction vs PUD vs gastritis.  Less likely side effect to her Zepbound giving timing since last injection.  Will proceed with labs, including lactic for bowel ischemia.  Will obtain CTAP and treat symptomatically.    Amount and/or Complexity of Data Reviewed  Labs: ordered. Decision-making details documented in ED Course.  Radiology: ordered. Decision-making details documented in ED Course.    Risk  OTC drugs.  Prescription drug management.        ED Course as of 04/13/25 0457   Sun Apr 13, 2025   0041 LACTIC ACID: 0.8  WNL   0043 Remainder of labs WNL   0319 Pt updated on pending CTAP results, reports ongoing burning epigastric pain.  Will give sucralfate now.     0429 CT abdomen pelvis wo contrast  Status post gastric bypass. No discrete evidence of bowel obstruction. Moderate amount of stool in the colon.   0446 Pt updated on CT results.  She expresses concern and frustration, as her symptoms have been ongoing since November and she wants definitive answers/treatments.  Discussed with patient that she needs to follow up at her previously scheduled EGD (4/21), this will help diagnosis PUD.  She should continue her medications as  "previously prescribed, as well as a bowel regimen.       Medications   sodium chloride 0.9 % bolus 1,000 mL (1,000 mL Intravenous New Bag 25 0014)   ondansetron (ZOFRAN) injection 4 mg (4 mg Intravenous Given 25 0013)   HYDROmorphone HCl (DILAUDID) injection 0.2 mg (0.2 mg Intravenous Given 25 0020)   Famotidine (PF) (PEPCID) injection 20 mg (20 mg Intravenous Given 25 0015)   HYDROmorphone (DILAUDID) injection 0.5 mg (0.5 mg Intravenous Given 25 0056)   iohexol (OMNIPAQUE) 240 MG/ML solution 50 mL (50 mL Oral Given 25 0227)   sucralfate (CARAFATE) tablet 1 g (1 g Oral Given 25 0331)       ED Risk Strat Scores                    No data recorded                            History of Present Illness       Chief Complaint   Patient presents with    Abdominal Pain     Patient states abdominal pain for the past few days, nausea, vomiting yesterday not today       Past Medical History:   Diagnosis Date    Abdominal pain     \"almost constant\"    Anemia     Anesthesia     \"woke up swinging with last  2018  \"was fine with EGD\"    Back pain     Bariatric surgery status     2008-gastric sleeve revison RUEN-Y 2019-abd painnow-dx lap today 3/9/2020    Constipation     COVID     x 2 -  and     Dental bridge present     right lower permanent    Diarrhea     Difficulty swallowing     \"in the past\"    Disease of thyroid gland     not on meds now    Dizzy     Edema     ble's    Fatigue     \"when blood pressure low\" and weakness too\"    GERD (gastroesophageal reflux disease)     \"increase after surgery\"    Heart murmur     heart murmer, work up negative with holter monitor    History of 2  sections     most recent 17    History of D&C 2019    History of iron deficiency     anemia/ had IV infusions through pregnancy in 3095-8266    History of transfusion     2019 - pt had allergic reaction - had to stop the blood    Inguinal hernia     right    Low BP     \"off and " "on\"    Migraine     N&V (nausea and vomiting)     \" a little better since on meds\"    Non-intractable vomiting     \"not since been on medicine\"    Palpitations     \"having again\"    PONV (postoperative nausea and vomiting)     Postgastrectomy malabsorption       Past Surgical History:   Procedure Laterality Date     SECTION      x2    CHOLECYSTECTOMY      CHROMOSOME ANALYSIS, PRODUCTS OF CONCEPTION (HISTORICAL)      2 miscarriages in  and Nov    FL DISCOGRAM LUMBAR  2024    HYSTERECTOMY      LAPAROSCOPIC SALPINGOOPHERECTOMY Right 2018    LVH, 6 cm benign ovarian cyst    LAPAROSCOPY N/A 2020    Procedure: DIAGNOSTIC LAPAROSCOPY,CLOSURE OF COATES DEFECT, INTRAOP EGD;  Surgeon: Jose Moya MD;  Location: [facility];  Service: Bariatrics    KS EGD TRANSORAL BIOPSY SINGLE/MULTIPLE N/A 2019    Procedure: ESOPHAGOGASTRODUODENOSCOPY (EGD) with bx;  Surgeon: Jose Moya MD;  Location: [facility];  Service: Bariatrics    KS LAPS GSTR RSTCV PX W/BYP SREE-EN-Y LIMB <150 CM N/A 2019    Procedure: LAP SREE-EN-Y GASTRIC BYPASS, INTRAOP EGD;  Surgeon: Jose Moya MD;  Location: [facility];  Service: Bariatrics    KS LAPS SUPRACRV HYSTERECT 250 GM/< RMVL TUBE/OVAR Bilateral 2023    Procedure: (LSH);  Surgeon: Dayron Lou DO;  Location: [facility];  Service: Gynecology    KS TX MISSED  FIRST TRIMESTER SURGICAL N/A 2019    Procedure: DILATATION AND EVACUATION (D&E) (8 weeks) missed ab;  Surgeon: Madisyn Petty MD;  Location: [facility];  Service: Gynecology    REVISION BYPASS LAPAROSCOPIC N/A 2019    Procedure: LAP REVISION/ CONVERSION;  Surgeon: Jose Moya MD;  Location: [facility];  Service: Bariatrics    SALPINGOOPHORECTOMY Right 2018    SLEEVE GASTROPLASTY        Family History   Problem Relation Age of Onset    Hypertension Mother     Heart disease Mother     No Known Problems Father       Social History     Tobacco Use    " Smoking status: Never    Smokeless tobacco: Never   Vaping Use    Vaping status: Never Used   Substance Use Topics    Alcohol use: Not Currently     Comment: occ    Drug use: No      E-Cigarette/Vaping    E-Cigarette Use Never User       E-Cigarette/Vaping Substances    Nicotine No     THC No     CBD No     Flavoring No     Other No     Unknown No       I have reviewed and agree with the history as documented.     A 44-year-old female with past medical history of gastric sleeve (2011) with conversion to gastric bypass (2019) and GERD; presents with epigastric abdominal pain for the past 2-3 days.  Pain has been constant since onset.  She had nausea with vomiting, however no additional vomiting today.  She did attempt to eat soup and take Tylenol today although reported worsening abdominal pain.  She has not had a bowel movement yet today.  She otherwise denies fever, chills, chest pain, shortness of breath, peripheral edema and rashes.  She states her current pain is much more severe than her typical GERD.  She has not taken her Zepbound injections since Tuesday.      History provided by:  Patient and medical records  Abdominal Pain  Associated symptoms: nausea and vomiting        Review of Systems   Gastrointestinal:  Positive for abdominal pain, nausea and vomiting.   All other systems reviewed and are negative.      Objective       ED Triage Vitals [04/12/25 2303]   Temperature Pulse Blood Pressure Respirations SpO2 Patient Position - Orthostatic VS   98 °F (36.7 °C) 78 118/72 18 100 % Sitting      Temp src Heart Rate Source BP Location FiO2 (%) Pain Score    -- Monitor Right arm -- 10 - Worst Possible Pain      Vitals      Date and Time Temp Pulse SpO2 Resp BP Pain Score FACES Pain Rating User   04/13/25 0332 -- 87 98 % 16 124/77 -- -- TP   04/13/25 0056 -- -- -- -- -- 8 -- TP   04/13/25 0040 -- 69 100 % 18 129/76 -- -- TP   04/13/25 0037 -- -- -- -- -- 8 --    04/13/25 0020 -- -- -- -- -- 10 - Worst Possible  Pain -- TP   04/12/25 2303 98 °F (36.7 °C) 78 100 % 18 118/72 10 - Worst Possible Pain -- BLG            Physical Exam  General Appearance: alert and oriented, appears uncomfortable  Skin:  Warm, dry, intact.  No cyanosis  HEENT: Atraumatic, normocephalic.  No eye drainage.  Normal hearing.  Moist mucous membranes.    Neck: Supple, trachea midline  Cardiac: RRR; no murmurs, rub, gallops.  No pedal edema, 2+ pulses  Pulmonary: lungs CTAB; no wheezes, rales, rhonchi  Gastrointestinal: abdomen soft, upper abdominal tenderness, nondistended; no guarding or rebound tenderness; good bowel sounds, no mass or bruits  Extremities:  No deformities.  No calf tenderness, no clubbing  Neuro:  no focal motor or sensory deficits, CN 2-12 grossly intact  Psych:  Normal mood and affect, normal judgement and insight       Results Reviewed       Procedure Component Value Units Date/Time    Urine Microscopic [[id number]]  (Abnormal) Collected: 04/13/25 0049    Lab Status: Final result Specimen: Urine, Clean Catch Updated: 04/13/25 0107     RBC, UA None Seen /hpf      WBC, UA 1-2 /hpf      Epithelial Cells Innumerable /hpf      Bacteria, UA Innumerable /hpf      MUCUS THREADS Occasional    POCT pregnancy, urine [[id number]]  (Normal) Collected: 04/13/25 0052    Lab Status: Final result Updated: 04/13/25 0052     EXT Preg Test, Ur Negative     Control Valid    Urine Macroscopic, POC [[id number]]  (Abnormal) Collected: 04/13/25 0049    Lab Status: Final result Specimen: Urine Updated: 04/13/25 0050     Color, UA Yellow     Clarity, UA Clear     pH, UA 6.0     Leukocytes, UA Negative     Nitrite, UA Negative     Protein, UA Negative mg/dl      Glucose, UA Negative mg/dl      Ketones, UA Negative mg/dl      Urobilinogen, UA 0.2 E.U./dl      Bilirubin, UA Negative     Occult Blood, UA Trace     Specific Gravity, UA 1.025    Narrative:      CLINITEK RESULT    Comprehensive metabolic panel [[id number]] Collected: 04/13/25 0012    Lab Status: Final  result Specimen: Blood from Arm, Left Updated: 04/13/25 0040     Sodium 136 mmol/L      Potassium 4.3 mmol/L      Chloride 101 mmol/L      CO2 29 mmol/L      ANION GAP 6 mmol/L      BUN 12 mg/dL      Creatinine 0.89 mg/dL      Glucose 86 mg/dL      Calcium 9.4 mg/dL      AST 23 U/L      ALT 11 U/L      Alkaline Phosphatase 73 U/L      Total Protein 8.1 g/dL      Albumin 4.5 g/dL      Total Bilirubin 0.34 mg/dL      eGFR 79 ml/min/1.73sq m     Narrative:      [employer] Kid[employer] guidelines for Chronic Kidney Disease (CKD):     Stage 1 with normal or high GFR (GFR > 90 mL/min/1.73 square meters)    Stage 2 Mild CKD (GFR = 60-89 mL/min/1.73 square meters)    Stage 3A Moderate CKD (GFR = 45-59 mL/min/1.73 square meters)    Stage 3B Moderate CKD (GFR = 30-44 mL/min/1.73 square meters)    Stage 4 Severe CKD (GFR = 15-29 mL/min/1.73 square meters)    Stage 5 End Stage CKD (GFR <15 mL/min/1.73 square meters)  Note: GFR calculation is accurate only with a steady state creatinine    Lipase [[id number]]  (Normal) Collected: 04/13/25 0012    Lab Status: Final result Specimen: Blood from Arm, Left Updated: 04/13/25 0040     Lipase 22 u/L     Lactic acid, plasma (w/reflex if result > 2.0) [[id number]]  (Normal) Collected: 04/13/25 0012    Lab Status: Final result Specimen: Blood from Arm, Left Updated: 04/13/25 0039     LACTIC ACID 0.8 mmol/L     Narrative:      Result may be elevated if tourniquet was used during collection.    CBC and differential [[id number]]  (Abnormal) Collected: 04/13/25 0012    Lab Status: Final result Specimen: Blood from Arm, Left Updated: 04/13/25 0027     WBC 7.47 Thousand/uL      RBC 4.69 Million/uL      Hemoglobin 13.1 g/dL      Hematocrit 40.5 %      MCV 86 fL      MCH 27.9 pg      MCHC 32.3 g/dL      RDW 16.6 %      MPV 9.5 fL      Platelets 281 Thousands/uL      nRBC 0 /100 WBCs      Segmented % 40 %      Immature Grans % 0 %      Lymphocytes % 50 %      Monocytes % 8 %       Eosinophils Relative 2 %      Basophils Relative 0 %      Absolute Neutrophils 2.99 Thousands/µL      Absolute Immature Grans 0.01 Thousand/uL      Absolute Lymphocytes 3.76 Thousands/µL      Absolute Monocytes 0.58 Thousand/µL      Eosinophils Absolute 0.11 Thousand/µL      Basophils Absolute 0.02 Thousands/µL             CT abdomen pelvis wo contrast   Final Interpretation by Brennen Byrd DO (04/13 0427)      Status post gastric bypass. No discrete evidence of bowel obstruction. Moderate amount of stool in the colon.      Other findings as above.      Workstation performed: [id number]             Procedures    ED Medication and Procedure Management   Prior to Admission Medications   Prescriptions Last Dose Informant Patient Reported? Taking?   Acetaminophen (TYLENOL 8 HOUR PO)   Yes No   Sig: Tylenol   Multiple Vitamin (multivitamin) tablet  Self Yes No   Sig: Take 1 tablet by mouth daily   amoxicillin (AMOXIL) 500 mg capsule   No No   Sig: Take 1 capsule (500 mg total) by mouth every 12 (twelve) hours for 10 days   clindamycin (CLEOCIN) 2 % vaginal cream  Self No No   Sig: Insert 1 applicator into the vagina daily at bedtime   cyclobenzaprine (FLEXERIL) 10 mg tablet   Yes No   Sig: Take 10 mg by mouth   famotidine (PEPCID) 40 MG tablet   Yes No   Sig: Take 1 tablet by mouth every evening   furosemide (LASIX) 20 mg tablet   No No   Sig: Take 1 tablet (20 mg total) by mouth daily as needed (leg swelling)   gabapentin (NEURONTIN) 300 mg capsule   Yes No   Sig: Take 600 mg by mouth Three times a day   lidocaine (LMX) 4 % cream  Self No No   Sig: Apply topically as needed for mild pain   lidocaine (Lidoderm) 5 %  Self No No   Sig: Apply 1 patch topically over 12 hours daily Remove & Discard patch within 12 hours or as directed by MD   methocarbamol (ROBAXIN) 750 mg tablet   Yes No   Sig: Take 750 mg by mouth   omeprazole (PriLOSEC) 20 mg delayed release capsule  Self No No   Sig: TAKE 1 CAPSULE (20 MG  TOTAL) BY MOUTH DAILY   ondansetron (ZOFRAN) 4 mg tablet   No No   Sig: Take 1 tablet (4 mg total) by mouth every 8 (eight) hours as needed for nausea or vomiting   pantoprazole (PROTONIX) 40 mg tablet   No No   Sig: Take 1 tablet (40 mg total) by mouth daily   promethazine (PHENERGAN) 25 mg tablet  Self No No   Sig: Take 1 tablet (25 mg total) by mouth every 6 (six) hours as needed for nausea or vomiting   tamsulosin (FLOMAX) 0.4 mg   Yes No   Sig: Take 0.4 mg by mouth   tiZANidine (ZANAFLEX) 2 mg tablet   Yes No   Sig: Take 2 mg by mouth   tirzepatide (Zepbound) 10 mg/0.5 mL auto-injector   No No   Sig: Inject 0.5 mL (10 mg total) under the skin once a week      Facility-Administered Medications: None     Patient's Medications   Discharge Prescriptions    DOCUSATE SODIUM (COLACE) 100 MG CAPSULE    Take 1 capsule (100 mg total) by mouth every 12 (twelve) hours       Start Date: 4/13/2025 End Date: --       Order Dose: 100 mg       Quantity: 60 capsule    Refills: 0    ONDANSETRON (ZOFRAN-ODT) 4 MG DISINTEGRATING TABLET    Take 1 tablet (4 mg total) by mouth every 6 (six) hours as needed for nausea or vomiting       Start Date: 4/13/2025 End Date: --       Order Dose: 4 mg       Quantity: 10 tablet    Refills: 0    POLYETHYLENE GLYCOL (GLYCOLAX) 17 GM/SCOOP POWDER    Take 17 g by mouth daily       Start Date: 4/13/2025 End Date: --       Order Dose: 17 g       Quantity: 116 g    Refills: 0     No discharge procedures on file.  ED SEPSIS DOCUMENTATION   Time reflects when diagnosis was documented in both MDM as applicable and the Disposition within this note       Time User Action Codes Description Comment    4/13/2025  4:48 AM Bella Pelayo Add [R10.13] Epigastric abdominal pain     4/13/2025  4:48 AM Bella Pelayo Add [K59.00] Constipation     4/13/2025  4:49 AM Bella Pelayo Add [Z98.84] S/P gastric bypass                  Bella Pelayo DO  04/13/25 0457

## 2025-04-14 ENCOUNTER — NURSE TRIAGE (OUTPATIENT)
Age: 45
End: 2025-04-14

## 2025-04-14 NOTE — TELEPHONE ENCOUNTER
"FOLLOW UP: EGD scheduled of 4/22/24     REASON FOR CONVERSATION: Abdominal Pain    SYMPTOMS: Severe epigastric pain.     OTHER: Pantoprazole 40 mg in AM prescribed.  Patient states \" honestly I am just taking it all the time when I have the pain.  Patient aware that she should not be taking more then 2 doses a day.   Patient instucted to take pantoprazole BID 30 minutes before breakfast and dinner and add in famotidine at HS.  Also aware that she can take OTC Tums, gaviscon or Mylanta to help with symptoms.      Patient state \"nothing has helped I have tried everything. I really just need them to do my EGD sooner\"  Patient has not tried famotide in the past, again encouraged famotide at bedtime with pantoprazole bid.  She was also made aware that there are no sooner apt for her EGD then 4/22/25.   She had previously called in and asked for a sooner apt.     DISPOSITION: See Within 2 Weeks in Office    Reason for Disposition   Abdominal pain is a chronic symptom (recurrent or ongoing AND lasting > 4 weeks)    Answer Assessment - Initial Assessment Questions  1. LOCATION: \"Where does it hurt?\"       Epigastic pain   2. RADIATION: \"Does the pain shoot anywhere else?\" (e.g., chest, back)     denies  3. ONSET: \"When did the pain begin?\" (e.g., minutes, hours or days ago)      \" A couple months ago   4. SUDDEN: \"Gradual or sudden onset?\"     On going  5. PATTERN \"Does the pain come and go, or is it constant?\"     On going  6. SEVERITY: \"How bad is the pain?\"  (e.g., Scale 1-10; mild, moderate, or severe)      Moderate to severe   7. RECURRENT SYMPTOM: \"Have you ever had this type of stomach pain before?\" If Yes, ask: \"When was the last time?\" and \"What happened that time?\"       On going    Protocols used: Abdominal Pain - Upper-Adult-OH    "

## 2025-04-14 NOTE — TELEPHONE ENCOUNTER
Patients GI provider:  Dr. Urrutia    Number to return call: [phone]    Reason for call: Pt called in requesting to schedule a sooner appt due to symptoms patient has been experiencing. Patient states she was recently admitted into the hospital. Patient was also transferred over to [facility] for symptoms.  Please reach out to patient, thank you.    Scheduled procedure/appointment date if applicable: Apt/procedure 4/22/2025

## 2025-04-17 ENCOUNTER — OFFICE VISIT (OUTPATIENT)
Dept: BARIATRICS | Facility: CLINIC | Age: 45
End: 2025-04-17
Payer: COMMERCIAL

## 2025-04-17 VITALS
HEIGHT: 65 IN | TEMPERATURE: 97.5 F | WEIGHT: 229.5 LBS | HEART RATE: 94 BPM | DIASTOLIC BLOOD PRESSURE: 74 MMHG | BODY MASS INDEX: 38.24 KG/M2 | SYSTOLIC BLOOD PRESSURE: 114 MMHG

## 2025-04-17 DIAGNOSIS — E66.01 CLASS 2 SEVERE OBESITY DUE TO EXCESS CALORIES WITH SERIOUS COMORBIDITY AND BODY MASS INDEX (BMI) OF 39.0 TO 39.9 IN ADULT (HCC): ICD-10-CM

## 2025-04-17 DIAGNOSIS — R10.9 ABDOMINAL PAIN: ICD-10-CM

## 2025-04-17 DIAGNOSIS — Z98.84 BARIATRIC SURGERY STATUS: ICD-10-CM

## 2025-04-17 DIAGNOSIS — Z48.815 ENCOUNTER FOR SURGICAL AFTERCARE FOLLOWING SURGERY OF DIGESTIVE SYSTEM: Primary | ICD-10-CM

## 2025-04-17 DIAGNOSIS — K91.2 POSTSURGICAL MALABSORPTION: ICD-10-CM

## 2025-04-17 DIAGNOSIS — E66.812 CLASS 2 SEVERE OBESITY DUE TO EXCESS CALORIES WITH SERIOUS COMORBIDITY AND BODY MASS INDEX (BMI) OF 39.0 TO 39.9 IN ADULT (HCC): ICD-10-CM

## 2025-04-17 PROCEDURE — 99204 OFFICE O/P NEW MOD 45 MIN: CPT | Performed by: PHYSICIAN ASSISTANT

## 2025-04-17 RX ORDER — SUCRALFATE ORAL 1 G/10ML
1 SUSPENSION ORAL 4 TIMES DAILY
Qty: 1200 ML | Refills: 1 | Status: SHIPPED | OUTPATIENT
Start: 2025-04-17 | End: 2025-05-17

## 2025-04-17 NOTE — PROGRESS NOTES
Date of surgery:4/8/2019  Procedure: RNY   Performing surgeon: Dr. Moya     Initial Weight - 279 lb  Current Weight - 229.5 lb  Jhonny Weight -  215 lb  Total Body Weight Loss (EWL)- 49%  EWL% - 39%  TWB % -18%        S/B   1/8

## 2025-04-17 NOTE — PATIENT INSTRUCTIONS
Follow-up in 4-6 weeks. We kindly ask that your arrive 15 minutes before your scheduled appointment time with your provider to allow our staff to room you, get your vital signs and update your chart.    Get lab work done. Please call the office if you need a script.  It is recommended to check with your insurance BEFORE getting labs done to make sure they are covered by your policy.      Call our office if you have any problems with abdominal pain especially associated with fever, chills, nausea, vomiting or any other concerns.    All  Post-bariatric surgery patients should be aware that very small quantities of any alcohol can cause impairment and it is very possible not to feel the effect. The effect can be in the system for several hours.  It is also a stomach irritant.     It is advised to AVOID alcohol, Nonsteroidal antiinflammatory drugs (NSAIDS) and nicotine of all forms . Any of these can cause stomach irritation/pain.    Discussed the effects of alcohol on a bariatric patient and the increased impairment risk.     Keep up the good work!

## 2025-04-17 NOTE — PROGRESS NOTES
Assessment/Plan:     Patient ID: Nydia So is a 44 y.o. female.     Bariatric Surgery Status    -s/p conversion sleeve to  Karmen-En-Y Gastric Bypass with Dr. Moya in 2018. Presents to the office today for Od annual .  She has been lost to follow for many years     Patient has had difficulty losing weight over the last several years. Despite trying medications, diet and being currently on zepbound 10 mg dose pt feels she is still not losing weight. She has also tried wegovy in the past without much success.   She does try to exercise as much as possible - of note has history of back injury. She sees pain management who also recommends she lose a bit more weight to help with her chronic pain.     She c/o burning abdominal pain for several months, even prior to starting zepbound. She has been on mainly liquid/soft diet as a result and even drinking liquids can cause burning. She states the burning pain can be quite significant and can last for up to 30 mins before resolve. She saw her GI doctor and she is scheduled for EGD on April 22  She was seen in ER for the pain last week , CT overall unremarkable with exception of moderate stool in colon.   Currently taking protonix 40 mg BID     PLAN:  - continue with scheduled EGD/bravo on 4/22  - will add carafate to PPI regimen in the event of an ulcer. Of note she has history of gastric ulcer in the past   - will also order UGI for further eval of her anatomy   - follow up after with dr Moya. Patient interested to discuss surgical options.       Continued/Maintain healthy weight loss with good nutrition intakes.  Adequate hydration with at least 64oz. fluid intake.  Follow diet as discussed.  Follow vitamin and mineral recommendations as reviewed with you.  Exercise as tolerated.    Colonoscopy referral made: n/a  Mammo: scheduled     Follow-up in 4-6 weeks. We kindly ask that your arrive 15 minutes before your scheduled appointment time with your provider to  allow our staff to room you, get your vital signs and update your chart.    Get lab work done. Please call the office if you need a script.  It is recommended to check with your insurance BEFORE getting labs done to make sure they are covered by your policy.      Call our office if you have any problems with abdominal pain especially associated with fever, chills, nausea, vomiting or any other concerns.    All  Post-bariatric surgery patients should be aware that very small quantities of any alcohol can cause impairment and it is very possible not to feel the effect. The effect can be in the system for several hours.  It is also a stomach irritant.     It is advised to AVOID alcohol, Nonsteroidal antiinflammatory drugs (NSAIDS) and nicotine of all forms . Any of these can cause stomach irritation/pain.    Discussed the effects of alcohol on a bariatric patient and the increased impairment risk.     Keep up the good work!     Postsurgical Malabsorption   -At risk for malabsorption of vitamins/minerals secondary to malabsorption and restriction of intake from bariatric surgery  -Currently taking adequate postop bariatric surgery vitamin supplementation  -Next set of bariatric labs ordered for approximately 2 weeks  -Patient received education about the importance of adhering to a lifelong supplementation regimen to avoid vitamin/mineral deficiencies      Diagnoses and all orders for this visit:    Encounter for surgical aftercare following surgery of digestive system  -     Zinc; Future  -     Vitamin D 25 hydroxy; Future  -     Vitamin B12; Future  -     Vitamin B1, whole blood; Future  -     Vitamin A; Future  -     PTH, intact; Future  -     CBC and Platelet; Future  -     Comprehensive metabolic panel; Future  -     Ferritin; Future  -     Folate; Future  -     TIBC Panel (incl. Iron, TIBC, % Iron Saturation); Future    Class 2 severe obesity due to excess calories with serious comorbidity and body mass index  (BMI) of 39.0 to 39.9 in adult ([facility])  -     Ambulatory Referral to Weight Management  -     FL UPPER GI UGI; Future  -     Zinc; Future  -     Vitamin D 25 hydroxy; Future  -     Vitamin B12; Future  -     Vitamin B1, whole blood; Future  -     Vitamin A; Future  -     PTH, intact; Future  -     CBC and Platelet; Future  -     Comprehensive metabolic panel; Future  -     Ferritin; Future  -     Folate; Future  -     TIBC Panel (incl. Iron, TIBC, % Iron Saturation); Future    Bariatric surgery status  -     FL UPPER GI UGI; Future  -     Zinc; Future  -     Vitamin D 25 hydroxy; Future  -     Vitamin B12; Future  -     Vitamin B1, whole blood; Future  -     Vitamin A; Future  -     PTH, intact; Future  -     CBC and Platelet; Future  -     Comprehensive metabolic panel; Future  -     Ferritin; Future  -     Folate; Future  -     TIBC Panel (incl. Iron, TIBC, % Iron Saturation); Future    Postsurgical malabsorption  -     Zinc; Future  -     Vitamin D 25 hydroxy; Future  -     Vitamin B12; Future  -     Vitamin B1, whole blood; Future  -     Vitamin A; Future  -     PTH, intact; Future  -     CBC and Platelet; Future  -     Comprehensive metabolic panel; Future  -     Ferritin; Future  -     Folate; Future  -     TIBC Panel (incl. Iron, TIBC, % Iron Saturation); Future    Abdominal pain  -     FL UPPER GI UGI; Future  -     sucralfate (CARAFATE) 1 g/10 mL suspension; Take 10 mL (1 g total) by mouth 4 (four) times a day  -     Zinc; Future  -     Vitamin D 25 hydroxy; Future  -     Vitamin B12; Future  -     Vitamin B1, whole blood; Future  -     Vitamin A; Future  -     PTH, intact; Future  -     CBC and Platelet; Future  -     Comprehensive metabolic panel; Future  -     Ferritin; Future  -     Folate; Future  -     TIBC Panel (incl. Iron, TIBC, % Iron Saturation); Future         Subjective:      Patient ID: Nydia So is a 44 y.o. female.    -s/p conversion sleeve to  Karmen-En-Y Gastric Bypass with   "Nita in 2018. Presents to the office today for Od annual .  She has been lost to follow for many years  Tolerating diet without issues; denies N/V, dysphagia, reflux.     Initial: 279  Current: 229  EWL: (Weight loss is ahead of schedule at this post surgical period.)  Jhonny: 215  Current BMI is Body mass index is 38.19 kg/m².    Tolerating a regular diet-not currently   Eating at least 60 grams of protein per day-no  Drinking at least 64 ounces of fluid per day-no  Sufficient exercise-yes  Using NSAIDs regularly-no  Using nicotine-no  Using alcohol-no  Supplements: Multivitamins    EWL is 39%, which places the patient ahead of schedule for expected post surgical weight loss at this time.     The following portions of the patient's history were reviewed and updated as appropriate: allergies, current medications, past family history, past medical history, past social history, past surgical history and problem list.    Review of Systems   Constitutional: Negative.    Respiratory: Negative.     Cardiovascular: Negative.    Gastrointestinal:  Positive for abdominal pain.   Musculoskeletal:  Positive for back pain.   Neurological: Negative.    Psychiatric/Behavioral: Negative.           Objective:    /74 (Patient Position: Sitting, Cuff Size: Standard)   Pulse 94   Temp 97.5 °F (36.4 °C) (Tympanic)   Ht 5' 5\" (1.651 m)   Wt 104 kg (229 lb 8 oz)   LMP 08/06/2023   BMI 38.19 kg/m²      Physical Exam  Vitals and nursing note reviewed.   Constitutional:       Appearance: Normal appearance. She is obese.   HENT:      Head: Normocephalic and atraumatic.   Eyes:      Extraocular Movements: Extraocular movements intact.   Cardiovascular:      Rate and Rhythm: Normal rate.   Pulmonary:      Effort: Pulmonary effort is normal.   Musculoskeletal:         General: Normal range of motion.      Cervical back: Normal range of motion.   Skin:     General: Skin is warm and dry.   Neurological:      General: No focal deficit " present.      Mental Status: She is alert and oriented to person, place, and time.   Psychiatric:         Mood and Affect: Mood normal.

## 2025-04-22 ENCOUNTER — ANESTHESIA EVENT (OUTPATIENT)
Dept: GASTROENTEROLOGY | Facility: HOSPITAL | Age: 45
End: 2025-04-22
Payer: COMMERCIAL

## 2025-04-22 ENCOUNTER — ANESTHESIA (OUTPATIENT)
Dept: GASTROENTEROLOGY | Facility: HOSPITAL | Age: 45
End: 2025-04-22
Payer: COMMERCIAL

## 2025-04-22 ENCOUNTER — HOSPITAL ENCOUNTER (OUTPATIENT)
Dept: GASTROENTEROLOGY | Facility: HOSPITAL | Age: 45
Setting detail: OUTPATIENT SURGERY
Discharge: HOME | End: 2025-04-22
Attending: INTERNAL MEDICINE
Payer: COMMERCIAL

## 2025-04-22 VITALS
TEMPERATURE: 98.9 F | HEART RATE: 68 BPM | OXYGEN SATURATION: 99 % | DIASTOLIC BLOOD PRESSURE: 57 MMHG | SYSTOLIC BLOOD PRESSURE: 100 MMHG | RESPIRATION RATE: 18 BRPM

## 2025-04-22 DIAGNOSIS — K21.9 GASTROESOPHAGEAL REFLUX DISEASE, UNSPECIFIED WHETHER ESOPHAGITIS PRESENT: ICD-10-CM

## 2025-04-22 PROBLEM — Z98.890 PONV (POSTOPERATIVE NAUSEA AND VOMITING): Status: ACTIVE | Noted: 2022-10-28

## 2025-04-22 PROCEDURE — 43239 EGD BIOPSY SINGLE/MULTIPLE: CPT | Performed by: STUDENT IN AN ORGANIZED HEALTH CARE EDUCATION/TRAINING PROGRAM

## 2025-04-22 PROCEDURE — 88305 TISSUE EXAM BY PATHOLOGIST: CPT | Performed by: PATHOLOGY

## 2025-04-22 RX ORDER — ONDANSETRON 2 MG/ML
4 INJECTION INTRAMUSCULAR; INTRAVENOUS ONCE
Status: COMPLETED | OUTPATIENT
Start: 2025-04-22 | End: 2025-04-22

## 2025-04-22 RX ORDER — SODIUM CHLORIDE, SODIUM LACTATE, POTASSIUM CHLORIDE, CALCIUM CHLORIDE 600; 310; 30; 20 MG/100ML; MG/100ML; MG/100ML; MG/100ML
INJECTION, SOLUTION INTRAVENOUS CONTINUOUS PRN
Status: DISCONTINUED | OUTPATIENT
Start: 2025-04-22 | End: 2025-04-22

## 2025-04-22 RX ORDER — PROPOFOL 10 MG/ML
INJECTION, EMULSION INTRAVENOUS AS NEEDED
Status: DISCONTINUED | OUTPATIENT
Start: 2025-04-22 | End: 2025-04-22

## 2025-04-22 RX ORDER — SODIUM CHLORIDE, SODIUM LACTATE, POTASSIUM CHLORIDE, CALCIUM CHLORIDE 600; 310; 30; 20 MG/100ML; MG/100ML; MG/100ML; MG/100ML
125 INJECTION, SOLUTION INTRAVENOUS CONTINUOUS
Status: DISCONTINUED | OUTPATIENT
Start: 2025-04-22 | End: 2025-04-26 | Stop reason: HOSPADM

## 2025-04-22 RX ADMIN — PROPOFOL 30 MG: 10 INJECTION, EMULSION INTRAVENOUS at 09:49

## 2025-04-22 RX ADMIN — ONDANSETRON 4 MG: 2 INJECTION INTRAMUSCULAR; INTRAVENOUS at 10:14

## 2025-04-22 RX ADMIN — PROPOFOL 100 MG: 10 INJECTION, EMULSION INTRAVENOUS at 09:43

## 2025-04-22 RX ADMIN — SODIUM CHLORIDE, SODIUM LACTATE, POTASSIUM CHLORIDE, AND CALCIUM CHLORIDE 125 ML/HR: .6; .31; .03; .02 INJECTION, SOLUTION INTRAVENOUS at 08:30

## 2025-04-22 RX ADMIN — SODIUM CHLORIDE, SODIUM LACTATE, POTASSIUM CHLORIDE, AND CALCIUM CHLORIDE: .6; .31; .03; .02 INJECTION, SOLUTION INTRAVENOUS at 08:13

## 2025-04-22 RX ADMIN — PROPOFOL 50 MG: 10 INJECTION, EMULSION INTRAVENOUS at 09:47

## 2025-04-22 RX ADMIN — PROPOFOL 50 MG: 10 INJECTION, EMULSION INTRAVENOUS at 09:45

## 2025-04-22 NOTE — ANESTHESIA PREPROCEDURE EVALUATION
"Medical History    History Comments   PONV (postoperative nausea and vomiting)    Bariatric surgery status 2008-gastric sleeve revison RUEN-Y 2019-abd painnow-dx lap today 3/9/2020   Postgastrectomy malabsorption    Migraine    Anemia    Non-intractable vomiting \"not since been on medicine\"   Fatigue \"when blood pressure low\" and weakness too\"   Dizzy    Palpitations \"having again\"   Low BP \"off and on\"   History of 2  sections most recent 17   Abdominal pain \"almost constant\"   N&V (nausea and vomiting) \" a little better since on meds\"   Dental bridge present right lower permanent   Anesthesia \"woke up swinging with last  2018  \"was fine with EGD\"   History of iron deficiency anemia/ had IV infusions through pregnancy in [id number]   Difficulty swallowing \"in the past\"   Inguinal hernia right   Disease of thyroid gland not on meds now   Heart murmur heart murmer, work up negative with holter monitor   GERD (gastroesophageal reflux disease) \"increase after surgery\"   History of D&C    History of transfusion 2019 - pt had allergic reaction - had to stop the blood   Back pain    Constipation    Diarrhea    COVID x 2 -  and    Edema ble's   Procedure:  EGD  BRAVO PH MONITORING    Relevant Problems   ANESTHESIA   (+) PONV (postoperative nausea and vomiting)      GI/HEPATIC   (+) Dysphagia   (+) Gastroesophageal reflux disease      HEMATOLOGY   (+) Anemia   (+) Iron deficiency anemia secondary to inadequate dietary iron intake      NEURO/PSYCH   (+) Depression        Physical Exam    Airway    Mallampati score: II  TM Distance: >3 FB  Neck ROM: full     Dental       Cardiovascular  Rate: normal    Pulmonary  Pulmonary exam normal     Other Findings  Per pt denies anything remaining that is loose or removeable    Bracespost-pubertal.      Anesthesia Plan  ASA Score- 3     Anesthesia Type- IV sedation with anesthesia with ASA Monitors.         Additional Monitors:     Airway Plan:      "       Plan Factors-Exercise tolerance (METS): >4 METS.    Chart reviewed.    Patient summary reviewed.    Patient is not a current smoker.              Induction- intravenous.    Postoperative Plan-         Informed Consent- Anesthetic plan and risks discussed with patient.  I personally reviewed this patient with the CRNA. Discussed and agreed on the Anesthesia Plan with the CRNA..      NPO Status:  Vitals Value Taken Time   Date of last liquid 04/21/25 04/22/25 0813   Time of last liquid 2200 04/22/25 0813   Date of last solid 04/21/25 04/22/25 0813   Time of last solid 1900 04/22/25 0813

## 2025-04-22 NOTE — INTERVAL H&P NOTE
H&P reviewed. After examining the patient I find no changes in the patients condition since the H&P had been written.    Vitals:    04/22/25 0816   BP: 113/79   Pulse: 84   Resp: 15   Temp: (!) 97.2 °F (36.2 °C)   SpO2: 100%

## 2025-04-22 NOTE — ANESTHESIA POSTPROCEDURE EVALUATION
Post-Op Assessment Note    CV Status:  Stable  Pain Score: 0    Pain management: adequate       Mental Status:  Alert   Hydration Status:  Stable   PONV Controlled:  Controlled   Airway Patency:  Patent     Post Op Vitals Reviewed: Yes    No anethesia notable event occurred.    Staff: CRNA           Last Filed PACU Vitals:  Vitals Value Taken Time   Temp 98.9 °F (37.2 °C) 04/22/25 1001   Pulse 75 04/22/25 1001   /55 04/22/25 1001   Resp 18 04/22/25 1001   SpO2 100 % 04/22/25 1001       Modified Herminio:     Vitals Value Taken Time   Activity 2 04/22/25 1002   Respiration 2 04/22/25 1002   Circulation 2 04/22/25 1002   Consciousness 2 04/22/25 1002   Oxygen Saturation 2 04/22/25 1002     Modified Herminio Score: 10

## 2025-04-25 PROCEDURE — 88305 TISSUE EXAM BY PATHOLOGIST: CPT | Performed by: PATHOLOGY

## 2025-04-29 ENCOUNTER — PATIENT MESSAGE (OUTPATIENT)
Dept: FAMILY MEDICINE CLINIC | Facility: CLINIC | Age: 45
End: 2025-04-29

## 2025-04-29 NOTE — PATIENT COMMUNICATION
Pt has been c/o bad headache and no relief with over the counter pain relievers. She also is having elevated blood pressures which she is usually normal. I advised her to go to urgent care or ER. I also made her aware that we have openings tomorrow if she chooses not to go to ER.

## 2025-04-30 ENCOUNTER — TELEPHONE (OUTPATIENT)
Dept: FAMILY MEDICINE CLINIC | Facility: CLINIC | Age: 45
End: 2025-04-30

## 2025-04-30 NOTE — PATIENT COMMUNICATION
Called pt to schedule her. Pt declined appt at this time and stated she is feeling better than yesterday. I advised her again to call back if she would like to be seen or go to urgent care or ER if symptoms return.

## 2025-04-30 NOTE — TELEPHONE ENCOUNTER
Called pt to schedule her. Pt declined appt at this time and stated she is feeling better than yesterday. I advised her to call back if she would like to be seen or go to urgent care or ER if symptoms return.

## 2025-05-08 ENCOUNTER — OFFICE VISIT (OUTPATIENT)
Dept: FAMILY MEDICINE CLINIC | Facility: CLINIC | Age: 45
End: 2025-05-08
Payer: COMMERCIAL

## 2025-05-08 VITALS
SYSTOLIC BLOOD PRESSURE: 102 MMHG | OXYGEN SATURATION: 98 % | HEART RATE: 91 BPM | HEIGHT: 65 IN | DIASTOLIC BLOOD PRESSURE: 74 MMHG | TEMPERATURE: 97 F | BODY MASS INDEX: 37.25 KG/M2 | WEIGHT: 223.6 LBS | RESPIRATION RATE: 16 BRPM

## 2025-05-08 DIAGNOSIS — E66.812 CLASS 2 SEVERE OBESITY DUE TO EXCESS CALORIES WITH SERIOUS COMORBIDITY AND BODY MASS INDEX (BMI) OF 37.0 TO 37.9 IN ADULT (HCC): Primary | ICD-10-CM

## 2025-05-08 DIAGNOSIS — M54.16 LUMBAR RADICULOPATHY: ICD-10-CM

## 2025-05-08 DIAGNOSIS — F32.89 OTHER DEPRESSION: ICD-10-CM

## 2025-05-08 DIAGNOSIS — E66.01 CLASS 2 SEVERE OBESITY DUE TO EXCESS CALORIES WITH SERIOUS COMORBIDITY AND BODY MASS INDEX (BMI) OF 37.0 TO 37.9 IN ADULT (HCC): Primary | ICD-10-CM

## 2025-05-08 DIAGNOSIS — K21.9 GASTROESOPHAGEAL REFLUX DISEASE, UNSPECIFIED WHETHER ESOPHAGITIS PRESENT: ICD-10-CM

## 2025-05-08 PROCEDURE — 99214 OFFICE O/P EST MOD 30 MIN: CPT | Performed by: NURSE PRACTITIONER

## 2025-05-08 RX ORDER — BUPROPION HYDROCHLORIDE 150 MG/1
150 TABLET ORAL DAILY
Qty: 30 TABLET | Refills: 1 | Status: SHIPPED | OUTPATIENT
Start: 2025-05-08

## 2025-05-08 RX ORDER — TIRZEPATIDE 12.5 MG/.5ML
12.5 INJECTION, SOLUTION SUBCUTANEOUS WEEKLY
Qty: 2 ML | Refills: 0 | Status: SHIPPED | OUTPATIENT
Start: 2025-05-08

## 2025-05-08 NOTE — ASSESSMENT & PLAN NOTE
- Not well controlled.  - Will start patient on Wellbutrin 150 mg daily. Discussed side effects.   - Recommend follow up in 1 month.   Orders:  •  buPROPion (Wellbutrin XL) 150 mg 24 hr tablet; Take 1 tablet (150 mg total) by mouth daily

## 2025-05-08 NOTE — ASSESSMENT & PLAN NOTE
- She is down 7 pounds since her last visit.  - Will increase Zepbound to 12.5 mg weekly. Discussed side effects.  - Encourage healthy diet and regular exercise.  - Recommend follow up in 1 month.   Orders:  •  tirzepatide (Zepbound) 12.5 mg/0.5 mL auto-injector; Inject 0.5 mL (12.5 mg total) under the skin once a week

## 2025-05-08 NOTE — PROGRESS NOTES
Name: Nydia So      : 1980      MRN: [id number]  Encounter Provider: DIGNA Vinson  Encounter Date: 2025   Encounter department: [facility] [address] [facility]  :  Assessment & Plan  Class 2 severe obesity due to excess calories with serious comorbidity and body mass index (BMI) of 37.0 to 37.9 in adult ([facility])  - She is down 7 pounds since her last visit.  - Will increase Zepbound to 12.5 mg weekly. Discussed side effects.  - Encourage healthy diet and regular exercise.  - Recommend follow up in 1 month.   Orders:  •  tirzepatide (Zepbound) 12.5 mg/0.5 mL auto-injector; Inject 0.5 mL (12.5 mg total) under the skin once a week    Other depression  - Not well controlled.  - Will start patient on Wellbutrin 150 mg daily. Discussed side effects.   - Recommend follow up in 1 month.   Orders:  •  buPROPion (Wellbutrin XL) 150 mg 24 hr tablet; Take 1 tablet (150 mg total) by mouth daily    Gastroesophageal reflux disease, unspecified whether esophagitis present  - Continue Omeprazole and Carafate.  - Continue follow up with GI.        Lumbar radiculopathy  - Continue follow up with [facility].               History of Present Illness   Patient with PMH of GERD, iron deficiency, and Hx of gastric bypass surgery presents to office today for follow up. She is taking Zepbound for weight loss. She is down 7 pounds since last visit. She has been seeing GI regarding her GERD and gastritis. She is being maintained on Omeprazole and Carafate. Her depression screening last visit was positive. She would like to start medication. She denies any other concerns or complaints today.       Review of Systems   Constitutional:  Negative for fatigue and fever.   HENT:  Negative for trouble swallowing.    Eyes:  Negative for visual disturbance.   Respiratory:  Negative for cough and shortness of breath.    Cardiovascular:  Negative for chest pain and palpitations.   Gastrointestinal:   "Negative for abdominal pain and blood in stool.   Endocrine: Negative for cold intolerance and heat intolerance.   Genitourinary:  Negative for difficulty urinating and dysuria.   Musculoskeletal:  Negative for gait problem.   Skin:  Negative for rash.   Neurological:  Negative for dizziness, syncope and headaches.   Hematological:  Negative for adenopathy.   Psychiatric/Behavioral:  Positive for dysphoric mood. Negative for behavioral problems.        Objective   /74 (BP Location: Left arm, Patient Position: Sitting, Cuff Size: Standard)   Pulse 91   Temp (!) 97 °F (36.1 °C) (Tympanic)   Resp 16   Ht 5' 5\" (1.651 m)   Wt 101 kg (223 lb 9.6 oz)   LMP 08/06/2023   SpO2 98%   BMI 37.21 kg/m²      Physical Exam  Vitals and nursing note reviewed.   Constitutional:       Appearance: Normal appearance. She is well-developed.   HENT:      Head: Normocephalic and atraumatic.      Right Ear: External ear normal.      Left Ear: External ear normal.   Eyes:      Conjunctiva/sclera: Conjunctivae normal.   Cardiovascular:      Rate and Rhythm: Normal rate and regular rhythm.      Heart sounds: Normal heart sounds.   Pulmonary:      Effort: Pulmonary effort is normal.      Breath sounds: Normal breath sounds.   Musculoskeletal:         General: Normal range of motion.      Cervical back: Normal range of motion.   Skin:     General: Skin is warm and dry.   Neurological:      Mental Status: She is alert and oriented to person, place, and time.   Psychiatric:         Mood and Affect: Mood normal.         Behavior: Behavior normal.         "

## 2025-05-09 ENCOUNTER — RESULTS FOLLOW-UP (OUTPATIENT)
Dept: GASTROENTEROLOGY | Facility: MEDICAL CENTER | Age: 45
End: 2025-05-09

## 2025-05-21 NOTE — TELEPHONE ENCOUNTER
Call left  waiting for pt to return phone call for nonurgent biopsy result she can also look at her results from my chart     ----- Message from Asa Costa MD sent at 5/21/2025  1:51 PM EDT -----  Patient has not read the result message I sent via [employer] 1 week ago. I will ask GI staff to call to update the patient. Recommend reading the [employer] message to the patient - not the result note.  ----- Message -----  From: [employer]  Sent: 5/16/2025   5:00 AM EDT  To: Asa Costa MD  Subject: Notification of Unviewed Test Results            MELBA PALOMO has not viewed the following results:  - TISSUE EXAM

## 2025-05-22 NOTE — TELEPHONE ENCOUNTER
Left vm 2nd x will send letter out waiting for pt to call back for nonurgent results     ----- Message from Asa Costa MD sent at 5/21/2025  1:51 PM EDT -----  Patient has not read the result message I sent via [employer] 1 week ago. I will ask GI staff to call to update the patient. Recommend reading the [employer] message to the patient - not the result note.  ----- Message -----  From: [employer]  Sent: 5/16/2025   5:00 AM EDT  To: Asa Costa MD  Subject: Notification of Unviewed Test Results            MELBA PALOMO has not viewed the following results:  - TISSUE EXAM

## 2025-05-27 ENCOUNTER — HOSPITAL ENCOUNTER (OUTPATIENT)
Dept: RADIOLOGY | Facility: IMAGING CENTER | Age: 45
Discharge: HOME | End: 2025-05-27

## 2025-05-27 ENCOUNTER — OFFICE VISIT (OUTPATIENT)
Dept: BARIATRICS | Facility: CLINIC | Age: 45
End: 2025-05-27
Payer: COMMERCIAL

## 2025-05-27 VITALS
HEIGHT: 66 IN | HEART RATE: 92 BPM | SYSTOLIC BLOOD PRESSURE: 114 MMHG | DIASTOLIC BLOOD PRESSURE: 64 MMHG | TEMPERATURE: 98.5 F | BODY MASS INDEX: 35.27 KG/M2 | WEIGHT: 219.5 LBS

## 2025-05-27 DIAGNOSIS — R10.13 EPIGASTRIC PAIN: Primary | ICD-10-CM

## 2025-05-27 DIAGNOSIS — Z12.31 ENCOUNTER FOR SCREENING MAMMOGRAM FOR BREAST CANCER: ICD-10-CM

## 2025-05-27 PROCEDURE — 99213 OFFICE O/P EST LOW 20 MIN: CPT | Performed by: SURGERY

## 2025-05-27 NOTE — PROGRESS NOTES
Date of surgery:11/22/2011 // 4/8/2019  Procedure:sleeve // RNY  Performing surgeon:    Initial Weight - 279.0 Ibs  Current Weight -219.5 Ibs  Jhonny Weight - 210.0 Ibs  Total Body Weight Loss (EWL)- 59.5  EWL% - 47%  TWB % -21%    SB-1/8    For *NEW/TRANSFER* patients only: Who referred the patient? Please provide name and specialty (PCP, GI, etc.).

## 2025-05-27 NOTE — PROGRESS NOTES
"OFFICE VISIT - BARIATRIC SURGERY  Nydia So 44 y.o. female MRN: [id number]  Unit/Bed#:  Encounter: [id number]      HPI:  Nydia So is a 44 y.o. female status post conversion sleeve to  Karmen-En-Y Gastric Bypass with Dr. Moya in 2019. Comes to the office today for EGD/UGI review.      Subjective   She was seen last month with complaints of burning abdominal pain for several months. She had been on a mostly liquid and soft diet as a result of this.    Since she was last seen, she continues to experience epigastric pain. She is currently on Omeprazole twice daily in addition to Carafate. She denies any dysphagia.    These symptoms continue to affect her quality of life, she recently went on a cruise but was unable to enjoy the food. Her Zepbound is managed by her PCP.    Review of Systems   Constitutional:  Negative for chills and fever.   HENT:  Negative for ear pain, sore throat and trouble swallowing.    Eyes:  Negative for pain and visual disturbance.   Respiratory:  Negative for cough and shortness of breath.    Cardiovascular:  Negative for chest pain and palpitations.   Gastrointestinal:  Positive for abdominal pain. Negative for vomiting.   Genitourinary:  Negative for dysuria and hematuria.   Musculoskeletal:  Negative for arthralgias and back pain.   Skin:  Negative for color change and rash.   Neurological:  Negative for seizures and syncope.   All other systems reviewed and are negative.      Historical Information   Past Medical History[1]  Past Surgical History[2]  Social History   Social History     Substance and Sexual Activity   Alcohol Use Not Currently    Comment: occ     Social History     Substance and Sexual Activity   Drug Use No     Tobacco Use History[3]    Objective       Current Vitals:   Blood Pressure: 114/64 (05/27/25 1000)  Pulse: 92 (05/27/25 1000)  Temperature: 98.5 °F (36.9 °C) (05/27/25 1000)  Temp Source: Tympanic (05/27/25 1000)  Height: 5' 5.5\" (166.4 cm) " (05/27/25 1000)  Weight - Scale: 99.6 kg (219 lb 8 oz) (05/27/25 1000)    Invasive Devices       None                   Physical Exam  Vitals reviewed.   Constitutional:       General: She is not in acute distress.     Appearance: Normal appearance. She is not ill-appearing.   HENT:      Head: Normocephalic and atraumatic.      Nose: Nose normal.      Mouth/Throat:      Mouth: Mucous membranes are moist.      Pharynx: Oropharynx is clear.     Eyes:      Extraocular Movements: Extraocular movements intact.      Conjunctiva/sclera: Conjunctivae normal.       Cardiovascular:      Rate and Rhythm: Normal rate.   Pulmonary:      Effort: Pulmonary effort is normal. No respiratory distress.   Abdominal:      General: There is no distension.      Palpations: Abdomen is soft.      Tenderness: There is no abdominal tenderness. There is no guarding or rebound.     Musculoskeletal:         General: No deformity. Normal range of motion.      Cervical back: Normal range of motion and neck supple.     Skin:     General: Skin is warm and dry.      Coloration: Skin is not jaundiced.     Neurological:      General: No focal deficit present.      Mental Status: She is alert and oriented to person, place, and time.     Psychiatric:         Mood and Affect: Mood normal.         Thought Content: Thought content normal.         Pathology, and Other Studies: Results Review Statement: No pertinent imaging studies reviewed.      Assessment/PLAN:    yNdia So is a 44 y.o. female status post conversion sleeve to  Karmen-En-Y Gastric Bypass with Dr. Moya in 2018 . Comes to the office today for EGD/UGI review.    Workup thus far reviewed and discussed with patient: Signficant for abnormal findings.  Workup includes:     UGI  Pending      EGD  DATE OF SERVICE:  4/22/25     PHYSICIAN(S):  Attending:   Asa Costa MD      Fellow:   No Staff Documented         INDICATION:  Gastroesophageal reflux disease, unspecified whether  esophagitis present     POST-OP DIAGNOSIS:  See the impression below.     PREPROCEDURE:  Informed consent was obtained for the procedure, including sedation.  Risks of perforation, hemorrhage, adverse drug reaction and aspiration were discussed. The patient was placed in the left lateral decubitus position.     Patient was explained about the risks and benefits of the procedure. Risks including but not limited to bleeding, infection, and perforation were explained in detail. Also explained about less than 100% sensitivity with the exam and other alternatives.     PROCEDURE: EGD     DETAILS OF PROCEDURE:   Patient was taken to the procedure room where a time out was performed to confirm correct patient and correct procedure. The patient underwent monitored anesthesia care, which was administered by an anesthesia professional. The patient's blood pressure, heart rate, level of consciousness, respirations, oxygen, ECG and ETCO2 were monitored throughout the procedure. The scope was introduced through the mouth and advanced to the second part of the duodenum. Retroflexion was performed in the fundus. The patient experienced no blood loss. The procedure was not difficult. The patient tolerated the procedure well. There were no apparent adverse events.      ANESTHESIA INFORMATION:  ASA: III  Anesthesia Type: IV Sedation with Anesthesia     MEDICATIONS:  No administrations occurring from 0931 to 0957 on 04/22/25         FINDINGS:  Grade A esophagitis with a single mucosal break measuring less than 5 mm not continuous between folds, covering less than 75% of the circumference in the GE junction  Performed forceps biopsies in the upper third of the esophagus and middle third of the esophagus to rule out eosinophilic esophagitis  1 cm sliding hiatal hernia (type I hiatal hernia) - GE junction 37 cm from the incisors, diaphragmatic impression 38 cm from the incisors:  Hill classification: Grade IV  Healthy previous Karmen-en-Y  gastric bypass  The gastric pouch appeared normal. Performed random biopsy using biopsy forceps to rule out H. pylori.  The Karmen limb appeared normal.  The GE junction was visualized. A pH capsule was placed successfully in the esophagus (6 cm from the GE junction). Bravo capsule placed per protocol at 31 cm.        SPECIMENS:  ID Type Source Tests Collected by Time Destination   1 : cold bx gastric pouch r/o Hpylori Tissue Stomach TISSUE EXAM Asa Costa MD 4/22/2025  9:47 AM     2 : cold bx mid r/o EOE Tissue Esophagus TISSUE EXAM Asa Costa MD 4/22/2025  9:49 AM     3 : cold bx proximal r/o EOE Tissue Esophagus TISSUE EXAM Asa Costa MD 4/22/2025  9:50 AM              IMPRESSION:  Grade A esophagitis in the GE junction  Performed forceps biopsies in the upper third of the esophagus and middle third of the esophagus to rule out eosinophilic esophagitis  1 cm type I hiatal hernia  Healthy previous Karmen-en-Y gastric bypass  The gastric pouch appeared normal. Performed random biopsy to rule out H. pylori.  The karmen limb appeared normal.  A pH capsule was placed successfully in the esophagus (6 cm from the GE junction).      Pathology from EGD   Final Diagnosis   A. Stomach, cold bx gastric pouch r/o Hpylori:  - Gastric oxyntic type mucosa with minimal or mild chronic inflammation  - No isaac shaped bacteria seen on H&E stained tissue section  - Negative for intestinal metaplasia and dysplasia     B. Esophagus, cold bx mid r/o EOE:  - Benign squamous mucosa without specific histopathologic abnormality.  - No increased intraepithelial eosinophils seen     C. Esophagus, cold bx proximal r/o EOE:  - Benign squamous mucosa without specific histopathologic abnormality.  - No increased intraepithelial eosinophils seen          MANOMETRY/pH Study  pH STUDY:     Day 1:  Number of reflux episodes: 35  Overall acid exposure time (%): 1.5     Day 2:  Number of reflux episodes: 12  Overall acid exposure time  "(%): 0.3     Day 3:  Number of reflux episodes: 9  Overall acid exposure time (%): 1.3     Day 4:  Number of reflux episodes: 7  Overall acid exposure time (%): 0.1     DeMeester score: 4.7  Symptom correlation significant for any reported symptoms: (SI > 50% and SAP > 95%):   No     FINAL DIAGNOSIS:  No evidence of pathologic GERD. Consider functional heartburn      GASTRIC EMPTYING STUDY      --------------------------------------------------------------------  EGD and Bravo findings discussed with this patient. Her workup thus far is significant for grade A esophagitis and a 1 cm hiatal hernia without evidence of pathologic GERD based on the Bravo study. Given the small size of the hernia, would not recommend any surgical intervention at this time. Recommend that she see the dietitians to help improve her diet while she continues on Zepbound.     Plan:  - Follow up with dietitians  - Continue Omeprazole BID  - Obtain UGI (already ordered)  - Continue follow up as scheduled              Yuriy Bello MD  Bariatric Surgery  2025  10:29 AM         [1]   Past Medical History:  Diagnosis Date    Abdominal pain     \"almost constant\"    Anemia     Anesthesia     \"woke up swinging with last  2018  \"was fine with EGD\"    Back pain     Bariatric surgery status     2008-gastric sleeve revison RUEN-Y 2019-abd painnow-dx lap today 3/9/2020    Constipation     COVID     x 2 -  and     Dental bridge present     right lower permanent    Diarrhea     Difficulty swallowing     \"in the past\"    Disease of thyroid gland     not on meds now    Dizzy     Edema     ble's    Fatigue     \"when blood pressure low\" and weakness too\"    GERD (gastroesophageal reflux disease)     \"increase after surgery\"    Heart murmur     heart murmer, work up negative with holter monitor    History of 2  sections     most recent 17    History of D&C 2019    History of iron deficiency     anemia/ had IV infusions through " "pregnancy in 4455-9176    History of transfusion     2019 - pt had allergic reaction - had to stop the blood    Inguinal hernia     right    Low BP     \"off and on\"    Migraine     N&V (nausea and vomiting)     \" a little better since on meds\"    Non-intractable vomiting     \"not since been on medicine\"    Palpitations     \"having again\"    PONV (postoperative nausea and vomiting)     Postgastrectomy malabsorption    [2]   Past Surgical History:  Procedure Laterality Date     SECTION      x2    CHOLECYSTECTOMY      CHROMOSOME ANALYSIS, PRODUCTS OF CONCEPTION (HISTORICAL)      2 miscarriages in  and Nov    FL DISCOGRAM LUMBAR  2024    HYSTERECTOMY      LAPAROSCOPIC SALPINGOOPHERECTOMY Right 2018    LVH, 6 cm benign ovarian cyst    LAPAROSCOPY N/A 2020    Procedure: DIAGNOSTIC LAPAROSCOPY,CLOSURE OF COATES DEFECT, INTRAOP EGD;  Surgeon: Jose Moya MD;  Location: [facility];  Service: Bariatrics    NH EGD TRANSORAL BIOPSY SINGLE/MULTIPLE N/A 2019    Procedure: ESOPHAGOGASTRODUODENOSCOPY (EGD) with bx;  Surgeon: Jose Moya MD;  Location: [facility];  Service: Bariatrics    NH LAPS GSTR RSTCV PX W/BYP SREE-EN-Y LIMB <150 CM N/A 2019    Procedure: LAP SREE-EN-Y GASTRIC BYPASS, INTRAOP EGD;  Surgeon: Jose Moya MD;  Location: [facility];  Service: Bariatrics    NH LAPS SUPRACRV HYSTERECT 250 GM/< RMVL TUBE/OVAR Bilateral 2023    Procedure: (LSH);  Surgeon: Dayron Lou DO;  Location: [facility];  Service: Gynecology    NH TX MISSED  FIRST TRIMESTER SURGICAL N/A 2019    Procedure: DILATATION AND EVACUATION (D&E) (8 weeks) missed ab;  Surgeon: Madisyn Petty MD;  [facility]: [facility];  Service: Gynecology    REVISION BYPASS LAPAROSCOPIC N/A 2019    Procedure: LAP REVISION/ CONVERSION;  Surgeon: Jose Moya MD;  Location: [facility];  Service: Bariatrics    SALPINGOOPHORECTOMY Right 2018    SLEEVE GASTROPLASTY   "   [3]   Social History  Tobacco Use   Smoking Status Never   Smokeless Tobacco Never

## 2025-05-30 DIAGNOSIS — F32.89 OTHER DEPRESSION: ICD-10-CM

## 2025-05-31 RX ORDER — BUPROPION HYDROCHLORIDE 150 MG/1
150 TABLET ORAL DAILY
Qty: 90 TABLET | Refills: 1 | Status: SHIPPED | OUTPATIENT
Start: 2025-05-31

## 2025-06-05 ENCOUNTER — TELEPHONE (OUTPATIENT)
Dept: BARIATRICS | Facility: CLINIC | Age: 45
End: 2025-06-05

## 2025-06-06 ENCOUNTER — OFFICE VISIT (OUTPATIENT)
Dept: OBGYN CLINIC | Facility: OTHER | Age: 45
End: 2025-06-06
Payer: COMMERCIAL

## 2025-06-06 VITALS — HEIGHT: 64 IN | BODY MASS INDEX: 37.39 KG/M2 | WEIGHT: 219 LBS

## 2025-06-06 DIAGNOSIS — M25.511 RIGHT SHOULDER PAIN, UNSPECIFIED CHRONICITY: Primary | ICD-10-CM

## 2025-06-06 PROCEDURE — 99203 OFFICE O/P NEW LOW 30 MIN: CPT | Performed by: ORTHOPAEDIC SURGERY
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
## 2025-06-06 NOTE — ASSESSMENT & PLAN NOTE
I explained my current clinical findings to the patient with regards to her right shoulder pain.  Differential diagnosis includes right shoulder rotator cuff pathology, arthropathy, chronic tendinopathy, bursitis, adhesive capsulitis, complex regional pain syndrome.  Recommend her to complete her right shoulder MRI to evaluate for any structural shoulder pathology.  Also advised to obtain previous clinical test results and medical management records for review.  Patient is agreeable to calling us back once these have been completed.  In the interim, she may continue with application of topical lidocaine ointment or patch for shoulder pain.

## 2025-06-08 DIAGNOSIS — M79.89 LEG SWELLING: ICD-10-CM

## 2025-06-09 DIAGNOSIS — M79.89 LEG SWELLING: ICD-10-CM

## 2025-06-09 RX ORDER — FUROSEMIDE 20 MG/1
TABLET ORAL
Qty: 30 TABLET | Refills: 1 | Status: SHIPPED | OUTPATIENT
Start: 2025-06-09 | End: 2025-06-10

## 2025-06-10 ENCOUNTER — OFFICE VISIT (OUTPATIENT)
Dept: FAMILY MEDICINE CLINIC | Facility: CLINIC | Age: 45
End: 2025-06-10
Payer: COMMERCIAL

## 2025-06-10 ENCOUNTER — TELEPHONE (OUTPATIENT)
Age: 45
End: 2025-06-10

## 2025-06-10 VITALS
TEMPERATURE: 97.5 F | RESPIRATION RATE: 16 BRPM | HEIGHT: 64 IN | SYSTOLIC BLOOD PRESSURE: 116 MMHG | HEART RATE: 97 BPM | WEIGHT: 214.2 LBS | DIASTOLIC BLOOD PRESSURE: 70 MMHG | OXYGEN SATURATION: 98 % | BODY MASS INDEX: 36.57 KG/M2

## 2025-06-10 DIAGNOSIS — R30.0 DYSURIA: ICD-10-CM

## 2025-06-10 DIAGNOSIS — F32.89 OTHER DEPRESSION: Primary | ICD-10-CM

## 2025-06-10 DIAGNOSIS — M79.89 LEG SWELLING: ICD-10-CM

## 2025-06-10 DIAGNOSIS — E66.01 CLASS 2 SEVERE OBESITY DUE TO EXCESS CALORIES WITH SERIOUS COMORBIDITY AND BODY MASS INDEX (BMI) OF 36.0 TO 36.9 IN ADULT (HCC): ICD-10-CM

## 2025-06-10 DIAGNOSIS — G89.29 CHRONIC RIGHT SHOULDER PAIN: ICD-10-CM

## 2025-06-10 DIAGNOSIS — E66.812 CLASS 2 SEVERE OBESITY DUE TO EXCESS CALORIES WITH SERIOUS COMORBIDITY AND BODY MASS INDEX (BMI) OF 36.0 TO 36.9 IN ADULT (HCC): ICD-10-CM

## 2025-06-10 DIAGNOSIS — M25.511 CHRONIC RIGHT SHOULDER PAIN: ICD-10-CM

## 2025-06-10 LAB
BACTERIA UR QL AUTO: ABNORMAL /HPF
BILIRUB UR QL STRIP: NEGATIVE
CLARITY UR: CLEAR
COLOR UR: YELLOW
GLUCOSE UR STRIP-MCNC: NEGATIVE MG/DL
HGB UR QL STRIP.AUTO: NEGATIVE
KETONES UR STRIP-MCNC: NEGATIVE MG/DL
LEUKOCYTE ESTERASE UR QL STRIP: ABNORMAL
MUCOUS THREADS UR QL AUTO: ABNORMAL
NITRITE UR QL STRIP: NEGATIVE
NON-SQ EPI CELLS URNS QL MICRO: ABNORMAL /HPF
PH UR STRIP.AUTO: 5.5 [PH]
PROT UR STRIP-MCNC: ABNORMAL MG/DL
RBC #/AREA URNS AUTO: ABNORMAL /HPF
SP GR UR STRIP.AUTO: 1.02 (ref 1–1.03)
UROBILINOGEN UR STRIP-ACNC: <2 MG/DL
WBC #/AREA URNS AUTO: ABNORMAL /HPF

## 2025-06-10 PROCEDURE — 81001 URINALYSIS AUTO W/SCOPE: CPT | Performed by: NURSE PRACTITIONER

## 2025-06-10 PROCEDURE — 87086 URINE CULTURE/COLONY COUNT: CPT | Performed by: NURSE PRACTITIONER

## 2025-06-10 PROCEDURE — 87186 SC STD MICRODIL/AGAR DIL: CPT | Performed by: NURSE PRACTITIONER

## 2025-06-10 PROCEDURE — 99214 OFFICE O/P EST MOD 30 MIN: CPT | Performed by: NURSE PRACTITIONER

## 2025-06-10 PROCEDURE — 87077 CULTURE AEROBIC IDENTIFY: CPT | Performed by: NURSE PRACTITIONER

## 2025-06-10 RX ORDER — GABAPENTIN 300 MG/1
300 CAPSULE ORAL 2 TIMES DAILY PRN
Qty: 60 CAPSULE | Refills: 0 | Status: SHIPPED | OUTPATIENT
Start: 2025-06-10 | End: 2025-07-10

## 2025-06-10 RX ORDER — FUROSEMIDE 20 MG/1
TABLET ORAL
Qty: 90 TABLET | Refills: 1 | Status: SHIPPED | OUTPATIENT
Start: 2025-06-10 | End: 2025-06-10 | Stop reason: SDUPTHER

## 2025-06-10 RX ORDER — TIRZEPATIDE 15 MG/.5ML
15 INJECTION, SOLUTION SUBCUTANEOUS WEEKLY
Qty: 2 ML | Refills: 2 | Status: SHIPPED | OUTPATIENT
Start: 2025-06-10

## 2025-06-10 RX ORDER — FUROSEMIDE 20 MG/1
20 TABLET ORAL DAILY
Qty: 90 TABLET | Refills: 1 | Status: SHIPPED | OUTPATIENT
Start: 2025-06-10 | End: 2025-06-12 | Stop reason: SDUPTHER

## 2025-06-10 RX ORDER — NITROFURANTOIN 25; 75 MG/1; MG/1
100 CAPSULE ORAL 2 TIMES DAILY
Qty: 10 CAPSULE | Refills: 0 | Status: SHIPPED | OUTPATIENT
Start: 2025-06-10 | End: 2025-06-15

## 2025-06-10 RX ORDER — BUPROPION HYDROCHLORIDE 300 MG/1
300 TABLET ORAL EVERY MORNING
Qty: 90 TABLET | Refills: 1 | Status: SHIPPED | OUTPATIENT
Start: 2025-06-10 | End: 2025-12-07

## 2025-06-10 NOTE — ASSESSMENT & PLAN NOTE
- Not well controlled.  - Was seen by Ortho who ordered MRI of her shoulder.  - Prescription sent for gabapentin 300 mg twice daily as needed which has helped her in the past. Discussed side effects.   - Will continue to monitor.   Orders:  •  gabapentin (NEURONTIN) 300 mg capsule; Take 1 capsule (300 mg total) by mouth 2 (two) times a day as needed (pain)

## 2025-06-10 NOTE — ASSESSMENT & PLAN NOTE
- Will obtain UA and culture.  - Prescription sent for Macorbid. Will change antibiotic based on urine culture results if necessary.  - Increase oral hydration.  - Will continue to follow up.   Orders:  •  nitrofurantoin (MACROBID) 100 mg capsule; Take 1 capsule (100 mg total) by mouth 2 (two) times a day for 5 days  •  Urine culture; Future  •  UA w Reflex to Microscopic w Reflex to Culture; Future

## 2025-06-10 NOTE — ASSESSMENT & PLAN NOTE
Depression Screening Follow-up Plan: Patient's depression screening was positive with a PHQ-9 score of 14. Patient with underlying depression and was advised to continue current medications as prescribed.  - Not well controlled.  - Will increase Wellbutrin to 300 mg daily. Discussed side effects.  - Recommend follow up in 1 month.   Orders:  •  buPROPion (WELLBUTRIN XL) 300 mg 24 hr tablet; Take 1 tablet (300 mg total) by mouth every morning
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
## 2025-06-10 NOTE — TELEPHONE ENCOUNTER
[facility] sent over a message that her insurance will not cover Zepbound and sent over alternative medication for phentermine.  Please advise

## 2025-06-10 NOTE — TELEPHONE ENCOUNTER
PA for Zepbound 15mg/0.5mL SUBMITTED to [employer]    via    [x]CMM-KEY:[id number]   []Surescripts-Case ID #   []Availity-Auth ID # NDC #   []Faxed to plan   []Other website   []Phone call Case ID #     [x]PA sent as URGENT    All office notes, labs and other pertaining documents and studies sent. Clinical questions answered. Awaiting determination from insurance company.     Turnaround time for your insurance to make a decision on your Prior Authorization can take 7-21 business days.

## 2025-06-10 NOTE — ASSESSMENT & PLAN NOTE
- Down 9 pounds since last visit.  - Will increase Zepbound to 15 mg weekly. Discussed side effects.  - Encourage healthy diet and regular exercise.  - Recommend follow up in 1 month.     Orders:  •  tirzepatide (Zepbound) 15 mg/0.5 mL auto-injector; Inject 0.5 mL (15 mg total) under the skin once a week

## 2025-06-11 RX ORDER — PHENTERMINE HYDROCHLORIDE 37.5 MG/1
CAPSULE ORAL
Refills: 0 | OUTPATIENT
Start: 2025-06-11

## 2025-06-11 NOTE — TELEPHONE ENCOUNTER
Can we call patient and have her check with her insurance. She has been on this several months without an issue.

## 2025-06-11 NOTE — TELEPHONE ENCOUNTER
PA for Zepbound 15mg/0.5mL APPROVED     Date(s) approved 06/10/2025-12/10/2025    Case #[id number]    Patient advised by          []Abazabhart Message  []Phone call   [x]LMOM  []L/M to call office as no active Communication consent on file  []Unable to leave detailed message as VM not approved on Communication consent       Pharmacy advised by    [x]Fax  []Phone call  []Secure Chat     Approval letter scanned into Media Yes

## 2025-06-12 ENCOUNTER — RESULTS FOLLOW-UP (OUTPATIENT)
Dept: FAMILY MEDICINE CLINIC | Facility: CLINIC | Age: 45
End: 2025-06-12

## 2025-06-12 DIAGNOSIS — M79.89 LEG SWELLING: ICD-10-CM

## 2025-06-12 LAB — BACTERIA UR CULT: ABNORMAL

## 2025-06-12 RX ORDER — FUROSEMIDE 20 MG/1
20 TABLET ORAL DAILY
Qty: 90 TABLET | Refills: 1 | Status: SHIPPED | OUTPATIENT
Start: 2025-06-12 | End: 2025-06-17 | Stop reason: SDUPTHER

## 2025-06-17 ENCOUNTER — HOSPITAL ENCOUNTER (OUTPATIENT)
Dept: RADIOLOGY | Facility: IMAGING CENTER | Age: 45
Discharge: HOME | End: 2025-06-17
Attending: ORTHOPAEDIC SURGERY
Payer: COMMERCIAL

## 2025-06-17 DIAGNOSIS — M79.89 LEG SWELLING: ICD-10-CM

## 2025-06-17 DIAGNOSIS — M25.511 PAIN IN RIGHT SHOULDER: ICD-10-CM

## 2025-06-17 DIAGNOSIS — R30.0 DYSURIA: Primary | ICD-10-CM

## 2025-06-17 PROCEDURE — 73221 MRI JOINT UPR EXTREM W/O DYE: CPT

## 2025-06-17 RX ORDER — FUROSEMIDE 20 MG/1
20 TABLET ORAL DAILY
Qty: 90 TABLET | Refills: 1 | Status: SHIPPED | OUTPATIENT
Start: 2025-06-17

## 2025-06-18 ENCOUNTER — APPOINTMENT (OUTPATIENT)
Dept: LAB | Age: 45
End: 2025-06-18
Payer: COMMERCIAL

## 2025-06-18 DIAGNOSIS — D50.8 IRON DEFICIENCY ANEMIA SECONDARY TO INADEQUATE DIETARY IRON INTAKE: ICD-10-CM

## 2025-06-18 DIAGNOSIS — R30.0 DYSURIA: ICD-10-CM

## 2025-06-18 DIAGNOSIS — E66.813 CLASS 3 SEVERE OBESITY DUE TO EXCESS CALORIES WITH SERIOUS COMORBIDITY AND BODY MASS INDEX (BMI) OF 40.0 TO 44.9 IN ADULT: ICD-10-CM

## 2025-06-18 DIAGNOSIS — Z00.00 HEALTHCARE MAINTENANCE: ICD-10-CM

## 2025-06-18 LAB
ALBUMIN SERPL BCG-MCNC: 4.1 G/DL (ref 3.5–5)
ALP SERPL-CCNC: 79 U/L (ref 34–104)
ALT SERPL W P-5'-P-CCNC: 11 U/L (ref 7–52)
ANION GAP SERPL CALCULATED.3IONS-SCNC: 11 MMOL/L (ref 4–13)
AST SERPL W P-5'-P-CCNC: 24 U/L (ref 13–39)
BACTERIA UR QL AUTO: ABNORMAL /HPF
BASOPHILS # BLD AUTO: 0.01 THOUSANDS/ÂΜL (ref 0–0.1)
BASOPHILS NFR BLD AUTO: 0 % (ref 0–1)
BILIRUB SERPL-MCNC: 0.51 MG/DL (ref 0.2–1)
BILIRUB UR QL STRIP: NEGATIVE
BUN SERPL-MCNC: 10 MG/DL (ref 5–25)
CALCIUM SERPL-MCNC: 8.9 MG/DL (ref 8.4–10.2)
CHLORIDE SERPL-SCNC: 104 MMOL/L (ref 96–108)
CHOLEST SERPL-MCNC: 170 MG/DL (ref ?–200)
CLARITY UR: ABNORMAL
CO2 SERPL-SCNC: 25 MMOL/L (ref 21–32)
COLOR UR: YELLOW
CREAT SERPL-MCNC: 0.86 MG/DL (ref 0.6–1.3)
EOSINOPHIL # BLD AUTO: 0.09 THOUSAND/ÂΜL (ref 0–0.61)
EOSINOPHIL NFR BLD AUTO: 2 % (ref 0–6)
ERYTHROCYTE [DISTWIDTH] IN BLOOD BY AUTOMATED COUNT: 15 % (ref 11.6–15.1)
FERRITIN SERPL-MCNC: 15 NG/ML (ref 30–307)
GFR SERPL CREATININE-BSD FRML MDRD: 83 ML/MIN/1.73SQ M
GLUCOSE P FAST SERPL-MCNC: 79 MG/DL (ref 65–99)
GLUCOSE UR STRIP-MCNC: NEGATIVE MG/DL
HCT VFR BLD AUTO: 38.2 % (ref 34.8–46.1)
HDLC SERPL-MCNC: 69 MG/DL
HGB BLD-MCNC: 12.3 G/DL (ref 11.5–15.4)
HGB UR QL STRIP.AUTO: NEGATIVE
HYALINE CASTS #/AREA URNS LPF: ABNORMAL /LPF
IMM GRANULOCYTES # BLD AUTO: 0.01 THOUSAND/UL (ref 0–0.2)
IMM GRANULOCYTES NFR BLD AUTO: 0 % (ref 0–2)
IRON SATN MFR SERPL: 18 % (ref 15–50)
IRON SERPL-MCNC: 89 UG/DL (ref 50–212)
KETONES UR STRIP-MCNC: ABNORMAL MG/DL
LDLC SERPL CALC-MCNC: 88 MG/DL (ref 0–100)
LEUKOCYTE ESTERASE UR QL STRIP: ABNORMAL
LYMPHOCYTES # BLD AUTO: 2.06 THOUSANDS/ÂΜL (ref 0.6–4.47)
LYMPHOCYTES NFR BLD AUTO: 38 % (ref 14–44)
MCH RBC QN AUTO: 29.4 PG (ref 26.8–34.3)
MCHC RBC AUTO-ENTMCNC: 32.2 G/DL (ref 31.4–37.4)
MCV RBC AUTO: 91 FL (ref 82–98)
MONOCYTES # BLD AUTO: 0.42 THOUSAND/ÂΜL (ref 0.17–1.22)
MONOCYTES NFR BLD AUTO: 8 % (ref 4–12)
MUCOUS THREADS UR QL AUTO: ABNORMAL
NEUTROPHILS # BLD AUTO: 2.9 THOUSANDS/ÂΜL (ref 1.85–7.62)
NEUTS SEG NFR BLD AUTO: 52 % (ref 43–75)
NITRITE UR QL STRIP: NEGATIVE
NON-SQ EPI CELLS URNS QL MICRO: ABNORMAL /HPF
NRBC BLD AUTO-RTO: 0 /100 WBCS
PH UR STRIP.AUTO: 5.5 [PH]
PLATELET # BLD AUTO: 287 THOUSANDS/UL (ref 149–390)
PMV BLD AUTO: 10 FL (ref 8.9–12.7)
POTASSIUM SERPL-SCNC: 4.1 MMOL/L (ref 3.5–5.3)
PROT SERPL-MCNC: 7.5 G/DL (ref 6.4–8.4)
PROT UR STRIP-MCNC: ABNORMAL MG/DL
RBC # BLD AUTO: 4.19 MILLION/UL (ref 3.81–5.12)
RBC #/AREA URNS AUTO: ABNORMAL /HPF
SODIUM SERPL-SCNC: 140 MMOL/L (ref 135–147)
SP GR UR STRIP.AUTO: 1.03 (ref 1–1.03)
TIBC SERPL-MCNC: 495.6 UG/DL (ref 250–450)
TRANSFERRIN SERPL-MCNC: 354 MG/DL (ref 203–362)
TRIGL SERPL-MCNC: 67 MG/DL (ref ?–150)
TSH SERPL DL<=0.05 MIU/L-ACNC: 3.39 UIU/ML (ref 0.45–4.5)
UIBC SERPL-MCNC: 407 UG/DL (ref 155–355)
UROBILINOGEN UR STRIP-ACNC: <2 MG/DL
WBC # BLD AUTO: 5.49 THOUSAND/UL (ref 4.31–10.16)
WBC #/AREA URNS AUTO: ABNORMAL /HPF

## 2025-06-18 PROCEDURE — 80061 LIPID PANEL: CPT

## 2025-06-18 PROCEDURE — 80053 COMPREHEN METABOLIC PANEL: CPT

## 2025-06-18 PROCEDURE — 87086 URINE CULTURE/COLONY COUNT: CPT

## 2025-06-18 PROCEDURE — 82728 ASSAY OF FERRITIN: CPT

## 2025-06-18 PROCEDURE — 83540 ASSAY OF IRON: CPT

## 2025-06-18 PROCEDURE — 81001 URINALYSIS AUTO W/SCOPE: CPT

## 2025-06-18 PROCEDURE — 84443 ASSAY THYROID STIM HORMONE: CPT

## 2025-06-18 PROCEDURE — 83550 IRON BINDING TEST: CPT

## 2025-06-18 PROCEDURE — 85025 COMPLETE CBC W/AUTO DIFF WBC: CPT

## 2025-06-18 PROCEDURE — 36415 COLL VENOUS BLD VENIPUNCTURE: CPT

## 2025-06-20 LAB — BACTERIA UR CULT: NORMAL

## 2025-06-24 ENCOUNTER — RESULTS FOLLOW-UP (OUTPATIENT)
Dept: FAMILY MEDICINE CLINIC | Facility: CLINIC | Age: 45
End: 2025-06-24

## 2025-07-09 ENCOUNTER — OFFICE VISIT (OUTPATIENT)
Dept: FAMILY MEDICINE CLINIC | Facility: CLINIC | Age: 45
End: 2025-07-09
Payer: COMMERCIAL

## 2025-07-09 VITALS
RESPIRATION RATE: 16 BRPM | SYSTOLIC BLOOD PRESSURE: 122 MMHG | OXYGEN SATURATION: 99 % | DIASTOLIC BLOOD PRESSURE: 76 MMHG | BODY MASS INDEX: 35.85 KG/M2 | HEIGHT: 64 IN | TEMPERATURE: 98.6 F | HEART RATE: 94 BPM | WEIGHT: 210 LBS

## 2025-07-09 DIAGNOSIS — Z01.818 PREOPERATIVE CLEARANCE: Primary | ICD-10-CM

## 2025-07-09 DIAGNOSIS — M25.511 CHRONIC RIGHT SHOULDER PAIN: ICD-10-CM

## 2025-07-09 DIAGNOSIS — F32.A DEPRESSION, UNSPECIFIED DEPRESSION TYPE: ICD-10-CM

## 2025-07-09 DIAGNOSIS — E66.812 CLASS 2 SEVERE OBESITY DUE TO EXCESS CALORIES WITH SERIOUS COMORBIDITY AND BODY MASS INDEX (BMI) OF 36.0 TO 36.9 IN ADULT (HCC): ICD-10-CM

## 2025-07-09 DIAGNOSIS — E66.01 CLASS 2 SEVERE OBESITY DUE TO EXCESS CALORIES WITH SERIOUS COMORBIDITY AND BODY MASS INDEX (BMI) OF 36.0 TO 36.9 IN ADULT (HCC): ICD-10-CM

## 2025-07-09 DIAGNOSIS — S46.001A INJURY OF RIGHT ROTATOR CUFF, INITIAL ENCOUNTER: ICD-10-CM

## 2025-07-09 DIAGNOSIS — G89.29 CHRONIC RIGHT SHOULDER PAIN: ICD-10-CM

## 2025-07-09 PROCEDURE — 99214 OFFICE O/P EST MOD 30 MIN: CPT | Performed by: NURSE PRACTITIONER

## 2025-07-09 RX ORDER — GABAPENTIN 300 MG/1
300 CAPSULE ORAL 2 TIMES DAILY PRN
Qty: 60 CAPSULE | Refills: 1 | Status: SHIPPED | OUTPATIENT
Start: 2025-07-09 | End: 2025-08-08

## 2025-07-09 NOTE — ASSESSMENT & PLAN NOTE
- Continue gabapentin 300 mg twice daily as needed.  - Continue follow up with Orthopedic Surgery.   Orders:  •  gabapentin (NEURONTIN) 300 mg capsule; Take 1 capsule (300 mg total) by mouth 2 (two) times a day as needed (pain)

## 2025-07-09 NOTE — ASSESSMENT & PLAN NOTE
- Scheduled for arthroscopic rotator cuff repair of the right shoulder on 7/10.  - Continue follow up with Orthopedic Surgery.

## 2025-07-09 NOTE — ASSESSMENT & PLAN NOTE
- Down 4 pounds since last visit.  - Starting weight: 244 lb.     Current weight: 210 lb.    Total weight loss: 34 lb.  - Continue Zepbound 15 mg weekly. Discussed side effects.  - Encourage healthy diet and regular exercise.  - Recommend follow up in 3 months.

## 2025-07-09 NOTE — PROGRESS NOTES
Name: Nydia So      : 1980      MRN: [id number]  Encounter Provider: DIGNA Vinson  Encounter Date: 2025   Encounter department: [facility]  :  Assessment & Plan  Preoperative clearance  - Patient is an acceptable risk for the proposed procedure.       Injury of right rotator cuff, initial encounter  - Scheduled for arthroscopic rotator cuff repair of the right shoulder on 7/10.  - Continue follow up with Orthopedic Surgery.        Chronic right shoulder pain  - Continue gabapentin 300 mg twice daily as needed.  - Continue follow up with Orthopedic Surgery.   Orders:  •  gabapentin (NEURONTIN) 300 mg capsule; Take 1 capsule (300 mg total) by mouth 2 (two) times a day as needed (pain)    Class 2 severe obesity due to excess calories with serious comorbidity and body mass index (BMI) of 36.0 to 36.9 in adult (HCC)  - Down 4 pounds since last visit.  - Starting weight: 244 lb.     Current weight: 210 lb.    Total weight loss: 34 lb.  - Continue Zepbound 15 mg weekly. Discussed side effects.  - Encourage healthy diet and regular exercise.  - Recommend follow up in 3 months.          Depression, unspecified depression type  - Improving.   - Continue Wellbutrin 300 mg daily.   - Will continue to monitor.               History of Present Illness   Patient with PMH of GERD, iron deficiency, and Hx of gastric bypass surgery presents to office today for follow up. She is taking Zepbound for weight loss. She is down 4 pounds since last visit. She is down a total of 34 pounds. She is tolerating the medication well. Her Wellbutrin was increased to 300 mg last visit. She is feeling better with the increased dose. She needs pre-op clearance today for shoulder surgery tomorrow. She denies any other concerns or complaints today.           Review of Systems   Constitutional:  Negative for fatigue and fever.   HENT:  Negative for trouble swallowing.    Eyes:  Negative for  "visual disturbance.   Respiratory:  Negative for cough and shortness of breath.    Cardiovascular:  Negative for chest pain and palpitations.   Gastrointestinal:  Negative for abdominal pain and blood in stool.   Endocrine: Negative for cold intolerance and heat intolerance.   Genitourinary:  Negative for difficulty urinating and dysuria.   Musculoskeletal:  Negative for gait problem.   Skin:  Negative for rash.   Neurological:  Negative for dizziness, syncope and headaches.   Hematological:  Negative for adenopathy.   Psychiatric/Behavioral:  Negative for behavioral problems.        Objective   /76 (BP Location: Left arm, Patient Position: Sitting, Cuff Size: Large)   Pulse 94   Temp 98.6 °F (37 °C) (Tympanic)   Resp 16   Ht 5' 4\" (1.626 m)   Wt 95.3 kg (210 lb)   LMP 08/06/2023   SpO2 99%   BMI 36.05 kg/m²      Physical Exam  Vitals and nursing note reviewed.   Constitutional:       Appearance: Normal appearance. She is well-developed.   HENT:      Head: Normocephalic and atraumatic.      Right Ear: External ear normal.      Left Ear: External ear normal.      Nose: Nose normal.      Mouth/Throat:      Mouth: Mucous membranes are moist.      Pharynx: Oropharynx is clear.     Eyes:      Conjunctiva/sclera: Conjunctivae normal.       Cardiovascular:      Rate and Rhythm: Normal rate and regular rhythm.      Heart sounds: Normal heart sounds.   Pulmonary:      Effort: Pulmonary effort is normal.      Breath sounds: Normal breath sounds.     Musculoskeletal:         General: Normal range of motion.      Cervical back: Normal range of motion.     Skin:     General: Skin is warm and dry.     Neurological:      Mental Status: She is alert and oriented to person, place, and time.     Psychiatric:         Mood and Affect: Mood normal.         Behavior: Behavior normal.         "

## 2025-07-16 DIAGNOSIS — E66.812 CLASS 2 SEVERE OBESITY DUE TO EXCESS CALORIES WITH SERIOUS COMORBIDITY AND BODY MASS INDEX (BMI) OF 36.0 TO 36.9 IN ADULT (HCC): ICD-10-CM

## 2025-07-16 DIAGNOSIS — E66.01 CLASS 2 SEVERE OBESITY DUE TO EXCESS CALORIES WITH SERIOUS COMORBIDITY AND BODY MASS INDEX (BMI) OF 36.0 TO 36.9 IN ADULT (HCC): ICD-10-CM

## 2025-07-18 RX ORDER — TIRZEPATIDE 15 MG/.5ML
15 INJECTION, SOLUTION SUBCUTANEOUS WEEKLY
Qty: 2 ML | Refills: 0 | Status: SHIPPED | OUTPATIENT
Start: 2025-07-18

## 2025-08-05 ENCOUNTER — TELEPHONE (OUTPATIENT)
Dept: BARIATRICS | Facility: CLINIC | Age: 45
End: 2025-08-05

## 2025-08-06 DIAGNOSIS — G89.29 CHRONIC RIGHT SHOULDER PAIN: ICD-10-CM

## 2025-08-06 DIAGNOSIS — F32.89 OTHER DEPRESSION: ICD-10-CM

## 2025-08-06 DIAGNOSIS — M79.89 LEG SWELLING: ICD-10-CM

## 2025-08-06 DIAGNOSIS — M25.511 CHRONIC RIGHT SHOULDER PAIN: ICD-10-CM

## 2025-08-06 RX ORDER — BUPROPION HYDROCHLORIDE 300 MG/1
300 TABLET ORAL EVERY MORNING
Qty: 90 TABLET | Refills: 0 | Status: CANCELLED | OUTPATIENT
Start: 2025-08-06 | End: 2026-02-02

## 2025-08-06 RX ORDER — FUROSEMIDE 20 MG/1
20 TABLET ORAL DAILY
Qty: 90 TABLET | Refills: 0 | Status: CANCELLED | OUTPATIENT
Start: 2025-08-06

## 2025-08-07 ENCOUNTER — OFFICE VISIT (OUTPATIENT)
Dept: FAMILY MEDICINE CLINIC | Facility: CLINIC | Age: 45
End: 2025-08-07
Payer: COMMERCIAL

## 2025-08-07 VITALS
SYSTOLIC BLOOD PRESSURE: 130 MMHG | WEIGHT: 212 LBS | DIASTOLIC BLOOD PRESSURE: 78 MMHG | RESPIRATION RATE: 16 BRPM | BODY MASS INDEX: 36.19 KG/M2 | OXYGEN SATURATION: 99 % | HEART RATE: 83 BPM | HEIGHT: 64 IN | TEMPERATURE: 98.3 F

## 2025-08-07 DIAGNOSIS — B34.9 VIRAL ILLNESS: Primary | ICD-10-CM

## 2025-08-07 DIAGNOSIS — F32.89 OTHER DEPRESSION: ICD-10-CM

## 2025-08-07 DIAGNOSIS — E66.01 CLASS 2 SEVERE OBESITY DUE TO EXCESS CALORIES WITH SERIOUS COMORBIDITY AND BODY MASS INDEX (BMI) OF 36.0 TO 36.9 IN ADULT (HCC): ICD-10-CM

## 2025-08-07 DIAGNOSIS — M79.89 LEG SWELLING: ICD-10-CM

## 2025-08-07 DIAGNOSIS — E66.812 CLASS 2 SEVERE OBESITY DUE TO EXCESS CALORIES WITH SERIOUS COMORBIDITY AND BODY MASS INDEX (BMI) OF 36.0 TO 36.9 IN ADULT (HCC): ICD-10-CM

## 2025-08-07 PROCEDURE — 99214 OFFICE O/P EST MOD 30 MIN: CPT | Performed by: NURSE PRACTITIONER

## 2025-08-07 RX ORDER — TIRZEPATIDE 15 MG/.5ML
15 INJECTION, SOLUTION SUBCUTANEOUS WEEKLY
Qty: 2 ML | Refills: 2 | Status: SHIPPED | OUTPATIENT
Start: 2025-08-07 | End: 2025-08-12 | Stop reason: SDUPTHER

## 2025-08-07 RX ORDER — FUROSEMIDE 20 MG/1
20 TABLET ORAL DAILY
Qty: 90 TABLET | Refills: 1 | Status: SHIPPED | OUTPATIENT
Start: 2025-08-07 | End: 2025-08-12 | Stop reason: SDUPTHER

## 2025-08-07 RX ORDER — BUPROPION HYDROCHLORIDE 300 MG/1
300 TABLET ORAL EVERY MORNING
Qty: 90 TABLET | Refills: 1 | Status: SHIPPED | OUTPATIENT
Start: 2025-08-07 | End: 2025-08-12 | Stop reason: SDUPTHER

## 2025-08-08 RX ORDER — GABAPENTIN 300 MG/1
300 CAPSULE ORAL 2 TIMES DAILY PRN
Qty: 60 CAPSULE | Refills: 0 | OUTPATIENT
Start: 2025-08-08 | End: 2025-09-07

## 2025-08-21 ENCOUNTER — TELEPHONE (OUTPATIENT)
Dept: BARIATRICS | Facility: CLINIC | Age: 45
End: 2025-08-21

## (undated) DEVICE — ASTOUND STANDARD SURGICAL GOWN, XL: Brand: CONVERTORS

## (undated) DEVICE — ENDOPATH 5MM CURVED SCISSORS WITH MONOPOLAR CAUTERY: Brand: ENDOPATH

## (undated) DEVICE — STAPLER EEA 25 MM COVIDIEN

## (undated) DEVICE — BAG DECANTER

## (undated) DEVICE — LIGASURE LAP SLR/DIV MARYLAND 5MM

## (undated) DEVICE — SCD SEQUENTIAL COMPRESSION COMFORT SLEEVE MEDIUM KNEE LENGTH: Brand: KENDALL SCD

## (undated) DEVICE — LAPAROSCOPIC SMOKE EVAC TUBING

## (undated) DEVICE — TROCAR: Brand: KII FIOS FIRST ENTRY

## (undated) DEVICE — SYRINGE 20ML LL

## (undated) DEVICE — INTENDED FOR TISSUE SEPARATION, AND OTHER PROCEDURES THAT REQUIRE A SHARP SURGICAL BLADE TO PUNCTURE OR CUT.: Brand: BARD-PARKER SAFETY BLADES SIZE 15, STERILE

## (undated) DEVICE — INSUFFLATION NEEDLE TO ESTABLISH PNEUMOPERITONEUM.: Brand: INSUFFLATION NEEDLE

## (undated) DEVICE — CURETTE VACURETTE CRVD 8MM

## (undated) DEVICE — DRAPE EQUIPMENT RF WAND

## (undated) DEVICE — VIOLET BRAIDED (POLYGLACTIN 910), SYNTHETIC ABSORBABLE SUTURE: Brand: COATED VICRYL

## (undated) DEVICE — 40595 XL TRENDELENBURG POSITIONING KIT: Brand: 40595 XL TRENDELENBURG POSITIONING KIT

## (undated) DEVICE — M-CLOSE KIT

## (undated) DEVICE — PMI DISPOSABLE PUNCTURE CLOSURE DEVICE / SUTURE GRASPER: Brand: PMI

## (undated) DEVICE — SURGICAL GOWN, XL SMARTSLEEVE: Brand: CONVERTORS

## (undated) DEVICE — ELECTRODE LAP J HOOK E-Z CLEAN 33CM-0021

## (undated) DEVICE — UTERINE MANIPULATOR RUMI 6.7 X 10 CM

## (undated) DEVICE — SUT ETHILON 2-0 FS 18 IN 664H

## (undated) DEVICE — TRAY FOLEY 16FR URIMETER SILICONE SURESTEP

## (undated) DEVICE — SUT MONOCRYL 4-0 PS-2 27 IN Y426H

## (undated) DEVICE — URETERAL CATHETER ADAPTOR TIP

## (undated) DEVICE — 3000CC GUARDIAN II: Brand: GUARDIAN

## (undated) DEVICE — NEEDLE 25G X 1 1/2

## (undated) DEVICE — 2000CC GUARDIAN II: Brand: GUARDIAN

## (undated) DEVICE — BETHLEHEM UNIVERSAL MINOR VAG: Brand: CARDINAL HEALTH

## (undated) DEVICE — STAPLER ENDO GIA ROTICULATOR 60-2.5

## (undated) DEVICE — BLUE HEAT SCOPE WARMER

## (undated) DEVICE — NEEDLE HYPO 22G X 1-1/2 IN

## (undated) DEVICE — SUT SILK 2-0 SH 30 IN K833H

## (undated) DEVICE — LIGACLIP 10-M/L, 10MM ENDOSCOPIC ROTATING MULTIPLE CLIP APPLIERS: Brand: LIGACLIP

## (undated) DEVICE — GLOVE INDICATOR PI UNDERGLOVE SZ 7.5 BLUE

## (undated) DEVICE — GLOVE SRG BIOGEL 7.5

## (undated) DEVICE — MORCELLATOR LAP LINA XCISE 15 MM OBTURATOR CRDLS

## (undated) DEVICE — SINGLE-USE BIOPSY FORCEPS: Brand: RADIAL JAW 4

## (undated) DEVICE — PENCIL ELECTROSURG E-Z CLEAN -0035H

## (undated) DEVICE — Device

## (undated) DEVICE — WEBRIL 6 IN UNSTERILE

## (undated) DEVICE — TROCAR: Brand: KII SLEEVE

## (undated) DEVICE — ADHESIVE SKIN CLSR DERMABOND NX

## (undated) DEVICE — FLEXIBLE ADHESIVE BANDAGE,X-LARGE: Brand: CURITY

## (undated) DEVICE — TIBURON LAPAROSCOPIC ABDOMINAL DRAPE: Brand: CONVERTORS

## (undated) DEVICE — TROCAR VISIPORT

## (undated) DEVICE — GLOVE SRG BIOGEL 8

## (undated) DEVICE — Device: Brand: TISSUE RETRIEVAL SYSTEM

## (undated) DEVICE — [HIGH FLOW INSUFFLATOR,  DO NOT USE IF PACKAGE IS DAMAGED,  KEEP DRY,  KEEP AWAY FROM SUNLIGHT,  PROTECT FROM HEAT AND RADIOACTIVE SOURCES.]: Brand: PNEUMOSURE

## (undated) DEVICE — LAPAROSCOPIC TROCAR SLEEVE/SINGLE USE: Brand: KII® SLEEVE

## (undated) DEVICE — IRRIG ENDO FLO TUBING

## (undated) DEVICE — Device: Brand: OLYMPUS

## (undated) DEVICE — TUBING SMOKE EVAC W/FILTRATION DEVICE PLUMEPORT ACTIV

## (undated) DEVICE — SUT PLAIN 3-0 PS-1 27 IN 1640H

## (undated) DEVICE — COVIDIEN ENDO GIA PURPLE (MED) RELOAD 60MM

## (undated) DEVICE — CHLORAPREP HI-LITE 26ML ORANGE

## (undated) DEVICE — GLOVE PI ULTRA TOUCH SZ.7.0

## (undated) DEVICE — VIAL DECANTER

## (undated) DEVICE — ENDO STITCH 2-0 SURGIDAC 48 IN

## (undated) DEVICE — ALLENTOWN LAP CHOLE APP PACK: Brand: CARDINAL HEALTH

## (undated) DEVICE — STRL COTTON TIP APPLCTR 6IN PK: Brand: CARDINAL HEALTH

## (undated) DEVICE — PVC URETHRAL CATHETER: Brand: DOVER

## (undated) DEVICE — GLOVE PI ULTRA TOUCH SZ.7.5

## (undated) DEVICE — TROCARS: Brand: KII® BALLOON BLUNT TIP SYSTEM

## (undated) DEVICE — SPONGE 4 X 4 XRAY 16 PLY STRL LF RFD

## (undated) DEVICE — PLASTIC ADHESIVE BANDAGE: Brand: CURITY

## (undated) DEVICE — TROCARS: Brand: KII® OPTICAL ACCESS SYSTEM

## (undated) DEVICE — MAYO STAND COVER: Brand: CONVERTORS

## (undated) DEVICE — SPONGE LAP 18 X 18 IN STRL RFD

## (undated) DEVICE — GLOVE INDICATOR PI UNDERGLOVE SZ 7 BLUE

## (undated) DEVICE — PREMIUM DRY TRAY LF: Brand: MEDLINE INDUSTRIES, INC.

## (undated) DEVICE — BETHLEHEM UNIVERSAL GYN LAP PK: Brand: CARDINAL HEALTH

## (undated) DEVICE — POWER SHELL SIGNIA

## (undated) DEVICE — SYRINGE 30ML LL

## (undated) DEVICE — TRAVELKIT CONTAINS FIRST STEP KIT (200ML EP-4 KIT) AND SOILED SCOPE BAG - 1 KIT: Brand: TRAVELKIT CONTAINS FIRST STEP KIT AND SOILED SCOPE BAG

## (undated) DEVICE — GLOVE PI ULTRA TOUCH SZ.6.5

## (undated) DEVICE — ENDO STITCH 2-0 VICRYL

## (undated) DEVICE — 10 MM BABCOCKS WITH RATCHET HANDLES: Brand: ENDOPATH

## (undated) DEVICE — TUBING SUCTION 5MM X 12 FT

## (undated) DEVICE — ELECTRODE LAP SPATULA STR E-Z CLEAN 33CM -0018

## (undated) DEVICE — STANDARD SURGICAL GOWN, L: Brand: CONVERTORS

## (undated) DEVICE — HARMONIC 1100 SHEARS, 36CM SHAFT LENGTH: Brand: HARMONIC

## (undated) DEVICE — INTENDED FOR TISSUE SEPARATION, AND OTHER PROCEDURES THAT REQUIRE A SHARP SURGICAL BLADE TO PUNCTURE OR CUT.: Brand: BARD-PARKER SAFETY BLADES SIZE 11, STERILE

## (undated) DEVICE — COLLECTION SET, DISPOSABLE WITH HANDLE AND TAPERED FITTINGS TUBING, 6 FT (183 CM): Brand: GYRUS ACMI

## (undated) DEVICE — VISUALIZATION SYSTEM: Brand: CLEARIFY

## (undated) DEVICE — ENDO STITCH DEVICE 10 MM

## (undated) DEVICE — 3M™ STERI-STRIP™ REINFORCED ADHESIVE SKIN CLOSURES, R1542, 1/4 IN X 1-1/2 IN (6 MM X 38 MM), 6 STRIPS/ENVELOPE: Brand: 3M™ STERI-STRIP™

## (undated) DEVICE — INTRODUCER ANAL ABDOM EEA25 DISP STRL

## (undated) DEVICE — NEEDLE SPINAL18G X 3.5 IN QUINCKE

## (undated) DEVICE — TRAY FOLEY 16FR URIMETER SURESTEP

## (undated) DEVICE — ENDOPOUCH RETRIEVER SPECIMEN RETRIEVAL BAGS: Brand: ENDOPOUCH RETRIEVER